# Patient Record
Sex: FEMALE | Race: WHITE | NOT HISPANIC OR LATINO | Employment: FULL TIME | URBAN - METROPOLITAN AREA
[De-identification: names, ages, dates, MRNs, and addresses within clinical notes are randomized per-mention and may not be internally consistent; named-entity substitution may affect disease eponyms.]

---

## 2017-05-13 LAB
EXTERNAL HIV CONFIRMATION: NORMAL
EXTERNAL HIV SCREEN: NORMAL
EXTERNAL HIV SCREEN: NORMAL

## 2018-10-09 ENCOUNTER — APPOINTMENT (OUTPATIENT)
Dept: URGENT CARE | Facility: MEDICAL CENTER | Age: 58
End: 2018-10-09
Payer: OTHER MISCELLANEOUS

## 2018-10-09 PROCEDURE — 99283 EMERGENCY DEPT VISIT LOW MDM: CPT

## 2018-10-09 PROCEDURE — G0382 LEV 3 HOSP TYPE B ED VISIT: HCPCS

## 2018-10-11 ENCOUNTER — APPOINTMENT (OUTPATIENT)
Dept: URGENT CARE | Facility: MEDICAL CENTER | Age: 58
End: 2018-10-11
Payer: OTHER MISCELLANEOUS

## 2018-10-11 PROCEDURE — 99213 OFFICE O/P EST LOW 20 MIN: CPT

## 2018-10-15 ENCOUNTER — APPOINTMENT (OUTPATIENT)
Dept: URGENT CARE | Facility: MEDICAL CENTER | Age: 58
End: 2018-10-15
Payer: OTHER MISCELLANEOUS

## 2018-10-15 PROCEDURE — 99213 OFFICE O/P EST LOW 20 MIN: CPT

## 2018-10-22 ENCOUNTER — APPOINTMENT (OUTPATIENT)
Dept: URGENT CARE | Facility: MEDICAL CENTER | Age: 58
End: 2018-10-22
Payer: OTHER MISCELLANEOUS

## 2018-10-22 PROCEDURE — 99213 OFFICE O/P EST LOW 20 MIN: CPT

## 2018-10-29 ENCOUNTER — APPOINTMENT (OUTPATIENT)
Dept: URGENT CARE | Facility: MEDICAL CENTER | Age: 58
End: 2018-10-29
Payer: OTHER MISCELLANEOUS

## 2018-10-29 PROCEDURE — 99213 OFFICE O/P EST LOW 20 MIN: CPT

## 2019-01-08 RX ORDER — DICLOFENAC SODIUM 75 MG/1
75 TABLET, DELAYED RELEASE ORAL 3 TIMES DAILY
COMMUNITY
End: 2020-09-28 | Stop reason: ALTCHOICE

## 2019-01-08 RX ORDER — METAXALONE 800 MG/1
800 TABLET ORAL 3 TIMES DAILY
COMMUNITY
Start: 2018-11-26 | End: 2022-03-29 | Stop reason: ALTCHOICE

## 2019-01-08 RX ORDER — PREDNISONE 20 MG/1
20 TABLET ORAL AS NEEDED
COMMUNITY
End: 2020-12-02

## 2019-01-08 RX ORDER — CETIRIZINE HYDROCHLORIDE 10 MG/1
10 TABLET, CHEWABLE ORAL DAILY
COMMUNITY
End: 2021-01-05 | Stop reason: ALTCHOICE

## 2019-01-08 RX ORDER — ONDANSETRON 4 MG/1
4 TABLET, ORALLY DISINTEGRATING ORAL 2 TIMES DAILY PRN
Status: ON HOLD | COMMUNITY
Start: 2016-11-30 | End: 2020-12-25 | Stop reason: CLARIF

## 2019-01-08 RX ORDER — NORTRIPTYLINE HYDROCHLORIDE 10 MG/1
10 CAPSULE ORAL
COMMUNITY
Start: 2018-08-29 | End: 2020-01-29 | Stop reason: ALTCHOICE

## 2019-01-08 RX ORDER — GABAPENTIN 300 MG/1
CAPSULE ORAL
COMMUNITY
Start: 2018-12-27

## 2019-01-08 RX ORDER — ZOLMITRIPTAN 2.5 MG/1
TABLET, FILM COATED ORAL
COMMUNITY
Start: 2018-08-29 | End: 2020-02-05 | Stop reason: SDUPTHER

## 2019-01-08 RX ORDER — DULOXETIN HYDROCHLORIDE 60 MG/1
60 CAPSULE, DELAYED RELEASE ORAL DAILY
COMMUNITY
Start: 2018-12-27 | End: 2020-03-24 | Stop reason: SDUPTHER

## 2019-01-08 RX ORDER — ARMODAFINIL 250 MG/1
250 TABLET ORAL
COMMUNITY
Start: 2018-11-13 | End: 2019-08-30 | Stop reason: SDUPTHER

## 2019-01-08 RX ORDER — AMPICILLIN TRIHYDRATE 250 MG
CAPSULE ORAL
COMMUNITY
End: 2019-11-20 | Stop reason: HOSPADM

## 2019-01-08 RX ORDER — MORPHINE SULFATE 15 MG/1
15 TABLET, FILM COATED, EXTENDED RELEASE ORAL 2 TIMES DAILY
Refills: 0 | COMMUNITY
Start: 2018-11-05 | End: 2019-06-25 | Stop reason: ALTCHOICE

## 2019-01-08 RX ORDER — FERROUS SULFATE 325(65) MG
325 TABLET ORAL
COMMUNITY
Start: 2014-09-04

## 2019-01-08 RX ORDER — HYOSCYAMINE SULFATE 0.125 MG
0.12 TABLET ORAL
COMMUNITY
Start: 2016-04-16 | End: 2019-09-25 | Stop reason: ALTCHOICE

## 2019-01-11 ENCOUNTER — OFFICE VISIT (OUTPATIENT)
Dept: UROLOGY | Facility: MEDICAL CENTER | Age: 59
End: 2019-01-11

## 2019-01-11 VITALS
WEIGHT: 134 LBS | BODY MASS INDEX: 22.88 KG/M2 | HEART RATE: 87 BPM | DIASTOLIC BLOOD PRESSURE: 70 MMHG | HEIGHT: 64 IN | SYSTOLIC BLOOD PRESSURE: 120 MMHG

## 2019-01-11 DIAGNOSIS — Z87.448 HISTORY OF HYDRONEPHROSIS: ICD-10-CM

## 2019-01-11 DIAGNOSIS — N39.41 URGE INCONTINENCE: ICD-10-CM

## 2019-01-11 DIAGNOSIS — R35.0 FREQUENCY OF URINATION: Primary | ICD-10-CM

## 2019-01-11 LAB
POST-VOID RESIDUAL VOLUME, ML POC: 6 ML
SL AMB  POCT GLUCOSE, UA: ABNORMAL
SL AMB LEUKOCYTE ESTERASE,UA: ABNORMAL
SL AMB POCT BILIRUBIN,UA: ABNORMAL
SL AMB POCT BLOOD,UA: ABNORMAL
SL AMB POCT CLARITY,UA: CLEAR
SL AMB POCT COLOR,UA: YELLOW
SL AMB POCT KETONES,UA: ABNORMAL
SL AMB POCT NITRITE,UA: ABNORMAL
SL AMB POCT PH,UA: 5
SL AMB POCT SPECIFIC GRAVITY,UA: 1.03
SL AMB POCT URINE PROTEIN: ABNORMAL
SL AMB POCT UROBILINOGEN: 0.2

## 2019-01-11 PROCEDURE — 81003 URINALYSIS AUTO W/O SCOPE: CPT | Performed by: UROLOGY

## 2019-01-11 PROCEDURE — 51798 US URINE CAPACITY MEASURE: CPT | Performed by: UROLOGY

## 2019-01-11 PROCEDURE — 99204 OFFICE O/P NEW MOD 45 MIN: CPT | Performed by: UROLOGY

## 2019-01-11 RX ORDER — VITAMIN E 268 MG
400 CAPSULE ORAL DAILY
COMMUNITY
End: 2021-03-16
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
## 2019-05-29 ENCOUNTER — HOSPITAL ENCOUNTER (EMERGENCY)
Facility: HOSPITAL | Age: 59
Discharge: HOME | End: 2019-05-29
Attending: EMERGENCY MEDICINE | Admitting: EMERGENCY MEDICINE
Payer: COMMERCIAL

## 2019-05-29 ENCOUNTER — OFFICE VISIT (OUTPATIENT)
Dept: URGENT CARE | Facility: CLINIC | Age: 59
End: 2019-05-29
Payer: COMMERCIAL

## 2019-05-29 VITALS
OXYGEN SATURATION: 98 % | SYSTOLIC BLOOD PRESSURE: 118 MMHG | TEMPERATURE: 98.1 F | WEIGHT: 126.98 LBS | BODY MASS INDEX: 21.8 KG/M2 | RESPIRATION RATE: 16 BRPM | HEART RATE: 78 BPM | DIASTOLIC BLOOD PRESSURE: 64 MMHG

## 2019-05-29 VITALS
DIASTOLIC BLOOD PRESSURE: 59 MMHG | TEMPERATURE: 96.9 F | SYSTOLIC BLOOD PRESSURE: 100 MMHG | RESPIRATION RATE: 18 BRPM | OXYGEN SATURATION: 99 % | HEART RATE: 81 BPM

## 2019-05-29 DIAGNOSIS — R53.1 WEAKNESS: Primary | ICD-10-CM

## 2019-05-29 DIAGNOSIS — R41.82 FLUCTUATING MENTAL STATUS: ICD-10-CM

## 2019-05-29 DIAGNOSIS — N39.0 UTI (URINARY TRACT INFECTION): Primary | ICD-10-CM

## 2019-05-29 DIAGNOSIS — R63.8 DEHYDRATION SYMPTOMS: ICD-10-CM

## 2019-05-29 LAB
ALBUMIN SERPL BCP-MCNC: 3.2 G/DL (ref 3.5–5)
ALP SERPL-CCNC: 89 U/L (ref 46–116)
ALT SERPL W P-5'-P-CCNC: 19 U/L (ref 12–78)
ANION GAP SERPL CALCULATED.3IONS-SCNC: 11 MMOL/L (ref 4–13)
AST SERPL W P-5'-P-CCNC: 18 U/L (ref 5–45)
BACTERIA UR QL AUTO: ABNORMAL /HPF
BASOPHILS # BLD AUTO: 0.05 THOUSANDS/ΜL (ref 0–0.1)
BASOPHILS NFR BLD AUTO: 0 % (ref 0–1)
BILIRUB SERPL-MCNC: 0.78 MG/DL (ref 0.2–1)
BILIRUB UR QL STRIP: NEGATIVE
BUN SERPL-MCNC: 29 MG/DL (ref 5–25)
CALCIUM SERPL-MCNC: 9.5 MG/DL (ref 8.3–10.1)
CHLORIDE SERPL-SCNC: 100 MMOL/L (ref 100–108)
CLARITY UR: CLEAR
CO2 SERPL-SCNC: 23 MMOL/L (ref 21–32)
COLOR UR: YELLOW
CREAT SERPL-MCNC: 1.21 MG/DL (ref 0.6–1.3)
EOSINOPHIL # BLD AUTO: 0.2 THOUSAND/ΜL (ref 0–0.61)
EOSINOPHIL NFR BLD AUTO: 1 % (ref 0–6)
ERYTHROCYTE [DISTWIDTH] IN BLOOD BY AUTOMATED COUNT: 12.7 % (ref 11.6–15.1)
GFR SERPL CREATININE-BSD FRML MDRD: 49 ML/MIN/1.73SQ M
GLUCOSE SERPL-MCNC: 96 MG/DL (ref 65–140)
GLUCOSE UR STRIP-MCNC: NEGATIVE MG/DL
HCT VFR BLD AUTO: 34.9 % (ref 34.8–46.1)
HGB BLD-MCNC: 11.4 G/DL (ref 11.5–15.4)
HGB UR QL STRIP.AUTO: ABNORMAL
IMM GRANULOCYTES # BLD AUTO: 0.06 THOUSAND/UL (ref 0–0.2)
IMM GRANULOCYTES NFR BLD AUTO: 0 % (ref 0–2)
KETONES UR STRIP-MCNC: NEGATIVE MG/DL
LEUKOCYTE ESTERASE UR QL STRIP: ABNORMAL
LIPASE SERPL-CCNC: 53 U/L (ref 73–393)
LYMPHOCYTES # BLD AUTO: 0.67 THOUSANDS/ΜL (ref 0.6–4.47)
LYMPHOCYTES NFR BLD AUTO: 5 % (ref 14–44)
MCH RBC QN AUTO: 31.4 PG (ref 26.8–34.3)
MCHC RBC AUTO-ENTMCNC: 32.7 G/DL (ref 31.4–37.4)
MCV RBC AUTO: 96 FL (ref 82–98)
MONOCYTES # BLD AUTO: 0.65 THOUSAND/ΜL (ref 0.17–1.22)
MONOCYTES NFR BLD AUTO: 5 % (ref 4–12)
NEUTROPHILS # BLD AUTO: 12.64 THOUSANDS/ΜL (ref 1.85–7.62)
NEUTS SEG NFR BLD AUTO: 89 % (ref 43–75)
NITRITE UR QL STRIP: POSITIVE
NON-SQ EPI CELLS URNS QL MICRO: ABNORMAL /HPF
NRBC BLD AUTO-RTO: 0 /100 WBCS
PH UR STRIP.AUTO: 5 [PH] (ref 4.5–8)
PLATELET # BLD AUTO: 241 THOUSANDS/UL (ref 149–390)
PMV BLD AUTO: 9.7 FL (ref 8.9–12.7)
POTASSIUM SERPL-SCNC: 4.1 MMOL/L (ref 3.5–5.3)
PROT SERPL-MCNC: 7 G/DL (ref 6.4–8.2)
PROT UR STRIP-MCNC: NEGATIVE MG/DL
RBC # BLD AUTO: 3.63 MILLION/UL (ref 3.81–5.12)
RBC #/AREA URNS AUTO: ABNORMAL /HPF
SODIUM SERPL-SCNC: 134 MMOL/L (ref 136–145)
SP GR UR STRIP.AUTO: 1.01 (ref 1–1.03)
TSH SERPL DL<=0.05 MIU/L-ACNC: 0.79 UIU/ML (ref 0.36–3.74)
UROBILINOGEN UR QL STRIP.AUTO: 0.2 E.U./DL
WBC # BLD AUTO: 14.27 THOUSAND/UL (ref 4.31–10.16)
WBC #/AREA URNS AUTO: ABNORMAL /HPF

## 2019-05-29 PROCEDURE — 81001 URINALYSIS AUTO W/SCOPE: CPT

## 2019-05-29 PROCEDURE — 99284 EMERGENCY DEPT VISIT MOD MDM: CPT

## 2019-05-29 PROCEDURE — 84443 ASSAY THYROID STIM HORMONE: CPT | Performed by: EMERGENCY MEDICINE

## 2019-05-29 PROCEDURE — 87186 SC STD MICRODIL/AGAR DIL: CPT | Performed by: EMERGENCY MEDICINE

## 2019-05-29 PROCEDURE — 96360 HYDRATION IV INFUSION INIT: CPT

## 2019-05-29 PROCEDURE — 36415 COLL VENOUS BLD VENIPUNCTURE: CPT | Performed by: EMERGENCY MEDICINE

## 2019-05-29 PROCEDURE — S9088 SERVICES PROVIDED IN URGENT: HCPCS | Performed by: PHYSICIAN ASSISTANT

## 2019-05-29 PROCEDURE — 85025 COMPLETE CBC W/AUTO DIFF WBC: CPT | Performed by: EMERGENCY MEDICINE

## 2019-05-29 PROCEDURE — 83690 ASSAY OF LIPASE: CPT | Performed by: EMERGENCY MEDICINE

## 2019-05-29 PROCEDURE — 99284 EMERGENCY DEPT VISIT MOD MDM: CPT | Performed by: EMERGENCY MEDICINE

## 2019-05-29 PROCEDURE — 99203 OFFICE O/P NEW LOW 30 MIN: CPT | Performed by: PHYSICIAN ASSISTANT

## 2019-05-29 PROCEDURE — 81003 URINALYSIS AUTO W/O SCOPE: CPT

## 2019-05-29 PROCEDURE — 87086 URINE CULTURE/COLONY COUNT: CPT | Performed by: EMERGENCY MEDICINE

## 2019-05-29 PROCEDURE — 87077 CULTURE AEROBIC IDENTIFY: CPT | Performed by: EMERGENCY MEDICINE

## 2019-05-29 PROCEDURE — 80053 COMPREHEN METABOLIC PANEL: CPT | Performed by: EMERGENCY MEDICINE

## 2019-05-29 RX ORDER — CEPHALEXIN 250 MG/1
500 CAPSULE ORAL ONCE
Status: COMPLETED | OUTPATIENT
Start: 2019-05-29 | End: 2019-05-29

## 2019-05-29 RX ORDER — CEPHALEXIN 500 MG/1
500 CAPSULE ORAL EVERY 12 HOURS SCHEDULED
Qty: 14 CAPSULE | Refills: 0 | Status: SHIPPED | OUTPATIENT
Start: 2019-05-29 | End: 2019-06-05

## 2019-05-29 RX ADMIN — SODIUM CHLORIDE 1000 ML: 0.9 INJECTION, SOLUTION INTRAVENOUS at 21:04

## 2019-05-29 RX ADMIN — CEPHALEXIN 500 MG: 250 CAPSULE ORAL at 23:24

## 2019-06-01 LAB — BACTERIA UR CULT: ABNORMAL

## 2019-06-25 ENCOUNTER — OFFICE VISIT (OUTPATIENT)
Dept: FAMILY MEDICINE CLINIC | Facility: CLINIC | Age: 59
End: 2019-06-25
Payer: COMMERCIAL

## 2019-06-25 VITALS
TEMPERATURE: 97.7 F | DIASTOLIC BLOOD PRESSURE: 76 MMHG | BODY MASS INDEX: 21.58 KG/M2 | WEIGHT: 126.4 LBS | HEIGHT: 64 IN | HEART RATE: 83 BPM | SYSTOLIC BLOOD PRESSURE: 118 MMHG | OXYGEN SATURATION: 96 %

## 2019-06-25 DIAGNOSIS — N39.0 URINARY TRACT INFECTION WITHOUT HEMATURIA, SITE UNSPECIFIED: Primary | ICD-10-CM

## 2019-06-25 DIAGNOSIS — R79.89 LOW SERUM SODIUM: ICD-10-CM

## 2019-06-25 DIAGNOSIS — D72.829 LEUKOCYTOSIS, UNSPECIFIED TYPE: ICD-10-CM

## 2019-06-25 DIAGNOSIS — R79.9 ELEVATED BUN: ICD-10-CM

## 2019-06-25 DIAGNOSIS — D64.9 ANEMIA, UNSPECIFIED TYPE: ICD-10-CM

## 2019-06-25 DIAGNOSIS — E78.00 PURE HYPERCHOLESTEROLEMIA: ICD-10-CM

## 2019-06-25 DIAGNOSIS — Z87.448 HISTORY OF HYDRONEPHROSIS: ICD-10-CM

## 2019-06-25 PROBLEM — K58.8 OTHER IRRITABLE BOWEL SYNDROME: Status: ACTIVE | Noted: 2019-06-25

## 2019-06-25 PROBLEM — M79.7 FIBROMYALGIA: Status: ACTIVE | Noted: 2019-06-25

## 2019-06-25 PROBLEM — G43.909 MIGRAINE: Status: ACTIVE | Noted: 2019-06-25

## 2019-06-25 PROBLEM — G47.11 IDIOPATHIC HYPERSOMNIA: Status: ACTIVE | Noted: 2019-06-25

## 2019-06-25 PROBLEM — T78.40XA ALLERGY: Status: ACTIVE | Noted: 2019-06-25

## 2019-06-25 PROBLEM — M81.0 OSTEOPOROSIS: Status: ACTIVE | Noted: 2019-06-25

## 2019-06-25 PROBLEM — E61.1 LOW IRON: Status: ACTIVE | Noted: 2019-06-25

## 2019-06-25 PROBLEM — K57.90 DIVERTICULOSIS: Status: ACTIVE | Noted: 2019-06-25

## 2019-06-25 PROCEDURE — 99204 OFFICE O/P NEW MOD 45 MIN: CPT | Performed by: FAMILY MEDICINE

## 2019-06-25 PROCEDURE — 3008F BODY MASS INDEX DOCD: CPT | Performed by: FAMILY MEDICINE

## 2019-06-25 RX ORDER — PANTOPRAZOLE SODIUM 20 MG/1
20 TABLET, DELAYED RELEASE ORAL DAILY PRN
COMMUNITY
End: 2019-10-03

## 2019-06-27 ENCOUNTER — APPOINTMENT (OUTPATIENT)
Dept: LAB | Facility: CLINIC | Age: 59
End: 2019-06-27
Payer: COMMERCIAL

## 2019-06-27 DIAGNOSIS — R79.9 ELEVATED BUN: ICD-10-CM

## 2019-06-27 DIAGNOSIS — R79.89 LOW SERUM SODIUM: ICD-10-CM

## 2019-06-27 DIAGNOSIS — D72.829 LEUKOCYTOSIS, UNSPECIFIED TYPE: ICD-10-CM

## 2019-06-27 DIAGNOSIS — D64.9 ANEMIA, UNSPECIFIED TYPE: ICD-10-CM

## 2019-06-27 DIAGNOSIS — N39.0 URINARY TRACT INFECTION WITHOUT HEMATURIA, SITE UNSPECIFIED: ICD-10-CM

## 2019-06-27 DIAGNOSIS — E78.00 PURE HYPERCHOLESTEROLEMIA: ICD-10-CM

## 2019-06-27 LAB
ALBUMIN SERPL BCP-MCNC: 3.8 G/DL (ref 3.5–5)
ALP SERPL-CCNC: 81 U/L (ref 46–116)
ALT SERPL W P-5'-P-CCNC: 24 U/L (ref 12–78)
ANION GAP SERPL CALCULATED.3IONS-SCNC: 6 MMOL/L (ref 4–13)
AST SERPL W P-5'-P-CCNC: 18 U/L (ref 5–45)
BACTERIA UR QL AUTO: ABNORMAL /HPF
BASOPHILS # BLD AUTO: 0.08 THOUSANDS/ΜL (ref 0–0.1)
BASOPHILS NFR BLD AUTO: 1 % (ref 0–1)
BILIRUB SERPL-MCNC: 0.47 MG/DL (ref 0.2–1)
BILIRUB UR QL STRIP: NEGATIVE
BUN SERPL-MCNC: 13 MG/DL (ref 5–25)
CALCIUM SERPL-MCNC: 9.5 MG/DL (ref 8.3–10.1)
CHLORIDE SERPL-SCNC: 107 MMOL/L (ref 100–108)
CHOLEST SERPL-MCNC: 265 MG/DL (ref 50–200)
CLARITY UR: ABNORMAL
CO2 SERPL-SCNC: 28 MMOL/L (ref 21–32)
COLOR UR: YELLOW
CREAT SERPL-MCNC: 0.66 MG/DL (ref 0.6–1.3)
EOSINOPHIL # BLD AUTO: 0.6 THOUSAND/ΜL (ref 0–0.61)
EOSINOPHIL NFR BLD AUTO: 9 % (ref 0–6)
ERYTHROCYTE [DISTWIDTH] IN BLOOD BY AUTOMATED COUNT: 13.5 % (ref 11.6–15.1)
FERRITIN SERPL-MCNC: 195 NG/ML (ref 8–388)
GFR SERPL CREATININE-BSD FRML MDRD: 96 ML/MIN/1.73SQ M
GLUCOSE P FAST SERPL-MCNC: 90 MG/DL (ref 65–99)
GLUCOSE UR STRIP-MCNC: NEGATIVE MG/DL
HCT VFR BLD AUTO: 39.7 % (ref 34.8–46.1)
HDLC SERPL-MCNC: 80 MG/DL (ref 40–60)
HGB BLD-MCNC: 12.4 G/DL (ref 11.5–15.4)
HGB UR QL STRIP.AUTO: NEGATIVE
HYALINE CASTS #/AREA URNS LPF: ABNORMAL /LPF
IMM GRANULOCYTES # BLD AUTO: 0.02 THOUSAND/UL (ref 0–0.2)
IMM GRANULOCYTES NFR BLD AUTO: 0 % (ref 0–2)
IRON SATN MFR SERPL: 33 %
IRON SERPL-MCNC: 86 UG/DL (ref 50–170)
KETONES UR STRIP-MCNC: NEGATIVE MG/DL
LDLC SERPL CALC-MCNC: 162 MG/DL (ref 0–100)
LEUKOCYTE ESTERASE UR QL STRIP: ABNORMAL
LYMPHOCYTES # BLD AUTO: 1.66 THOUSANDS/ΜL (ref 0.6–4.47)
LYMPHOCYTES NFR BLD AUTO: 25 % (ref 14–44)
MCH RBC QN AUTO: 30.5 PG (ref 26.8–34.3)
MCHC RBC AUTO-ENTMCNC: 31.2 G/DL (ref 31.4–37.4)
MCV RBC AUTO: 98 FL (ref 82–98)
MONOCYTES # BLD AUTO: 0.34 THOUSAND/ΜL (ref 0.17–1.22)
MONOCYTES NFR BLD AUTO: 5 % (ref 4–12)
NEUTROPHILS # BLD AUTO: 4.08 THOUSANDS/ΜL (ref 1.85–7.62)
NEUTS SEG NFR BLD AUTO: 60 % (ref 43–75)
NITRITE UR QL STRIP: POSITIVE
NON-SQ EPI CELLS URNS QL MICRO: ABNORMAL /HPF
NRBC BLD AUTO-RTO: 0 /100 WBCS
PH UR STRIP.AUTO: 7.5 [PH]
PLATELET # BLD AUTO: 244 THOUSANDS/UL (ref 149–390)
PMV BLD AUTO: 11.6 FL (ref 8.9–12.7)
POTASSIUM SERPL-SCNC: 4.4 MMOL/L (ref 3.5–5.3)
PROT SERPL-MCNC: 7 G/DL (ref 6.4–8.2)
PROT UR STRIP-MCNC: NEGATIVE MG/DL
RBC # BLD AUTO: 4.06 MILLION/UL (ref 3.81–5.12)
RBC #/AREA URNS AUTO: ABNORMAL /HPF
SODIUM SERPL-SCNC: 141 MMOL/L (ref 136–145)
SP GR UR STRIP.AUTO: 1.02 (ref 1–1.03)
TIBC SERPL-MCNC: 264 UG/DL (ref 250–450)
TRIGL SERPL-MCNC: 115 MG/DL
UROBILINOGEN UR QL STRIP.AUTO: 1 E.U./DL
WBC # BLD AUTO: 6.78 THOUSAND/UL (ref 4.31–10.16)
WBC #/AREA URNS AUTO: ABNORMAL /HPF

## 2019-06-27 PROCEDURE — 83540 ASSAY OF IRON: CPT

## 2019-06-27 PROCEDURE — 87086 URINE CULTURE/COLONY COUNT: CPT

## 2019-06-27 PROCEDURE — 80061 LIPID PANEL: CPT

## 2019-06-27 PROCEDURE — 81001 URINALYSIS AUTO W/SCOPE: CPT | Performed by: FAMILY MEDICINE

## 2019-06-27 PROCEDURE — 80053 COMPREHEN METABOLIC PANEL: CPT

## 2019-06-27 PROCEDURE — 87186 SC STD MICRODIL/AGAR DIL: CPT

## 2019-06-27 PROCEDURE — 83550 IRON BINDING TEST: CPT

## 2019-06-27 PROCEDURE — 36415 COLL VENOUS BLD VENIPUNCTURE: CPT

## 2019-06-27 PROCEDURE — 87077 CULTURE AEROBIC IDENTIFY: CPT

## 2019-06-27 PROCEDURE — 85025 COMPLETE CBC W/AUTO DIFF WBC: CPT

## 2019-06-27 PROCEDURE — 82728 ASSAY OF FERRITIN: CPT

## 2019-06-29 LAB — BACTERIA UR CULT: ABNORMAL

## 2019-07-01 ENCOUNTER — TELEPHONE (OUTPATIENT)
Dept: FAMILY MEDICINE CLINIC | Facility: CLINIC | Age: 59
End: 2019-07-01

## 2019-07-01 ENCOUNTER — TRANSCRIBE ORDERS (OUTPATIENT)
Dept: FAMILY MEDICINE CLINIC | Facility: CLINIC | Age: 59
End: 2019-07-01

## 2019-07-01 DIAGNOSIS — N39.0 URINARY TRACT INFECTION WITHOUT HEMATURIA, SITE UNSPECIFIED: ICD-10-CM

## 2019-07-01 DIAGNOSIS — D64.9 ANEMIA, UNSPECIFIED TYPE: Primary | ICD-10-CM

## 2019-07-01 NOTE — TELEPHONE ENCOUNTER
----- Message from Dinorah Tellez MD sent at 6/30/2019  2:56 PM EDT -----  uc is positive , if pt has no urinary symptoms , no treatment now , increase fluid intake , recheck ua and uc in 2 weeks anemia improved ,increase eosinophil    Check Cbc in one month ,also pt has high cholesterol , recommend medication

## 2019-07-03 ENCOUNTER — TELEPHONE (OUTPATIENT)
Dept: FAMILY MEDICINE CLINIC | Facility: CLINIC | Age: 59
End: 2019-07-03

## 2019-07-03 NOTE — TELEPHONE ENCOUNTER
----- Message from Wilfredo Perry MD sent at 7/3/2019 11:00 AM EDT -----  Urinalysis and urine culture is positive however if the patient has no urinary symptoms ,no treatment indicated at this time, but if she  Has symptoms, let me know so we can  Place her on antibiotic  White blood count is normal except eos is slightly above normal   Will recheck CBC  In 1 month   Iron study is normal  Blood work for kidney, sugar and liver are normal  Her cholesterol is high , recommend medication and to follow with low-fat diet

## 2019-07-15 ENCOUNTER — TELEPHONE (OUTPATIENT)
Dept: NEUROLOGY | Facility: CLINIC | Age: 59
End: 2019-07-15

## 2019-07-18 ENCOUNTER — APPOINTMENT (OUTPATIENT)
Dept: LAB | Facility: CLINIC | Age: 59
End: 2019-07-18
Payer: COMMERCIAL

## 2019-07-18 LAB
BACTERIA UR QL AUTO: ABNORMAL /HPF
BILIRUB UR QL STRIP: NEGATIVE
CLARITY UR: CLEAR
COLOR UR: YELLOW
GLUCOSE UR STRIP-MCNC: NEGATIVE MG/DL
HGB UR QL STRIP.AUTO: NEGATIVE
HYALINE CASTS #/AREA URNS LPF: ABNORMAL /LPF
KETONES UR STRIP-MCNC: NEGATIVE MG/DL
LEUKOCYTE ESTERASE UR QL STRIP: ABNORMAL
NITRITE UR QL STRIP: POSITIVE
NON-SQ EPI CELLS URNS QL MICRO: ABNORMAL /HPF
PH UR STRIP.AUTO: 6.5 [PH]
PROT UR STRIP-MCNC: NEGATIVE MG/DL
RBC #/AREA URNS AUTO: ABNORMAL /HPF
SP GR UR STRIP.AUTO: 1.02 (ref 1–1.03)
UROBILINOGEN UR QL STRIP.AUTO: 0.2 E.U./DL
WBC #/AREA URNS AUTO: ABNORMAL /HPF

## 2019-07-18 PROCEDURE — 81001 URINALYSIS AUTO W/SCOPE: CPT

## 2019-07-18 PROCEDURE — 87077 CULTURE AEROBIC IDENTIFY: CPT

## 2019-07-18 PROCEDURE — 87086 URINE CULTURE/COLONY COUNT: CPT

## 2019-07-18 PROCEDURE — 87186 SC STD MICRODIL/AGAR DIL: CPT

## 2019-07-21 LAB — BACTERIA UR CULT: ABNORMAL

## 2019-07-22 ENCOUNTER — TELEPHONE (OUTPATIENT)
Dept: FAMILY MEDICINE CLINIC | Facility: CLINIC | Age: 59
End: 2019-07-22

## 2019-07-22 DIAGNOSIS — N39.0 URINARY TRACT INFECTION WITHOUT HEMATURIA, SITE UNSPECIFIED: Primary | ICD-10-CM

## 2019-07-23 ENCOUNTER — TELEPHONE (OUTPATIENT)
Dept: FAMILY MEDICINE CLINIC | Facility: CLINIC | Age: 59
End: 2019-07-23

## 2019-07-23 DIAGNOSIS — N39.0 URINARY TRACT INFECTION WITHOUT HEMATURIA, SITE UNSPECIFIED: Primary | ICD-10-CM

## 2019-07-23 RX ORDER — CIPROFLOXACIN 250 MG/1
250 TABLET, FILM COATED ORAL EVERY 12 HOURS SCHEDULED
Qty: 6 TABLET | Refills: 0 | Status: SHIPPED | OUTPATIENT
Start: 2019-07-23 | End: 2019-07-26

## 2019-07-25 NOTE — TELEPHONE ENCOUNTER
Pt cipro went to [facility] pharmacy which is her mail away  She wanted it sent to [facility] in [place]   I called it into [facility] as directed on med list

## 2019-07-29 ENCOUNTER — OFFICE VISIT (OUTPATIENT)
Dept: NEUROLOGY | Facility: CLINIC | Age: 59
End: 2019-07-29
Payer: COMMERCIAL

## 2019-07-29 VITALS
BODY MASS INDEX: 20.93 KG/M2 | WEIGHT: 122.6 LBS | HEIGHT: 64 IN | DIASTOLIC BLOOD PRESSURE: 72 MMHG | HEART RATE: 78 BPM | RESPIRATION RATE: 16 BRPM | SYSTOLIC BLOOD PRESSURE: 108 MMHG

## 2019-07-29 DIAGNOSIS — G43.009 MIGRAINE WITHOUT AURA AND WITHOUT STATUS MIGRAINOSUS, NOT INTRACTABLE: Primary | ICD-10-CM

## 2019-07-29 PROCEDURE — 99203 OFFICE O/P NEW LOW 30 MIN: CPT | Performed by: PSYCHIATRY & NEUROLOGY

## 2019-07-29 RX ORDER — ASCORBIC ACID 1000 MG
40 TABLET ORAL DAILY
COMMUNITY

## 2019-07-29 NOTE — LETTER
July 29, 2019     Rajesh Moncada MD  [address]    Patient: Bhavna Lockett   YOB: 1960   Date of Visit: 7/29/2019       Dear Dr Jenifer Garza: Thank you for referring Bhavna Lockett to me for evaluation  Below are my notes for this consultation  If you have questions, please do not hesitate to call me  I look forward to following your patient along with you  Sincerely,        Pat Fabian MD        CC: No Recipients  Pat Fabian MD  7/29/2019  3:49 PM  Sign at close encounter  Patient ID: Bhavna Lockett is a 59 y o  female  Assessment/Plan:    Migraine without aura and without status migrainosus, not intractable  History as described quite consistent with a diagnosis of migraine without aura or common migraine  Headaches date back to her teen years  Has been evaluated and followed by Neurology elsewhere, and now looking to transfer into [place] [address] neurology  Fortunately, the frequency of her headaches would not suggest the need for a daily preventative medication in that she is only experiencing 2 and occasionally 3 per month  Those headaches when they do occur are fortunately quite nicely responsive to the judicious use of her zolmitriptan  --no role at this juncture for a daily preventative medication  --will continue to use zolmitriptan 2 5 milligram tablets taking 1 for headache, may repeat in 2 hours if needed  Limit 2/24 hours  She will follow up in 1 year or p r n     Subjective:    HPI  Patient, age 59 years and right-handed, presents in transfer given her history of migraine headaches  Has be followed by [address] [facility] neurology and is now looking to continue her care through [place] [address] neurology  As a background, she has a history of headaches dating back to her teen years  The headaches are for the most part stereotyped   Generally the headache begins in the left frontoparietal area and then becomes bilateral   The quality of the pain is described as a pressure which at times is pulsatile  With her headaches she experiences light and sound sensitivity and nausea  Occasional vomiting  No aura  Duration of headaches untreated up to half a day  However, generally quickly respond to the use of her zolmitriptan  Frequency of headaches 2 to occasionally 3 per month and patient does not feel that the frequency is significant enough to warrant daily preventative medication  Describes a family history of cerebral aneurysm  Did have an MRA head performed in 2017 which revealed no evident aneurysm  Recent blood work includes an unremarkable CMP along with CBC with hemoglobin 12 4, hematocrit 39 7, white count 6 78 and platelet count 879  Past Medical History:   Diagnosis Date    Irritable bowel     Osteoarthritis     Osteoporosis     Other chronic pain     degenerative disc disease    Sleep disorder      Past Surgical History:   Procedure Laterality Date    ABDOMINAL ADHESION SURGERY      BLADDER SURGERY      CHOLECYSTECTOMY      HYSTERECTOMY      KNEE SURGERY Right     replacement    LAMINECTOMY      MOLE REMOVAL      OTHER SURGICAL HISTORY      enlarged lymph node removed left arm   no cancer    SPINAL FUSION      WISDOM TOOTH EXTRACTION       Social History     Socioeconomic History    Marital status: /Civil Union     Spouse name: None    Number of children: None    Years of education: None    Highest education level: None   Occupational History    None   Social Needs    Financial resource strain: None    Food insecurity:     Worry: None     Inability: None    Transportation needs:     Medical: None     Non-medical: None   Tobacco Use    Smoking status: Current Every Day Smoker     Packs/day: 0 25     Types: Cigarettes    Smokeless tobacco: Never Used   Substance and Sexual Activity    Alcohol use: Yes     Comment: rarely    Drug use: Yes     Types: Marijuana     Comment: medicinal marijuana    Sexual activity: None   Lifestyle    Physical activity:     Days per week: None     Minutes per session: None    Stress: None   Relationships    Social connections:     Talks on phone: None     Gets together: None     Attends Jewish service: None     Active member of club or organization: None     Attends meetings of clubs or organizations: None     Relationship status: None    Intimate partner violence:     Fear of current or ex partner: None     Emotionally abused: None     Physically abused: None     Forced sexual activity: None   Other Topics Concern    None   Social History Narrative    None     Family History   Problem Relation Age of Onset    Hypertension Father     Cancer Father     Diabetes Mother     Hypertension Mother     Stroke Brother     Nephrolithiasis Brother     Diabetes Sister      Allergies   Allergen Reactions    Other Shortness Of Breath     And mold sneezing and breathing problems    Celecoxib Other (See Comments)     Increased Heart Rate, Palpitations    Sumatriptan      Other reaction(s): SUMATRIPTAN (IMITREX) (Throat closure)  Pt analilia zolmitriptan   Tramadol Other (See Comments)     GI Upset sever vomiting      Medical Tape Dermatitis, Other (See Comments) and Rash     Adhesive from medication patches       Current Outpatient Medications:     Armodafinil 250 MG tablet, Take 250 mg by mouth, Disp: , Rfl:     Ascorbic Acid (VITAMIN C PO), Take by mouth, Disp: , Rfl:     B Complex Vitamins (B COMPLEX 1 PO), Take 1 tablet by mouth, Disp: , Rfl:     BACILLUS COAGULANS-INULIN PO, Take 1 capsule by mouth, Disp: , Rfl:     calcium carbonate 1250 MG capsule, Take 600 mg by mouth  , Disp: , Rfl:     cetirizine (ZyrTEC) 10 MG chewable tablet, Chew 10 mg, Disp: , Rfl:     Cholecalciferol 1000 units tablet, Take 1,000 Units by mouth, Disp: , Rfl:     Cinnamon 500 MG capsule, , Disp: , Rfl:     Citicoline Sodium 500 MG TABS, Take by mouth, Disp: , Rfl:     Coenzyme Q10 200 MG/GM POWD, Take 200 mg by mouth, Disp: , Rfl:     denosumab (PROLIA) 60 mg/mL, Inject 60 mg under the skin, Disp: , Rfl:     diclofenac (VOLTAREN) 75 mg EC tablet, Take 75 mg by mouth, Disp: , Rfl:     DICYCLOMINE HCL PO, Take by mouth, Disp: , Rfl:     DULoxetine (CYMBALTA) 60 mg delayed release capsule, Take 60 mg by mouth, Disp: , Rfl:     ferrous sulfate 325 (65 Fe) mg tablet, Take 325 mg by mouth, Disp: , Rfl:     gabapentin (NEURONTIN) 300 mg capsule, 300 mg at 1200 in addition to 600 mg at bedtime  , Disp: , Rfl:     Ginger, Zingiber officinalis, (GINGER ROOT) 550 MG CAPS, Take by mouth, Disp: , Rfl:     Green Tea, Camillia sinensis, (GREEN TEA EXTRACT PO), Take by mouth, Disp: , Rfl:     hyoscyamine (ANASPAZ,LEVSIN) 0 125 MG tablet, Take 0 125 mg by mouth, Disp: , Rfl:     metaxalone (SKELAXIN) 800 mg tablet, Take 800 mg by mouth Three times daily as needed, Disp: , Rfl:     Misc Natural Products (GINSENG-COMPLEX PO), daily  , Disp: , Rfl:     Multiple Vitamins-Minerals (CENTRUM SILVER PO), Take 1 tablet by mouth, Disp: , Rfl:     nortriptyline (PAMELOR) 10 mg capsule, Take 10 mg by mouth, Disp: , Rfl:     ondansetron (ZOFRAN-ODT) 4 mg disintegrating tablet, TAKE 1 TABLET (4 MG TOTAL) BY MOUTH EVERY 12 (TWELVE) HOURS AS NEEDED FOR NAUSEA OR VOMITING , Disp: , Rfl:     pantoprazole (PROTONIX) 20 mg tablet, Take 20 mg by mouth 2 (two) times a day, Disp: , Rfl:     predniSONE 20 mg tablet, Take 20 mg by mouth as needed , Disp: , Rfl:     Probiotic Product (PROBIOTIC-10) CAPS, Take by mouth, Disp: , Rfl:     TURMERIC PO, Take 500 mg by mouth, Disp: , Rfl:     vitamin E, tocopherol, 400 units capsule, Take 400 Units by mouth daily, Disp: , Rfl:     ZOLMitriptan (ZOMIG) 2 5 MG tablet, Take one tablet by mouth at the onset of migraine  May repeat in 2 hours   2/24 hrs, 4/week, 8/month , Disp: , Rfl:     aspirin 500 MG tablet, 1500mg in the morning 1000mg at lunch and 1500mg before bedtime, Disp: , Rfl:     Ginkgo Biloba 40 MG TABS, Take by mouth, Disp: , Rfl:     Objective:    Blood pressure 108/72, pulse 78, resp  rate 16, height 5' 4" (1 626 m), weight 55 6 kg (122 lb 9 6 oz)  Physical Exam  Head normocephalic  Eyes nonicteric  No audible anterior neck bruits  Lungs clear to auscultation  Rhythm regular  GI (abdomen) soft nontender  Bowel sounds present  No significant lower extremity edema  Neurological Exam  Alert  Pleasantly and appropriately conversational   Fully oriented  No dysarthria  Unremarkable spontaneous gait  Able to tandem walk  Romberg maneuver performed unremarkably  Cranial Nerves:   I: Olfactory sense intact bilaterally  II: Visual fields full to confrontation  Pupils equal, round, reactive to light with normal accomodation  Fundus: with bilaterally marginated discs  III,IV,VI: Extraocular muscles EOMI, no nystagmus  V: [place] and pterygoid strength  Sensation in the V1 through V3 distributions intact to pinprick and light touch bilaterally  VII: Face is symmetric with no weakness noted  VIII: Audition intact to finger rub bilaterally  IX/X: Uvula midline  Soft palate elevation symmetric  XI: Trapezius and SCM strength 5/5 bilaterally  XII: Tongue midline with no atrophy or fasciculations with appropriate movement  Accurate with finger-to-nose and heel-to-shin maneuvers bilaterally  Essentially full symmetrical strength throughout the 4 extremities with no upper extremity drift  Sensory testing grossly intact to pin, position and vibration throughout  No extinction with double simultaneous stimulation  Muscle stretch reflexes bilaterally 2 throughout the upper extremities, at the knees and at the ankles  Toe response downgoing bilaterally  ROS:    Review of Systems   Constitutional: Positive for appetite change and fatigue  Negative for fever  Recent weight loss, taste and smell changes,    HENT: Positive for hearing loss   Negative for tinnitus and voice change  Eyes: Positive for pain  Negative for photophobia  Double vision, loss of vision,    Respiratory: Negative  Negative for shortness of breath  Cardiovascular: Negative  Negative for palpitations  Gastrointestinal: Positive for diarrhea and nausea  Negative for vomiting  Bowel habit changes   Endocrine: Negative  Negative for cold intolerance and heat intolerance  Genitourinary: Positive for frequency and urgency  Negative for dysuria  Loss of bladder control   Musculoskeletal: Positive for back pain, gait problem (clumsiness) and neck pain  Negative for myalgias  Joint pain,muscle pain,pain with walking, difficulty walking,tingling and falls, cramping   Skin: Negative  Negative for rash  Allergic/Immunologic: Negative  Neurological: Positive for dizziness, tremors, speech difficulty (slurred speech, ), light-headedness, numbness (hands and feet, facial numbness) and headaches  Negative for seizures, syncope, facial asymmetry and weakness  twitching   Hematological: Negative  Does not bruise/bleed easily  Psychiatric/Behavioral: Positive for agitation, behavioral problems and confusion  Negative for hallucinations and sleep disturbance  Mood swings,loss os sexual drive, increased sleepiness, memory problems,        I personally reviewed the ROS that was entered by the medical assistant  *Please note this document was created using voice recognition software and may contain sound-alike word errors  *

## 2019-07-29 NOTE — PROGRESS NOTES
Patient ID: Francisca Bourne is a 59 y o  female  Assessment/Plan:    Migraine without aura and without status migrainosus, not intractable  History as described quite consistent with a diagnosis of migraine without aura or common migraine  Headaches date back to her teen years  Has been evaluated and followed by Neurology elsewhere, and now looking to transfer into [place] [id number] neurology  Fortunately, the frequency of her headaches would not suggest the need for a daily preventative medication in that she is only experiencing 2 and occasionally 3 per month  Those headaches when they do occur are fortunately quite nicely responsive to the judicious use of her zolmitriptan  --no role at this juncture for a daily preventative medication  --will continue to use zolmitriptan 2 5 milligram tablets taking 1 for headache, may repeat in 2 hours if needed  Limit 2/24 hours  She will follow up in 1 year or p r n     Subjective:    HPI  Patient, age 59 years and right-handed, presents in transfer given her history of migraine headaches  Has be followed by [facility] and is now looking to continue her care through [place] [address] neurology  As a background, she has a history of headaches dating back to her teen years  The headaches are for the most part stereotyped  Generally the headache begins in the left frontoparietal area and then becomes bilateral   The quality of the pain is described as a pressure which at times is pulsatile  With her headaches she experiences light and sound sensitivity and nausea  Occasional vomiting  No aura  Duration of headaches untreated up to half a day  However, generally quickly respond to the use of her zolmitriptan  Frequency of headaches 2 to occasionally 3 per month and patient does not feel that the frequency is significant enough to warrant daily preventative medication  Describes a family history of cerebral aneurysm    Did have an MRA head performed in 2017 which revealed no evident aneurysm  Recent blood work includes an unremarkable CMP along with CBC with hemoglobin 12 4, hematocrit 39 7, white count 6 78 and platelet count 574  Past Medical History:   Diagnosis Date    Irritable bowel     Osteoarthritis     Osteoporosis     Other chronic pain     degenerative disc disease    Sleep disorder      Past Surgical History:   Procedure Laterality Date    ABDOMINAL ADHESION SURGERY      BLADDER SURGERY      CHOLECYSTECTOMY      HYSTERECTOMY      KNEE SURGERY Right     replacement    LAMINECTOMY      MOLE REMOVAL      OTHER SURGICAL HISTORY      enlarged lymph node removed left arm   no cancer    SPINAL FUSION      WISDOM TOOTH EXTRACTION       Social History     Socioeconomic History    Marital status: /Civil Union     Spouse name: None    Number of children: None    Years of education: None    Highest education level: None   Occupational History    None   Social Needs    Financial resource strain: None    Food insecurity:     Worry: None     Inability: None    Transportation needs:     Medical: None     Non-medical: None   Tobacco Use    Smoking status: Current Every Day Smoker     Packs/day: 0 25     Types: Cigarettes    Smokeless tobacco: Never Used   Substance and Sexual Activity    Alcohol use: Yes     Comment: rarely    Drug use: Yes     Types: Marijuana     Comment: medicinal marijuana    Sexual activity: None   Lifestyle    Physical activity:     Days per week: None     Minutes per session: None    Stress: None   Relationships    Social connections:     Talks on phone: None     Gets together: None     Attends Holiness service: None     Active member of club or organization: None     Attends meetings of clubs or organizations: None     Relationship status: None    Intimate partner violence:     Fear of current or ex partner: None     Emotionally abused: None     Physically abused: None     Forced sexual activity: None   Other Topics Concern    None   Social History Narrative    None     Family History   Problem Relation Age of Onset    Hypertension Father     Cancer Father     Diabetes Mother     Hypertension Mother     Stroke Brother     Nephrolithiasis Brother     Diabetes Sister      Allergies   Allergen Reactions    Other Shortness Of Breath     And mold sneezing and breathing problems    Celecoxib Other (See Comments)     Increased Heart Rate, Palpitations    Sumatriptan      Other reaction(s): SUMATRIPTAN (IMITREX) (Throat closure)  Pt analilia zolmitriptan   Tramadol Other (See Comments)     GI Upset sever vomiting   Medical Tape Dermatitis, Other (See Comments) and Rash     Adhesive from medication patches       Current Outpatient Medications:     Armodafinil 250 MG tablet, Take 250 mg by mouth, Disp: , Rfl:     Ascorbic Acid (VITAMIN C PO), Take by mouth, Disp: , Rfl:     B Complex Vitamins (B COMPLEX 1 PO), Take 1 tablet by mouth, Disp: , Rfl:     BACILLUS COAGULANS-INULIN PO, Take 1 capsule by mouth, Disp: , Rfl:     calcium carbonate 1250 MG capsule, Take 600 mg by mouth  , Disp: , Rfl:     cetirizine (ZyrTEC) 10 MG chewable tablet, Chew 10 mg, Disp: , Rfl:     Cholecalciferol 1000 units tablet, Take 1,000 Units by mouth, Disp: , Rfl:     Cinnamon 500 MG capsule, , Disp: , Rfl:     Citicoline Sodium 500 MG TABS, Take by mouth, Disp: , Rfl:     Coenzyme Q10 200 MG/GM POWD, Take 200 mg by mouth, Disp: , Rfl:     denosumab (PROLIA) 60 mg/mL, Inject 60 mg under the skin, Disp: , Rfl:     diclofenac (VOLTAREN) 75 mg EC tablet, Take 75 mg by mouth, Disp: , Rfl:     DICYCLOMINE HCL PO, Take by mouth, Disp: , Rfl:     DULoxetine (CYMBALTA) 60 mg delayed release capsule, Take 60 mg by mouth, Disp: , Rfl:     ferrous sulfate 325 (65 Fe) mg tablet, Take 325 mg by mouth, Disp: , Rfl:     gabapentin (NEURONTIN) 300 mg capsule, 300 mg at 1200 in addition to 600 mg at bedtime  , Disp: , Rfl:     Lori, Zingiber officinalis, (GINGER ROOT) 550 MG CAPS, Take by mouth, Disp: , Rfl:     Green Tea, Camillia sinensis, (GREEN TEA EXTRACT PO), Take by mouth, Disp: , Rfl:     hyoscyamine (ANASPAZ,LEVSIN) 0 125 MG tablet, Take 0 125 mg by mouth, Disp: , Rfl:     metaxalone (SKELAXIN) 800 mg tablet, Take 800 mg by mouth Three times daily as needed, Disp: , Rfl:     Misc Natural Products (GINSENG-COMPLEX PO), daily  , Disp: , Rfl:     Multiple Vitamins-Minerals (CENTRUM SILVER PO), Take 1 tablet by mouth, Disp: , Rfl:     nortriptyline (PAMELOR) 10 mg capsule, Take 10 mg by mouth, Disp: , Rfl:     ondansetron (ZOFRAN-ODT) 4 mg disintegrating tablet, TAKE 1 TABLET (4 MG TOTAL) BY MOUTH EVERY 12 (TWELVE) HOURS AS NEEDED FOR NAUSEA OR VOMITING , Disp: , Rfl:     pantoprazole (PROTONIX) 20 mg tablet, Take 20 mg by mouth 2 (two) times a day, Disp: , Rfl:     predniSONE 20 mg tablet, Take 20 mg by mouth as needed , Disp: , Rfl:     Probiotic Product (PROBIOTIC-10) CAPS, Take by mouth, Disp: , Rfl:     TURMERIC PO, Take 500 mg by mouth, Disp: , Rfl:     vitamin E, tocopherol, 400 units capsule, Take 400 Units by mouth daily, Disp: , Rfl:     ZOLMitriptan (ZOMIG) 2 5 MG tablet, Take one tablet by mouth at the onset of migraine  May repeat in 2 hours  2/24 hrs, 4/week, 8/month , Disp: , Rfl:     aspirin 500 MG tablet, 1500mg in the morning 1000mg at lunch and 1500mg before bedtime, Disp: , Rfl:     Ginkgo Biloba 40 MG TABS, Take by mouth, Disp: , Rfl:     Objective:    Blood pressure 108/72, pulse 78, resp  rate 16, height 5' 4" (1 626 m), weight 55 6 kg (122 lb 9 6 oz)  Physical Exam  Head normocephalic  Eyes nonicteric  No audible anterior neck bruits  Lungs clear to auscultation  Rhythm regular  GI (abdomen) soft nontender  Bowel sounds present  No significant lower extremity edema  Neurological Exam  Alert  Pleasantly and appropriately conversational   Fully oriented  No dysarthria    Unremarkable spontaneous gait   Able to tandem walk  Romberg maneuver performed unremarkably  Cranial Nerves:   I: Olfactory sense intact bilaterally  II: Visual fields full to confrontation  Pupils equal, round, reactive to light with normal accomodation  Fundus: with bilaterally marginated discs  III,IV,VI: Extraocular muscles EOMI, no nystagmus  V: [place] and pterygoid strength  Sensation in the V1 through V3 distributions intact to pinprick and light touch bilaterally  VII: Face is symmetric with no weakness noted  VIII: Audition intact to finger rub bilaterally  IX/X: Uvula midline  Soft palate elevation symmetric  XI: Trapezius and SCM strength 5/5 bilaterally  XII: Tongue midline with no atrophy or fasciculations with appropriate movement  Accurate with finger-to-nose and heel-to-shin maneuvers bilaterally  Essentially full symmetrical strength throughout the 4 extremities with no upper extremity drift  Sensory testing grossly intact to pin, position and vibration throughout  No extinction with double simultaneous stimulation  Muscle stretch reflexes bilaterally 2 throughout the upper extremities, at the knees and at the ankles  Toe response downgoing bilaterally  ROS:    Review of Systems   Constitutional: Positive for appetite change and fatigue  Negative for fever  Recent weight loss, taste and smell changes,    HENT: Positive for hearing loss  Negative for tinnitus and voice change  Eyes: Positive for pain  Negative for photophobia  Double vision, loss of vision,    Respiratory: Negative  Negative for shortness of breath  Cardiovascular: Negative  Negative for palpitations  Gastrointestinal: Positive for diarrhea and nausea  Negative for vomiting  Bowel habit changes   Endocrine: Negative  Negative for cold intolerance and heat intolerance  Genitourinary: Positive for frequency and urgency  Negative for dysuria          Loss of bladder control   Musculoskeletal: Positive for back pain, gait problem (clumsiness) and neck pain  Negative for myalgias  Joint pain,muscle pain,pain with walking, difficulty walking,tingling and falls, cramping   Skin: Negative  Negative for rash  Allergic/Immunologic: Negative  Neurological: Positive for dizziness, tremors, speech difficulty (slurred speech, ), light-headedness, numbness (hands and feet, facial numbness) and headaches  Negative for seizures, syncope, facial asymmetry and weakness  twitching   Hematological: Negative  Does not bruise/bleed easily  Psychiatric/Behavioral: Positive for agitation, behavioral problems and confusion  Negative for hallucinations and sleep disturbance  Mood swings,loss os sexual drive, increased sleepiness, memory problems,        I personally reviewed the ROS that was entered by the medical assistant  *Please note this document was created using voice recognition software and may contain sound-alike word errors  *

## 2019-07-29 NOTE — ASSESSMENT & PLAN NOTE
History as described quite consistent with a diagnosis of migraine without aura or common migraine  Headaches date back to her teen years  Has been evaluated and followed by Neurology elsewhere, and now looking to transfer into [address]  Fortunately, the frequency of her headaches would not suggest the need for a daily preventative medication in that she is only experiencing 2 and occasionally 3 per month  Those headaches when they do occur are fortunately quite nicely responsive to the judicious use of her zolmitriptan  --no role at this juncture for a daily preventative medication  --will continue to use zolmitriptan 2 5 milligram tablets taking 1 for headache, may repeat in 2 hours if needed  Limit 2/24 hours

## 2019-07-31 ENCOUNTER — TRANSCRIBE ORDERS (OUTPATIENT)
Dept: ADMINISTRATIVE | Facility: HOSPITAL | Age: 59
End: 2019-07-31

## 2019-07-31 ENCOUNTER — APPOINTMENT (OUTPATIENT)
Dept: LAB | Facility: CLINIC | Age: 59
End: 2019-07-31
Payer: COMMERCIAL

## 2019-07-31 ENCOUNTER — TRANSCRIBE ORDERS (OUTPATIENT)
Dept: LAB | Facility: CLINIC | Age: 59
End: 2019-07-31

## 2019-07-31 DIAGNOSIS — D64.9 ANEMIA, UNSPECIFIED TYPE: ICD-10-CM

## 2019-07-31 LAB
BASOPHILS # BLD AUTO: 0.07 THOUSANDS/ΜL (ref 0–0.1)
BASOPHILS NFR BLD AUTO: 1 % (ref 0–1)
EOSINOPHIL # BLD AUTO: 0.25 THOUSAND/ΜL (ref 0–0.61)
EOSINOPHIL NFR BLD AUTO: 3 % (ref 0–6)
ERYTHROCYTE [DISTWIDTH] IN BLOOD BY AUTOMATED COUNT: 13.5 % (ref 11.6–15.1)
HCT VFR BLD AUTO: 36.6 % (ref 34.8–46.1)
HGB BLD-MCNC: 11.5 G/DL (ref 11.5–15.4)
IMM GRANULOCYTES # BLD AUTO: 0.02 THOUSAND/UL (ref 0–0.2)
IMM GRANULOCYTES NFR BLD AUTO: 0 % (ref 0–2)
LYMPHOCYTES # BLD AUTO: 1.86 THOUSANDS/ΜL (ref 0.6–4.47)
LYMPHOCYTES NFR BLD AUTO: 23 % (ref 14–44)
MCH RBC QN AUTO: 30.2 PG (ref 26.8–34.3)
MCHC RBC AUTO-ENTMCNC: 31.4 G/DL (ref 31.4–37.4)
MCV RBC AUTO: 96 FL (ref 82–98)
MONOCYTES # BLD AUTO: 0.34 THOUSAND/ΜL (ref 0.17–1.22)
MONOCYTES NFR BLD AUTO: 4 % (ref 4–12)
NEUTROPHILS # BLD AUTO: 5.51 THOUSANDS/ΜL (ref 1.85–7.62)
NEUTS SEG NFR BLD AUTO: 69 % (ref 43–75)
NRBC BLD AUTO-RTO: 0 /100 WBCS
PLATELET # BLD AUTO: 289 THOUSANDS/UL (ref 149–390)
PMV BLD AUTO: 10.7 FL (ref 8.9–12.7)
RBC # BLD AUTO: 3.81 MILLION/UL (ref 3.81–5.12)
WBC # BLD AUTO: 8.05 THOUSAND/UL (ref 4.31–10.16)

## 2019-07-31 PROCEDURE — 36415 COLL VENOUS BLD VENIPUNCTURE: CPT

## 2019-07-31 PROCEDURE — 85025 COMPLETE CBC W/AUTO DIFF WBC: CPT

## 2019-08-22 ENCOUNTER — CONSULT (OUTPATIENT)
Dept: PAIN MEDICINE | Facility: MEDICAL CENTER | Age: 59
End: 2019-08-22
Payer: COMMERCIAL

## 2019-08-22 VITALS
HEART RATE: 87 BPM | SYSTOLIC BLOOD PRESSURE: 120 MMHG | WEIGHT: 123.4 LBS | HEIGHT: 64 IN | BODY MASS INDEX: 21.07 KG/M2 | DIASTOLIC BLOOD PRESSURE: 73 MMHG | RESPIRATION RATE: 16 BRPM

## 2019-08-22 DIAGNOSIS — M54.50 CHRONIC BILATERAL LOW BACK PAIN WITHOUT SCIATICA: ICD-10-CM

## 2019-08-22 DIAGNOSIS — M47.816 LUMBAR SPONDYLOSIS: ICD-10-CM

## 2019-08-22 DIAGNOSIS — M47.812 SPONDYLOSIS OF CERVICAL REGION WITHOUT MYELOPATHY OR RADICULOPATHY: Primary | ICD-10-CM

## 2019-08-22 DIAGNOSIS — M54.2 NECK PAIN: ICD-10-CM

## 2019-08-22 DIAGNOSIS — G89.29 CHRONIC BILATERAL LOW BACK PAIN WITHOUT SCIATICA: ICD-10-CM

## 2019-08-22 PROCEDURE — 99204 OFFICE O/P NEW MOD 45 MIN: CPT | Performed by: PHYSICAL MEDICINE & REHABILITATION

## 2019-08-22 NOTE — PROGRESS NOTES
Assessment  1  Spondylosis of cervical region without myelopathy or radiculopathy    2  Neck pain    3  Lumbar spondylosis    4  Chronic bilateral low back pain without sciatica        Plan  1  We will schedule the patient for left C3-5 medial branch nerve blocks with intention of moving forward towards radiofrequency ablation if there is an appropriate diagnostic response  The initial blocks will be performed with 2% lidocaine and if an appropriate response is obtained upon review of the patient's pain diary, a confirmatory block will be scheduled with 0 5% bupivacaine  In the office today, we reviewed the nature of facet joint pathology in depth using a spine model  We discussed the approach we would use for the injections and provided literature for home review  The patient understands the risks associated with the procedure including bleeding, infection, tissue injury, and allergic reaction and provided verbal informed consent in the office today  We will address the right side if she continues to have similar symptoms as well    2  She likely will also be a candidate for lumbar medial branch blocks and radiofrequency ablation based on the axial spine pain she has there and the fact that she has responded to facet joint injections with Dr Oskar Madison in the past     3   Patient currently using medical marijuana products  Not a candidate for opioid therapy  My impressions and treatment recommendations were discussed in detail with the patient who verbalized understanding and had no further questions  Discharge instructions were provided  I personally saw and examined the patient and I agree with the above discussed plan of care  No orders of the defined types were placed in this encounter  No orders of the defined types were placed in this encounter  History of Present Illness    Rudolph Covington is a 59 y o  female who is seen in consultation for chronic neck and back pain complaints    This is been going on for over 7 years without any specific inciting event or trauma  She states the pain is moderate to severe intensity and rated as a 6/10 currently  This is constant without any typical pattern characterized as shooting, numbness, pins and needles, pressure-like, throbbing, dull, aching  She does describe upper and lower extremity weakness as well and utilizes a cane occasionally for long distance ambulation  Aggravating factors include lying down, standing, bending, sitting, walking, coughing, sneezing  Alleviating factors occasionally include relaxation sitting, walking  She has had CT scan of the cervical spine as well as lumbar MRI  She does have degenerative changes and prior history of ACDF at C5 through 7  Medical history is also significant for fibromyalgia  She is using medical marijuana currently  I have personally reviewed and/or updated the patient's past medical history, past surgical history, family history, social history, current medications, allergies, and vital signs today  Review of Systems   Constitutional: Positive for unexpected weight change  Negative for fever  HENT: Negative for trouble swallowing  Eyes: Positive for visual disturbance  Respiratory: Negative for shortness of breath and wheezing  Cardiovascular: Positive for leg swelling  Negative for chest pain and palpitations  Gastrointestinal: Positive for abdominal pain and nausea  Negative for constipation, diarrhea and vomiting  Endocrine: Negative for cold intolerance, heat intolerance and polydipsia  Genitourinary: Positive for frequency  Negative for difficulty urinating  Musculoskeletal: Positive for joint swelling and myalgias  Negative for arthralgias and gait problem  Skin: Negative for rash  Neurological: Positive for dizziness, numbness and headaches  Negative for seizures, syncope and weakness  Hematological: Does not bruise/bleed easily     Psychiatric/Behavioral: Positive for decreased concentration and dysphoric mood  All other systems reviewed and are negative  Patient Active Problem List   Diagnosis    Urge incontinence    Frequency of urination    History of hydronephrosis    Migraine    Fibromyalgia    Pure hypercholesterolemia    Allergy    Low iron    Idiopathic hypersomnia    Osteoporosis    Diverticulosis    Other irritable bowel syndrome    Migraine without aura and without status migrainosus, not intractable       Past Medical History:   Diagnosis Date    Irritable bowel     Osteoarthritis     Osteoporosis     Other chronic pain     degenerative disc disease    Sleep disorder        Past Surgical History:   Procedure Laterality Date    ABDOMINAL ADHESION SURGERY      BLADDER SURGERY      CHOLECYSTECTOMY      HYSTERECTOMY      KNEE SURGERY Right     replacement    LAMINECTOMY      MOLE REMOVAL      OTHER SURGICAL HISTORY      enlarged lymph node removed left arm   no cancer    SPINAL FUSION      WISDOM TOOTH EXTRACTION         Family History   Problem Relation Age of Onset    Hypertension Father     Cancer Father     Diabetes Mother     Hypertension Mother    Lydia Silence Stroke Brother     Nephrolithiasis Brother     Diabetes Sister        Social History     Occupational History    Not on file   Tobacco Use    Smoking status: Current Every Day Smoker     Packs/day: 0 25     Types: Cigarettes    Smokeless tobacco: Never Used   Substance and Sexual Activity    Alcohol use: Yes     Comment: rarely    Drug use: Yes     Types: Marijuana     Comment: medicinal marijuana    Sexual activity: Not on file       Current Outpatient Medications on File Prior to Visit   Medication Sig    Armodafinil 250 MG tablet Take 250 mg by mouth    Ascorbic Acid (VITAMIN C PO) Take by mouth    B Complex Vitamins (B COMPLEX 1 PO) Take 1 tablet by mouth    BACILLUS COAGULANS-INULIN PO Take 1 capsule by mouth    calcium carbonate 1250 MG capsule Take 600 mg by mouth      cetirizine (ZyrTEC) 10 MG chewable tablet Chew 10 mg    Cholecalciferol 1000 units tablet Take 1,000 Units by mouth    Cinnamon 500 MG capsule     Citicoline Sodium 500 MG TABS Take by mouth    Coenzyme Q10 200 MG/GM POWD Take 200 mg by mouth    denosumab (PROLIA) 60 mg/mL Inject 60 mg under the skin    diclofenac (VOLTAREN) 75 mg EC tablet Take 75 mg by mouth    DULoxetine (CYMBALTA) 60 mg delayed release capsule Take 60 mg by mouth    ferrous sulfate 325 (65 Fe) mg tablet Take 325 mg by mouth    gabapentin (NEURONTIN) 300 mg capsule 300 mg at 1200 in addition to 600 mg at bedtime   Ginger, Zingiber officinalis, (GINGER ROOT) 550 MG CAPS Take by mouth    Ginkgo Biloba 40 MG TABS Take by mouth    Green Tea, Camillia sinensis, (GREEN TEA EXTRACT PO) Take by mouth    hyoscyamine (ANASPAZ,LEVSIN) 0 125 MG tablet Take 0 125 mg by mouth    metaxalone (SKELAXIN) 800 mg tablet Take 800 mg by mouth Three times daily as needed    Misc Natural Products (GINSENG-COMPLEX PO) daily   Multiple Vitamins-Minerals (CENTRUM SILVER PO) Take 1 tablet by mouth    nortriptyline (PAMELOR) 10 mg capsule Take 10 mg by mouth    ondansetron (ZOFRAN-ODT) 4 mg disintegrating tablet TAKE 1 TABLET (4 MG TOTAL) BY MOUTH EVERY 12 (TWELVE) HOURS AS NEEDED FOR NAUSEA OR VOMITING   pantoprazole (PROTONIX) 20 mg tablet Take 20 mg by mouth 2 (two) times a day    predniSONE 20 mg tablet Take 20 mg by mouth as needed     Probiotic Product (PROBIOTIC-10) CAPS Take by mouth    TURMERIC PO Take 500 mg by mouth    vitamin E, tocopherol, 400 units capsule Take 400 Units by mouth daily    ZOLMitriptan (ZOMIG) 2 5 MG tablet Take one tablet by mouth at the onset of migraine  May repeat in 2 hours  2/24 hrs, 4/week, 8/month      [DISCONTINUED] aspirin 500 MG tablet 1500mg in the morning 1000mg at lunch and 1500mg before bedtime    [DISCONTINUED] DICYCLOMINE HCL PO Take by mouth     No current facility-administered medications on file prior to visit  Allergies   Allergen Reactions    Other Shortness Of Breath     And mold sneezing and breathing problems    Celecoxib Other (See Comments)     Increased Heart Rate, Palpitations    Sumatriptan      Other reaction(s): SUMATRIPTAN (IMITREX) (Throat closure)  Pt analilia zolmitriptan   Tramadol Other (See Comments)     GI Upset sever vomiting   Medical Tape Dermatitis, Other (See Comments) and Rash     Adhesive from medication patches       Physical Exam    /73   Pulse 87   Resp 16   Ht 5' 4" (1 626 m)   Wt 56 kg (123 lb 6 4 oz)   BMI 21 18 kg/m²     General: Well-developed, well-nourished individual in no acute distress  Mental: Appropriate mood and affect  Grossly oriented with coherent speech and thought processing   Neuro:  Cranial nerves: Cranial nerve function is grossly intact bilaterally   Strength: Bilateral upper extremity strength is normal and symmetric   No atrophy or tone abnormalities noted   Reflexes: Bilateral upper extremity muscle stretch reflexes are physiologic and symmetric   No Regalado sign   Sensation: No loss of sensation is noted   Gait:  Gait/gross motor: Gait is slightly antalgic and pigeon toed  Station is normal      Musculoskeletal:  Palpation: Inspection and palpation of the spine and extremities are unremarkable   Spine: Normal pain-free range of motion except for cervical rotation which is limited as well as cervical flexion and extension, lumbar spine extension is significantly limited in reproduces her axial back pain  No gross axial skeletal deformities   Skin: Skin inspection grossly negative for erythema, breakdown, or concerning lesions in affected area   Lymph: No lymphadenopathy is appreciated in the involved extremity   Vessels: No lower extremity edema   Lungs: Breathing is comfortable and regular  No dyspnea noted during examination       Eyes: Visual field grossly intact to confrontation  No redness appreciated  ENT: No craniofacial deformities or asymmetry  No neck masses appreciated            Imaging

## 2019-08-29 ENCOUNTER — OFFICE VISIT (OUTPATIENT)
Dept: FAMILY MEDICINE CLINIC | Facility: CLINIC | Age: 59
End: 2019-08-29
Payer: COMMERCIAL

## 2019-08-29 ENCOUNTER — OFFICE VISIT (OUTPATIENT)
Dept: UROLOGY | Facility: MEDICAL CENTER | Age: 59
End: 2019-08-29
Payer: COMMERCIAL

## 2019-08-29 ENCOUNTER — CLINICAL SUPPORT (OUTPATIENT)
Dept: URGENT CARE | Facility: CLINIC | Age: 59
End: 2019-08-29
Payer: COMMERCIAL

## 2019-08-29 ENCOUNTER — APPOINTMENT (OUTPATIENT)
Dept: RADIOLOGY | Facility: CLINIC | Age: 59
End: 2019-08-29
Payer: COMMERCIAL

## 2019-08-29 ENCOUNTER — APPOINTMENT (OUTPATIENT)
Dept: LAB | Facility: CLINIC | Age: 59
End: 2019-08-29
Payer: COMMERCIAL

## 2019-08-29 VITALS
WEIGHT: 122 LBS | HEIGHT: 64 IN | BODY MASS INDEX: 20.83 KG/M2 | DIASTOLIC BLOOD PRESSURE: 74 MMHG | HEART RATE: 80 BPM | SYSTOLIC BLOOD PRESSURE: 120 MMHG

## 2019-08-29 VITALS
RESPIRATION RATE: 16 BRPM | OXYGEN SATURATION: 99 % | WEIGHT: 123 LBS | DIASTOLIC BLOOD PRESSURE: 76 MMHG | HEART RATE: 76 BPM | TEMPERATURE: 98.2 F | HEIGHT: 64 IN | SYSTOLIC BLOOD PRESSURE: 118 MMHG | BODY MASS INDEX: 21 KG/M2

## 2019-08-29 DIAGNOSIS — K58.9 IRRITABLE BOWEL SYNDROME, UNSPECIFIED TYPE: ICD-10-CM

## 2019-08-29 DIAGNOSIS — D64.9 ANEMIA, UNSPECIFIED TYPE: ICD-10-CM

## 2019-08-29 DIAGNOSIS — R60.0 EDEMA OF BOTH LEGS: ICD-10-CM

## 2019-08-29 DIAGNOSIS — N30.00 ACUTE CYSTITIS WITHOUT HEMATURIA: ICD-10-CM

## 2019-08-29 DIAGNOSIS — G43.009 MIGRAINE WITHOUT AURA AND WITHOUT STATUS MIGRAINOSUS, NOT INTRACTABLE: ICD-10-CM

## 2019-08-29 DIAGNOSIS — D72.829 LEUKOCYTOSIS, UNSPECIFIED TYPE: ICD-10-CM

## 2019-08-29 DIAGNOSIS — M81.0 OSTEOPOROSIS WITHOUT CURRENT PATHOLOGICAL FRACTURE, UNSPECIFIED OSTEOPOROSIS TYPE: ICD-10-CM

## 2019-08-29 DIAGNOSIS — R21 RASH: ICD-10-CM

## 2019-08-29 DIAGNOSIS — Z12.11 SCREENING FOR COLON CANCER: ICD-10-CM

## 2019-08-29 DIAGNOSIS — N39.0 URINARY TRACT INFECTION WITHOUT HEMATURIA, SITE UNSPECIFIED: Primary | ICD-10-CM

## 2019-08-29 DIAGNOSIS — Z12.31 SCREENING MAMMOGRAM, ENCOUNTER FOR: ICD-10-CM

## 2019-08-29 DIAGNOSIS — N20.0 CALCULUS OF KIDNEY: ICD-10-CM

## 2019-08-29 DIAGNOSIS — M79.7 FIBROMYALGIA: ICD-10-CM

## 2019-08-29 DIAGNOSIS — R94.31 PROLONGED QT INTERVAL: ICD-10-CM

## 2019-08-29 DIAGNOSIS — N39.41 URGE INCONTINENCE: Primary | ICD-10-CM

## 2019-08-29 DIAGNOSIS — R79.9 ELEVATED BUN: ICD-10-CM

## 2019-08-29 DIAGNOSIS — R79.89 LOW SERUM SODIUM: ICD-10-CM

## 2019-08-29 DIAGNOSIS — R89.8 EOSINOPHILS INCREASED: ICD-10-CM

## 2019-08-29 DIAGNOSIS — E78.00 PURE HYPERCHOLESTEROLEMIA: ICD-10-CM

## 2019-08-29 LAB
NT-PROBNP SERPL-MCNC: 61 PG/ML
POST-VOID RESIDUAL VOLUME, ML POC: 37 ML
SL AMB  POCT GLUCOSE, UA: NORMAL
SL AMB LEUKOCYTE ESTERASE,UA: NORMAL
SL AMB POCT BILIRUBIN,UA: NORMAL
SL AMB POCT BLOOD,UA: NORMAL
SL AMB POCT CLARITY,UA: CLEAR
SL AMB POCT COLOR,UA: YELLOW
SL AMB POCT KETONES,UA: NORMAL
SL AMB POCT NITRITE,UA: NORMAL
SL AMB POCT PH,UA: 6.5
SL AMB POCT SPECIFIC GRAVITY,UA: 1.01
SL AMB POCT URINE PROTEIN: NORMAL
SL AMB POCT UROBILINOGEN: 0.2

## 2019-08-29 PROCEDURE — 99244 OFF/OP CNSLTJ NEW/EST MOD 40: CPT | Performed by: UROLOGY

## 2019-08-29 PROCEDURE — 81003 URINALYSIS AUTO W/O SCOPE: CPT | Performed by: UROLOGY

## 2019-08-29 PROCEDURE — 71046 X-RAY EXAM CHEST 2 VIEWS: CPT

## 2019-08-29 PROCEDURE — 51798 US URINE CAPACITY MEASURE: CPT | Performed by: UROLOGY

## 2019-08-29 PROCEDURE — 93005 ELECTROCARDIOGRAM TRACING: CPT

## 2019-08-29 PROCEDURE — 36415 COLL VENOUS BLD VENIPUNCTURE: CPT

## 2019-08-29 PROCEDURE — 99214 OFFICE O/P EST MOD 30 MIN: CPT | Performed by: FAMILY MEDICINE

## 2019-08-29 PROCEDURE — 83880 ASSAY OF NATRIURETIC PEPTIDE: CPT

## 2019-08-29 RX ORDER — TOLTERODINE 4 MG/1
4 CAPSULE, EXTENDED RELEASE ORAL DAILY
Qty: 90 CAPSULE | Refills: 3 | Status: SHIPPED | OUTPATIENT
Start: 2019-08-29 | End: 2019-09-13 | Stop reason: DRUGHIGH

## 2019-08-29 NOTE — PROGRESS NOTES
Assessment/Plan:          Diagnoses and all orders for this visit:    Urinary tract infection without hematuria, site unspecified  Comments:   follow waking with Dr Subramanian    Anemia, unspecified type  Comments:   resolved    Eosinophils increased  Comments:  Resolved    Elevated BUN  Comments:  Resolved    Pure hypercholesterolemia    Low serum sodium  Comments:  Corrected    Leukocytosis, unspecified type  Comments:  resolved    Fibromyalgia  Comments: Following with Rheumatology    Osteoporosis without current pathological fracture, unspecified osteoporosis type  Comments:  Rheumatology ov on 6--27-19  Fall prevention, weight-bearing exercise, calcium vitamin-D supplement discussed    Irritable bowel syndrome, unspecified type  Comments:  Controlled  She is following with GI  Dr Sophie Stringer    Migraine without aura and without status migrainosus, not intractable  Comments:  Dr Marcio Hernandez on 7-29-19 noted    Edema of both legs  Comments:  Elevate legs  Patient to call if she developed persistent edema or new symptoms  Orders:  -     NT-BNP PRO; Future  -     XR chest pa & lateral; Future  -     ECG 12 lead; Future    Prolonged QT interval  Comments:  Patient was evaluated by Cardiology   she said  Orders:  -     ECG 12 lead; Future    Rash  Comments:  None seen today advised patient to call when she developed rash to be checked    Screening mammogram, encounter for  -     Mammo screening bilateral w 3d & cad; Future    Screening for colon cancer  Comments:    Patient follows with Dr Sophie Stringer  Orders:  -     Cancel: Ambulatory referral to Gastroenterology; Future            Subjective:     Patient ID: Leigh Rodriguez is a 59 y o  female       Patient is here for follow-up on her  Chronic medical problem  Urinary tract infection  Patient saw Dr Jonny Soler today and he sending her urine for culture   new complaint  Edema of the lower extremities for almost 1 year  Often on  Usually by the end of the day  Mild    Denied shortness of breath, orthopnea, chest pain or cough  Irritable bowel syndrome  Symptoms well controlled, following with Dr Jessica Kim  Migraine  Controlled following with Urology  Fibromyalgia  Controlled   following with Rheumatology  Rash  Patient stated in the last month she had rash at the distal both leg twice for couple days then resolved  Last time happen 2 weeks ago  Patient denied change in so, detergent, closing, food or medication  Hyperlipidemia  Admit to regular fat intake  Denied chest pain     test results  Lab done on 6-27 , July 18, 22nd, 31st 2019    EKG done in March 2019 at [facility] noted  Patient stated she did see Cardiology at [facility]  All discussed with patient      Review of Systems   Constitutional: Negative for activity change, appetite change, chills, fatigue, fever and unexpected weight change  HENT: Negative for congestion, ear discharge, ear pain, hearing loss, nosebleeds, rhinorrhea, sinus pressure, sore throat, tinnitus, trouble swallowing and voice change  Eyes: Negative for photophobia, pain and visual disturbance  Respiratory: Negative for cough, chest tightness, shortness of breath and wheezing  Cardiovascular: Positive for leg swelling  Negative for chest pain and palpitations  Gastrointestinal: Negative for abdominal pain, anal bleeding, blood in stool, constipation, diarrhea, nausea and vomiting  Endocrine: Negative for cold intolerance, heat intolerance, polydipsia and polyuria  Genitourinary: Negative for dysuria, frequency, hematuria and urgency  Musculoskeletal: Negative for arthralgias, back pain, gait problem, joint swelling, myalgias and neck pain  Skin: Negative for rash  Neurological: Negative for dizziness, tremors, seizures, syncope, weakness, light-headedness and headaches  Hematological: Negative for adenopathy  Does not bruise/bleed easily     Psychiatric/Behavioral: Negative for agitation, behavioral problems, confusion, dysphoric mood, hallucinations and sleep disturbance  The patient is not nervous/anxious  Objective:     Physical Exam   Constitutional: She is oriented to person, place, and time  She appears well-developed and well-nourished  No distress  HENT:   Head: Normocephalic and atraumatic  Eyes: Pupils are equal, round, and reactive to light  Conjunctivae and EOM are normal  No scleral icterus  Neck: Neck supple  No JVD present  No thyromegaly present  Cardiovascular: Normal rate, regular rhythm and normal heart sounds  No murmur heard  Pulses:       Carotid pulses are 3+ on the right side, and 3+ on the left side  Extremities  No edema  No calf tenderness bilaterally   Pulmonary/Chest: Effort normal and breath sounds normal    Abdominal: Soft  Bowel sounds are normal  She exhibits no distension and no mass  There is no tenderness  There is no rebound and no guarding  No hernia  Lymphadenopathy:     She has no cervical adenopathy  Neurological: She is alert and oriented to person, place, and time  No cranial nerve deficit  She exhibits normal muscle tone  Coordination normal    Skin: No rash noted  No erythema  No pallor  Psychiatric: She has a normal mood and affect   Her behavior is normal  Judgment and thought content normal

## 2019-08-29 NOTE — PROGRESS NOTES
SUBJECTIVE:    Years of urinary incontinence, mostly urgency type but becoming almost passive  Bad infection in May, with three positive cultures over the next six weeks  During that time her incontinence became much worse and is still real bad  CT March 2019 showed 3 mm right upper pole kidney stone, nonobstructing  She has never had trouble with stones  ASSESSMENT / PLAN:    Evaluate with cystoscopy  She is not sure which overactive bladder medicines she took before  She is on hyoscyamine for her bowel issues, I do not think would be safe to take both hyoscyamine and a new bladder med  She will talk with her GI doctor and see if it is okay to back off on the hyoscyamine so that she can take tolterodine 4 mg per day  The following portions of the patient's history were reviewed and updated as appropriate: allergies, current medications, past family history, past medical history, past social history, past surgical history and problem list     Review of Systems   All other systems reviewed and are negative  Objective:     Physical Exam   Constitutional: She appears well-developed and well-nourished     Abdominal:   Abdomen soft nontender    Minimal PVR         No results found for: PSA]  BUN   Date Value Ref Range Status   06/27/2019 13 5 - 25 mg/dL Final     Creatinine   Date Value Ref Range Status   06/27/2019 0 66 0 60 - 1 30 mg/dL Final     Comment:     Standardized to IDMS reference method     No components found for: CBC      Patient Active Problem List   Diagnosis    Urge incontinence    Frequency of urination    History of hydronephrosis    Migraine    Fibromyalgia    Pure hypercholesterolemia    Allergy    Low iron    Idiopathic hypersomnia    Osteoporosis    Diverticulosis    Other irritable bowel syndrome    Migraine without aura and without status migrainosus, not intractable    Acute cystitis without hematuria    Calculus of kidney    Elevated BUN        Diagnoses and all orders for this visit:    Urge incontinence  -     POCT Measure PVR  -     POCT urine dip auto non-scope  -     tolterodine (DETROL LA) 4 mg 24 hr capsule; Take 1 capsule (4 mg total) by mouth daily    Acute cystitis without hematuria  -     Urine culture    Calculus of kidney    Other orders  -     NON FORMULARY           Patient ID: Latisha Mancini is a 59 y o  female  Current Outpatient Medications:     Armodafinil 250 MG tablet, Take 250 mg by mouth, Disp: , Rfl:     Ascorbic Acid (VITAMIN C PO), Take by mouth, Disp: , Rfl:     B Complex Vitamins (B COMPLEX 1 PO), Take 1 tablet by mouth, Disp: , Rfl:     BACILLUS COAGULANS-INULIN PO, Take 1 capsule by mouth, Disp: , Rfl:     calcium carbonate 1250 MG capsule, Take 600 mg by mouth  , Disp: , Rfl:     cetirizine (ZyrTEC) 10 MG chewable tablet, Chew 10 mg, Disp: , Rfl:     Cholecalciferol 1000 units tablet, Take 1,000 Units by mouth, Disp: , Rfl:     Cinnamon 500 MG capsule, , Disp: , Rfl:     Citicoline Sodium 500 MG TABS, Take by mouth, Disp: , Rfl:     Coenzyme Q10 200 MG/GM POWD, Take 200 mg by mouth, Disp: , Rfl:     denosumab (PROLIA) 60 mg/mL, Inject 60 mg under the skin, Disp: , Rfl:     diclofenac (VOLTAREN) 75 mg EC tablet, Take 75 mg by mouth, Disp: , Rfl:     DULoxetine (CYMBALTA) 60 mg delayed release capsule, Take 60 mg by mouth, Disp: , Rfl:     ferrous sulfate 325 (65 Fe) mg tablet, Take 325 mg by mouth, Disp: , Rfl:     gabapentin (NEURONTIN) 300 mg capsule, 300 mg at 1200 in addition to 600 mg at bedtime  , Disp: , Rfl:     Lori, Zingiber officinalis, (LORI ROOT) 550 MG CAPS, Take by mouth, Disp: , Rfl:     Ginkgo Biloba 40 MG TABS, Take by mouth, Disp: , Rfl:     Green Tea, Camillia sinensis, (GREEN TEA EXTRACT PO), Take by mouth, Disp: , Rfl:     hyoscyamine (ANASPAZ,LEVSIN) 0 125 MG tablet, Take 0 125 mg by mouth, Disp: , Rfl:     Misc Natural Products (GINSENG-COMPLEX PO), daily  , Disp: , Rfl:     Multiple Vitamins-Minerals (CENTRUM SILVER PO), Take 1 tablet by mouth, Disp: , Rfl:     NON FORMULARY, , Disp: , Rfl:     nortriptyline (PAMELOR) 10 mg capsule, Take 10 mg by mouth, Disp: , Rfl:     ondansetron (ZOFRAN-ODT) 4 mg disintegrating tablet, TAKE 1 TABLET (4 MG TOTAL) BY MOUTH EVERY 12 (TWELVE) HOURS AS NEEDED FOR NAUSEA OR VOMITING , Disp: , Rfl:     pantoprazole (PROTONIX) 20 mg tablet, Take 20 mg by mouth 2 (two) times a day, Disp: , Rfl:     predniSONE 20 mg tablet, Take 20 mg by mouth as needed , Disp: , Rfl:     Probiotic Product (PROBIOTIC-10) CAPS, Take by mouth, Disp: , Rfl:     TURMERIC PO, Take 500 mg by mouth, Disp: , Rfl:     vitamin E, tocopherol, 400 units capsule, Take 400 Units by mouth daily, Disp: , Rfl:     ZOLMitriptan (ZOMIG) 2 5 MG tablet, Take one tablet by mouth at the onset of migraine  May repeat in 2 hours  2/24 hrs, 4/week, 8/month , Disp: , Rfl:     metaxalone (SKELAXIN) 800 mg tablet, Take 800 mg by mouth Three times daily as needed, Disp: , Rfl:     tolterodine (DETROL LA) 4 mg 24 hr capsule, Take 1 capsule (4 mg total) by mouth daily, Disp: 90 capsule, Rfl: 3    Past Medical History:   Diagnosis Date    Irritable bowel     Osteoarthritis     Osteoporosis     Other chronic pain     degenerative disc disease    Sleep disorder        Past Surgical History:   Procedure Laterality Date    ABDOMINAL ADHESION SURGERY      BLADDER SURGERY      CHOLECYSTECTOMY      HYSTERECTOMY      KNEE SURGERY Right     replacement    LAMINECTOMY      MOLE REMOVAL      OTHER SURGICAL HISTORY      enlarged lymph node removed left arm   no cancer    SPINAL FUSION      WISDOM TOOTH EXTRACTION         Social History

## 2019-08-29 NOTE — PATIENT INSTRUCTIONS
The new medicine for overactive bladder is tolterodine    Ask GI if you can stop the hyoscyamine to try this new medicine

## 2019-08-30 ENCOUNTER — OFFICE VISIT (OUTPATIENT)
Dept: SLEEP CENTER | Facility: CLINIC | Age: 59
End: 2019-08-30
Payer: COMMERCIAL

## 2019-08-30 VITALS
WEIGHT: 122 LBS | DIASTOLIC BLOOD PRESSURE: 80 MMHG | BODY MASS INDEX: 20.83 KG/M2 | HEIGHT: 64 IN | SYSTOLIC BLOOD PRESSURE: 116 MMHG

## 2019-08-30 DIAGNOSIS — M79.7 FIBROMYALGIA: ICD-10-CM

## 2019-08-30 DIAGNOSIS — Z72.0 TOBACCO ABUSE: ICD-10-CM

## 2019-08-30 DIAGNOSIS — G47.11 IDIOPATHIC HYPERSOMNIA: Primary | ICD-10-CM

## 2019-08-30 DIAGNOSIS — F51.12 INSUFFICIENT SLEEP SYNDROME: ICD-10-CM

## 2019-08-30 DIAGNOSIS — J30.9 ALLERGIC RHINITIS, UNSPECIFIED SEASONALITY, UNSPECIFIED TRIGGER: ICD-10-CM

## 2019-08-30 DIAGNOSIS — G43.009 MIGRAINE WITHOUT AURA AND WITHOUT STATUS MIGRAINOSUS, NOT INTRACTABLE: ICD-10-CM

## 2019-08-30 PROCEDURE — 99204 OFFICE O/P NEW MOD 45 MIN: CPT | Performed by: INTERNAL MEDICINE

## 2019-08-30 RX ORDER — ARMODAFINIL 250 MG/1
250 TABLET ORAL DAILY
Qty: 30 TABLET | Refills: 1 | Status: SHIPPED | OUTPATIENT
Start: 2019-08-30 | End: 2019-10-25 | Stop reason: SDUPTHER

## 2019-08-30 NOTE — PROGRESS NOTES
Review of Systems      Genitourinary need to urinate more than twice a night and post menopausal (no peroids)   Cardiology ankle/leg swelling   Gastrointestinal none   Neurology frequent headaches, awaken with headache, muscle weakness, numbness/tingling of an extremity, forgetfulness, poor concentration or confusion, , difficulty with memory and balance problems   Constitutional fatigue   Integumentary rash or dry skin   Psychiatry none   Musculoskeletal joint pain, muscle aches, back pain and leg cramps   Pulmonary none   ENT none   Endocrine frequent urination   Hematological none

## 2019-08-30 NOTE — PROGRESS NOTES
Consultation - [address] [facility], [place],[address]  59 y o  female  Jimenez Gerardo  PXT:[id number]    Physician Requesting Consult: Dinorah Tellez MD     Reason for Consult : I saw this patient for initial sleep evaluation today  She is here of her on accord to establish care  She was diagnosed with idiopathic hypersomnolence in 2018 for which she uses Nuvigil with good effect  Results of her prior studies were not available at the time of this encounter  PFSH, Problem List, Medications & Allergies were reviewed in EMR  She  has a past medical history of Irritable bowel, Osteoarthritis, Osteoporosis, Other chronic pain, and Sleep disorder  She has a current medication list which includes the following prescription(s): armodafinil, ascorbic acid, b complex vitamins, bacillus coagulans-inulin, calcium carbonate, cetirizine, cholecalciferol, cinnamon, citicoline sodium, coenzyme q10, denosumab, diclofenac, duloxetine, ferrous sulfate, gabapentin, pierre root, ginkgo biloba, green tea (tone sinensis), misc natural products, multiple vitamins-minerals, NON FORMULARY, ondansetron, pantoprazole, prednisone, probiotic-10, tolterodine, turmeric, vitamin e (tocopherol), zolmitriptan, hyoscyamine, metaxalone, and nortriptyline  HPI:  She sleeps alone but reports no snoring or breathing difficulties during sleep  She sleeps in a recliner because of back pain  Presently she is getting insufficient sleep but states she was drowsy even when she was able to get enough sleep  Other Complaints: none  Restless Leg Syndrome: reports no suggestive symptoms    Parasomnia: no features reported    Sleep Routine:   Typical Bedtime:  10:00 a m  Gets OOB:  4:30 a m  TIB:6 5 hrs  Sleep latency:< 15 minutes Sleep Interruptions:infrequent/nite   Awakens: only with aid of multiple alarms   Upon awakening: feels refreshed once I'm awake  She estimates getting 6 5 hrs sleep   She has Excessive Daytime Sleepiness and in the past week since running out of her medication she is dozing off unintentionally in various settings  [place] Sleepiness Scale rated at Total score: 18 /24  Her 's license was withdrawn and she does not drive  Habits: reports that she has been smoking cigarettes  She has been smoking about 0 25 packs per day  She uses smokeless tobacco  , reports that she drinks alcohol  ,  reports that she has current or past drug history  Drug: Marijuana   (medical use for back pain),Caffeine use:limited , Exercise routine: none but he is very active in her job that involves walking  Family History: Negative for sleep disturbance  ROS: reviewed & as attached  Significant for weight has been stable  She has nasal symptoms due to allergies  She reported no respiratory or cardiac symptoms  She has musculoskeletal aches and pains  She has migraine headaches around once a month  She denied feelings of anxiety or depression  She is on Cymbalta for fibromyalgia  EXAM:    Vitals /80   Ht 5' 4" (1 626 m)   Wt 55 3 kg (122 lb)   BMI 20 94 kg/m²     General  Well appearing female; appears  stated age; no apparent distress  Psychiatric  Well groomed Speech:clear and coherent; Cooperative  Mental State appears normal   Head   Craniofacial anatomy: normalSinuses: non- tender  TMJ: Normal     Ears Externally appear normal      Eyes   EOM's intact;  conjunctiva/corneas clear         Nasal Airway  is patent Septum:intact; Mucosa: normal; Turbinates: normal;  No Rhinorrhea    Oral   Airway   Lips: normal; Dentition: dentures - upper; Mucosa:moist Tongue: Modified MallampatiClass III and Mobile;Hard Palate: normal   Oropharynx: AP narrowing   Soft Palate:  redundant Tonsils:cryptic  PND: +   Neck   Neck Circumference: 13 5"; is lean; Supple; no abnormal masses;  Thyroid:normal  Trachea:central      Lymph    No Cervical or Submandibular Lymhadenopathy   Heart:   S1,S2 normal;RRR; no gallop; nomurmurs     Lungs Respiratory Effort:normal  Air entry good bilaterally  No wheezes  No rales   Abdomen   Soft & non-tender     Extremities   + pedal edema  No clubbing or cyanosis  Skin   Skin is warm and dry; Color& Hydration good; no facial rashes or lesions    Neurologic  Alert and orientated; CNII-XII intact; Motor normal; Sensation normal    Muscskeltl    Muscle bulk, tone and power WNL Gait:normal           IMPRESSION: Primary, Secondary Sleep Diagnoses (to Medical or Psych conditions) & Comorbidities   1  Idiopathic hypersomnia     2  Insufficient sleep syndrome     3  Fibromyalgia     4  Migraine without aura and without status migrainosus, not intractable          PLAN:  1  We will need to obtain results of her prior studies  2  I discussed treatment options with risks and benefits  3  I informed her that a diagnosis of idiopathic hypersomnia cannot be made in the setting of insufficient sleep     4  She was instructed to increase the amount of sleep that she gets to at least 8 hours a night  5  I provided a prescription for a limited supply of Nuvigil contingent upon review of results of her prior studies and that she gets sufficient sleep  6  Smoking cessation was advised but she has no intention to stop  7  I will see her for follow-up in 6-8 weeks to review results and treatment options going forward        Sincerely,     Authenticated electronically by Jeff Goldman MD   on 61/66/80   Board Certified Specialist

## 2019-08-30 NOTE — PATIENT INSTRUCTIONS

## 2019-08-31 LAB
ATRIAL RATE: 73 BPM
P AXIS: 60 DEGREES
PR INTERVAL: 140 MS
QRS AXIS: 20 DEGREES
QRSD INTERVAL: 98 MS
QT INTERVAL: 420 MS
QTC INTERVAL: 462 MS
T WAVE AXIS: 65 DEGREES
VENTRICULAR RATE: 73 BPM

## 2019-08-31 PROCEDURE — 93010 ELECTROCARDIOGRAM REPORT: CPT | Performed by: INTERNAL MEDICINE

## 2019-09-01 PROBLEM — Z12.11 SCREENING FOR COLON CANCER: Status: ACTIVE | Noted: 2019-09-01

## 2019-09-01 PROBLEM — R21 RASH: Status: ACTIVE | Noted: 2019-09-01

## 2019-09-01 PROBLEM — R89.8 EOSINOPHILS INCREASED: Status: ACTIVE | Noted: 2019-09-01

## 2019-09-01 PROBLEM — D72.829 LEUKOCYTOSIS: Status: RESOLVED | Noted: 2019-09-01 | Resolved: 2019-09-01

## 2019-09-01 PROBLEM — R79.9 ELEVATED BUN: Status: RESOLVED | Noted: 2019-08-29 | Resolved: 2019-09-01

## 2019-09-01 PROBLEM — K58.9 IRRITABLE BOWEL SYNDROME: Status: ACTIVE | Noted: 2019-09-01

## 2019-09-01 PROBLEM — R89.8 EOSINOPHILS INCREASED: Status: RESOLVED | Noted: 2019-09-01 | Resolved: 2019-09-01

## 2019-09-01 PROBLEM — Z12.31 SCREENING MAMMOGRAM, ENCOUNTER FOR: Status: ACTIVE | Noted: 2019-09-01

## 2019-09-01 PROBLEM — D64.9 ANEMIA: Status: RESOLVED | Noted: 2019-09-01 | Resolved: 2019-09-01

## 2019-09-01 PROBLEM — D72.829 LEUKOCYTOSIS: Status: ACTIVE | Noted: 2019-09-01

## 2019-09-01 PROBLEM — D64.9 ANEMIA: Status: ACTIVE | Noted: 2019-09-01

## 2019-09-01 PROBLEM — R60.0 EDEMA OF BOTH LEGS: Status: ACTIVE | Noted: 2019-09-01

## 2019-09-01 PROBLEM — R94.31 PROLONGED QT INTERVAL: Status: ACTIVE | Noted: 2019-09-01

## 2019-09-01 PROBLEM — N39.0 URINARY TRACT INFECTION WITHOUT HEMATURIA: Status: ACTIVE | Noted: 2019-09-01

## 2019-09-12 DIAGNOSIS — R35.0 FREQUENCY OF URINATION: ICD-10-CM

## 2019-09-12 DIAGNOSIS — R35.0 FREQUENCY OF URINATION: Primary | ICD-10-CM

## 2019-09-12 RX ORDER — OXYBUTYNIN CHLORIDE 10 MG/1
10 TABLET, EXTENDED RELEASE ORAL
Qty: 30 TABLET | Refills: 6 | Status: SHIPPED | OUTPATIENT
Start: 2019-09-12 | End: 2019-09-13 | Stop reason: SDUPTHER

## 2019-09-12 NOTE — TELEPHONE ENCOUNTER
Patient of Dr Joanne Castellon seen in the [facility] office  Patient advised that the medication Tolterodine that was prescribed at he last office visit  She advised that he insurance will not cover the medication due to having to do other steps and medications before this medication is taken  Please advise

## 2019-09-13 RX ORDER — OXYBUTYNIN CHLORIDE 10 MG/1
10 TABLET, EXTENDED RELEASE ORAL
Qty: 90 TABLET | Refills: 3 | Status: SHIPPED | OUTPATIENT
Start: 2019-09-13 | End: 2020-06-24

## 2019-09-13 NOTE — TELEPHONE ENCOUNTER
Patient called back stating script needs to be for a 90 day supply to [facility]    Corrected script was requeued and forwarded to RADHA Muniz for approval

## 2019-09-25 ENCOUNTER — HOSPITAL ENCOUNTER (OUTPATIENT)
Dept: RADIOLOGY | Facility: MEDICAL CENTER | Age: 59
Discharge: HOME | End: 2019-09-25
Attending: PHYSICAL MEDICINE & REHABILITATION
Payer: COMMERCIAL

## 2019-09-25 VITALS
HEART RATE: 61 BPM | SYSTOLIC BLOOD PRESSURE: 124 MMHG | DIASTOLIC BLOOD PRESSURE: 79 MMHG | TEMPERATURE: 98.6 F | RESPIRATION RATE: 18 BRPM | OXYGEN SATURATION: 100 %

## 2019-09-25 DIAGNOSIS — M54.2 NECK PAIN: ICD-10-CM

## 2019-09-25 DIAGNOSIS — M47.812 SPONDYLOSIS OF CERVICAL REGION WITHOUT MYELOPATHY OR RADICULOPATHY: ICD-10-CM

## 2019-09-25 PROCEDURE — 64491 INJ PARAVERT F JNT C/T 2 LEV: CPT | Performed by: PHYSICAL MEDICINE & REHABILITATION

## 2019-09-25 PROCEDURE — 64490 INJ PARAVERT F JNT C/T 1 LEV: CPT | Performed by: PHYSICAL MEDICINE & REHABILITATION

## 2019-09-25 RX ADMIN — Medication 1.5 ML: at 15:41

## 2019-09-25 NOTE — DISCHARGE INSTRUCTIONS

## 2019-09-25 NOTE — H&P
History of Present Illness: The patient is a 62 y o  female who presents with complaints of left-sided neck    Patient Active Problem List   Diagnosis    Urge incontinence    Frequency of urination    History of hydronephrosis    Migraine    Fibromyalgia    Pure hypercholesterolemia    Allergy    Low iron    Idiopathic hypersomnia    Osteoporosis    Diverticulosis    Other irritable bowel syndrome    Migraine without aura and without status migrainosus, not intractable    Acute cystitis without hematuria    Calculus of kidney    Urinary tract infection without hematuria    Irritable bowel syndrome    Edema of both legs    Prolonged QT interval    Rash    Screening mammogram, encounter for    Screening for colon cancer       Past Medical History:   Diagnosis Date    Irritable bowel     Osteoarthritis     Osteoporosis     Other chronic pain     degenerative disc disease    Sleep disorder        Past Surgical History:   Procedure Laterality Date    ABDOMINAL ADHESION SURGERY      BLADDER SURGERY      CHOLECYSTECTOMY      HYSTERECTOMY      KNEE SURGERY Right     replacement    LAMINECTOMY      MOLE REMOVAL      OTHER SURGICAL HISTORY      enlarged lymph node removed left arm   no cancer    SPINAL FUSION      WISDOM TOOTH EXTRACTION           Current Outpatient Medications:     Armodafinil 250 MG tablet, Take 1 tablet (250 mg total) by mouth daily, Disp: 30 tablet, Rfl: 1    Ascorbic Acid (VITAMIN C PO), Take by mouth, Disp: , Rfl:     B Complex Vitamins (B COMPLEX 1 PO), Take 1 tablet by mouth, Disp: , Rfl:     BACILLUS COAGULANS-INULIN PO, Take 1 capsule by mouth, Disp: , Rfl:     calcium carbonate 1250 MG capsule, Take 600 mg by mouth  , Disp: , Rfl:     cetirizine (ZyrTEC) 10 MG chewable tablet, Chew 10 mg, Disp: , Rfl:     Cholecalciferol 1000 units tablet, Take 1,000 Units by mouth, Disp: , Rfl:     Cinnamon 500 MG capsule, , Disp: , Rfl:     Citicoline Sodium 500 MG TABS, Take by mouth, Disp: , Rfl:     Coenzyme Q10 200 MG/GM POWD, Take 200 mg by mouth, Disp: , Rfl:     denosumab (PROLIA) 60 mg/mL, Inject 60 mg under the skin, Disp: , Rfl:     diclofenac (VOLTAREN) 75 mg EC tablet, Take 75 mg by mouth, Disp: , Rfl:     DULoxetine (CYMBALTA) 60 mg delayed release capsule, Take 60 mg by mouth, Disp: , Rfl:     ferrous sulfate 325 (65 Fe) mg tablet, Take 325 mg by mouth, Disp: , Rfl:     gabapentin (NEURONTIN) 300 mg capsule, 300 mg at 1200 in addition to 600 mg at bedtime  , Disp: , Rfl:     Ginger, Zingiber officinalis, (GINGER ROOT) 550 MG CAPS, Take by mouth, Disp: , Rfl:     Ginkgo Biloba 40 MG TABS, Take by mouth, Disp: , Rfl:     Green Tea, Camillia sinensis, (GREEN TEA EXTRACT PO), Take by mouth, Disp: , Rfl:     Misc Natural Products (GINSENG-COMPLEX PO), daily  , Disp: , Rfl:     Multiple Vitamins-Minerals (CENTRUM SILVER PO), Take 1 tablet by mouth, Disp: , Rfl:     NON FORMULARY, , Disp: , Rfl:     ondansetron (ZOFRAN-ODT) 4 mg disintegrating tablet, TAKE 1 TABLET (4 MG TOTAL) BY MOUTH EVERY 12 (TWELVE) HOURS AS NEEDED FOR NAUSEA OR VOMITING , Disp: , Rfl:     oxybutynin (DITROPAN-XL) 10 MG 24 hr tablet, Take 1 tablet (10 mg total) by mouth daily at bedtime, Disp: 90 tablet, Rfl: 3    pantoprazole (PROTONIX) 20 mg tablet, Take 20 mg by mouth 2 (two) times a day , Disp: , Rfl:     Probiotic Product (PROBIOTIC-10) CAPS, Take by mouth, Disp: , Rfl:     TURMERIC PO, Take 500 mg by mouth, Disp: , Rfl:     vitamin E, tocopherol, 400 units capsule, Take 400 Units by mouth daily, Disp: , Rfl:     ZOLMitriptan (ZOMIG) 2 5 MG tablet, Take one tablet by mouth at the onset of migraine  May repeat in 2 hours   2/24 hrs, 4/week, 8/month , Disp: , Rfl:     metaxalone (SKELAXIN) 800 mg tablet, Take 800 mg by mouth Three times daily as needed, Disp: , Rfl:     nortriptyline (PAMELOR) 10 mg capsule, Take 10 mg by mouth, Disp: , Rfl:     predniSONE 20 mg tablet, Take 20 mg by mouth as needed , Disp: , Rfl:     Current Facility-Administered Medications:     lidocaine (PF) (XYLOCAINE-MPF) 2 % injection 5 mL, 5 mL, Perineural, Once, J[facility] [employer], DO    Allergies   Allergen Reactions    Other Shortness Of Breath, Blisters and Rash     And mold sneezing and breathing problems    Celecoxib Other (See Comments)     Increased Heart Rate, Palpitations    Sumatriptan      Other reaction(s): SUMATRIPTAN (IMITREX) (Throat closure)  Pt analilia zolmitriptan   Tramadol Other (See Comments)     GI Upset sever vomiting   Medical Tape Dermatitis, Other (See Comments) and Rash     Adhesive from medication patches       Physical Exam:   Vitals:    09/25/19 1526   BP: 121/75   Pulse: 68   Resp: 20   Temp: 98 6 °F (37 °C)   SpO2: 97%     General: Awake, Alert, Oriented x 3, Mood and affect appropriate  Respiratory: Respirations even and unlabored  Cardiovascular: Peripheral pulses intact; no edema  Musculoskeletal Exam:  Left-sided neck    ASA Score:  2  Patient/Chart Verification  Patient ID Verified: Verbal  ID Band Applied: No  Consents Confirmed: Procedural  H&P( within 30 days) Verified: To be obtained in the Pre-Procedure area  Interval H&P(within 24 hr) Complete (required for Outpatients and Surgery Admit only): To be obtained in the Pre-Procedure area  Allergies Reviewed: Yes  Anticoag/NSAID held?: NA  Currently on antibiotics?: No    Assessment:   1  Spondylosis of cervical region without myelopathy or radiculopathy    2   Neck pain        Plan: (L) C3-5 MBB#1

## 2019-10-03 ENCOUNTER — OFFICE VISIT (OUTPATIENT)
Dept: FAMILY MEDICINE CLINIC | Facility: CLINIC | Age: 59
End: 2019-10-03
Payer: COMMERCIAL

## 2019-10-03 VITALS
TEMPERATURE: 96.5 F | SYSTOLIC BLOOD PRESSURE: 100 MMHG | HEIGHT: 64 IN | OXYGEN SATURATION: 98 % | HEART RATE: 62 BPM | DIASTOLIC BLOOD PRESSURE: 60 MMHG | WEIGHT: 123.4 LBS | BODY MASS INDEX: 21.07 KG/M2

## 2019-10-03 DIAGNOSIS — R19.00 ABDOMINAL WALL BULGE: Primary | ICD-10-CM

## 2019-10-03 DIAGNOSIS — R60.0 EDEMA OF BOTH LEGS: ICD-10-CM

## 2019-10-03 DIAGNOSIS — Z12.11 COLON CANCER SCREENING: ICD-10-CM

## 2019-10-03 DIAGNOSIS — Z87.448 HISTORY OF HYDRONEPHROSIS: ICD-10-CM

## 2019-10-03 DIAGNOSIS — E78.00 PURE HYPERCHOLESTEROLEMIA: ICD-10-CM

## 2019-10-03 DIAGNOSIS — Z23 NEED FOR INFLUENZA VACCINATION: ICD-10-CM

## 2019-10-03 DIAGNOSIS — Z11.59 ENCOUNTER FOR HEPATITIS C SCREENING TEST FOR LOW RISK PATIENT: ICD-10-CM

## 2019-10-03 DIAGNOSIS — R94.31 PROLONGED QT INTERVAL: ICD-10-CM

## 2019-10-03 PROCEDURE — 90471 IMMUNIZATION ADMIN: CPT

## 2019-10-03 PROCEDURE — 90682 RIV4 VACC RECOMBINANT DNA IM: CPT

## 2019-10-03 PROCEDURE — 99214 OFFICE O/P EST MOD 30 MIN: CPT | Performed by: FAMILY MEDICINE

## 2019-10-03 NOTE — PROGRESS NOTES
Assessment/Plan:          Diagnoses and all orders for this visit:    Abdominal wall bulge  Comments:  Abdominal wall hernia  Recommend referral to surgery  Patient declined  To call if she developed pain    Edema of both legs  Comments:  Resolved    Prolonged QT interval  Comments:  Resolved    History of hydronephrosis  Comments: Following with Urology    Pure hypercholesterolemia  Comments: Follow with low-fat diet   Orders:  -     Comprehensive metabolic panel; Future  -     Lipid Panel with Direct LDL reflex; Future    Colon cancer screening  -     Ambulatory referral to Gastroenterology; Future    Need for influenza vaccination  -     influenza vaccine, 9427-5064, quadrivalent, recombinant, PF, 0 5 mL, for patients 18 yr+ ([facility])    Encounter for hepatitis C screening test for low risk patient  -     Hepatitis C antibody; Future            Subjective:     Patient ID: Dell Benton is a 62 y o  female      Patient is here for follow-up   edema of the lower extremities  Resolved  Denied shortness of breath, cough or orthopnea  Prolonged QT interval   Patient denied palpitation or syncope  Hyperlipidemia admit to regular fat intake  Denied chest pain  New complaint  Bulging  Patient stated she noticed a small bulging at the right lower abdominal   For the last 2 years  Patient did have abdominal and pelvic CT scan  Bulging is stable  And also not painful  She was told she has a cyst there  Neck pain  Following with pain management  Had MRI done      CXR  EKG  Lab done on August 29, 2019  Discussed result with patient  Also CT scan was done on March 14 2019 reviewed results with patient      Review of Systems   Constitutional: Negative for appetite change and fatigue  HENT: Negative for ear pain, tinnitus, trouble swallowing and voice change  Eyes: Negative for photophobia, pain and visual disturbance  Respiratory: Negative for cough, chest tightness and wheezing      Cardiovascular: Negative for chest pain, palpitations and leg swelling  Gastrointestinal: Negative for abdominal distention, abdominal pain, anal bleeding, constipation, diarrhea, nausea and rectal pain  Endocrine: Negative for cold intolerance, heat intolerance, polydipsia and polyuria  Genitourinary: Negative for decreased urine volume, difficulty urinating, dysuria, flank pain, frequency, hematuria and urgency  Musculoskeletal: Positive for neck pain  Negative for arthralgias, back pain, gait problem and myalgias  Skin: Negative for pallor and rash  Allergic/Immunologic: Negative for immunocompromised state  Neurological: Negative for dizziness, seizures, syncope and speech difficulty  Hematological: Negative for adenopathy  Does not bruise/bleed easily  Psychiatric/Behavioral: Negative for agitation, confusion and hallucinations  The patient is not nervous/anxious  Objective:     Physical Exam   Constitutional: She is oriented to person, place, and time  She appears well-developed and well-nourished  No distress  HENT:   Head: Normocephalic and atraumatic  Eyes: Pupils are equal, round, and reactive to light  Conjunctivae and EOM are normal  No scleral icterus  Neck: No JVD present  No thyromegaly present  Cardiovascular: Normal rate, regular rhythm and normal heart sounds  No murmur heard  Extremities  No edema   Pulmonary/Chest: Effort normal and breath sounds normal    Abdominal: Soft  She exhibits no mass  There is no tenderness  There is no rebound and no guarding  There is a small size bulging at the right lower abdomen bulging about the size of a golf ball at the right lower abdomen soft to touch  Not tender   Lymphadenopathy:     She has no cervical adenopathy  Neurological: She is alert and oriented to person, place, and time  No cranial nerve deficit  She exhibits normal muscle tone  Coordination normal    Skin: No rash noted  Psychiatric: She has a normal mood and affect   Her behavior is normal  Judgment normal

## 2019-10-04 PROBLEM — Z11.59 ENCOUNTER FOR HEPATITIS C SCREENING TEST FOR LOW RISK PATIENT: Status: ACTIVE | Noted: 2019-10-04

## 2019-10-08 ENCOUNTER — OFFICE VISIT (OUTPATIENT)
Dept: DERMATOLOGY | Facility: CLINIC | Age: 59
End: 2019-10-08
Payer: COMMERCIAL

## 2019-10-08 VITALS — TEMPERATURE: 98.7 F | BODY MASS INDEX: 21.51 KG/M2 | HEIGHT: 64 IN | WEIGHT: 126 LBS

## 2019-10-08 DIAGNOSIS — L30.9 HAND DERMATITIS: Primary | ICD-10-CM

## 2019-10-08 DIAGNOSIS — L82.1 SEBORRHEIC KERATOSIS: ICD-10-CM

## 2019-10-08 DIAGNOSIS — D22.30 MELANOCYTIC NEVI OF FACE: ICD-10-CM

## 2019-10-08 PROCEDURE — 99204 OFFICE O/P NEW MOD 45 MIN: CPT | Performed by: DERMATOLOGY

## 2019-10-08 NOTE — PATIENT INSTRUCTIONS
Based on a thorough discussion of this condition and the management approach to it (including a comprehensive discussion of the known risks, side effects and potential benefits of treatment), the patient (family) agrees to implement the following specific plan:   Monitor for changes     Seborrheic Keratosis  A seborrheic keratosis is a harmless warty spot that appears during adult life as a common sign of skin aging  Seborrheic keratoses can arise on any area of skin, covered or uncovered, with the usual exception of the palms and soles  They do not arise from mucous membranes  Seborrheic keratoses can have highly variable appearance  Seborrheic keratoses are extremely common  It has been estimated that over 90% of adults over the age of 61 years have one or more of them  They occur in males and females of all races, typically beginning to erupt in the 35s or 45s  They are uncommon under the age of 21 years  The precise cause of seborrhoeic keratoses is not known  Seborrhoeic keratoses are considered degenerative in nature  As time goes by, seborrheic keratoses tend to become more numerous  Some people inherit a tendency to develop a very large number of them; some people may have hundreds of them  The name "seborrheic keratosis" is misleading, because these lesions are not limited to a seborrhoeic distribution (scalp, mid-face, chest, upper back), nor are they formed from sebaceous glands, nor are they associated with sebum -- which is greasy    Seborrheic keratosis may also be called "SK," "Seb K," "basal cell papilloma," "senile wart," or "barnacle "      Researchers have noted:   Eruptive seborrhoeic keratoses can follow sunburn or dermatitis   Skin friction may be the reason they appear in body folds   Viral cause (e g , human papillomavirus) seems unlikely   Stable and clonal mutations or activation of FRFR3, PIK3CA, COLE, AKT1 and EGFR genes are found in seborrhoeic keratoses   Seborrhoeic keratosis can arise from solar lentigo   FRFR3 mutations also arise in solar lentigines  These mutations are associated with increased age and location on the head and neck, suggesting a role of ultraviolet radiation in these lesions   Seborrheic keratoses do not harbour tumour suppressor gene mutations   Epidermal growth factor receptor inhibitors, which are used to treat some cancers, often result in an increase in verrucal (warty) keratoses  There is no easy way to remove multiple lesions on a single occasion  Unless a specific lesion is "inflamed" and is causing pain or stinging/burning or is bleeding, most insurance companies do not authorize treatment  Melanocytic Nevi  Melanocytic nevi ("moles") are tan or brown, raised or flat areas of the skin which have an increased number of melanocytes  Melanocytes are the cells in our body which make pigment and account for skin color  Some moles are present at birth (I e , "congenital nevi"), while others come up later in life (i e , "acquired nevi")  The sun can stimulate the body to make more moles  Sunburns are not the only thing that triggers more moles  Chronic sun exposure can do it too  Clinically distinguishing a healthy mole from melanoma may be difficult, even for experienced dermatologists  The "ABCDE's" of moles have been suggested as a means of helping to alert a person to a suspicious mole and the possible increased risk of melanoma  The suggestions for raising alert are as follows:    Asymmetry: Healthy moles tend to be symmetric, while melanomas are often asymmetric  Asymmetry means if you draw a line through the mole, the two halves do not match in color, size, shape, or surface texture  Asymmetry can be a result of rapid enlargement of a mole, the development of a raised area on a previously flat lesion, scaling, ulceration, bleeding or scabbing within the mole    Any mole that starts to demonstrate "asymmetry" should be examined promptly by a board certified dermatologist      Trell Forth: Healthy moles tend to have discrete, even borders  The border of a melanoma often blends into the normal skin and does not sharply delineate the mole from normal skin  Any mole that starts to demonstrate "uneven borders" should be examined promptly by a board certified dermatologist      Color: Healthy moles tend to be one color throughout  Melanomas tend to be made up of different colors ranging from dark black, blue, white, or red  Any mole that demonstrates a color change should be examined promptly by a board certified dermatologist      Diameter: Healthy moles tend to be smaller than 0 6 cm in size; an exception are "congenital nevi" that can be larger  Melanomas tend to grow and can often be greater than 0 6 cm (1/4 of an inch, or the size of a pencil eraser)  This is only a guideline, and many normal moles may be larger than 0 6 cm without being unhealthy  Any mole that starts to change in size (small to bigger or bigger to smaller) should be examined promptly by a board certified dermatologist      Evolving: Healthy moles tend to "stay the same "  Melanomas may often show signs of change or evolution such as a change in size, shape, color, or elevation  Any mole that starts to itch, bleed, crust, burn, hurt, or ulcerate or demonstrate a change or evolution should be examined promptly by a board certified dermatologist       Dysplastic Nevi  Dysplastic moles are moles that fit the ABCDE rules of melanoma but are not identified as melanomas when examined under the microscope  They may indicate an increased risk of melanoma in that person  If there is a family history of melanoma, most experts agree that the person may be at an increased risk for developing a melanoma  Experts still do not agree on what dysplastic moles mean in patients without a personal or family history of melanoma    Dysplastic moles are usually larger than common moles and have different colors within it with irregular borders  The appearance can be very similar to a melanoma  Biopsies of dysplastic moles may show abnormalities which are different from a regular mole  Melanoma  Malignant melanoma is a type of skin cancer that can be deadly if it spreads throughout the body  The incidence of melanoma in the [place] is growing faster than any other cancer  Melanoma usually grows near the surface of the skin for a period of time, and then begins to grow deeper into the skin  Once it grows deeper into the skin, the risk of spread to other organs greatly increases  Therefore, early detection and removal of a malignant melanoma may result in a better chance at a complete cure; removal after the tumor has spread may not be as effective, leading to worse clinical outcomes such as death  The true rate of nevus transformation into a melanoma is unknown  It has been estimated that the lifetime risk for any acquired melanocytic nevus on any 21year-old individual transforming into melanoma by age [de-identified] is 0 03% (1 in 3,164) for men and 0 009% (1 in 10,800) for women  The appearance of a "new mole" remains one of the most reliable methods for identifying a malignant melanoma  Occasionally, melanomas appear as rapidly growing, blue-black, dome-shaped bumps within a previous mole or previous area of normal skin  Other times, melanomas are suspected when a mole suddenly appears or changes  Itching, burning, or pain in a pigmented lesion should increase suspicion, but most patients with early melanoma have no skin discomfort whatsoever  Melanoma can occur anywhere on the skin, including areas that are difficult for self-examination  Many melanomas are first noticed by other family members  Suspicious-looking moles may be removed for microscopic examination  You may be able to prevent death from melanoma by doing two simple things:    1   Try to avoid unnecessary sun exposure and protect your skin when it is exposed to the sun  People who live near the equator, people who have intermittent exposures to large amounts of sun, and people who have had sunburns in childhood or adolescence have an increased risk for melanoma  Sun sense and vigilant sun protection may be keys to helping to prevent melanoma  We recommend wearing UPF-rated sun protective clothing and sunglasses whenever possible and applying a moisturizer-sunscreen combination product (SPF 50+) such as Neutrogena Daily Defense to sun exposed areas of skin at least three times a day  2  Have your moles regularly examined by a board certified dermatologist AND by yourself or a family member/friend at home  We recommend that you have your moles examined at least once a year by a board certified dermatologist   Use your birthday as an annual reminder to have your "Birthday Suit" (I e , your skin) examined; it is a nice birthday gift to yourself to know that your skin is healthy appearing! Additionally, at-home self examinations may be helpful for detecting a possible melanoma  Use the ABCDEs we discussed and check your moles once a month at home

## 2019-10-08 NOTE — PROGRESS NOTES
[address] [facility] Note     Patient Name: Doretha Carmona  Encounter Date: 10/08/2019    Today's Chief Concerns:  Cristina Uriel Concern #1:  Skin check   Concern #2:  Spot on leg   Concern #3:  Spot on chin    Past Medical History:  Have you ever had or currently have any of the following medical conditions or treatments? · HIV/AIDS: No  · Hepatitis B: No  · Hepatitis C: No   · Diabetes: No  · Tuberculosis: No  · Biologic Therapy/Chemotherapy: No  · Organ or Bone Marrow Transplantation: No  · Radiation Treatment: No  · Cancer (If Yes, which types)- No   · Arthritis: yes   · Osteoporosis: Yes      Have you ever had any of the following skin conditions? · Melanoma? (If Yes, please provide more detail)- No  · Basal Cell Carcinoma: No  · Squamous Cell Carcinoma: No  · Sebaceous Cell Carcinoma: No  · Merkel Cell Carcinoma: No  · Angiosarcoma: No  · Blistering Sunburns: No  · Eczema: No  · Psoriasis: No   · Mole removal: Pre cancerous     Social History:    What is your current Smoking Status? current everyday smoker    What is/was your primary occupation? What are your hobbies/past-times? Family history:  Do any of your "first degree relatives" (parent, brother, sister, or child) have any of the following conditions? · Melanoma? (If Yes, which relatives?) No  · Eczema: No  · Asthma: No  · Hay Fever/Seasonal Allergies: No  · Psoriasis: No  · Arthritis: No  · Thyroid Problems: No  · Lupus/Connective Tissue Disease: No  · Diabetes: YES  · Stroke: YES  · Blood Clots: No  · IBD/Crohn's/Ulcerative Colitis: No  · Vitiligo: No  · Scarring/Keloids: No  · Severe Acne: No  · Pancreatic Cancer: No  · Other known Skin Condition? If Yes, what condition and which relatives?   YES, father on scalp   · Nephrolithiasis: Yes    Current Medications:    Current Outpatient Medications:     Armodafinil 250 MG tablet, Take 1 tablet (250 mg total) by mouth daily, Disp: 30 tablet, Rfl: 1    Ascorbic Acid (VITAMIN C PO), Take by mouth, Disp: , Rfl:     B Complex Vitamins (B COMPLEX 1 PO), Take 1 tablet by mouth, Disp: , Rfl:     BACILLUS COAGULANS-INULIN PO, Take 1 capsule by mouth, Disp: , Rfl:     calcium carbonate 1250 MG capsule, Take 600 mg by mouth  , Disp: , Rfl:     cetirizine (ZyrTEC) 10 MG chewable tablet, Chew 10 mg, Disp: , Rfl:     Cholecalciferol 1000 units tablet, Take 1,000 Units by mouth, Disp: , Rfl:     Cinnamon 500 MG capsule, , Disp: , Rfl:     Citicoline Sodium 500 MG TABS, Take by mouth, Disp: , Rfl:     Coenzyme Q10 200 MG/GM POWD, Take 200 mg by mouth, Disp: , Rfl:     denosumab (PROLIA) 60 mg/mL, Inject 60 mg under the skin, Disp: , Rfl:     diclofenac (VOLTAREN) 75 mg EC tablet, Take 75 mg by mouth, Disp: , Rfl:     DULoxetine (CYMBALTA) 60 mg delayed release capsule, Take 60 mg by mouth, Disp: , Rfl:     ferrous sulfate 325 (65 Fe) mg tablet, Take 325 mg by mouth, Disp: , Rfl:     gabapentin (NEURONTIN) 300 mg capsule, 300 mg at 1200 in addition to 600 mg at bedtime  , Disp: , Rfl:     Ginger, Zingiber officinalis, (GINGER ROOT) 550 MG CAPS, Take by mouth, Disp: , Rfl:     Ginkgo Biloba 40 MG TABS, Take by mouth, Disp: , Rfl:     Green Tea, Camillia sinensis, (GREEN TEA EXTRACT PO), Take by mouth, Disp: , Rfl:     Misc Natural Products (GINSENG-COMPLEX PO), daily  , Disp: , Rfl:     Multiple Vitamins-Minerals (CENTRUM SILVER PO), Take 1 tablet by mouth, Disp: , Rfl:     NON FORMULARY, , Disp: , Rfl:     ondansetron (ZOFRAN-ODT) 4 mg disintegrating tablet, TAKE 1 TABLET (4 MG TOTAL) BY MOUTH EVERY 12 (TWELVE) HOURS AS NEEDED FOR NAUSEA OR VOMITING , Disp: , Rfl:     oxybutynin (DITROPAN-XL) 10 MG 24 hr tablet, Take 1 tablet (10 mg total) by mouth daily at bedtime, Disp: 90 tablet, Rfl: 3    predniSONE 20 mg tablet, Take 20 mg by mouth as needed , Disp: , Rfl:     Probiotic Product (PROBIOTIC-10) CAPS, Take by mouth, Disp: , Rfl:     TURMERIC PO, Take 500 mg by mouth, Disp: , Rfl:     vitamin E, tocopherol, 400 units capsule, Take 400 Units by mouth daily, Disp: , Rfl:     ZOLMitriptan (ZOMIG) 2 5 MG tablet, Take one tablet by mouth at the onset of migraine  May repeat in 2 hours  2/24 hrs, 4/week, 8/month , Disp: , Rfl:     metaxalone (SKELAXIN) 800 mg tablet, Take 800 mg by mouth Three times daily as needed, Disp: , Rfl:     nortriptyline (PAMELOR) 10 mg capsule, Take 10 mg by mouth, Disp: , Rfl:     Specific Alerts:    Have you been seen by a [facility] Dermatologist in the last 3 years? No    Are you pregnant or planning to become pregnant? No    Are you currently or planning to be nursing or breast feeding? No    Allergies   Allergen Reactions    Celecoxib Other (See Comments)     Increased Heart Rate, Palpitations    Sumatriptan      Other reaction(s): SUMATRIPTAN (IMITREX) (Throat closure)  Pt analilia zolmitriptan   Tramadol Other (See Comments)     GI Upset sever vomiting   Medical Tape Dermatitis, Other (See Comments) and Rash     Adhesive from medication patches       May we call your Preferred Phone number to discuss your specific medical information? YES    May we leave a detailed message that includes your specific medical information? YES    Have you traveled outside of the [facility] in the past 3 months? No    Do you currently have a pacemaker or defibrillator? No    Do you have any artificial heart valves, joints, plates, screws, rods, stents, pins, etc? YES, left knee and neck   - If Yes, were any placed within the last 2 years? Do you require any medications prior to a surgical procedure? No   - If Yes, for which procedure? - If Yes, what medications to you require? Are you taking any medications that cause you to bleed more easily ("blood thinners") No    Have you ever experienced a rapid heartbeat with epinephrine? No    Have you ever been treated with "gold" (gold sodium thiomalate) therapy?  No    [facility] can help with wrinkles, "laugh lines," facial volume loss, "double chin," "love handles," age spots, and more  Are you interested in learning today about some of the skin enhancement procedures that we offer? (If Yes, please provide more detail) No    Review of Systems:  Have you recently had or currently have any of the following? · Fever or chills: No  · Night Sweats: No  · Headaches: No  · Weight Gain: No  · Weight Loss: No  · Blurry Vision: No  · Nausea: No  · Vomiting: No  · Diarrhea: No  · Blood in Stool: No  · Abdominal Pain: No  · Itchy Skin: No  · Painful Joints: No  · Swollen Joints: No  · Muscle Pain: No  · Irregular Mole: No  · Sun Burn: No  · Dry Skin: No  · Skin Color Changes: No  · Scar or Keloid: No  · Cold Sores/Fever Blisters: No  · Bacterial Infections/MRSA: No  · Anxiety: No  · Depression: No  · Suicidal or Homicidal Thoughts: No      PHYSICAL EXAM:      Was a chaperone (Derm Clinical Assistant) present for the entirety of the Physical Exam? YES    Did the Dermatology Team specifically ask and  the patient on the importance of a Full Skin Exam to be sure that nothing is missed clinically?  YES    Did the patient request or accept a Full Skin Exam?  YES    Did the patient specifically refuse to have the areas "under-the-bra" examined by the Dermatologist? No    Did the patient specifically refuse to have the areas "under-the-underwear" examined by the Dermatologist? No      CONSTITUTIONAL:   Vitals:    10/08/19 1412   Temp: 98 7 °F (37 1 °C)   Weight: 57 2 kg (126 lb)   Height: 5' 4" (1 626 m)           PSYCH: Normal mood and affect  EYES: Normal conjunctiva  ENT: Normal lips and oral mucosa  CARDIOVASCULAR: No edema  RESPIRATORY: Normal respirations  HEME/LYMPH/IMMUNO:  No regional lymphadenopathy except as noted below in [address] (SKIN)  Hair, Scalp, Ears, Face Normal except as noted below in Assessment   Neck, Cervical Chain Nodes Normal except as noted below in Assessment   Right Arm/Hand/Fingers Normal except as noted below in Assessment   Left Arm/Hand/Fingers Normal except as noted below in Assessment   Chest/Breasts/Axillae Viewed areas Normal except as noted below in Assessment   Abdomen, Umbilicus Normal except as noted below in Assessment   Back/Spine Normal except as noted below in Assessment   Groin/Genitalia/Buttocks Viewed areas Normal except as noted below in Assessment   Right Leg, Foot, Toes Normal except as noted below in Assessment   Left Leg, Foot, Toes Normal except as noted below in Assessment        ASSESSMENT AND PLAN BY DIAGNOSIS:    History of Present Condition:     Duration:  How long has this been an issue for you? o  6 months    Location Affected:  Where on the body is this affecting you?    o  right leg    Quality:  Is there any bleeding, pain, itch, burning/irritation, or redness associated with the skin lesion? o  sore and flaky    Severity:  Describe any bleeding, pain, itch, burning/irritation, or redness on a scale of 1 to 10 (with 10 being the worst)  o  2   Timing:  Does this condition seem to be there pretty constantly or do you notice it more at specific times throughout the day? o  consistent    Context:  Have you ever noticed that this condition seems to be associated with specific activities you do?    o  denies   Modifying Factors:    o Anything that seems to make the condition worse?    -  denies   o What have you tried to do to make the condition better?    -  denies   Associated Signs and Symptoms:  Does this skin lesion seem to be associated with any of the following:  o  DERM ASSOCIATED SIGNS AND SYMPTOMS: Redness     1  MELANOCYTIC NEVI ("Moles")    Physical Exam:   Anatomic Location Affected:   Mid chin    Morphological Description:  0 4 cm Dome shaped papule central hair    Pertinent Positives:   Pertinent Negatives:     Additional History of Present Condition:  Patient states it occasionally becomes sore      Assessment and Plan:  Based on a thorough discussion of this condition and the management approach to it (including a comprehensive discussion of the known risks, side effects and potential benefits of treatment), the patient (family) agrees to implement the following specific plan:   Monitor for changes      Melanocytic Nevi  Melanocytic nevi ("moles") are tan or brown, raised or flat areas of the skin which have an increased number of melanocytes  Melanocytes are the cells in our body which make pigment and account for skin color  Some moles are present at birth (I e , "congenital nevi"), while others come up later in life (i e , "acquired nevi")  The sun can stimulate the body to make more moles  Sunburns are not the only thing that triggers more moles  Chronic sun exposure can do it too  Clinically distinguishing a healthy mole from melanoma may be difficult, even for experienced dermatologists  The "ABCDE's" of moles have been suggested as a means of helping to alert a person to a suspicious mole and the possible increased risk of melanoma  The suggestions for raising alert are as follows:    Asymmetry: Healthy moles tend to be symmetric, while melanomas are often asymmetric  Asymmetry means if you draw a line through the mole, the two halves do not match in color, size, shape, or surface texture  Asymmetry can be a result of rapid enlargement of a mole, the development of a raised area on a previously flat lesion, scaling, ulceration, bleeding or scabbing within the mole  Any mole that starts to demonstrate "asymmetry" should be examined promptly by a board certified dermatologist      Border: Healthy moles tend to have discrete, even borders  The border of a melanoma often blends into the normal skin and does not sharply delineate the mole from normal skin    Any mole that starts to demonstrate "uneven borders" should be examined promptly by a board certified dermatologist      Color: Healthy moles tend to be one color throughout  Melanomas tend to be made up of different colors ranging from dark black, blue, white, or red  Any mole that demonstrates a color change should be examined promptly by a board certified dermatologist      Diameter: Healthy moles tend to be smaller than 0 6 cm in size; an exception are "congenital nevi" that can be larger  Melanomas tend to grow and can often be greater than 0 6 cm (1/4 of an inch, or the size of a pencil eraser)  This is only a guideline, and many normal moles may be larger than 0 6 cm without being unhealthy  Any mole that starts to change in size (small to bigger or bigger to smaller) should be examined promptly by a board certified dermatologist      Evolving: Healthy moles tend to "stay the same "  Melanomas may often show signs of change or evolution such as a change in size, shape, color, or elevation  Any mole that starts to itch, bleed, crust, burn, hurt, or ulcerate or demonstrate a change or evolution should be examined promptly by a board certified dermatologist       Dysplastic Nevi  Dysplastic moles are moles that fit the ABCDE rules of melanoma but are not identified as melanomas when examined under the microscope  They may indicate an increased risk of melanoma in that person  If there is a family history of melanoma, most experts agree that the person may be at an increased risk for developing a melanoma  Experts still do not agree on what dysplastic moles mean in patients without a personal or family history of melanoma  Dysplastic moles are usually larger than common moles and have different colors within it with irregular borders  The appearance can be very similar to a melanoma  Biopsies of dysplastic moles may show abnormalities which are different from a regular mole  Melanoma  Malignant melanoma is a type of skin cancer that can be deadly if it spreads throughout the body   The incidence of melanoma in the [place] is growing faster than any other cancer  Melanoma usually grows near the surface of the skin for a period of time, and then begins to grow deeper into the skin  Once it grows deeper into the skin, the risk of spread to other organs greatly increases  Therefore, early detection and removal of a malignant melanoma may result in a better chance at a complete cure; removal after the tumor has spread may not be as effective, leading to worse clinical outcomes such as death  The true rate of nevus transformation into a melanoma is unknown  It has been estimated that the lifetime risk for any acquired melanocytic nevus on any 21year-old individual transforming into melanoma by age [de-identified] is 0 03% (1 in 3,164) for men and 0 009% (1 in 10,800) for women  The appearance of a "new mole" remains one of the most reliable methods for identifying a malignant melanoma  Occasionally, melanomas appear as rapidly growing, blue-black, dome-shaped bumps within a previous mole or previous area of normal skin  Other times, melanomas are suspected when a mole suddenly appears or changes  Itching, burning, or pain in a pigmented lesion should increase suspicion, but most patients with early melanoma have no skin discomfort whatsoever  Melanoma can occur anywhere on the skin, including areas that are difficult for self-examination  Many melanomas are first noticed by other family members  Suspicious-looking moles may be removed for microscopic examination  You may be able to prevent death from melanoma by doing two simple things:    1  Try to avoid unnecessary sun exposure and protect your skin when it is exposed to the sun  People who live near the equator, people who have intermittent exposures to large amounts of sun, and people who have had sunburns in childhood or adolescence have an increased risk for melanoma  Sun sense and vigilant sun protection may be keys to helping to prevent melanoma    We recommend wearing UPF-rated sun protective clothing and sunglasses whenever possible and applying a moisturizer-sunscreen combination product (SPF 50+) such as Neutrogena Daily Defense to sun exposed areas of skin at least three times a day  2  Have your moles regularly examined by a board certified dermatologist AND by yourself or a family member/friend at home  We recommend that you have your moles examined at least once a year by a board certified dermatologist   Use your birthday as an annual reminder to have your "Birthday Suit" (I e , your skin) examined; it is a nice birthday gift to yourself to know that your skin is healthy appearing! 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 which cause her hands to dry and crack  Patient does put on o'keef hand cream and Vaseline  Assessment and Plan:  Based on a thorough discussion of this condition and the management approach to it (including a comprehensive discussion of the known risks, side effects and potential benefits of treatment), the patient (family) agrees to implement the following specific plan:   Vaseline and cotton gloves at home after work    Wear gloves when you can     3  SEBORRHEIC KERATOSIS; NON-INFLAMED    Physical Exam:   Anatomic Location Affected:  Right ankle    Morphological Description:  Flat and raised, waxy, smooth to warty textured, yellow to brownish-grey to dark brown to blackish, discrete, "stuck-on" appearing papules     Pertinent Positives:   Pertinent Negatives: Additional History of Present Condition:  Patient reports new bumps on the skin  Denies itch, burn, pain, bleeding or ulceration  Present constantly; nothing seems to make it worse or better  No prior treatment  Assessment and Plan:  Based on a thorough discussion of this condition and the management approach to it (including a comprehensive discussion of the known risks, side effects and potential benefits of treatment), the patient (family) agrees to implement the following specific plan:   Monitor for changes     Seborrheic Keratosis  A seborrheic keratosis is a harmless warty spot that appears during adult life as a common sign of skin aging  Seborrheic keratoses can arise on any area of skin, covered or uncovered, with the usual exception of the palms and soles  They do not arise from mucous membranes  Seborrheic keratoses can have highly variable appearance  Seborrheic keratoses are extremely common  It has been estimated that over 90% of adults over the age of 61 years have one or more of them  They occur in males and females of all races, typically beginning to erupt in the 35s or 45s  They are uncommon under the age of 21 years  The precise cause of seborrhoeic keratoses is not known  Seborrhoeic keratoses are considered degenerative in nature  As time goes by, seborrheic keratoses tend to become more numerous  Some people inherit a tendency to develop a very large number of them; some people may have hundreds of them  The name "seborrheic keratosis" is misleading, because these lesions are not limited to a seborrhoeic distribution (scalp, mid-face, chest, upper back), nor are they formed from sebaceous glands, nor are they associated with sebum -- which is greasy    Seborrheic keratosis may also be called "SK," "Seb K," "basal cell papilloma," "senile wart," or "barnacle "      Researchers have noted:   Eruptive seborrhoeic keratoses can follow sunburn or dermatitis   Skin friction may be the reason they appear in body folds   Viral cause (e g , human papillomavirus) seems unlikely   Stable and clonal mutations or activation of FRFR3, PIK3CA, COLE, AKT1 and EGFR genes are found in seborrhoeic keratoses   Seborrhoeic keratosis can arise from solar lentigo   FRFR3 mutations also arise in solar lentigines  These mutations are associated with increased age and location on the head and neck, suggesting a role of ultraviolet radiation in these lesions   Seborrheic keratoses do not harbour tumour suppressor gene mutations   Epidermal growth factor receptor inhibitors, which are used to treat some cancers, often result in an increase in verrucal (warty) keratoses  There is no easy way to remove multiple lesions on a single occasion  Unless a specific lesion is "inflamed" and is causing pain or stinging/burning or is bleeding, most insurance companies do not authorize treatment    Scribe Attestation    I,:   Getix am acting as a scribe while in the presence of the attending physician :        I,:   Linden Osgood, MD personally performed the services described in this documentation    as scribed in my presence :

## 2019-10-16 ENCOUNTER — TELEPHONE (OUTPATIENT)
Dept: RADIOLOGY | Facility: MEDICAL CENTER | Age: 59
End: 2019-10-16

## 2019-10-17 NOTE — TELEPHONE ENCOUNTER
Pt returned call and would like a callback,  Pt somehow lost the voicemail        Pt can be reached at [phone]

## 2019-10-17 NOTE — TELEPHONE ENCOUNTER
Called patient back to schedule MBB #2  Patient states that she is unsure of proceeding with left C3-5 MBB #2 because this did not give her any relief to the numbness in her hands  I questioned if it helped with her neck pain as that is what it is targeted to help with and she said that it did but if it's only going to maybe last a year, then it's not worth it to continue since she has fibromyalgia pain also  She opted for OV to further discuss her response and options again  Next available with you on 10/25   0700 consult slot was used for f/u patient; I scheduled patient at 0730       FYI

## 2019-10-21 NOTE — TELEPHONE ENCOUNTER
The 0700 was scheduled for f/u patient already; this left the other half of the consult slot which I then used for this patient

## 2019-10-25 ENCOUNTER — OFFICE VISIT (OUTPATIENT)
Dept: SLEEP CENTER | Facility: CLINIC | Age: 59
End: 2019-10-25
Payer: COMMERCIAL

## 2019-10-25 ENCOUNTER — OFFICE VISIT (OUTPATIENT)
Dept: PAIN MEDICINE | Facility: MEDICAL CENTER | Age: 59
End: 2019-10-25
Payer: COMMERCIAL

## 2019-10-25 VITALS
HEART RATE: 69 BPM | RESPIRATION RATE: 16 BRPM | DIASTOLIC BLOOD PRESSURE: 72 MMHG | WEIGHT: 123 LBS | BODY MASS INDEX: 21 KG/M2 | HEIGHT: 64 IN | SYSTOLIC BLOOD PRESSURE: 128 MMHG

## 2019-10-25 VITALS
DIASTOLIC BLOOD PRESSURE: 68 MMHG | SYSTOLIC BLOOD PRESSURE: 112 MMHG | HEART RATE: 70 BPM | BODY MASS INDEX: 21 KG/M2 | WEIGHT: 123 LBS | HEIGHT: 64 IN

## 2019-10-25 DIAGNOSIS — M54.12 CERVICAL RADICULOPATHY: Primary | ICD-10-CM

## 2019-10-25 DIAGNOSIS — G43.009 MIGRAINE WITHOUT AURA AND WITHOUT STATUS MIGRAINOSUS, NOT INTRACTABLE: ICD-10-CM

## 2019-10-25 DIAGNOSIS — F51.12 INSUFFICIENT SLEEP SYNDROME: ICD-10-CM

## 2019-10-25 DIAGNOSIS — J30.9 ALLERGIC RHINITIS, UNSPECIFIED SEASONALITY, UNSPECIFIED TRIGGER: ICD-10-CM

## 2019-10-25 DIAGNOSIS — G47.11 IDIOPATHIC HYPERSOMNIA: Primary | ICD-10-CM

## 2019-10-25 DIAGNOSIS — Z72.0 TOBACCO ABUSE: ICD-10-CM

## 2019-10-25 DIAGNOSIS — Z98.1 HISTORY OF FUSION OF CERVICAL SPINE: ICD-10-CM

## 2019-10-25 DIAGNOSIS — G89.4 CHRONIC PAIN DISORDER: ICD-10-CM

## 2019-10-25 DIAGNOSIS — M79.7 FIBROMYALGIA: ICD-10-CM

## 2019-10-25 PROCEDURE — 99214 OFFICE O/P EST MOD 30 MIN: CPT | Performed by: PHYSICAL MEDICINE & REHABILITATION

## 2019-10-25 PROCEDURE — 99214 OFFICE O/P EST MOD 30 MIN: CPT | Performed by: INTERNAL MEDICINE

## 2019-10-25 RX ORDER — ARMODAFINIL 250 MG/1
250 TABLET ORAL DAILY
Qty: 30 TABLET | Refills: 2 | Status: SHIPPED | OUTPATIENT
Start: 2019-10-25 | End: 2020-01-24 | Stop reason: SDUPTHER

## 2019-10-25 NOTE — PROGRESS NOTES
Review of Systems      Genitourinary post menopausal (no peroids)   Cardiology none   Gastrointestinal none   Neurology muscle weakness, numbness/tingling of an extremity, forgetfulness, poor concentration or confusion, , difficulty with memory and balance problems   Constitutional weight change   Integumentary none   Psychiatry none   Musculoskeletal joint pain, muscle aches, back pain and legs twitching/jerking   Pulmonary none   ENT none   Endocrine none   Hematological none

## 2019-10-25 NOTE — PATIENT INSTRUCTIONS

## 2019-10-25 NOTE — PROGRESS NOTES
Follow-Up Note - Sleep Center   [facility], [place]  62 y o  female  GPP:4/24/3433  CQX:[id number]    CC: I saw this patient for follow-up in clinic today for hypersomnolence, Coexisting Sleep and Medical Problems  She had results of prior study sent over  The only report received was from and unattended home sleep study in October of 2017 that demonstrated an our GI of 4 3 per hour  Minimum oxygen saturation was 88%  PFSH, Problem List, Medications & Allergies were reviewed in EMR  Interval changes: none reported  She  has a past medical history of Irritable bowel, Osteoarthritis, Osteoporosis, Other chronic pain, and Sleep disorder  She has a current medication list which includes the following prescription(s): armodafinil, ascorbic acid, b complex vitamins, bacillus coagulans-inulin, calcium carbonate, cetirizine, cholecalciferol, cinnamon, citicoline sodium, coenzyme q10, denosumab, diclofenac, duloxetine, ferrous sulfate, gabapentin, pierre root, ginkgo biloba, metaxalone, misc natural products, multiple vitamins-minerals, mupirocin, NON FORMULARY, nortriptyline, ondansetron, oxybutynin, prednisone, probiotic-10, turmeric, vitamin e (tocopherol), and zolmitriptan  ROS: reviewed (see attached)  Significant for headaches are somewhat improved  She uses medical marijuana and other prescription medications for chronic pain  HPI:  She has increase the amount of sleep that she gets but feel more tired the more sleep I get  She dozes off unintentionally in various settings, mainly when sedentary or even when eating  She reported none of the ancillary symptoms of narcolepsy  Sleep Routine: She reports getting 7-1/2 to 8 hours sleep on week nights and more on weekends at 10-11 hours (because my  does not wake me up); she has no difficulty initiating or maintaining sleep   She awakens with the aid of multiple alarms feeling never feels rested  She has excessive drowsiness   She rated herself at Total score: 20 /24 on the [place] sleepiness scale  Habits: reports that she has been smoking cigarettes  She has been smoking about 0 25 packs per day  She uses smokeless tobacco ,  reports that she drinks alcohol ,  reports that she has current or past drug history  Drug: Marijuana  , Caffeine use: limited  , Exercise routine: regular    EXAM: /68   Pulse 70   Ht 5' 4" (1 626 m)   Wt 55 8 kg (123 lb)   BMI 21 11 kg/m²     Patient is well groomed; well appearing  Skin/Extrem: warm & dry; col & hydration normal; no edema  Psych: cooperativeand in no distress  Mental state appears normal   CNS: Alert, orientated, clear & coherent speech  H&N: EOMI; NC/AT:no facial pressure marks, no rashes  Neck Circumference: 13 5 cm  ENMT Mucus membranes appear normal Nasal airway:patent  Oral airway:  crowded  Resp:effort is normal CVS: RRR ABD:  Soft, nontender MSK:Gait normal     IMPRESSION: Primary Sleep/Secondary(to Medical or Psych conditions) & comorbidities   1  Idiopathic hypersomnia  Armodafinil 250 MG tablet   2  Insufficient sleep syndrome     3  Fibromyalgia     4  Allergic rhinitis, unspecified seasonality, unspecified trigger     5  Tobacco abuse     6  Migraine without aura and without status migrainosus, not intractable     7  Chronic pain disorder         PLAN:  1  I reviewed results of the Sleep studies with the patient  2  I advise continuing on general measures of Sleep Hygiene and allowing sufficient opportunity for sleep  3  She will obtain results of additional sleep studies done at [facility]   I will need to see results of her MSLT   (she did not want to repeat any studies at this time )  4  She does not drive but would like to have her license reinstated  In order to do so, she will need to undertake a maintenance of wakefulness test   5  I provided a prescription to renew Nuvigil  6  She has no intention of smoking cessation at this time      7  Follow-up to be scheduled in 3 months to review results and monitor progress           Sincerely,    Authenticated electronically by Isabel Cabezas MD on 28/65/93   Board Certified Specialist

## 2019-10-25 NOTE — PROGRESS NOTES
Assessment  1  Cervical radiculopathy    2  History of fusion of cervical spine        Plan  1  Cervical spine MRI  2  Bilateral upper extremity EMG  3  Follow-up once imaging and EMG study is available, consider cervical epidural steroid injection or referral to 1 of our spine surgeons     My impressions and treatment recommendations were discussed in detail with the patient who verbalized understanding and had no further questions  Discharge instructions were provided  I personally saw and examined the patient and I agree with the above discussed plan of care  Orders Placed This Encounter   Procedures    MRI cervical spine w wo contrast     Standing Status:   Future     Standing Expiration Date:   10/25/2023     Scheduling Instructions: There is no preparation for this test  Please leave your jewelry and valuables at home, wedding rings are the exception  Please bring your insurance cards, a form of photo ID and a list of your medications with you  Arrive 15 minutes prior to your appointment time in order to register  Please bring any prior CT or MRI studies of this area that were not performed at a [facility]  To schedule this appointment, please contact Central Scheduling at [phone]  Order Specific Question:   What is the patient's sedation requirement? Answer:   No Sedation    BUN     Standing Status:   Future     Standing Expiration Date:   10/25/2020    Creatinine, serum     Standing Status:   Future     Standing Expiration Date:   10/25/2020    EMG 2 Limb Upper Extremity     Standing Status:   Future     Standing Expiration Date:   10/25/2020     Scheduling Instructions:      Cervical radiculopathy vs carpal tunnel     Order Specific Question:   Possible Diagnosis: Answer:   cervical neuropathy     Order Specific Question:   Possible Diagnosis:      Answer:   median neuropathy (carpal tunnel syndrome)     No orders of the defined types were placed in this encounter  History of Present Illness    Natalie Ruvalcaba is a 62 y o  female follow-up after completing cervical medial branch nerve blocks  This did provide relief of her axial cervical spine pain however her main complaint is pain radiating into her hands  She is also describing progressive weakness  This is concerning for progressive neurological decline and potential cervical nerve root compression  She currently rates the pain as a 5/10  This is constant and worse in the evening characterized as burning, dull, aching, pressure-like, numbness, pins and needle sensation  She is currently using medical marijuana and describes 75-80% relief of symptoms with this  Despite that she is seeking further care  I have personally reviewed and/or updated the patient's past medical history, past surgical history, family history, social history, current medications, allergies, and vital signs today  Review of Systems   Respiratory: Negative for shortness of breath  Cardiovascular: Negative for chest pain  Gastrointestinal: Negative for constipation, diarrhea, nausea and vomiting  Musculoskeletal: Positive for gait problem and joint swelling  Negative for arthralgias and myalgias  Skin: Negative for rash  Neurological: Positive for weakness and numbness (in hands)  Negative for dizziness and seizures  Psychiatric/Behavioral: Positive for decreased concentration  All other systems reviewed and are negative        Patient Active Problem List   Diagnosis    Urge incontinence    Frequency of urination    History of hydronephrosis    Migraine    Fibromyalgia    Pure hypercholesterolemia    Allergy    Low iron    Idiopathic hypersomnia    Osteoporosis    Diverticulosis    Other irritable bowel syndrome    Migraine without aura and without status migrainosus, not intractable    Acute cystitis without hematuria    Calculus of kidney    Urinary tract infection without hematuria    Irritable bowel syndrome    Edema of both legs    Prolonged QT interval    Rash    Screening mammogram, encounter for    Screening for colon cancer    Spondylosis of cervical region without myelopathy or radiculopathy    Neck pain    Encounter for hepatitis C screening test for low risk patient       Past Medical History:   Diagnosis Date    Irritable bowel     Osteoarthritis     Osteoporosis     Other chronic pain     degenerative disc disease    Sleep disorder        Past Surgical History:   Procedure Laterality Date    ABDOMINAL ADHESION SURGERY      BLADDER SURGERY      CHOLECYSTECTOMY      HYSTERECTOMY      KNEE SURGERY Right     replacement    LAMINECTOMY      MOLE REMOVAL      OTHER SURGICAL HISTORY      enlarged lymph node removed left arm   no cancer    SPINAL FUSION      WISDOM TOOTH EXTRACTION         Family History   Problem Relation Age of Onset    Hypertension Father     Cancer Father     Diabetes Mother     Hypertension Mother     Stroke Brother     Nephrolithiasis Brother     Diabetes Sister        Social History     Occupational History    Not on file   Tobacco Use    Smoking status: Current Every Day Smoker     Packs/day: 0 25     Types: Cigarettes    Smokeless tobacco: Current User   Substance and Sexual Activity    Alcohol use: Yes     Comment: rarely    Drug use: Yes     Types: Marijuana     Comment: medicinal marijuana    Sexual activity: Not on file       Current Outpatient Medications on File Prior to Visit   Medication Sig    Armodafinil 250 MG tablet Take 1 tablet (250 mg total) by mouth daily    Ascorbic Acid (VITAMIN C PO) Take by mouth    B Complex Vitamins (B COMPLEX 1 PO) Take 1 tablet by mouth    BACILLUS COAGULANS-INULIN PO Take 1 capsule by mouth    calcium carbonate 1250 MG capsule Take 600 mg by mouth      cetirizine (ZyrTEC) 10 MG chewable tablet Chew 10 mg    Cholecalciferol 1000 units tablet Take 1,000 Units by mouth    Cinnamon 500 MG capsule     Citicoline Sodium 500 MG TABS Take by mouth    Coenzyme Q10 200 MG/GM POWD Take 200 mg by mouth    denosumab (PROLIA) 60 mg/mL Inject 60 mg under the skin    diclofenac (VOLTAREN) 75 mg EC tablet Take 75 mg by mouth    DULoxetine (CYMBALTA) 60 mg delayed release capsule Take 60 mg by mouth    ferrous sulfate 325 (65 Fe) mg tablet Take 325 mg by mouth    gabapentin (NEURONTIN) 300 mg capsule 300 mg at 1200 in addition to 600 mg at bedtime   Ginger, Zingiber officinalis, (GINGER ROOT) 550 MG CAPS Take by mouth    Ginkgo Biloba 40 MG TABS Take by mouth    metaxalone (SKELAXIN) 800 mg tablet Take 800 mg by mouth Three times daily as needed    Misc Natural Products (GINSENG-COMPLEX PO) daily   Multiple Vitamins-Minerals (CENTRUM SILVER PO) Take 1 tablet by mouth    mupirocin (BACTROBAN) 2 % ointment Apply topically 3 (three) times a day Apply topically on hands to open cuts to prevent infection    NON FORMULARY     nortriptyline (PAMELOR) 10 mg capsule Take 10 mg by mouth    ondansetron (ZOFRAN-ODT) 4 mg disintegrating tablet TAKE 1 TABLET (4 MG TOTAL) BY MOUTH EVERY 12 (TWELVE) HOURS AS NEEDED FOR NAUSEA OR VOMITING   oxybutynin (DITROPAN-XL) 10 MG 24 hr tablet Take 1 tablet (10 mg total) by mouth daily at bedtime    predniSONE 20 mg tablet Take 20 mg by mouth as needed     Probiotic Product (PROBIOTIC-10) CAPS Take by mouth    TURMERIC PO Take 500 mg by mouth    vitamin E, tocopherol, 400 units capsule Take 400 Units by mouth daily    ZOLMitriptan (ZOMIG) 2 5 MG tablet Take one tablet by mouth at the onset of migraine  May repeat in 2 hours  2/24 hrs, 4/week, 8/month   [DISCONTINUED] Green Tea, Camillia sinensis, (GREEN TEA EXTRACT PO) Take by mouth     No current facility-administered medications on file prior to visit          Allergies   Allergen Reactions    Celecoxib Other (See Comments)     Increased Heart Rate, Palpitations    Sumatriptan      Other reaction(s): SUMATRIPTAN (IMITREX) (Throat closure)  Pt analilia zolmitriptan   Tramadol Other (See Comments)     GI Upset sever vomiting   Medical Tape Dermatitis, Other (See Comments) and Rash     Adhesive from medication patches       Physical Exam    /72   Pulse 69   Resp 16   Ht 5' 4" (1 626 m)   Wt 55 8 kg (123 lb)   BMI 21 11 kg/m²     Constitutional: normal, well developed, well nourished, alert, in no distress and non-toxic and no overt pain behavior    Eyes: anicteric  HEENT: grossly intact  Neck: supple, symmetric, trachea midline and no masses   Pulmonary:even and unlabored  Cardiovascular:No edema or pitting edema present  Skin:Normal without rashes or lesions and well hydrated  Psychiatric:Mood and affect appropriate  Neurologic:Cranial Nerves II-XII grossly intact  Musculoskeletal:normal, except for pain radiating into the hands and perceived weakness with     Imaging

## 2019-11-06 ENCOUNTER — PROCEDURE VISIT (OUTPATIENT)
Dept: UROLOGY | Facility: MEDICAL CENTER | Age: 59
End: 2019-11-06
Payer: COMMERCIAL

## 2019-11-06 VITALS — BODY MASS INDEX: 21.17 KG/M2 | WEIGHT: 124 LBS | HEIGHT: 64 IN

## 2019-11-06 DIAGNOSIS — N39.41 URGE INCONTINENCE: Primary | ICD-10-CM

## 2019-11-06 DIAGNOSIS — R35.0 FREQUENCY OF URINATION: ICD-10-CM

## 2019-11-06 LAB
SL AMB  POCT GLUCOSE, UA: NORMAL
SL AMB LEUKOCYTE ESTERASE,UA: NORMAL
SL AMB POCT BILIRUBIN,UA: NORMAL
SL AMB POCT BLOOD,UA: NORMAL
SL AMB POCT CLARITY,UA: CLEAR
SL AMB POCT COLOR,UA: YELLOW
SL AMB POCT KETONES,UA: NORMAL
SL AMB POCT NITRITE,UA: NORMAL
SL AMB POCT PH,UA: 6
SL AMB POCT SPECIFIC GRAVITY,UA: 1.02
SL AMB POCT URINE PROTEIN: NORMAL
SL AMB POCT UROBILINOGEN: 0.2

## 2019-11-06 PROCEDURE — 81003 URINALYSIS AUTO W/O SCOPE: CPT | Performed by: UROLOGY

## 2019-11-06 PROCEDURE — 52000 CYSTOURETHROSCOPY: CPT | Performed by: UROLOGY

## 2019-11-06 PROCEDURE — 99213 OFFICE O/P EST LOW 20 MIN: CPT | Performed by: UROLOGY

## 2019-11-06 NOTE — PROGRESS NOTES
HISTORY:    Follow-up for urgency with urge incontinence mostly daytime  Much improved on oxybutynin 10 mg per day  No side effects  Much less leakage, only wears small pad now and again  ASSESSMENT / PLAN:    Cysto unremarkable    Continue medication  If she needed more help, would increase oxybutynin to 15 mg per day  The following portions of the patient's history were reviewed and updated as appropriate: allergies, current medications, past family history, past medical history, past social history, past surgical history and problem list     Review of Systems   All other systems reviewed and are negative  Objective:     Physical Exam   Constitutional: She appears well-developed and well-nourished  Cystoscopy  Date/Time: 11/6/2019 4:00 PM  Performed by: Manjula Carnes MD  Authorized by: Manjula Carnes MD     Procedure details: cystoscopy    Additional Procedure Details:      Patient presents for cystoscopy  I have discussed the reasons for doing the test, and the potential risks and complications  Patient expressed understanding, and signed informed consent document  The patient was carefully  positioned supine on the examining table  Sterile preparation was performed on the urethra  Xylocaine jelly was instilled and left  Indwelling for the procedure  The 13 Romanian flexible cystoscope was passed with the following findings:      Urethra:  No stenosis    Bladder: Moderate trabeculation, no lesion tumor stones     Residual urine:  Minimal    Patient tolerated the procedure well and was escorted from the examining table        No results found for: PSA]  BUN   Date Value Ref Range Status   06/27/2019 13 5 - 25 mg/dL Final     Creatinine   Date Value Ref Range Status   06/27/2019 0 66 0 60 - 1 30 mg/dL Final     Comment:     Standardized to IDMS reference method     No components found for: CBC      Patient Active Problem List   Diagnosis    Urge incontinence    Frequency of urination    History of hydronephrosis    Migraine    Fibromyalgia    Pure hypercholesterolemia    Allergy    Low iron    Idiopathic hypersomnia    Osteoporosis    Diverticulosis    Other irritable bowel syndrome    Migraine without aura and without status migrainosus, not intractable    Acute cystitis without hematuria    Calculus of kidney    Urinary tract infection without hematuria    Irritable bowel syndrome    Edema of both legs    Prolonged QT interval    Rash    Screening mammogram, encounter for    Screening for colon cancer    Spondylosis of cervical region without myelopathy or radiculopathy    Neck pain    Encounter for hepatitis C screening test for low risk patient        Diagnoses and all orders for this visit:    Urge incontinence  -     POCT urine dip auto non-scope    Frequency of urination  -     POCT urine dip auto non-scope           Patient ID: Jeanna White is a 62 y o  female        Current Outpatient Medications:     Armodafinil 250 MG tablet, Take 1 tablet (250 mg total) by mouth daily, Disp: 30 tablet, Rfl: 2    Ascorbic Acid (VITAMIN C PO), Take by mouth, Disp: , Rfl:     B Complex Vitamins (B COMPLEX 1 PO), Take 1 tablet by mouth, Disp: , Rfl:     BACILLUS COAGULANS-INULIN PO, Take 1 capsule by mouth, Disp: , Rfl:     calcium carbonate 1250 MG capsule, Take 600 mg by mouth  , Disp: , Rfl:     cetirizine (ZyrTEC) 10 MG chewable tablet, Chew 10 mg, Disp: , Rfl:     Cholecalciferol 1000 units tablet, Take 1,000 Units by mouth, Disp: , Rfl:     Citicoline Sodium 500 MG TABS, Take by mouth, Disp: , Rfl:     Coenzyme Q10 200 MG/GM POWD, Take 200 mg by mouth, Disp: , Rfl:     denosumab (PROLIA) 60 mg/mL, Inject 60 mg under the skin, Disp: , Rfl:     diclofenac (VOLTAREN) 75 mg EC tablet, Take 75 mg by mouth, Disp: , Rfl:     DULoxetine (CYMBALTA) 60 mg delayed release capsule, Take 60 mg by mouth, Disp: , Rfl:     ferrous sulfate 325 (65 Fe) mg tablet, Take 325 mg by mouth, Disp: , Rfl:     gabapentin (NEURONTIN) 300 mg capsule, 300 mg at 1200 in addition to 600 mg at bedtime  , Disp: , Rfl:     Ginger, Zingiber officinalis, (GINGER ROOT) 550 MG CAPS, Take by mouth, Disp: , Rfl:     Ginkgo Biloba 40 MG TABS, Take by mouth, Disp: , Rfl:     metaxalone (SKELAXIN) 800 mg tablet, Take 800 mg by mouth Three times daily as needed, Disp: , Rfl:     Misc Natural Products (GINSENG-COMPLEX PO), daily  , Disp: , Rfl:     Multiple Vitamins-Minerals (CENTRUM SILVER PO), Take 1 tablet by mouth, Disp: , Rfl:     mupirocin (BACTROBAN) 2 % ointment, Apply topically 3 (three) times a day Apply topically on hands to open cuts to prevent infection, Disp: 30 g, Rfl: 0    NON FORMULARY, , Disp: , Rfl:     nortriptyline (PAMELOR) 10 mg capsule, Take 10 mg by mouth, Disp: , Rfl:     ondansetron (ZOFRAN-ODT) 4 mg disintegrating tablet, TAKE 1 TABLET (4 MG TOTAL) BY MOUTH EVERY 12 (TWELVE) HOURS AS NEEDED FOR NAUSEA OR VOMITING , Disp: , Rfl:     oxybutynin (DITROPAN-XL) 10 MG 24 hr tablet, Take 1 tablet (10 mg total) by mouth daily at bedtime, Disp: 90 tablet, Rfl: 3    predniSONE 20 mg tablet, Take 20 mg by mouth as needed , Disp: , Rfl:     TURMERIC PO, Take 500 mg by mouth, Disp: , Rfl:     vitamin E, tocopherol, 400 units capsule, Take 400 Units by mouth daily, Disp: , Rfl:     ZOLMitriptan (ZOMIG) 2 5 MG tablet, Take one tablet by mouth at the onset of migraine  May repeat in 2 hours   2/24 hrs, 4/week, 8/month , Disp: , Rfl:     Cinnamon 500 MG capsule, , Disp: , Rfl:     Past Medical History:   Diagnosis Date    Irritable bowel     Osteoarthritis     Osteoporosis     Other chronic pain     degenerative disc disease    Sleep disorder        Past Surgical History:   Procedure Laterality Date    ABDOMINAL ADHESION SURGERY      BLADDER SURGERY      CHOLECYSTECTOMY      HYSTERECTOMY      KNEE SURGERY Right     replacement    LAMINECTOMY      MOLE REMOVAL      OTHER SURGICAL HISTORY      enlarged lymph node removed left arm   no cancer    SPINAL FUSION      WISDOM TOOTH EXTRACTION         Social History

## 2019-11-06 NOTE — LETTER
November 6, 2019     Mary Nieto MD  [address]    Patient: Kari Mallory   YOB: 1960   Date of Visit: 11/6/2019       Dear Dr Phoebe Swanson: Thank you for referring Kari Mallory to me for evaluation  Below are my notes for this consultation  If you have questions, please do not hesitate to call me  I look forward to following your patient along with you  Sincerely,        Aaron Leyva MD        CC: No Recipients  Aaron Leyva MD  11/6/2019  4:01 PM  Sign at close encounter     HISTORY:    Follow-up for urgency with urge incontinence mostly daytime  Much improved on oxybutynin 10 mg per day  No side effects  Much less leakage, only wears small pad now and again  ASSESSMENT / PLAN:    Cysto unremarkable    Continue medication  If she needed more help, would increase oxybutynin to 15 mg per day  The following portions of the patient's history were reviewed and updated as appropriate: allergies, current medications, past family history, past medical history, past social history, past surgical history and problem list     Review of Systems   All other systems reviewed and are negative  Objective:     Physical Exam   Constitutional: She appears well-developed and well-nourished  Cystoscopy  Date/Time: 11/6/2019 4:00 PM  Performed by: Aaron Leyva MD  Authorized by: Aaron Leyva MD     Procedure details: cystoscopy    Additional Procedure Details:      Patient presents for cystoscopy  I have discussed the reasons for doing the test, and the potential risks and complications  Patient expressed understanding, and signed informed consent document  The patient was carefully  positioned supine on the examining table  Sterile preparation was performed on the urethra  Xylocaine jelly was instilled and left  Indwelling for the procedure    The 15 [place] flexible cystoscope was passed with the following findings:      Urethra:  No stenosis    Bladder: Moderate trabeculation, no lesion tumor stones     Residual urine:  Minimal    Patient tolerated the procedure well and was escorted from the examining table  No results found for: PSA]  BUN   Date Value Ref Range Status   06/27/2019 13 5 - 25 mg/dL Final     Creatinine   Date Value Ref Range Status   06/27/2019 0 66 0 60 - 1 30 mg/dL Final     Comment:     Standardized to IDMS reference method     No components found for: CBC      Patient Active Problem List   Diagnosis    Urge incontinence    Frequency of urination    History of hydronephrosis    Migraine    Fibromyalgia    Pure hypercholesterolemia    Allergy    Low iron    Idiopathic hypersomnia    Osteoporosis    Diverticulosis    Other irritable bowel syndrome    Migraine without aura and without status migrainosus, not intractable    Acute cystitis without hematuria    Calculus of kidney    Urinary tract infection without hematuria    Irritable bowel syndrome    Edema of both legs    Prolonged QT interval    Rash    Screening mammogram, encounter for    Screening for colon cancer    Spondylosis of cervical region without myelopathy or radiculopathy    Neck pain    Encounter for hepatitis C screening test for low risk patient        Diagnoses and all orders for this visit:    Urge incontinence  -     POCT urine dip auto non-scope    Frequency of urination  -     POCT urine dip auto non-scope           Patient ID: Natalie Ruvalcaba is a 62 y o  female        Current Outpatient Medications:     Armodafinil 250 MG tablet, Take 1 tablet (250 mg total) by mouth daily, Disp: 30 tablet, Rfl: 2    Ascorbic Acid (VITAMIN C PO), Take by mouth, Disp: , Rfl:     B Complex Vitamins (B COMPLEX 1 PO), Take 1 tablet by mouth, Disp: , Rfl:     BACILLUS COAGULANS-INULIN PO, Take 1 capsule by mouth, Disp: , Rfl:     calcium carbonate 1250 MG capsule, Take 600 mg by mouth  , Disp: , Rfl:     cetirizine (ZyrTEC) 10 MG chewable tablet, Chew 10 mg, Disp: , Rfl:     Cholecalciferol 1000 units tablet, Take 1,000 Units by mouth, Disp: , Rfl:     Citicoline Sodium 500 MG TABS, Take by mouth, Disp: , Rfl:     Coenzyme Q10 200 MG/GM POWD, Take 200 mg by mouth, Disp: , Rfl:     denosumab (PROLIA) 60 mg/mL, Inject 60 mg under the skin, Disp: , Rfl:     diclofenac (VOLTAREN) 75 mg EC tablet, Take 75 mg by mouth, Disp: , Rfl:     DULoxetine (CYMBALTA) 60 mg delayed release capsule, Take 60 mg by mouth, Disp: , Rfl:     ferrous sulfate 325 (65 Fe) mg tablet, Take 325 mg by mouth, Disp: , Rfl:     gabapentin (NEURONTIN) 300 mg capsule, 300 mg at 1200 in addition to 600 mg at bedtime  , Disp: , Rfl:     Ginger, Zingiber officinalis, (GINGER ROOT) 550 MG CAPS, Take by mouth, Disp: , Rfl:     Ginkgo Biloba 40 MG TABS, Take by mouth, Disp: , Rfl:     metaxalone (SKELAXIN) 800 mg tablet, Take 800 mg by mouth Three times daily as needed, Disp: , Rfl:     Misc Natural Products (GINSENG-COMPLEX PO), daily  , Disp: , Rfl:     Multiple Vitamins-Minerals (CENTRUM SILVER PO), Take 1 tablet by mouth, Disp: , Rfl:     mupirocin (BACTROBAN) 2 % ointment, Apply topically 3 (three) times a day Apply topically on hands to open cuts to prevent infection, Disp: 30 g, Rfl: 0    NON FORMULARY, , Disp: , Rfl:     nortriptyline (PAMELOR) 10 mg capsule, Take 10 mg by mouth, Disp: , Rfl:     ondansetron (ZOFRAN-ODT) 4 mg disintegrating tablet, TAKE 1 TABLET (4 MG TOTAL) BY MOUTH EVERY 12 (TWELVE) HOURS AS NEEDED FOR NAUSEA OR VOMITING , Disp: , Rfl:     oxybutynin (DITROPAN-XL) 10 MG 24 hr tablet, Take 1 tablet (10 mg total) by mouth daily at bedtime, Disp: 90 tablet, Rfl: 3    predniSONE 20 mg tablet, Take 20 mg by mouth as needed , Disp: , Rfl:     TURMERIC PO, Take 500 mg by mouth, Disp: , Rfl:     vitamin E, tocopherol, 400 units capsule, Take 400 Units by mouth daily, Disp: , Rfl:     ZOLMitriptan (ZOMIG) 2 5 MG tablet, Take one tablet by mouth at the onset of migraine  May repeat in 2 hours  2/24 hrs, 4/week, 8/month , Disp: , Rfl:     Cinnamon 500 MG capsule, , Disp: , Rfl:     Past Medical History:   Diagnosis Date    Irritable bowel     Osteoarthritis     Osteoporosis     Other chronic pain     degenerative disc disease    Sleep disorder        Past Surgical History:   Procedure Laterality Date    ABDOMINAL ADHESION SURGERY      BLADDER SURGERY      CHOLECYSTECTOMY      HYSTERECTOMY      KNEE SURGERY Right     replacement    LAMINECTOMY      MOLE REMOVAL      OTHER SURGICAL HISTORY      enlarged lymph node removed left arm   no cancer    SPINAL FUSION      WISDOM TOOTH EXTRACTION         Social History

## 2019-11-14 ENCOUNTER — HOSPITAL ENCOUNTER (OUTPATIENT)
Dept: MRI IMAGING | Facility: HOSPITAL | Age: 59
Discharge: HOME | End: 2019-11-14
Attending: PHYSICAL MEDICINE & REHABILITATION
Payer: COMMERCIAL

## 2019-11-14 DIAGNOSIS — M54.12 CERVICAL RADICULOPATHY: ICD-10-CM

## 2019-11-14 DIAGNOSIS — Z98.1 HISTORY OF FUSION OF CERVICAL SPINE: ICD-10-CM

## 2019-11-14 PROCEDURE — A9585 GADOBUTROL INJECTION: HCPCS | Performed by: PHYSICAL MEDICINE & REHABILITATION

## 2019-11-14 PROCEDURE — 72156 MRI NECK SPINE W/O & W/DYE: CPT

## 2019-11-14 RX ADMIN — GADOBUTROL 5.5 ML: 604.72 INJECTION INTRAVENOUS at 20:22

## 2019-11-21 ENCOUNTER — APPOINTMENT (OUTPATIENT)
Dept: LAB | Facility: CLINIC | Age: 59
End: 2019-11-21
Payer: COMMERCIAL

## 2019-11-21 ENCOUNTER — OFFICE VISIT (OUTPATIENT)
Dept: FAMILY MEDICINE CLINIC | Facility: CLINIC | Age: 59
End: 2019-11-21
Payer: COMMERCIAL

## 2019-11-21 VITALS
HEIGHT: 64 IN | HEART RATE: 70 BPM | DIASTOLIC BLOOD PRESSURE: 82 MMHG | SYSTOLIC BLOOD PRESSURE: 120 MMHG | OXYGEN SATURATION: 96 % | BODY MASS INDEX: 21.1 KG/M2 | WEIGHT: 123.6 LBS | TEMPERATURE: 96.2 F

## 2019-11-21 DIAGNOSIS — Z11.59 ENCOUNTER FOR HEPATITIS C SCREENING TEST FOR LOW RISK PATIENT: ICD-10-CM

## 2019-11-21 DIAGNOSIS — E61.1 LOW IRON: ICD-10-CM

## 2019-11-21 DIAGNOSIS — E78.00 PURE HYPERCHOLESTEROLEMIA: Primary | ICD-10-CM

## 2019-11-21 DIAGNOSIS — L85.3 XEROSIS OF SKIN: ICD-10-CM

## 2019-11-21 DIAGNOSIS — M54.12 CERVICAL RADICULOPATHY: ICD-10-CM

## 2019-11-21 DIAGNOSIS — M79.7 FIBROMYALGIA: ICD-10-CM

## 2019-11-21 DIAGNOSIS — T78.40XD ALLERGIC STATE, SUBSEQUENT ENCOUNTER: ICD-10-CM

## 2019-11-21 DIAGNOSIS — M81.0 OSTEOPOROSIS WITHOUT CURRENT PATHOLOGICAL FRACTURE, UNSPECIFIED OSTEOPOROSIS TYPE: ICD-10-CM

## 2019-11-21 DIAGNOSIS — Z98.1 HISTORY OF FUSION OF CERVICAL SPINE: ICD-10-CM

## 2019-11-21 DIAGNOSIS — Z23 ENCOUNTER FOR IMMUNIZATION: ICD-10-CM

## 2019-11-21 DIAGNOSIS — G43.009 MIGRAINE WITHOUT AURA AND WITHOUT STATUS MIGRAINOSUS, NOT INTRACTABLE: ICD-10-CM

## 2019-11-21 DIAGNOSIS — E78.00 PURE HYPERCHOLESTEROLEMIA: ICD-10-CM

## 2019-11-21 LAB
ALBUMIN SERPL BCP-MCNC: 4.2 G/DL (ref 3.5–5)
ALP SERPL-CCNC: 81 U/L (ref 46–116)
ALT SERPL W P-5'-P-CCNC: 26 U/L (ref 12–78)
ANION GAP SERPL CALCULATED.3IONS-SCNC: 7 MMOL/L (ref 4–13)
AST SERPL W P-5'-P-CCNC: 15 U/L (ref 5–45)
BILIRUB SERPL-MCNC: 0.39 MG/DL (ref 0.2–1)
BUN SERPL-MCNC: 20 MG/DL (ref 5–25)
CALCIUM SERPL-MCNC: 9.7 MG/DL (ref 8.3–10.1)
CHLORIDE SERPL-SCNC: 108 MMOL/L (ref 100–108)
CHOLEST SERPL-MCNC: 281 MG/DL (ref 50–200)
CO2 SERPL-SCNC: 25 MMOL/L (ref 21–32)
CREAT SERPL-MCNC: 0.94 MG/DL (ref 0.6–1.3)
GFR SERPL CREATININE-BSD FRML MDRD: 66 ML/MIN/1.73SQ M
GLUCOSE P FAST SERPL-MCNC: 97 MG/DL (ref 65–99)
HCV AB SER QL: NORMAL
HDLC SERPL-MCNC: 84 MG/DL
LDLC SERPL CALC-MCNC: 176 MG/DL (ref 0–100)
POTASSIUM SERPL-SCNC: 5.6 MMOL/L (ref 3.5–5.3)
PROT SERPL-MCNC: 7.6 G/DL (ref 6.4–8.2)
SODIUM SERPL-SCNC: 140 MMOL/L (ref 136–145)
TRIGL SERPL-MCNC: 106 MG/DL

## 2019-11-21 PROCEDURE — 80053 COMPREHEN METABOLIC PANEL: CPT

## 2019-11-21 PROCEDURE — 90670 PCV13 VACCINE IM: CPT

## 2019-11-21 PROCEDURE — 90471 IMMUNIZATION ADMIN: CPT

## 2019-11-21 PROCEDURE — 36415 COLL VENOUS BLD VENIPUNCTURE: CPT

## 2019-11-21 PROCEDURE — 80061 LIPID PANEL: CPT

## 2019-11-21 PROCEDURE — 99214 OFFICE O/P EST MOD 30 MIN: CPT | Performed by: FAMILY MEDICINE

## 2019-11-21 PROCEDURE — 86803 HEPATITIS C AB TEST: CPT

## 2019-11-21 NOTE — PROGRESS NOTES
Assessment/Plan:          Diagnoses and all orders for this visit:    Pure hypercholesterolemia  Comments:  Check CMP and lipid  Order exist  To follow with low-fat diet    Allergic state, subsequent encounter  Comments:  Discussed side effect of Zyrtec  including drowsiness , avoid driving  Low iron  Comments:  Patient had iron study June this year was normal and  She had CBC July this year  Also she had EGD and colonoscopy    Migraine without aura and without status migrainosus, not intractable  Comments:  Neuro office visit on November 15, 2019 noted    Fibromyalgia  Comments: Follow with Rheumatology    Xerosis of skin  Comments:  Fingers     Advised to apply Eucerin cream at night and to workup denied loss  Avoid excessive exposure to chemical and water    Encounter for hepatitis C screening test for low risk patient  Comments:  check hep c  Order exit    Osteoporosis without current pathological fracture, unspecified osteoporosis type  Comments: Following with Rheumatology    Encounter for immunization  -     PNEUMOCOCCAL CONJUGATE VACCINE 13-VALENT GREATER THAN 6 MONTHS            Subjective:     Patient ID: Keara Quiroga is a 62 y o  female      Patient is here for follow-up on her chronic medical problem  Hyperlipidemia  Admit to low fat intake  Low iron  Denied the home fatigue, denied melena or rectal bleeding  Patient stated she had colonoscopy and EGD  Migraine  Well controlled  She is following with Urology  Allergy  Well controlled with Zyrtec  Patient stated if she does not take Zyrtec she has sneezing, nasal drainage  Cracks of  finger tips  chronic , admit to high exposure to water  And chemical   saw Dermatology told, her to use Bactroban ointment, continue to have same symptoms        Patient had HIV screening last year  No test done since last office visit      Review of Systems   Constitutional: Negative for activity change, appetite change, chills, fatigue, fever and unexpected weight change  HENT: Negative for congestion, ear discharge, ear pain, hearing loss, nosebleeds, rhinorrhea, sinus pressure, sore throat, tinnitus, trouble swallowing and voice change  Eyes: Negative for photophobia, pain and visual disturbance  Respiratory: Negative for cough, chest tightness, shortness of breath and wheezing  Cardiovascular: Negative for chest pain, palpitations and leg swelling  Gastrointestinal: Negative for abdominal pain, anal bleeding, blood in stool, constipation, diarrhea, nausea and vomiting  Endocrine: Negative for cold intolerance, heat intolerance, polydipsia and polyuria  Genitourinary: Negative for dysuria, frequency, hematuria and urgency  Musculoskeletal: Negative for arthralgias, back pain, gait problem, joint swelling, myalgias and neck pain  Skin: Negative for rash  Neurological: Negative for dizziness, tremors, seizures, syncope, weakness, light-headedness and headaches  Hematological: Negative for adenopathy  Does not bruise/bleed easily  Psychiatric/Behavioral: Negative for agitation, behavioral problems, confusion, dysphoric mood, hallucinations and sleep disturbance  The patient is not nervous/anxious  Objective:     Physical Exam   Constitutional: She is oriented to person, place, and time  She appears well-developed and well-nourished  No distress  HENT:   Head: Normocephalic and atraumatic  Eyes: Pupils are equal, round, and reactive to light  Conjunctivae and EOM are normal  No scleral icterus  Neck: No JVD present  No thyromegaly present  Cardiovascular: Normal rate, regular rhythm and normal heart sounds  No murmur heard  Extremities  No edema   Pulmonary/Chest: Effort normal and breath sounds normal    Abdominal: Soft  She exhibits no mass  There is no tenderness  There is no rebound and no guarding  No hernia  Lymphadenopathy:     She has no cervical adenopathy     Neurological: She is alert and oriented to person, place, and time  No cranial nerve deficit  She exhibits normal muscle tone  Coordination normal    Skin: No rash noted  Moderate dry skin at the tip of the fingers with cracks    No sign of infection   Psychiatric: She has a normal mood and affect   Her behavior is normal  Judgment normal

## 2019-11-22 ENCOUNTER — TELEPHONE (OUTPATIENT)
Dept: FAMILY MEDICINE CLINIC | Facility: CLINIC | Age: 59
End: 2019-11-22

## 2019-11-22 ENCOUNTER — TELEPHONE (OUTPATIENT)
Dept: PAIN MEDICINE | Facility: MEDICAL CENTER | Age: 59
End: 2019-11-22

## 2019-11-22 NOTE — TELEPHONE ENCOUNTER
----- Message from Da Estevez MD sent at 11/21/2019 10:59 PM EST -----  Potassium is slightly  To follow with low-potassium diet  Recheck potassium level in 2 weeks  Total cholesterol 281 is high ,triglyceride is normal    LDL is high 176   advised to start medication

## 2019-11-22 NOTE — TELEPHONE ENCOUNTER
----- Message from Quinten Saini DO sent at 11/22/2019  3:50 PM EST -----  Previous ACDF at the C5-6 and C6-7 levels with no residual canal stenosis or foraminal narrowing      Progression of degenerative disc disease at the C4-5 level compared to prior MRI dated 8/14/2014  Await EMG findings  May consider surgical opinion again

## 2019-11-22 NOTE — TELEPHONE ENCOUNTER
Patient is aware of test result  Patient stated that she will go for the blood work in two weeks, but she will not start on any medications

## 2019-11-23 PROBLEM — L85.3 XEROSIS OF SKIN: Status: ACTIVE | Noted: 2019-11-23

## 2019-11-23 PROBLEM — Z23 ENCOUNTER FOR IMMUNIZATION: Status: ACTIVE | Noted: 2019-11-23

## 2019-11-25 NOTE — TELEPHONE ENCOUNTER
RN attempted to reach pt regarding previous  [employer] with c/b number office ours and location provided

## 2019-11-30 PROBLEM — E78.00 HYPERCHOLESTEROLEMIA: Status: ACTIVE | Noted: 2019-04-04

## 2019-12-11 ENCOUNTER — HOSPITAL ENCOUNTER (OUTPATIENT)
Dept: NEUROLOGY | Facility: CLINIC | Age: 59
Discharge: HOME | End: 2019-12-11
Payer: COMMERCIAL

## 2019-12-11 DIAGNOSIS — Z98.1 HISTORY OF FUSION OF CERVICAL SPINE: ICD-10-CM

## 2019-12-11 DIAGNOSIS — M54.12 CERVICAL RADICULOPATHY: ICD-10-CM

## 2019-12-11 PROCEDURE — 95886 MUSC TEST DONE W/N TEST COMP: CPT | Performed by: PHYSICAL MEDICINE & REHABILITATION

## 2019-12-11 PROCEDURE — 95913 NRV CNDJ TEST 13/> STUDIES: CPT | Performed by: PHYSICAL MEDICINE & REHABILITATION

## 2019-12-12 ENCOUNTER — TELEPHONE (OUTPATIENT)
Dept: RADIOLOGY | Facility: MEDICAL CENTER | Age: 59
End: 2019-12-12

## 2019-12-12 NOTE — TELEPHONE ENCOUNTER
----- Message from Ade Covarrubias DO sent at 12/12/2019  7:29 AM EST -----  There is electrodiagnostic evidence of a left and right median demyelinating sensory  mononeuropathy across the wrists  This may be consistent with clinical diagnosis of mild  bilateral carpal tunnel syndrome  Treatment recs for this include PT, bracing especially at night, and can consider carpal tunnel injections and finally referral to one of our hand surgeons  Can also try YUN for radiating neck pain  If no relief from that or if patient prefers to avoid injection would recommend review with previous spine surgeon or new consultation with another spine surgeon

## 2019-12-12 NOTE — TELEPHONE ENCOUNTER
RN attempted to reach pt at mobile number, [employer] with c/b number office hours and location provided

## 2019-12-12 NOTE — TELEPHONE ENCOUNTER
RN s/w pt regarding previous  Pt would like to wait on doing anything for her wrists  Pt would like to be scheduled for YUN on 1/29 at 11:30 arrival time denied use of prescription strength blood thinners does take diclofenac and is aware last dose to be taken on 1/24 and then held until procedure is completed  Aware must have a , call to reschedule if become ill has a fever or starts on antibiotics, wear loose comfortable clothing, NPO one hour prior  Pt appreciative of call

## 2020-01-09 ENCOUNTER — HOSPITAL ENCOUNTER (OUTPATIENT)
Dept: MAMMOGRAPHY | Facility: MEDICAL CENTER | Age: 60
Discharge: HOME | End: 2020-01-09
Payer: COMMERCIAL

## 2020-01-09 VITALS — HEIGHT: 64 IN | WEIGHT: 120 LBS | BODY MASS INDEX: 20.49 KG/M2

## 2020-01-09 DIAGNOSIS — Z12.31 SCREENING MAMMOGRAM, ENCOUNTER FOR: ICD-10-CM

## 2020-01-09 PROCEDURE — 77063 BREAST TOMOSYNTHESIS BI: CPT

## 2020-01-09 PROCEDURE — 77067 SCR MAMMO BI INCL CAD: CPT

## 2020-01-24 ENCOUNTER — OFFICE VISIT (OUTPATIENT)
Dept: SLEEP CENTER | Facility: CLINIC | Age: 60
End: 2020-01-24
Payer: COMMERCIAL

## 2020-01-24 VITALS
HEIGHT: 64 IN | SYSTOLIC BLOOD PRESSURE: 112 MMHG | WEIGHT: 125 LBS | BODY MASS INDEX: 21.34 KG/M2 | HEART RATE: 70 BPM | DIASTOLIC BLOOD PRESSURE: 58 MMHG

## 2020-01-24 DIAGNOSIS — G43.009 MIGRAINE WITHOUT AURA AND WITHOUT STATUS MIGRAINOSUS, NOT INTRACTABLE: ICD-10-CM

## 2020-01-24 DIAGNOSIS — G89.4 CHRONIC PAIN DISORDER: ICD-10-CM

## 2020-01-24 DIAGNOSIS — M79.7 FIBROMYALGIA: ICD-10-CM

## 2020-01-24 DIAGNOSIS — F51.12 INSUFFICIENT SLEEP SYNDROME: ICD-10-CM

## 2020-01-24 DIAGNOSIS — G47.11 IDIOPATHIC HYPERSOMNIA: Primary | ICD-10-CM

## 2020-01-24 DIAGNOSIS — Z72.0 TOBACCO ABUSE: ICD-10-CM

## 2020-01-24 DIAGNOSIS — J30.9 ALLERGIC RHINITIS, UNSPECIFIED SEASONALITY, UNSPECIFIED TRIGGER: ICD-10-CM

## 2020-01-24 PROCEDURE — 99214 OFFICE O/P EST MOD 30 MIN: CPT | Performed by: INTERNAL MEDICINE

## 2020-01-24 RX ORDER — ARMODAFINIL 250 MG/1
250 TABLET ORAL DAILY
Qty: 30 TABLET | Refills: 2 | Status: SHIPPED | OUTPATIENT
Start: 2020-01-24 | End: 2020-02-14

## 2020-01-24 NOTE — PATIENT INSTRUCTIONS

## 2020-01-24 NOTE — PROGRESS NOTES
Review of Systems      Genitourinary none   Cardiology ankle/leg swelling   Gastrointestinal none   Neurology need to move extremities, muscle weakness, numbness/tingling of an extremity, forgetfulness, poor concentration or confusion, , difficulty with memory and balance problems   Constitutional none   Integumentary none   Psychiatry none   Musculoskeletal joint pain, muscle aches, back pain and leg cramps   Pulmonary none   ENT none   Endocrine none   Hematological none

## 2020-01-24 NOTE — PROGRESS NOTES
Follow-Up Note - Sleep Center   [facility]  62 y o  female  LAZARUS:[birth year]  OCS:[id number]    CC: I saw this patient for follow-up in clinic today for hypersomnolence, Coexisting Sleep and Medical Problems  She had a diagnostic sleep study at [facility] on 10/03/2018 total amount of sleep and sleep efficiency were not quantified however she was seen in all sleep stages  The study demonstrated no evidence for sleep disordered breathing:  AHI of 1 per hour and minimum oxygen saturation of 86%  This was followed by and MSLT 1 4 minutes  No sleep onset REM was seen in any of the 5 nap trials  PFSH, Problem List, Medications & Allergies were reviewed in EMR  Interval changes: none reported  She  has a past medical history of Irritable bowel, Osteoarthritis, Osteoporosis, Other chronic pain, and Sleep disorder  She has a current medication list which includes the following prescription(s): armodafinil, ascorbic acid, bacillus coagulans-inulin, calcium carbonate, cetirizine, cholecalciferol, citicoline sodium, coenzyme q10, denosumab, diclofenac, duloxetine, ferrous sulfate, gabapentin, pierre root, ginkgo biloba, misc natural products, multiple vitamins-minerals, mupirocin, NON FORMULARY, ondansetron, oxybutynin, prednisone, vitamin e (tocopherol), zolmitriptan, metaxalone, and nortriptyline  ROS: A 10 point review of systems undertaken (as attached)  Significant for nasal congestion  Migraines and fibromyalgia are controlled on current medications  HPI:  She is on Nuvigil 250 mg and is doing well  She is not requiring daytime naps  Sleep Routine: She reports getting 7-1/2 hours sleep on week nights and more on weekends ; she has no difficulty initiating or maintaining sleep   She awakens with the aid of an alarm feeling never feels rested  She has episodic  excessive drowsiness if she gets insufficient sleep   She rated herself at Total score: 12 /24 on the [place] sleepiness scale   With use of new visual, she is not dozing off unintentionally  Habits: reports that she has been smoking cigarettes  She has been smoking about 0 25 packs per day  She uses smokeless tobacco ,  reports that she drinks alcohol ,  reports that she has current or past drug history  Drug: Marijuana  , Caffeine use: limited  , Exercise routine: none   EXAM: /58   Pulse 70   Ht 5' 4" (1 626 m)   Wt 56 7 kg (125 lb)   BMI 21 46 kg/m²     Patient is well groomed; well appearing  Skin/Extrem: warm & dry; col & hydration normal; no edema  Psych: cooperativeand in no distress  Mental state appears normal   CNS: Alert, orientated, clear & coherent speech  H&N: EOMI; NC/AT:no facial pressure marks, no rashes  Neck Circumference: 13 5 cm  ENMT Mucus membranes appear normal Nasal airway:patent  Oral airway:  crowded  Resp:effort is normal CVS: RRR ABD:truncal obesity MSK:Gait normal     IMPRESSION: Primary Sleep/Secondary(to Medical or Psych conditions) & comorbidities   1  Idiopathic hypersomnia  Armodafinil 250 MG tablet    Maintenance wakefulness test   2  Insufficient sleep syndrome     3  Fibromyalgia     4  Tobacco abuse     5  Allergic rhinitis, unspecified seasonality, unspecified trigger     6  Migraine without aura and without status migrainosus, not intractable     7  Chronic pain disorder         PLAN:  1  I reviewed results of the Sleep studies with the patient  I discussed treatment options with risks and benefits     2  Continue Nuvigil 250 mg daily  2  I advised on sleep hygiene specifically allowing sufficient opportunity for sleep  3  Nasal symptoms may improve with regular nasal saline rinse followed by topical nasal steroid if necessary  4  I also advised smoking cessation  5  I advise minimizing use of sedating medications such as nortriptyline  6  She will need a maintenance of wakefulness tests to demonstrate adequate vigilance in ordered to reinstate her license    She is to be scheduled for this  7  I will see her for follow-up after her study to review results and further options        Sincerely,    Authenticated electronically by Christi Cazares MD on 37/68/45   Board Certified Specialist

## 2020-01-29 ENCOUNTER — HOSPITAL ENCOUNTER (OUTPATIENT)
Dept: RADIOLOGY | Facility: MEDICAL CENTER | Age: 60
Discharge: HOME | End: 2020-01-29
Attending: PHYSICAL MEDICINE & REHABILITATION | Admitting: PHYSICAL MEDICINE & REHABILITATION
Payer: COMMERCIAL

## 2020-01-29 VITALS
SYSTOLIC BLOOD PRESSURE: 116 MMHG | TEMPERATURE: 98.4 F | OXYGEN SATURATION: 96 % | RESPIRATION RATE: 18 BRPM | HEART RATE: 68 BPM | DIASTOLIC BLOOD PRESSURE: 75 MMHG

## 2020-01-29 DIAGNOSIS — M54.16 LUMBAR RADICULOPATHY: ICD-10-CM

## 2020-01-29 PROCEDURE — 62321 NJX INTERLAMINAR CRV/THRC: CPT | Performed by: PHYSICAL MEDICINE & REHABILITATION

## 2020-01-29 RX ORDER — METHYLPREDNISOLONE ACETATE 80 MG/ML
80 INJECTION, SUSPENSION INTRA-ARTICULAR; INTRALESIONAL; INTRAMUSCULAR; PARENTERAL; SOFT TISSUE ONCE
Status: COMPLETED | OUTPATIENT
Start: 2020-01-29 | End: 2020-01-29

## 2020-01-29 RX ORDER — LIDOCAINE HYDROCHLORIDE 10 MG/ML
5 INJECTION, SOLUTION EPIDURAL; INFILTRATION; INTRACAUDAL; PERINEURAL ONCE
Status: COMPLETED | OUTPATIENT
Start: 2020-01-29 | End: 2020-01-29

## 2020-01-29 RX ADMIN — METHYLPREDNISOLONE ACETATE 80 MG: 80 INJECTION, SUSPENSION INTRA-ARTICULAR; INTRALESIONAL; INTRAMUSCULAR; PARENTERAL; SOFT TISSUE at 11:58

## 2020-01-29 RX ADMIN — IOHEXOL 1 ML: 300 INJECTION, SOLUTION INTRAVENOUS at 11:58

## 2020-01-29 RX ADMIN — LIDOCAINE HYDROCHLORIDE 2 ML: 10 INJECTION, SOLUTION EPIDURAL; INFILTRATION; INTRACAUDAL; PERINEURAL at 11:56

## 2020-01-29 NOTE — H&P
History of Present Illness: The patient is a 62 y o  female who presents with complaints of neck and arm pain    Patient Active Problem List   Diagnosis    Urge incontinence    Frequency of urination    History of hydronephrosis    Migraine    Fibromyalgia    Pure hypercholesterolemia    Allergy    Low iron    Idiopathic hypersomnia    Osteoporosis    Diverticulosis    Other irritable bowel syndrome    Migraine without aura and without status migrainosus, not intractable    Acute cystitis without hematuria    Calculus of kidney    Urinary tract infection without hematuria    Irritable bowel syndrome    Edema of both legs    Prolonged QT interval    Rash    Screening mammogram, encounter for    Screening for colon cancer    Spondylosis of cervical region without myelopathy or radiculopathy    Neck pain    Encounter for hepatitis C screening test for low risk patient    Encounter for immunization    Xerosis of skin    Spondylosis of lumbar spine    Senile osteoporosis    Irritable bowel syndrome without diarrhea    Hypercholesterolemia    Cervical radiculopathy       Past Medical History:   Diagnosis Date    Irritable bowel     Osteoarthritis     Osteoporosis     Other chronic pain     degenerative disc disease    Sleep disorder        Past Surgical History:   Procedure Laterality Date    ABDOMINAL ADHESION SURGERY      BLADDER SURGERY      CHOLECYSTECTOMY      HYSTERECTOMY  1977    KNEE SURGERY Right     replacement    LAMINECTOMY      MOLE REMOVAL      OOPHORECTOMY Bilateral 1977    OTHER SURGICAL HISTORY      enlarged lymph node removed left arm   no cancer    SPINAL FUSION      WISDOM TOOTH EXTRACTION           Current Outpatient Medications:     Armodafinil 250 MG tablet, Take 1 tablet (250 mg total) by mouth daily, Disp: 30 tablet, Rfl: 2    Ascorbic Acid (VITAMIN C PO), Take by mouth daily , Disp: , Rfl:     BACILLUS COAGULANS-INULIN PO, Take 1 capsule by mouth daily , Disp: , Rfl:     calcium carbonate 1250 MG capsule, Take 600 mg by mouth daily , Disp: , Rfl:     cetirizine (ZyrTEC) 10 MG chewable tablet, Chew 10 mg daily , Disp: , Rfl:     Cholecalciferol 1000 units tablet, Take 1,000 Units by mouth daily , Disp: , Rfl:     Citicoline Sodium 500 MG TABS, Take by mouth daily , Disp: , Rfl:     Coenzyme Q10 200 MG/GM POWD, Take 200 mg by mouth daily , Disp: , Rfl:     denosumab (PROLIA) 60 mg/mL, Inject 60 mg under the skin, Disp: , Rfl:     diclofenac (VOLTAREN) 75 mg EC tablet, Take 75 mg by mouth 3 (three) times a day , Disp: , Rfl:     DULoxetine (CYMBALTA) 60 mg delayed release capsule, Take 60 mg by mouth daily , Disp: , Rfl:     ferrous sulfate 325 (65 Fe) mg tablet, Take 325 mg by mouth daily with breakfast , Disp: , Rfl:     gabapentin (NEURONTIN) 300 mg capsule, 300 mg at 1200 in addition to 600 mg at bedtime  , Disp: , Rfl:     Ginger, Zingiber officinalis, (GINGER ROOT) 550 MG CAPS, Take by mouth 2 (two) times a day , Disp: , Rfl:     Ginkgo Biloba 40 MG TABS, Take by mouth, Disp: , Rfl:     metaxalone (SKELAXIN) 800 mg tablet, Take 800 mg by mouth Three times daily as needed, Disp: , Rfl:     Misc Natural Products (GINSENG-COMPLEX PO), daily  , Disp: , Rfl:     Multiple Vitamins-Minerals (CENTRUM SILVER PO), Take 1 tablet by mouth daily , Disp: , Rfl:     mupirocin (BACTROBAN) 2 % ointment, Apply topically 3 (three) times a day Apply topically on hands to open cuts to prevent infection (Patient taking differently: Apply topically as needed Apply topically on hands to open cuts to prevent infection), Disp: 30 g, Rfl: 0    NON FORMULARY, , Disp: , Rfl:     ondansetron (ZOFRAN-ODT) 4 mg disintegrating tablet, TAKE 1 TABLET (4 MG TOTAL) BY MOUTH EVERY 12 (TWELVE) HOURS AS NEEDED FOR NAUSEA OR VOMITING , Disp: , Rfl:     oxybutynin (DITROPAN-XL) 10 MG 24 hr tablet, Take 1 tablet (10 mg total) by mouth daily at bedtime, Disp: 90 tablet, Rfl: 3   predniSONE 20 mg tablet, Take 20 mg by mouth as needed , Disp: , Rfl:     vitamin E, tocopherol, 400 units capsule, Take 400 Units by mouth daily, Disp: , Rfl:     ZOLMitriptan (ZOMIG) 2 5 MG tablet, Take one tablet by mouth at the onset of migraine  May repeat in 2 hours  2/24 hrs, 4/week, 8/month , Disp: , Rfl:     Current Facility-Administered Medications:     iohexol (OMNIPAQUE) 300 mg/mL injection 50 mL, 50 mL, Epidural, Once, Andreia Padilla,     lidocaine (PF) (XYLOCAINE-MPF) 1 % injection 5 mL, 5 mL, Infiltration, Once, Andreia Padilla DO    methylPREDNISolone acetate (DEPO-MEDROL) injection 80 mg, 80 mg, Epidural, Once, Andreia Padilla, DO    Allergies   Allergen Reactions    Celecoxib Other (See Comments)     Increased Heart Rate, Palpitations    Sumatriptan      Other reaction(s): SUMATRIPTAN (IMITREX) (Throat closure)  Pt analilia zolmitriptan   Tramadol Other (See Comments)     GI Upset sever vomiting   Medical Tape Dermatitis, Rash and Other (See Comments)     Adhesive from medication patches and bandaides       Physical Exam:   Vitals:    01/29/20 1146   BP: 118/73   Pulse: 67   Resp: 18   Temp: 98 4 °F (36 9 °C)   SpO2: 99%     General: Awake, Alert, Oriented x 3, Mood and affect appropriate  Respiratory: Respirations even and unlabored  Cardiovascular: Peripheral pulses intact; no edema  Musculoskeletal Exam:  Neck and arm pain    ASA Score:  2  Patient/Chart Verification  Patient ID Verified: Verbal  Consents Confirmed: Procedural, To be obtained in the Pre-Procedure area  H&P( within 30 days) Verified: To be obtained in the Pre-Procedure area  Allergies Reviewed: Yes  Anticoag/NSAID held?: Yes(diclofenac last dose 1/24/20)  Currently on antibiotics?: No  Pregnancy denied?: NA    Assessment:   1   Cervical radiculopathy        Plan: YUN

## 2020-01-29 NOTE — DISCHARGE INSTRUCTIONS
Epidural Steroid Injection   WHAT YOU NEED TO KNOW:   An epidural steroid injection (NOEMY) is a procedure to inject steroid medicine into the epidural space  The epidural space is between your spinal cord and vertebrae  Steroids reduce inflammation and fluid buildup in your spine that may be causing pain  You may be given pain medicine along with the steroids  ACTIVITY  · Do not drive or operate machinery today  · No strenuous activity today - bending, lifting, etc   · You may resume normal activites starting tomorrow - start slowly and as tolerated  · You may shower today, but no tub baths or hot tubs  · You may have numbness for several hours from the local anesthetic  Please use caution and common sense, especially with weight-bearing activities  CARE OF THE INJECTION SITE  · If you have soreness or pain, apply ice to the area today (20 minutes on/20 minutes off)  · Starting tomorrow, you may use warm, moist heat or ice if needed  · You may have an increase or change in your discomfort for 36-48 hours after your treatment  · Apply ice and continue with any pain medication you have been prescribed  · Notify [facility] if you have any of the following: redness, drainage, swelling, headache, stiff neck or fever above 100°F     SPECIAL INSTRUCTIONS  · Our office will contact you in approximately 7 days for a progress report  MEDICATIONS  · Continue to take all routine medications  · Our office may have instructed you to hold some medications  Resume diclofenac tomorrow 1/30/2020  If you have a problem specifically related to your procedure, please call our office at [phone]  Problems not related to your procedure should be directed to your primary care physician

## 2020-02-05 ENCOUNTER — TELEPHONE (OUTPATIENT)
Dept: PAIN MEDICINE | Facility: CLINIC | Age: 60
End: 2020-02-05

## 2020-02-05 DIAGNOSIS — G43.009 MIGRAINE WITHOUT AURA AND WITHOUT STATUS MIGRAINOSUS, NOT INTRACTABLE: Primary | ICD-10-CM

## 2020-02-05 RX ORDER — ZOLMITRIPTAN 2.5 MG/1
TABLET, FILM COATED ORAL
Qty: 12 TABLET | Refills: 2 | Status: SHIPPED | OUTPATIENT
Start: 2020-02-05 | End: 2020-12-30

## 2020-02-05 NOTE — TELEPHONE ENCOUNTER
Pharmacy stated that patient can receive only a 30 day supply of the zolmitriptan which is 12 tablets

## 2020-02-05 NOTE — TELEPHONE ENCOUNTER
I called patient and left a voicemail message informing the patient that her script from Dr Ana Rosa Mcclendon was sent to the pharmacy for her Zolmitriptan

## 2020-02-05 NOTE — TELEPHONE ENCOUNTER
----- Message from Natalie Ruvalcaba sent at 2/5/2020  6:52 AM EST -----  Regarding: Prescription Question  Contact: [phone]  It is time for me to get a prescription for my Zolmitripan 2 5 mg  This will be a first time refill for your office  I normally get a 3 month supply  It should be sent to the [facility] pharmacy in [address]  If you have any questions, please contact me

## 2020-02-05 NOTE — TELEPHONE ENCOUNTER
Left detailed message advising pt that insurance will only cover a 30 day supply  Pt was advise if any questions arise to give us a call back

## 2020-02-05 NOTE — TELEPHONE ENCOUNTER
Angelina, please check with the [facility] pharmacy as to exactly how many tablets is approved for a 90 day supply  Thanks

## 2020-02-06 NOTE — TELEPHONE ENCOUNTER
S/W pt  Advised pt of the same  Pt verbalized understanding    Pt stated she has a appt coming up with her PCP and she sees a neurologist

## 2020-02-06 NOTE — TELEPHONE ENCOUNTER
Pain level is a 2-3/10  The only problem is pt states she is getting migraines  She has to take medication for it  Every other day she is getting migraines  Pt would like someone to call her   Pt can be called back at [phone]

## 2020-02-06 NOTE — TELEPHONE ENCOUNTER
Aware and agree with plan  Recommend she follow up with PCP or [facility] for assistance with managing migraines

## 2020-02-12 DIAGNOSIS — G47.11 IDIOPATHIC HYPERSOMNIA: ICD-10-CM

## 2020-02-14 RX ORDER — ARMODAFINIL 250 MG/1
TABLET ORAL
Qty: 30 TABLET | Refills: 1 | Status: SHIPPED | OUTPATIENT
Start: 2020-02-14 | End: 2020-05-21 | Stop reason: SDUPTHER

## 2020-02-20 ENCOUNTER — HOSPITAL ENCOUNTER (OUTPATIENT)
Dept: SLEEP CENTER | Facility: CLINIC | Age: 60
Discharge: HOME | End: 2020-02-20
Payer: COMMERCIAL

## 2020-02-20 DIAGNOSIS — G47.11 IDIOPATHIC HYPERSOMNIA: ICD-10-CM

## 2020-02-20 PROCEDURE — 95805 MULTIPLE SLEEP LATENCY TEST: CPT

## 2020-02-20 PROCEDURE — 80307 DRUG TEST PRSMV CHEM ANLYZR: CPT | Performed by: INTERNAL MEDICINE

## 2020-02-20 NOTE — PROGRESS NOTES
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   Caffeine use:YES- coffee  6 to 18 ounces    Alcohol:Dayshift workers after 5PM: Alcohol use:NO    Typical day for patient:YES    Time awoke prior to study: 5am    Medication used prior to sleep study:NO    Urine drug screen performed: YES      Study Documentation    Trial: 1: Lights out: 8:07:00   EPOCH: 127 Lights on:8:47:00  EPOCH:207    Sleep Onset: N/A Time: N/A Unequivocal Sleep: No      Trial 2: Lights out: 10:00:00    EPOCH: 353 Lights on:10:40:00  EPOCH:433 Sleep Onset: N/A Time: N/A Unequivocal Sleep: No     Trial 3: Lights out:12:02:00     EPOCH: 597 Lights on:12:29:30   EPOCH:652 Sleep Onset: Yes Time:12:28:30 Unequivocal Sleep: Yes      Trial 4: Lights out:2:02:00   EPOCH: 837 Lights on:2:28:30  EPOCH:889 Sleep Onset: Yes Time: 2:23:30 Unequivocal Sleep: Yes     EKG abnormalities: no     EEG abnormalities: no    Study Terminated:N/A    Patient classification: employed       Post-Sleep Study    Medication used during sleep study:NO

## 2020-02-24 ENCOUNTER — TELEPHONE (OUTPATIENT)
Dept: SLEEP CENTER | Facility: CLINIC | Age: 60
End: 2020-02-24

## 2020-02-24 LAB
AMPHETAMINES UR QL SCN: NEGATIVE NG/ML
BARBITURATES UR QL SCN: NEGATIVE NG/ML
BENZODIAZ UR QL: NEGATIVE NG/ML
BZE UR QL: NEGATIVE NG/ML
CANNABINOIDS UR QL SCN: NEGATIVE
METHADONE UR QL SCN: NEGATIVE NG/ML
OPIATES UR QL: NEGATIVE NG/ML
PCP UR QL: NEGATIVE NG/ML
PROPOXYPH UR QL SCN: NEGATIVE NG/ML

## 2020-02-24 NOTE — TELEPHONE ENCOUNTER
Left message for the patient to call back for sleep study results  Patient had DME set up appointment for 3/2/2020 I canceled      Follow up appointment with Dr Gina Hollis is scheduled for 4/28/2020

## 2020-02-25 ENCOUNTER — OFFICE VISIT (OUTPATIENT)
Dept: SLEEP CENTER | Facility: CLINIC | Age: 60
End: 2020-02-25
Payer: COMMERCIAL

## 2020-02-25 VITALS
HEIGHT: 64 IN | DIASTOLIC BLOOD PRESSURE: 60 MMHG | BODY MASS INDEX: 21.17 KG/M2 | WEIGHT: 124 LBS | HEART RATE: 84 BPM | SYSTOLIC BLOOD PRESSURE: 116 MMHG

## 2020-02-25 DIAGNOSIS — G47.11 IDIOPATHIC HYPERSOMNIA: Primary | ICD-10-CM

## 2020-02-25 DIAGNOSIS — Z72.0 TOBACCO ABUSE: ICD-10-CM

## 2020-02-25 DIAGNOSIS — G43.009 MIGRAINE WITHOUT AURA AND WITHOUT STATUS MIGRAINOSUS, NOT INTRACTABLE: ICD-10-CM

## 2020-02-25 DIAGNOSIS — M79.7 FIBROMYALGIA: ICD-10-CM

## 2020-02-25 DIAGNOSIS — J30.9 ALLERGIC RHINITIS, UNSPECIFIED SEASONALITY, UNSPECIFIED TRIGGER: ICD-10-CM

## 2020-02-25 DIAGNOSIS — G89.4 CHRONIC PAIN DISORDER: ICD-10-CM

## 2020-02-25 DIAGNOSIS — F51.12 INSUFFICIENT SLEEP SYNDROME: ICD-10-CM

## 2020-02-25 PROCEDURE — 3008F BODY MASS INDEX DOCD: CPT | Performed by: INTERNAL MEDICINE

## 2020-02-25 PROCEDURE — 99214 OFFICE O/P EST MOD 30 MIN: CPT | Performed by: INTERNAL MEDICINE

## 2020-02-25 NOTE — PATIENT INSTRUCTIONS

## 2020-02-25 NOTE — PROGRESS NOTES
Review of Systems      Genitourinary none   Cardiology ankle/leg swelling   Gastrointestinal none   Neurology awaken with headache, muscle weakness, numbness/tingling of an extremity, forgetfulness, poor concentration or confusion,  and difficulty with memory   Constitutional fatigue   Integumentary none   Psychiatry none   Musculoskeletal joint pain, muscle aches, back pain and leg cramps   Pulmonary none   ENT none   Endocrine none   Hematological none

## 2020-03-02 RX ORDER — METAXALONE 800 MG/1
TABLET ORAL
COMMUNITY
Start: 2020-02-15 | End: 2020-05-04 | Stop reason: SDUPTHER

## 2020-03-05 ENCOUNTER — OFFICE VISIT (OUTPATIENT)
Dept: FAMILY MEDICINE CLINIC | Facility: CLINIC | Age: 60
End: 2020-03-05
Payer: COMMERCIAL

## 2020-03-05 VITALS
WEIGHT: 127.6 LBS | HEART RATE: 64 BPM | RESPIRATION RATE: 16 BRPM | SYSTOLIC BLOOD PRESSURE: 120 MMHG | TEMPERATURE: 96.5 F | HEIGHT: 64 IN | BODY MASS INDEX: 21.78 KG/M2 | DIASTOLIC BLOOD PRESSURE: 70 MMHG

## 2020-03-05 DIAGNOSIS — E78.00 PURE HYPERCHOLESTEROLEMIA: ICD-10-CM

## 2020-03-05 DIAGNOSIS — Z23 ENCOUNTER FOR IMMUNIZATION: ICD-10-CM

## 2020-03-05 DIAGNOSIS — E61.1 LOW IRON: ICD-10-CM

## 2020-03-05 DIAGNOSIS — Z53.20 HIV SCREENING DECLINED: ICD-10-CM

## 2020-03-05 DIAGNOSIS — G47.11 IDIOPATHIC HYPERSOMNIA: ICD-10-CM

## 2020-03-05 DIAGNOSIS — M79.7 FIBROMYALGIA: ICD-10-CM

## 2020-03-05 DIAGNOSIS — E87.5 HYPERKALEMIA: ICD-10-CM

## 2020-03-05 DIAGNOSIS — Z00.00 WELL ADULT EXAM: Primary | ICD-10-CM

## 2020-03-05 DIAGNOSIS — M81.0 OSTEOPOROSIS WITHOUT CURRENT PATHOLOGICAL FRACTURE, UNSPECIFIED OSTEOPOROSIS TYPE: ICD-10-CM

## 2020-03-05 DIAGNOSIS — G43.009 MIGRAINE WITHOUT AURA AND WITHOUT STATUS MIGRAINOSUS, NOT INTRACTABLE: ICD-10-CM

## 2020-03-05 DIAGNOSIS — K58.9 IRRITABLE BOWEL SYNDROME, UNSPECIFIED TYPE: ICD-10-CM

## 2020-03-05 DIAGNOSIS — H26.9 CATARACT OF BOTH EYES, UNSPECIFIED CATARACT TYPE: ICD-10-CM

## 2020-03-05 PROCEDURE — 90471 IMMUNIZATION ADMIN: CPT | Performed by: FAMILY MEDICINE

## 2020-03-05 PROCEDURE — 99396 PREV VISIT EST AGE 40-64: CPT | Performed by: FAMILY MEDICINE

## 2020-03-05 PROCEDURE — 90750 HZV VACC RECOMBINANT IM: CPT | Performed by: FAMILY MEDICINE

## 2020-03-05 PROCEDURE — 99214 OFFICE O/P EST MOD 30 MIN: CPT | Performed by: FAMILY MEDICINE

## 2020-03-05 PROCEDURE — 3008F BODY MASS INDEX DOCD: CPT | Performed by: FAMILY MEDICINE

## 2020-03-05 NOTE — PROGRESS NOTES
Assessment/Plan:       No problem-specific Assessment & Plan notes found for this encounter  Diagnoses and all orders for this visit:    Well adult exam  Comments:  Diet and activity discussed    Cataract of both eyes, unspecified cataract type  Comments:  Advised to follow with her ophthalmologist    Pure hypercholesterolemia  Comments: To follow with low-fat diet  Orders:  -     Comprehensive metabolic panel; Future  -     Lipid Panel with Direct LDL reflex; Future    Fibromyalgia  Comments:  Controlled  Following with Rheumatology  Rheumatology office visit on January 2, 2020 noted    Idiopathic hypersomnia  Comments:  Take medicine office visit on February noted    Osteoporosis without current pathological fracture, unspecified osteoporosis type  Comments: On Prolia  Fall prevention and weight-bearing exercise discussed    Irritable bowel syndrome, unspecified type  -     Comprehensive metabolic panel; Future  -     Vitamin D 25 hydroxy; Future    Hyperkalemia  Comments: To follow with low-potassium diet  Orders:  -     Comprehensive metabolic panel; Future    Low iron  -     CBC and differential; Future    Migraine without aura and without status migrainosus, not intractable  Comments:  Controlled  Following with the Neurology    Encounter for immunization  Comments:  Side effect of Shingrix discussed  Orders:  -     Zoster Vaccine Recombinant IM    HIV screening declined        Patient Instructions   Patient to follow up with test results      Orders Placed This Encounter   Procedures    Zoster Vaccine Recombinant IM    Comprehensive metabolic panel     This is a patient instruction: Patient fasting for 8 hours or longer recommended  Standing Status:   Future     Standing Expiration Date:   3/5/2021    Lipid Panel with Direct LDL reflex     This is a patient instruction: This test requires patient fasting for 10-12 hours or longer  Drinking of black coffee or black tea is acceptable  Standing Status:   Future     Standing Expiration Date:   3/5/2021    Vitamin D 25 hydroxy     Standing Status:   Future     Standing Expiration Date:   3/5/2021    CBC and differential     This is a patient instruction: This test is non-fasting  Please drink two glasses of water morning of bloodwork  Standing Status:   Future     Standing Expiration Date:   3/5/2021         Subjective:     Patient ID: Kari  is a 62 y o  female      Patient is here for follow-up on her chronic medical problem  Fibromyalgia  Well controlled denied any significant muscle or aches pain  Neck pain following with pain management  She had an epidural injection recently  Migraine was doing well and she had an epidural injection she had 1 episode  No further headache  Following with the Neurology  Idiopathic hypersomnia she is doing very well  And the had been evaluated recently at the Sleep Medicine  She said they are  reactivating her driving license  Osteoporosis, she is taking Prolia injection  She is following with Rheumatology  Denied bone fracture     Hyperlipidemia admit to regular fat intake  Denied chest pain  Low iron  Denied rectal bleeding or melena denied fatigue, shortness of breath or dizziness  Next  Test results  Labs 11-26-20   Discussed      Review of Systems   Constitutional: Negative for appetite change and fatigue  HENT: Negative for ear pain, tinnitus, trouble swallowing and voice change  Eyes: Negative for photophobia, pain and visual disturbance  Respiratory: Negative for cough, chest tightness and wheezing  Cardiovascular: Negative for chest pain, palpitations and leg swelling  Gastrointestinal: Negative for abdominal distention, abdominal pain, anal bleeding, constipation, diarrhea, nausea and rectal pain  Endocrine: Negative for cold intolerance, heat intolerance, polydipsia and polyuria     Genitourinary: Negative for decreased urine volume, difficulty urinating, dysuria, flank pain, frequency, hematuria and urgency  Musculoskeletal: Negative for arthralgias, back pain, gait problem, myalgias and neck pain  Skin: Negative for pallor and rash  Allergic/Immunologic: Negative for immunocompromised state  Neurological: Negative for dizziness, seizures, syncope and speech difficulty  Hematological: Negative for adenopathy  Does not bruise/bleed easily  Psychiatric/Behavioral: Negative for agitation, confusion and hallucinations  The patient is not nervous/anxious  Objective:     Physical Exam   Constitutional: She is oriented to person, place, and time  She appears well-developed and well-nourished  No distress  HENT:   Head: Normocephalic and atraumatic  Right Ear: External ear normal    Left Ear: External ear normal    Nose: Nose normal    Mouth/Throat: Oropharynx is clear and moist  No oropharyngeal exudate  Eyes: Pupils are equal, round, and reactive to light  Conjunctivae and EOM are normal  Right eye exhibits no discharge  Left eye exhibits no discharge  No scleral icterus  Neck: Neck supple  No JVD present  No thyromegaly present  Cardiovascular: Normal rate, regular rhythm, normal heart sounds and intact distal pulses  No murmur heard  Pulmonary/Chest: Effort normal and breath sounds normal    Abdominal: Soft  Bowel sounds are normal  She exhibits no distension and no mass  There is no tenderness  There is no rebound and no guarding  No hernia  Lymphadenopathy:     She has no cervical adenopathy  Neurological: She is alert and oriented to person, place, and time  No cranial nerve deficit  She exhibits normal muscle tone  Coordination normal    Skin: No rash noted  Psychiatric: She has a normal mood and affect   Her behavior is normal  Judgment normal

## 2020-03-05 NOTE — PROGRESS NOTES
Assessment/Plan:       No problem-specific Assessment & Plan notes found for this encounter  Diagnoses and all orders for this visit:    Well adult exam  Comments:  Diet and activity discussed    Cataract of both eyes, unspecified cataract type  Comments:  Advised to follow with her ophthalmologist    Pure hypercholesterolemia  Comments: To follow with low-fat diet  Orders:  -     Comprehensive metabolic panel; Future  -     Lipid Panel with Direct LDL reflex; Future    Fibromyalgia  Comments:  Controlled  Following with Rheumatology  Rheumatology office visit on January 2, 2020 noted    Idiopathic hypersomnia  Comments:  Take medicine office visit on February noted    Osteoporosis without current pathological fracture, unspecified osteoporosis type  Comments: On Prolia  Fall prevention and weight-bearing exercise discussed    Irritable bowel syndrome, unspecified type  -     Comprehensive metabolic panel; Future  -     Vitamin D 25 hydroxy; Future    Hyperkalemia  Comments: To follow with low-potassium diet  Orders:  -     Comprehensive metabolic panel; Future    Low iron  -     CBC and differential; Future    Migraine without aura and without status migrainosus, not intractable  Comments:  Controlled  Following with the Neurology    Encounter for immunization  Comments:  Side effect of Shingrix discussed  Orders:  -     Zoster Vaccine Recombinant IM    HIV screening declined        Patient Instructions   Patient to follow up with test results      Orders Placed This Encounter   Procedures    Zoster Vaccine Recombinant IM    Comprehensive metabolic panel     This is a patient instruction: Patient fasting for 8 hours or longer recommended  Standing Status:   Future     Standing Expiration Date:   3/5/2021    Lipid Panel with Direct LDL reflex     This is a patient instruction: This test requires patient fasting for 10-12 hours or longer  Drinking of black coffee or black tea is acceptable  Standing Status:   Future     Standing Expiration Date:   3/5/2021    Vitamin D 25 hydroxy     Standing Status:   Future     Standing Expiration Date:   3/5/2021    CBC and differential     This is a patient instruction: This test is non-fasting  Please drink two glasses of water morning of bloodwork  Standing Status:   Future     Standing Expiration Date:   3/5/2021         Subjective:     Patient ID: Kari  is a 62 y o  female      Patient is here for annual physical     Patient denied anxiety or depression  Eye care  Last eye exam was October 2018  She wear glasses  Dental care, she sees her dentist on the regular basis  Gyn care, patient had hysterectomy and bilateral oophorectomy when she was 32years old and since then she never had follow-up gyn exam   Colonoscopy  Had colonoscopy less than 10 years ago and it was normal  Mammogram she had this year  Diet  Regular  Activity  She is very active at her job  Immunization, reviewed  Had history of chickenpox  He had HIV screening in 2017 and it was negative      Recommend GYN eaxm , pt declined      Review of Systems   Constitutional: Negative for activity change, appetite change, chills, diaphoresis, fatigue, fever and unexpected weight change  HENT: Negative for congestion, dental problem, drooling, ear discharge, ear pain, hearing loss, mouth sores, nosebleeds, rhinorrhea, sinus pressure, sinus pain, sore throat, tinnitus, trouble swallowing and voice change  Eyes: Negative for photophobia, pain, discharge and visual disturbance  Respiratory: Negative for cough, chest tightness, shortness of breath and wheezing  Cardiovascular: Negative for chest pain, palpitations and leg swelling  Gastrointestinal: Negative for abdominal pain, anal bleeding, blood in stool, constipation, diarrhea, nausea and vomiting  Endocrine: Negative for cold intolerance, heat intolerance, polydipsia and polyuria     Genitourinary: Negative for decreased urine volume, dysuria, frequency, hematuria, pelvic pain, urgency, vaginal bleeding, vaginal discharge and vaginal pain  Musculoskeletal: Negative for arthralgias, back pain, gait problem, joint swelling, myalgias and neck pain  Skin: Negative for rash  Neurological: Negative for dizziness, tremors, seizures, syncope, facial asymmetry, speech difficulty, weakness, light-headedness, numbness and headaches  Hematological: Negative for adenopathy  Does not bruise/bleed easily  Psychiatric/Behavioral: Negative for agitation, behavioral problems, confusion, decreased concentration, dysphoric mood, hallucinations and sleep disturbance  The patient is not nervous/anxious and is not hyperactive  Objective:     Physical Exam   Constitutional: She is oriented to person, place, and time  She appears well-developed and well-nourished  No distress  HENT:   Head: Normocephalic and atraumatic  Right Ear: External ear normal    Left Ear: External ear normal    Nose: Nose normal    Mouth/Throat: Oropharynx is clear and moist  No oropharyngeal exudate  Tympanic membrane are normal bilaterally  Whisper test is normal bilaterally  5 ft away   Eyes: Pupils are equal, round, and reactive to light  Conjunctivae and EOM are normal  Right eye exhibits no discharge  Left eye exhibits no discharge  No scleral icterus  Positive cataracts bilaterally   Neck: Normal range of motion  Neck supple  No JVD present  No thyromegaly present  Cardiovascular: Normal rate, regular rhythm, normal heart sounds and intact distal pulses  No murmur heard  Pulses:       Carotid pulses are 3+ on the right side, and 3+ on the left side  Dorsalis pedis pulses are 3+ on the right side, and 3+ on the left side  Posterior tibial pulses are 3+ on the right side, and 3+ on the left side  Pulmonary/Chest: Effort normal and breath sounds normal  No stridor  No respiratory distress  She has no wheezes  She has no rales  Abdominal: Soft  Bowel sounds are normal  She exhibits no distension and no mass  There is no tenderness  There is no rebound and no guarding  No hernia  Genitourinary:   Genitourinary Comments: Breast exam/gyn exam  , pt declined   Musculoskeletal: Normal range of motion  She exhibits no edema, tenderness or deformity  Lymphadenopathy:     She has no cervical adenopathy  Neurological: She is alert and oriented to person, place, and time  She displays normal reflexes  No cranial nerve deficit or sensory deficit  She exhibits normal muscle tone  Coordination normal    Gait is normal    Negative Babinski bilaterally  Negative Romberg   Skin: No rash noted  She is not diaphoretic  No pallor  Psychiatric: She has a normal mood and affect   Her behavior is normal  Judgment and thought content normal

## 2020-03-19 ENCOUNTER — APPOINTMENT (OUTPATIENT)
Dept: LAB | Facility: CLINIC | Age: 60
End: 2020-03-19
Payer: COMMERCIAL

## 2020-03-19 DIAGNOSIS — E78.00 PURE HYPERCHOLESTEROLEMIA: ICD-10-CM

## 2020-03-19 DIAGNOSIS — E61.1 LOW IRON: ICD-10-CM

## 2020-03-19 DIAGNOSIS — K58.9 IRRITABLE BOWEL SYNDROME, UNSPECIFIED TYPE: ICD-10-CM

## 2020-03-19 DIAGNOSIS — E87.5 HYPERKALEMIA: ICD-10-CM

## 2020-03-19 LAB
25(OH)D3 SERPL-MCNC: 40.5 NG/ML (ref 30–100)
ALBUMIN SERPL BCP-MCNC: 3.6 G/DL (ref 3.5–5)
ALP SERPL-CCNC: 78 U/L (ref 46–116)
ALT SERPL W P-5'-P-CCNC: 23 U/L (ref 12–78)
ANION GAP SERPL CALCULATED.3IONS-SCNC: 3 MMOL/L (ref 4–13)
AST SERPL W P-5'-P-CCNC: 18 U/L (ref 5–45)
BASOPHILS # BLD AUTO: 0.07 THOUSANDS/ΜL (ref 0–0.1)
BASOPHILS NFR BLD AUTO: 1 % (ref 0–1)
BILIRUB SERPL-MCNC: 0.49 MG/DL (ref 0.2–1)
BUN SERPL-MCNC: 19 MG/DL (ref 5–25)
CALCIUM SERPL-MCNC: 10.1 MG/DL (ref 8.3–10.1)
CHLORIDE SERPL-SCNC: 107 MMOL/L (ref 100–108)
CHOLEST SERPL-MCNC: 227 MG/DL (ref 50–200)
CO2 SERPL-SCNC: 29 MMOL/L (ref 21–32)
CREAT SERPL-MCNC: 0.81 MG/DL (ref 0.6–1.3)
EOSINOPHIL # BLD AUTO: 0.37 THOUSAND/ΜL (ref 0–0.61)
EOSINOPHIL NFR BLD AUTO: 6 % (ref 0–6)
ERYTHROCYTE [DISTWIDTH] IN BLOOD BY AUTOMATED COUNT: 12.7 % (ref 11.6–15.1)
GFR SERPL CREATININE-BSD FRML MDRD: 79 ML/MIN/1.73SQ M
GLUCOSE P FAST SERPL-MCNC: 79 MG/DL (ref 65–99)
HCT VFR BLD AUTO: 34.6 % (ref 34.8–46.1)
HDLC SERPL-MCNC: 95 MG/DL
HGB BLD-MCNC: 11.2 G/DL (ref 11.5–15.4)
IMM GRANULOCYTES # BLD AUTO: 0.02 THOUSAND/UL (ref 0–0.2)
IMM GRANULOCYTES NFR BLD AUTO: 0 % (ref 0–2)
LDLC SERPL CALC-MCNC: 121 MG/DL (ref 0–100)
LYMPHOCYTES # BLD AUTO: 1.85 THOUSANDS/ΜL (ref 0.6–4.47)
LYMPHOCYTES NFR BLD AUTO: 28 % (ref 14–44)
MCH RBC QN AUTO: 31 PG (ref 26.8–34.3)
MCHC RBC AUTO-ENTMCNC: 32.4 G/DL (ref 31.4–37.4)
MCV RBC AUTO: 96 FL (ref 82–98)
MONOCYTES # BLD AUTO: 0.27 THOUSAND/ΜL (ref 0.17–1.22)
MONOCYTES NFR BLD AUTO: 4 % (ref 4–12)
NEUTROPHILS # BLD AUTO: 4.14 THOUSANDS/ΜL (ref 1.85–7.62)
NEUTS SEG NFR BLD AUTO: 61 % (ref 43–75)
NRBC BLD AUTO-RTO: 0 /100 WBCS
PLATELET # BLD AUTO: 321 THOUSANDS/UL (ref 149–390)
PMV BLD AUTO: 10.1 FL (ref 8.9–12.7)
POTASSIUM SERPL-SCNC: 4.1 MMOL/L (ref 3.5–5.3)
PROT SERPL-MCNC: 6.9 G/DL (ref 6.4–8.2)
RBC # BLD AUTO: 3.61 MILLION/UL (ref 3.81–5.12)
SODIUM SERPL-SCNC: 139 MMOL/L (ref 136–145)
TRIGL SERPL-MCNC: 55 MG/DL
WBC # BLD AUTO: 6.72 THOUSAND/UL (ref 4.31–10.16)

## 2020-03-19 PROCEDURE — 82306 VITAMIN D 25 HYDROXY: CPT

## 2020-03-19 PROCEDURE — 80053 COMPREHEN METABOLIC PANEL: CPT

## 2020-03-19 PROCEDURE — 85025 COMPLETE CBC W/AUTO DIFF WBC: CPT

## 2020-03-19 PROCEDURE — 36415 COLL VENOUS BLD VENIPUNCTURE: CPT

## 2020-03-19 PROCEDURE — 80061 LIPID PANEL: CPT

## 2020-03-23 ENCOUNTER — TELEPHONE (OUTPATIENT)
Dept: FAMILY MEDICINE CLINIC | Facility: CLINIC | Age: 60
End: 2020-03-23

## 2020-03-23 DIAGNOSIS — E61.1 LOW IRON: Primary | ICD-10-CM

## 2020-03-23 NOTE — TELEPHONE ENCOUNTER
----- Message from Ermelinda Doshi MD sent at 3/22/2020 12:03 PM EDT -----  Vit d is normal ,chemistry unremarkable     TC is 227 ,  to follow with low fat diet , recommend medication  CBC , mild anemia , check iron , sat , TIBC , and ferritin

## 2020-03-23 NOTE — TELEPHONE ENCOUNTER
Haily Marc called in regarding results  She will get the labs done and she will try medication for her cholesterol  Can you send in a 30 supply so she can try it  Thanks

## 2020-03-24 ENCOUNTER — TELEPHONE (OUTPATIENT)
Dept: FAMILY MEDICINE CLINIC | Facility: CLINIC | Age: 60
End: 2020-03-24

## 2020-03-24 ENCOUNTER — TELEMEDICINE (OUTPATIENT)
Dept: PAIN MEDICINE | Facility: MEDICAL CENTER | Age: 60
End: 2020-03-24
Payer: COMMERCIAL

## 2020-03-24 DIAGNOSIS — D64.9 ANEMIA, UNSPECIFIED TYPE: Primary | ICD-10-CM

## 2020-03-24 DIAGNOSIS — M54.16 RIGHT LUMBAR RADICULITIS: Primary | ICD-10-CM

## 2020-03-24 PROCEDURE — G2012 BRIEF CHECK IN BY MD/QHP: HCPCS | Performed by: PHYSICAL MEDICINE & REHABILITATION

## 2020-03-24 RX ORDER — DULOXETIN HYDROCHLORIDE 30 MG/1
90 CAPSULE, DELAYED RELEASE ORAL DAILY
Qty: 90 CAPSULE | Refills: 1 | Status: SHIPPED | OUTPATIENT
Start: 2020-03-24

## 2020-03-24 NOTE — PROGRESS NOTES
Virtual Regular Visit    Problem List Items Addressed This Visit     None      Visit Diagnoses     Right lumbar radiculitis    -  Primary    Relevant Medications    DULoxetine (CYMBALTA) 30 mg delayed release capsule               Reason for visit is back and radiating right leg pain with occasional buckling of the leg secondary to weakness    Encounter provider Evonne Grossman DO    Provider located at [address] [place] [address]      Recent Visits  No visits were found meeting these conditions  Showing recent visits within past 7 days and meeting all other requirements     Today's Visits  Date Type Provider Dept   03/24/20 Telephone Irish Ormond, MD [facility]   Showing today's visits and meeting all other requirements     Future Appointments  Date Type Provider Dept   03/24/20 Telephone Irish Ormond, MD [facility]   Showing future appointments within next 150 days and meeting all other requirements        After connecting through [facility], the patient was identified by name and date of birth  Giuliano Suazo was informed that this is a telemedicine visit and that the visit is being conducted through doxy  me video visit later converted to phone as the patient was unable to unmute her cell phone which may not be secure and therefore, might not be HIPAA-compliant  My office door was closed  No one else was in the room  She acknowledged consent and understanding of privacy and security of the video platform  The patient has agreed to participate and understands they can discontinue the visit at any time  Subjective  Giuliano Suazo is a 62 y o  female presented for video visit follow-up for new complaint of back and radiating right leg pain  The patient is known to our office from prior treatment for her neck pain complaints  She is currently requesting assistance of back and radiating right leg pain    She has experience this in the past but has been having worsening symptoms at this point  I was able to review her previous MRI of the lumbar spine which was performed in January of 2018 at [address] [place] [id number]  This does reveal bilateral neural foraminal narrowing at L5-S1 with potential L5 nerve root irritation  It is likely that her current symptoms are related to this especially on the right side and she potentially has worsening compression on this side  She is agreeable to obtaining an updated MRI  We discussed multiple adjustments to her current medication regimen but she is very sensitive to medication adjustments  She is using medical marijuana therefore a cannot issue opioid medications  She is hesitant to increase her gabapentin  She was agreeable to increasing her duloxetine  She has been unable to tolerate Lyrica in the past and trialed Nortriptyline without any benefit  She is describing back and radiating right leg pain as well as occasional weakness in the leg with buckling episodes  She denies any recent falls  She describes burning and shooting pain in the right lower extremity as well  Past Medical History:   Diagnosis Date    Irritable bowel     Osteoarthritis     Osteoporosis     Other chronic pain     degenerative disc disease    Sleep disorder        Past Surgical History:   Procedure Laterality Date    ABDOMINAL ADHESION SURGERY      BLADDER SURGERY      CHOLECYSTECTOMY      HYSTERECTOMY  1977    KNEE SURGERY Right     replacement    LAMINECTOMY      MOLE REMOVAL      OOPHORECTOMY Bilateral 1977    OTHER SURGICAL HISTORY      enlarged lymph node removed left arm   no cancer    SPINAL FUSION      WISDOM TOOTH EXTRACTION         Current Outpatient Medications   Medication Sig Dispense Refill    Armodafinil 250 MG tablet TAKE ONE TABLET BY MOUTH EVERY DAY 30 tablet 1    Ascorbic Acid (VITAMIN C PO) Take by mouth daily       BACILLUS COAGULANS-INULIN PO Take 1 capsule by mouth daily       calcium carbonate 1250 MG capsule Take 600 mg by mouth daily       cetirizine (ZyrTEC) 10 MG chewable tablet Chew 10 mg daily       Cholecalciferol 1000 units tablet Take 1,000 Units by mouth daily       Citicoline Sodium 500 MG TABS Take by mouth daily       Coenzyme Q10 200 MG/GM POWD Take 200 mg by mouth daily       denosumab (PROLIA) 60 mg/mL Inject 60 mg under the skin      diclofenac (VOLTAREN) 75 mg EC tablet Take 75 mg by mouth 3 (three) times a day       DULoxetine (CYMBALTA) 30 mg delayed release capsule Take 3 capsules (90 mg total) by mouth daily 90 capsule 1    ferrous sulfate 325 (65 Fe) mg tablet Take 325 mg by mouth daily with breakfast       gabapentin (NEURONTIN) 300 mg capsule 300 mg at 1200 in addition to 600 mg at bedtime   Ginger, Zingiber officinalis, (GINGER ROOT) 550 MG CAPS Take by mouth 2 (two) times a day       Ginkgo Biloba 40 MG TABS Take by mouth      metaxalone (SKELAXIN) 800 mg tablet Take 800 mg by mouth Three times daily as needed      metaxalone (SKELAXIN) 800 mg tablet       Misc Natural Products (GINSENG-COMPLEX PO) daily   Multiple Vitamins-Minerals (CENTRUM SILVER PO) Take 1 tablet by mouth daily       mupirocin (BACTROBAN) 2 % ointment Apply topically 3 (three) times a day Apply topically on hands to open cuts to prevent infection (Patient taking differently: Apply topically as needed Apply topically on hands to open cuts to prevent infection) 30 g 0    NON FORMULARY       ondansetron (ZOFRAN-ODT) 4 mg disintegrating tablet TAKE 1 TABLET (4 MG TOTAL) BY MOUTH EVERY 12 (TWELVE) HOURS AS NEEDED FOR NAUSEA OR VOMITING        oxybutynin (DITROPAN-XL) 10 MG 24 hr tablet Take 1 tablet (10 mg total) by mouth daily at bedtime 90 tablet 3    predniSONE 20 mg tablet Take 20 mg by mouth as needed       vitamin E, tocopherol, 400 units capsule Take 400 Units by mouth daily      ZOLMitriptan (ZOMIG) 2 5 MG tablet By mouth take 1 tablet at onset of migraine  May repeat in 2 hours if needed  Limit 2/24 hours, 4/week, 8/month  12 tablet 2     No current facility-administered medications for this visit  Allergies   Allergen Reactions    Celecoxib Other (See Comments)     Increased Heart Rate, Palpitations    Sumatriptan      Other reaction(s): SUMATRIPTAN (IMITREX) (Throat closure)  Pt analilia zolmitriptan   Tramadol Other (See Comments)     GI Upset sever vomiting   Medical Tape Dermatitis, Rash and Other (See Comments)     Adhesive from medication patches and bandaides       Twelve point review of systems is conducted and negative aside from what is mentioned in the subjective portion of the note  Physical exam:   General: Well-developed, well-nourished individual in no acute distress  Wearing a hat and sunglasses  Mental: Appropriate mood and affect  Grossly oriented with coherent speech and thought processing   Neuro:  Cranial nerves: Cranial nerve function is grossly intact bilaterally   Strength: Bilateral lower extremity strength is graded as at least functional as she is able to ambulate without assistive device noted on video imaging  Gait:  Gait/gross motor: Gait is normal      Lungs: Breathing is comfortable and regular  No dyspnea noted during examination   ENT: No craniofacial deformities or asymmetry  Assessment:  1  Right lumbar radiculitis    Plan:  1  We will increase to duloxetine to 90 mg daily  2  Obtain updated MRI of the lumbar spine  3  Consider right L5-S1 transforaminal epidural steroid injection depending on results from the MRI  4  Follow-up with her once the imaging is available for review       I spent 8 minutes with the patient during this visit      Typical billing code for this visit would be [phone]

## 2020-03-25 ENCOUNTER — TELEPHONE (OUTPATIENT)
Dept: FAMILY MEDICINE CLINIC | Facility: CLINIC | Age: 60
End: 2020-03-25

## 2020-03-25 DIAGNOSIS — E78.00 PURE HYPERCHOLESTEROLEMIA: Primary | ICD-10-CM

## 2020-03-25 RX ORDER — ROSUVASTATIN CALCIUM 5 MG/1
5 TABLET, COATED ORAL DAILY
Qty: 30 TABLET | Refills: 1 | Status: SHIPPED | OUTPATIENT
Start: 2020-03-25 | End: 2020-06-02 | Stop reason: SDUPTHER

## 2020-03-25 NOTE — TELEPHONE ENCOUNTER
I did put prescription for Crestor 5 mg to take it 1 tablet daily advised patient there might be is side effect on the liver and muscle patient to call if any symptoms also a recheck lipid and liver function test in 6 week

## 2020-03-25 NOTE — TELEPHONE ENCOUNTER
Patient called back stating that she tried doing a virtual visit with pain management yesterday and it did not work well  Patient states that she will start the cholesterol medication and does not need the phone call with you  However, if she has any side effects she will go off of it  Patient stated to only call a 30 day supply into [facility]

## 2020-04-06 ENCOUNTER — TELEPHONE (OUTPATIENT)
Dept: MRI IMAGING | Facility: HOSPITAL | Age: 60
End: 2020-04-06

## 2020-05-07 ENCOUNTER — TELEPHONE (OUTPATIENT)
Dept: ADMINISTRATIVE | Facility: OTHER | Age: 60
End: 2020-05-07

## 2020-05-07 ENCOUNTER — TELEPHONE (OUTPATIENT)
Dept: FAMILY MEDICINE CLINIC | Facility: CLINIC | Age: 60
End: 2020-05-07

## 2020-05-07 ENCOUNTER — OFFICE VISIT (OUTPATIENT)
Dept: FAMILY MEDICINE CLINIC | Facility: CLINIC | Age: 60
End: 2020-05-07
Payer: COMMERCIAL

## 2020-05-07 ENCOUNTER — APPOINTMENT (OUTPATIENT)
Dept: LAB | Facility: CLINIC | Age: 60
End: 2020-05-07
Payer: COMMERCIAL

## 2020-05-07 VITALS
TEMPERATURE: 97.2 F | WEIGHT: 120 LBS | HEIGHT: 64 IN | RESPIRATION RATE: 18 BRPM | SYSTOLIC BLOOD PRESSURE: 120 MMHG | OXYGEN SATURATION: 98 % | DIASTOLIC BLOOD PRESSURE: 70 MMHG | HEART RATE: 72 BPM | BODY MASS INDEX: 20.49 KG/M2

## 2020-05-07 DIAGNOSIS — R63.4 WEIGHT LOSS: ICD-10-CM

## 2020-05-07 DIAGNOSIS — E78.00 PURE HYPERCHOLESTEROLEMIA: ICD-10-CM

## 2020-05-07 DIAGNOSIS — M65.331 TRIGGER MIDDLE FINGER OF RIGHT HAND: Primary | ICD-10-CM

## 2020-05-07 DIAGNOSIS — D64.9 ANEMIA, UNSPECIFIED TYPE: ICD-10-CM

## 2020-05-07 DIAGNOSIS — E61.1 LOW IRON: ICD-10-CM

## 2020-05-07 DIAGNOSIS — R10.31 RIGHT LOWER QUADRANT ABDOMINAL PAIN: Primary | ICD-10-CM

## 2020-05-07 DIAGNOSIS — M65.331 TRIGGER MIDDLE FINGER OF RIGHT HAND: ICD-10-CM

## 2020-05-07 DIAGNOSIS — R19.7 DIARRHEA, UNSPECIFIED TYPE: ICD-10-CM

## 2020-05-07 DIAGNOSIS — K40.91 UNILATERAL RECURRENT INGUINAL HERNIA WITHOUT OBSTRUCTION OR GANGRENE: ICD-10-CM

## 2020-05-07 DIAGNOSIS — K62.5 RECTAL BLEED: ICD-10-CM

## 2020-05-07 LAB
ALT SERPL W P-5'-P-CCNC: 21 U/L (ref 12–78)
AST SERPL W P-5'-P-CCNC: 15 U/L (ref 5–45)
CHOLEST SERPL-MCNC: 182 MG/DL (ref 50–200)
FERRITIN SERPL-MCNC: 184 NG/ML (ref 8–388)
HDLC SERPL-MCNC: 93 MG/DL
IRON SATN MFR SERPL: 54 %
IRON SERPL-MCNC: 128 UG/DL (ref 50–170)
LDLC SERPL CALC-MCNC: 78 MG/DL (ref 0–100)
TIBC SERPL-MCNC: 238 UG/DL (ref 250–450)
TRIGL SERPL-MCNC: 53 MG/DL

## 2020-05-07 PROCEDURE — 36415 COLL VENOUS BLD VENIPUNCTURE: CPT

## 2020-05-07 PROCEDURE — 80061 LIPID PANEL: CPT

## 2020-05-07 PROCEDURE — 83550 IRON BINDING TEST: CPT

## 2020-05-07 PROCEDURE — 99214 OFFICE O/P EST MOD 30 MIN: CPT | Performed by: FAMILY MEDICINE

## 2020-05-07 PROCEDURE — 82728 ASSAY OF FERRITIN: CPT

## 2020-05-07 PROCEDURE — 83540 ASSAY OF IRON: CPT

## 2020-05-07 PROCEDURE — 84460 ALANINE AMINO (ALT) (SGPT): CPT

## 2020-05-07 PROCEDURE — 84450 TRANSFERASE (AST) (SGOT): CPT

## 2020-05-07 PROCEDURE — 3008F BODY MASS INDEX DOCD: CPT | Performed by: FAMILY MEDICINE

## 2020-05-14 ENCOUNTER — TELEPHONE (OUTPATIENT)
Dept: PAIN MEDICINE | Facility: CLINIC | Age: 60
End: 2020-05-14

## 2020-05-14 ENCOUNTER — HOSPITAL ENCOUNTER (OUTPATIENT)
Dept: MRI IMAGING | Facility: HOSPITAL | Age: 60
Discharge: HOME | End: 2020-05-14
Attending: PHYSICAL MEDICINE & REHABILITATION
Payer: COMMERCIAL

## 2020-05-14 ENCOUNTER — APPOINTMENT (OUTPATIENT)
Dept: LAB | Facility: HOSPITAL | Age: 60
End: 2020-05-14
Attending: FAMILY MEDICINE
Payer: COMMERCIAL

## 2020-05-14 DIAGNOSIS — M54.16 RIGHT LUMBAR RADICULITIS: ICD-10-CM

## 2020-05-14 DIAGNOSIS — R10.31 RIGHT LOWER QUADRANT ABDOMINAL PAIN: ICD-10-CM

## 2020-05-14 DIAGNOSIS — R19.7 DIARRHEA, UNSPECIFIED TYPE: ICD-10-CM

## 2020-05-14 LAB
ALBUMIN SERPL BCP-MCNC: 3.6 G/DL (ref 3.5–5)
ALP SERPL-CCNC: 82 U/L (ref 46–116)
ALT SERPL W P-5'-P-CCNC: 28 U/L (ref 12–78)
AMYLASE SERPL-CCNC: 35 IU/L (ref 25–115)
ANION GAP SERPL CALCULATED.3IONS-SCNC: 7 MMOL/L (ref 4–13)
AST SERPL W P-5'-P-CCNC: 19 U/L (ref 5–45)
BASOPHILS # BLD AUTO: 0.09 THOUSANDS/ΜL (ref 0–0.1)
BASOPHILS NFR BLD AUTO: 1 % (ref 0–1)
BILIRUB SERPL-MCNC: 0.4 MG/DL (ref 0.2–1)
BUN SERPL-MCNC: 17 MG/DL (ref 5–25)
CALCIUM SERPL-MCNC: 9.3 MG/DL (ref 8.3–10.1)
CHLORIDE SERPL-SCNC: 104 MMOL/L (ref 100–108)
CO2 SERPL-SCNC: 27 MMOL/L (ref 21–32)
CREAT SERPL-MCNC: 0.94 MG/DL (ref 0.6–1.3)
EOSINOPHIL # BLD AUTO: 0.31 THOUSAND/ΜL (ref 0–0.61)
EOSINOPHIL NFR BLD AUTO: 4 % (ref 0–6)
ERYTHROCYTE [DISTWIDTH] IN BLOOD BY AUTOMATED COUNT: 12.8 % (ref 11.6–15.1)
GFR SERPL CREATININE-BSD FRML MDRD: 66 ML/MIN/1.73SQ M
GLUCOSE P FAST SERPL-MCNC: 108 MG/DL (ref 65–99)
HCT VFR BLD AUTO: 39 % (ref 34.8–46.1)
HGB BLD-MCNC: 12.6 G/DL (ref 11.5–15.4)
IMM GRANULOCYTES # BLD AUTO: 0.02 THOUSAND/UL (ref 0–0.2)
IMM GRANULOCYTES NFR BLD AUTO: 0 % (ref 0–2)
LIPASE SERPL-CCNC: 91 U/L (ref 73–393)
LYMPHOCYTES # BLD AUTO: 1.79 THOUSANDS/ΜL (ref 0.6–4.47)
LYMPHOCYTES NFR BLD AUTO: 22 % (ref 14–44)
MCH RBC QN AUTO: 31.1 PG (ref 26.8–34.3)
MCHC RBC AUTO-ENTMCNC: 32.3 G/DL (ref 31.4–37.4)
MCV RBC AUTO: 96 FL (ref 82–98)
MONOCYTES # BLD AUTO: 0.31 THOUSAND/ΜL (ref 0.17–1.22)
MONOCYTES NFR BLD AUTO: 4 % (ref 4–12)
NEUTROPHILS # BLD AUTO: 5.53 THOUSANDS/ΜL (ref 1.85–7.62)
NEUTS SEG NFR BLD AUTO: 69 % (ref 43–75)
NRBC BLD AUTO-RTO: 0 /100 WBCS
PLATELET # BLD AUTO: 379 THOUSANDS/UL (ref 149–390)
PMV BLD AUTO: 9.3 FL (ref 8.9–12.7)
POTASSIUM SERPL-SCNC: 4.8 MMOL/L (ref 3.5–5.3)
PROT SERPL-MCNC: 7.2 G/DL (ref 6.4–8.2)
RBC # BLD AUTO: 4.05 MILLION/UL (ref 3.81–5.12)
SODIUM SERPL-SCNC: 138 MMOL/L (ref 136–145)
WBC # BLD AUTO: 8.05 THOUSAND/UL (ref 4.31–10.16)

## 2020-05-14 PROCEDURE — 82150 ASSAY OF AMYLASE: CPT

## 2020-05-14 PROCEDURE — 83690 ASSAY OF LIPASE: CPT

## 2020-05-14 PROCEDURE — 72148 MRI LUMBAR SPINE W/O DYE: CPT

## 2020-05-14 PROCEDURE — 85025 COMPLETE CBC W/AUTO DIFF WBC: CPT

## 2020-05-14 PROCEDURE — 80053 COMPREHEN METABOLIC PANEL: CPT

## 2020-05-14 PROCEDURE — 36415 COLL VENOUS BLD VENIPUNCTURE: CPT

## 2020-05-15 ENCOUNTER — APPOINTMENT (OUTPATIENT)
Dept: LAB | Facility: CLINIC | Age: 60
End: 2020-05-15
Payer: COMMERCIAL

## 2020-05-15 DIAGNOSIS — N39.0 URINARY TRACT INFECTION WITHOUT HEMATURIA, SITE UNSPECIFIED: ICD-10-CM

## 2020-05-15 DIAGNOSIS — R19.7 DIARRHEA, UNSPECIFIED TYPE: ICD-10-CM

## 2020-05-15 LAB
BACTERIA UR QL AUTO: ABNORMAL /HPF
BILIRUB UR QL STRIP: NEGATIVE
CLARITY UR: CLEAR
COLOR UR: YELLOW
GLUCOSE UR STRIP-MCNC: NEGATIVE MG/DL
HGB UR QL STRIP.AUTO: ABNORMAL
HYALINE CASTS #/AREA URNS LPF: ABNORMAL /LPF
KETONES UR STRIP-MCNC: NEGATIVE MG/DL
LEUKOCYTE ESTERASE UR QL STRIP: NEGATIVE
NITRITE UR QL STRIP: NEGATIVE
NON-SQ EPI CELLS URNS QL MICRO: ABNORMAL /HPF
PH UR STRIP.AUTO: 6 [PH]
PROT UR STRIP-MCNC: NEGATIVE MG/DL
RBC #/AREA URNS AUTO: ABNORMAL /HPF
SP GR UR STRIP.AUTO: 1.01 (ref 1–1.03)
UROBILINOGEN UR QL STRIP.AUTO: 0.2 E.U./DL
WBC #/AREA URNS AUTO: ABNORMAL /HPF

## 2020-05-15 PROCEDURE — 87177 OVA AND PARASITES SMEARS: CPT

## 2020-05-15 PROCEDURE — 87209 SMEAR COMPLEX STAIN: CPT

## 2020-05-15 PROCEDURE — 87086 URINE CULTURE/COLONY COUNT: CPT

## 2020-05-15 PROCEDURE — 87505 NFCT AGENT DETECTION GI: CPT

## 2020-05-15 PROCEDURE — 81001 URINALYSIS AUTO W/SCOPE: CPT | Performed by: FAMILY MEDICINE

## 2020-05-16 LAB
BACTERIA UR CULT: NORMAL
C DIFF TOX GENS STL QL NAA+PROBE: NEGATIVE
CAMPYLOBACTER DNA SPEC NAA+PROBE: NORMAL
O+P STL CONC: NORMAL
SALMONELLA DNA SPEC QL NAA+PROBE: NORMAL
SHIGA TOXIN STX GENE SPEC NAA+PROBE: NORMAL
SHIGELLA DNA SPEC QL NAA+PROBE: NORMAL

## 2020-05-21 ENCOUNTER — TELEPHONE (OUTPATIENT)
Dept: FAMILY MEDICINE CLINIC | Facility: CLINIC | Age: 60
End: 2020-05-21

## 2020-05-21 ENCOUNTER — HOSPITAL ENCOUNTER (OUTPATIENT)
Dept: CT IMAGING | Facility: HOSPITAL | Age: 60
Discharge: HOME | End: 2020-05-21
Attending: FAMILY MEDICINE
Payer: COMMERCIAL

## 2020-05-21 DIAGNOSIS — K62.5 RECTAL BLEED: ICD-10-CM

## 2020-05-21 DIAGNOSIS — R19.7 DIARRHEA, UNSPECIFIED TYPE: ICD-10-CM

## 2020-05-21 DIAGNOSIS — R10.31 RIGHT LOWER QUADRANT ABDOMINAL PAIN: ICD-10-CM

## 2020-05-21 DIAGNOSIS — G47.11 IDIOPATHIC HYPERSOMNIA: ICD-10-CM

## 2020-05-21 DIAGNOSIS — R63.4 WEIGHT LOSS: ICD-10-CM

## 2020-05-21 PROCEDURE — 74177 CT ABD & PELVIS W/CONTRAST: CPT

## 2020-05-21 RX ORDER — ARMODAFINIL 250 MG/1
250 TABLET ORAL DAILY
Qty: 30 TABLET | Refills: 1 | Status: SHIPPED | OUTPATIENT
Start: 2020-05-21 | End: 2020-08-25 | Stop reason: SDUPTHER

## 2020-05-21 RX ADMIN — IOHEXOL 100 ML: 350 INJECTION, SOLUTION INTRAVENOUS at 16:14

## 2020-05-22 ENCOUNTER — TELEPHONE (OUTPATIENT)
Dept: FAMILY MEDICINE CLINIC | Facility: CLINIC | Age: 60
End: 2020-05-22

## 2020-05-26 ENCOUNTER — OFFICE VISIT (OUTPATIENT)
Dept: GASTROENTEROLOGY | Facility: MEDICAL CENTER | Age: 60
End: 2020-05-26
Payer: COMMERCIAL

## 2020-05-26 VITALS
TEMPERATURE: 99.4 F | WEIGHT: 119 LBS | DIASTOLIC BLOOD PRESSURE: 78 MMHG | BODY MASS INDEX: 20.43 KG/M2 | SYSTOLIC BLOOD PRESSURE: 132 MMHG | HEART RATE: 89 BPM

## 2020-05-26 DIAGNOSIS — R19.7 DIARRHEA, UNSPECIFIED TYPE: Primary | ICD-10-CM

## 2020-05-26 DIAGNOSIS — K57.90 DIVERTICULOSIS: ICD-10-CM

## 2020-05-26 DIAGNOSIS — R13.10 DYSPHAGIA, UNSPECIFIED TYPE: ICD-10-CM

## 2020-05-26 DIAGNOSIS — R63.4 WEIGHT LOSS: ICD-10-CM

## 2020-05-26 DIAGNOSIS — K21.9 GASTROESOPHAGEAL REFLUX DISEASE, ESOPHAGITIS PRESENCE NOT SPECIFIED: ICD-10-CM

## 2020-05-26 DIAGNOSIS — K62.5 RECTAL BLEED: ICD-10-CM

## 2020-05-26 DIAGNOSIS — R19.4 CHANGE IN BOWEL HABITS: ICD-10-CM

## 2020-05-26 DIAGNOSIS — K58.8 OTHER IRRITABLE BOWEL SYNDROME: ICD-10-CM

## 2020-05-26 DIAGNOSIS — K58.9 IRRITABLE BOWEL SYNDROME, UNSPECIFIED TYPE: ICD-10-CM

## 2020-05-26 DIAGNOSIS — R11.0 NAUSEA: ICD-10-CM

## 2020-05-26 PROCEDURE — 99245 OFF/OP CONSLTJ NEW/EST HI 55: CPT | Performed by: INTERNAL MEDICINE

## 2020-05-26 RX ORDER — DICYCLOMINE HCL 20 MG
20 TABLET ORAL EVERY 6 HOURS PRN
Qty: 120 TABLET | Refills: 2 | Status: SHIPPED | OUTPATIENT
Start: 2020-05-26 | End: 2020-12-02

## 2020-05-27 ENCOUNTER — TELEMEDICINE (OUTPATIENT)
Dept: UROLOGY | Facility: MEDICAL CENTER | Age: 60
End: 2020-05-27
Payer: COMMERCIAL

## 2020-05-27 ENCOUNTER — TELEPHONE (OUTPATIENT)
Dept: UROLOGY | Facility: MEDICAL CENTER | Age: 60
End: 2020-05-27

## 2020-05-27 DIAGNOSIS — N20.1 CALCULUS OF URETER: Primary | ICD-10-CM

## 2020-05-27 DIAGNOSIS — R31.29 MICROSCOPIC HEMATURIA: ICD-10-CM

## 2020-05-27 PROCEDURE — 99442 PR PHYS/QHP TELEPHONE EVALUATION 11-20 MIN: CPT | Performed by: UROLOGY

## 2020-05-28 ENCOUNTER — OFFICE VISIT (OUTPATIENT)
Dept: OBGYN CLINIC | Facility: MEDICAL CENTER | Age: 60
End: 2020-05-28
Payer: COMMERCIAL

## 2020-05-28 VITALS
BODY MASS INDEX: 20.32 KG/M2 | HEART RATE: 84 BPM | HEIGHT: 64 IN | SYSTOLIC BLOOD PRESSURE: 131 MMHG | WEIGHT: 119 LBS | DIASTOLIC BLOOD PRESSURE: 76 MMHG | TEMPERATURE: 98 F

## 2020-05-28 DIAGNOSIS — M65.331 TRIGGER MIDDLE FINGER OF RIGHT HAND: ICD-10-CM

## 2020-05-28 PROCEDURE — 20550 NJX 1 TENDON SHEATH/LIGAMENT: CPT | Performed by: PHYSICIAN ASSISTANT

## 2020-05-28 PROCEDURE — 99244 OFF/OP CNSLTJ NEW/EST MOD 40: CPT | Performed by: PHYSICIAN ASSISTANT

## 2020-05-28 RX ORDER — LIDOCAINE HYDROCHLORIDE 10 MG/ML
0.5 INJECTION, SOLUTION INFILTRATION; PERINEURAL
Status: COMPLETED | OUTPATIENT
Start: 2020-05-28 | End: 2020-05-28

## 2020-05-28 RX ORDER — BETAMETHASONE SODIUM PHOSPHATE AND BETAMETHASONE ACETATE 3; 3 MG/ML; MG/ML
3 INJECTION, SUSPENSION INTRA-ARTICULAR; INTRALESIONAL; INTRAMUSCULAR; SOFT TISSUE
Status: COMPLETED | OUTPATIENT
Start: 2020-05-28 | End: 2020-05-28

## 2020-05-28 RX ADMIN — BETAMETHASONE SODIUM PHOSPHATE AND BETAMETHASONE ACETATE 3 MG: 3; 3 INJECTION, SUSPENSION INTRA-ARTICULAR; INTRALESIONAL; INTRAMUSCULAR; SOFT TISSUE at 16:52

## 2020-05-28 RX ADMIN — LIDOCAINE HYDROCHLORIDE 0.5 ML: 10 INJECTION, SOLUTION INFILTRATION; PERINEURAL at 16:52

## 2020-06-01 ENCOUNTER — TELEPHONE (OUTPATIENT)
Dept: UROLOGY | Facility: MEDICAL CENTER | Age: 60
End: 2020-06-01

## 2020-06-02 DIAGNOSIS — E78.00 PURE HYPERCHOLESTEROLEMIA: ICD-10-CM

## 2020-06-03 RX ORDER — ROSUVASTATIN CALCIUM 5 MG/1
5 TABLET, COATED ORAL DAILY
Qty: 30 TABLET | Refills: 1 | Status: SHIPPED | OUTPATIENT
Start: 2020-06-03 | End: 2020-08-25 | Stop reason: SDUPTHER

## 2020-06-04 ENCOUNTER — OFFICE VISIT (OUTPATIENT)
Dept: SURGERY | Facility: CLINIC | Age: 60
End: 2020-06-04
Payer: COMMERCIAL

## 2020-06-04 ENCOUNTER — APPOINTMENT (OUTPATIENT)
Dept: LAB | Facility: HOSPITAL | Age: 60
End: 2020-06-04
Attending: INTERNAL MEDICINE
Payer: COMMERCIAL

## 2020-06-04 ENCOUNTER — TRANSCRIBE ORDERS (OUTPATIENT)
Dept: ADMINISTRATIVE | Facility: HOSPITAL | Age: 60
End: 2020-06-04

## 2020-06-04 VITALS
DIASTOLIC BLOOD PRESSURE: 62 MMHG | HEIGHT: 64 IN | WEIGHT: 117.6 LBS | BODY MASS INDEX: 20.08 KG/M2 | TEMPERATURE: 97.5 F | HEART RATE: 78 BPM | SYSTOLIC BLOOD PRESSURE: 100 MMHG

## 2020-06-04 DIAGNOSIS — R19.7 DIARRHEA, UNSPECIFIED TYPE: ICD-10-CM

## 2020-06-04 DIAGNOSIS — K40.90 NON-RECURRENT UNILATERAL INGUINAL HERNIA WITHOUT OBSTRUCTION OR GANGRENE: Primary | ICD-10-CM

## 2020-06-04 PROCEDURE — 87338 HPYLORI STOOL AG IA: CPT

## 2020-06-04 PROCEDURE — 99243 OFF/OP CNSLTJ NEW/EST LOW 30: CPT | Performed by: SURGERY

## 2020-06-04 PROCEDURE — 36415 COLL VENOUS BLD VENIPUNCTURE: CPT

## 2020-06-04 PROCEDURE — 82656 EL-1 FECAL QUAL/SEMIQ: CPT

## 2020-06-04 PROCEDURE — 86255 FLUORESCENT ANTIBODY SCREEN: CPT

## 2020-06-04 PROCEDURE — 82784 ASSAY IGA/IGD/IGG/IGM EACH: CPT

## 2020-06-04 PROCEDURE — 83516 IMMUNOASSAY NONANTIBODY: CPT

## 2020-06-04 PROCEDURE — 83993 ASSAY FOR CALPROTECTIN FECAL: CPT

## 2020-06-04 PROCEDURE — 82705 FATS/LIPIDS FECES QUAL: CPT

## 2020-06-05 ENCOUNTER — TELEPHONE (OUTPATIENT)
Dept: NEUROLOGY | Facility: CLINIC | Age: 60
End: 2020-06-05

## 2020-06-05 LAB
ENDOMYSIUM IGA SER QL: NEGATIVE
GLIADIN PEPTIDE IGA SER-ACNC: 2 UNITS (ref 0–19)
GLIADIN PEPTIDE IGG SER-ACNC: 1 UNITS (ref 0–19)
H PYLORI AG STL QL IA: NEGATIVE
IGA SERPL-MCNC: 243 MG/DL (ref 87–352)
TTG IGA SER-ACNC: <2 U/ML (ref 0–3)
TTG IGG SER-ACNC: 4 U/ML (ref 0–5)

## 2020-06-09 LAB
FAT STL QL: NORMAL
NEUTRAL FAT STL QL: NORMAL

## 2020-06-10 ENCOUNTER — OFFICE VISIT (OUTPATIENT)
Dept: FAMILY MEDICINE CLINIC | Facility: CLINIC | Age: 60
End: 2020-06-10
Payer: COMMERCIAL

## 2020-06-10 ENCOUNTER — TELEPHONE (OUTPATIENT)
Dept: PAIN MEDICINE | Facility: MEDICAL CENTER | Age: 60
End: 2020-06-10

## 2020-06-10 VITALS
HEART RATE: 70 BPM | TEMPERATURE: 97.2 F | SYSTOLIC BLOOD PRESSURE: 100 MMHG | OXYGEN SATURATION: 98 % | WEIGHT: 119.8 LBS | HEIGHT: 64 IN | DIASTOLIC BLOOD PRESSURE: 64 MMHG | BODY MASS INDEX: 20.45 KG/M2

## 2020-06-10 DIAGNOSIS — N20.1 URETERAL STONE: ICD-10-CM

## 2020-06-10 DIAGNOSIS — R73.9 ELEVATED BLOOD SUGAR: ICD-10-CM

## 2020-06-10 DIAGNOSIS — R19.7 DIARRHEA, UNSPECIFIED TYPE: ICD-10-CM

## 2020-06-10 DIAGNOSIS — M65.331 TRIGGER MIDDLE FINGER OF RIGHT HAND: ICD-10-CM

## 2020-06-10 DIAGNOSIS — D64.9 ANEMIA, UNSPECIFIED TYPE: ICD-10-CM

## 2020-06-10 DIAGNOSIS — K40.90 RIGHT INGUINAL HERNIA: ICD-10-CM

## 2020-06-10 DIAGNOSIS — R10.31 RIGHT LOWER QUADRANT ABDOMINAL PAIN: Primary | ICD-10-CM

## 2020-06-10 DIAGNOSIS — R63.4 WEIGHT LOSS: ICD-10-CM

## 2020-06-10 DIAGNOSIS — N13.30 BILATERAL HYDRONEPHROSIS: ICD-10-CM

## 2020-06-10 DIAGNOSIS — R31.29 MICROSCOPIC HEMATURIA: ICD-10-CM

## 2020-06-10 LAB — CALPROTECTIN STL-MCNT: 75 UG/G (ref 0–120)

## 2020-06-10 PROCEDURE — 3008F BODY MASS INDEX DOCD: CPT | Performed by: FAMILY MEDICINE

## 2020-06-10 PROCEDURE — 99214 OFFICE O/P EST MOD 30 MIN: CPT | Performed by: FAMILY MEDICINE

## 2020-06-10 RX ORDER — NORTRIPTYLINE HYDROCHLORIDE 10 MG/1
10 CAPSULE ORAL
COMMUNITY
Start: 2019-06-11 | End: 2020-07-10 | Stop reason: ALTCHOICE

## 2020-06-12 ENCOUNTER — OFFICE VISIT (OUTPATIENT)
Dept: UROLOGY | Facility: MEDICAL CENTER | Age: 60
End: 2020-06-12
Payer: COMMERCIAL

## 2020-06-12 VITALS
BODY MASS INDEX: 20.66 KG/M2 | SYSTOLIC BLOOD PRESSURE: 112 MMHG | WEIGHT: 121 LBS | DIASTOLIC BLOOD PRESSURE: 72 MMHG | HEIGHT: 64 IN | TEMPERATURE: 98.7 F

## 2020-06-12 DIAGNOSIS — N39.41 URGE INCONTINENCE: Primary | ICD-10-CM

## 2020-06-12 DIAGNOSIS — N20.1 CALCULUS OF URETER: ICD-10-CM

## 2020-06-12 LAB
SL AMB  POCT GLUCOSE, UA: NORMAL
SL AMB LEUKOCYTE ESTERASE,UA: NORMAL
SL AMB POCT BILIRUBIN,UA: NORMAL
SL AMB POCT BLOOD,UA: NORMAL
SL AMB POCT CLARITY,UA: CLEAR
SL AMB POCT COLOR,UA: YELLOW
SL AMB POCT KETONES,UA: NORMAL
SL AMB POCT NITRITE,UA: NORMAL
SL AMB POCT PH,UA: 6
SL AMB POCT SPECIFIC GRAVITY,UA: 1.03
SL AMB POCT URINE PROTEIN: NORMAL
SL AMB POCT UROBILINOGEN: 0.2

## 2020-06-12 PROCEDURE — 3008F BODY MASS INDEX DOCD: CPT | Performed by: UROLOGY

## 2020-06-12 PROCEDURE — 81003 URINALYSIS AUTO W/O SCOPE: CPT | Performed by: UROLOGY

## 2020-06-12 PROCEDURE — 99214 OFFICE O/P EST MOD 30 MIN: CPT | Performed by: UROLOGY

## 2020-06-12 RX ORDER — OXYBUTYNIN CHLORIDE 15 MG/1
15 TABLET, EXTENDED RELEASE ORAL
Qty: 90 TABLET | Refills: 3 | Status: ON HOLD | OUTPATIENT
Start: 2020-06-12 | End: 2020-12-25 | Stop reason: CLARIF

## 2020-06-13 LAB — ELASTASE PANC STL-MCNT: 240 UG ELAST./G

## 2020-06-18 ENCOUNTER — TELEPHONE (OUTPATIENT)
Dept: SURGERY | Facility: CLINIC | Age: 60
End: 2020-06-18

## 2020-06-19 DIAGNOSIS — R19.7 DIARRHEA, UNSPECIFIED TYPE: ICD-10-CM

## 2020-06-19 DIAGNOSIS — R63.4 WEIGHT LOSS: ICD-10-CM

## 2020-06-19 PROCEDURE — U0003 INFECTIOUS AGENT DETECTION BY NUCLEIC ACID (DNA OR RNA); SEVERE ACUTE RESPIRATORY SYNDROME CORONAVIRUS 2 (SARS-COV-2) (CORONAVIRUS DISEASE [COVID-19]), AMPLIFIED PROBE TECHNIQUE, MAKING USE OF HIGH THROUGHPUT TECHNOLOGIES AS DESCRIBED BY CMS-2020-01-R: HCPCS

## 2020-06-20 LAB — SARS-COV-2 RNA SPEC QL NAA+PROBE: NOT DETECTED

## 2020-06-24 ENCOUNTER — ANESTHESIA EVENT (OUTPATIENT)
Dept: PERIOP | Facility: HOSPITAL | Age: 60
End: 2020-06-24
Payer: COMMERCIAL

## 2020-06-24 LAB — ELASTASE PANC STL-MCNT: NORMAL UG/G

## 2020-06-25 ENCOUNTER — ANESTHESIA (OUTPATIENT)
Dept: PERIOP | Facility: HOSPITAL | Age: 60
End: 2020-06-25
Payer: COMMERCIAL

## 2020-06-25 ENCOUNTER — HOSPITAL ENCOUNTER (OUTPATIENT)
Facility: HOSPITAL | Age: 60
Setting detail: OUTPATIENT SURGERY
Discharge: HOME | End: 2020-06-25
Attending: SURGERY | Admitting: SURGERY
Payer: COMMERCIAL

## 2020-06-25 VITALS
HEART RATE: 69 BPM | SYSTOLIC BLOOD PRESSURE: 107 MMHG | RESPIRATION RATE: 16 BRPM | DIASTOLIC BLOOD PRESSURE: 60 MMHG | BODY MASS INDEX: 20.32 KG/M2 | TEMPERATURE: 97.7 F | HEIGHT: 64 IN | OXYGEN SATURATION: 98 % | WEIGHT: 119 LBS

## 2020-06-25 DIAGNOSIS — K40.90 INGUINAL HERNIA OF RIGHT SIDE WITHOUT OBSTRUCTION OR GANGRENE: Primary | ICD-10-CM

## 2020-06-25 PROCEDURE — C1781 MESH (IMPLANTABLE): HCPCS | Performed by: SURGERY

## 2020-06-25 PROCEDURE — 49585 PR REPAIR UMBILICAL HERN,5+Y/O,REDUC: CPT | Performed by: PHYSICIAN ASSISTANT

## 2020-06-25 PROCEDURE — 49505 PRP I/HERN INIT REDUC >5 YR: CPT | Performed by: SURGERY

## 2020-06-25 DEVICE — BARD MESH
Type: IMPLANTABLE DEVICE | Site: INGUINAL | Status: FUNCTIONAL
Brand: BARD MESH

## 2020-06-25 RX ORDER — GLYCOPYRROLATE 0.2 MG/ML
INJECTION INTRAMUSCULAR; INTRAVENOUS AS NEEDED
Status: DISCONTINUED | OUTPATIENT
Start: 2020-06-25 | End: 2020-06-25 | Stop reason: SURG

## 2020-06-25 RX ORDER — DEXTROSE AND SODIUM CHLORIDE 5; .45 G/100ML; G/100ML
80 INJECTION, SOLUTION INTRAVENOUS CONTINUOUS
Status: DISCONTINUED | OUTPATIENT
Start: 2020-06-25 | End: 2020-06-25 | Stop reason: HOSPADM

## 2020-06-25 RX ORDER — CEFAZOLIN SODIUM 1 G/50ML
1000 SOLUTION INTRAVENOUS ONCE
Status: COMPLETED | OUTPATIENT
Start: 2020-06-25 | End: 2020-06-25

## 2020-06-25 RX ORDER — HYDROMORPHONE HCL/PF 1 MG/ML
1 SYRINGE (ML) INJECTION
Status: DISCONTINUED | OUTPATIENT
Start: 2020-06-25 | End: 2020-06-25 | Stop reason: HOSPADM

## 2020-06-25 RX ORDER — HYDROCODONE BITARTRATE AND ACETAMINOPHEN 5; 325 MG/1; MG/1
1 TABLET ORAL EVERY 4 HOURS PRN
Qty: 10 TABLET | Refills: 0 | Status: SHIPPED | OUTPATIENT
Start: 2020-06-25 | End: 2020-07-10 | Stop reason: ALTCHOICE

## 2020-06-25 RX ORDER — NEOSTIGMINE METHYLSULFATE 1 MG/ML
INJECTION INTRAVENOUS AS NEEDED
Status: DISCONTINUED | OUTPATIENT
Start: 2020-06-25 | End: 2020-06-25 | Stop reason: SURG

## 2020-06-25 RX ORDER — ONDANSETRON 4 MG/1
4 TABLET, FILM COATED ORAL EVERY 8 HOURS PRN
Qty: 20 TABLET | Refills: 0 | Status: SHIPPED | OUTPATIENT
Start: 2020-06-25 | End: 2020-12-02

## 2020-06-25 RX ORDER — ONDANSETRON 2 MG/ML
4 INJECTION INTRAMUSCULAR; INTRAVENOUS ONCE AS NEEDED
Status: DISCONTINUED | OUTPATIENT
Start: 2020-06-25 | End: 2020-06-25 | Stop reason: HOSPADM

## 2020-06-25 RX ORDER — FENTANYL CITRATE 50 UG/ML
INJECTION, SOLUTION INTRAMUSCULAR; INTRAVENOUS AS NEEDED
Status: DISCONTINUED | OUTPATIENT
Start: 2020-06-25 | End: 2020-06-25 | Stop reason: SURG

## 2020-06-25 RX ORDER — EPHEDRINE SULFATE 50 MG/ML
INJECTION INTRAVENOUS AS NEEDED
Status: DISCONTINUED | OUTPATIENT
Start: 2020-06-25 | End: 2020-06-25 | Stop reason: SURG

## 2020-06-25 RX ORDER — ONDANSETRON 4 MG/1
4 TABLET, ORALLY DISINTEGRATING ORAL EVERY 8 HOURS PRN
Status: DISCONTINUED | OUTPATIENT
Start: 2020-06-25 | End: 2020-06-25 | Stop reason: HOSPADM

## 2020-06-25 RX ORDER — PROPOFOL 10 MG/ML
INJECTION, EMULSION INTRAVENOUS AS NEEDED
Status: DISCONTINUED | OUTPATIENT
Start: 2020-06-25 | End: 2020-06-25 | Stop reason: SURG

## 2020-06-25 RX ORDER — SODIUM CHLORIDE 9 MG/ML
125 INJECTION, SOLUTION INTRAVENOUS CONTINUOUS
Status: DISCONTINUED | OUTPATIENT
Start: 2020-06-25 | End: 2020-06-25 | Stop reason: HOSPADM

## 2020-06-25 RX ORDER — ROCURONIUM BROMIDE 10 MG/ML
INJECTION, SOLUTION INTRAVENOUS AS NEEDED
Status: DISCONTINUED | OUTPATIENT
Start: 2020-06-25 | End: 2020-06-25 | Stop reason: SURG

## 2020-06-25 RX ORDER — DOCUSATE SODIUM 100 MG/1
100 CAPSULE, LIQUID FILLED ORAL 2 TIMES DAILY
Qty: 60 CAPSULE | Refills: 0 | Status: SHIPPED | OUTPATIENT
Start: 2020-06-25 | End: 2020-07-10 | Stop reason: ALTCHOICE

## 2020-06-25 RX ORDER — HYDROCODONE BITARTRATE AND ACETAMINOPHEN 5; 325 MG/1; MG/1
1 TABLET ORAL EVERY 4 HOURS PRN
Status: DISCONTINUED | OUTPATIENT
Start: 2020-06-25 | End: 2020-06-25 | Stop reason: HOSPADM

## 2020-06-25 RX ORDER — MIDAZOLAM HYDROCHLORIDE 2 MG/2ML
INJECTION, SOLUTION INTRAMUSCULAR; INTRAVENOUS AS NEEDED
Status: DISCONTINUED | OUTPATIENT
Start: 2020-06-25 | End: 2020-06-25 | Stop reason: SURG

## 2020-06-25 RX ORDER — ONDANSETRON 2 MG/ML
4 INJECTION INTRAMUSCULAR; INTRAVENOUS EVERY 8 HOURS PRN
Status: DISCONTINUED | OUTPATIENT
Start: 2020-06-25 | End: 2020-06-25 | Stop reason: HOSPADM

## 2020-06-25 RX ORDER — FENTANYL CITRATE/PF 50 MCG/ML
50 SYRINGE (ML) INJECTION
Status: DISCONTINUED | OUTPATIENT
Start: 2020-06-25 | End: 2020-06-25 | Stop reason: HOSPADM

## 2020-06-25 RX ORDER — ACETAMINOPHEN 325 MG/1
650 TABLET ORAL EVERY 6 HOURS PRN
Status: DISCONTINUED | OUTPATIENT
Start: 2020-06-25 | End: 2020-06-25 | Stop reason: HOSPADM

## 2020-06-25 RX ORDER — ONDANSETRON 2 MG/ML
INJECTION INTRAMUSCULAR; INTRAVENOUS AS NEEDED
Status: DISCONTINUED | OUTPATIENT
Start: 2020-06-25 | End: 2020-06-25 | Stop reason: SURG

## 2020-06-25 RX ORDER — SCOLOPAMINE TRANSDERMAL SYSTEM 1 MG/1
1 PATCH, EXTENDED RELEASE TRANSDERMAL
Status: DISCONTINUED | OUTPATIENT
Start: 2020-06-25 | End: 2020-06-25 | Stop reason: HOSPADM

## 2020-06-25 RX ADMIN — NEOSTIGMINE METHYLSULFATE 3 MG: 1 INJECTION, SOLUTION INTRAVENOUS at 09:19

## 2020-06-25 RX ADMIN — LIDOCAINE HYDROCHLORIDE 100 MG: 20 INJECTION, SOLUTION INTRAVENOUS at 08:46

## 2020-06-25 RX ADMIN — FENTANYL CITRATE 50 MCG: 50 INJECTION, SOLUTION INTRAMUSCULAR; INTRAVENOUS at 09:30

## 2020-06-25 RX ADMIN — ONDANSETRON 4 MG: 4 TABLET, ORALLY DISINTEGRATING ORAL at 11:58

## 2020-06-25 RX ADMIN — PROPOFOL 150 MG: 10 INJECTION, EMULSION INTRAVENOUS at 08:46

## 2020-06-25 RX ADMIN — GLYCOPYRROLATE 0.2 MG: 0.2 INJECTION, SOLUTION INTRAMUSCULAR; INTRAVENOUS at 08:59

## 2020-06-25 RX ADMIN — SODIUM CHLORIDE 125 ML/HR: 0.9 INJECTION, SOLUTION INTRAVENOUS at 07:49

## 2020-06-25 RX ADMIN — GLYCOPYRROLATE 0.4 MG: 0.2 INJECTION, SOLUTION INTRAMUSCULAR; INTRAVENOUS at 09:19

## 2020-06-25 RX ADMIN — FENTANYL CITRATE 50 MCG: 50 INJECTION, SOLUTION INTRAMUSCULAR; INTRAVENOUS at 10:05

## 2020-06-25 RX ADMIN — HYDROCODONE BITARTRATE AND ACETAMINOPHEN 1 TABLET: 5; 325 TABLET ORAL at 11:57

## 2020-06-25 RX ADMIN — FENTANYL CITRATE 100 MCG: 50 INJECTION, SOLUTION INTRAMUSCULAR; INTRAVENOUS at 08:46

## 2020-06-25 RX ADMIN — MIDAZOLAM 2 MG: 1 INJECTION INTRAMUSCULAR; INTRAVENOUS at 08:37

## 2020-06-25 RX ADMIN — FENTANYL CITRATE 50 MCG: 50 INJECTION, SOLUTION INTRAMUSCULAR; INTRAVENOUS at 09:26

## 2020-06-25 RX ADMIN — ONDANSETRON 4 MG: 2 INJECTION INTRAMUSCULAR; INTRAVENOUS at 09:15

## 2020-06-25 RX ADMIN — FENTANYL CITRATE 50 MCG: 50 INJECTION, SOLUTION INTRAMUSCULAR; INTRAVENOUS at 10:16

## 2020-06-25 RX ADMIN — SODIUM CHLORIDE 125 ML/HR: 0.9 INJECTION, SOLUTION INTRAVENOUS at 10:16

## 2020-06-25 RX ADMIN — SCOPALAMINE 1 PATCH: 1 PATCH, EXTENDED RELEASE TRANSDERMAL at 08:20

## 2020-06-25 RX ADMIN — CEFAZOLIN SODIUM 1000 MG: 1 SOLUTION INTRAVENOUS at 08:15

## 2020-06-25 RX ADMIN — EPHEDRINE SULFATE 15 MG: 50 INJECTION, SOLUTION INTRAVENOUS at 08:52

## 2020-06-25 RX ADMIN — PHENYLEPHRINE HYDROCHLORIDE 200 MCG: 10 INJECTION INTRAVENOUS at 08:57

## 2020-06-25 RX ADMIN — FENTANYL CITRATE 50 MCG: 50 INJECTION, SOLUTION INTRAMUSCULAR; INTRAVENOUS at 09:50

## 2020-06-25 RX ADMIN — ROCURONIUM BROMIDE 30 MG: 10 INJECTION, SOLUTION INTRAVENOUS at 08:46

## 2020-06-26 ENCOUNTER — TELEPHONE (OUTPATIENT)
Dept: SURGERY | Facility: CLINIC | Age: 60
End: 2020-06-26

## 2020-06-26 NOTE — TELEPHONE ENCOUNTER
Received return call from patient  She reports no F/V/N/C  She states that she hasn't yet had a bowel movement, but she also hasn't really eaten  She's aware that if this becomes an issue, to utilize stool softener/prune juice, etc  OR to call the office, as needed  She's aware that outer bandage can be removed later today or tomorrow, aware steri-strips will remain intact  Patient was discharged with binder/truss  She's aware to wear this, unless showering until her post-op appointment, 7/9/2020  Patient will call the office with any questions/concerns  No pathology was sent

## 2020-06-26 NOTE — TELEPHONE ENCOUNTER
S/P = Hernia repair = 06/25/2020    Unable to reach patient left message to call the office back  No path sent

## 2020-07-01 ENCOUNTER — APPOINTMENT (OUTPATIENT)
Dept: RADIOLOGY | Facility: CLINIC | Age: 60
End: 2020-07-01
Payer: COMMERCIAL

## 2020-07-01 ENCOUNTER — APPOINTMENT (OUTPATIENT)
Dept: LAB | Facility: CLINIC | Age: 60
End: 2020-07-01
Payer: COMMERCIAL

## 2020-07-01 ENCOUNTER — OFFICE VISIT (OUTPATIENT)
Dept: FAMILY MEDICINE CLINIC | Facility: CLINIC | Age: 60
End: 2020-07-01
Payer: COMMERCIAL

## 2020-07-01 VITALS
DIASTOLIC BLOOD PRESSURE: 72 MMHG | TEMPERATURE: 98.2 F | WEIGHT: 115.4 LBS | HEART RATE: 73 BPM | OXYGEN SATURATION: 97 % | HEIGHT: 64 IN | BODY MASS INDEX: 19.7 KG/M2 | SYSTOLIC BLOOD PRESSURE: 121 MMHG

## 2020-07-01 DIAGNOSIS — R19.7 DIARRHEA, UNSPECIFIED TYPE: ICD-10-CM

## 2020-07-01 DIAGNOSIS — R63.4 WEIGHT LOSS: ICD-10-CM

## 2020-07-01 DIAGNOSIS — N20.0 CALCULUS OF KIDNEY: ICD-10-CM

## 2020-07-01 DIAGNOSIS — Z98.890 S/P RIGHT INGUINAL HERNIA REPAIR: Primary | ICD-10-CM

## 2020-07-01 DIAGNOSIS — N20.0 CALCULUS OF KIDNEY: Primary | ICD-10-CM

## 2020-07-01 DIAGNOSIS — E78.00 PURE HYPERCHOLESTEROLEMIA: ICD-10-CM

## 2020-07-01 DIAGNOSIS — Z87.19 S/P RIGHT INGUINAL HERNIA REPAIR: Primary | ICD-10-CM

## 2020-07-01 DIAGNOSIS — R73.9 ELEVATED BLOOD SUGAR: ICD-10-CM

## 2020-07-01 LAB
EST. AVERAGE GLUCOSE BLD GHB EST-MCNC: 111 MG/DL
HBA1C MFR BLD: 5.5 %

## 2020-07-01 PROCEDURE — 4004F PT TOBACCO SCREEN RCVD TLK: CPT | Performed by: FAMILY MEDICINE

## 2020-07-01 PROCEDURE — 36415 COLL VENOUS BLD VENIPUNCTURE: CPT

## 2020-07-01 PROCEDURE — 99214 OFFICE O/P EST MOD 30 MIN: CPT | Performed by: FAMILY MEDICINE

## 2020-07-01 PROCEDURE — 83036 HEMOGLOBIN GLYCOSYLATED A1C: CPT

## 2020-07-01 PROCEDURE — 74018 RADEX ABDOMEN 1 VIEW: CPT

## 2020-07-01 NOTE — PROGRESS NOTES
So solid red check list room shows solid shows patient the condition diet for the Assessment/Plan:       No problem-specific Assessment & Plan notes found for this encounter  Diagnoses and all orders for this visit:    S/P right inguinal hernia repair  Comments: To follow with surgeon, if her pain continue    Diarrhea, unspecified type  Comments:  chronic , stable , to follow with GI    Weight loss  Comments:  Patient to check her weight every 3 days if she keep losing weight she need to call me back    Elevated blood sugar  Comments:  await Hgba1c results    Pure hypercholesterolemia  -     Lipid Panel with Direct LDL reflex; Future    Other orders  -     Cancel: Ambulatory referral to Gynecology; Future        Patient Instructions   Patient to follow up with test results      Orders Placed This Encounter   Procedures    Lipid Panel with Direct LDL reflex     This is a patient instruction: This test requires patient fasting for 10-12 hours or longer  Drinking of black coffee or black tea is acceptable  Standing Status:   Future     Standing Expiration Date:   7/1/2021         Subjective:     Patient ID: Breana Burt is a 62 y o  female      HPI  Post right inguinal hernia repair  Patient stated she has pain at right inguinal hernia radiate to upper inner   thigh since surgery  , she called her surgeon , was told was expected  But if continue , they will refer her to pain management   Taking Percocet only p r n  She needed to use Percocet only 3 tablets since her surgery, 1 week ago  pain is vary    Could be mild or moderate , burning sensation  Diarrhea  Same  Admit to 3 - 4 BM a day   , small amount  , no fever    Scheduled for colonoscopy in August and also she is going to have EGD  Weight loss, patient stated her weight is stable  Elevated blood sugar, denied polyuria or polydipsia    Discussed GYN exam pt declined , pt had GLO -BSO at 32year old , pt had dysmenorrhea secondary to severe adhesion    Labs  Hemoglobin A1c is pending  On June 19 for COVID was negative      tReview of Systems   Constitutional: Negative for appetite change and fatigue  HENT: Negative for ear pain, tinnitus, trouble swallowing and voice change  Eyes: Negative for photophobia, pain and visual disturbance  Respiratory: Negative for cough, chest tightness and wheezing  Cardiovascular: Negative for chest pain, palpitations and leg swelling  Gastrointestinal: Negative for abdominal distention, anal bleeding, blood in stool, constipation, diarrhea, nausea, rectal pain and vomiting  Endocrine: Negative for cold intolerance, heat intolerance, polydipsia and polyuria  Genitourinary: Negative for decreased urine volume, difficulty urinating, dysuria, flank pain, frequency, hematuria and urgency  Musculoskeletal: Negative for arthralgias, back pain, gait problem, myalgias and neck pain  Skin: Negative for pallor and rash  Allergic/Immunologic: Negative for immunocompromised state  Neurological: Negative for dizziness, seizures, syncope and speech difficulty  Hematological: Negative for adenopathy  Does not bruise/bleed easily  Psychiatric/Behavioral: Negative for agitation, confusion and hallucinations  The patient is not nervous/anxious  Objective:     Physical Exam   Constitutional: She is oriented to person, place, and time  She appears well-developed and well-nourished  No distress  HENT:   Head: Normocephalic and atraumatic  Eyes: Pupils are equal, round, and reactive to light  Conjunctivae and EOM are normal  Right eye exhibits no discharge  Left eye exhibits no discharge  No scleral icterus  Neck: No JVD present  No thyromegaly present  Cardiovascular: Normal rate, regular rhythm and normal heart sounds  No murmur heard  Extremities  No edema   Pulmonary/Chest: Effort normal and breath sounds normal    Abdominal: Soft   Bowel sounds are normal  She exhibits no distension and no mass  There is no tenderness  There is no rebound and no guarding  No hernia  Right inguinal area  No mass  No tenderness  Patient has for range of motion of the right hip   Lymphadenopathy:     She has no cervical adenopathy  Neurological: She is alert and oriented to person, place, and time  No cranial nerve deficit or sensory deficit  She exhibits normal muscle tone  Coordination normal    Skin: No rash noted  Psychiatric: She has a normal mood and affect   Her behavior is normal  Judgment and thought content normal

## 2020-07-02 PROBLEM — Z98.890 S/P RIGHT INGUINAL HERNIA REPAIR: Status: ACTIVE | Noted: 2020-07-02

## 2020-07-02 PROBLEM — Z87.19 S/P RIGHT INGUINAL HERNIA REPAIR: Status: ACTIVE | Noted: 2020-07-02

## 2020-07-02 PROBLEM — R63.4 WEIGHT LOSS: Status: ACTIVE | Noted: 2020-07-02

## 2020-07-02 PROBLEM — R19.7 DIARRHEA: Status: ACTIVE | Noted: 2020-07-02

## 2020-07-02 PROBLEM — R73.9 ELEVATED BLOOD SUGAR: Status: ACTIVE | Noted: 2020-07-02

## 2020-07-02 PROBLEM — Z12.4 PAP SMEAR FOR CERVICAL CANCER SCREENING: Status: ACTIVE | Noted: 2020-07-02

## 2020-07-10 ENCOUNTER — OFFICE VISIT (OUTPATIENT)
Dept: UROLOGY | Facility: MEDICAL CENTER | Age: 60
End: 2020-07-10
Payer: COMMERCIAL

## 2020-07-10 ENCOUNTER — OFFICE VISIT (OUTPATIENT)
Dept: SURGERY | Facility: CLINIC | Age: 60
End: 2020-07-10

## 2020-07-10 VITALS
HEIGHT: 64 IN | DIASTOLIC BLOOD PRESSURE: 72 MMHG | TEMPERATURE: 98.6 F | SYSTOLIC BLOOD PRESSURE: 118 MMHG | BODY MASS INDEX: 19.63 KG/M2 | WEIGHT: 115 LBS

## 2020-07-10 VITALS
TEMPERATURE: 97.2 F | HEART RATE: 64 BPM | OXYGEN SATURATION: 99 % | WEIGHT: 114 LBS | DIASTOLIC BLOOD PRESSURE: 70 MMHG | SYSTOLIC BLOOD PRESSURE: 118 MMHG | BODY MASS INDEX: 19.57 KG/M2

## 2020-07-10 DIAGNOSIS — K40.90 NON-RECURRENT UNILATERAL INGUINAL HERNIA WITHOUT OBSTRUCTION OR GANGRENE: Primary | ICD-10-CM

## 2020-07-10 DIAGNOSIS — N20.1 CALCULUS OF URETER: Primary | ICD-10-CM

## 2020-07-10 PROCEDURE — 4004F PT TOBACCO SCREEN RCVD TLK: CPT | Performed by: UROLOGY

## 2020-07-10 PROCEDURE — 99024 POSTOP FOLLOW-UP VISIT: CPT | Performed by: PHYSICIAN ASSISTANT

## 2020-07-10 PROCEDURE — 3008F BODY MASS INDEX DOCD: CPT | Performed by: UROLOGY

## 2020-07-10 PROCEDURE — 99214 OFFICE O/P EST MOD 30 MIN: CPT | Performed by: UROLOGY

## 2020-07-10 NOTE — PROGRESS NOTES
Assessment/Plan: Larissa Sultana is a 62 y  o female who comes in today for postoperative check after open right inguinal hernia repair with mesh    Patient is here for follow-up visit after surgery  Patient is doing well except she notices the bulge lateral to her incision  Patient states she had a severe coughing fit and ever since then she has noticed a bulge and tenderness  Patient is concerned that she has a new hernia    On physical exam there is a bulge and laxity in the muscle but no discrete hernia noted  Possible seroma or scar tissue  Will follow up in 6-8 weeks or sooner if gets worse  Consider CT scan if no improvement or worsening of symptoms    Pathology: Reviewed with patient, all questions answered  Postoperative restrictions reviewed, including specific lifting and exercise restrictions  All questions answered  Patient is eager to return to work discussed lifting restrictions and to limit to 4 hours or less for 1 week     ___________________________________________________  HPI:  Larissa Sultana is a 62 y  o female who comes in today for postoperative check after recent open right inguinal hernia repair    Currently doing well with some problems : bulge above incision and tenderness , no fever or chills,no nausea and no vomiting  Reports painful bulge lateral and superior to incision  ROS:  General ROS: negative for - chills, fatigue, fever or night sweats, weight loss  Respiratory ROS: no cough, shortness of breath, or wheezing  Cardiovascular ROS: no chest pain or dyspnea on exertion  Genito-Urinary ROS: no dysuria, trouble voiding, or hematuria  Musculoskeletal ROS: negative for - gait disturbance, joint pain or muscle pain  Neurological ROS: no TIA or stroke symptoms    GI ROS: see HPI  Skin ROS: no new rashes or lesions   Lymphatic ROS: no new adenopathy noted by pt     GYN ROS: see HPI, no new GYN history or bleeding noted  Psy ROS: no new mental or behavioral disturbances     Patient Active Problem List   Diagnosis    Urge incontinence    Frequency of urination    History of hydronephrosis    Migraine    Fibromyalgia    Pure hypercholesterolemia    Allergy    Low iron    Idiopathic hypersomnia    Osteoporosis    Diverticulosis    Other irritable bowel syndrome    Migraine without aura and without status migrainosus, not intractable    Acute cystitis without hematuria    Calculus of kidney    Urinary tract infection without hematuria    Irritable bowel syndrome    Edema of both legs    Prolonged QT interval    Rash    Screening mammogram, encounter for    Screening for colon cancer    Spondylosis of cervical region without myelopathy or radiculopathy    Neck pain    Encounter for hepatitis C screening test for low risk patient    Encounter for immunization    Xerosis of skin    Spondylosis of lumbar spine    Senile osteoporosis    Irritable bowel syndrome without diarrhea    Hypercholesterolemia    Cervical radiculopathy    Narcolepsy without cataplexy    Microscopic hematuria    Calculus of ureter    Inguinal hernia of right side without obstruction or gangrene    S/P right inguinal hernia repair    Diarrhea    Weight loss    Elevated blood sugar    Pap smear for cervical cancer screening         Allergies:   Sumatriptan; Celecoxib;  Tramadol; and Medical tape      Current Outpatient Medications:     Armodafinil 250 MG tablet, Take 1 tablet (250 mg total) by mouth daily, Disp: 30 tablet, Rfl: 1    Ascorbic Acid (VITAMIN C PO), Take 500 mg by mouth daily , Disp: , Rfl:     BACILLUS COAGULANS-INULIN PO, Take 1 capsule by mouth daily , Disp: , Rfl:     calcium carbonate 1250 MG capsule, Take 600 mg by mouth daily , Disp: , Rfl:     cetirizine (ZyrTEC) 10 MG chewable tablet, Chew 10 mg daily , Disp: , Rfl:     Cholecalciferol 1000 units tablet, Take 1,000 Units by mouth daily , Disp: , Rfl:     Citicoline Sodium 500 MG TABS, Take 500 mg by mouth daily , Disp: , Rfl:     Coenzyme Q10 200 MG/GM POWD, Take 200 mg by mouth daily , Disp: , Rfl:     denosumab (PROLIA) 60 mg/mL, Inject 60 mg under the skin Every 6 months, Disp: , Rfl:     diclofenac (VOLTAREN) 75 mg EC tablet, Take 75 mg by mouth 3 (three) times a day , Disp: , Rfl:     dicyclomine (BENTYL) 20 mg tablet, Take 1 tablet (20 mg total) by mouth every 6 (six) hours as needed (urgency, abdominal pain), Disp: 120 tablet, Rfl: 2    ferrous sulfate 325 (65 Fe) mg tablet, Take 325 mg by mouth daily with breakfast , Disp: , Rfl:     gabapentin (NEURONTIN) 300 mg capsule, 300 mg at 1200 in addition to 600 mg at bedtime  , Disp: , Rfl:     Ginger, Zingiber officinalis, (GINGER ROOT) 550 MG CAPS, Take 550 mg by mouth 2 (two) times a day , Disp: , Rfl:     Ginkgo Biloba 40 MG TABS, Take 40 mg by mouth daily , Disp: , Rfl:     Misc Natural Products (GINSENG-COMPLEX PO), Take 2 tablets by mouth daily , Disp: , Rfl:     Multiple Vitamins-Minerals (CENTRUM SILVER PO), Take 1 tablet by mouth daily , Disp: , Rfl:     mupirocin (BACTROBAN) 2 % ointment, Apply topically 3 (three) times a day Apply topically on hands to open cuts to prevent infection (Patient taking differently: Apply 1 application topically as needed Apply topically on hands to open cuts to prevent infection), Disp: 30 g, Rfl: 0    NON FORMULARY, Place 30 mg under the tongue as needed --- tincture prn, Disp: , Rfl:     NON FORMULARY, Take 2 tablets by mouth daily Take Health and Sooth take two capsule daily , Disp: , Rfl:     oxybutynin (DITROPAN XL) 15 MG 24 hr tablet, Take 1 tablet (15 mg total) by mouth daily at bedtime, Disp: 90 tablet, Rfl: 3    predniSONE 20 mg tablet, Take 20 mg by mouth as needed , Disp: , Rfl:     rosuvastatin (CRESTOR) 5 mg tablet, Take 1 tablet (5 mg total) by mouth daily, Disp: 30 tablet, Rfl: 1    vitamin E, tocopherol, 400 units capsule, Take 400 Units by mouth daily, Disp: , Rfl:     ZOLMitriptan (ZOMIG) 2 5 MG tablet, By mouth take 1 tablet at onset of migraine  May repeat in 2 hours if needed  Limit 2/24 hours, 4/week, 8/month  (Patient taking differently: Take 2 5 mg by mouth By mouth take 1 tablet at onset of migraine  May repeat in 2 hours if needed    Limit 2/24 hours, 4/week, 8/month ), Disp: 12 tablet, Rfl: 2    docusate sodium (COLACE) 100 mg capsule, Take 1 capsule (100 mg total) by mouth 2 (two) times a day, Disp: 60 capsule, Rfl: 0    DULoxetine (CYMBALTA) 30 mg delayed release capsule, Take 3 capsules (90 mg total) by mouth daily (Patient taking differently: Take 90 mg by mouth every evening ), Disp: 90 capsule, Rfl: 1    HYDROcodone-acetaminophen (NORCO) 5-325 mg per tablet, Take 1 tablet by mouth every 4 (four) hours as needed for pain for up to 10 dosesMax Daily Amount: 6 tablets, Disp: 10 tablet, Rfl: 0    metaxalone (SKELAXIN) 800 mg tablet, Take 800 mg by mouth 3 (three) times a day , Disp: , Rfl:     nortriptyline (PAMELOR) 10 mg capsule, Take 10 mg by mouth daily at bedtime as needed , Disp: , Rfl:     ondansetron (ZOFRAN) 4 mg tablet, Take 1 tablet (4 mg total) by mouth every 8 (eight) hours as needed for nausea or vomiting for up to 7 days, Disp: 20 tablet, Rfl: 0    ondansetron (ZOFRAN-ODT) 4 mg disintegrating tablet, Take 4 mg by mouth 2 (two) times a day as needed , Disp: , Rfl:     Past Medical History:   Diagnosis Date    Allergy to dust     Anemia     Calculus of ureter     Chronic pain disorder     neck and back- sees pain management    Colitis     Cracked skin     on fingers    Diverticulosis     Dysphagia     H/O: GI bleed     Hiatal hernia     History of DVT in adulthood     flaquito    Hydronephrosis     Hyperlipidemia     Inguinal hernia     right side    Irritable bowel     Migraines     Narcolepsy     OAB (overactive bladder)     Osteoarthritis     Osteoporosis     Other chronic pain     degenerative disc disease    PONV (postoperative nausea and vomiting) migraines    Tobacco abuse     Wears dentures     full upper    Wears glasses        Past Surgical History:   Procedure Laterality Date    ABDOMINAL ADHESION SURGERY      ARM SKIN LESION BIOPSY / EXCISION      lymoh node excision    BLADDER SURGERY      BREAST SURGERY      CHOLECYSTECTOMY      HYSTERECTOMY  1977    JOINT REPLACEMENT Right     KNEE SURGERY Right     replacement    LAMINECTOMY      LAPAROSCOPY      MOLE REMOVAL      OOPHORECTOMY Bilateral 1977    OTHER SURGICAL HISTORY      enlarged lymph node removed left arm   no cancer    NM REPAIR ING HERNIA,5+Y/O,REDUCIBL Right 6/25/2020    Procedure: REPAIR HERNIA INGUINAL  WITH MESH;  Surgeon: Sapna Caceres MD;  Location: [facility];  Service: General    SPINAL FUSION      WISDOM TOOTH EXTRACTION         Family History   Problem Relation Age of Onset    Hypertension Father     Cancer Father     Diabetes Mother     Hypertension Mother     Stroke Brother     Nephrolithiasis Brother     Diabetes Sister     No Known Problems Daughter     No Known Problems Maternal Grandmother     No Known Problems Maternal Grandfather     No Known Problems Paternal Grandmother     No Known Problems Paternal Grandfather     No Known Problems Sister     No Known Problems Maternal Aunt     No Known Problems Maternal Aunt     No Known Problems Maternal Aunt     No Known Problems Paternal Aunt     No Known Problems Paternal Aunt     No Known Problems Paternal Aunt         reports that she has been smoking cigarettes  She has a 23 00 pack-year smoking history  She has never used smokeless tobacco  She reports that she drinks alcohol  She reports that she has current or past drug history  Drug: Marijuana      Vitals:    07/10/20 1040   BP: 118/70   Pulse: 64   Temp: (!) 97 2 °F (36 2 °C)   SpO2: 99%        PHYSICAL EXAM  General: normal, cooperative, no distress  Abdominal: soft, nondistended, bulge with tenderness lateral incision  Incision: clean, dry, and intact and healing well    Physical Exam   Abdominal:               Some portions of this record may have been generated with voice recognition software  There may be translation, syntax,  or grammatical errors  Occasional wrong word or "sound-a-like" substitutions may have occurred due to the inherent limitations of the voice recognition software  Read the chart carefully and recognize, using context, where substitutions may have occurred  If you have any questions, please contact the dictating provider for clarification or correction, as needed  This encounter has been coded by a non-certified coder         Sapna Caceres MD    Date: 7/10/2020 Time: 11:14 AM

## 2020-07-10 NOTE — PROGRESS NOTES
HISTORY:    Follow-up on CT scan of May 27, it showed a 2 x 3 mm stone proximal right ureter just below UPJ  She had some mild right flank pain before that, but does not have that same pain since    In follow-up we did KUB on 1 July, the film is hazy, but also shows a similar calcification in that area  She had some mild generalized abdominal and back pain the day of that  KUB but nothing referable to the right kidney  ASSESSMENT / PLAN:    If in fact the stone is still lodged in the ureter, she will need surgery for this  The KUB is not enough evidence to schedule this, so I will do another CT  The following portions of the patient's history were reviewed and updated as appropriate: allergies, current medications, past family history, past medical history, past social history, past surgical history and problem list     Review of Systems   All other systems reviewed and are negative  Objective:     Physical Exam   Constitutional: She is oriented to person, place, and time  She appears well-developed and well-nourished  No distress  HENT:   Head: Normocephalic and atraumatic  Eyes: No scleral icterus  Pulmonary/Chest: Effort normal    Neurological: She is alert and oriented to person, place, and time  Skin: She is not diaphoretic  No pallor  Psychiatric: She has a normal mood and affect   Her behavior is normal  Judgment and thought content normal        CT and KUB films reviewed and shown to patient  No results found for: PSA]  BUN   Date Value Ref Range Status   05/14/2020 17 5 - 25 mg/dL Final     Creatinine   Date Value Ref Range Status   05/14/2020 0 94 0 60 - 1 30 mg/dL Final     Comment:     Standardized to IDMS reference method     No components found for: CBC      Patient Active Problem List   Diagnosis    Urge incontinence    Frequency of urination    History of hydronephrosis    Migraine    Fibromyalgia    Pure hypercholesterolemia    Allergy    Low iron    Idiopathic hypersomnia    Osteoporosis    Diverticulosis    Other irritable bowel syndrome    Migraine without aura and without status migrainosus, not intractable    Acute cystitis without hematuria    Calculus of kidney    Urinary tract infection without hematuria    Irritable bowel syndrome    Edema of both legs    Prolonged QT interval    Rash    Screening mammogram, encounter for    Screening for colon cancer    Spondylosis of cervical region without myelopathy or radiculopathy    Neck pain    Encounter for hepatitis C screening test for low risk patient    Encounter for immunization    Xerosis of skin    Spondylosis of lumbar spine    Senile osteoporosis    Irritable bowel syndrome without diarrhea    Hypercholesterolemia    Cervical radiculopathy    Narcolepsy without cataplexy    Microscopic hematuria    Calculus of ureter    Inguinal hernia of right side without obstruction or gangrene    S/P right inguinal hernia repair    Diarrhea    Weight loss    Elevated blood sugar    Pap smear for cervical cancer screening        Diagnoses and all orders for this visit:    Calculus of ureter  -     CT abdomen pelvis wo contrast; Future           Patient ID: Negar Saavedra is a 62 y o  female        Current Outpatient Medications:     Armodafinil 250 MG tablet, Take 1 tablet (250 mg total) by mouth daily, Disp: 30 tablet, Rfl: 1    Ascorbic Acid (VITAMIN C PO), Take 500 mg by mouth daily , Disp: , Rfl:     BACILLUS COAGULANS-INULIN PO, Take 1 capsule by mouth daily , Disp: , Rfl:     calcium carbonate 1250 MG capsule, Take 600 mg by mouth daily , Disp: , Rfl:     cetirizine (ZyrTEC) 10 MG chewable tablet, Chew 10 mg daily , Disp: , Rfl:     Cholecalciferol 1000 units tablet, Take 1,000 Units by mouth daily , Disp: , Rfl:     Citicoline Sodium 500 MG TABS, Take 500 mg by mouth daily , Disp: , Rfl:     Coenzyme Q10 200 MG/GM POWD, Take 200 mg by mouth daily , Disp: , Rfl:    denosumab (PROLIA) 60 mg/mL, Inject 60 mg under the skin Every 6 months, Disp: , Rfl:     diclofenac (VOLTAREN) 75 mg EC tablet, Take 75 mg by mouth 3 (three) times a day , Disp: , Rfl:     dicyclomine (BENTYL) 20 mg tablet, Take 1 tablet (20 mg total) by mouth every 6 (six) hours as needed (urgency, abdominal pain), Disp: 120 tablet, Rfl: 2    ferrous sulfate 325 (65 Fe) mg tablet, Take 325 mg by mouth daily with breakfast , Disp: , Rfl:     gabapentin (NEURONTIN) 300 mg capsule, 300 mg at 1200 in addition to 600 mg at bedtime  , Disp: , Rfl:     Ginger, Zingiber officinalis, (GINGER ROOT) 550 MG CAPS, Take 550 mg by mouth 2 (two) times a day , Disp: , Rfl:     Ginkgo Biloba 40 MG TABS, Take 40 mg by mouth daily , Disp: , Rfl:     Misc Natural Products (GINSENG-COMPLEX PO), Take 2 tablets by mouth daily , Disp: , Rfl:     Multiple Vitamins-Minerals (CENTRUM SILVER PO), Take 1 tablet by mouth daily , Disp: , Rfl:     mupirocin (BACTROBAN) 2 % ointment, Apply topically 3 (three) times a day Apply topically on hands to open cuts to prevent infection (Patient taking differently: Apply 1 application topically as needed Apply topically on hands to open cuts to prevent infection), Disp: 30 g, Rfl: 0    NON FORMULARY, Place 30 mg under the tongue as needed --- tincture prn, Disp: , Rfl:     NON FORMULARY, Take 2 tablets by mouth daily Take Health and Sooth take two capsule daily , Disp: , Rfl:     ondansetron (ZOFRAN-ODT) 4 mg disintegrating tablet, Take 4 mg by mouth 2 (two) times a day as needed , Disp: , Rfl:     oxybutynin (DITROPAN XL) 15 MG 24 hr tablet, Take 1 tablet (15 mg total) by mouth daily at bedtime, Disp: 90 tablet, Rfl: 3    predniSONE 20 mg tablet, Take 20 mg by mouth as needed , Disp: , Rfl:     rosuvastatin (CRESTOR) 5 mg tablet, Take 1 tablet (5 mg total) by mouth daily, Disp: 30 tablet, Rfl: 1    vitamin E, tocopherol, 400 units capsule, Take 400 Units by mouth daily, Disp: , Rfl:    ZOLMitriptan (ZOMIG) 2 5 MG tablet, By mouth take 1 tablet at onset of migraine  May repeat in 2 hours if needed  Limit 2/24 hours, 4/week, 8/month  (Patient taking differently: Take 2 5 mg by mouth By mouth take 1 tablet at onset of migraine  May repeat in 2 hours if needed    Limit 2/24 hours, 4/week, 8/month ), Disp: 12 tablet, Rfl: 2    DULoxetine (CYMBALTA) 30 mg delayed release capsule, Take 3 capsules (90 mg total) by mouth daily (Patient taking differently: Take 90 mg by mouth every evening ), Disp: 90 capsule, Rfl: 1    metaxalone (SKELAXIN) 800 mg tablet, Take 800 mg by mouth 3 (three) times a day , Disp: , Rfl:     ondansetron (ZOFRAN) 4 mg tablet, Take 1 tablet (4 mg total) by mouth every 8 (eight) hours as needed for nausea or vomiting for up to 7 days, Disp: 20 tablet, Rfl: 0    Past Medical History:   Diagnosis Date    Allergy to dust     Anemia     Calculus of ureter     Chronic pain disorder     neck and back- sees pain management    Colitis     Cracked skin     on fingers    Diverticulosis     Dysphagia     H/O: GI bleed     Hiatal hernia     History of DVT in adulthood     flaquito    Hydronephrosis     Hyperlipidemia     Inguinal hernia     right side    Irritable bowel     Migraines     Narcolepsy     OAB (overactive bladder)     Osteoarthritis     Osteoporosis     Other chronic pain     degenerative disc disease    PONV (postoperative nausea and vomiting)     migraines    Tobacco abuse     Wears dentures     full upper    Wears glasses        Past Surgical History:   Procedure Laterality Date    ABDOMINAL ADHESION SURGERY      ARM SKIN LESION BIOPSY / EXCISION      lymoh node excision    BLADDER SURGERY      BREAST SURGERY      CHOLECYSTECTOMY      HYSTERECTOMY  1977    JOINT REPLACEMENT Right     KNEE SURGERY Right     replacement    LAMINECTOMY      LAPAROSCOPY      MOLE REMOVAL      OOPHORECTOMY Bilateral 1977    OTHER SURGICAL HISTORY      enlarged lymph node removed left arm   no cancer    CO REPAIR ING HERNIA,5+Y/O,REDUCIBL Right 6/25/2020    Procedure: REPAIR HERNIA INGUINAL  WITH MESH;  Surgeon: Jonathan Mckeon MD;  Location: [facility];  Service: General    SPINAL FUSION      WISDOM TOOTH EXTRACTION         Social History

## 2020-07-13 ENCOUNTER — HOSPITAL ENCOUNTER (OUTPATIENT)
Dept: RADIOLOGY | Facility: MEDICAL CENTER | Age: 60
Discharge: HOME | End: 2020-07-13
Attending: PHYSICAL MEDICINE & REHABILITATION | Admitting: PHYSICAL MEDICINE & REHABILITATION
Payer: COMMERCIAL

## 2020-07-13 VITALS
HEART RATE: 80 BPM | SYSTOLIC BLOOD PRESSURE: 100 MMHG | OXYGEN SATURATION: 96 % | TEMPERATURE: 98.4 F | RESPIRATION RATE: 20 BRPM | DIASTOLIC BLOOD PRESSURE: 61 MMHG

## 2020-07-13 DIAGNOSIS — M54.16 LUMBAR RADICULOPATHY: ICD-10-CM

## 2020-07-13 PROCEDURE — 64483 NJX AA&/STRD TFRM EPI L/S 1: CPT | Performed by: PHYSICAL MEDICINE & REHABILITATION

## 2020-07-13 RX ORDER — PAPAVERINE HCL 150 MG
20 CAPSULE, EXTENDED RELEASE ORAL ONCE
Status: COMPLETED | OUTPATIENT
Start: 2020-07-13 | End: 2020-07-13

## 2020-07-13 RX ORDER — LIDOCAINE HYDROCHLORIDE 10 MG/ML
5 INJECTION, SOLUTION EPIDURAL; INFILTRATION; INTRACAUDAL; PERINEURAL ONCE
Status: COMPLETED | OUTPATIENT
Start: 2020-07-13 | End: 2020-07-13

## 2020-07-13 RX ADMIN — DEXAMETHASONE SODIUM PHOSPHATE 20 MG: 10 INJECTION, SOLUTION INTRAMUSCULAR; INTRAVENOUS at 13:18

## 2020-07-13 RX ADMIN — IOHEXOL 2 ML: 300 INJECTION, SOLUTION INTRAVENOUS at 13:18

## 2020-07-13 RX ADMIN — LIDOCAINE HYDROCHLORIDE 2.5 ML: 10 INJECTION, SOLUTION EPIDURAL; INFILTRATION; INTRACAUDAL; PERINEURAL at 13:16

## 2020-07-13 NOTE — H&P
History of Present Illness:  The patient is a 62 y o  female who presents with complaints of bilateral back and leg pain    Patient Active Problem List   Diagnosis    Urge incontinence    Frequency of urination    History of hydronephrosis    Migraine    Fibromyalgia    Pure hypercholesterolemia    Allergy    Low iron    Idiopathic hypersomnia    Osteoporosis    Diverticulosis    Other irritable bowel syndrome    Migraine without aura and without status migrainosus, not intractable    Acute cystitis without hematuria    Calculus of kidney    Urinary tract infection without hematuria    Irritable bowel syndrome    Edema of both legs    Prolonged QT interval    Rash    Screening mammogram, encounter for    Screening for colon cancer    Spondylosis of cervical region without myelopathy or radiculopathy    Neck pain    Encounter for hepatitis C screening test for low risk patient    Encounter for immunization    Xerosis of skin    Spondylosis of lumbar spine    Senile osteoporosis    Irritable bowel syndrome without diarrhea    Hypercholesterolemia    Cervical radiculopathy    Narcolepsy without cataplexy    Microscopic hematuria    Calculus of ureter    Inguinal hernia of right side without obstruction or gangrene    S/P right inguinal hernia repair    Diarrhea    Weight loss    Elevated blood sugar    Pap smear for cervical cancer screening       Past Medical History:   Diagnosis Date    Allergy to dust     Anemia     Calculus of ureter     Chronic pain disorder     neck and back- sees pain management    Colitis     Cracked skin     on fingers    Diverticulosis     Dysphagia     H/O: GI bleed     Hiatal hernia     History of DVT in adulthood     flaquito    Hydronephrosis     Hyperlipidemia     Inguinal hernia     right side    Irritable bowel     Migraines     Narcolepsy     OAB (overactive bladder)     Osteoarthritis     Osteoporosis     Other chronic pain degenerative disc disease    PONV (postoperative nausea and vomiting)     migraines    Tobacco abuse     Wears dentures     full upper    Wears glasses        Past Surgical History:   Procedure Laterality Date    ABDOMINAL ADHESION SURGERY      ARM SKIN LESION BIOPSY / EXCISION      lymoh node excision    BLADDER SURGERY      BREAST SURGERY      CHOLECYSTECTOMY      HYSTERECTOMY  1977    JOINT REPLACEMENT Right     KNEE SURGERY Right     replacement    LAMINECTOMY      LAPAROSCOPY      MOLE REMOVAL      OOPHORECTOMY Bilateral 1977    OTHER SURGICAL HISTORY      enlarged lymph node removed left arm   no cancer    NV REPAIR ING HERNIA,5+Y/O,REDUCIBL Right 6/25/2020    Procedure: REPAIR HERNIA INGUINAL  WITH MESH;  Surgeon: Deborah Ba MD;  Location: [facility];  Service: General    SPINAL FUSION      WISDOM TOOTH EXTRACTION           Current Outpatient Medications:     Armodafinil 250 MG tablet, Take 1 tablet (250 mg total) by mouth daily, Disp: 30 tablet, Rfl: 1    Ascorbic Acid (VITAMIN C PO), Take 500 mg by mouth daily , Disp: , Rfl:     BACILLUS COAGULANS-INULIN PO, Take 1 capsule by mouth daily , Disp: , Rfl:     calcium carbonate 1250 MG capsule, Take 600 mg by mouth daily , Disp: , Rfl:     cetirizine (ZyrTEC) 10 MG chewable tablet, Chew 10 mg daily , Disp: , Rfl:     Cholecalciferol 1000 units tablet, Take 1,000 Units by mouth daily , Disp: , Rfl:     Citicoline Sodium 500 MG TABS, Take 500 mg by mouth daily , Disp: , Rfl:     Coenzyme Q10 200 MG/GM POWD, Take 200 mg by mouth daily , Disp: , Rfl:     denosumab (PROLIA) 60 mg/mL, Inject 60 mg under the skin Every 6 months, Disp: , Rfl:     diclofenac (VOLTAREN) 75 mg EC tablet, Take 75 mg by mouth 3 (three) times a day , Disp: , Rfl:     dicyclomine (BENTYL) 20 mg tablet, Take 1 tablet (20 mg total) by mouth every 6 (six) hours as needed (urgency, abdominal pain), Disp: 120 tablet, Rfl: 2    DULoxetine (CYMBALTA) 30 mg delayed release capsule, Take 3 capsules (90 mg total) by mouth daily (Patient taking differently: Take 90 mg by mouth every evening ), Disp: 90 capsule, Rfl: 1    ferrous sulfate 325 (65 Fe) mg tablet, Take 325 mg by mouth daily with breakfast , Disp: , Rfl:     gabapentin (NEURONTIN) 300 mg capsule, 300 mg at 1200 in addition to 600 mg at bedtime  , Disp: , Rfl:     Ginger, Zingiber officinalis, (GINGER ROOT) 550 MG CAPS, Take 550 mg by mouth 2 (two) times a day , Disp: , Rfl:     Ginkgo Biloba 40 MG TABS, Take 40 mg by mouth daily , Disp: , Rfl:     metaxalone (SKELAXIN) 800 mg tablet, Take 800 mg by mouth 3 (three) times a day , Disp: , Rfl:     Misc Natural Products (GINSENG-COMPLEX PO), Take 2 tablets by mouth daily , Disp: , Rfl:     Multiple Vitamins-Minerals (CENTRUM SILVER PO), Take 1 tablet by mouth daily , Disp: , Rfl:     mupirocin (BACTROBAN) 2 % ointment, Apply topically 3 (three) times a day Apply topically on hands to open cuts to prevent infection (Patient taking differently: Apply 1 application topically as needed Apply topically on hands to open cuts to prevent infection), Disp: 30 g, Rfl: 0    NON FORMULARY, Place 30 mg under the tongue as needed --- tincture prn, Disp: , Rfl:     NON FORMULARY, Take 2 tablets by mouth daily Take Health and Sooth take two capsule daily , Disp: , Rfl:     ondansetron (ZOFRAN) 4 mg tablet, Take 1 tablet (4 mg total) by mouth every 8 (eight) hours as needed for nausea or vomiting for up to 7 days, Disp: 20 tablet, Rfl: 0    ondansetron (ZOFRAN-ODT) 4 mg disintegrating tablet, Take 4 mg by mouth 2 (two) times a day as needed , Disp: , Rfl:     oxybutynin (DITROPAN XL) 15 MG 24 hr tablet, Take 1 tablet (15 mg total) by mouth daily at bedtime, Disp: 90 tablet, Rfl: 3    predniSONE 20 mg tablet, Take 20 mg by mouth as needed , Disp: , Rfl:     rosuvastatin (CRESTOR) 5 mg tablet, Take 1 tablet (5 mg total) by mouth daily, Disp: 30 tablet, Rfl: 1   vitamin E, tocopherol, 400 units capsule, Take 400 Units by mouth daily, Disp: , Rfl:     ZOLMitriptan (ZOMIG) 2 5 MG tablet, By mouth take 1 tablet at onset of migraine  May repeat in 2 hours if needed  Limit 2/24 hours, 4/week, 8/month  (Patient taking differently: Take 2 5 mg by mouth By mouth take 1 tablet at onset of migraine  May repeat in 2 hours if needed  Limit 2/24 hours, 4/week, 8/month ), Disp: 12 tablet, Rfl: 2    Current Facility-Administered Medications:     dexamethasone (PF) (DECADRON) injection 20 mg, 20 mg, Epidural, Once, [place] Levers, DO    iohexol (OMNIPAQUE) 300 mg/mL injection 50 mL, 50 mL, Epidural, Once, [place] Levers, DO    lidocaine (PF) (XYLOCAINE-MPF) 1 % injection 5 mL, 5 mL, Infiltration, Once, [place] Levers, DO    Allergies   Allergen Reactions    Sumatriptan Anaphylaxis     Other reaction(s): SUMATRIPTAN (IMITREX) (Throat closure)  Pt analilia zolmitriptan   Celecoxib Other (See Comments)     Increased Heart Rate, Palpitations    Tramadol Other (See Comments)     GI Upset sever vomiting   Medical Tape Dermatitis, Rash and Other (See Comments)     Adhesive from medication patches and bandaides- ok with paper tape       Physical Exam:   Vitals:    07/13/20 1306   BP: 100/62   Pulse: 75   Resp: 20   Temp: 98 4 °F (36 9 °C)   SpO2: 95%     General: Awake, Alert, Oriented x 3, Mood and affect appropriate  Respiratory: Respirations even and unlabored  Cardiovascular: Peripheral pulses intact; no edema  Musculoskeletal Exam:  Bilateral back and leg pain    ASA Score:  2  Patient/Chart Verification  Patient ID Verified: Verbal  ID Band Applied: No  Consents Confirmed: Procedural, To be obtained in the Pre-Procedure area  H&P( within 30 days) Verified: To be obtained in the Pre-Procedure area  Interval H&P(within 24 hr) Complete (required for Outpatients and Surgery Admit only): To be obtained in the Pre-Procedure area  Allergies Reviewed:  Yes  Anticoag/NSAID held?: No  Currently on antibiotics?: No    Assessment:   1   Lumbar radiculopathy        Plan: RT L5-S1 TFESI

## 2020-07-13 NOTE — DISCHARGE INSTRUCTIONS
Epidural Steroid Injection   WHAT YOU NEED TO KNOW:   An epidural steroid injection (NOEMY) is a procedure to inject steroid medicine into the epidural space  The epidural space is between your spinal cord and vertebrae  Steroids reduce inflammation and fluid buildup in your spine that may be causing pain  You may be given pain medicine along with the steroids  ACTIVITY  · Do not drive or operate machinery today  · No strenuous activity today - bending, lifting, etc   · You may resume normal activites starting tomorrow - start slowly and as tolerated  · You may shower today, but no tub baths or hot tubs  · You may have numbness for several hours from the local anesthetic  Please use caution and common sense, especially with weight-bearing activities  CARE OF THE INJECTION SITE  · If you have soreness or pain, apply ice to the area today (20 minutes on/20 minutes off)  · Starting tomorrow, you may use warm, moist heat or ice if needed  · You may have an increase or change in your discomfort for 36-48 hours after your treatment  · Apply ice and continue with any pain medication you have been prescribed  · Notify the [facility] if you have any of the following: redness, drainage, swelling, headache, stiff neck or fever above 100°F     SPECIAL INSTRUCTIONS  · Our office will contact you in approximately 7 days for a progress report  MEDICATIONS  · Continue to take all routine medications  · Our office may have instructed you to hold some medications  If you have a problem specifically related to your procedure, please call our office at [phone]  Problems not related to your procedure should be directed to your primary care physician

## 2020-07-20 ENCOUNTER — TELEPHONE (OUTPATIENT)
Dept: PAIN MEDICINE | Facility: CLINIC | Age: 60
End: 2020-07-20

## 2020-07-20 ENCOUNTER — HOSPITAL ENCOUNTER (OUTPATIENT)
Dept: CT IMAGING | Facility: HOSPITAL | Age: 60
Discharge: HOME | End: 2020-07-20
Attending: UROLOGY
Payer: COMMERCIAL

## 2020-07-20 DIAGNOSIS — N20.1 CALCULUS OF URETER: ICD-10-CM

## 2020-07-20 PROCEDURE — 74176 CT ABD & PELVIS W/O CONTRAST: CPT

## 2020-07-22 NOTE — TELEPHONE ENCOUNTER
Pt reports 25% improvement post inj   Pain level 8/10 (she said she twisted her back and has more pain today)

## 2020-07-24 ENCOUNTER — TELEPHONE (OUTPATIENT)
Dept: UROLOGY | Facility: CLINIC | Age: 60
End: 2020-07-24

## 2020-07-24 NOTE — TELEPHONE ENCOUNTER
Called and left a detailed message  Explained in message that [address] in ureter is gone, no obstruction  Two small nonobstructing stones in right kidney, in peripheral calices, that I do not recommend she have any procedures for  Patient is to call the office  Office number provided  ----- Message from Jacob Bradley MD sent at 7/23/2020  4:30 PM EDT -----  Tell pt:  [address] in ureter is gone, no obstruction  Two small nonobstructing stones in right kidney, in peripheral calices, that I do not recommend she have any procedures for

## 2020-07-27 DIAGNOSIS — K40.90 NON-RECURRENT UNILATERAL INGUINAL HERNIA WITHOUT OBSTRUCTION OR GANGRENE: ICD-10-CM

## 2020-07-27 PROCEDURE — U0003 INFECTIOUS AGENT DETECTION BY NUCLEIC ACID (DNA OR RNA); SEVERE ACUTE RESPIRATORY SYNDROME CORONAVIRUS 2 (SARS-COV-2) (CORONAVIRUS DISEASE [COVID-19]), AMPLIFIED PROBE TECHNIQUE, MAKING USE OF HIGH THROUGHPUT TECHNOLOGIES AS DESCRIBED BY CMS-2020-01-R: HCPCS

## 2020-07-28 LAB — SARS-COV-2 RNA SPEC QL NAA+PROBE: NOT DETECTED

## 2020-08-05 ENCOUNTER — ANESTHESIA EVENT (OUTPATIENT)
Dept: GASTROENTEROLOGY | Facility: HOSPITAL | Age: 60
End: 2020-08-05

## 2020-08-06 ENCOUNTER — HOSPITAL ENCOUNTER (OUTPATIENT)
Dept: GASTROENTEROLOGY | Facility: HOSPITAL | Age: 60
Setting detail: OUTPATIENT SURGERY
Discharge: HOME | End: 2020-08-06
Attending: INTERNAL MEDICINE
Payer: COMMERCIAL

## 2020-08-06 ENCOUNTER — ANESTHESIA (OUTPATIENT)
Dept: GASTROENTEROLOGY | Facility: HOSPITAL | Age: 60
End: 2020-08-06

## 2020-08-06 ENCOUNTER — TELEPHONE (OUTPATIENT)
Dept: NEUROLOGY | Facility: CLINIC | Age: 60
End: 2020-08-06

## 2020-08-06 VITALS
HEIGHT: 64 IN | RESPIRATION RATE: 18 BRPM | DIASTOLIC BLOOD PRESSURE: 74 MMHG | SYSTOLIC BLOOD PRESSURE: 126 MMHG | OXYGEN SATURATION: 99 % | TEMPERATURE: 97.6 F | WEIGHT: 115 LBS | BODY MASS INDEX: 19.63 KG/M2 | HEART RATE: 60 BPM

## 2020-08-06 DIAGNOSIS — K29.80 DUODENITIS: Primary | ICD-10-CM

## 2020-08-06 DIAGNOSIS — K62.5 RECTAL BLEED: ICD-10-CM

## 2020-08-06 DIAGNOSIS — R19.7 DIARRHEA, UNSPECIFIED TYPE: ICD-10-CM

## 2020-08-06 DIAGNOSIS — R63.4 WEIGHT LOSS: ICD-10-CM

## 2020-08-06 PROBLEM — F17.200 SMOKING: Chronic | Status: ACTIVE | Noted: 2020-08-06

## 2020-08-06 PROBLEM — IMO0001 SMOKING: Chronic | Status: ACTIVE | Noted: 2020-08-06

## 2020-08-06 PROBLEM — G47.419 NARCOLEPSY: Chronic | Status: ACTIVE | Noted: 2020-08-06

## 2020-08-06 PROCEDURE — 45380 COLONOSCOPY AND BIOPSY: CPT | Performed by: INTERNAL MEDICINE

## 2020-08-06 PROCEDURE — 43239 EGD BIOPSY SINGLE/MULTIPLE: CPT | Performed by: INTERNAL MEDICINE

## 2020-08-06 PROCEDURE — 88305 TISSUE EXAM BY PATHOLOGIST: CPT | Performed by: PATHOLOGY

## 2020-08-06 RX ORDER — ONDANSETRON 2 MG/ML
INJECTION INTRAMUSCULAR; INTRAVENOUS AS NEEDED
Status: DISCONTINUED | OUTPATIENT
Start: 2020-08-06 | End: 2020-08-06

## 2020-08-06 RX ORDER — PROPOFOL 10 MG/ML
INJECTION, EMULSION INTRAVENOUS CONTINUOUS PRN
Status: DISCONTINUED | OUTPATIENT
Start: 2020-08-06 | End: 2020-08-06

## 2020-08-06 RX ORDER — OMEPRAZOLE 40 MG/1
40 CAPSULE, DELAYED RELEASE ORAL
Qty: 30 CAPSULE | Refills: 3 | Status: SHIPPED | OUTPATIENT
Start: 2020-08-06 | End: 2020-08-31

## 2020-08-06 RX ORDER — PROPOFOL 10 MG/ML
INJECTION, EMULSION INTRAVENOUS AS NEEDED
Status: DISCONTINUED | OUTPATIENT
Start: 2020-08-06 | End: 2020-08-06

## 2020-08-06 RX ORDER — SODIUM CHLORIDE 9 MG/ML
125 INJECTION, SOLUTION INTRAVENOUS CONTINUOUS
Status: DISCONTINUED | OUTPATIENT
Start: 2020-08-06 | End: 2020-08-10 | Stop reason: HOSPADM

## 2020-08-06 RX ADMIN — PROPOFOL 50 MG: 10 INJECTION, EMULSION INTRAVENOUS at 08:47

## 2020-08-06 RX ADMIN — PROPOFOL 150 MG: 10 INJECTION, EMULSION INTRAVENOUS at 08:41

## 2020-08-06 RX ADMIN — PROPOFOL 50 MG: 10 INJECTION, EMULSION INTRAVENOUS at 08:44

## 2020-08-06 RX ADMIN — PROPOFOL 30 MG: 10 INJECTION, EMULSION INTRAVENOUS at 08:58

## 2020-08-06 RX ADMIN — SODIUM CHLORIDE 125 ML/HR: 0.9 INJECTION, SOLUTION INTRAVENOUS at 08:32

## 2020-08-06 RX ADMIN — PROPOFOL 30 MG: 10 INJECTION, EMULSION INTRAVENOUS at 09:04

## 2020-08-06 RX ADMIN — PROPOFOL 30 MG: 10 INJECTION, EMULSION INTRAVENOUS at 09:06

## 2020-08-06 RX ADMIN — ONDANSETRON 4 MG: 2 INJECTION INTRAMUSCULAR; INTRAVENOUS at 08:49

## 2020-08-06 RX ADMIN — PROPOFOL 50 MG: 10 INJECTION, EMULSION INTRAVENOUS at 08:49

## 2020-08-06 RX ADMIN — PROPOFOL 30 MG: 10 INJECTION, EMULSION INTRAVENOUS at 09:01

## 2020-08-06 RX ADMIN — PROPOFOL 150 MCG/KG/MIN: 10 INJECTION, EMULSION INTRAVENOUS at 08:49

## 2020-08-06 NOTE — H&P
History and Physical - [facility]  Constantin Saini 62 y o  female MRN: [id number]                  HPI: Constantin Saini is a 62y o  year old female who presents for weight loss, diarrhea, dysphagia, nausea  REVIEW OF SYSTEMS: Per the HPI, and otherwise unremarkable  Historical Information   Past Medical History:   Diagnosis Date    Allergy to dust     Anemia     Calculus of ureter     Chronic pain disorder     neck and back- sees pain management    Colitis     Cracked skin     on fingers    Diverticulosis     Dysphagia     H/O: GI bleed     Hiatal hernia     History of DVT in adulthood     flaquito    Hydronephrosis     Hyperlipidemia     Inguinal hernia     right side    Irritable bowel     Migraines     Narcolepsy     OAB (overactive bladder)     Osteoarthritis     Osteoporosis     Other chronic pain     degenerative disc disease    PONV (postoperative nausea and vomiting)     migraines    Tobacco abuse     Wears dentures     full upper    Wears glasses      Past Surgical History:   Procedure Laterality Date    ABDOMINAL ADHESION SURGERY      ARM SKIN LESION BIOPSY / EXCISION      lymoh node excision    BLADDER SURGERY      BREAST SURGERY      CHOLECYSTECTOMY      HYSTERECTOMY  1977    JOINT REPLACEMENT Right     KNEE SURGERY Right     replacement    LAMINECTOMY      LAPAROSCOPY      MOLE REMOVAL      OOPHORECTOMY Bilateral 1977    OTHER SURGICAL HISTORY      enlarged lymph node removed left arm   no cancer    MA REPAIR ING HERNIA,5+Y/O,REDUCIBL Right 6/25/2020    Procedure: REPAIR HERNIA INGUINAL  WITH MESH;  Surgeon: Naty Lee MD;  Location: [facility];  Service: General    SPINAL FUSION      WISDOM TOOTH EXTRACTION       Social History   Social History     Substance and Sexual Activity   Alcohol Use Yes    Frequency: 2-4 times a month    Drinks per session: 1 or 2    Binge frequency: Never    Comment: beer     Social History     Substance and Sexual Activity   Drug Use Yes    Types: Marijuana    Comment: medicinal marijuana     Social History     Tobacco Use   Smoking Status Current Every Day Smoker    Packs/day: 0 50    Years: 46 00    Pack years: 23 00    Types: Cigarettes   Smokeless Tobacco Never Used     Family History   Problem Relation Age of Onset    Hypertension Father     Cancer Father     Diabetes Mother     Hypertension Mother     Stroke Brother     Nephrolithiasis Brother     Diabetes Sister     No Known Problems Daughter     No Known Problems Maternal Grandmother     No Known Problems Maternal Grandfather     No Known Problems Paternal Grandmother     No Known Problems Paternal Grandfather     No Known Problems Sister     No Known Problems Maternal Aunt     No Known Problems Maternal Aunt     No Known Problems Maternal Aunt     No Known Problems Paternal Aunt     No Known Problems Paternal Aunt     No Known Problems Paternal Aunt        Meds/Allergies     (Not in a hospital admission)      Allergies   Allergen Reactions    Sumatriptan Anaphylaxis     Other reaction(s): SUMATRIPTAN (IMITREX) (Throat closure)  Pt analilia zolmitriptan   Celecoxib Other (See Comments)     Increased Heart Rate, Palpitations    Tramadol Other (See Comments)     GI Upset sever vomiting   Medical Tape Dermatitis, Rash and Other (See Comments)     Adhesive from medication patches and bandaides- ok with paper tape       Objective     not currently breastfeeding  PHYSICAL EXAMINATION:    General Appearance:   Alert, cooperative, no distress   HEENT:  Normocephalic, atraumatic, anicteric  Neck supple, symmetrical, trachea midline  Lungs:   Equal chest rise and unlabored breathing, normal effort, no coughing  Cardiovascular:   No visualized JVD  Abdomen:   No abdominal distension  Skin:   No jaundice, rashes, or lesions  Musculoskeletal:   Normal range of motion visualized  Psych:  Normal affect and normal insight     Neuro: Alert and appropriate  ASSESSMENT/PLAN:  This is a 62y o  year old female here for EGD and colonoscopy, and she is stable and optimized for her procedure

## 2020-08-06 NOTE — DISCHARGE INSTRUCTIONS
Upper Endoscopy   WHAT YOU NEED TO KNOW:   An upper endoscopy is also called an upper gastrointestinal (GI) endoscopy, or an esophagogastroduodenoscopy (EGD)  You may feel bloated, gassy, or have some abdominal discomfort after your procedure  Your throat may be sore for 24 to 36 hours  You may burp or pass gas from air that is still inside your body  DISCHARGE INSTRUCTIONS:   Call 911 if:   · You have sudden chest pain or trouble breathing  Seek care immediately if:   · You feel dizzy or faint  · You have trouble swallowing  · You have severe throat pain  · Your bowel movements are very dark or black  · Your abdomen is hard and firm and you have severe pain  · You vomit blood  Contact your healthcare provider if:   · You feel full or bloated and cannot burp or pass gas  · You have not had a bowel movement for 3 days after your procedure  · You have neck pain  · You have a fever or chills  · You have nausea or are vomiting  · You have a rash or hives  · You have questions or concerns about your endoscopy  Relieve a sore throat:  Suck on throat lozenges or crushed ice  Gargle with a small amount of warm salt water  Mix 1 teaspoon of salt and 1 cup of warm water to make salt water  Relieve gas and discomfort from bloating:  Lie on your right side with a heating pad on your abdomen  Take short walks to help pass gas  Eat small meals until bloating is relieved  Rest after your procedure:  Do not drive or make important decisions until the day after your procedure  Return to your normal activity as directed  You can usually return to work the day after your procedure  Follow up with your healthcare provider as directed:  Write down your questions so you remember to ask them during your visits  © 2017 [employer] Information is for End User's use only and may not be sold, redistributed or otherwise used for commercial purposes   All illustrations and images included in CareNotes® are the copyrighted property of [employer]  or Bryon Holbrook  The above information is an  only  It is not intended as medical advice for individual conditions or treatments  Talk to your doctor, nurse or pharmacist before following any medical regimen to see if it is safe and effective for you  Colonoscopy   WHAT YOU NEED TO KNOW:   A colonoscopy is a procedure to examine the inside of your colon (intestine) with a scope  Polyps or tissue growths may have been removed during your colonoscopy  It is normal to feel bloated and to have some abdominal discomfort  You should be passing gas  If you have hemorrhoids or you had polyps removed, you may have a small amount of bleeding  DISCHARGE INSTRUCTIONS:   Seek care immediately if:   · You have a large amount of bright red blood in your bowel movements  · Your abdomen is hard and firm and you have severe pain  · You have sudden trouble breathing  Contact your healthcare provider if:   · You develop a rash or hives  · You have a fever within 24 hours of your procedure       · You have not had a bowel movement for 3 days after your procedure  · You have questions or concerns about your condition or care  Activity:   · Do not lift, strain, or run  for 3 days after your procedure  · Rest after your procedure  You have been given medicine to relax you  Do not  drive or make important decisions until the day after your procedure  Return to your normal activity as directed  · Relieve gas and discomfort from bloating  by lying on your right side with a heating pad on your abdomen  You may need to take short walks to help the gas move out  Eat small meals until bloating is relieved  If you had polyps removed: For 7 days after your procedure:  · Do not  take aspirin  · Do not  go on long car rides    Follow up with your healthcare provider as directed:  Write down your questions so you remember to ask them during your visits  © 2017 [address] [employer] is for End User's use only and may not be sold, redistributed or otherwise used for commercial purposes  All illustrations and images included in CareNotes® are the copyrighted property of [employer]  or Bryon Holbrook  The above information is an  only  It is not intended as medical advice for individual conditions or treatments  Talk to your doctor, nurse or pharmacist before following any medical regimen to see if it is safe and effective for you

## 2020-08-06 NOTE — ANESTHESIA PREPROCEDURE EVALUATION
Procedure:  COLONOSCOPY  EGD    Relevant Problems   CARDIO   (+) Hypercholesterolemia   (+) Pure hypercholesterolemia      /RENAL   (+) Calculus of kidney      MUSCULOSKELETAL   (+) Spondylosis of cervical region without myelopathy or radiculopathy   (+) Spondylosis of lumbar spine      NEURO/PSYCH   (+) Fibromyalgia   (+) History of hydronephrosis      PULMONARY   (+) Smoking        Physical Exam    Airway    Mallampati score: II  TM Distance: >3 FB  Neck ROM: full     Dental   No notable dental hx     Cardiovascular  Rhythm: regular, Rate: normal,     Pulmonary  Pulmonary exam normal Breath sounds clear to auscultation,     Other Findings        Anesthesia Plan  ASA Score- 2     Anesthesia Type- IV sedation with anesthesia and general with ASA Monitors  Additional Monitors:   Airway Plan:           Plan Factors-    Chart reviewed  Patient summary reviewed  Induction-     Postoperative Plan-     Informed Consent- Anesthetic plan and risks discussed with patient  I personally reviewed this patient with the CRNA  Discussed and agreed on the Anesthesia Plan with the CRNA  Naman Veronica

## 2020-08-10 ENCOUNTER — OFFICE VISIT (OUTPATIENT)
Dept: PAIN MEDICINE | Facility: MEDICAL CENTER | Age: 60
End: 2020-08-10
Payer: COMMERCIAL

## 2020-08-10 ENCOUNTER — TELEPHONE (OUTPATIENT)
Dept: NEUROLOGY | Facility: CLINIC | Age: 60
End: 2020-08-10

## 2020-08-10 VITALS
BODY MASS INDEX: 19.63 KG/M2 | TEMPERATURE: 97.7 F | HEART RATE: 58 BPM | DIASTOLIC BLOOD PRESSURE: 69 MMHG | HEIGHT: 64 IN | WEIGHT: 115 LBS | SYSTOLIC BLOOD PRESSURE: 129 MMHG

## 2020-08-10 DIAGNOSIS — M54.16 LUMBAR RADICULOPATHY: Primary | ICD-10-CM

## 2020-08-10 PROCEDURE — 4004F PT TOBACCO SCREEN RCVD TLK: CPT | Performed by: NURSE PRACTITIONER

## 2020-08-10 PROCEDURE — 3008F BODY MASS INDEX DOCD: CPT | Performed by: NURSE PRACTITIONER

## 2020-08-10 PROCEDURE — 99214 OFFICE O/P EST MOD 30 MIN: CPT | Performed by: NURSE PRACTITIONER

## 2020-08-10 NOTE — TELEPHONE ENCOUNTER
Message left for patient to move her appointment on 8/13/20 from 750a to 230p as provider has a meeting this morning

## 2020-08-10 NOTE — PROGRESS NOTES
Assessment  1  Lumbar radiculopathy        Plan  At this time we will schedule the patient for repeat bilateral L5-S1 transforaminal epidural steroid injections since she did get about 25% relief that lasted 1-2 weeks  Hopefully with a 2nd injection she will get longer lasting relief  Follow up after procedure        Complete risks and benefits including bleeding, infection, tissue reaction, nerve injury and allergic reaction were discussed  The approach was demonstrated using models and literature was provided  Verbal and written consent was obtained  My impressions and treatment recommendations were discussed in detail with the patient who verbalized understanding and had no further questions  Discharge instructions were provided  I personally saw and examined the patient and I agree with the above discussed plan of care  Orders Placed This Encounter   Procedures    FL spine and pain procedure     Standing Status:   Future     Standing Expiration Date:   8/10/2024     Order Specific Question:   Reason for Exam:     Answer:   repeat B/L L5- S1 TFESI     Order Specific Question:   Anticoagulant hold needed? Answer:   uses NSAIDS     No orders of the defined types were placed in this encounter  History of Present Illness    Olimpia Washington is a 62 y o  female presents for follow-up related to her chronic low back and bilateral leg pain  Today she rates pain 6/10 this is constant and described as dull, aching, throbbing, numbness, and pins and needles  Patient is status post a bilateral L5-S1 transforaminal epidural steroid injection with Dr Kyle Ramírez on July 13, 2020 she reports 25% relief that lasted 1-2 weeks  Patient does use medical marijuana  Her duloxetine and gabapentin are refill that Rheumatology  I have personally reviewed and/or updated the patient's past medical history, past surgical history, family history, social history, current medications, allergies, and vital signs today  Review of Systems   Respiratory: Negative for shortness of breath  Cardiovascular: Negative for chest pain  Gastrointestinal: Positive for diarrhea and nausea  Negative for constipation and vomiting  Musculoskeletal: Positive for gait problem, neck pain and neck stiffness  Negative for arthralgias, joint swelling and myalgias  Skin: Negative for rash  Neurological: Negative for dizziness, seizures and weakness  All other systems reviewed and are negative        Patient Active Problem List   Diagnosis    Urge incontinence    Frequency of urination    History of hydronephrosis    Migraine    Fibromyalgia    Pure hypercholesterolemia    Allergy    Low iron    Idiopathic hypersomnia    Osteoporosis    Diverticulosis    Other irritable bowel syndrome    Migraine without aura and without status migrainosus, not intractable    Acute cystitis without hematuria    Calculus of kidney    Urinary tract infection without hematuria    Irritable bowel syndrome    Edema of both legs    Prolonged QT interval    Rash    Screening mammogram, encounter for    Screening for colon cancer    Spondylosis of cervical region without myelopathy or radiculopathy    Neck pain    Encounter for hepatitis C screening test for low risk patient    Encounter for immunization    Xerosis of skin    Spondylosis of lumbar spine    Senile osteoporosis    Irritable bowel syndrome without diarrhea    Hypercholesterolemia    Cervical radiculopathy    Narcolepsy without cataplexy    Microscopic hematuria    Calculus of ureter    Inguinal hernia of right side without obstruction or gangrene    S/P right inguinal hernia repair    Diarrhea    Weight loss    Elevated blood sugar    Pap smear for cervical cancer screening    Lumbar radiculopathy    Smoking    Narcolepsy       Past Medical History:   Diagnosis Date    Allergy to dust     Anemia     Calculus of ureter     Chronic pain disorder     neck and back- sees pain management    Colitis     Cracked skin     on fingers    Diverticulosis     Dysphagia     H/O: GI bleed     Hiatal hernia     History of DVT in adulthood     flaquito    Hydronephrosis     Hyperlipidemia     Inguinal hernia     right side    Irritable bowel     Migraines     Narcolepsy     Narcolepsy 8/6/2020    OAB (overactive bladder)     Osteoarthritis     Osteoporosis     Other chronic pain     degenerative disc disease    PONV (postoperative nausea and vomiting)     migraines    Smoking 8/6/2020    Tobacco abuse     Wears dentures     full upper    Wears glasses        Past Surgical History:   Procedure Laterality Date    ABDOMINAL ADHESION SURGERY      ARM SKIN LESION BIOPSY / EXCISION      lymoh node excision    BLADDER SURGERY      BREAST SURGERY      CHOLECYSTECTOMY      HYSTERECTOMY  1977    JOINT REPLACEMENT Right     KNEE SURGERY Right     replacement    LAMINECTOMY      LAPAROSCOPY      MOLE REMOVAL      OOPHORECTOMY Bilateral 1977    OTHER SURGICAL HISTORY      enlarged lymph node removed left arm   no cancer    MI REPAIR ING HERNIA,5+Y/O,REDUCIBL Right 6/25/2020    Procedure: REPAIR HERNIA INGUINAL  WITH MESH;  Surgeon: Oskar Del Rosario MD;  Location: [facility];  Service: General    SPINAL FUSION      WISDOM TOOTH EXTRACTION         Family History   Problem Relation Age of Onset    Hypertension Father     Cancer Father     Diabetes Mother     Hypertension Mother     Stroke Brother     Nephrolithiasis Brother     Diabetes Sister     No Known Problems Daughter     No Known Problems Maternal Grandmother     No Known Problems Maternal Grandfather     No Known Problems Paternal Grandmother     No Known Problems Paternal Grandfather     No Known Problems Sister     No Known Problems Maternal Aunt     No Known Problems Maternal Aunt     No Known Problems Maternal Aunt     No Known Problems Paternal Aunt     No Known Problems Paternal Aunt     No Known Problems Paternal Aunt        Social History     Occupational History    Not on file   Tobacco Use    Smoking status: Current Every Day Smoker     Packs/day: 0 50     Years: 46 00     Pack years: 23 00     Types: Cigarettes    Smokeless tobacco: Never Used   Substance and Sexual Activity    Alcohol use: Yes     Frequency: 2-4 times a month     Drinks per session: 1 or 2     Binge frequency: Never     Comment: beer    Drug use: Yes     Types: Marijuana     Comment: medicinal marijuana    Sexual activity: Not Currently       Current Outpatient Medications on File Prior to Visit   Medication Sig    Armodafinil 250 MG tablet Take 1 tablet (250 mg total) by mouth daily    Ascorbic Acid (VITAMIN C PO) Take 500 mg by mouth daily     BACILLUS COAGULANS-INULIN PO Take 1 capsule by mouth daily     calcium carbonate 1250 MG capsule Take 600 mg by mouth daily     cetirizine (ZyrTEC) 10 MG chewable tablet Chew 10 mg daily     Cholecalciferol 1000 units tablet Take 1,000 Units by mouth daily     Citicoline Sodium 500 MG TABS Take 500 mg by mouth daily     Coenzyme Q10 200 MG/GM POWD Take 200 mg by mouth daily     denosumab (PROLIA) 60 mg/mL Inject 60 mg under the skin Every 6 months    diclofenac (VOLTAREN) 75 mg EC tablet Take 75 mg by mouth 3 (three) times a day     ferrous sulfate 325 (65 Fe) mg tablet Take 325 mg by mouth daily with breakfast     gabapentin (NEURONTIN) 300 mg capsule 300 mg at 1200 in addition to 600 mg at bedtime      Lori, Zingiber officinalis, (LORI ROOT) 550 MG CAPS Take 550 mg by mouth 2 (two) times a day     Ginkgo Biloba 40 MG TABS Take 40 mg by mouth daily     Misc Natural Products (GINSENG-COMPLEX PO) Take 2 tablets by mouth daily     Multiple Vitamins-Minerals (CENTRUM SILVER PO) Take 1 tablet by mouth daily     mupirocin (BACTROBAN) 2 % ointment Apply topically 3 (three) times a day Apply topically on hands to open cuts to prevent infection (Patient taking differently: Apply 1 application topically as needed Apply topically on hands to open cuts to prevent infection)    NON FORMULARY Place 30 mg under the tongue as needed --- tincture prn    NON FORMULARY Take 2 tablets by mouth daily Take Health and Sooth take two capsule daily     omeprazole (PriLOSEC) 40 MG capsule Take 1 capsule (40 mg total) by mouth daily before breakfast    ondansetron (ZOFRAN-ODT) 4 mg disintegrating tablet Take 4 mg by mouth 2 (two) times a day as needed     oxybutynin (DITROPAN XL) 15 MG 24 hr tablet Take 1 tablet (15 mg total) by mouth daily at bedtime    predniSONE 20 mg tablet Take 20 mg by mouth as needed     rosuvastatin (CRESTOR) 5 mg tablet Take 1 tablet (5 mg total) by mouth daily    vitamin E, tocopherol, 400 units capsule Take 400 Units by mouth daily    ZOLMitriptan (ZOMIG) 2 5 MG tablet By mouth take 1 tablet at onset of migraine  May repeat in 2 hours if needed  Limit 2/24 hours, 4/week, 8/month  (Patient taking differently: Take 2 5 mg by mouth By mouth take 1 tablet at onset of migraine  May repeat in 2 hours if needed    Limit 2/24 hours, 4/week, 8/month )    dicyclomine (BENTYL) 20 mg tablet Take 1 tablet (20 mg total) by mouth every 6 (six) hours as needed (urgency, abdominal pain) (Patient not taking: Reported on 8/10/2020)    DULoxetine (CYMBALTA) 30 mg delayed release capsule Take 3 capsules (90 mg total) by mouth daily (Patient taking differently: Take 90 mg by mouth every evening )    metaxalone (SKELAXIN) 800 mg tablet Take 800 mg by mouth 3 (three) times a day     ondansetron (ZOFRAN) 4 mg tablet Take 1 tablet (4 mg total) by mouth every 8 (eight) hours as needed for nausea or vomiting for up to 7 days     Current Facility-Administered Medications on File Prior to Visit   Medication    [DISCONTINUED] sodium chloride 0 9 % infusion       Allergies   Allergen Reactions    Sumatriptan Anaphylaxis     Other reaction(s): SUMATRIPTAN (IMITREX) (Throat closure)  Pt analilia zolmitriptan   Celecoxib Other (See Comments)     Increased Heart Rate, Palpitations    Tramadol Other (See Comments)     GI Upset sever vomiting   Medical Tape Dermatitis, Rash and Other (See Comments)     Adhesive from medication patches and bandaides- ok with paper tape       Physical Exam    /69   Pulse 58   Temp 97 7 °F (36 5 °C)   Ht 5' 4" (1 626 m)   Wt 52 2 kg (115 lb)   BMI 19 74 kg/m²     Constitutional: normal, well developed, well nourished, alert, in no distress and non-toxic and no overt pain behavior    Eyes: anicteric  HEENT: grossly intact  Neck: supple, symmetric, trachea midline and no masses   Pulmonary:even and unlabored  Cardiovascular:No edema or pitting edema present  Skin:Normal without rashes or lesions and well hydrated  Psychiatric:Mood and affect appropriate  Neurologic:Cranial Nerves II-XII grossly intact  Musculoskeletal:normal    Imaging

## 2020-08-11 ENCOUNTER — TELEPHONE (OUTPATIENT)
Dept: NEUROLOGY | Facility: CLINIC | Age: 60
End: 2020-08-11

## 2020-08-13 ENCOUNTER — OFFICE VISIT (OUTPATIENT)
Dept: NEUROLOGY | Facility: CLINIC | Age: 60
End: 2020-08-13
Payer: COMMERCIAL

## 2020-08-13 VITALS
WEIGHT: 116 LBS | TEMPERATURE: 98.4 F | DIASTOLIC BLOOD PRESSURE: 60 MMHG | HEIGHT: 64 IN | BODY MASS INDEX: 19.81 KG/M2 | HEART RATE: 78 BPM | SYSTOLIC BLOOD PRESSURE: 109 MMHG | RESPIRATION RATE: 18 BRPM

## 2020-08-13 DIAGNOSIS — G43.009 MIGRAINE WITHOUT AURA AND WITHOUT STATUS MIGRAINOSUS, NOT INTRACTABLE: Primary | ICD-10-CM

## 2020-08-13 PROCEDURE — 99212 OFFICE O/P EST SF 10 MIN: CPT | Performed by: PSYCHIATRY & NEUROLOGY

## 2020-08-13 PROCEDURE — 3008F BODY MASS INDEX DOCD: CPT | Performed by: PSYCHIATRY & NEUROLOGY

## 2020-08-13 PROCEDURE — 4004F PT TOBACCO SCREEN RCVD TLK: CPT | Performed by: PSYCHIATRY & NEUROLOGY

## 2020-08-13 NOTE — LETTER
August 13, 2020     Shannon Lemitar, [address] [place] [address]    Patient: Breana Burt   YOB: 1960   Date of Visit: 8/13/2020       Dear Dr Beth Benjamin: Thank you for referring Breana Burt to me for evaluation  Below are my notes for this consultation  If you have questions, please do not hesitate to call me  I look forward to following your patient along with you  Sincerely,        Nanci Garner MD        CC: No Recipients  Nanci Garner MD  8/13/2020  2:54 PM  Sign when Signing Visit  Patient ID: Breana Burt is a 62 y o  female  Assessment/Plan:    Migraine without aura and without status migrainosus, not intractable  Continues to do very well with only 1-2 migraines on a monthly basis  Fortunately those respond very nicely to her zolmitriptan  Her current headache frequency does not speak to the appropriateness of considering at this point in time a daily preventative medication  Patient remains pleased with a symptomatic approach  --continues on a triptan 2 5 mg tablets taking 1 for headache, may repeat in 2 hours if needed, limit 2/24 hours  --she will contact the the office should she have any escalating headache issues prior to her next scheduled follow-up  She will follow up in 1 year or as needed  Subjective:    HPI  Patient, 62years of age, returns to reassess the status of her migraine without aura  In the year since last seen she has done very well  Once again she experiences on average of 1 and occasionally 2 migraines on a monthly basis  Fortunately they usually respond very briskly to an initial dose of zolmitriptan  She will occasionally have an additional migraine in the aftermath of an anesthetic event, etc   At this point in time she is very pleased with the symptomatic approach      Blood work reviewed:  -CBC with hemoglobin 12 6, hematocrit 39 0, white count 8 05 and platelet count 114   -CMP with creatinine 0 94, AST 19 and ALT 28  Past Medical History:   Diagnosis Date    Allergy to dust     Anemia     Calculus of ureter     Chronic pain disorder     neck and back- sees pain management    Colitis     Cracked skin     on fingers    Diverticulosis     Dysphagia     H/O: GI bleed     Hiatal hernia     History of DVT in adulthood     flaquito    Hydronephrosis     Hyperlipidemia     Inguinal hernia     right side    Irritable bowel     Migraines     Narcolepsy     Narcolepsy 8/6/2020    OAB (overactive bladder)     Osteoarthritis     Osteoporosis     Other chronic pain     degenerative disc disease    PONV (postoperative nausea and vomiting)     migraines    Smoking 8/6/2020    Tobacco abuse     Wears dentures     full upper    Wears glasses      Past Surgical History:   Procedure Laterality Date    ABDOMINAL ADHESION SURGERY      ARM SKIN LESION BIOPSY / EXCISION      lymoh node excision    BLADDER SURGERY      BREAST SURGERY      CHOLECYSTECTOMY      HYSTERECTOMY  1977    JOINT REPLACEMENT Right     KNEE SURGERY Right     replacement    LAMINECTOMY      LAPAROSCOPY      MOLE REMOVAL      OOPHORECTOMY Bilateral 1977    OTHER SURGICAL HISTORY      enlarged lymph node removed left arm   no cancer    IL REPAIR ING HERNIA,5+Y/O,REDUCIBL Right 6/25/2020    Procedure: REPAIR HERNIA INGUINAL  WITH MESH;  Surgeon: Rashard Marquez MD;  Location: [facility];  Service: General    SPINAL FUSION      WISDOM TOOTH EXTRACTION       Social History     Socioeconomic History    Marital status: /Civil Union     Spouse name: None    Number of children: None    Years of education: None    Highest education level: None   Occupational History    None   Social Needs    Financial resource strain: None    Food insecurity     Worry: None     Inability: None    Transportation needs     Medical: None     Non-medical: None   Tobacco Use    Smoking status: Current Every Day Smoker     Packs/day: 0 50 Years: 46 00     Pack years: 23 00     Types: Cigarettes    Smokeless tobacco: Never Used   Substance and Sexual Activity    Alcohol use: Yes     Frequency: 2-4 times a month     Drinks per session: 1 or 2     Binge frequency: Never     Comment: beer    Drug use: Yes     Types: Marijuana     Comment: medicinal marijuana    Sexual activity: Not Currently   Lifestyle    Physical activity     Days per week: None     Minutes per session: None    Stress: None   Relationships    Social connections     Talks on phone: None     Gets together: None     Attends Jew service: None     Active member of club or organization: None     Attends meetings of clubs or organizations: None     Relationship status: None    Intimate partner violence     Fear of current or ex partner: None     Emotionally abused: None     Physically abused: None     Forced sexual activity: None   Other Topics Concern    None   Social History Narrative    None     Family History   Problem Relation Age of Onset    Hypertension Father     Cancer Father     Diabetes Mother     Hypertension Mother     Stroke Brother     Nephrolithiasis Brother     Diabetes Sister     No Known Problems Daughter     No Known Problems Maternal Grandmother     No Known Problems Maternal Grandfather     No Known Problems Paternal Grandmother     No Known Problems Paternal Grandfather     No Known Problems Sister     No Known Problems Maternal Aunt     No Known Problems Maternal Aunt     No Known Problems Maternal Aunt     No Known Problems Paternal Aunt     No Known Problems Paternal Aunt     No Known Problems Paternal Aunt      Allergies   Allergen Reactions    Sumatriptan Anaphylaxis     Other reaction(s): SUMATRIPTAN (IMITREX) (Throat closure)  Pt analilia zolmitriptan   Celecoxib Other (See Comments)     Increased Heart Rate, Palpitations    Tramadol Other (See Comments)     GI Upset sever vomiting      Medical Tape Dermatitis, Rash and Other (See Comments)     Adhesive from medication patches and bandaides- ok with paper tape       Current Outpatient Medications:     Armodafinil 250 MG tablet, Take 1 tablet (250 mg total) by mouth daily, Disp: 30 tablet, Rfl: 1    Ascorbic Acid (VITAMIN C PO), Take 500 mg by mouth daily , Disp: , Rfl:     BACILLUS COAGULANS-INULIN PO, Take 1 capsule by mouth daily , Disp: , Rfl:     calcium carbonate 1250 MG capsule, Take 600 mg by mouth daily , Disp: , Rfl:     cetirizine (ZyrTEC) 10 MG chewable tablet, Chew 10 mg daily , Disp: , Rfl:     Cholecalciferol 1000 units tablet, Take 1,000 Units by mouth daily , Disp: , Rfl:     Citicoline Sodium 500 MG TABS, Take 500 mg by mouth daily , Disp: , Rfl:     Coenzyme Q10 200 MG/GM POWD, Take 200 mg by mouth daily , Disp: , Rfl:     denosumab (PROLIA) 60 mg/mL, Inject 60 mg under the skin Every 6 months, Disp: , Rfl:     diclofenac (VOLTAREN) 75 mg EC tablet, Take 75 mg by mouth 3 (three) times a day , Disp: , Rfl:     dicyclomine (BENTYL) 20 mg tablet, Take 1 tablet (20 mg total) by mouth every 6 (six) hours as needed (urgency, abdominal pain), Disp: 120 tablet, Rfl: 2    ferrous sulfate 325 (65 Fe) mg tablet, Take 325 mg by mouth daily with breakfast , Disp: , Rfl:     gabapentin (NEURONTIN) 300 mg capsule, 300 mg at 1200 in addition to 600 mg at bedtime  , Disp: , Rfl:     Ginger, Zingiber officinalis, (GINGER ROOT) 550 MG CAPS, Take 550 mg by mouth 2 (two) times a day , Disp: , Rfl:     Ginkgo Biloba 40 MG TABS, Take 40 mg by mouth daily , Disp: , Rfl:     Misc Natural Products (GINSENG-COMPLEX PO), Take 2 tablets by mouth daily , Disp: , Rfl:     Multiple Vitamins-Minerals (CENTRUM SILVER PO), Take 1 tablet by mouth daily , Disp: , Rfl:     mupirocin (BACTROBAN) 2 % ointment, Apply topically 3 (three) times a day Apply topically on hands to open cuts to prevent infection (Patient taking differently: Apply 1 application topically as needed Apply topically on hands to open cuts to prevent infection), Disp: 30 g, Rfl: 0    NON FORMULARY, Place 30 mg under the tongue as needed --- tincture prn, Disp: , Rfl:     NON FORMULARY, Take 2 tablets by mouth daily Take Health and Sooth take two capsule daily , Disp: , Rfl:     omeprazole (PriLOSEC) 40 MG capsule, Take 1 capsule (40 mg total) by mouth daily before breakfast, Disp: 30 capsule, Rfl: 3    ondansetron (ZOFRAN-ODT) 4 mg disintegrating tablet, Take 4 mg by mouth 2 (two) times a day as needed , Disp: , Rfl:     oxybutynin (DITROPAN XL) 15 MG 24 hr tablet, Take 1 tablet (15 mg total) by mouth daily at bedtime, Disp: 90 tablet, Rfl: 3    predniSONE 20 mg tablet, Take 20 mg by mouth as needed , Disp: , Rfl:     rosuvastatin (CRESTOR) 5 mg tablet, Take 1 tablet (5 mg total) by mouth daily, Disp: 30 tablet, Rfl: 1    vitamin E, tocopherol, 400 units capsule, Take 400 Units by mouth daily, Disp: , Rfl:     ZOLMitriptan (ZOMIG) 2 5 MG tablet, By mouth take 1 tablet at onset of migraine  May repeat in 2 hours if needed  Limit 2/24 hours, 4/week, 8/month  (Patient taking differently: Take 2 5 mg by mouth By mouth take 1 tablet at onset of migraine  May repeat in 2 hours if needed  Limit 2/24 hours, 4/week, 8/month ), Disp: 12 tablet, Rfl: 2    DULoxetine (CYMBALTA) 30 mg delayed release capsule, Take 3 capsules (90 mg total) by mouth daily (Patient taking differently: Take 90 mg by mouth every evening ), Disp: 90 capsule, Rfl: 1    metaxalone (SKELAXIN) 800 mg tablet, Take 800 mg by mouth 3 (three) times a day , Disp: , Rfl:     ondansetron (ZOFRAN) 4 mg tablet, Take 1 tablet (4 mg total) by mouth every 8 (eight) hours as needed for nausea or vomiting for up to 7 days (Patient not taking: Reported on 8/13/2020), Disp: 20 tablet, Rfl: 0    Objective:    Blood pressure 109/60, pulse 78, temperature 98 4 °F (36 9 °C), resp   rate 18, height 5' 4" (1 626 m), weight 52 6 kg (116 lb), not currently breastfeeding  Physical Exam  Head normocephalic  Eyes nonicteric  No audible anterior neck bruits  Lungs clear to auscultation  Rhythm regular  GI (abdomen) soft nontender with bowel sounds present  No lower extremity edema  Neurological Exam  Alert  Fully oriented and appropriately conversational   No dysarthria  Unremarkable spontaneous gait  Romberg maneuver performed unremarkably  Cranial nerves 2-12 tested and grossly intact  Accurate finger-to-nose bilaterally  Good symmetrical strength throughout the 4 extremities with no upper extremity drift  Sensory testing grossly intact to pin throughout  Muscle stretch reflexes bilaterally 2 throughout the upper extremities, at the knees and at the ankles  ROS:    Review of Systems   Constitutional: Negative  Negative for appetite change and fever  HENT: Negative  Negative for hearing loss, tinnitus, trouble swallowing and voice change  Eyes: Negative  Negative for photophobia and pain  Respiratory: Negative  Negative for shortness of breath  Cardiovascular: Negative  Negative for palpitations  Gastrointestinal: Positive for diarrhea  Negative for nausea and vomiting  IBS   Endocrine: Negative  Negative for cold intolerance  Genitourinary: Positive for frequency and urgency  Negative for dysuria  Musculoskeletal: Positive for back pain (lumbar pain ), neck pain and neck stiffness  Negative for myalgias  Balance issues   Skin: Negative  Negative for rash  Allergic/Immunologic: Negative  Neurological: Positive for dizziness, light-headedness, numbness (hands and feet) and headaches  Negative for tremors, seizures, syncope, facial asymmetry, speech difficulty and weakness  Hematological: Negative  Does not bruise/bleed easily  Psychiatric/Behavioral: Negative  Negative for confusion, hallucinations and sleep disturbance          Memory issues       I personally reviewed the ROS as entered by the medical assistant  *Please note this document was created using voice recognition software and may contain sound-alike word errors  *

## 2020-08-13 NOTE — ASSESSMENT & PLAN NOTE
Continues to do very well with only 1-2 migraines on a monthly basis  Fortunately those respond very nicely to her zolmitriptan  Her current headache frequency does not speak to the appropriateness of considering at this point in time a daily preventative medication  Patient remains pleased with a symptomatic approach  --continues on zolmitriptan 2 5 mg tablets taking 1 for headache, may repeat in 2 hours if needed, limit 2/24 hours  --she will contact the the office should she have any escalating headache issues prior to her next scheduled follow-up

## 2020-08-13 NOTE — PROGRESS NOTES
Patient ID: Tiburcio Hager is a 56 y o  female  Assessment/Plan:    Migraine without aura and without status migrainosus, not intractable  Continues to do very well with only 1-2 migraines on a monthly basis  Fortunately those respond very nicely to her zolmitriptan  Her current headache frequency does not speak to the appropriateness of considering at this point in time a daily preventative medication  Patient remains pleased with a symptomatic approach  --continues on zolmitriptan 2 5 mg tablets taking 1 for headache, may repeat in 2 hours if needed, limit 2/24 hours  --she will contact the the office should she have any escalating headache issues prior to her next scheduled follow-up  She will follow up in 1 year or as needed  Subjective:    HPI  Patient, Monet Leon 88years of age, returns to reassess the status of her migraine without aura  In the year since last seen she has done very well  Once again she experiences on average of 1 and occasionally 2 migraines on a monthly basis  Fortunately they usually respond very briskly to an initial dose of zolmitriptan  She will occasionally have an additional migraine in the aftermath of an anesthetic event, etc   At this point in time she is very pleased with the symptomatic approach  Blood work reviewed:  -CBC with hemoglobin 12 6, hematocrit 39 0, white count 8 05 and platelet count 309   -CMP with creatinine 0 94, AST 19 and ALT 28       Past Medical History:   Diagnosis Date    Allergy to dust     Anemia     Calculus of ureter     Chronic pain disorder     neck and back- sees pain management    Colitis     Cracked skin     on fingers    Diverticulosis     Dysphagia     GERD (gastroesophageal reflux disease)     H/O: GI bleed     Hiatal hernia     History of DVT in adulthood     flaquito    Hydronephrosis     Hyperlipidemia     Inguinal hernia     right side    Irritable bowel     Migraines     Narcolepsy     Narcolepsy 8/6/2020    OAB (overactive bladder)     Osteoarthritis     Osteoporosis     Other chronic pain     degenerative disc disease    PONV (postoperative nausea and vomiting)     migraines    Smoking 8/6/2020    Tobacco abuse     Wears dentures     full upper    Wears glasses     Wears glasses      Past Surgical History:   Procedure Laterality Date    ABDOMINAL ADHESION SURGERY      ARM SKIN LESION BIOPSY / EXCISION      lymph node excision    BLADDER SURGERY      CHOLECYSTECTOMY      COLONOSCOPY  08/06/2020    HYSTERECTOMY  1977    JOINT REPLACEMENT Right     KNEE SURGERY Right     replacement    LAMINECTOMY      L5-6-7    LAPAROSCOPY      LYMPH NODE DISSECTION Left     removed -no cancer-  limb restriction    MOLE REMOVAL      OOPHORECTOMY Bilateral 1977    OTHER SURGICAL HISTORY      enlarged lymph node removed left arm   no cancer    NM CYSTOURETHROSCOPY,URETER CATHETER Right 12/2/2020    Procedure: CYSTOSCOPY AND INSERTION STENT URETERAL;  Surgeon: Daisy Ruvalcaba MD;  Location: [facility];  Service: Urology    NM REPAIR ING HERNIA,5+Y/O,REDUCIBL Right 6/25/2020    Procedure: REPAIR HERNIA INGUINAL  WITH MESH;  Surgeon: Soledad Garcia MD;  Location: [facility];  Service: General    SPINAL FUSION      UPPER GASTROINTESTINAL ENDOSCOPY  08/06/2020    WISDOM TOOTH EXTRACTION       Social History     Socioeconomic History    Marital status: /Civil Union     Spouse name: None    Number of children: None    Years of education: None    Highest education level: None   Occupational History    None   Social Needs    Financial resource strain: None    Food insecurity     Worry: None     Inability: None    Transportation needs     Medical: None     Non-medical: None   Tobacco Use    Smoking status: Current Every Day Smoker     Packs/day: 0 25     Years: 46 00     Pack years: 11 50     Types: Cigarettes    Smokeless tobacco: Never Used    Tobacco comment: as of 12- pt has reduced to 3 cigarettes per day   Substance and Sexual Activity    Alcohol use: Yes     Frequency: Monthly or less     Drinks per session: 1 or 2     Binge frequency: Never    Drug use: Yes     Types: Marijuana     Comment: medicinal marijuana    Sexual activity: Not Currently   Lifestyle    Physical activity     Days per week: None     Minutes per session: None    Stress: None   Relationships    Social connections     Talks on phone: None     Gets together: None     Attends [employer] service: None     Active member of club or organization: None     Attends meetings of clubs or organizations: None     Relationship status: None    Intimate partner violence     Fear of current or ex partner: None     Emotionally abused: None     Physically abused: None     Forced sexual activity: None   Other Topics Concern    None   Social History Narrative    None     Family History   Problem Relation Age of Onset    Hypertension Father     Cancer Father     Diabetes Mother     Hypertension Mother     Stroke Brother     Nephrolithiasis Brother     Diabetes Sister     No Known Problems Daughter     No Known Problems Maternal Grandmother     No Known Problems Maternal Grandfather     No Known Problems Paternal Grandmother     No Known Problems Paternal Grandfather     No Known Problems Sister     No Known Problems Maternal Aunt     No Known Problems Maternal Aunt     No Known Problems Maternal Aunt     No Known Problems Paternal Aunt     No Known Problems Paternal Aunt     No Known Problems Paternal Aunt      Allergies   Allergen Reactions    Celecoxib Other (See Comments)     Increased Heart Rate, Palpitations    Sumatriptan Anaphylaxis     Other reaction(s): SUMATRIPTAN (IMITREX) (Throat closure)  Pt analilia zolmitriptan      Tramadol Other (See Comments)     GI Upset- severe vomiting and systemic body aches    Medical Tape Dermatitis, Rash and Other (See Comments)     Adhesive from medication patches and bandaides- ok with paper tape       Current Outpatient Medications:     Ascorbic Acid (VITAMIN C PO), Take 500 mg by mouth daily , Disp: , Rfl:     BACILLUS COAGULANS-INULIN PO, Take 1 capsule by mouth daily , Disp: , Rfl:     calcium carbonate 1250 MG capsule, Take 600 mg by mouth daily , Disp: , Rfl:     cetirizine (ZyrTEC) 10 MG chewable tablet, Chew 10 mg daily , Disp: , Rfl:     Cholecalciferol 1000 units tablet, Take 1,000 Units by mouth daily , Disp: , Rfl:     Citicoline Sodium 500 MG TABS, Take 500 mg by mouth daily , Disp: , Rfl:     Coenzyme Q10 200 MG/GM POWD, Take 200 mg by mouth daily , Disp: , Rfl:     denosumab (PROLIA) 60 mg/mL, Inject 60 mg under the skin Every 6 months, Disp: , Rfl:     ferrous sulfate 325 (65 Fe) mg tablet, Take 325 mg by mouth daily with breakfast , Disp: , Rfl:     gabapentin (NEURONTIN) 300 mg capsule, 300 mg at 1200 in addition to 600 mg at bedtime  , Disp: , Rfl:     Ginger, Zingiber officinalis, (GINGER ROOT) 550 MG CAPS, Take 550 mg by mouth 2 (two) times a day , Disp: , Rfl:     Ginkgo Biloba 40 MG TABS, Take 40 mg by mouth daily , Disp: , Rfl:     Multiple Vitamins-Minerals (CENTRUM SILVER PO), Take 1 tablet by mouth daily , Disp: , Rfl:     mupirocin (BACTROBAN) 2 % ointment, Apply topically 3 (three) times a day Apply topically on hands to open cuts to prevent infection, Disp: 30 g, Rfl: 0    NON FORMULARY, Place 30 mg under the tongue as needed --- tincture prn, Disp: , Rfl:     NON FORMULARY, Take 2 tablets by mouth daily Take Health and Sooth take two capsule daily , Disp: , Rfl:     vitamin E, tocopherol, 400 units capsule, Take 400 Units by mouth daily, Disp: , Rfl:     ZOLMitriptan (ZOMIG) 2 5 MG tablet, By mouth take 1 tablet at onset of migraine  May repeat in 2 hours if needed  Limit 2/24 hours, 4/week, 8/month  (Patient taking differently: Take 2 5 mg by mouth By mouth take 1 tablet at onset of migraine  May repeat in 2 hours if needed    Limit 2/24 hours, 4/week, 8/month ), Disp: 12 tablet, Rfl: 2    Armodafinil 250 MG tablet, Take 1 tablet (250 mg total) by mouth daily, Disp: 30 tablet, Rfl: 5    dicyclomine (BENTYL) 20 mg tablet, Take 1 tablet (20 mg total) by mouth 4 (four) times a day (before meals and at bedtime), Disp: 20 tablet, Rfl: 0    docusate sodium (COLACE) 100 mg capsule, Take 1 capsule (100 mg total) by mouth 2 (two) times a day as needed for constipation, Disp: 10 capsule, Rfl: 0    DULoxetine (CYMBALTA) 30 mg delayed release capsule, Take 3 capsules (90 mg total) by mouth daily (Patient taking differently: Take 90 mg by mouth every evening ), Disp: 90 capsule, Rfl: 1    mesalamine (LIALDA) 1 2 g EC tablet, Take 2 tablets (2 4 g total) by mouth daily with breakfast, Disp: 60 tablet, Rfl: 3    metaxalone (SKELAXIN) 800 mg tablet, Take 800 mg by mouth 3 (three) times a day , Disp: , Rfl:     nitrofurantoin (MACROBID) 100 mg capsule, Take 1 capsule (100 mg total) by mouth 2 (two) times a day with meals for 3 days, Disp: 6 capsule, Rfl: 0    omeprazole (PriLOSEC) 40 MG capsule, Take 40 mg by mouth daily before breakfast, Disp: , Rfl:     oxybutynin (DITROPAN) 5 mg tablet, Take 1 tablet (5 mg total) by mouth 3 (three) times a day, Disp: 15 tablet, Rfl: 0    prochlorperazine (COMPAZINE) 10 mg tablet, Take 1 tablet (10 mg total) by mouth every 6 (six) hours as needed for nausea or vomiting, Disp: 12 tablet, Rfl: 1    rosuvastatin (CRESTOR) 5 mg tablet, Take 1 tablet (5 mg total) by mouth daily, Disp: 90 tablet, Rfl: 0    solifenacin (VESICARE) 10 MG tablet, Take 1 tablet (10 mg total) by mouth daily, Disp: 30 tablet, Rfl: 3    tamsulosin (FLOMAX) 0 4 mg, Take 1 capsule (0 4 mg total) by mouth daily with dinner, Disp: 15 capsule, Rfl: 0    Objective:    Blood pressure 109/60, pulse 78, temperature 98 4 °F (36 9 °C), resp  rate 18, height 5' 4" (1 626 m), weight 52 6 kg (116 lb), not currently breastfeeding      Physical Exam  Head normocephalic  Eyes nonicteric  No audible anterior neck bruits  Lungs clear to auscultation  Rhythm regular  GI (abdomen) soft nontender with bowel sounds present  No lower extremity edema  Neurological Exam  Alert  Fully oriented and appropriately conversational   No dysarthria  Unremarkable spontaneous gait  Romberg maneuver performed unremarkably  Cranial nerves 2-12 tested and grossly intact  Accurate finger-to-nose bilaterally  Good symmetrical strength throughout the 4 extremities with no upper extremity drift  Sensory testing grossly intact to pin throughout  Muscle stretch reflexes bilaterally 2 throughout the upper extremities, at the knees and at the ankles  ROS:    Review of Systems   Constitutional: Negative  Negative for appetite change and fever  HENT: Negative  Negative for hearing loss, tinnitus, trouble swallowing and voice change  Eyes: Negative  Negative for photophobia and pain  Respiratory: Negative  Negative for shortness of breath  Cardiovascular: Negative  Negative for palpitations  Gastrointestinal: Positive for diarrhea  Negative for nausea and vomiting  IBS   Endocrine: Negative  Negative for cold intolerance  Genitourinary: Positive for frequency and urgency  Negative for dysuria  Musculoskeletal: Positive for back pain (lumbar pain ), neck pain and neck stiffness  Negative for myalgias  Balance issues   Skin: Negative  Negative for rash  Allergic/Immunologic: Negative  Neurological: Positive for dizziness, light-headedness, numbness (hands and feet) and headaches  Negative for tremors, seizures, syncope, facial asymmetry, speech difficulty and weakness  Hematological: Negative  Does not bruise/bleed easily  Psychiatric/Behavioral: Negative  Negative for confusion, hallucinations and sleep disturbance  Memory issues       I personally reviewed the ROS as entered by the medical assistant        *Please note this document was created using voice recognition software and may contain sound-alike word errors  *

## 2020-08-19 ENCOUNTER — TELEPHONE (OUTPATIENT)
Dept: RADIOLOGY | Facility: MEDICAL CENTER | Age: 60
End: 2020-08-19

## 2020-08-19 NOTE — TELEPHONE ENCOUNTER
Old voicemail reviewed on scheduling line ( out of office on 8/11)  Patient left message stating that she was seen on 8/10 and is calling with her insurance information:     ID# [id number]  And a # above this ID# on the card: Luc Lara    Please f/u with patient on this information

## 2020-08-20 NOTE — TELEPHONE ENCOUNTER
Contacted patient, she states her insurance is [employer], when I look up the ID in [facility] it doesn't show as one of theirs, tried to look up her full name and  and it states Invalid / Sravani Colin / Insured ID  Please Correct and Resubmit  Patient stated she needs to find the paper she has with her information on since she has yet to receive her ID card  Patient to call back

## 2020-08-20 NOTE — TELEPHONE ENCOUNTER
Tried to verify patients elibility via navinet and availity through multiple insurances as patient did not provide the name of the insurance she had   Ran through : [address] [place] [address]  , [facility] , [facility], [facility], [facility], [facility], [facility] all returned "subscriber not found"  Will need to contact patient for type of insurance

## 2020-08-20 NOTE — TELEPHONE ENCOUNTER
Patient called back with [facility] is name of her insurance and ID is [id number]     Thank you

## 2020-08-25 ENCOUNTER — OFFICE VISIT (OUTPATIENT)
Dept: SLEEP CENTER | Facility: CLINIC | Age: 60
End: 2020-08-25
Payer: COMMERCIAL

## 2020-08-25 VITALS
OXYGEN SATURATION: 98 % | BODY MASS INDEX: 20.55 KG/M2 | WEIGHT: 120.4 LBS | HEART RATE: 73 BPM | SYSTOLIC BLOOD PRESSURE: 108 MMHG | HEIGHT: 64 IN | DIASTOLIC BLOOD PRESSURE: 60 MMHG

## 2020-08-25 DIAGNOSIS — J30.9 ALLERGIC RHINITIS, UNSPECIFIED SEASONALITY, UNSPECIFIED TRIGGER: ICD-10-CM

## 2020-08-25 DIAGNOSIS — E78.00 PURE HYPERCHOLESTEROLEMIA: ICD-10-CM

## 2020-08-25 DIAGNOSIS — F51.12 INSUFFICIENT SLEEP SYNDROME: ICD-10-CM

## 2020-08-25 DIAGNOSIS — G89.4 CHRONIC PAIN DISORDER: ICD-10-CM

## 2020-08-25 DIAGNOSIS — G47.11 IDIOPATHIC HYPERSOMNIA: Primary | ICD-10-CM

## 2020-08-25 DIAGNOSIS — Z79.899 MEDICAL MARIJUANA USE: ICD-10-CM

## 2020-08-25 DIAGNOSIS — G43.009 MIGRAINE WITHOUT AURA AND WITHOUT STATUS MIGRAINOSUS, NOT INTRACTABLE: ICD-10-CM

## 2020-08-25 DIAGNOSIS — Z72.0 TOBACCO ABUSE: ICD-10-CM

## 2020-08-25 DIAGNOSIS — M79.7 FIBROMYALGIA: ICD-10-CM

## 2020-08-25 PROCEDURE — 4004F PT TOBACCO SCREEN RCVD TLK: CPT | Performed by: INTERNAL MEDICINE

## 2020-08-25 PROCEDURE — 3008F BODY MASS INDEX DOCD: CPT | Performed by: INTERNAL MEDICINE

## 2020-08-25 PROCEDURE — 99214 OFFICE O/P EST MOD 30 MIN: CPT | Performed by: INTERNAL MEDICINE

## 2020-08-25 RX ORDER — ARMODAFINIL 250 MG/1
250 TABLET ORAL DAILY
Qty: 30 TABLET | Refills: 5 | Status: SHIPPED | OUTPATIENT
Start: 2020-08-25 | End: 2021-03-29 | Stop reason: SDUPTHER

## 2020-08-25 NOTE — PROGRESS NOTES
Review of Systems      Genitourinary post menopausal (no peroids)   Cardiology none   Gastrointestinal none   Neurology awaken with headache, muscle weakness, numbness/tingling of an extremity, forgetfulness, poor concentration or confusion, , difficulty with memory and balance problems   Constitutional weight change   Integumentary none   Psychiatry none   Musculoskeletal joint pain, muscle aches, back pain, sciatica and leg cramps   Pulmonary none   ENT none   Endocrine none   Hematological none

## 2020-08-25 NOTE — PATIENT INSTRUCTIONS

## 2020-08-25 NOTE — PROGRESS NOTES
Follow-Up Note - Sleep Center   [facility]  62 y o  female  [id number]  [facility]:[id number]    CC: I saw this patient for follow-up in clinic today for hypersomnolence, Coexisting Sleep and Medical Problems  She had a diagnostic sleep study at [facility] on 10/03/2018 total amount of sleep and sleep efficiency were not quantified however she was seen in all sleep stages  The study demonstrated no evidence for sleep disordered breathing:  AHI of 1 per hour and minimum oxygen saturation of 86%  This was followed by and MSLT demonstrated an average sleep latency of 1 4 minutes  No sleep onset REM was seen in any of the 5 nap trials  PFSH, Problem List, Medications & Allergies were reviewed in EMR  Interval changes: none reported  She  has a past medical history of Allergy to dust, Anemia, Calculus of ureter, Chronic pain disorder, Colitis, Cracked skin, Diverticulosis, Dysphagia, H/O: GI bleed, Hiatal hernia, History of DVT in adulthood, Hydronephrosis, Hyperlipidemia, Inguinal hernia, Irritable bowel, Migraines, Narcolepsy, Narcolepsy (2020), OAB (overactive bladder), Osteoarthritis, Osteoporosis, Other chronic pain, PONV (postoperative nausea and vomiting), Smoking (2020), Tobacco abuse, Wears dentures, and Wears glasses  She has a current medication list which includes the following prescription(s): armodafinil, ascorbic acid, bacillus coagulans-inulin, calcium carbonate, cetirizine, cholecalciferol, citicoline sodium, coenzyme q10, denosumab, diclofenac, duloxetine, ferrous sulfate, gabapentin, pierre root, ginkgo biloba, metaxalone, misc natural products, multiple vitamins-minerals, mupirocin, NON FORMULARY, NON FORMULARY, ondansetron, oxybutynin, prednisone, rosuvastatin, vitamin e (tocopherol), zolmitriptan, dicyclomine, omeprazole, and ondansetron  ROS: constitutional, psychiatric, ENT, respiratory,CVS, GI, UGS, CNS, MSK, integumentary, endocrine, hematological reviewed  Significant for - see attached  HPI:  She is doing well with Nuvigil 250 mg daily  She uses medical marijuana for fibromyalgia, chronic pain and irritable bowel syndrome that are controlled with use  She reports none of the ancillary symptoms of narcolepsy  Sleep Routine: She reports getting 7 5 sleep on week nights and more on weekends; she has no difficulty initiating or maintaining sleep   She awakens with aid of an alarm feeling never feels rested  She significantly improved drowsiness since on Nuvigil and is not dozing off unintentionally with use  She rated herself at Total score: 12 /24 on the Farnsworth sleepiness scale  Habits: reports that she has been smoking cigarettes  She has a 23 00 pack-year smoking history  She has never used smokeless tobacco ,  reports current alcohol use ,  reports current drug use  Drug: Marijuana  , Caffeine use: limited  , Exercise routine: none but is physically active in her job  EXAM: /60   Pulse 73   Ht 5' 4" (1 626 m)   Wt 54 6 kg (120 lb 6 4 oz)   SpO2 98%   BMI 20 67 kg/m²     Patient is well groomed; well appearing  Skin/Extrem: warm & dry; col & hydration normal; no edema  Psych: cooperativeand in no distress  Mental state appears normal   CNS: Alert, orientated, clear & coherent speech  H&N: EOMI; NC/AT:no facial pressure marks, no rashes  Neck Circumference: 11 5 cm  ENMT Mucus membranes appear normal Nasal airway:patent  Oral airway:  crowded  Resp:effort is normal CVS: RRR ABD:truncal obesity MSK:Gait normal     IMPRESSION: Primary Sleep/Secondary(to Medical or Psych conditions) & comorbidities   1  Idiopathic hypersomnia  Armodafinil 250 MG tablet   2  Insufficient sleep syndrome     3  Fibromyalgia     4  Migraine without aura and without status migrainosus, not intractable     5  Tobacco abuse     6  Allergic rhinitis, unspecified seasonality, unspecified trigger     7  Chronic pain disorder     8   Medical marijuana use PLAN:  1  I reviewed results of the Sleep studies with the patient  2  Continue Nuvigil 250 mg daily  3  I advised on sleep hygiene specifically allowing sufficient opportunity for sleep  4  I advised smoking cessation  5  She is still trying to obtain documentation required to reinstate her 's license    6  I advised follow-up in 6 months to monitor progress and to adjust therapy           Sincerely,    Authenticated electronically by Meena Kong MD on 94/61/23   Board Certified Specialist

## 2020-08-26 RX ORDER — ROSUVASTATIN CALCIUM 5 MG/1
5 TABLET, COATED ORAL DAILY
Qty: 30 TABLET | Refills: 0 | Status: SHIPPED | OUTPATIENT
Start: 2020-08-26 | End: 2020-10-06

## 2020-08-31 ENCOUNTER — OFFICE VISIT (OUTPATIENT)
Dept: GASTROENTEROLOGY | Facility: MEDICAL CENTER | Age: 60
End: 2020-08-31
Payer: COMMERCIAL

## 2020-08-31 VITALS
TEMPERATURE: 98 F | BODY MASS INDEX: 20.63 KG/M2 | WEIGHT: 120.2 LBS | DIASTOLIC BLOOD PRESSURE: 66 MMHG | HEART RATE: 69 BPM | SYSTOLIC BLOOD PRESSURE: 104 MMHG

## 2020-08-31 DIAGNOSIS — K58.8 OTHER IRRITABLE BOWEL SYNDROME: ICD-10-CM

## 2020-08-31 DIAGNOSIS — M25.50 ARTHRALGIA, UNSPECIFIED JOINT: ICD-10-CM

## 2020-08-31 DIAGNOSIS — R12 HEARTBURN: ICD-10-CM

## 2020-08-31 DIAGNOSIS — R15.9 INCONTINENCE OF FECES, UNSPECIFIED FECAL INCONTINENCE TYPE: ICD-10-CM

## 2020-08-31 DIAGNOSIS — R19.7 DIARRHEA, UNSPECIFIED TYPE: ICD-10-CM

## 2020-08-31 DIAGNOSIS — K58.0 IRRITABLE BOWEL SYNDROME WITH DIARRHEA: ICD-10-CM

## 2020-08-31 DIAGNOSIS — K52.9 COLITIS: Primary | ICD-10-CM

## 2020-08-31 DIAGNOSIS — R15.2 FECAL URGENCY: ICD-10-CM

## 2020-08-31 DIAGNOSIS — K57.90 DIVERTICULOSIS: ICD-10-CM

## 2020-08-31 DIAGNOSIS — R13.10 DYSPHAGIA, UNSPECIFIED TYPE: ICD-10-CM

## 2020-08-31 DIAGNOSIS — K13.79 MOUTH SORES: ICD-10-CM

## 2020-08-31 PROCEDURE — 99215 OFFICE O/P EST HI 40 MIN: CPT | Performed by: INTERNAL MEDICINE

## 2020-08-31 RX ORDER — MESALAMINE 1.2 G/1
2400 TABLET, DELAYED RELEASE ORAL
Qty: 60 TABLET | Refills: 3 | Status: SHIPPED | OUTPATIENT
Start: 2020-08-31 | End: 2021-01-05 | Stop reason: ALTCHOICE

## 2020-08-31 NOTE — PROGRESS NOTES
[facility]ot [facility] - Outpatient Follow-up Note  Bing Phan 62 y o  female MRN: [id number]  Encounter: [id number]          ASSESSMENT AND PLAN:    Bing Phan is a 62 y o  female with partial SBO 2/2 adhesions in the setting of prior hysterectomy (s/p RONIT), microscopic colitis who presents with complaint of diarrhea, urgency, heartburn  She recently underwent an EGD and colonoscopy and biopsies notable for colitis without architectural changes  The ddx is broad and includes NSAID-induced colitis vs segmental colitis associated with diverticulosis vs early IBD vs a form of microscopic colitis (although there was some identified macroscopic/endoscopic colitis)  She continues to have significant diarrhea and symptoms at this time  She also notes some intermittent dysphagia (to pills and solids)  DDx includes motility disorder vs GERD related dysmotility  Available labs and imaging reviewed  1  Colitis    2  Dysphagia, unspecified type    3  Other irritable bowel syndrome    4  Irritable bowel syndrome with diarrhea    5  Diverticulosis    6  Diarrhea, unspecified type    7  Fecal urgency    8  Incontinence of feces, unspecified fecal incontinence type    9  Mouth sores    10  Arthralgia, unspecified joint    11   Heartburn        Orders Placed This Encounter   Procedures    MRI enterography w wo    FL barium swallow       We will obtain an MRE to evaluate for small bowel inflammation  Stop Voltaren  Will start low dose Lialda to see if this helps  Continue Bentyl, Imodium PRN  Consider repeat colonoscopy for additional biopsies in the next several months depending on the above  Esophogram given dysphagia  Consider pH and manometry study for dysphagia and GERD, pending esophogram results  Gastric emptying study in the future, depending on the above  Consider PPI in the future if she is agreeable  ______________________________________________________________________    SUBJECTIVEDewane Del Charu Kurtz is a 62 y o  female with microscopic colitis who presents with complaint of diarrhea, urgency, heartburn      Biopsies showed inflammation throughout the colon but without architectural changes, and no colitis in the rectum  This could be consistent with early Crohn's colitis vs NSAID induced colitis  She is on Voltaren  We discussed the results in detail  She has diarrhea, with 2-3 loose watery stools  No BRBPR  She is on iron and so stools are dark  + urgency and + incontinence  No nighttime BMs (rare)  She avoids spicy foods, but otherwise she has mid / epigastric pain  No lower abdominal pain  Some nausea with diarrhea, but no vomiting  + heartburn  Occasional dysphagia at times  She has been steadily losing weight, but she is still within her normal range  No fevers, chills  + mouth sores (unclear if dentures)  No rashes  + joint pains  No FH of Crohn's or colitis  She had microscopic colitis in the past      EGD (8/6/2020): IMPRESSION:  · The esophagus appeared normal  Performed random biopsy  · Irregular Z-line 40 cm from the incisors  · Mild, generalized erythematous mucosa in the body of the stomach, incisura and antrum; performed cold biopsy  R/o H pylori  · Mild, patchy edematous, erythematous and ulcerated mucosa with erosion in the duodenal bulb; performed cold biopsy  · The 2nd part of the duodenum appeared normal  Performed random biopsy  Colonoscopy (8/6/2020): IMPRESSION:  · Small, prolapsing and internal hemorrhoids  · Multiple small and large, moderate extensive pancolonic diverticula causing mild luminal narrowing  Most diverticula in the sigmoid colon  · Inflamed stricture in the ascending colon; performed cold biopsy  · Mild, patchy scarred mucosa with erosion in the ascending colon; performed cold biopsy  · The terminal ileum appeared normal   · Performed biopsies in the terminal ileum  · Performed multiple pancolonic biopsies  R/o colitis    · The examination was otherwise normal on direct and retroflexion views       Path:  A  Duodenum, second portion duodenal bx, r/o celiac:  - Small bowel mucosa with no significant histopathologic abnormality   - Negative for malabsorption pattern  - Negative for malignancy       B  Duodenum, duodenal bulb and ulcer bx:  - Small bowel mucosa with erosive and reactive changes  - Negative for malabsorption pattern  - Negative for malignancy       C  Stomach, gastric bx, r/o hpylori:  - Reactive gastropathy   - Negative for H  pylori by routine H&E   - Negative for malignancy        D  Esophagus, lower esophageal bx:  - Squamocolumnar mucosa with mild chronic inflammation   - Negative for intestinal metaplasia, dysplasia, and malignancy       E  Terminal Ileum, bx:  - Small bowel mucosa with no significant histopathologic abnormality   - Negative for malabsorption pattern  - Negative for malignancy       F  Colon, right colon bx:  - Erosive mild acute colitis without significant architectural changes  See comment   - Negative for dysplasia and malignancy       G  Large Intestine, ascending colon stricture bx:  - Mild acute colitis without significant architectural changes  See comment   - Negative for dysplasia and malignancy       H  Large Intestine, transverse colon bx:  - Moderate acute colitis without significant architectural changes  See comment   - Negative for dysplasia and malignancy        I  Large Intestine, left colon bx:  - Mild acute colitis without significant architectural changes  See comment   - Negative for dysplasia and malignancy        J  Rectum, bx:  - Rectal mucosa with no significant histopathologic abnormality   - No evidence of microscopic colitis or acute inflammation   - Negative for dysplasia and malignancy  REVIEW OF SYSTEMS IS OTHERWISE NEGATIVE    10 point ROS reviewed and negative, except as above      Historical Information   Past Medical History:   Diagnosis Date    Allergy to dust     Anemia     Calculus of ureter     Chronic pain disorder     neck and back- sees pain management    Colitis     Cracked skin     on fingers    Diverticulosis     Dysphagia     H/O: GI bleed     Hiatal hernia     History of DVT in adulthood     flaquito    Hydronephrosis     Hyperlipidemia     Inguinal hernia     right side    Irritable bowel     Migraines     Narcolepsy     Narcolepsy 8/6/2020    OAB (overactive bladder)     Osteoarthritis     Osteoporosis     Other chronic pain     degenerative disc disease    PONV (postoperative nausea and vomiting)     migraines    Smoking 8/6/2020    Tobacco abuse     Wears dentures     full upper    Wears glasses      Past Surgical History:   Procedure Laterality Date    ABDOMINAL ADHESION SURGERY      ARM SKIN LESION BIOPSY / EXCISION      lymoh node excision    BLADDER SURGERY      BREAST SURGERY      CHOLECYSTECTOMY      HYSTERECTOMY  1977    JOINT REPLACEMENT Right     KNEE SURGERY Right     replacement    LAMINECTOMY      LAPAROSCOPY      MOLE REMOVAL      OOPHORECTOMY Bilateral 1977    OTHER SURGICAL HISTORY      enlarged lymph node removed left arm   no cancer    NC REPAIR ING HERNIA,5+Y/O,REDUCIBL Right 6/25/2020    Procedure: REPAIR HERNIA INGUINAL  WITH MESH;  Surgeon: Gena Rowland MD;  Location: [facility];  Service: General    SPINAL FUSION      WISDOM TOOTH EXTRACTION       Social History   Social History     Substance and Sexual Activity   Alcohol Use Yes    Frequency: 2-4 times a month    Drinks per session: 1 or 2    Binge frequency: Never    Comment: beer     Social History     Substance and Sexual Activity   Drug Use Yes    Types: Marijuana    Comment: medicinal marijuana     Social History     Tobacco Use   Smoking Status Current Every Day Smoker    Packs/day: 0 50    Years: 46 00    Pack years: 23 00    Types: Cigarettes   Smokeless Tobacco Never Used     Family History   Problem Relation Age of Onset    Hypertension Father  Cancer Father     Diabetes Mother     Hypertension Mother     Stroke Brother     Nephrolithiasis Brother     Diabetes Sister     No Known Problems Daughter     No Known Problems Maternal Grandmother     No Known Problems Maternal Grandfather     No Known Problems Paternal Grandmother     No Known Problems Paternal Grandfather     No Known Problems Sister     No Known Problems Maternal Aunt     No Known Problems Maternal Aunt     No Known Problems Maternal Aunt     No Known Problems Paternal Aunt     No Known Problems Paternal Aunt     No Known Problems Paternal Aunt        Meds/Allergies       Current Outpatient Medications:     Armodafinil 250 MG tablet    Ascorbic Acid (VITAMIN C PO)    BACILLUS COAGULANS-INULIN PO    calcium carbonate 1250 MG capsule    cetirizine (ZyrTEC) 10 MG chewable tablet    Cholecalciferol 1000 units tablet    Citicoline Sodium 500 MG TABS    Coenzyme Q10 200 MG/GM POWD    denosumab (PROLIA) 60 mg/mL    diclofenac (VOLTAREN) 75 mg EC tablet    ferrous sulfate 325 (65 Fe) mg tablet    gabapentin (NEURONTIN) 300 mg capsule    Lori, Zingiber officinalis, (LORI ROOT) 550 MG CAPS    Ginkgo Biloba 40 MG TABS    Misc Natural Products (GINSENG-COMPLEX PO)    Multiple Vitamins-Minerals (CENTRUM SILVER PO)    mupirocin (BACTROBAN) 2 % ointment    NON FORMULARY    NON FORMULARY    ondansetron (ZOFRAN-ODT) 4 mg disintegrating tablet    oxybutynin (DITROPAN XL) 15 MG 24 hr tablet    predniSONE 20 mg tablet    rosuvastatin (CRESTOR) 5 mg tablet    vitamin E, tocopherol, 400 units capsule    ZOLMitriptan (ZOMIG) 2 5 MG tablet    dicyclomine (BENTYL) 20 mg tablet    DULoxetine (CYMBALTA) 30 mg delayed release capsule    mesalamine (LIALDA) 1 2 g EC tablet    metaxalone (SKELAXIN) 800 mg tablet    ondansetron (ZOFRAN) 4 mg tablet    Allergies   Allergen Reactions    Sumatriptan Anaphylaxis     Other reaction(s): SUMATRIPTAN (IMITREX) (Throat closure)  Pt analilia zolmitriptan   Celecoxib Other (See Comments)     Increased Heart Rate, Palpitations    Tramadol Other (See Comments)     GI Upset sever vomiting   Medical Tape Dermatitis, Rash and Other (See Comments)     Adhesive from medication patches and bandaides- ok with paper tape           Objective     Blood pressure 104/66, pulse 69, temperature 98 °F (36 7 °C), weight 54 5 kg (120 lb 3 2 oz), not currently breastfeeding  Body mass index is 20 63 kg/m²  PHYSICAL EXAMINATION:    General Appearance:   Alert, cooperative, no distress   HEENT:  Normocephalic, atraumatic, anicteric  Neck supple, symmetrical, trachea midline  Lungs:   Equal chest rise and unlabored breathing, normal effort, no coughing  Cardiovascular:   No visualized JVD  Abdomen:   No abdominal distension  No TTP   Skin:   No jaundice, rashes, or lesions  Musculoskeletal:   Normal range of motion visualized  Psych:  Normal affect and normal insight  Neuro:  Alert and appropriate  Lab Results:   No visits with results within 1 Day(s) from this visit     Latest known visit with results is:   Hospital Outpatient Visit on 08/06/2020   Component Date Value    Case Report 08/06/2020                      Value:Surgical Pathology Report                         Case: [id number]                                   Authorizing Provider:  Roberto Kumari MD    Collected:           08/06/2020 0844              Ordering Location:     [facility]        [facility]            [facility]                                                          Pathologist:           Sayda Mcclellan MD                                                   Specimens:   A) - Duodenum, second portion duodenal bx, r/o celiac                                               B) - Duodenum, duodenal bulb and ulcer bx                                                           C) - Stomach, gastric bx, r/o hpylori                                                               D) - Esophagus, lower esophageal bx                                                                 E) - Terminal Ileum, terminal ileum bx                                                                                        F) - Colon, rt colon bx                                                                             G) - Large Intestine, Right/Ascending Colon, ascending colon stricture bx                           H) - Large Intestine, Transverse Colon, transverse colon bx                                         I) - Large Intestine, Left/Descending Colon, left colon bx                                          J) - Rectum, rectal bx                                                                     Final Diagnosis 08/06/2020                      Value: This result contains rich text formatting which cannot be displayed here   Additional Information 08/06/2020                      Value: This result contains rich text formatting which cannot be displayed here  Jagdish Matute Gross Description 08/06/2020                      Value: This result contains rich text formatting which cannot be displayed here         Lab Results   Component Value Date    WBC 8 05 05/14/2020    HGB 12 6 05/14/2020    HCT 39 0 05/14/2020    MCV 96 05/14/2020     05/14/2020       Lab Results   Component Value Date    SODIUM 138 05/14/2020    K 4 8 05/14/2020     05/14/2020    CO2 27 05/14/2020    AGAP 7 05/14/2020    BUN 17 05/14/2020    CREATININE 0 94 05/14/2020    GLUC 96 05/29/2019    GLUF 108 (H) 05/14/2020    CALCIUM 9 3 05/14/2020    AST 19 05/14/2020    ALT 28 05/14/2020    ALKPHOS 82 05/14/2020    TP 7 2 05/14/2020    TBILI 0 40 05/14/2020    EGFR 66 05/14/2020       No results found for: CRP    Lab Results   Component Value Date    IPS6KQGYQCRJ 0 794 05/29/2019       Lab Results   Component Value Date    IRON 128 05/07/2020 TIBC 238 (L) 05/07/2020    FERRITIN 184 05/07/2020       Radiology Results:   No results found

## 2020-09-15 ENCOUNTER — HOSPITAL ENCOUNTER (OUTPATIENT)
Dept: MRI IMAGING | Facility: HOSPITAL | Age: 60
Discharge: HOME | End: 2020-09-15
Attending: INTERNAL MEDICINE
Payer: COMMERCIAL

## 2020-09-15 DIAGNOSIS — K52.9 COLITIS: ICD-10-CM

## 2020-09-15 PROCEDURE — 74183 MRI ABD W/O CNTR FLWD CNTR: CPT

## 2020-09-15 PROCEDURE — 72197 MRI PELVIS W/O & W/DYE: CPT

## 2020-09-15 PROCEDURE — G1004 CDSM NDSC: HCPCS

## 2020-09-15 PROCEDURE — A9585 GADOBUTROL INJECTION: HCPCS | Performed by: INTERNAL MEDICINE

## 2020-09-15 RX ADMIN — GADOBUTROL 5 ML: 604.72 INJECTION INTRAVENOUS at 08:51

## 2020-09-15 RX ADMIN — GLUCAGON HYDROCHLORIDE 1 MG: KIT at 09:00

## 2020-09-17 ENCOUNTER — HOSPITAL ENCOUNTER (OUTPATIENT)
Dept: RADIOLOGY | Facility: HOSPITAL | Age: 60
Discharge: HOME | End: 2020-09-17
Attending: INTERNAL MEDICINE
Payer: COMMERCIAL

## 2020-09-17 DIAGNOSIS — R13.10 DYSPHAGIA, UNSPECIFIED TYPE: ICD-10-CM

## 2020-09-17 PROCEDURE — 74220 X-RAY XM ESOPHAGUS 1CNTRST: CPT

## 2020-09-22 ENCOUNTER — HOSPITAL ENCOUNTER (OUTPATIENT)
Dept: RADIOLOGY | Facility: MEDICAL CENTER | Age: 60
Discharge: HOME | End: 2020-09-22
Attending: PHYSICAL MEDICINE & REHABILITATION | Admitting: PHYSICAL MEDICINE & REHABILITATION
Payer: COMMERCIAL

## 2020-09-22 VITALS
TEMPERATURE: 97.9 F | HEART RATE: 76 BPM | SYSTOLIC BLOOD PRESSURE: 130 MMHG | RESPIRATION RATE: 20 BRPM | OXYGEN SATURATION: 97 % | DIASTOLIC BLOOD PRESSURE: 85 MMHG

## 2020-09-22 DIAGNOSIS — M54.16 LUMBAR RADICULOPATHY: ICD-10-CM

## 2020-09-22 PROCEDURE — 64483 NJX AA&/STRD TFRM EPI L/S 1: CPT | Performed by: PHYSICAL MEDICINE & REHABILITATION

## 2020-09-22 RX ORDER — LIDOCAINE HYDROCHLORIDE 10 MG/ML
5 INJECTION, SOLUTION EPIDURAL; INFILTRATION; INTRACAUDAL; PERINEURAL ONCE
Status: COMPLETED | OUTPATIENT
Start: 2020-09-22 | End: 2020-09-22

## 2020-09-22 RX ORDER — PAPAVERINE HCL 150 MG
20 CAPSULE, EXTENDED RELEASE ORAL ONCE
Status: COMPLETED | OUTPATIENT
Start: 2020-09-22 | End: 2020-09-22

## 2020-09-22 RX ADMIN — DEXAMETHASONE SODIUM PHOSPHATE 15 MG: 10 INJECTION, SOLUTION INTRAMUSCULAR; INTRAVENOUS at 15:06

## 2020-09-22 RX ADMIN — LIDOCAINE HYDROCHLORIDE 2 ML: 10 INJECTION, SOLUTION EPIDURAL; INFILTRATION; INTRACAUDAL; PERINEURAL at 15:04

## 2020-09-22 RX ADMIN — IOHEXOL 3 ML: 300 INJECTION, SOLUTION INTRAVENOUS at 15:05

## 2020-09-22 NOTE — DISCHARGE INSTRUCTIONS
Epidural Steroid Injection   WHAT YOU NEED TO KNOW:   An epidural steroid injection (NOEMY) is a procedure to inject steroid medicine into the epidural space  The epidural space is between your spinal cord and vertebrae  Steroids reduce inflammation and fluid buildup in your spine that may be causing pain  You may be given pain medicine along with the steroids  ACTIVITY  · Do not drive or operate machinery today  · No strenuous activity today - bending, lifting, etc   · You may resume normal activites starting tomorrow - start slowly and as tolerated  · You may shower today, but no tub baths or hot tubs  · You may have numbness for several hours from the local anesthetic  Please use caution and common sense, especially with weight-bearing activities  CARE OF THE INJECTION SITE  · If you have soreness or pain, apply ice to the area today (20 minutes on/20 minutes off)  · Starting tomorrow, you may use warm, moist heat or ice if needed  · You may have an increase or change in your discomfort for 36-48 hours after your treatment  · Apply ice and continue with any pain medication you have been prescribed  · Notify [facility] if you have any of the following: redness, drainage, swelling, headache, stiff neck or fever above 100°F     SPECIAL INSTRUCTIONS  · Our office will contact you in approximately 7 days for a progress report  MEDICATIONS  · Continue to take all routine medications  · Our office may have instructed you to hold some medications  If you have a problem specifically related to your procedure, please call our office at [phone]  Problems not related to your procedure should be directed to your primary care physician

## 2020-09-22 NOTE — H&P
History of Present Illness:  The patient is a 62 y o  female who presents with complaints of  Bilateral back and leg pain    Patient Active Problem List   Diagnosis    Urge incontinence    Frequency of urination    History of hydronephrosis    Migraine    Fibromyalgia    Pure hypercholesterolemia    Allergy    Low iron    Idiopathic hypersomnia    Osteoporosis    Diverticulosis    Other irritable bowel syndrome    Migraine without aura and without status migrainosus, not intractable    Acute cystitis without hematuria    Calculus of kidney    Urinary tract infection without hematuria    Irritable bowel syndrome    Edema of both legs    Prolonged QT interval    Rash    Screening mammogram, encounter for    Screening for colon cancer    Spondylosis of cervical region without myelopathy or radiculopathy    Neck pain    Encounter for hepatitis C screening test for low risk patient    Encounter for immunization    Xerosis of skin    Spondylosis of lumbar spine    Senile osteoporosis    Irritable bowel syndrome without diarrhea    Hypercholesterolemia    Cervical radiculopathy    Narcolepsy without cataplexy    Microscopic hematuria    Calculus of ureter    Inguinal hernia of right side without obstruction or gangrene    S/P right inguinal hernia repair    Diarrhea    Weight loss    Elevated blood sugar    Pap smear for cervical cancer screening    Lumbar radiculopathy    Smoking    Narcolepsy       Past Medical History:   Diagnosis Date    Allergy to dust     Anemia     Calculus of ureter     Chronic pain disorder     neck and back- sees pain management    Colitis     Cracked skin     on fingers    Diverticulosis     Dysphagia     H/O: GI bleed     Hiatal hernia     History of DVT in adulthood     flaquito    Hydronephrosis     Hyperlipidemia     Inguinal hernia     right side    Irritable bowel     Migraines     Narcolepsy     Narcolepsy 8/6/2020    OAB (overactive bladder)     Osteoarthritis     Osteoporosis     Other chronic pain     degenerative disc disease    PONV (postoperative nausea and vomiting)     migraines    Smoking 8/6/2020    Tobacco abuse     Wears dentures     full upper    Wears glasses        Past Surgical History:   Procedure Laterality Date    ABDOMINAL ADHESION SURGERY      ARM SKIN LESION BIOPSY / EXCISION      lymoh node excision    BLADDER SURGERY      BREAST SURGERY      CHOLECYSTECTOMY      HYSTERECTOMY  1977    JOINT REPLACEMENT Right     KNEE SURGERY Right     replacement    LAMINECTOMY      LAPAROSCOPY      MOLE REMOVAL      OOPHORECTOMY Bilateral 1977    OTHER SURGICAL HISTORY      enlarged lymph node removed left arm   no cancer    [place] REPAIR ING HERNIA,5+Y/O,REDUCIBL Right 6/25/2020    Procedure: REPAIR HERNIA INGUINAL  WITH MESH;  Surgeon: Rebecca Garnica MD;  Location: [facility];  Service: General    SPINAL FUSION      WISDOM TOOTH EXTRACTION           Current Outpatient Medications:     Armodafinil 250 MG tablet, Take 1 tablet (250 mg total) by mouth daily, Disp: 30 tablet, Rfl: 5    Ascorbic Acid (VITAMIN C PO), Take 500 mg by mouth daily , Disp: , Rfl:     BACILLUS COAGULANS-INULIN PO, Take 1 capsule by mouth daily , Disp: , Rfl:     calcium carbonate 1250 MG capsule, Take 600 mg by mouth daily , Disp: , Rfl:     cetirizine (ZyrTEC) 10 MG chewable tablet, Chew 10 mg daily , Disp: , Rfl:     Cholecalciferol 1000 units tablet, Take 1,000 Units by mouth daily , Disp: , Rfl:     Citicoline Sodium 500 MG TABS, Take 500 mg by mouth daily , Disp: , Rfl:     Coenzyme Q10 200 MG/GM POWD, Take 200 mg by mouth daily , Disp: , Rfl:     denosumab (PROLIA) 60 mg/mL, Inject 60 mg under the skin Every 6 months, Disp: , Rfl:     diclofenac (VOLTAREN) 75 mg EC tablet, Take 75 mg by mouth 3 (three) times a day , Disp: , Rfl:     dicyclomine (BENTYL) 20 mg tablet, Take 1 tablet (20 mg total) by mouth every 6 (six) hours as needed (urgency, abdominal pain) (Patient not taking: Reported on 8/31/2020), Disp: 120 tablet, Rfl: 2    DULoxetine (CYMBALTA) 30 mg delayed release capsule, Take 3 capsules (90 mg total) by mouth daily (Patient taking differently: Take 90 mg by mouth every evening ), Disp: 90 capsule, Rfl: 1    ferrous sulfate 325 (65 Fe) mg tablet, Take 325 mg by mouth daily with breakfast , Disp: , Rfl:     gabapentin (NEURONTIN) 300 mg capsule, 300 mg at 1200 in addition to 600 mg at bedtime  , Disp: , Rfl:     Ginger, Zingiber officinalis, (GINGER ROOT) 550 MG CAPS, Take 550 mg by mouth 2 (two) times a day , Disp: , Rfl:     Ginkgo Biloba 40 MG TABS, Take 40 mg by mouth daily , Disp: , Rfl:     mesalamine (LIALDA) 1 2 g EC tablet, Take 2 tablets (2 4 g total) by mouth daily with breakfast, Disp: 60 tablet, Rfl: 3    metaxalone (SKELAXIN) 800 mg tablet, Take 800 mg by mouth 3 (three) times a day , Disp: , Rfl:     Misc Natural Products (GINSENG-COMPLEX PO), Take 2 tablets by mouth daily , Disp: , Rfl:     Multiple Vitamins-Minerals (CENTRUM SILVER PO), Take 1 tablet by mouth daily , Disp: , Rfl:     mupirocin (BACTROBAN) 2 % ointment, Apply topically 3 (three) times a day Apply topically on hands to open cuts to prevent infection (Patient taking differently: Apply 1 application topically as needed Apply topically on hands to open cuts to prevent infection), Disp: 30 g, Rfl: 0    NON FORMULARY, Place 30 mg under the tongue as needed --- tincture prn, Disp: , Rfl:     NON FORMULARY, Take 2 tablets by mouth daily Take Health and Sooth take two capsule daily , Disp: , Rfl:     ondansetron (ZOFRAN) 4 mg tablet, Take 1 tablet (4 mg total) by mouth every 8 (eight) hours as needed for nausea or vomiting for up to 7 days, Disp: 20 tablet, Rfl: 0    ondansetron (ZOFRAN-ODT) 4 mg disintegrating tablet, Take 4 mg by mouth 2 (two) times a day as needed , Disp: , Rfl:     oxybutynin (DITROPAN XL) 15 MG 24 hr tablet, Take 1 tablet (15 mg total) by mouth daily at bedtime, Disp: 90 tablet, Rfl: 3    predniSONE 20 mg tablet, Take 20 mg by mouth as needed , Disp: , Rfl:     rosuvastatin (CRESTOR) 5 mg tablet, Take 1 tablet (5 mg total) by mouth daily, Disp: 30 tablet, Rfl: 0    vitamin E, tocopherol, 400 units capsule, Take 400 Units by mouth daily, Disp: , Rfl:     ZOLMitriptan (ZOMIG) 2 5 MG tablet, By mouth take 1 tablet at onset of migraine  May repeat in 2 hours if needed  Limit 2/24 hours, 4/week, 8/month  (Patient taking differently: Take 2 5 mg by mouth By mouth take 1 tablet at onset of migraine  May repeat in 2 hours if needed  Limit 2/24 hours, 4/week, 8/month ), Disp: 12 tablet, Rfl: 2    Current Facility-Administered Medications:     dexamethasone (PF) (DECADRON) injection 20 mg, 20 mg, Epidural, Once, Ashley Epley, DO    iohexol (OMNIPAQUE) 300 mg/mL injection 50 mL, 50 mL, Epidural, Once, Ashley Epley, DO    lidocaine (PF) (XYLOCAINE-MPF) 1 % injection 5 mL, 5 mL, Infiltration, Once, Ashley Epley, DO    Allergies   Allergen Reactions    Sumatriptan Anaphylaxis     Other reaction(s): SUMATRIPTAN (IMITREX) (Throat closure)  Pt analilia zolmitriptan   Celecoxib Other (See Comments)     Increased Heart Rate, Palpitations    Tramadol Other (See Comments)     GI Upset sever vomiting   Medical Tape Dermatitis, Rash and Other (See Comments)     Adhesive from medication patches and bandaides- ok with paper tape       Physical Exam:   Vitals:    09/22/20 1451   BP: 120/77   Pulse: 84   Resp: 20   Temp: 97 9 °F (36 6 °C)   SpO2: 95%     General: Awake, Alert, Oriented x 3, Mood and affect appropriate  Respiratory: Respirations even and unlabored  Cardiovascular: Peripheral pulses intact; no edema  Musculoskeletal Exam:  Bilateral back and leg pain    ASA Score:  2      Patient/Chart Verification  Patient ID Verified: Verbal  ID Band Applied: No  Consents Confirmed: Procedural  H&P( within 30 days) Verified: To be obtained in the Pre-Procedure area  Interval H&P(within 24 hr) Complete (required for Outpatients and Surgery Admit only): To be obtained in the Pre-Procedure area  Allergies Reviewed: Yes  Anticoag/NSAID held?: NA  Currently on antibiotics?: No  Pregnancy denied?: NA    Assessment:   1   Lumbar radiculopathy        Plan: repeat B/L L5- S1 TFESI

## 2020-09-24 ENCOUNTER — APPOINTMENT (OUTPATIENT)
Dept: LAB | Facility: MEDICAL CENTER | Age: 60
End: 2020-09-24
Payer: COMMERCIAL

## 2020-09-24 DIAGNOSIS — E78.00 PURE HYPERCHOLESTEROLEMIA: ICD-10-CM

## 2020-09-24 LAB
CHOLEST SERPL-MCNC: 223 MG/DL (ref 50–200)
HDLC SERPL-MCNC: 87 MG/DL
LDLC SERPL CALC-MCNC: 101 MG/DL (ref 0–100)
TRIGL SERPL-MCNC: 175 MG/DL

## 2020-09-24 PROCEDURE — 36415 COLL VENOUS BLD VENIPUNCTURE: CPT

## 2020-09-24 PROCEDURE — 80061 LIPID PANEL: CPT

## 2020-09-29 ENCOUNTER — TELEPHONE (OUTPATIENT)
Dept: PAIN MEDICINE | Facility: CLINIC | Age: 60
End: 2020-09-29

## 2020-10-01 ENCOUNTER — TELEPHONE (OUTPATIENT)
Dept: UROLOGY | Facility: MEDICAL CENTER | Age: 60
End: 2020-10-01

## 2020-10-01 ENCOUNTER — OFFICE VISIT (OUTPATIENT)
Dept: FAMILY MEDICINE CLINIC | Facility: CLINIC | Age: 60
End: 2020-10-01
Payer: COMMERCIAL

## 2020-10-01 VITALS
TEMPERATURE: 97.8 F | HEART RATE: 78 BPM | WEIGHT: 116.6 LBS | DIASTOLIC BLOOD PRESSURE: 67 MMHG | BODY MASS INDEX: 19.91 KG/M2 | OXYGEN SATURATION: 97 % | HEIGHT: 64 IN | SYSTOLIC BLOOD PRESSURE: 126 MMHG

## 2020-10-01 DIAGNOSIS — R63.4 WEIGHT LOSS: ICD-10-CM

## 2020-10-01 DIAGNOSIS — S00.83XA CONTUSION OF OTHER PART OF HEAD, INITIAL ENCOUNTER: ICD-10-CM

## 2020-10-01 DIAGNOSIS — K52.9 COLITIS: ICD-10-CM

## 2020-10-01 DIAGNOSIS — R73.9 ELEVATED BLOOD SUGAR: ICD-10-CM

## 2020-10-01 DIAGNOSIS — Z23 IMMUNIZATION DUE: ICD-10-CM

## 2020-10-01 DIAGNOSIS — E78.2 MIXED HYPERLIPIDEMIA: Primary | ICD-10-CM

## 2020-10-01 DIAGNOSIS — Z12.4 SCREENING FOR MALIGNANT NEOPLASM OF THE CERVIX: ICD-10-CM

## 2020-10-01 PROCEDURE — 4004F PT TOBACCO SCREEN RCVD TLK: CPT | Performed by: FAMILY MEDICINE

## 2020-10-01 PROCEDURE — 99214 OFFICE O/P EST MOD 30 MIN: CPT | Performed by: FAMILY MEDICINE

## 2020-10-01 PROCEDURE — 3725F SCREEN DEPRESSION PERFORMED: CPT | Performed by: FAMILY MEDICINE

## 2020-10-06 DIAGNOSIS — E78.00 PURE HYPERCHOLESTEROLEMIA: ICD-10-CM

## 2020-10-06 RX ORDER — ROSUVASTATIN CALCIUM 5 MG/1
TABLET, COATED ORAL
Qty: 30 TABLET | Refills: 0 | Status: SHIPPED | OUTPATIENT
Start: 2020-10-06 | End: 2020-11-24

## 2020-11-09 ENCOUNTER — OFFICE VISIT (OUTPATIENT)
Dept: GASTROENTEROLOGY | Facility: MEDICAL CENTER | Age: 60
End: 2020-11-09
Payer: COMMERCIAL

## 2020-11-09 VITALS
HEIGHT: 64 IN | WEIGHT: 119 LBS | BODY MASS INDEX: 20.32 KG/M2 | HEART RATE: 78 BPM | TEMPERATURE: 97.5 F | DIASTOLIC BLOOD PRESSURE: 82 MMHG | SYSTOLIC BLOOD PRESSURE: 140 MMHG

## 2020-11-09 DIAGNOSIS — K52.9 COLITIS: Primary | ICD-10-CM

## 2020-11-09 PROCEDURE — 99214 OFFICE O/P EST MOD 30 MIN: CPT | Performed by: INTERNAL MEDICINE

## 2020-11-19 ENCOUNTER — OFFICE VISIT (OUTPATIENT)
Dept: UROLOGY | Facility: MEDICAL CENTER | Age: 60
End: 2020-11-19
Payer: COMMERCIAL

## 2020-11-19 VITALS
HEIGHT: 64 IN | TEMPERATURE: 97.5 F | BODY MASS INDEX: 19.81 KG/M2 | WEIGHT: 116 LBS | DIASTOLIC BLOOD PRESSURE: 80 MMHG | SYSTOLIC BLOOD PRESSURE: 132 MMHG

## 2020-11-19 DIAGNOSIS — N20.0 RENAL CALCULUS: Primary | ICD-10-CM

## 2020-11-19 DIAGNOSIS — R39.15 URGENCY OF URINATION: ICD-10-CM

## 2020-11-19 LAB
SL AMB  POCT GLUCOSE, UA: NORMAL
SL AMB LEUKOCYTE ESTERASE,UA: NORMAL
SL AMB POCT BILIRUBIN,UA: NORMAL
SL AMB POCT BLOOD,UA: NORMAL
SL AMB POCT CLARITY,UA: CLEAR
SL AMB POCT COLOR,UA: YELLOW
SL AMB POCT KETONES,UA: NORMAL
SL AMB POCT NITRITE,UA: NORMAL
SL AMB POCT PH,UA: 6.5
SL AMB POCT SPECIFIC GRAVITY,UA: 1.01
SL AMB POCT URINE PROTEIN: NORMAL
SL AMB POCT UROBILINOGEN: 0.2

## 2020-11-19 PROCEDURE — 4004F PT TOBACCO SCREEN RCVD TLK: CPT | Performed by: UROLOGY

## 2020-11-19 PROCEDURE — 81003 URINALYSIS AUTO W/O SCOPE: CPT | Performed by: UROLOGY

## 2020-11-19 PROCEDURE — 99213 OFFICE O/P EST LOW 20 MIN: CPT | Performed by: UROLOGY

## 2020-11-19 PROCEDURE — 3008F BODY MASS INDEX DOCD: CPT | Performed by: UROLOGY

## 2020-11-23 DIAGNOSIS — E78.00 PURE HYPERCHOLESTEROLEMIA: ICD-10-CM

## 2020-11-24 RX ORDER — ROSUVASTATIN CALCIUM 5 MG/1
TABLET, COATED ORAL
Qty: 30 TABLET | Refills: 0 | Status: SHIPPED | OUTPATIENT
Start: 2020-11-24 | End: 2020-12-29 | Stop reason: SDUPTHER

## 2020-11-30 ENCOUNTER — TELEPHONE (OUTPATIENT)
Dept: ADMINISTRATIVE | Facility: OTHER | Age: 60
End: 2020-11-30

## 2020-12-02 ENCOUNTER — APPOINTMENT (EMERGENCY)
Dept: CT IMAGING | Facility: HOSPITAL | Age: 60
DRG: 661 | End: 2020-12-02
Payer: COMMERCIAL

## 2020-12-02 ENCOUNTER — ANESTHESIA EVENT (INPATIENT)
Dept: PERIOP | Facility: HOSPITAL | Age: 60
DRG: 661 | End: 2020-12-02
Payer: COMMERCIAL

## 2020-12-02 ENCOUNTER — ANESTHESIA (INPATIENT)
Dept: PERIOP | Facility: HOSPITAL | Age: 60
DRG: 661 | End: 2020-12-02
Payer: COMMERCIAL

## 2020-12-02 ENCOUNTER — HOSPITAL ENCOUNTER (INPATIENT)
Facility: HOSPITAL | Age: 60
LOS: 2 days | Discharge: HOME | DRG: 661 | End: 2020-12-04
Attending: EMERGENCY MEDICINE | Admitting: HOSPITALIST
Payer: COMMERCIAL

## 2020-12-02 ENCOUNTER — APPOINTMENT (EMERGENCY)
Dept: RADIOLOGY | Facility: HOSPITAL | Age: 60
DRG: 661 | End: 2020-12-02
Payer: COMMERCIAL

## 2020-12-02 VITALS — HEART RATE: 87 BPM

## 2020-12-02 DIAGNOSIS — N20.1 RIGHT URETERAL CALCULUS: Primary | ICD-10-CM

## 2020-12-02 DIAGNOSIS — N13.2 URETERAL STONE WITH HYDRONEPHROSIS: ICD-10-CM

## 2020-12-02 DIAGNOSIS — R82.71 BACTERIURIA: ICD-10-CM

## 2020-12-02 PROBLEM — Z72.0 TOBACCO ABUSE: Status: ACTIVE | Noted: 2020-08-06

## 2020-12-02 LAB
ALBUMIN SERPL BCP-MCNC: 3.3 G/DL (ref 3.5–5)
ALP SERPL-CCNC: 74 U/L (ref 46–116)
ALT SERPL W P-5'-P-CCNC: 21 U/L (ref 12–78)
ANION GAP SERPL CALCULATED.3IONS-SCNC: 12 MMOL/L (ref 4–13)
AST SERPL W P-5'-P-CCNC: 18 U/L (ref 5–45)
ATRIAL RATE: 86 BPM
BACTERIA UR QL AUTO: ABNORMAL /HPF
BASOPHILS # BLD AUTO: 0.03 THOUSANDS/ΜL (ref 0–0.1)
BASOPHILS NFR BLD AUTO: 0 % (ref 0–1)
BILIRUB SERPL-MCNC: 0.66 MG/DL (ref 0.2–1)
BILIRUB UR QL STRIP: NEGATIVE
BUN SERPL-MCNC: 17 MG/DL (ref 5–25)
CALCIUM ALBUM COR SERPL-MCNC: 9.2 MG/DL (ref 8.3–10.1)
CALCIUM SERPL-MCNC: 8.6 MG/DL (ref 8.3–10.1)
CHLORIDE SERPL-SCNC: 103 MMOL/L (ref 100–108)
CLARITY UR: ABNORMAL
CLARITY, POC: ABNORMAL
CO2 SERPL-SCNC: 24 MMOL/L (ref 21–32)
COLOR UR: YELLOW
COLOR, POC: YELLOW
CREAT SERPL-MCNC: 0.82 MG/DL (ref 0.6–1.3)
EOSINOPHIL # BLD AUTO: 0.04 THOUSAND/ΜL (ref 0–0.61)
EOSINOPHIL NFR BLD AUTO: 1 % (ref 0–6)
ERYTHROCYTE [DISTWIDTH] IN BLOOD BY AUTOMATED COUNT: 12.2 % (ref 11.6–15.1)
GFR SERPL CREATININE-BSD FRML MDRD: 77 ML/MIN/1.73SQ M
GLUCOSE SERPL-MCNC: 120 MG/DL (ref 65–140)
GLUCOSE UR STRIP-MCNC: NEGATIVE MG/DL
HCT VFR BLD AUTO: 34.5 % (ref 34.8–46.1)
HGB BLD-MCNC: 11.5 G/DL (ref 11.5–15.4)
HGB UR QL STRIP.AUTO: ABNORMAL
IMM GRANULOCYTES # BLD AUTO: 0.02 THOUSAND/UL (ref 0–0.2)
IMM GRANULOCYTES NFR BLD AUTO: 0 % (ref 0–2)
KETONES UR STRIP-MCNC: ABNORMAL MG/DL
LEUKOCYTE ESTERASE UR QL STRIP: ABNORMAL
LIPASE SERPL-CCNC: 59 U/L (ref 73–393)
LYMPHOCYTES # BLD AUTO: 0.37 THOUSANDS/ΜL (ref 0.6–4.47)
LYMPHOCYTES NFR BLD AUTO: 5 % (ref 14–44)
MCH RBC QN AUTO: 31.9 PG (ref 26.8–34.3)
MCHC RBC AUTO-ENTMCNC: 33.3 G/DL (ref 31.4–37.4)
MCV RBC AUTO: 96 FL (ref 82–98)
MONOCYTES # BLD AUTO: 0.49 THOUSAND/ΜL (ref 0.17–1.22)
MONOCYTES NFR BLD AUTO: 6 % (ref 4–12)
NEUTROPHILS # BLD AUTO: 6.8 THOUSANDS/ΜL (ref 1.85–7.62)
NEUTS SEG NFR BLD AUTO: 88 % (ref 43–75)
NITRITE UR QL STRIP: POSITIVE
NON-SQ EPI CELLS URNS QL MICRO: ABNORMAL /HPF
NRBC BLD AUTO-RTO: 0 /100 WBCS
P AXIS: 68 DEGREES
PH UR STRIP.AUTO: 7 [PH] (ref 4.5–8)
PLATELET # BLD AUTO: 190 THOUSANDS/UL (ref 149–390)
PMV BLD AUTO: 10.1 FL (ref 8.9–12.7)
POTASSIUM SERPL-SCNC: 3.2 MMOL/L (ref 3.5–5.3)
PR INTERVAL: 136 MS
PROT SERPL-MCNC: 6.4 G/DL (ref 6.4–8.2)
PROT UR STRIP-MCNC: NEGATIVE MG/DL
QRS AXIS: 38 DEGREES
QRSD INTERVAL: 94 MS
QT INTERVAL: 346 MS
QTC INTERVAL: 414 MS
RBC # BLD AUTO: 3.6 MILLION/UL (ref 3.81–5.12)
RBC #/AREA URNS AUTO: ABNORMAL /HPF
SODIUM SERPL-SCNC: 139 MMOL/L (ref 136–145)
SP GR UR STRIP.AUTO: 1.02 (ref 1–1.03)
T WAVE AXIS: 57 DEGREES
TROPONIN I SERPL-MCNC: <0.02 NG/ML
UROBILINOGEN UR QL STRIP.AUTO: 0.2 E.U./DL
VENTRICULAR RATE: 86 BPM
WBC # BLD AUTO: 7.75 THOUSAND/UL (ref 4.31–10.16)
WBC #/AREA URNS AUTO: ABNORMAL /HPF

## 2020-12-02 PROCEDURE — 84484 ASSAY OF TROPONIN QUANT: CPT | Performed by: PHYSICIAN ASSISTANT

## 2020-12-02 PROCEDURE — 81001 URINALYSIS AUTO W/SCOPE: CPT

## 2020-12-02 PROCEDURE — 93005 ELECTROCARDIOGRAM TRACING: CPT

## 2020-12-02 PROCEDURE — 96374 THER/PROPH/DIAG INJ IV PUSH: CPT

## 2020-12-02 PROCEDURE — 87077 CULTURE AEROBIC IDENTIFY: CPT | Performed by: PHYSICIAN ASSISTANT

## 2020-12-02 PROCEDURE — 99285 EMERGENCY DEPT VISIT HI MDM: CPT

## 2020-12-02 PROCEDURE — 87186 SC STD MICRODIL/AGAR DIL: CPT | Performed by: PHYSICIAN ASSISTANT

## 2020-12-02 PROCEDURE — 80053 COMPREHEN METABOLIC PANEL: CPT | Performed by: PHYSICIAN ASSISTANT

## 2020-12-02 PROCEDURE — 93010 ELECTROCARDIOGRAM REPORT: CPT

## 2020-12-02 PROCEDURE — 52332 CYSTOSCOPY AND TREATMENT: CPT | Performed by: UROLOGY

## 2020-12-02 PROCEDURE — 0T768DZ DILATION OF RIGHT URETER WITH INTRALUMINAL DEVICE, VIA NATURAL OR ARTIFICIAL OPENING ENDOSCOPIC: ICD-10-PCS | Performed by: UROLOGY

## 2020-12-02 PROCEDURE — 74018 RADEX ABDOMEN 1 VIEW: CPT

## 2020-12-02 PROCEDURE — 36415 COLL VENOUS BLD VENIPUNCTURE: CPT | Performed by: PHYSICIAN ASSISTANT

## 2020-12-02 PROCEDURE — 99223 1ST HOSP IP/OBS HIGH 75: CPT | Performed by: HOSPITALIST

## 2020-12-02 PROCEDURE — C1769 GUIDE WIRE: HCPCS | Performed by: UROLOGY

## 2020-12-02 PROCEDURE — 74177 CT ABD & PELVIS W/CONTRAST: CPT

## 2020-12-02 PROCEDURE — 96375 TX/PRO/DX INJ NEW DRUG ADDON: CPT

## 2020-12-02 PROCEDURE — 87086 URINE CULTURE/COLONY COUNT: CPT | Performed by: PHYSICIAN ASSISTANT

## 2020-12-02 PROCEDURE — 96361 HYDRATE IV INFUSION ADD-ON: CPT

## 2020-12-02 PROCEDURE — C2617 STENT, NON-COR, TEM W/O DEL: HCPCS | Performed by: UROLOGY

## 2020-12-02 PROCEDURE — 83690 ASSAY OF LIPASE: CPT | Performed by: PHYSICIAN ASSISTANT

## 2020-12-02 PROCEDURE — 99285 EMERGENCY DEPT VISIT HI MDM: CPT | Performed by: EMERGENCY MEDICINE

## 2020-12-02 PROCEDURE — 99255 IP/OBS CONSLTJ NEW/EST HI 80: CPT | Performed by: UROLOGY

## 2020-12-02 PROCEDURE — 85025 COMPLETE CBC W/AUTO DIFF WBC: CPT | Performed by: PHYSICIAN ASSISTANT

## 2020-12-02 PROCEDURE — G1004 CDSM NDSC: HCPCS

## 2020-12-02 DEVICE — INLAY OPTIMA URETERAL STENT W/O GUIDEWIRE
Type: IMPLANTABLE DEVICE | Site: BLADDER | Status: FUNCTIONAL
Brand: BARD® INLAY OPTIMA® URETERAL STENT

## 2020-12-02 RX ORDER — PROPOFOL 10 MG/ML
INJECTION, EMULSION INTRAVENOUS AS NEEDED
Status: DISCONTINUED | OUTPATIENT
Start: 2020-12-02 | End: 2020-12-02

## 2020-12-02 RX ORDER — ONDANSETRON 2 MG/ML
4 INJECTION INTRAMUSCULAR; INTRAVENOUS ONCE
Status: COMPLETED | OUTPATIENT
Start: 2020-12-02 | End: 2020-12-02

## 2020-12-02 RX ORDER — ONDANSETRON 2 MG/ML
INJECTION INTRAMUSCULAR; INTRAVENOUS AS NEEDED
Status: DISCONTINUED | OUTPATIENT
Start: 2020-12-02 | End: 2020-12-02

## 2020-12-02 RX ORDER — FENTANYL CITRATE 50 UG/ML
1 INJECTION, SOLUTION INTRAMUSCULAR; INTRAVENOUS ONCE
Status: COMPLETED | OUTPATIENT
Start: 2020-12-02 | End: 2020-12-02

## 2020-12-02 RX ORDER — MAGNESIUM HYDROXIDE 1200 MG/15ML
LIQUID ORAL AS NEEDED
Status: DISCONTINUED | OUTPATIENT
Start: 2020-12-02 | End: 2020-12-02 | Stop reason: HOSPADM

## 2020-12-02 RX ORDER — OMEPRAZOLE 40 MG/1
40 CAPSULE, DELAYED RELEASE ORAL
COMMUNITY
Start: 2020-11-16 | End: 2021-04-01 | Stop reason: SDUPTHER

## 2020-12-02 RX ORDER — MORPHINE SULFATE 4 MG/ML
4 INJECTION, SOLUTION INTRAMUSCULAR; INTRAVENOUS ONCE
Status: COMPLETED | OUTPATIENT
Start: 2020-12-02 | End: 2020-12-02

## 2020-12-02 RX ORDER — ONDANSETRON 2 MG/ML
1 INJECTION INTRAMUSCULAR; INTRAVENOUS ONCE
Status: COMPLETED | OUTPATIENT
Start: 2020-12-02 | End: 2020-12-02

## 2020-12-02 RX ORDER — SODIUM CHLORIDE 9 MG/ML
125 INJECTION, SOLUTION INTRAVENOUS CONTINUOUS
Status: CANCELLED | OUTPATIENT
Start: 2020-12-02

## 2020-12-02 RX ORDER — CEFAZOLIN SODIUM 1 G/50ML
1000 SOLUTION INTRAVENOUS ONCE
Status: COMPLETED | OUTPATIENT
Start: 2020-12-02 | End: 2020-12-02

## 2020-12-02 RX ORDER — ONDANSETRON 2 MG/ML
4 INJECTION INTRAMUSCULAR; INTRAVENOUS ONCE AS NEEDED
Status: DISCONTINUED | OUTPATIENT
Start: 2020-12-02 | End: 2020-12-02 | Stop reason: HOSPADM

## 2020-12-02 RX ORDER — FENTANYL CITRATE/PF 50 MCG/ML
25 SYRINGE (ML) INJECTION
Status: DISCONTINUED | OUTPATIENT
Start: 2020-12-02 | End: 2020-12-02 | Stop reason: HOSPADM

## 2020-12-02 RX ORDER — FENTANYL CITRATE 50 UG/ML
INJECTION, SOLUTION INTRAMUSCULAR; INTRAVENOUS AS NEEDED
Status: DISCONTINUED | OUTPATIENT
Start: 2020-12-02 | End: 2020-12-02

## 2020-12-02 RX ORDER — SODIUM CHLORIDE 9 MG/ML
100 INJECTION, SOLUTION INTRAVENOUS CONTINUOUS
Status: DISCONTINUED | OUTPATIENT
Start: 2020-12-02 | End: 2020-12-04 | Stop reason: HOSPADM

## 2020-12-02 RX ORDER — NICOTINE 21 MG/24HR
1 PATCH, TRANSDERMAL 24 HOURS TRANSDERMAL DAILY
Status: DISCONTINUED | OUTPATIENT
Start: 2020-12-03 | End: 2020-12-04 | Stop reason: HOSPADM

## 2020-12-02 RX ORDER — DEXAMETHASONE SODIUM PHOSPHATE 4 MG/ML
INJECTION, SOLUTION INTRA-ARTICULAR; INTRALESIONAL; INTRAMUSCULAR; INTRAVENOUS; SOFT TISSUE AS NEEDED
Status: DISCONTINUED | OUTPATIENT
Start: 2020-12-02 | End: 2020-12-02

## 2020-12-02 RX ORDER — LIDOCAINE HYDROCHLORIDE 20 MG/ML
INJECTION, SOLUTION EPIDURAL; INFILTRATION; INTRACAUDAL; PERINEURAL AS NEEDED
Status: DISCONTINUED | OUTPATIENT
Start: 2020-12-02 | End: 2020-12-02

## 2020-12-02 RX ORDER — MIDAZOLAM HYDROCHLORIDE 2 MG/2ML
INJECTION, SOLUTION INTRAMUSCULAR; INTRAVENOUS AS NEEDED
Status: DISCONTINUED | OUTPATIENT
Start: 2020-12-02 | End: 2020-12-02

## 2020-12-02 RX ORDER — EPHEDRINE SULFATE 50 MG/ML
INJECTION INTRAVENOUS AS NEEDED
Status: DISCONTINUED | OUTPATIENT
Start: 2020-12-02 | End: 2020-12-02

## 2020-12-02 RX ADMIN — ONDANSETRON 4 MG: 2 INJECTION INTRAMUSCULAR; INTRAVENOUS at 18:39

## 2020-12-02 RX ADMIN — SODIUM CHLORIDE: 0.9 INJECTION, SOLUTION INTRAVENOUS at 18:12

## 2020-12-02 RX ADMIN — LIDOCAINE HYDROCHLORIDE 50 MG: 20 INJECTION, SOLUTION EPIDURAL; INFILTRATION; INTRACAUDAL; PERINEURAL at 18:24

## 2020-12-02 RX ADMIN — EPHEDRINE SULFATE 5 MG: 50 INJECTION, SOLUTION INTRAVENOUS at 18:34

## 2020-12-02 RX ADMIN — MORPHINE SULFATE 4 MG: 4 INJECTION INTRAVENOUS at 14:42

## 2020-12-02 RX ADMIN — MORPHINE SULFATE 2 MG: 2 INJECTION, SOLUTION INTRAMUSCULAR; INTRAVENOUS at 17:35

## 2020-12-02 RX ADMIN — ONDANSETRON 4 MG: 2 INJECTION INTRAMUSCULAR; INTRAVENOUS at 08:44

## 2020-12-02 RX ADMIN — MORPHINE SULFATE 2 MG: 2 INJECTION, SOLUTION INTRAMUSCULAR; INTRAVENOUS at 13:46

## 2020-12-02 RX ADMIN — DEXAMETHASONE SODIUM PHOSPHATE 4 MG: 4 INJECTION INTRA-ARTICULAR; INTRALESIONAL; INTRAMUSCULAR; INTRAVENOUS; SOFT TISSUE at 18:39

## 2020-12-02 RX ADMIN — CEFAZOLIN SODIUM 1000 MG: 1 SOLUTION INTRAVENOUS at 18:04

## 2020-12-02 RX ADMIN — FENTANYL CITRATE 50 MCG: 50 INJECTION, SOLUTION INTRAMUSCULAR; INTRAVENOUS at 18:20

## 2020-12-02 RX ADMIN — EPHEDRINE SULFATE 5 MG: 50 INJECTION, SOLUTION INTRAVENOUS at 18:31

## 2020-12-02 RX ADMIN — SODIUM CHLORIDE 125 ML/HR: 0.9 INJECTION, SOLUTION INTRAVENOUS at 17:40

## 2020-12-02 RX ADMIN — IOHEXOL 100 ML: 350 INJECTION, SOLUTION INTRAVENOUS at 11:09

## 2020-12-02 RX ADMIN — EPHEDRINE SULFATE 5 MG: 50 INJECTION, SOLUTION INTRAVENOUS at 18:30

## 2020-12-02 RX ADMIN — SODIUM CHLORIDE 1000 ML: 0.9 INJECTION, SOLUTION INTRAVENOUS at 08:00

## 2020-12-02 RX ADMIN — FENTANYL CITRATE 25 MCG: 50 INJECTION, SOLUTION INTRAMUSCULAR; INTRAVENOUS at 18:24

## 2020-12-02 RX ADMIN — SODIUM CHLORIDE 125 ML/HR: 0.9 INJECTION, SOLUTION INTRAVENOUS at 19:46

## 2020-12-02 RX ADMIN — EPHEDRINE SULFATE 5 MG: 50 INJECTION, SOLUTION INTRAVENOUS at 18:33

## 2020-12-02 RX ADMIN — MIDAZOLAM 2 MG: 1 INJECTION INTRAMUSCULAR; INTRAVENOUS at 18:15

## 2020-12-02 RX ADMIN — EPHEDRINE SULFATE 5 MG: 50 INJECTION, SOLUTION INTRAVENOUS at 18:32

## 2020-12-02 RX ADMIN — PROPOFOL 120 MG: 10 INJECTION, EMULSION INTRAVENOUS at 18:24

## 2020-12-02 RX ADMIN — MORPHINE SULFATE 4 MG: 4 INJECTION INTRAVENOUS at 08:44

## 2020-12-02 RX ADMIN — CEFTRIAXONE SODIUM 1000 MG: 10 INJECTION, POWDER, FOR SOLUTION INTRAVENOUS at 13:19

## 2020-12-02 RX ADMIN — FENTANYL CITRATE 25 MCG: 50 INJECTION, SOLUTION INTRAMUSCULAR; INTRAVENOUS at 18:44

## 2020-12-02 RX ADMIN — EPHEDRINE SULFATE 5 MG: 50 INJECTION, SOLUTION INTRAVENOUS at 18:35

## 2020-12-03 LAB
ANION GAP SERPL CALCULATED.3IONS-SCNC: 9 MMOL/L (ref 4–13)
BASOPHILS # BLD AUTO: 0.03 THOUSANDS/ΜL (ref 0–0.1)
BASOPHILS NFR BLD AUTO: 0 % (ref 0–1)
BUN SERPL-MCNC: 14 MG/DL (ref 5–25)
CALCIUM SERPL-MCNC: 7.8 MG/DL (ref 8.3–10.1)
CHLORIDE SERPL-SCNC: 103 MMOL/L (ref 100–108)
CO2 SERPL-SCNC: 25 MMOL/L (ref 21–32)
CREAT SERPL-MCNC: 0.75 MG/DL (ref 0.6–1.3)
EOSINOPHIL # BLD AUTO: 0.04 THOUSAND/ΜL (ref 0–0.61)
EOSINOPHIL NFR BLD AUTO: 0 % (ref 0–6)
ERYTHROCYTE [DISTWIDTH] IN BLOOD BY AUTOMATED COUNT: 12.8 % (ref 11.6–15.1)
GFR SERPL CREATININE-BSD FRML MDRD: 86 ML/MIN/1.73SQ M
GLUCOSE SERPL-MCNC: 111 MG/DL (ref 65–140)
HCT VFR BLD AUTO: 31.3 % (ref 34.8–46.1)
HGB BLD-MCNC: 10.3 G/DL (ref 11.5–15.4)
IMM GRANULOCYTES # BLD AUTO: 0.04 THOUSAND/UL (ref 0–0.2)
IMM GRANULOCYTES NFR BLD AUTO: 0 % (ref 0–2)
LYMPHOCYTES # BLD AUTO: 0.6 THOUSANDS/ΜL (ref 0.6–4.47)
LYMPHOCYTES NFR BLD AUTO: 6 % (ref 14–44)
MAGNESIUM SERPL-MCNC: 1.5 MG/DL (ref 1.6–2.6)
MCH RBC QN AUTO: 31.7 PG (ref 26.8–34.3)
MCHC RBC AUTO-ENTMCNC: 32.9 G/DL (ref 31.4–37.4)
MCV RBC AUTO: 96 FL (ref 82–98)
MONOCYTES # BLD AUTO: 0.56 THOUSAND/ΜL (ref 0.17–1.22)
MONOCYTES NFR BLD AUTO: 6 % (ref 4–12)
NEUTROPHILS # BLD AUTO: 8.5 THOUSANDS/ΜL (ref 1.85–7.62)
NEUTS SEG NFR BLD AUTO: 88 % (ref 43–75)
NRBC BLD AUTO-RTO: 0 /100 WBCS
PLATELET # BLD AUTO: 163 THOUSANDS/UL (ref 149–390)
PMV BLD AUTO: 10.7 FL (ref 8.9–12.7)
POTASSIUM SERPL-SCNC: 3.3 MMOL/L (ref 3.5–5.3)
RBC # BLD AUTO: 3.25 MILLION/UL (ref 3.81–5.12)
SODIUM SERPL-SCNC: 137 MMOL/L (ref 136–145)
WBC # BLD AUTO: 9.77 THOUSAND/UL (ref 4.31–10.16)

## 2020-12-03 PROCEDURE — 90471 IMMUNIZATION ADMIN: CPT | Performed by: HOSPITALIST

## 2020-12-03 PROCEDURE — 85025 COMPLETE CBC W/AUTO DIFF WBC: CPT | Performed by: UROLOGY

## 2020-12-03 PROCEDURE — 90682 RIV4 VACC RECOMBINANT DNA IM: CPT | Performed by: HOSPITALIST

## 2020-12-03 PROCEDURE — NC001 PR NO CHARGE: Performed by: PHYSICIAN ASSISTANT

## 2020-12-03 PROCEDURE — 83735 ASSAY OF MAGNESIUM: CPT | Performed by: UROLOGY

## 2020-12-03 PROCEDURE — 99232 SBSQ HOSP IP/OBS MODERATE 35: CPT | Performed by: HOSPITALIST

## 2020-12-03 PROCEDURE — 80048 BASIC METABOLIC PNL TOTAL CA: CPT | Performed by: UROLOGY

## 2020-12-03 PROCEDURE — 99232 SBSQ HOSP IP/OBS MODERATE 35: CPT | Performed by: PHYSICIAN ASSISTANT

## 2020-12-03 RX ORDER — HYDROMORPHONE HCL/PF 1 MG/ML
0.5 SYRINGE (ML) INJECTION ONCE
Status: COMPLETED | OUTPATIENT
Start: 2020-12-03 | End: 2020-12-03

## 2020-12-03 RX ORDER — KETOROLAC TROMETHAMINE 30 MG/ML
15 INJECTION, SOLUTION INTRAMUSCULAR; INTRAVENOUS ONCE
Status: COMPLETED | OUTPATIENT
Start: 2020-12-03 | End: 2020-12-03

## 2020-12-03 RX ORDER — MAGNESIUM SULFATE 1 G/100ML
1 INJECTION INTRAVENOUS ONCE
Status: COMPLETED | OUTPATIENT
Start: 2020-12-03 | End: 2020-12-03

## 2020-12-03 RX ORDER — METOCLOPRAMIDE HYDROCHLORIDE 5 MG/ML
10 INJECTION INTRAMUSCULAR; INTRAVENOUS ONCE
Status: COMPLETED | OUTPATIENT
Start: 2020-12-03 | End: 2020-12-03

## 2020-12-03 RX ORDER — POTASSIUM CHLORIDE 20 MEQ/1
40 TABLET, EXTENDED RELEASE ORAL ONCE
Status: COMPLETED | OUTPATIENT
Start: 2020-12-03 | End: 2020-12-03

## 2020-12-03 RX ORDER — METOCLOPRAMIDE HYDROCHLORIDE 5 MG/ML
5 INJECTION INTRAMUSCULAR; INTRAVENOUS ONCE
Status: COMPLETED | OUTPATIENT
Start: 2020-12-03 | End: 2020-12-03

## 2020-12-03 RX ORDER — HYDROMORPHONE HCL/PF 1 MG/ML
0.5 SYRINGE (ML) INJECTION ONCE
Status: DISCONTINUED | OUTPATIENT
Start: 2020-12-03 | End: 2020-12-03

## 2020-12-03 RX ADMIN — ENOXAPARIN SODIUM 40 MG: 40 INJECTION SUBCUTANEOUS at 08:36

## 2020-12-03 RX ADMIN — MAGNESIUM SULFATE HEPTAHYDRATE 1 G: 1 INJECTION, SOLUTION INTRAVENOUS at 11:22

## 2020-12-03 RX ADMIN — HYDROMORPHONE HYDROCHLORIDE 0.5 MG: 1 INJECTION, SOLUTION INTRAMUSCULAR; INTRAVENOUS; SUBCUTANEOUS at 18:34

## 2020-12-03 RX ADMIN — POTASSIUM CHLORIDE 40 MEQ: 1500 TABLET, EXTENDED RELEASE ORAL at 11:21

## 2020-12-03 RX ADMIN — INFLUENZA A VIRUS A/HAWAII/70/2019 (H1N1) RECOMBINANT HEMAGGLUTININ ANTIGEN, INFLUENZA A VIRUS A/MINNESOTA/41/2019 (H3N2) RECOMBINANT HEMAGGLUTININ ANTIGEN, INFLUENZA B VIRUS B/WASHINGTON/02/2019 RECOMBINANT HEMAGGLUTININ ANTIGEN, AND INFLUENZA B VIRUS B/PHUKET/3073/2013 RECOMBINANT HEMAGGLUTININ ANTIGEN 0.5 ML: 45; 45; 45; 45 INJECTION INTRAMUSCULAR at 06:05

## 2020-12-03 RX ADMIN — KETOROLAC TROMETHAMINE 15 MG: 30 INJECTION, SOLUTION INTRAMUSCULAR at 09:21

## 2020-12-03 RX ADMIN — SODIUM CHLORIDE 125 ML/HR: 0.9 INJECTION, SOLUTION INTRAVENOUS at 16:49

## 2020-12-03 RX ADMIN — Medication 1 PATCH: at 08:36

## 2020-12-03 RX ADMIN — MORPHINE SULFATE 2 MG: 2 INJECTION, SOLUTION INTRAMUSCULAR; INTRAVENOUS at 23:39

## 2020-12-03 RX ADMIN — MORPHINE SULFATE 2 MG: 2 INJECTION, SOLUTION INTRAMUSCULAR; INTRAVENOUS at 06:03

## 2020-12-03 RX ADMIN — SODIUM CHLORIDE 125 ML/HR: 0.9 INJECTION, SOLUTION INTRAVENOUS at 04:31

## 2020-12-03 RX ADMIN — METOCLOPRAMIDE 5 MG: 5 INJECTION, SOLUTION INTRAMUSCULAR; INTRAVENOUS at 09:22

## 2020-12-03 RX ADMIN — DIPHENHYDRAMINE HYDROCHLORIDE 25 MG: 50 INJECTION INTRAMUSCULAR; INTRAVENOUS at 09:26

## 2020-12-03 RX ADMIN — METOCLOPRAMIDE 10 MG: 5 INJECTION, SOLUTION INTRAMUSCULAR; INTRAVENOUS at 18:34

## 2020-12-03 RX ADMIN — CEFTRIAXONE SODIUM 1000 MG: 10 INJECTION, POWDER, FOR SOLUTION INTRAVENOUS at 13:14

## 2020-12-04 VITALS
BODY MASS INDEX: 20.06 KG/M2 | WEIGHT: 116.84 LBS | RESPIRATION RATE: 18 BRPM | TEMPERATURE: 98.1 F | DIASTOLIC BLOOD PRESSURE: 80 MMHG | SYSTOLIC BLOOD PRESSURE: 137 MMHG | HEART RATE: 72 BPM | OXYGEN SATURATION: 97 %

## 2020-12-04 LAB
ANION GAP SERPL CALCULATED.3IONS-SCNC: 8 MMOL/L (ref 4–13)
BACTERIA UR CULT: ABNORMAL
BASOPHILS # BLD AUTO: 0.04 THOUSANDS/ΜL (ref 0–0.1)
BASOPHILS NFR BLD AUTO: 1 % (ref 0–1)
BUN SERPL-MCNC: 10 MG/DL (ref 5–25)
CALCIUM SERPL-MCNC: 7.9 MG/DL (ref 8.3–10.1)
CHLORIDE SERPL-SCNC: 103 MMOL/L (ref 100–108)
CO2 SERPL-SCNC: 22 MMOL/L (ref 21–32)
CREAT SERPL-MCNC: 0.73 MG/DL (ref 0.6–1.3)
EOSINOPHIL # BLD AUTO: 0.07 THOUSAND/ΜL (ref 0–0.61)
EOSINOPHIL NFR BLD AUTO: 1 % (ref 0–6)
ERYTHROCYTE [DISTWIDTH] IN BLOOD BY AUTOMATED COUNT: 12.6 % (ref 11.6–15.1)
GFR SERPL CREATININE-BSD FRML MDRD: 89 ML/MIN/1.73SQ M
GLUCOSE SERPL-MCNC: 107 MG/DL (ref 65–140)
HCT VFR BLD AUTO: 32.7 % (ref 34.8–46.1)
HGB BLD-MCNC: 10.7 G/DL (ref 11.5–15.4)
IMM GRANULOCYTES # BLD AUTO: 0.04 THOUSAND/UL (ref 0–0.2)
IMM GRANULOCYTES NFR BLD AUTO: 1 % (ref 0–2)
LYMPHOCYTES # BLD AUTO: 0.69 THOUSANDS/ΜL (ref 0.6–4.47)
LYMPHOCYTES NFR BLD AUTO: 9 % (ref 14–44)
MCH RBC QN AUTO: 31.3 PG (ref 26.8–34.3)
MCHC RBC AUTO-ENTMCNC: 32.7 G/DL (ref 31.4–37.4)
MCV RBC AUTO: 96 FL (ref 82–98)
MONOCYTES # BLD AUTO: 0.44 THOUSAND/ΜL (ref 0.17–1.22)
MONOCYTES NFR BLD AUTO: 6 % (ref 4–12)
NEUTROPHILS # BLD AUTO: 6.52 THOUSANDS/ΜL (ref 1.85–7.62)
NEUTS SEG NFR BLD AUTO: 82 % (ref 43–75)
NRBC BLD AUTO-RTO: 0 /100 WBCS
PLATELET # BLD AUTO: 165 THOUSANDS/UL (ref 149–390)
PMV BLD AUTO: 10.7 FL (ref 8.9–12.7)
POTASSIUM SERPL-SCNC: 3.5 MMOL/L (ref 3.5–5.3)
RBC # BLD AUTO: 3.42 MILLION/UL (ref 3.81–5.12)
SODIUM SERPL-SCNC: 133 MMOL/L (ref 136–145)
WBC # BLD AUTO: 7.8 THOUSAND/UL (ref 4.31–10.16)

## 2020-12-04 PROCEDURE — 85025 COMPLETE CBC W/AUTO DIFF WBC: CPT | Performed by: HOSPITALIST

## 2020-12-04 PROCEDURE — 80048 BASIC METABOLIC PNL TOTAL CA: CPT | Performed by: HOSPITALIST

## 2020-12-04 PROCEDURE — 99239 HOSP IP/OBS DSCHRG MGMT >30: CPT | Performed by: HOSPITALIST

## 2020-12-04 RX ORDER — CEPHALEXIN 500 MG/1
500 CAPSULE ORAL EVERY 6 HOURS SCHEDULED
Qty: 28 CAPSULE | Refills: 0 | Status: SHIPPED | OUTPATIENT
Start: 2020-12-04 | End: 2020-12-11

## 2020-12-04 RX ADMIN — ENOXAPARIN SODIUM 40 MG: 40 INJECTION SUBCUTANEOUS at 08:05

## 2020-12-04 RX ADMIN — CEFTRIAXONE SODIUM 1000 MG: 10 INJECTION, POWDER, FOR SOLUTION INTRAVENOUS at 12:53

## 2020-12-04 RX ADMIN — SODIUM CHLORIDE 100 ML/HR: 0.9 INJECTION, SOLUTION INTRAVENOUS at 01:29

## 2020-12-04 RX ADMIN — Medication 1 PATCH: at 08:07

## 2020-12-07 ENCOUNTER — TELEPHONE (OUTPATIENT)
Dept: UROLOGY | Facility: MEDICAL CENTER | Age: 60
End: 2020-12-07

## 2020-12-07 ENCOUNTER — TRANSITIONAL CARE MANAGEMENT (OUTPATIENT)
Dept: FAMILY MEDICINE CLINIC | Facility: CLINIC | Age: 60
End: 2020-12-07

## 2020-12-09 ENCOUNTER — OFFICE VISIT (OUTPATIENT)
Dept: FAMILY MEDICINE CLINIC | Facility: CLINIC | Age: 60
End: 2020-12-09
Payer: COMMERCIAL

## 2020-12-09 VITALS
OXYGEN SATURATION: 97 % | SYSTOLIC BLOOD PRESSURE: 140 MMHG | HEIGHT: 64 IN | WEIGHT: 117 LBS | HEART RATE: 94 BPM | BODY MASS INDEX: 19.97 KG/M2 | TEMPERATURE: 97.6 F | DIASTOLIC BLOOD PRESSURE: 80 MMHG

## 2020-12-09 DIAGNOSIS — R94.31 ABNORMAL EKG: ICD-10-CM

## 2020-12-09 DIAGNOSIS — E87.6 HYPOKALEMIA: ICD-10-CM

## 2020-12-09 DIAGNOSIS — R74.8 ABNORMAL SERUM LEVEL OF LIPASE: ICD-10-CM

## 2020-12-09 DIAGNOSIS — I10 HYPERTENSION, UNSPECIFIED TYPE: ICD-10-CM

## 2020-12-09 DIAGNOSIS — E83.51 HYPOCALCEMIA: ICD-10-CM

## 2020-12-09 DIAGNOSIS — R79.0 LOW MAGNESIUM LEVEL: ICD-10-CM

## 2020-12-09 DIAGNOSIS — R93.89 ABNORMAL FINDING ON CT SCAN: Primary | ICD-10-CM

## 2020-12-09 DIAGNOSIS — D64.9 ANEMIA, UNSPECIFIED TYPE: ICD-10-CM

## 2020-12-09 DIAGNOSIS — N13.2 URETERAL STONE WITH HYDRONEPHROSIS: ICD-10-CM

## 2020-12-09 DIAGNOSIS — G43.819 OTHER MIGRAINE WITHOUT STATUS MIGRAINOSUS, INTRACTABLE: ICD-10-CM

## 2020-12-09 DIAGNOSIS — Z12.4 SCREENING FOR MALIGNANT NEOPLASM OF THE CERVIX: ICD-10-CM

## 2020-12-09 DIAGNOSIS — N39.0 URINARY TRACT INFECTION WITHOUT HEMATURIA, SITE UNSPECIFIED: ICD-10-CM

## 2020-12-09 DIAGNOSIS — J98.11 ATELECTASIS OF BOTH LUNGS: ICD-10-CM

## 2020-12-09 PROCEDURE — 4004F PT TOBACCO SCREEN RCVD TLK: CPT | Performed by: FAMILY MEDICINE

## 2020-12-09 PROCEDURE — 1111F DSCHRG MED/CURRENT MED MERGE: CPT | Performed by: FAMILY MEDICINE

## 2020-12-09 PROCEDURE — 3008F BODY MASS INDEX DOCD: CPT | Performed by: FAMILY MEDICINE

## 2020-12-09 PROCEDURE — 3077F SYST BP >= 140 MM HG: CPT | Performed by: FAMILY MEDICINE

## 2020-12-09 PROCEDURE — 99215 OFFICE O/P EST HI 40 MIN: CPT | Performed by: FAMILY MEDICINE

## 2020-12-09 PROCEDURE — 3079F DIAST BP 80-89 MM HG: CPT | Performed by: FAMILY MEDICINE

## 2020-12-14 ENCOUNTER — PREP FOR PROCEDURE (OUTPATIENT)
Dept: UROLOGY | Facility: AMBULATORY SURGERY CENTER | Age: 60
End: 2020-12-14

## 2020-12-14 DIAGNOSIS — R11.0 NAUSEA AFTER ANESTHESIA, INITIAL ENCOUNTER: ICD-10-CM

## 2020-12-14 DIAGNOSIS — R39.15 URGENCY OF URINATION: ICD-10-CM

## 2020-12-14 DIAGNOSIS — N20.0 RENAL CALCULUS: Primary | ICD-10-CM

## 2020-12-14 DIAGNOSIS — T88.59XA NAUSEA AFTER ANESTHESIA, INITIAL ENCOUNTER: ICD-10-CM

## 2020-12-14 DIAGNOSIS — N20.1 URETERAL CALCULUS: Primary | ICD-10-CM

## 2020-12-14 RX ORDER — PROCHLORPERAZINE MALEATE 10 MG
10 TABLET ORAL EVERY 6 HOURS PRN
Qty: 12 TABLET | Refills: 1 | Status: SHIPPED | OUTPATIENT
Start: 2020-12-14 | End: 2021-01-07 | Stop reason: SDUPTHER

## 2020-12-14 RX ORDER — CEFAZOLIN SODIUM 1 G/50ML
1000 SOLUTION INTRAVENOUS ONCE
Status: CANCELLED | OUTPATIENT
Start: 2021-01-05 | End: 2020-12-14

## 2020-12-14 RX ORDER — SOLIFENACIN SUCCINATE 10 MG/1
10 TABLET, FILM COATED ORAL DAILY
Qty: 30 TABLET | Refills: 3 | Status: SHIPPED | OUTPATIENT
Start: 2020-12-14 | End: 2021-03-05 | Stop reason: SDUPTHER

## 2020-12-15 ENCOUNTER — PREP FOR PROCEDURE (OUTPATIENT)
Dept: UROLOGY | Facility: MEDICAL CENTER | Age: 60
End: 2020-12-15

## 2020-12-15 DIAGNOSIS — N20.0 RENAL CALCULUS: Primary | ICD-10-CM

## 2020-12-20 ENCOUNTER — NURSE TRIAGE (OUTPATIENT)
Dept: OTHER | Facility: OTHER | Age: 60
End: 2020-12-20

## 2020-12-21 ENCOUNTER — LAB (OUTPATIENT)
Dept: LAB | Facility: CLINIC | Age: 60
End: 2020-12-21
Payer: COMMERCIAL

## 2020-12-21 DIAGNOSIS — R79.0 LOW MAGNESIUM LEVEL: ICD-10-CM

## 2020-12-21 DIAGNOSIS — D64.9 ANEMIA, UNSPECIFIED TYPE: ICD-10-CM

## 2020-12-21 DIAGNOSIS — E83.51 HYPOCALCEMIA: ICD-10-CM

## 2020-12-21 DIAGNOSIS — E87.6 HYPOKALEMIA: ICD-10-CM

## 2020-12-21 DIAGNOSIS — N20.0 RENAL CALCULUS: ICD-10-CM

## 2020-12-21 DIAGNOSIS — R74.8 ABNORMAL SERUM LEVEL OF LIPASE: ICD-10-CM

## 2020-12-21 LAB
ANION GAP SERPL CALCULATED.3IONS-SCNC: 9 MMOL/L (ref 4–13)
BASOPHILS # BLD AUTO: 0.07 THOUSANDS/ΜL (ref 0–0.1)
BASOPHILS NFR BLD AUTO: 1 % (ref 0–1)
BUN SERPL-MCNC: 12 MG/DL (ref 5–25)
CALCIUM SERPL-MCNC: 9.5 MG/DL (ref 8.3–10.1)
CHLORIDE SERPL-SCNC: 106 MMOL/L (ref 100–108)
CO2 SERPL-SCNC: 26 MMOL/L (ref 21–32)
CREAT SERPL-MCNC: 0.67 MG/DL (ref 0.6–1.3)
EOSINOPHIL # BLD AUTO: 0.29 THOUSAND/ΜL (ref 0–0.61)
EOSINOPHIL NFR BLD AUTO: 4 % (ref 0–6)
ERYTHROCYTE [DISTWIDTH] IN BLOOD BY AUTOMATED COUNT: 13.2 % (ref 11.6–15.1)
GFR SERPL CREATININE-BSD FRML MDRD: 95 ML/MIN/1.73SQ M
GLUCOSE SERPL-MCNC: 90 MG/DL (ref 65–140)
HCT VFR BLD AUTO: 38.5 % (ref 34.8–46.1)
HGB BLD-MCNC: 11.9 G/DL (ref 11.5–15.4)
IMM GRANULOCYTES # BLD AUTO: 0.03 THOUSAND/UL (ref 0–0.2)
IMM GRANULOCYTES NFR BLD AUTO: 0 % (ref 0–2)
LIPASE SERPL-CCNC: 40 U/L (ref 73–393)
LYMPHOCYTES # BLD AUTO: 1.39 THOUSANDS/ΜL (ref 0.6–4.47)
LYMPHOCYTES NFR BLD AUTO: 17 % (ref 14–44)
MAGNESIUM SERPL-MCNC: 2.1 MG/DL (ref 1.6–2.6)
MCH RBC QN AUTO: 31.3 PG (ref 26.8–34.3)
MCHC RBC AUTO-ENTMCNC: 30.9 G/DL (ref 31.4–37.4)
MCV RBC AUTO: 101 FL (ref 82–98)
MONOCYTES # BLD AUTO: 0.38 THOUSAND/ΜL (ref 0.17–1.22)
MONOCYTES NFR BLD AUTO: 5 % (ref 4–12)
NEUTROPHILS # BLD AUTO: 6.1 THOUSANDS/ΜL (ref 1.85–7.62)
NEUTS SEG NFR BLD AUTO: 73 % (ref 43–75)
NRBC BLD AUTO-RTO: 0 /100 WBCS
PLATELET # BLD AUTO: 357 THOUSANDS/UL (ref 149–390)
PMV BLD AUTO: 10.6 FL (ref 8.9–12.7)
POTASSIUM SERPL-SCNC: 4 MMOL/L (ref 3.5–5.3)
RBC # BLD AUTO: 3.8 MILLION/UL (ref 3.81–5.12)
SODIUM SERPL-SCNC: 141 MMOL/L (ref 136–145)
WBC # BLD AUTO: 8.26 THOUSAND/UL (ref 4.31–10.16)

## 2020-12-21 PROCEDURE — 87077 CULTURE AEROBIC IDENTIFY: CPT

## 2020-12-21 PROCEDURE — 87186 SC STD MICRODIL/AGAR DIL: CPT

## 2020-12-21 PROCEDURE — 80048 BASIC METABOLIC PNL TOTAL CA: CPT

## 2020-12-21 PROCEDURE — 87086 URINE CULTURE/COLONY COUNT: CPT

## 2020-12-21 PROCEDURE — 83690 ASSAY OF LIPASE: CPT

## 2020-12-21 PROCEDURE — 83735 ASSAY OF MAGNESIUM: CPT

## 2020-12-21 PROCEDURE — 36415 COLL VENOUS BLD VENIPUNCTURE: CPT

## 2020-12-21 PROCEDURE — 85025 COMPLETE CBC W/AUTO DIFF WBC: CPT

## 2020-12-22 ENCOUNTER — TELEPHONE (OUTPATIENT)
Dept: UROLOGY | Facility: MEDICAL CENTER | Age: 60
End: 2020-12-22

## 2020-12-22 DIAGNOSIS — N30.00 ACUTE CYSTITIS WITHOUT HEMATURIA: Primary | ICD-10-CM

## 2020-12-22 RX ORDER — NITROFURANTOIN 25; 75 MG/1; MG/1
100 CAPSULE ORAL 2 TIMES DAILY
Qty: 34 CAPSULE | Refills: 0 | Status: ON HOLD | OUTPATIENT
Start: 2020-12-22 | End: 2020-12-28 | Stop reason: SDUPTHER

## 2020-12-23 LAB — BACTERIA UR CULT: ABNORMAL

## 2020-12-25 ENCOUNTER — HOSPITAL ENCOUNTER (INPATIENT)
Facility: HOSPITAL | Age: 60
LOS: 3 days | Discharge: HOME | DRG: 690 | End: 2020-12-28
Attending: EMERGENCY MEDICINE | Admitting: INTERNAL MEDICINE
Payer: COMMERCIAL

## 2020-12-25 ENCOUNTER — APPOINTMENT (EMERGENCY)
Dept: CT IMAGING | Facility: HOSPITAL | Age: 60
DRG: 690 | End: 2020-12-25
Payer: COMMERCIAL

## 2020-12-25 DIAGNOSIS — N30.00 ACUTE CYSTITIS WITHOUT HEMATURIA: ICD-10-CM

## 2020-12-25 DIAGNOSIS — R19.7 DIARRHEA, UNSPECIFIED TYPE: ICD-10-CM

## 2020-12-25 DIAGNOSIS — N39.0 COMPLICATED UTI (URINARY TRACT INFECTION): ICD-10-CM

## 2020-12-25 DIAGNOSIS — R10.9 FLANK PAIN: ICD-10-CM

## 2020-12-25 DIAGNOSIS — R35.0 FREQUENCY OF URINATION: ICD-10-CM

## 2020-12-25 DIAGNOSIS — R63.4 WEIGHT LOSS: ICD-10-CM

## 2020-12-25 DIAGNOSIS — Z78.9 FAILURE OF OUTPATIENT TREATMENT: Primary | ICD-10-CM

## 2020-12-25 PROBLEM — K52.9 CHRONIC DIARRHEA: Status: ACTIVE | Noted: 2019-06-25

## 2020-12-25 LAB
ANION GAP SERPL CALCULATED.3IONS-SCNC: 7 MMOL/L (ref 4–13)
BACTERIA UR QL AUTO: ABNORMAL /HPF
BASOPHILS # BLD AUTO: 0.04 THOUSANDS/ΜL (ref 0–0.1)
BASOPHILS NFR BLD AUTO: 1 % (ref 0–1)
BILIRUB UR QL STRIP: NEGATIVE
BUN SERPL-MCNC: 15 MG/DL (ref 5–25)
CALCIUM SERPL-MCNC: 10 MG/DL (ref 8.3–10.1)
CHLORIDE SERPL-SCNC: 102 MMOL/L (ref 100–108)
CLARITY UR: ABNORMAL
CO2 SERPL-SCNC: 27 MMOL/L (ref 21–32)
COLOR UR: YELLOW
CREAT SERPL-MCNC: 0.73 MG/DL (ref 0.6–1.3)
EOSINOPHIL # BLD AUTO: 0.24 THOUSAND/ΜL (ref 0–0.61)
EOSINOPHIL NFR BLD AUTO: 5 % (ref 0–6)
ERYTHROCYTE [DISTWIDTH] IN BLOOD BY AUTOMATED COUNT: 12.4 % (ref 11.6–15.1)
GFR SERPL CREATININE-BSD FRML MDRD: 89 ML/MIN/1.73SQ M
GLUCOSE SERPL-MCNC: 103 MG/DL (ref 65–140)
GLUCOSE UR STRIP-MCNC: NEGATIVE MG/DL
HCT VFR BLD AUTO: 39.7 % (ref 34.8–46.1)
HGB BLD-MCNC: 12.9 G/DL (ref 11.5–15.4)
HGB UR QL STRIP.AUTO: ABNORMAL
IMM GRANULOCYTES # BLD AUTO: 0.03 THOUSAND/UL (ref 0–0.2)
IMM GRANULOCYTES NFR BLD AUTO: 1 % (ref 0–2)
KETONES UR STRIP-MCNC: NEGATIVE MG/DL
LEUKOCYTE ESTERASE UR QL STRIP: ABNORMAL
LYMPHOCYTES # BLD AUTO: 1.36 THOUSANDS/ΜL (ref 0.6–4.47)
LYMPHOCYTES NFR BLD AUTO: 25 % (ref 14–44)
MCH RBC QN AUTO: 31.4 PG (ref 26.8–34.3)
MCHC RBC AUTO-ENTMCNC: 32.5 G/DL (ref 31.4–37.4)
MCV RBC AUTO: 97 FL (ref 82–98)
MONOCYTES # BLD AUTO: 0.24 THOUSAND/ΜL (ref 0.17–1.22)
MONOCYTES NFR BLD AUTO: 5 % (ref 4–12)
NEUTROPHILS # BLD AUTO: 3.47 THOUSANDS/ΜL (ref 1.85–7.62)
NEUTS SEG NFR BLD AUTO: 63 % (ref 43–75)
NITRITE UR QL STRIP: NEGATIVE
NON-SQ EPI CELLS URNS QL MICRO: ABNORMAL /HPF
NRBC BLD AUTO-RTO: 0 /100 WBCS
OTHER STN SPEC: ABNORMAL
PH UR STRIP.AUTO: 6.5 [PH] (ref 4.5–8)
PLATELET # BLD AUTO: 332 THOUSANDS/UL (ref 149–390)
PMV BLD AUTO: 9.8 FL (ref 8.9–12.7)
POTASSIUM SERPL-SCNC: 4.1 MMOL/L (ref 3.5–5.3)
PROT UR STRIP-MCNC: NEGATIVE MG/DL
RBC # BLD AUTO: 4.11 MILLION/UL (ref 3.81–5.12)
RBC #/AREA URNS AUTO: ABNORMAL /HPF
SODIUM SERPL-SCNC: 136 MMOL/L (ref 136–145)
SP GR UR STRIP.AUTO: 1.02 (ref 1–1.03)
UROBILINOGEN UR QL STRIP.AUTO: 0.2 E.U./DL
WBC # BLD AUTO: 5.38 THOUSAND/UL (ref 4.31–10.16)
WBC #/AREA URNS AUTO: ABNORMAL /HPF

## 2020-12-25 PROCEDURE — 87086 URINE CULTURE/COLONY COUNT: CPT | Performed by: EMERGENCY MEDICINE

## 2020-12-25 PROCEDURE — 96375 TX/PRO/DX INJ NEW DRUG ADDON: CPT

## 2020-12-25 PROCEDURE — 74176 CT ABD & PELVIS W/O CONTRAST: CPT

## 2020-12-25 PROCEDURE — 99223 1ST HOSP IP/OBS HIGH 75: CPT | Performed by: INTERNAL MEDICINE

## 2020-12-25 PROCEDURE — 99254 IP/OBS CNSLTJ NEW/EST MOD 60: CPT | Performed by: PHYSICIAN ASSISTANT

## 2020-12-25 PROCEDURE — G1004 CDSM NDSC: HCPCS

## 2020-12-25 PROCEDURE — 80048 BASIC METABOLIC PNL TOTAL CA: CPT | Performed by: EMERGENCY MEDICINE

## 2020-12-25 PROCEDURE — 36415 COLL VENOUS BLD VENIPUNCTURE: CPT | Performed by: EMERGENCY MEDICINE

## 2020-12-25 PROCEDURE — 85025 COMPLETE CBC W/AUTO DIFF WBC: CPT | Performed by: EMERGENCY MEDICINE

## 2020-12-25 PROCEDURE — 87086 URINE CULTURE/COLONY COUNT: CPT

## 2020-12-25 PROCEDURE — 96374 THER/PROPH/DIAG INJ IV PUSH: CPT

## 2020-12-25 PROCEDURE — 99285 EMERGENCY DEPT VISIT HI MDM: CPT

## 2020-12-25 PROCEDURE — 81001 URINALYSIS AUTO W/SCOPE: CPT

## 2020-12-25 PROCEDURE — 99285 EMERGENCY DEPT VISIT HI MDM: CPT | Performed by: EMERGENCY MEDICINE

## 2020-12-25 PROCEDURE — 96361 HYDRATE IV INFUSION ADD-ON: CPT

## 2020-12-25 RX ORDER — PROCHLORPERAZINE MALEATE 10 MG
10 TABLET ORAL EVERY 6 HOURS PRN
Status: DISCONTINUED | OUTPATIENT
Start: 2020-12-25 | End: 2020-12-28 | Stop reason: HOSPADM

## 2020-12-25 RX ORDER — KETOROLAC TROMETHAMINE 30 MG/ML
15 INJECTION, SOLUTION INTRAMUSCULAR; INTRAVENOUS ONCE
Status: COMPLETED | OUTPATIENT
Start: 2020-12-25 | End: 2020-12-25

## 2020-12-25 RX ORDER — HYDROMORPHONE HCL/PF 1 MG/ML
0.2 SYRINGE (ML) INJECTION EVERY 4 HOURS PRN
Status: DISCONTINUED | OUTPATIENT
Start: 2020-12-25 | End: 2020-12-26

## 2020-12-25 RX ORDER — GABAPENTIN 300 MG/1
300 CAPSULE ORAL
Status: DISCONTINUED | OUTPATIENT
Start: 2020-12-25 | End: 2020-12-28 | Stop reason: HOSPADM

## 2020-12-25 RX ORDER — PRAVASTATIN SODIUM 40 MG
40 TABLET ORAL
Status: DISCONTINUED | OUTPATIENT
Start: 2020-12-25 | End: 2020-12-28 | Stop reason: HOSPADM

## 2020-12-25 RX ORDER — OXYBUTYNIN CHLORIDE 10 MG/1
10 TABLET, EXTENDED RELEASE ORAL DAILY
Status: DISCONTINUED | OUTPATIENT
Start: 2020-12-25 | End: 2020-12-25

## 2020-12-25 RX ORDER — MESALAMINE 400 MG/1
400 CAPSULE, DELAYED RELEASE ORAL 4 TIMES DAILY
Status: DISCONTINUED | OUTPATIENT
Start: 2020-12-25 | End: 2020-12-25

## 2020-12-25 RX ORDER — MESALAMINE 400 MG/1
400 CAPSULE, DELAYED RELEASE ORAL 3 TIMES DAILY
Status: DISCONTINUED | OUTPATIENT
Start: 2020-12-25 | End: 2020-12-28 | Stop reason: HOSPADM

## 2020-12-25 RX ORDER — MORPHINE SULFATE 4 MG/ML
4 INJECTION, SOLUTION INTRAMUSCULAR; INTRAVENOUS ONCE
Status: COMPLETED | OUTPATIENT
Start: 2020-12-25 | End: 2020-12-25

## 2020-12-25 RX ORDER — METAXALONE 800 MG/1
800 TABLET ORAL EVERY 6 HOURS PRN
Status: DISCONTINUED | OUTPATIENT
Start: 2020-12-25 | End: 2020-12-28 | Stop reason: HOSPADM

## 2020-12-25 RX ORDER — ONDANSETRON 2 MG/ML
4 INJECTION INTRAMUSCULAR; INTRAVENOUS ONCE
Status: COMPLETED | OUTPATIENT
Start: 2020-12-25 | End: 2020-12-25

## 2020-12-25 RX ORDER — KETOROLAC TROMETHAMINE 30 MG/ML
15 INJECTION, SOLUTION INTRAMUSCULAR; INTRAVENOUS EVERY 6 HOURS PRN
Status: DISCONTINUED | OUTPATIENT
Start: 2020-12-25 | End: 2020-12-26

## 2020-12-25 RX ORDER — MODAFINIL 100 MG/1
100 TABLET ORAL DAILY
Status: DISCONTINUED | OUTPATIENT
Start: 2020-12-26 | End: 2020-12-28 | Stop reason: HOSPADM

## 2020-12-25 RX ORDER — ACETAMINOPHEN 325 MG/1
650 TABLET ORAL EVERY 6 HOURS PRN
Status: DISCONTINUED | OUTPATIENT
Start: 2020-12-25 | End: 2020-12-28 | Stop reason: HOSPADM

## 2020-12-25 RX ORDER — GABAPENTIN 300 MG/1
600 CAPSULE ORAL
Status: DISCONTINUED | OUTPATIENT
Start: 2020-12-25 | End: 2020-12-28 | Stop reason: HOSPADM

## 2020-12-25 RX ORDER — TAMSULOSIN HYDROCHLORIDE 0.4 MG/1
0.4 CAPSULE ORAL
Status: DISCONTINUED | OUTPATIENT
Start: 2020-12-25 | End: 2020-12-28 | Stop reason: HOSPADM

## 2020-12-25 RX ORDER — DULOXETIN HYDROCHLORIDE 30 MG/1
90 CAPSULE, DELAYED RELEASE ORAL EVERY EVENING
Status: DISCONTINUED | OUTPATIENT
Start: 2020-12-25 | End: 2020-12-28 | Stop reason: HOSPADM

## 2020-12-25 RX ORDER — PANTOPRAZOLE SODIUM 40 MG/1
40 TABLET, DELAYED RELEASE ORAL
Status: DISCONTINUED | OUTPATIENT
Start: 2020-12-25 | End: 2020-12-28 | Stop reason: HOSPADM

## 2020-12-25 RX ORDER — KETOROLAC TROMETHAMINE 30 MG/ML
15 INJECTION, SOLUTION INTRAMUSCULAR; INTRAVENOUS EVERY 6 HOURS SCHEDULED
Status: DISCONTINUED | OUTPATIENT
Start: 2020-12-25 | End: 2020-12-25

## 2020-12-25 RX ORDER — FERROUS SULFATE 325(65) MG
325 TABLET ORAL
Status: DISCONTINUED | OUTPATIENT
Start: 2020-12-26 | End: 2020-12-28 | Stop reason: HOSPADM

## 2020-12-25 RX ORDER — SODIUM CHLORIDE 9 MG/ML
125 INJECTION, SOLUTION INTRAVENOUS CONTINUOUS
Status: DISCONTINUED | OUTPATIENT
Start: 2020-12-25 | End: 2020-12-27

## 2020-12-25 RX ORDER — OXYCODONE HYDROCHLORIDE 5 MG/1
5 TABLET ORAL EVERY 4 HOURS PRN
Status: DISCONTINUED | OUTPATIENT
Start: 2020-12-25 | End: 2020-12-26

## 2020-12-25 RX ORDER — OXYBUTYNIN CHLORIDE 5 MG/1
5 TABLET ORAL 3 TIMES DAILY
Status: DISCONTINUED | OUTPATIENT
Start: 2020-12-25 | End: 2020-12-28 | Stop reason: HOSPADM

## 2020-12-25 RX ADMIN — ONDANSETRON 4 MG: 2 INJECTION INTRAMUSCULAR; INTRAVENOUS at 08:09

## 2020-12-25 RX ADMIN — DULOXETINE HYDROCHLORIDE 90 MG: 30 CAPSULE, DELAYED RELEASE ORAL at 17:37

## 2020-12-25 RX ADMIN — HYDROMORPHONE HYDROCHLORIDE 0.2 MG: 1 INJECTION, SOLUTION INTRAMUSCULAR; INTRAVENOUS; SUBCUTANEOUS at 17:37

## 2020-12-25 RX ADMIN — KETOROLAC TROMETHAMINE 15 MG: 30 INJECTION, SOLUTION INTRAMUSCULAR at 14:45

## 2020-12-25 RX ADMIN — GABAPENTIN 300 MG: 300 CAPSULE ORAL at 13:23

## 2020-12-25 RX ADMIN — MESALAMINE 400 MG: 400 CAPSULE, DELAYED RELEASE ORAL at 22:53

## 2020-12-25 RX ADMIN — TAMSULOSIN HYDROCHLORIDE 0.4 MG: 0.4 CAPSULE ORAL at 17:36

## 2020-12-25 RX ADMIN — GABAPENTIN 600 MG: 300 CAPSULE ORAL at 22:53

## 2020-12-25 RX ADMIN — HYDROMORPHONE HYDROCHLORIDE 0.2 MG: 1 INJECTION, SOLUTION INTRAMUSCULAR; INTRAVENOUS; SUBCUTANEOUS at 23:00

## 2020-12-25 RX ADMIN — ENOXAPARIN SODIUM 40 MG: 40 INJECTION SUBCUTANEOUS at 13:23

## 2020-12-25 RX ADMIN — SODIUM CHLORIDE 125 ML/HR: 0.9 INJECTION, SOLUTION INTRAVENOUS at 13:23

## 2020-12-25 RX ADMIN — KETOROLAC TROMETHAMINE 15 MG: 30 INJECTION, SOLUTION INTRAMUSCULAR at 08:08

## 2020-12-25 RX ADMIN — MORPHINE SULFATE 4 MG: 4 INJECTION INTRAVENOUS at 09:16

## 2020-12-25 RX ADMIN — SODIUM CHLORIDE 1000 ML: 0.9 INJECTION, SOLUTION INTRAVENOUS at 08:05

## 2020-12-25 RX ADMIN — CEFTRIAXONE SODIUM 1000 MG: 10 INJECTION, POWDER, FOR SOLUTION INTRAVENOUS at 13:04

## 2020-12-25 RX ADMIN — MESALAMINE 400 MG: 400 CAPSULE, DELAYED RELEASE ORAL at 14:07

## 2020-12-25 RX ADMIN — PRAVASTATIN SODIUM 40 MG: 40 TABLET ORAL at 17:37

## 2020-12-25 RX ADMIN — SODIUM CHLORIDE 125 ML/HR: 0.9 INJECTION, SOLUTION INTRAVENOUS at 23:01

## 2020-12-25 RX ADMIN — PANTOPRAZOLE SODIUM 40 MG: 40 TABLET, DELAYED RELEASE ORAL at 13:23

## 2020-12-26 LAB
ANION GAP SERPL CALCULATED.3IONS-SCNC: 8 MMOL/L (ref 4–13)
BACTERIA UR CULT: NORMAL
BACTERIA UR CULT: NORMAL
BASOPHILS # BLD AUTO: 0.06 THOUSANDS/ΜL (ref 0–0.1)
BASOPHILS NFR BLD AUTO: 1 % (ref 0–1)
BUN SERPL-MCNC: 17 MG/DL (ref 5–25)
CALCIUM SERPL-MCNC: 8.9 MG/DL (ref 8.3–10.1)
CHLORIDE SERPL-SCNC: 106 MMOL/L (ref 100–108)
CO2 SERPL-SCNC: 23 MMOL/L (ref 21–32)
CREAT SERPL-MCNC: 0.8 MG/DL (ref 0.6–1.3)
CRP SERPL QL: 3.3 MG/L
EOSINOPHIL # BLD AUTO: 0.24 THOUSAND/ΜL (ref 0–0.61)
EOSINOPHIL NFR BLD AUTO: 4 % (ref 0–6)
ERYTHROCYTE [DISTWIDTH] IN BLOOD BY AUTOMATED COUNT: 12.5 % (ref 11.6–15.1)
GFR SERPL CREATININE-BSD FRML MDRD: 79 ML/MIN/1.73SQ M
GLUCOSE SERPL-MCNC: 96 MG/DL (ref 65–140)
HCT VFR BLD AUTO: 35.2 % (ref 34.8–46.1)
HGB BLD-MCNC: 11.2 G/DL (ref 11.5–15.4)
IMM GRANULOCYTES # BLD AUTO: 0.02 THOUSAND/UL (ref 0–0.2)
IMM GRANULOCYTES NFR BLD AUTO: 0 % (ref 0–2)
LYMPHOCYTES # BLD AUTO: 1.62 THOUSANDS/ΜL (ref 0.6–4.47)
LYMPHOCYTES NFR BLD AUTO: 29 % (ref 14–44)
MCH RBC QN AUTO: 31.3 PG (ref 26.8–34.3)
MCHC RBC AUTO-ENTMCNC: 31.8 G/DL (ref 31.4–37.4)
MCV RBC AUTO: 98 FL (ref 82–98)
MONOCYTES # BLD AUTO: 0.31 THOUSAND/ΜL (ref 0.17–1.22)
MONOCYTES NFR BLD AUTO: 6 % (ref 4–12)
NEUTROPHILS # BLD AUTO: 3.27 THOUSANDS/ΜL (ref 1.85–7.62)
NEUTS SEG NFR BLD AUTO: 60 % (ref 43–75)
NRBC BLD AUTO-RTO: 0 /100 WBCS
PLATELET # BLD AUTO: 271 THOUSANDS/UL (ref 149–390)
PMV BLD AUTO: 10.4 FL (ref 8.9–12.7)
POTASSIUM SERPL-SCNC: 4.2 MMOL/L (ref 3.5–5.3)
RBC # BLD AUTO: 3.58 MILLION/UL (ref 3.81–5.12)
SODIUM SERPL-SCNC: 137 MMOL/L (ref 136–145)
WBC # BLD AUTO: 5.52 THOUSAND/UL (ref 4.31–10.16)

## 2020-12-26 PROCEDURE — 86140 C-REACTIVE PROTEIN: CPT | Performed by: INTERNAL MEDICINE

## 2020-12-26 PROCEDURE — 99232 SBSQ HOSP IP/OBS MODERATE 35: CPT | Performed by: INTERNAL MEDICINE

## 2020-12-26 PROCEDURE — 99222 1ST HOSP IP/OBS MODERATE 55: CPT | Performed by: INTERNAL MEDICINE

## 2020-12-26 PROCEDURE — 85025 COMPLETE CBC W/AUTO DIFF WBC: CPT | Performed by: INTERNAL MEDICINE

## 2020-12-26 PROCEDURE — 80048 BASIC METABOLIC PNL TOTAL CA: CPT | Performed by: INTERNAL MEDICINE

## 2020-12-26 RX ORDER — HYDROMORPHONE HCL/PF 1 MG/ML
0.5 SYRINGE (ML) INJECTION EVERY 4 HOURS PRN
Status: DISCONTINUED | OUTPATIENT
Start: 2020-12-26 | End: 2020-12-28 | Stop reason: HOSPADM

## 2020-12-26 RX ORDER — OXYCODONE HYDROCHLORIDE 5 MG/1
5 TABLET ORAL EVERY 4 HOURS PRN
Status: DISCONTINUED | OUTPATIENT
Start: 2020-12-26 | End: 2020-12-28 | Stop reason: HOSPADM

## 2020-12-26 RX ORDER — DICYCLOMINE HCL 20 MG
20 TABLET ORAL
Status: DISCONTINUED | OUTPATIENT
Start: 2020-12-26 | End: 2020-12-28 | Stop reason: HOSPADM

## 2020-12-26 RX ORDER — OXYCODONE HYDROCHLORIDE 10 MG/1
10 TABLET ORAL EVERY 4 HOURS PRN
Status: DISCONTINUED | OUTPATIENT
Start: 2020-12-26 | End: 2020-12-28 | Stop reason: HOSPADM

## 2020-12-26 RX ORDER — FLUCONAZOLE 100 MG/1
200 TABLET ORAL DAILY
Status: DISCONTINUED | OUTPATIENT
Start: 2020-12-26 | End: 2020-12-27

## 2020-12-26 RX ADMIN — ENOXAPARIN SODIUM 40 MG: 40 INJECTION SUBCUTANEOUS at 08:13

## 2020-12-26 RX ADMIN — KETOROLAC TROMETHAMINE 15 MG: 30 INJECTION, SOLUTION INTRAMUSCULAR at 05:52

## 2020-12-26 RX ADMIN — DICYCLOMINE HYDROCHLORIDE 20 MG: 20 TABLET ORAL at 17:11

## 2020-12-26 RX ADMIN — MESALAMINE 400 MG: 400 CAPSULE, DELAYED RELEASE ORAL at 17:10

## 2020-12-26 RX ADMIN — OXYCODONE HYDROCHLORIDE 10 MG: 10 TABLET ORAL at 13:44

## 2020-12-26 RX ADMIN — DULOXETINE HYDROCHLORIDE 90 MG: 30 CAPSULE, DELAYED RELEASE ORAL at 17:10

## 2020-12-26 RX ADMIN — HYDROMORPHONE HYDROCHLORIDE 0.5 MG: 1 INJECTION, SOLUTION INTRAMUSCULAR; INTRAVENOUS; SUBCUTANEOUS at 20:32

## 2020-12-26 RX ADMIN — OXYBUTYNIN CHLORIDE 5 MG: 5 TABLET ORAL at 20:32

## 2020-12-26 RX ADMIN — OXYCODONE HYDROCHLORIDE 10 MG: 10 TABLET ORAL at 19:30

## 2020-12-26 RX ADMIN — PANTOPRAZOLE SODIUM 40 MG: 40 TABLET, DELAYED RELEASE ORAL at 05:52

## 2020-12-26 RX ADMIN — OXYCODONE HYDROCHLORIDE 5 MG: 5 TABLET ORAL at 08:13

## 2020-12-26 RX ADMIN — DICYCLOMINE HYDROCHLORIDE 20 MG: 20 TABLET ORAL at 12:46

## 2020-12-26 RX ADMIN — FERROUS SULFATE TAB 325 MG (65 MG ELEMENTAL FE) 325 MG: 325 (65 FE) TAB at 08:13

## 2020-12-26 RX ADMIN — SODIUM CHLORIDE 125 ML/HR: 0.9 INJECTION, SOLUTION INTRAVENOUS at 08:12

## 2020-12-26 RX ADMIN — GABAPENTIN 600 MG: 300 CAPSULE ORAL at 22:03

## 2020-12-26 RX ADMIN — CEFTRIAXONE SODIUM 1000 MG: 10 INJECTION, POWDER, FOR SOLUTION INTRAVENOUS at 12:47

## 2020-12-26 RX ADMIN — FLUCONAZOLE 200 MG: 100 TABLET ORAL at 12:47

## 2020-12-26 RX ADMIN — OXYBUTYNIN CHLORIDE 5 MG: 5 TABLET ORAL at 17:11

## 2020-12-26 RX ADMIN — SODIUM CHLORIDE 125 ML/HR: 0.9 INJECTION, SOLUTION INTRAVENOUS at 17:10

## 2020-12-26 RX ADMIN — PRAVASTATIN SODIUM 40 MG: 40 TABLET ORAL at 17:11

## 2020-12-26 RX ADMIN — GABAPENTIN 300 MG: 300 CAPSULE ORAL at 12:47

## 2020-12-26 RX ADMIN — DICYCLOMINE HYDROCHLORIDE 20 MG: 20 TABLET ORAL at 22:03

## 2020-12-26 RX ADMIN — OXYBUTYNIN CHLORIDE 5 MG: 5 TABLET ORAL at 08:13

## 2020-12-26 RX ADMIN — TAMSULOSIN HYDROCHLORIDE 0.4 MG: 0.4 CAPSULE ORAL at 17:11

## 2020-12-26 RX ADMIN — MODAFINIL 100 MG: 100 TABLET ORAL at 08:13

## 2020-12-26 RX ADMIN — MESALAMINE 400 MG: 400 CAPSULE, DELAYED RELEASE ORAL at 08:13

## 2020-12-26 RX ADMIN — HYDROMORPHONE HYDROCHLORIDE 0.2 MG: 1 INJECTION, SOLUTION INTRAMUSCULAR; INTRAVENOUS; SUBCUTANEOUS at 03:13

## 2020-12-27 LAB
ANION GAP SERPL CALCULATED.3IONS-SCNC: 5 MMOL/L (ref 4–13)
BASOPHILS # BLD AUTO: 0.06 THOUSANDS/ΜL (ref 0–0.1)
BASOPHILS NFR BLD AUTO: 1 % (ref 0–1)
BUN SERPL-MCNC: 11 MG/DL (ref 5–25)
CALCIUM SERPL-MCNC: 8.3 MG/DL (ref 8.3–10.1)
CHLORIDE SERPL-SCNC: 109 MMOL/L (ref 100–108)
CO2 SERPL-SCNC: 26 MMOL/L (ref 21–32)
CREAT SERPL-MCNC: 0.66 MG/DL (ref 0.6–1.3)
EOSINOPHIL # BLD AUTO: 0.2 THOUSAND/ΜL (ref 0–0.61)
EOSINOPHIL NFR BLD AUTO: 4 % (ref 0–6)
ERYTHROCYTE [DISTWIDTH] IN BLOOD BY AUTOMATED COUNT: 12.6 % (ref 11.6–15.1)
FERRITIN SERPL-MCNC: 213 NG/ML (ref 8–388)
FOLATE SERPL-MCNC: 15.8 NG/ML (ref 3.1–17.5)
GFR SERPL CREATININE-BSD FRML MDRD: 95 ML/MIN/1.73SQ M
GLUCOSE SERPL-MCNC: 82 MG/DL (ref 65–140)
HCT VFR BLD AUTO: 29.6 % (ref 34.8–46.1)
HGB BLD-MCNC: 9.3 G/DL (ref 11.5–15.4)
IMM GRANULOCYTES # BLD AUTO: 0.01 THOUSAND/UL (ref 0–0.2)
IMM GRANULOCYTES NFR BLD AUTO: 0 % (ref 0–2)
IRON SATN MFR SERPL: 43 %
IRON SERPL-MCNC: 80 UG/DL (ref 50–170)
LYMPHOCYTES # BLD AUTO: 1.55 THOUSANDS/ΜL (ref 0.6–4.47)
LYMPHOCYTES NFR BLD AUTO: 34 % (ref 14–44)
MCH RBC QN AUTO: 31 PG (ref 26.8–34.3)
MCHC RBC AUTO-ENTMCNC: 31.4 G/DL (ref 31.4–37.4)
MCV RBC AUTO: 99 FL (ref 82–98)
MONOCYTES # BLD AUTO: 0.25 THOUSAND/ΜL (ref 0.17–1.22)
MONOCYTES NFR BLD AUTO: 6 % (ref 4–12)
NEUTROPHILS # BLD AUTO: 2.44 THOUSANDS/ΜL (ref 1.85–7.62)
NEUTS SEG NFR BLD AUTO: 55 % (ref 43–75)
NRBC BLD AUTO-RTO: 0 /100 WBCS
PLATELET # BLD AUTO: 220 THOUSANDS/UL (ref 149–390)
PMV BLD AUTO: 10.1 FL (ref 8.9–12.7)
POTASSIUM SERPL-SCNC: 4.4 MMOL/L (ref 3.5–5.3)
RBC # BLD AUTO: 3 MILLION/UL (ref 3.81–5.12)
SODIUM SERPL-SCNC: 140 MMOL/L (ref 136–145)
TIBC SERPL-MCNC: 185 UG/DL (ref 250–450)
TSH SERPL DL<=0.05 MIU/L-ACNC: 3.06 UIU/ML (ref 0.36–3.74)
VIT B12 SERPL-MCNC: 590 PG/ML (ref 100–900)
WBC # BLD AUTO: 4.51 THOUSAND/UL (ref 4.31–10.16)

## 2020-12-27 PROCEDURE — 82728 ASSAY OF FERRITIN: CPT | Performed by: INTERNAL MEDICINE

## 2020-12-27 PROCEDURE — 80048 BASIC METABOLIC PNL TOTAL CA: CPT | Performed by: INTERNAL MEDICINE

## 2020-12-27 PROCEDURE — 85025 COMPLETE CBC W/AUTO DIFF WBC: CPT | Performed by: INTERNAL MEDICINE

## 2020-12-27 PROCEDURE — 84443 ASSAY THYROID STIM HORMONE: CPT | Performed by: INTERNAL MEDICINE

## 2020-12-27 PROCEDURE — 99232 SBSQ HOSP IP/OBS MODERATE 35: CPT | Performed by: INTERNAL MEDICINE

## 2020-12-27 PROCEDURE — 82746 ASSAY OF FOLIC ACID SERUM: CPT | Performed by: INTERNAL MEDICINE

## 2020-12-27 PROCEDURE — 82607 VITAMIN B-12: CPT | Performed by: INTERNAL MEDICINE

## 2020-12-27 PROCEDURE — 83550 IRON BINDING TEST: CPT | Performed by: INTERNAL MEDICINE

## 2020-12-27 PROCEDURE — 83540 ASSAY OF IRON: CPT | Performed by: INTERNAL MEDICINE

## 2020-12-27 RX ORDER — POLYETHYLENE GLYCOL 3350 17 G/17G
17 POWDER, FOR SOLUTION ORAL DAILY
Status: DISCONTINUED | OUTPATIENT
Start: 2020-12-27 | End: 2020-12-28 | Stop reason: HOSPADM

## 2020-12-27 RX ORDER — NITROFURANTOIN 25; 75 MG/1; MG/1
100 CAPSULE ORAL 2 TIMES DAILY WITH MEALS
Status: DISCONTINUED | OUTPATIENT
Start: 2020-12-28 | End: 2020-12-28 | Stop reason: HOSPADM

## 2020-12-27 RX ORDER — LACTOBACILLUS ACIDOPHILUS / LACTOBACILLUS BULGARICUS 100 MILLION CFU STRENGTH
1 GRANULES ORAL
Status: DISCONTINUED | OUTPATIENT
Start: 2020-12-28 | End: 2020-12-28 | Stop reason: HOSPADM

## 2020-12-27 RX ADMIN — TAMSULOSIN HYDROCHLORIDE 0.4 MG: 0.4 CAPSULE ORAL at 17:04

## 2020-12-27 RX ADMIN — OXYCODONE HYDROCHLORIDE 10 MG: 10 TABLET ORAL at 21:34

## 2020-12-27 RX ADMIN — MESALAMINE 400 MG: 400 CAPSULE, DELAYED RELEASE ORAL at 08:52

## 2020-12-27 RX ADMIN — DICYCLOMINE HYDROCHLORIDE 20 MG: 20 TABLET ORAL at 05:01

## 2020-12-27 RX ADMIN — FLUCONAZOLE 200 MG: 100 TABLET ORAL at 08:51

## 2020-12-27 RX ADMIN — OXYCODONE HYDROCHLORIDE 10 MG: 10 TABLET ORAL at 04:28

## 2020-12-27 RX ADMIN — GABAPENTIN 600 MG: 300 CAPSULE ORAL at 21:33

## 2020-12-27 RX ADMIN — OXYBUTYNIN CHLORIDE 5 MG: 5 TABLET ORAL at 08:51

## 2020-12-27 RX ADMIN — DICYCLOMINE HYDROCHLORIDE 20 MG: 20 TABLET ORAL at 21:33

## 2020-12-27 RX ADMIN — GABAPENTIN 300 MG: 300 CAPSULE ORAL at 12:12

## 2020-12-27 RX ADMIN — OXYCODONE HYDROCHLORIDE 10 MG: 10 TABLET ORAL at 17:05

## 2020-12-27 RX ADMIN — PRAVASTATIN SODIUM 40 MG: 40 TABLET ORAL at 17:04

## 2020-12-27 RX ADMIN — MODAFINIL 100 MG: 100 TABLET ORAL at 08:51

## 2020-12-27 RX ADMIN — HYDROMORPHONE HYDROCHLORIDE 0.5 MG: 1 INJECTION, SOLUTION INTRAMUSCULAR; INTRAVENOUS; SUBCUTANEOUS at 18:58

## 2020-12-27 RX ADMIN — POLYETHYLENE GLYCOL 3350 17 G: 17 POWDER, FOR SOLUTION ORAL at 20:26

## 2020-12-27 RX ADMIN — OXYCODONE HYDROCHLORIDE 10 MG: 10 TABLET ORAL at 08:51

## 2020-12-27 RX ADMIN — ENOXAPARIN SODIUM 40 MG: 40 INJECTION SUBCUTANEOUS at 08:51

## 2020-12-27 RX ADMIN — OXYBUTYNIN CHLORIDE 5 MG: 5 TABLET ORAL at 16:00

## 2020-12-27 RX ADMIN — CEFTRIAXONE SODIUM 1000 MG: 10 INJECTION, POWDER, FOR SOLUTION INTRAVENOUS at 12:11

## 2020-12-27 RX ADMIN — OXYBUTYNIN CHLORIDE 5 MG: 5 TABLET ORAL at 21:33

## 2020-12-27 RX ADMIN — DICYCLOMINE HYDROCHLORIDE 20 MG: 20 TABLET ORAL at 16:00

## 2020-12-27 RX ADMIN — PANTOPRAZOLE SODIUM 40 MG: 40 TABLET, DELAYED RELEASE ORAL at 05:01

## 2020-12-27 RX ADMIN — FERROUS SULFATE TAB 325 MG (65 MG ELEMENTAL FE) 325 MG: 325 (65 FE) TAB at 08:51

## 2020-12-27 RX ADMIN — DULOXETINE HYDROCHLORIDE 90 MG: 30 CAPSULE, DELAYED RELEASE ORAL at 17:04

## 2020-12-28 ENCOUNTER — TELEPHONE (OUTPATIENT)
Dept: GASTROENTEROLOGY | Facility: MEDICAL CENTER | Age: 60
End: 2020-12-28

## 2020-12-28 VITALS
TEMPERATURE: 98 F | DIASTOLIC BLOOD PRESSURE: 53 MMHG | RESPIRATION RATE: 18 BRPM | OXYGEN SATURATION: 95 % | SYSTOLIC BLOOD PRESSURE: 95 MMHG | HEART RATE: 57 BPM

## 2020-12-28 LAB
BASOPHILS # BLD AUTO: 0.06 THOUSANDS/ΜL (ref 0–0.1)
BASOPHILS NFR BLD AUTO: 1 % (ref 0–1)
EOSINOPHIL # BLD AUTO: 0.19 THOUSAND/ΜL (ref 0–0.61)
EOSINOPHIL NFR BLD AUTO: 4 % (ref 0–6)
ERYTHROCYTE [DISTWIDTH] IN BLOOD BY AUTOMATED COUNT: 12.6 % (ref 11.6–15.1)
HCT VFR BLD AUTO: 31.1 % (ref 34.8–46.1)
HGB BLD-MCNC: 9.8 G/DL (ref 11.5–15.4)
IMM GRANULOCYTES # BLD AUTO: 0.02 THOUSAND/UL (ref 0–0.2)
IMM GRANULOCYTES NFR BLD AUTO: 0 % (ref 0–2)
LYMPHOCYTES # BLD AUTO: 1.66 THOUSANDS/ΜL (ref 0.6–4.47)
LYMPHOCYTES NFR BLD AUTO: 31 % (ref 14–44)
MCH RBC QN AUTO: 31.1 PG (ref 26.8–34.3)
MCHC RBC AUTO-ENTMCNC: 31.5 G/DL (ref 31.4–37.4)
MCV RBC AUTO: 99 FL (ref 82–98)
MONOCYTES # BLD AUTO: 0.32 THOUSAND/ΜL (ref 0.17–1.22)
MONOCYTES NFR BLD AUTO: 6 % (ref 4–12)
NEUTROPHILS # BLD AUTO: 3.08 THOUSANDS/ΜL (ref 1.85–7.62)
NEUTS SEG NFR BLD AUTO: 58 % (ref 43–75)
NRBC BLD AUTO-RTO: 0 /100 WBCS
PLATELET # BLD AUTO: 228 THOUSANDS/UL (ref 149–390)
PMV BLD AUTO: 10.4 FL (ref 8.9–12.7)
RBC # BLD AUTO: 3.15 MILLION/UL (ref 3.81–5.12)
WBC # BLD AUTO: 5.33 THOUSAND/UL (ref 4.31–10.16)

## 2020-12-28 PROCEDURE — 99239 HOSP IP/OBS DSCHRG MGMT >30: CPT | Performed by: INTERNAL MEDICINE

## 2020-12-28 PROCEDURE — 99232 SBSQ HOSP IP/OBS MODERATE 35: CPT | Performed by: INTERNAL MEDICINE

## 2020-12-28 PROCEDURE — 85025 COMPLETE CBC W/AUTO DIFF WBC: CPT | Performed by: PHYSICIAN ASSISTANT

## 2020-12-28 RX ORDER — OXYBUTYNIN CHLORIDE 5 MG/1
5 TABLET ORAL 3 TIMES DAILY
Qty: 15 TABLET | Refills: 0 | Status: SHIPPED | OUTPATIENT
Start: 2020-12-28 | End: 2020-12-31

## 2020-12-28 RX ORDER — TAMSULOSIN HYDROCHLORIDE 0.4 MG/1
0.4 CAPSULE ORAL
Qty: 15 CAPSULE | Refills: 0 | Status: SHIPPED | OUTPATIENT
Start: 2020-12-28 | End: 2020-12-31

## 2020-12-28 RX ORDER — POLYETHYLENE GLYCOL 3350 17 G/17G
17 POWDER, FOR SOLUTION ORAL DAILY PRN
Qty: 119 G | Refills: 0 | Status: SHIPPED | OUTPATIENT
Start: 2020-12-28 | End: 2020-12-29 | Stop reason: SDUPTHER

## 2020-12-28 RX ORDER — NITROFURANTOIN 25; 75 MG/1; MG/1
100 CAPSULE ORAL 2 TIMES DAILY WITH MEALS
Qty: 6 CAPSULE | Refills: 0 | Status: SHIPPED | OUTPATIENT
Start: 2020-12-28 | End: 2020-12-31

## 2020-12-28 RX ORDER — NITROFURANTOIN 25; 75 MG/1; MG/1
100 CAPSULE ORAL 2 TIMES DAILY
Qty: 6 CAPSULE | Refills: 0 | Status: SHIPPED | OUTPATIENT
Start: 2020-12-28 | End: 2020-12-28 | Stop reason: HOSPADM

## 2020-12-28 RX ORDER — DICYCLOMINE HCL 20 MG
20 TABLET ORAL
Qty: 20 TABLET | Refills: 0 | Status: SHIPPED | OUTPATIENT
Start: 2020-12-28 | End: 2021-04-01 | Stop reason: SDUPTHER

## 2020-12-28 RX ORDER — ZOLMITRIPTAN 2.5 MG/1
2.5 TABLET, FILM COATED ORAL 2 TIMES DAILY PRN
Status: DISCONTINUED | OUTPATIENT
Start: 2020-12-28 | End: 2020-12-28 | Stop reason: HOSPADM

## 2020-12-28 RX ORDER — DOCUSATE SODIUM 100 MG/1
100 CAPSULE, LIQUID FILLED ORAL 2 TIMES DAILY PRN
Qty: 10 CAPSULE | Refills: 0 | Status: SHIPPED | OUTPATIENT
Start: 2020-12-28 | End: 2021-03-16

## 2020-12-28 RX ADMIN — LACTOBACILLUS ACIDOPHILUS / LACTOBACILLUS BULGARICUS 1 PACKET: 100 MILLION CFU STRENGTH GRANULES at 09:55

## 2020-12-28 RX ADMIN — POLYETHYLENE GLYCOL 3350 17 G: 17 POWDER, FOR SOLUTION ORAL at 09:50

## 2020-12-28 RX ADMIN — FERROUS SULFATE TAB 325 MG (65 MG ELEMENTAL FE) 325 MG: 325 (65 FE) TAB at 09:48

## 2020-12-28 RX ADMIN — OXYCODONE HYDROCHLORIDE 10 MG: 10 TABLET ORAL at 09:58

## 2020-12-28 RX ADMIN — DICYCLOMINE HYDROCHLORIDE 20 MG: 20 TABLET ORAL at 05:32

## 2020-12-28 RX ADMIN — OXYBUTYNIN CHLORIDE 5 MG: 5 TABLET ORAL at 09:48

## 2020-12-28 RX ADMIN — ZOLMITRIPTAN 2.5 MG: 2.5 TABLET ORAL at 09:48

## 2020-12-28 RX ADMIN — DICYCLOMINE HYDROCHLORIDE 20 MG: 20 TABLET ORAL at 12:21

## 2020-12-28 RX ADMIN — PANTOPRAZOLE SODIUM 40 MG: 40 TABLET, DELAYED RELEASE ORAL at 05:31

## 2020-12-28 RX ADMIN — MODAFINIL 100 MG: 100 TABLET ORAL at 09:48

## 2020-12-28 RX ADMIN — GABAPENTIN 300 MG: 300 CAPSULE ORAL at 12:21

## 2020-12-28 RX ADMIN — ENOXAPARIN SODIUM 40 MG: 40 INJECTION SUBCUTANEOUS at 09:55

## 2020-12-28 RX ADMIN — LACTOBACILLUS ACIDOPHILUS / LACTOBACILLUS BULGARICUS 1 PACKET: 100 MILLION CFU STRENGTH GRANULES at 12:21

## 2020-12-28 RX ADMIN — OXYCODONE HYDROCHLORIDE 10 MG: 10 TABLET ORAL at 05:32

## 2020-12-28 RX ADMIN — NITROFURANTOIN (MONOHYDRATE/MACROCRYSTALS) 100 MG: 75; 25 CAPSULE ORAL at 09:55

## 2020-12-29 ENCOUNTER — ANESTHESIA EVENT (OUTPATIENT)
Dept: PERIOP | Facility: HOSPITAL | Age: 60
End: 2020-12-29
Payer: COMMERCIAL

## 2020-12-29 ENCOUNTER — TRANSITIONAL CARE MANAGEMENT (OUTPATIENT)
Dept: FAMILY MEDICINE CLINIC | Facility: CLINIC | Age: 60
End: 2020-12-29

## 2020-12-29 DIAGNOSIS — G43.009 MIGRAINE WITHOUT AURA AND WITHOUT STATUS MIGRAINOSUS, NOT INTRACTABLE: ICD-10-CM

## 2020-12-29 DIAGNOSIS — E78.00 PURE HYPERCHOLESTEROLEMIA: ICD-10-CM

## 2020-12-29 RX ORDER — ROSUVASTATIN CALCIUM 5 MG/1
5 TABLET, COATED ORAL DAILY
Qty: 90 TABLET | Refills: 0 | Status: SHIPPED | OUTPATIENT
Start: 2020-12-29 | End: 2021-05-17 | Stop reason: SDUPTHER

## 2020-12-29 NOTE — PRE-PROCEDURE INSTRUCTIONS
Pre-Surgery Instructions:   Medication Instructions    Armodafinil 250 MG tablet Instructed patient per Anesthesia Guidelines   Ascorbic Acid (VITAMIN C PO) Instructed patient per Anesthesia Guidelines   BACILLUS COAGULANS-INULIN PO Instructed patient per Anesthesia Guidelines   calcium carbonate 1250 MG capsule Instructed patient per Anesthesia Guidelines   cetirizine (ZyrTEC) 10 MG chewable tablet Instructed patient per Anesthesia Guidelines   Cholecalciferol 1000 units tablet Instructed patient per Anesthesia Guidelines   Citicoline Sodium 500 MG TABS Instructed patient per Anesthesia Guidelines   Coenzyme Q10 200 MG/GM POWD Instructed patient per Anesthesia Guidelines   denosumab (PROLIA) 60 mg/mL Instructed patient per Anesthesia Guidelines   dicyclomine (BENTYL) 20 mg tablet Instructed patient per Anesthesia Guidelines   docusate sodium (COLACE) 100 mg capsule Instructed patient per Anesthesia Guidelines   DULoxetine (CYMBALTA) 30 mg delayed release capsule Instructed patient per Anesthesia Guidelines   ferrous sulfate 325 (65 Fe) mg tablet Instructed patient per Anesthesia Guidelines   gabapentin (NEURONTIN) 300 mg capsule Instructed patient per Anesthesia Guidelines   Lori, Zingiber officinalis, (LORI ROOT) 550 MG CAPS Instructed patient per Anesthesia Guidelines   Ginkgo Biloba 40 MG TABS Instructed patient per Anesthesia Guidelines   mesalamine (LIALDA) 1 2 g EC tablet Instructed patient per Anesthesia Guidelines   metaxalone (SKELAXIN) 800 mg tablet Instructed patient per Anesthesia Guidelines   Multiple Vitamins-Minerals (CENTRUM SILVER PO) Instructed patient per Anesthesia Guidelines   mupirocin (BACTROBAN) 2 % ointment Instructed patient per Anesthesia Guidelines   nitrofurantoin (MACROBID) 100 mg capsule Instructed patient per Anesthesia Guidelines   NON FORMULARY Instructed patient per Anesthesia Guidelines      NON FORMULARY Instructed patient per Anesthesia Guidelines   omeprazole (PriLOSEC) 40 MG capsule Instructed patient per Anesthesia Guidelines   prochlorperazine (COMPAZINE) 10 mg tablet Instructed patient per Anesthesia Guidelines   rosuvastatin (CRESTOR) 5 mg tablet Instructed patient per Anesthesia Guidelines   solifenacin (VESICARE) 10 MG tablet Instructed patient per Anesthesia Guidelines   vitamin E, tocopherol, 400 units capsule Instructed patient per Anesthesia Guidelines   ZOLMitriptan (ZOMIG) 2 5 MG tablet Instructed patient per Anesthesia Guidelines   [DISCONTINUED] Misc Natural Products (GINSENG-COMPLEX PO) Instructed patient per Anesthesia Guidelines  Pre-op and bathing instructions given  Pt advised to hold ASA/ibuprofen and all vitamins and supplements for 7 days prior to sx

## 2020-12-30 ENCOUNTER — TELEPHONE (OUTPATIENT)
Dept: UROLOGY | Facility: MEDICAL CENTER | Age: 60
End: 2020-12-30

## 2020-12-30 ENCOUNTER — TELEPHONE (OUTPATIENT)
Dept: FAMILY MEDICINE CLINIC | Facility: CLINIC | Age: 60
End: 2020-12-30

## 2020-12-30 ENCOUNTER — APPOINTMENT (OUTPATIENT)
Dept: RADIOLOGY | Facility: MEDICAL CENTER | Age: 60
End: 2020-12-30
Payer: COMMERCIAL

## 2020-12-30 ENCOUNTER — APPOINTMENT (OUTPATIENT)
Dept: LAB | Facility: MEDICAL CENTER | Age: 60
End: 2020-12-30
Attending: UROLOGY
Payer: COMMERCIAL

## 2020-12-30 ENCOUNTER — PREP FOR PROCEDURE (OUTPATIENT)
Dept: UROLOGY | Facility: MEDICAL CENTER | Age: 60
End: 2020-12-30

## 2020-12-30 DIAGNOSIS — Z11.59 SPECIAL SCREENING EXAMINATION FOR VIRAL DISEASE: ICD-10-CM

## 2020-12-30 DIAGNOSIS — N20.0 RENAL CALCULUS: ICD-10-CM

## 2020-12-30 DIAGNOSIS — J98.11 ATELECTASIS OF BOTH LUNGS: ICD-10-CM

## 2020-12-30 DIAGNOSIS — N20.0 RENAL CALCULUS: Primary | ICD-10-CM

## 2020-12-30 PROCEDURE — 87086 URINE CULTURE/COLONY COUNT: CPT

## 2020-12-30 PROCEDURE — 71046 X-RAY EXAM CHEST 2 VIEWS: CPT

## 2020-12-30 PROCEDURE — U0003 INFECTIOUS AGENT DETECTION BY NUCLEIC ACID (DNA OR RNA); SEVERE ACUTE RESPIRATORY SYNDROME CORONAVIRUS 2 (SARS-COV-2) (CORONAVIRUS DISEASE [COVID-19]), AMPLIFIED PROBE TECHNIQUE, MAKING USE OF HIGH THROUGHPUT TECHNOLOGIES AS DESCRIBED BY CMS-2020-01-R: HCPCS | Performed by: UROLOGY

## 2020-12-30 RX ORDER — ZOLMITRIPTAN 2.5 MG/1
TABLET, FILM COATED ORAL
Qty: 12 TABLET | Refills: 2 | Status: SHIPPED | OUTPATIENT
Start: 2020-12-30 | End: 2021-07-28 | Stop reason: SDUPTHER

## 2020-12-31 ENCOUNTER — OFFICE VISIT (OUTPATIENT)
Dept: GASTROENTEROLOGY | Facility: MEDICAL CENTER | Age: 60
End: 2020-12-31
Payer: COMMERCIAL

## 2020-12-31 ENCOUNTER — TELEPHONE (OUTPATIENT)
Dept: FAMILY MEDICINE CLINIC | Facility: CLINIC | Age: 60
End: 2020-12-31

## 2020-12-31 VITALS
TEMPERATURE: 97.7 F | SYSTOLIC BLOOD PRESSURE: 108 MMHG | WEIGHT: 113 LBS | DIASTOLIC BLOOD PRESSURE: 68 MMHG | BODY MASS INDEX: 19.29 KG/M2 | HEIGHT: 64 IN | HEART RATE: 65 BPM

## 2020-12-31 DIAGNOSIS — K57.90 DIVERTICULOSIS: ICD-10-CM

## 2020-12-31 DIAGNOSIS — K52.9 COLITIS: ICD-10-CM

## 2020-12-31 DIAGNOSIS — K52.9 CHRONIC DIARRHEA: Primary | ICD-10-CM

## 2020-12-31 DIAGNOSIS — R63.4 WEIGHT LOSS: ICD-10-CM

## 2020-12-31 DIAGNOSIS — R10.9 ABDOMINAL PAIN, UNSPECIFIED ABDOMINAL LOCATION: ICD-10-CM

## 2020-12-31 LAB — SARS-COV-2 RNA SPEC QL NAA+PROBE: NOT DETECTED

## 2020-12-31 PROCEDURE — 99213 OFFICE O/P EST LOW 20 MIN: CPT | Performed by: INTERNAL MEDICINE

## 2020-12-31 PROCEDURE — NC001 PR NO CHARGE: Performed by: UROLOGY

## 2020-12-31 PROCEDURE — 3008F BODY MASS INDEX DOCD: CPT | Performed by: FAMILY MEDICINE

## 2020-12-31 NOTE — H&P
HISTORY AND PHYSICAL  ? ? Patient Name: Maria Del Rosario Gallego  Patient MRN: [id number]  Attending Provider: Lizett Thompson MD  Service: Urology  Chief Complaint    Right renal calculus 6 mm  HPI   Maria Del Rosario Gallego is a 56 y o  female with stent placed early December for six month later right upper ureteral stone  It admitted for infection on [place] [facility]  Now doing well and ready for procedure  I plan cysto, right ureteroscopy, laser stone, stent replacement  Potential risks and complications discussed, and informed consent was given by the patient  Medications  Meds/Allergies   No current facility-administered medications for this encounter  Cannot display prior to admission medications because the patient has not been admitted in this contact  No current facility-administered medications for this encounter       Current Outpatient Medications:     Armodafinil 250 MG tablet, Take 1 tablet (250 mg total) by mouth daily, Disp: 30 tablet, Rfl: 5    Ascorbic Acid (VITAMIN C PO), Take 500 mg by mouth daily , Disp: , Rfl:     BACILLUS COAGULANS-INULIN PO, Take 1 capsule by mouth daily , Disp: , Rfl:     calcium carbonate 1250 MG capsule, Take 600 mg by mouth daily , Disp: , Rfl:     cetirizine (ZyrTEC) 10 MG chewable tablet, Chew 10 mg daily , Disp: , Rfl:     Cholecalciferol 1000 units tablet, Take 1,000 Units by mouth daily , Disp: , Rfl:     Citicoline Sodium 500 MG TABS, Take 500 mg by mouth daily , Disp: , Rfl:     Coenzyme Q10 200 MG/GM POWD, Take 200 mg by mouth daily , Disp: , Rfl:     denosumab (PROLIA) 60 mg/mL, Inject 60 mg under the skin Every 6 months, Disp: , Rfl:     dicyclomine (BENTYL) 20 mg tablet, Take 1 tablet (20 mg total) by mouth 4 (four) times a day (before meals and at bedtime), Disp: 20 tablet, Rfl: 0    docusate sodium (COLACE) 100 mg capsule, Take 1 capsule (100 mg total) by mouth 2 (two) times a day as needed for constipation, Disp: 10 capsule, Rfl: 0    DULoxetine (CYMBALTA) 30 mg delayed release capsule, Take 3 capsules (90 mg total) by mouth daily (Patient taking differently: Take 90 mg by mouth every evening ), Disp: 90 capsule, Rfl: 1    ferrous sulfate 325 (65 Fe) mg tablet, Take 325 mg by mouth daily with breakfast , Disp: , Rfl:     gabapentin (NEURONTIN) 300 mg capsule, 300 mg at 1200 in addition to 600 mg at bedtime  , Disp: , Rfl:     Ginger, Zingiber officinalis, (GINGER ROOT) 550 MG CAPS, Take 550 mg by mouth 2 (two) times a day , Disp: , Rfl:     Ginkgo Biloba 40 MG TABS, Take 40 mg by mouth daily , Disp: , Rfl:     mesalamine (LIALDA) 1 2 g EC tablet, Take 2 tablets (2 4 g total) by mouth daily with breakfast (Patient not taking: Reported on 12/31/2020), Disp: 60 tablet, Rfl: 3    metaxalone (SKELAXIN) 800 mg tablet, Take 800 mg by mouth 3 (three) times a day , Disp: , Rfl:     Multiple Vitamins-Minerals (CENTRUM SILVER PO), Take 1 tablet by mouth daily , Disp: , Rfl:     mupirocin (BACTROBAN) 2 % ointment, Apply topically 3 (three) times a day Apply topically on hands to open cuts to prevent infection, Disp: 30 g, Rfl: 0    nitrofurantoin (MACROBID) 100 mg capsule, Take 1 capsule (100 mg total) by mouth 2 (two) times a day with meals for 3 days, Disp: 6 capsule, Rfl: 0    NON FORMULARY, Place 30 mg under the tongue as needed --- tincture prn, Disp: , Rfl:     NON FORMULARY, Take 2 tablets by mouth daily Take Health and Sooth take two capsule daily , Disp: , Rfl:     omeprazole (PriLOSEC) 40 MG capsule, Take 40 mg by mouth daily before breakfast, Disp: , Rfl:     prochlorperazine (COMPAZINE) 10 mg tablet, Take 1 tablet (10 mg total) by mouth every 6 (six) hours as needed for nausea or vomiting, Disp: 12 tablet, Rfl: 1    rosuvastatin (CRESTOR) 5 mg tablet, Take 1 tablet (5 mg total) by mouth daily, Disp: 90 tablet, Rfl: 0    solifenacin (VESICARE) 10 MG tablet, Take 1 tablet (10 mg total) by mouth daily, Disp: 30 tablet, Rfl: 3    vitamin E, tocopherol, 400 units capsule, Take 400 Units by mouth daily, Disp: , Rfl:     ZOLMitriptan (ZOMIG) 2 5 MG tablet, TAKE 1 TABLET BY MOUTH AT ONSET OF MIGRAINE, MAY REPEAT IN 2 HOURS IF NEEDED  LIMIT 2/24 HOURS, 4/WEEK, 8/MONTH, Disp: 12 tablet, Rfl: 2  Review of Systems  10 point review of systems negative except as noted in HPI  Allergies  Allergies   Allergen Reactions    Celecoxib Other (See Comments)     Increased Heart Rate, Palpitations    Sumatriptan Anaphylaxis     Other reaction(s): SUMATRIPTAN (IMITREX) (Throat closure)  Pt analilia zolmitriptan      Tramadol Other (See Comments)     GI Upset- severe vomiting and systemic body aches    Medical Tape Dermatitis, Rash and Other (See Comments)     Adhesive from medication patches and bandaides- ok with paper tape     PMH  Past Medical History:   Diagnosis Date    Allergy to dust     Anemia     Calculus of ureter     Chronic pain disorder     neck and back- sees pain management    Colitis     Cracked skin     on fingers    Diverticulosis     Dysphagia     GERD (gastroesophageal reflux disease)     H/O: GI bleed     Hiatal hernia     History of DVT in adulthood     flaquito    Hydronephrosis     Hyperlipidemia     Inguinal hernia     right side    Irritable bowel     Migraines     Narcolepsy     Narcolepsy 8/6/2020    OAB (overactive bladder)     Osteoarthritis     Osteoporosis     Other chronic pain     degenerative disc disease    PONV (postoperative nausea and vomiting)     migraines    Smoking 8/6/2020    Tobacco abuse     Wears dentures     full upper    Wears glasses     Wears glasses      Past surgical history  Past Surgical History:   Procedure Laterality Date    ABDOMINAL ADHESION SURGERY      ARM SKIN LESION BIOPSY / EXCISION      lymph node excision    BLADDER SURGERY      CHOLECYSTECTOMY      COLONOSCOPY  08/06/2020    HYSTERECTOMY  1977    JOINT REPLACEMENT Right     KNEE SURGERY Right     replacement    LAMINECTOMY L5-6-7    LAPAROSCOPY      LYMPH NODE DISSECTION Left     removed -no cancer-  limb restriction    MOLE REMOVAL      OOPHORECTOMY Bilateral 1977    OTHER SURGICAL HISTORY      enlarged lymph node removed left arm   no cancer    MA CYSTOURETHROSCOPY,URETER CATHETER Right 12/2/2020    Procedure: CYSTOSCOPY AND INSERTION STENT URETERAL;  Surgeon: Adal Thompson MD;  Location: [facility];  Service: Urology    MA REPAIR [address] HERNIA,5+Y/O,REDUCIBL Right 6/25/2020    Procedure: REPAIR HERNIA INGUINAL  WITH MESH;  Surgeon: Bib Gupta MD;  Location: [facility];  Service: General    SPINAL FUSION      UPPER GASTROINTESTINAL ENDOSCOPY  08/06/2020    WISDOM TOOTH EXTRACTION       Social history  Social History     Tobacco Use    Smoking status: Current Every Day Smoker     Packs/day: 0 25     Years: 46 00     Pack years: 11 50     Types: Cigarettes    Smokeless tobacco: Never Used    Tobacco comment: as of 12- pt has reduced to 3 cigarettes per day   Substance Use Topics    Alcohol use: Yes     Frequency: Monthly or less     Drinks per session: 1 or 2     Binge frequency: Never    Drug use: Yes     Types: Marijuana     Comment: medicinal marijuana     ? Physical Exam     vs  General appearance: alert and oriented, in no acute distress  Head: Normocephalic, without obvious abnormality, atraumatic  Eyes: conjunctivae/corneas clear  PERRL, EOM's intact  Fundi benign    Throat: lips, mucosa, and tongue normal; teeth and gums normal  Neck: no adenopathy, no carotid bruit, no JVD, supple, symmetrical, trachea midline and thyroid not enlarged, symmetric, no tenderness/mass/nodules  Lungs: clear to auscultation bilaterally  Heart: regular rate and rhythm, S1, S2 normal, no murmur, click, rub or gallop  Abdomen: soft, non-tender; bowel sounds normal; no masses,  no organomegaly  Extremities: extremities normal, warm and well-perfused; no cyanosis, clubbing, or edema  Neurologic: Grossly normal  Cori Andrae, MD

## 2021-01-01 LAB — BACTERIA UR CULT: NORMAL

## 2021-01-04 RX ORDER — PROPRANOLOL/HYDROCHLOROTHIAZID 40 MG-25MG
700 TABLET ORAL 2 TIMES DAILY
COMMUNITY

## 2021-01-05 ENCOUNTER — APPOINTMENT (OUTPATIENT)
Dept: RADIOLOGY | Facility: HOSPITAL | Age: 61
End: 2021-01-05
Payer: COMMERCIAL

## 2021-01-05 ENCOUNTER — HOSPITAL ENCOUNTER (OUTPATIENT)
Facility: HOSPITAL | Age: 61
Setting detail: OUTPATIENT SURGERY
Discharge: HOME | End: 2021-01-05
Attending: UROLOGY | Admitting: UROLOGY
Payer: COMMERCIAL

## 2021-01-05 ENCOUNTER — ANESTHESIA (OUTPATIENT)
Dept: PERIOP | Facility: HOSPITAL | Age: 61
End: 2021-01-05
Payer: COMMERCIAL

## 2021-01-05 VITALS
RESPIRATION RATE: 18 BRPM | TEMPERATURE: 98.2 F | BODY MASS INDEX: 18.1 KG/M2 | HEART RATE: 68 BPM | DIASTOLIC BLOOD PRESSURE: 59 MMHG | OXYGEN SATURATION: 97 % | SYSTOLIC BLOOD PRESSURE: 121 MMHG | HEIGHT: 64 IN | WEIGHT: 106 LBS

## 2021-01-05 VITALS — HEART RATE: 77 BPM

## 2021-01-05 DIAGNOSIS — N20.0 CALCULUS OF KIDNEY: Primary | ICD-10-CM

## 2021-01-05 DIAGNOSIS — N20.1 URETERAL CALCULUS: ICD-10-CM

## 2021-01-05 PROBLEM — F17.200 SMOKING: Status: ACTIVE | Noted: 2020-08-06

## 2021-01-05 PROBLEM — IMO0001 SMOKING: Status: ACTIVE | Noted: 2020-08-06

## 2021-01-05 PROCEDURE — NC001 PR NO CHARGE: Performed by: UROLOGY

## 2021-01-05 PROCEDURE — 52332 CYSTOSCOPY AND TREATMENT: CPT | Performed by: UROLOGY

## 2021-01-05 PROCEDURE — C1894 INTRO/SHEATH, NON-LASER: HCPCS | Performed by: UROLOGY

## 2021-01-05 PROCEDURE — C2617 STENT, NON-COR, TEM W/O DEL: HCPCS | Performed by: UROLOGY

## 2021-01-05 PROCEDURE — C1769 GUIDE WIRE: HCPCS | Performed by: UROLOGY

## 2021-01-05 PROCEDURE — 74420 UROGRAPHY RTRGR +-KUB: CPT

## 2021-01-05 PROCEDURE — 74018 RADEX ABDOMEN 1 VIEW: CPT

## 2021-01-05 PROCEDURE — 87086 URINE CULTURE/COLONY COUNT: CPT | Performed by: UROLOGY

## 2021-01-05 DEVICE — INLAY OPTIMA URETERAL STENT W/O GUIDEWIRE
Type: IMPLANTABLE DEVICE | Site: URETER | Status: FUNCTIONAL
Brand: BARD® INLAY OPTIMA® URETERAL STENT

## 2021-01-05 RX ORDER — CEFAZOLIN SODIUM 2 G/50ML
2000 SOLUTION INTRAVENOUS ONCE
Status: COMPLETED | OUTPATIENT
Start: 2021-01-05 | End: 2021-01-05

## 2021-01-05 RX ORDER — ONDANSETRON 2 MG/ML
INJECTION INTRAMUSCULAR; INTRAVENOUS AS NEEDED
Status: DISCONTINUED | OUTPATIENT
Start: 2021-01-05 | End: 2021-01-05

## 2021-01-05 RX ORDER — MAGNESIUM HYDROXIDE 1200 MG/15ML
LIQUID ORAL AS NEEDED
Status: DISCONTINUED | OUTPATIENT
Start: 2021-01-05 | End: 2021-01-05 | Stop reason: HOSPADM

## 2021-01-05 RX ORDER — OXYCODONE HYDROCHLORIDE AND ACETAMINOPHEN 5; 325 MG/1; MG/1
2 TABLET ORAL EVERY 4 HOURS PRN
Status: DISCONTINUED | OUTPATIENT
Start: 2021-01-05 | End: 2021-01-05 | Stop reason: HOSPADM

## 2021-01-05 RX ORDER — FENTANYL CITRATE 50 UG/ML
INJECTION, SOLUTION INTRAMUSCULAR; INTRAVENOUS AS NEEDED
Status: DISCONTINUED | OUTPATIENT
Start: 2021-01-05 | End: 2021-01-05

## 2021-01-05 RX ORDER — PROPOFOL 10 MG/ML
INJECTION, EMULSION INTRAVENOUS CONTINUOUS PRN
Status: DISCONTINUED | OUTPATIENT
Start: 2021-01-05 | End: 2021-01-05

## 2021-01-05 RX ORDER — MIDAZOLAM HYDROCHLORIDE 2 MG/2ML
INJECTION, SOLUTION INTRAMUSCULAR; INTRAVENOUS AS NEEDED
Status: DISCONTINUED | OUTPATIENT
Start: 2021-01-05 | End: 2021-01-05

## 2021-01-05 RX ORDER — SCOLOPAMINE TRANSDERMAL SYSTEM 1 MG/1
PATCH, EXTENDED RELEASE TRANSDERMAL AS NEEDED
Status: DISCONTINUED | OUTPATIENT
Start: 2021-01-05 | End: 2021-01-05

## 2021-01-05 RX ORDER — HYDRALAZINE HYDROCHLORIDE 20 MG/ML
INJECTION INTRAMUSCULAR; INTRAVENOUS AS NEEDED
Status: DISCONTINUED | OUTPATIENT
Start: 2021-01-05 | End: 2021-01-05

## 2021-01-05 RX ORDER — LABETALOL 20 MG/4 ML (5 MG/ML) INTRAVENOUS SYRINGE
AS NEEDED
Status: DISCONTINUED | OUTPATIENT
Start: 2021-01-05 | End: 2021-01-05

## 2021-01-05 RX ORDER — HYDROMORPHONE HCL/PF 1 MG/ML
0.5 SYRINGE (ML) INJECTION
Status: DISCONTINUED | OUTPATIENT
Start: 2021-01-05 | End: 2021-01-05 | Stop reason: HOSPADM

## 2021-01-05 RX ORDER — DEXAMETHASONE SODIUM PHOSPHATE 4 MG/ML
INJECTION, SOLUTION INTRA-ARTICULAR; INTRALESIONAL; INTRAMUSCULAR; INTRAVENOUS; SOFT TISSUE AS NEEDED
Status: DISCONTINUED | OUTPATIENT
Start: 2021-01-05 | End: 2021-01-05

## 2021-01-05 RX ORDER — GLYCOPYRROLATE 0.2 MG/ML
INJECTION INTRAMUSCULAR; INTRAVENOUS AS NEEDED
Status: DISCONTINUED | OUTPATIENT
Start: 2021-01-05 | End: 2021-01-05

## 2021-01-05 RX ORDER — NEOSTIGMINE METHYLSULFATE 1 MG/ML
INJECTION INTRAVENOUS AS NEEDED
Status: DISCONTINUED | OUTPATIENT
Start: 2021-01-05 | End: 2021-01-05

## 2021-01-05 RX ORDER — OXYCODONE HYDROCHLORIDE AND ACETAMINOPHEN 5; 325 MG/1; MG/1
1 TABLET ORAL EVERY 4 HOURS PRN
Status: DISCONTINUED | OUTPATIENT
Start: 2021-01-05 | End: 2021-01-05 | Stop reason: HOSPADM

## 2021-01-05 RX ORDER — CEFUROXIME AXETIL 250 MG/1
250 TABLET ORAL EVERY 12 HOURS SCHEDULED
Qty: 10 TABLET | Refills: 0 | Status: SHIPPED | OUTPATIENT
Start: 2021-01-05 | End: 2021-01-10

## 2021-01-05 RX ORDER — HYDROCODONE BITARTRATE AND ACETAMINOPHEN 5; 325 MG/1; MG/1
TABLET ORAL
Qty: 10 TABLET | Refills: 0 | Status: SHIPPED | OUTPATIENT
Start: 2021-01-05 | End: 2021-03-11 | Stop reason: ALTCHOICE

## 2021-01-05 RX ORDER — CEFAZOLIN SODIUM 1 G/50ML
1000 SOLUTION INTRAVENOUS ONCE
Status: DISCONTINUED | OUTPATIENT
Start: 2021-01-05 | End: 2021-01-05 | Stop reason: HOSPADM

## 2021-01-05 RX ORDER — PROPOFOL 10 MG/ML
INJECTION, EMULSION INTRAVENOUS AS NEEDED
Status: DISCONTINUED | OUTPATIENT
Start: 2021-01-05 | End: 2021-01-05

## 2021-01-05 RX ORDER — ROCURONIUM BROMIDE 10 MG/ML
INJECTION, SOLUTION INTRAVENOUS AS NEEDED
Status: DISCONTINUED | OUTPATIENT
Start: 2021-01-05 | End: 2021-01-05

## 2021-01-05 RX ORDER — SODIUM CHLORIDE, SODIUM LACTATE, POTASSIUM CHLORIDE, CALCIUM CHLORIDE 600; 310; 30; 20 MG/100ML; MG/100ML; MG/100ML; MG/100ML
125 INJECTION, SOLUTION INTRAVENOUS CONTINUOUS
Status: DISCONTINUED | OUTPATIENT
Start: 2021-01-05 | End: 2021-01-05 | Stop reason: HOSPADM

## 2021-01-05 RX ORDER — ONDANSETRON 2 MG/ML
4 INJECTION INTRAMUSCULAR; INTRAVENOUS ONCE AS NEEDED
Status: DISCONTINUED | OUTPATIENT
Start: 2021-01-05 | End: 2021-01-05 | Stop reason: HOSPADM

## 2021-01-05 RX ORDER — FENTANYL CITRATE/PF 50 MCG/ML
25 SYRINGE (ML) INJECTION
Status: COMPLETED | OUTPATIENT
Start: 2021-01-05 | End: 2021-01-05

## 2021-01-05 RX ADMIN — FENTANYL CITRATE 50 MCG: 50 INJECTION, SOLUTION INTRAMUSCULAR; INTRAVENOUS at 13:57

## 2021-01-05 RX ADMIN — PROPOFOL 140 MG: 10 INJECTION, EMULSION INTRAVENOUS at 13:57

## 2021-01-05 RX ADMIN — SCOPALAMINE 1 PATCH: 1 PATCH, EXTENDED RELEASE TRANSDERMAL at 13:40

## 2021-01-05 RX ADMIN — FENTANYL CITRATE 25 MCG: 50 INJECTION INTRAMUSCULAR; INTRAVENOUS at 15:49

## 2021-01-05 RX ADMIN — LIDOCAINE HYDROCHLORIDE 50 MG: 20 INJECTION, SOLUTION INTRAVENOUS at 13:57

## 2021-01-05 RX ADMIN — ROCURONIUM BROMIDE 10 MG: 10 INJECTION, SOLUTION INTRAVENOUS at 14:10

## 2021-01-05 RX ADMIN — ONDANSETRON 4 MG: 2 INJECTION INTRAMUSCULAR; INTRAVENOUS at 13:56

## 2021-01-05 RX ADMIN — FENTANYL CITRATE 50 MCG: 50 INJECTION, SOLUTION INTRAMUSCULAR; INTRAVENOUS at 14:10

## 2021-01-05 RX ADMIN — GLYCOPYRROLATE 0.4 MG: 0.2 INJECTION, SOLUTION INTRAMUSCULAR; INTRAVENOUS at 15:20

## 2021-01-05 RX ADMIN — FENTANYL CITRATE 25 MCG: 50 INJECTION INTRAMUSCULAR; INTRAVENOUS at 15:44

## 2021-01-05 RX ADMIN — LABETALOL 20 MG/4 ML (5 MG/ML) INTRAVENOUS SYRINGE 10 MG: at 14:38

## 2021-01-05 RX ADMIN — FENTANYL CITRATE 50 MCG: 50 INJECTION, SOLUTION INTRAMUSCULAR; INTRAVENOUS at 14:35

## 2021-01-05 RX ADMIN — HYDRALAZINE HYDROCHLORIDE 5 MG: 20 INJECTION INTRAMUSCULAR; INTRAVENOUS at 14:48

## 2021-01-05 RX ADMIN — OXYCODONE HYDROCHLORIDE AND ACETAMINOPHEN 1 TABLET: 5; 325 TABLET ORAL at 17:52

## 2021-01-05 RX ADMIN — HYDROMORPHONE HYDROCHLORIDE 0.5 MG: 1 INJECTION, SOLUTION INTRAMUSCULAR; INTRAVENOUS; SUBCUTANEOUS at 16:15

## 2021-01-05 RX ADMIN — FENTANYL CITRATE 25 MCG: 50 INJECTION INTRAMUSCULAR; INTRAVENOUS at 15:54

## 2021-01-05 RX ADMIN — SODIUM CHLORIDE, SODIUM LACTATE, POTASSIUM CHLORIDE, AND CALCIUM CHLORIDE 125 ML/HR: .6; .31; .03; .02 INJECTION, SOLUTION INTRAVENOUS at 13:12

## 2021-01-05 RX ADMIN — FENTANYL CITRATE 25 MCG: 50 INJECTION INTRAMUSCULAR; INTRAVENOUS at 16:07

## 2021-01-05 RX ADMIN — HYDROMORPHONE HYDROCHLORIDE 0.5 MG: 1 INJECTION, SOLUTION INTRAMUSCULAR; INTRAVENOUS; SUBCUTANEOUS at 16:42

## 2021-01-05 RX ADMIN — LABETALOL 20 MG/4 ML (5 MG/ML) INTRAVENOUS SYRINGE 5 MG: at 14:29

## 2021-01-05 RX ADMIN — HYDROMORPHONE HYDROCHLORIDE 0.5 MG: 1 INJECTION, SOLUTION INTRAMUSCULAR; INTRAVENOUS; SUBCUTANEOUS at 16:24

## 2021-01-05 RX ADMIN — PROPOFOL 50 MG: 10 INJECTION, EMULSION INTRAVENOUS at 14:51

## 2021-01-05 RX ADMIN — FENTANYL CITRATE 50 MCG: 50 INJECTION, SOLUTION INTRAMUSCULAR; INTRAVENOUS at 14:52

## 2021-01-05 RX ADMIN — LABETALOL 20 MG/4 ML (5 MG/ML) INTRAVENOUS SYRINGE 2.5 MG: at 14:23

## 2021-01-05 RX ADMIN — NEOSTIGMINE METHYLSULFATE 3 MG: 1 INJECTION, SOLUTION INTRAVENOUS at 15:20

## 2021-01-05 RX ADMIN — MIDAZOLAM 2 MG: 1 INJECTION INTRAMUSCULAR; INTRAVENOUS at 13:45

## 2021-01-05 RX ADMIN — CEFAZOLIN SODIUM 2000 MG: 2 SOLUTION INTRAVENOUS at 13:40

## 2021-01-05 RX ADMIN — DEXAMETHASONE SODIUM PHOSPHATE 4 MG: 4 INJECTION INTRA-ARTICULAR; INTRALESIONAL; INTRAMUSCULAR; INTRAVENOUS; SOFT TISSUE at 13:57

## 2021-01-05 RX ADMIN — ROCURONIUM BROMIDE 20 MG: 10 INJECTION, SOLUTION INTRAVENOUS at 13:57

## 2021-01-05 RX ADMIN — PROPOFOL 130 MCG/KG/MIN: 10 INJECTION, EMULSION INTRAVENOUS at 13:59

## 2021-01-05 NOTE — ANESTHESIA POSTPROCEDURE EVALUATION
Post-Op Assessment Note    CV Status:  Stable  Pain Score: 2    Pain management: adequate     Mental Status:  Alert and awake   Hydration Status:  Euvolemic   PONV Controlled:  Controlled   Airway Patency:  Patent      Post Op Vitals Reviewed: Yes      Staff: Anesthesiologist         No complications documented      /63 (01/05/21 1537)    Temp 98 3 °F (36 8 °C) (01/05/21 1537)    Pulse 72 (01/05/21 1537)   Resp 16 (01/05/21 1537)    SpO2 100 % (01/05/21 1537)

## 2021-01-05 NOTE — DISCHARGE SUMMARY
Discharge Summary - Terrence Daugherty 56 y o  female MRN: [id number]    Admission Date: 1/5/2021     Admitting Diagnosis: Ureteral calculus [N20 1]    Procedures Performed:  Cysto, right ureteroscopy, retrograde pyelogram, stent exchange    Patient underwent planned outpt surgery and recovered without complication  Discharged in good condition  Medications were prescribed  Pt knows to have office follow-up at the appropriate time

## 2021-01-05 NOTE — ANESTHESIA PREPROCEDURE EVALUATION
Procedure:  CYSTO, LASER  URETEROSCOPY, RETRO PYELOGRAM, STENT (Right Bladder)    Relevant Problems   CARDIO   (+) Hypercholesterolemia   (+) Pure hypercholesterolemia      /RENAL   (+) Calculus of kidney   (+) Ureteral stone with hydronephrosis      HEMATOLOGY   (+) Anemia      MUSCULOSKELETAL   (+) Fibromyalgia   (+) Spondylosis of cervical region without myelopathy or radiculopathy   (+) Spondylosis of lumbar spine      NEURO/PSYCH   (+) Fibromyalgia   (+) History of hydronephrosis      PULMONARY   (+) Smoking        Physical Exam    Airway    Mallampati score: II  TM Distance: >3 FB  Neck ROM: full     Dental       Cardiovascular  Rhythm: regular, Rate: normal, Cardiovascular exam normal    Pulmonary  Pulmonary exam normal Breath sounds clear to auscultation,     Other Findings        Anesthesia Plan  ASA Score- 3     Anesthesia Type- general with ASA Monitors  Additional Monitors:   Airway Plan:           Plan Factors-    Chart reviewed  Induction- intravenous  Postoperative Plan-     Informed Consent- Anesthetic plan and risks discussed with patient

## 2021-01-05 NOTE — DISCHARGE INSTRUCTIONS
ALL YOUR  PREVIOUS MEDS ARE THE SAME  NEW MEDS:  Cefuroxime antibiotic  Hydrocodone for pain    EXPECT SOME BLOOD IN URINE, BURNING, FREQUENT URINATION  ACTIVITY:  RESUME FULL ACTIVITY 2-3 days

## 2021-01-05 NOTE — INTERVAL H&P NOTE
H&P reviewed  After examining the patient I find no changes in the patients condition since the H&P had been written      Vitals:    01/05/21 1248   BP: 160/76   Pulse: 68   Resp: 18   Temp: 97 6 °F (36 4 °C)   SpO2: 99%

## 2021-01-05 NOTE — OP NOTE
OPERATIVE REPORT  PATIENT NAME: Riley Rose    :  1960  MRN: [id number]  Pt Location: [facility] ROOM 03    SURGERY DATE: 2021    Surgeon(s) and Role:     * Gianna Licona MD - Primary    Preop Diagnosis:  Ureteral calculus [N20 1]    Post-Op Diagnosis Codes:     * Ureteral calculus [N20 1]    Procedure(s) (LRB):  CYSTO, LASER  URETEROSCOPY, RETRO PYELOGRAM, STENT REPLACMENT (Right)    Specimen(s):  ID Type Source Tests Collected by Time Destination   A : urine culture Urine Urine, Cystoscopic URINE CULTURE Gianna Licona MD 2021 1423        Estimated Blood Loss:   Minimal    Drains:  Ureteral Drain/Stent Right ureter 6 Fr  (Active)   Site Assessment Clean; Intact 21 1537   Number of days: 0       Anesthesia Type:   General    Operative Indications:  Ureteral calculus [N20 1]  Correction:  Renal calculus    Operative Findings:  Dilated, corkscrew ureter, unable to pass scope adequately into kidney to access the stone  Stent replaced  Complications:   None    Procedure and Technique:      PLAN FOR STENT:  Keep in place until next procedure       The patient was brought into the OR, properly identified and positioned on the table  General anesthesia was induced, and they were placed in lithotomy position and prepped and draped using chlorhexidine solution  The 22F scope was introduced in the urethra, which showed no stricture, and mild inflammation from the existing stent       The right ureteral stent was grasped and withdrawn  It was wired and I passed a second wire alongside it  At this point I could see that the upper ureter, just at and below the UPJ, had a corkscrew appearance, made a complete 360 degree turn as went to  the kidney  Over the next hour time, I attempted to get a flexible ureteroscope through a sheath, and over one of the wires, up into the kidney    It was very difficult to get the scope to make this 360 degree turn, and at one point I did have the tip of the scope in the renal pelvis, but I could not direct  the scope to the lower pole of the kidney where the stone was presumed to be  Multiple attempts were made with different guidewires, access sheath, rigid wires and open-ended catheters  However, after this time I decided not to pursue anymore, and I replaced the stent  The scope was removed from the ureter, and the cystoscope was used to place a 6F Contour VL stent in the ureter, with the upper coils in the renal pelvis, and the distal coil in the bladder  2 in of string was left on the distal end of the stent in the bladder, in case the stent were to migrate up into the ureter  Retrograde pyelogram on that side confirmed good position and no extravasation of contrast    The patient was awaken from anesthesia and taken to the PACU in good condition        I was present for the entire procedure    Patient Disposition:  PACU     SIGNATURE: Damian Rodriguez MD  DATE: January 5, 2021  TIME: 3:56 PM

## 2021-01-06 ENCOUNTER — TELEPHONE (OUTPATIENT)
Dept: UROLOGY | Facility: MEDICAL CENTER | Age: 61
End: 2021-01-06

## 2021-01-06 NOTE — TELEPHONE ENCOUNTER
Patient underwent cysto, ureteroscopy, stent exchange in the OR yesterday, it was a prolonged procedure due to tortuosity of the ureter  The OR called her today for a postop check in call, and asked me to call patient  She has significant dysuria, and also bladder pain that is quite severe while she voids, and she feels it in the suprapubic region  at other times  Having generalized abdominal pain throughout, nothing specific to right kidney  No spiking temperatures  Cultures pending, urine culture one week ago was negative  We discussed her symptoms, we will observe one more day, the culture from surgery will be back tomorrow    If she still having bad pain, she will check in with us again

## 2021-01-07 ENCOUNTER — OFFICE VISIT (OUTPATIENT)
Dept: FAMILY MEDICINE CLINIC | Facility: CLINIC | Age: 61
End: 2021-01-07
Payer: COMMERCIAL

## 2021-01-07 VITALS
TEMPERATURE: 97.4 F | BODY MASS INDEX: 19.33 KG/M2 | HEIGHT: 64 IN | DIASTOLIC BLOOD PRESSURE: 62 MMHG | WEIGHT: 113.2 LBS | OXYGEN SATURATION: 97 % | HEART RATE: 58 BPM | SYSTOLIC BLOOD PRESSURE: 110 MMHG

## 2021-01-07 DIAGNOSIS — E87.6 HYPOKALEMIA: ICD-10-CM

## 2021-01-07 DIAGNOSIS — D50.9 IRON DEFICIENCY ANEMIA, UNSPECIFIED IRON DEFICIENCY ANEMIA TYPE: ICD-10-CM

## 2021-01-07 DIAGNOSIS — N39.0 URINARY TRACT INFECTION WITHOUT HEMATURIA, SITE UNSPECIFIED: Primary | ICD-10-CM

## 2021-01-07 DIAGNOSIS — E83.51 HYPOCALCEMIA: ICD-10-CM

## 2021-01-07 DIAGNOSIS — T88.59XA NAUSEA AFTER ANESTHESIA, INITIAL ENCOUNTER: ICD-10-CM

## 2021-01-07 DIAGNOSIS — R79.0 LOW MAGNESIUM LEVEL: ICD-10-CM

## 2021-01-07 DIAGNOSIS — R63.4 WEIGHT LOSS: ICD-10-CM

## 2021-01-07 DIAGNOSIS — G47.419 PRIMARY NARCOLEPSY WITHOUT CATAPLEXY: ICD-10-CM

## 2021-01-07 DIAGNOSIS — K52.9 COLITIS: ICD-10-CM

## 2021-01-07 DIAGNOSIS — R74.8 ABNORMAL SERUM LEVEL OF LIPASE: ICD-10-CM

## 2021-01-07 DIAGNOSIS — N13.2 URETERAL STONE WITH HYDRONEPHROSIS: ICD-10-CM

## 2021-01-07 DIAGNOSIS — T14.8XXA ABRASION: ICD-10-CM

## 2021-01-07 DIAGNOSIS — R11.0 NAUSEA AFTER ANESTHESIA, INITIAL ENCOUNTER: ICD-10-CM

## 2021-01-07 DIAGNOSIS — Z12.31 SCREENING MAMMOGRAM, ENCOUNTER FOR: ICD-10-CM

## 2021-01-07 DIAGNOSIS — J98.11 ATELECTASIS OF BOTH LUNGS: ICD-10-CM

## 2021-01-07 LAB — BACTERIA UR CULT: NORMAL

## 2021-01-07 PROCEDURE — 1111F DSCHRG MED/CURRENT MED MERGE: CPT | Performed by: FAMILY MEDICINE

## 2021-01-07 PROCEDURE — 99495 TRANSJ CARE MGMT MOD F2F 14D: CPT | Performed by: FAMILY MEDICINE

## 2021-01-07 RX ORDER — PROCHLORPERAZINE MALEATE 10 MG
10 TABLET ORAL EVERY 6 HOURS PRN
Qty: 12 TABLET | Refills: 1 | Status: SHIPPED | OUTPATIENT
Start: 2021-01-07

## 2021-01-07 NOTE — PROGRESS NOTES
TCM Call (since 12/10/2020)     Date and time call was made  12/29/2020 11:02 AM    Hospital care reviewed  Records reviewed    Patient was hospitialized at  [facility] [address]        Date of Admission  12/25/20    Date of discharge  12/28/20    Diagnosis  Complicated UTI    Disposition  Home    Were the patients medications reviewed and updated  Yes    Current Symptoms  Neausea; Back pain - left side; Back pain - right side; Upper abdominal pain; Middle abdominal pain; Lower abdominal pain    Lower abdominal pain severity  Moderate    Lower abdominal pain onset  Ongoing; Unable to assess    Back pain, left side, severity  Moderate    Back pain, left side, onset  Ongoing; Unable to assess    Upper abdominal pain severity  Moderate    Upper abdominal pain onset  Ongoing; Unable to assess    Neausea severity  Moderate    Middle abdominal pain severity  Moderate    Middle abdominal pain onset  Ongoing; Unable to assess    Back pain, right side, onset  Ongoing; Unable to assess      TCM Call (since 12/10/2020)     Post hospital issues  None    Should patient be enrolled in anticoag monitoring? No    Scheduled for follow up?   Yes    Patients specialists  Urologist    Urologist name  Dr Aisha Harris - patient has surgery scheduled on 1/5/2020    Did you obtain your prescribed medications  Yes    Do you need help managing your prescriptions or medications  No    Is transportation to your appointment needed  No    I have advised the patient to call PCP with any new or worsening symptoms  [address] or [employer] other    Support System  Spouse    The type of support provided  None    Are you recieving any outpatient services  No    Are you recieving home care services  No    Are you using any community resources  No    Current waiver services  No    Have you fallen in the last 12 months  No    Interperter language line needed  No    Counseling  Patient        Assessment/Plan:       No problem-specific Assessment & Plan notes found for this encounter  Diagnoses and all orders for this visit:    Urinary tract infection without hematuria, site unspecified  Comments:  Clinically patient is doing well  Advised patient to follow with Urology  Abrasion  Comments:  Left labia minora  Apply Bactroban ointment 3 times a day  To call if any further problem  Primary narcolepsy without cataplexy  Comments:  Doing well  [facility]  Ureteral stone with hydronephrosis  -     Basic metabolic panel; Future    Iron deficiency anemia, unspecified iron deficiency anemia type  -     CBC and differential; Future    Weight loss  Comments:  resolved    Colitis  Comments:  Controlled  Hypokalemia  Comments:  Resolved    Hypocalcemia  Comments:  Resolved    Low magnesium level  -     Magnesium; Future    Atelectasis of both lungs  Comments:  Resolved    Abnormal serum level of lipase  Comments: To follow up with GI    Screening mammogram, encounter for  -     Mammo screening bilateral w 3d & cad; Future    Other orders  -     Turmeric (QC Tumeric Complex) 500 MG CAPS        Patient Instructions   Patient to follow up with test results      Orders Placed This Encounter   Procedures    Mammo screening bilateral w 3d & cad     Standing Status:   Future     Standing Expiration Date:   1/7/2025     Scheduling Instructions:      Do not wear any perfume, powder, lotion or deodorant on the breast or underarm area  If you have had any prior breast studies done at a different facility than [facility], please bring a copy of the study with you on the day of your test  Please allow at least 30 minutes for this appointment  Please bring your insurance cards, a form of photo ID and a list of your medications with you  To schedule this appointment, please contact Central Scheduling at [phone]       Order Specific Question:   Reason for Exam:     Answer:   Screening     Order Specific Question:   Approval for Diagnostic Call Back/Ultrasound If Necessary     Answer:   Yes     Order Specific Question:   Approval for Coordination for Additional Testing     Answer: Yes    CBC and differential     This is a patient instruction: This test is non-fasting  Please drink two glasses of water morning of bloodwork  Standing Status:   Future     Standing Expiration Date:   1/7/2022    Basic metabolic panel     This is a patient instruction: Patient fasting for 8 hours or longer recommended  Standing Status:   Future     Standing Expiration Date:   1/7/2022    Magnesium     Standing Status:   Future     Standing Expiration Date:   1/7/2022         Subjective:     Patient ID: Sienna Richardson is a 56 y o  female      HPI  Post admission on December 25, 2020  Patient was discharged on December 07/53  Had complicated urinary tract infection  Patient developed abdominal pain and slight fever and she was diagnosed with urinary tract infection she was treated with antibiotic and she did well  Had CT scan with stent in appropriate position resolved hydronephrosis still has tiny nonobstructing stone bilaterally and diffuse bladder wall thickening suspicious for cystitis  Patient was evaluated by Urology  Narcolepsy patient was started on modafinil instead of her are red after because it was not present in the hospital patient does follow with Dr Darren Hills  Patient has been doing well with this medication  Weight loss  Patient stated she did lose weight when she was in the hospital she was down to 103 but she did gain weight after she left the hospital  Anemia  History of iron deficiency anemia her hemoglobin did drop from 12-9 3 in the hospital  Patient had EGD and colonoscopy  Hydration chronic diarrhea  Patient stated is well controlled and she follow with GI  She did see GI last week  Patient readmitted for for same-day procedure on January 5, 2021   For cystoscopy right ureteroscopy, retrograde and stent exchange they were not able to remove the stone  Patient stated shortly after the procedure she feel sensitive area on the left side external genitalia with touch and when she touch it with paper she sees trace for blood on the paper  Denied hematuria , vaginal bleeding or rectal  Discharge summary for admission December 25 noted also discharge summary for admission January 5, 2020 noted  She had chest x-ray on December 30  Labs done through her admissions and also abdominal and CT scan done on December 25, 2020 noted    Lab done on December 27,28,30/ 20  Reviewed results with patient  Review of Systems   Constitutional: Negative for activity change, appetite change, chills, fatigue, fever and unexpected weight change  HENT: Negative for congestion, ear discharge, ear pain, hearing loss, nosebleeds, rhinorrhea, sinus pressure, sore throat, tinnitus, trouble swallowing and voice change  Eyes: Negative for photophobia, pain and visual disturbance  Respiratory: Negative for cough, chest tightness, shortness of breath and wheezing  Cardiovascular: Negative for chest pain, palpitations and leg swelling  Gastrointestinal: Negative for abdominal pain, anal bleeding, blood in stool, constipation, diarrhea, nausea and vomiting  Endocrine: Negative for cold intolerance, heat intolerance, polydipsia and polyuria  Genitourinary: Negative for dysuria, frequency, hematuria and urgency  Musculoskeletal: Negative for arthralgias, back pain, gait problem, joint swelling, myalgias and neck pain  Skin: Negative for rash  Neurological: Negative for dizziness, tremors, seizures, syncope, weakness, light-headedness and headaches  Hematological: Negative for adenopathy  Does not bruise/bleed easily  Psychiatric/Behavioral: Negative for agitation, behavioral problems, confusion, dysphoric mood, hallucinations and sleep disturbance  The patient is not nervous/anxious          Objective: Physical Exam  Constitutional:       General: She is not in acute distress  Appearance: She is well-developed  HENT:      Head: Normocephalic and atraumatic  Eyes:      General: No scleral icterus  Conjunctiva/sclera: Conjunctivae normal       Pupils: Pupils are equal, round, and reactive to light  Neck:      Thyroid: No thyromegaly  Vascular: No JVD  Cardiovascular:      Rate and Rhythm: Normal rate and regular rhythm  Heart sounds: Normal heart sounds  No murmur  Comments: Extremities  No edema  Pulmonary:      Effort: Pulmonary effort is normal       Breath sounds: Normal breath sounds  Abdominal:      Palpations: Abdomen is soft  There is no mass  Tenderness: There is no abdominal tenderness  There is no guarding or rebound  Hernia: No hernia is present  Genitourinary:     Comments: Inside the labia minor on the left side there is a slight opening about few mm with a slight irritation around  No drainage no bleeding  Urethra looks normal   And no vaginal bleeding seen  Lymphadenopathy:      Cervical: No cervical adenopathy  Skin:     Findings: No rash  Neurological:      General: No focal deficit present  Mental Status: She is alert and oriented to person, place, and time  Cranial Nerves: No cranial nerve deficit  Motor: No weakness or abnormal muscle tone        Coordination: Coordination normal       Gait: Gait normal    Psychiatric:         Behavior: Behavior normal          Judgment: Judgment normal

## 2021-01-07 NOTE — PROGRESS NOTES
Ureteroscopy two days ago, could not reach stone, see retrogrades  I called her today for follow-up, she is feeling better, but still having some nausea, I will refill chlorpromazine  Stone is visible on KUB, I am asking whether one of us can do shockwave for her

## 2021-01-08 ENCOUNTER — PREP FOR PROCEDURE (OUTPATIENT)
Dept: UROLOGY | Facility: MEDICAL CENTER | Age: 61
End: 2021-01-08

## 2021-01-08 DIAGNOSIS — N20.0 RENAL CALCULUS: Primary | ICD-10-CM

## 2021-01-10 PROBLEM — E83.51 HYPOCALCEMIA: Status: RESOLVED | Noted: 2021-01-10 | Resolved: 2021-01-10

## 2021-01-10 PROBLEM — E87.6 HYPOKALEMIA: Status: ACTIVE | Noted: 2021-01-10

## 2021-01-10 PROBLEM — E83.51 HYPOCALCEMIA: Status: ACTIVE | Noted: 2021-01-10

## 2021-01-10 PROBLEM — J98.11 ATELECTASIS OF BOTH LUNGS: Status: RESOLVED | Noted: 2021-01-10 | Resolved: 2021-01-10

## 2021-01-10 PROBLEM — R79.0 LOW MAGNESIUM LEVEL: Status: ACTIVE | Noted: 2021-01-10

## 2021-01-10 PROBLEM — E87.6 HYPOKALEMIA: Status: RESOLVED | Noted: 2021-01-10 | Resolved: 2021-01-10

## 2021-01-10 PROBLEM — J98.11 ATELECTASIS OF BOTH LUNGS: Status: ACTIVE | Noted: 2021-01-10

## 2021-01-11 ENCOUNTER — TELEPHONE (OUTPATIENT)
Dept: UROLOGY | Facility: MEDICAL CENTER | Age: 61
End: 2021-01-11

## 2021-01-11 NOTE — TELEPHONE ENCOUNTER
----- Message from Jose Fulton MD sent at 1/8/2021 11:03 AM EST -----  Schedule shockwave lithotripsy with one of the docs who does this    Patient knows we will call

## 2021-01-13 ENCOUNTER — HOSPITAL ENCOUNTER (INPATIENT)
Facility: HOSPITAL | Age: 61
LOS: 3 days | Discharge: HOME | DRG: 872 | End: 2021-01-16
Attending: EMERGENCY MEDICINE | Admitting: INTERNAL MEDICINE
Payer: COMMERCIAL

## 2021-01-13 ENCOUNTER — TELEPHONE (OUTPATIENT)
Dept: UROLOGY | Facility: CLINIC | Age: 61
End: 2021-01-13

## 2021-01-13 ENCOUNTER — APPOINTMENT (EMERGENCY)
Dept: CT IMAGING | Facility: HOSPITAL | Age: 61
DRG: 872 | End: 2021-01-13
Payer: COMMERCIAL

## 2021-01-13 DIAGNOSIS — R50.9 FEVER: ICD-10-CM

## 2021-01-13 DIAGNOSIS — N12 PYELONEPHRITIS: Primary | ICD-10-CM

## 2021-01-13 PROBLEM — A41.9 SEPSIS (HCC): Status: ACTIVE | Noted: 2021-01-13

## 2021-01-13 LAB
ALBUMIN SERPL BCP-MCNC: 3.4 G/DL (ref 3.5–5)
ALP SERPL-CCNC: 79 U/L (ref 46–116)
ALT SERPL W P-5'-P-CCNC: 18 U/L (ref 12–78)
ANION GAP SERPL CALCULATED.3IONS-SCNC: 9 MMOL/L (ref 4–13)
APTT PPP: 29 SECONDS (ref 23–37)
AST SERPL W P-5'-P-CCNC: 13 U/L (ref 5–45)
BACTERIA UR QL AUTO: ABNORMAL /HPF
BASOPHILS # BLD AUTO: 0.04 THOUSANDS/ΜL (ref 0–0.1)
BASOPHILS NFR BLD AUTO: 0 % (ref 0–1)
BILIRUB SERPL-MCNC: 0.62 MG/DL (ref 0.2–1)
BILIRUB UR QL STRIP: NEGATIVE
BUN SERPL-MCNC: 14 MG/DL (ref 5–25)
CALCIUM ALBUM COR SERPL-MCNC: 9.8 MG/DL (ref 8.3–10.1)
CALCIUM SERPL-MCNC: 9.3 MG/DL (ref 8.3–10.1)
CHLORIDE SERPL-SCNC: 101 MMOL/L (ref 100–108)
CLARITY UR: ABNORMAL
CO2 SERPL-SCNC: 26 MMOL/L (ref 21–32)
COLOR UR: YELLOW
CREAT SERPL-MCNC: 0.66 MG/DL (ref 0.6–1.3)
EOSINOPHIL # BLD AUTO: 0.13 THOUSAND/ΜL (ref 0–0.61)
EOSINOPHIL NFR BLD AUTO: 1 % (ref 0–6)
ERYTHROCYTE [DISTWIDTH] IN BLOOD BY AUTOMATED COUNT: 13.5 % (ref 11.6–15.1)
FLUAV RNA RESP QL NAA+PROBE: NEGATIVE
FLUBV RNA RESP QL NAA+PROBE: NEGATIVE
GFR SERPL CREATININE-BSD FRML MDRD: 95 ML/MIN/1.73SQ M
GLUCOSE SERPL-MCNC: 112 MG/DL (ref 65–140)
GLUCOSE UR STRIP-MCNC: NEGATIVE MG/DL
HCT VFR BLD AUTO: 34.4 % (ref 34.8–46.1)
HGB BLD-MCNC: 11.4 G/DL (ref 11.5–15.4)
HGB UR QL STRIP.AUTO: ABNORMAL
IMM GRANULOCYTES # BLD AUTO: 0.09 THOUSAND/UL (ref 0–0.2)
IMM GRANULOCYTES NFR BLD AUTO: 1 % (ref 0–2)
INR PPP: 1 (ref 0.84–1.19)
KETONES UR STRIP-MCNC: NEGATIVE MG/DL
LACTATE SERPL-SCNC: 0.8 MMOL/L (ref 0.5–2)
LEUKOCYTE ESTERASE UR QL STRIP: ABNORMAL
LYMPHOCYTES # BLD AUTO: 0.66 THOUSANDS/ΜL (ref 0.6–4.47)
LYMPHOCYTES NFR BLD AUTO: 5 % (ref 14–44)
MCH RBC QN AUTO: 31.8 PG (ref 26.8–34.3)
MCHC RBC AUTO-ENTMCNC: 33.1 G/DL (ref 31.4–37.4)
MCV RBC AUTO: 96 FL (ref 82–98)
MONOCYTES # BLD AUTO: 0.75 THOUSAND/ΜL (ref 0.17–1.22)
MONOCYTES NFR BLD AUTO: 6 % (ref 4–12)
NEUTROPHILS # BLD AUTO: 11.84 THOUSANDS/ΜL (ref 1.85–7.62)
NEUTS SEG NFR BLD AUTO: 87 % (ref 43–75)
NITRITE UR QL STRIP: POSITIVE
NON-SQ EPI CELLS URNS QL MICRO: ABNORMAL /HPF
NRBC BLD AUTO-RTO: 0 /100 WBCS
PH UR STRIP.AUTO: 7 [PH]
PLATELET # BLD AUTO: 244 THOUSANDS/UL (ref 149–390)
PMV BLD AUTO: 9.9 FL (ref 8.9–12.7)
POTASSIUM SERPL-SCNC: 3.5 MMOL/L (ref 3.5–5.3)
PROCALCITONIN SERPL-MCNC: 0.28 NG/ML
PROT SERPL-MCNC: 6.6 G/DL (ref 6.4–8.2)
PROT UR STRIP-MCNC: ABNORMAL MG/DL
PROTHROMBIN TIME: 13 SECONDS (ref 11.6–14.5)
RBC # BLD AUTO: 3.59 MILLION/UL (ref 3.81–5.12)
RBC #/AREA URNS AUTO: ABNORMAL /HPF
RSV RNA RESP QL NAA+PROBE: NEGATIVE
SARS-COV-2 RNA RESP QL NAA+PROBE: NEGATIVE
SODIUM SERPL-SCNC: 136 MMOL/L (ref 136–145)
SP GR UR STRIP.AUTO: 1.02 (ref 1–1.03)
UROBILINOGEN UR QL STRIP.AUTO: 0.2 E.U./DL
WBC # BLD AUTO: 13.51 THOUSAND/UL (ref 4.31–10.16)
WBC #/AREA URNS AUTO: ABNORMAL /HPF

## 2021-01-13 PROCEDURE — G1004 CDSM NDSC: HCPCS

## 2021-01-13 PROCEDURE — 81001 URINALYSIS AUTO W/SCOPE: CPT | Performed by: EMERGENCY MEDICINE

## 2021-01-13 PROCEDURE — 96361 HYDRATE IV INFUSION ADD-ON: CPT

## 2021-01-13 PROCEDURE — 99285 EMERGENCY DEPT VISIT HI MDM: CPT

## 2021-01-13 PROCEDURE — 87040 BLOOD CULTURE FOR BACTERIA: CPT | Performed by: EMERGENCY MEDICINE

## 2021-01-13 PROCEDURE — 99285 EMERGENCY DEPT VISIT HI MDM: CPT | Performed by: EMERGENCY MEDICINE

## 2021-01-13 PROCEDURE — 36415 COLL VENOUS BLD VENIPUNCTURE: CPT | Performed by: EMERGENCY MEDICINE

## 2021-01-13 PROCEDURE — 85610 PROTHROMBIN TIME: CPT | Performed by: EMERGENCY MEDICINE

## 2021-01-13 PROCEDURE — 96375 TX/PRO/DX INJ NEW DRUG ADDON: CPT

## 2021-01-13 PROCEDURE — 74177 CT ABD & PELVIS W/CONTRAST: CPT

## 2021-01-13 PROCEDURE — 96374 THER/PROPH/DIAG INJ IV PUSH: CPT

## 2021-01-13 PROCEDURE — 85025 COMPLETE CBC W/AUTO DIFF WBC: CPT | Performed by: EMERGENCY MEDICINE

## 2021-01-13 PROCEDURE — 80053 COMPREHEN METABOLIC PANEL: CPT | Performed by: EMERGENCY MEDICINE

## 2021-01-13 PROCEDURE — 96365 THER/PROPH/DIAG IV INF INIT: CPT

## 2021-01-13 PROCEDURE — 83605 ASSAY OF LACTIC ACID: CPT | Performed by: EMERGENCY MEDICINE

## 2021-01-13 PROCEDURE — 87186 SC STD MICRODIL/AGAR DIL: CPT | Performed by: EMERGENCY MEDICINE

## 2021-01-13 PROCEDURE — 87077 CULTURE AEROBIC IDENTIFY: CPT | Performed by: EMERGENCY MEDICINE

## 2021-01-13 PROCEDURE — 85730 THROMBOPLASTIN TIME PARTIAL: CPT | Performed by: EMERGENCY MEDICINE

## 2021-01-13 PROCEDURE — 87086 URINE CULTURE/COLONY COUNT: CPT | Performed by: EMERGENCY MEDICINE

## 2021-01-13 PROCEDURE — 99223 1ST HOSP IP/OBS HIGH 75: CPT | Performed by: INTERNAL MEDICINE

## 2021-01-13 PROCEDURE — 0241U HB NFCT DS VIR RESP RNA 4 TRGT: CPT | Performed by: EMERGENCY MEDICINE

## 2021-01-13 PROCEDURE — 84145 PROCALCITONIN (PCT): CPT | Performed by: EMERGENCY MEDICINE

## 2021-01-13 RX ORDER — PANTOPRAZOLE SODIUM 40 MG/1
40 TABLET, DELAYED RELEASE ORAL
Status: DISCONTINUED | OUTPATIENT
Start: 2021-01-14 | End: 2021-01-16 | Stop reason: HOSPADM

## 2021-01-13 RX ORDER — CETIRIZINE HYDROCHLORIDE 10 MG/1
10 TABLET, CHEWABLE ORAL DAILY
COMMUNITY
End: 2021-03-16

## 2021-01-13 RX ORDER — GABAPENTIN 300 MG/1
600 CAPSULE ORAL
Status: DISCONTINUED | OUTPATIENT
Start: 2021-01-13 | End: 2021-01-16 | Stop reason: HOSPADM

## 2021-01-13 RX ORDER — OXYBUTYNIN CHLORIDE 15 MG/1
15 TABLET, EXTENDED RELEASE ORAL
COMMUNITY
End: 2021-03-05 | Stop reason: DRUGHIGH

## 2021-01-13 RX ORDER — MESALAMINE 1.2 G/1
2.4 TABLET, DELAYED RELEASE ORAL
COMMUNITY
End: 2022-03-24 | Stop reason: ALTCHOICE

## 2021-01-13 RX ORDER — ACETAMINOPHEN 325 MG/1
650 TABLET ORAL EVERY 6 HOURS PRN
Status: DISCONTINUED | OUTPATIENT
Start: 2021-01-13 | End: 2021-01-16 | Stop reason: HOSPADM

## 2021-01-13 RX ORDER — ACETAMINOPHEN 325 MG/1
975 TABLET ORAL ONCE
Status: COMPLETED | OUTPATIENT
Start: 2021-01-13 | End: 2021-01-13

## 2021-01-13 RX ORDER — MORPHINE SULFATE 4 MG/ML
4 INJECTION, SOLUTION INTRAMUSCULAR; INTRAVENOUS ONCE
Status: COMPLETED | OUTPATIENT
Start: 2021-01-13 | End: 2021-01-13

## 2021-01-13 RX ORDER — HEPARIN SODIUM 5000 [USP'U]/ML
5000 INJECTION, SOLUTION INTRAVENOUS; SUBCUTANEOUS EVERY 8 HOURS SCHEDULED
Status: DISCONTINUED | OUTPATIENT
Start: 2021-01-13 | End: 2021-01-16 | Stop reason: HOSPADM

## 2021-01-13 RX ORDER — MODAFINIL 100 MG/1
100 TABLET ORAL DAILY
Status: DISCONTINUED | OUTPATIENT
Start: 2021-01-14 | End: 2021-01-14

## 2021-01-13 RX ORDER — ATORVASTATIN CALCIUM 40 MG/1
40 TABLET, FILM COATED ORAL
Status: DISCONTINUED | OUTPATIENT
Start: 2021-01-14 | End: 2021-01-16 | Stop reason: HOSPADM

## 2021-01-13 RX ORDER — NICOTINE 21 MG/24HR
1 PATCH, TRANSDERMAL 24 HOURS TRANSDERMAL DAILY
Status: DISCONTINUED | OUTPATIENT
Start: 2021-01-14 | End: 2021-01-16 | Stop reason: HOSPADM

## 2021-01-13 RX ORDER — KETOROLAC TROMETHAMINE 30 MG/ML
15 INJECTION, SOLUTION INTRAMUSCULAR; INTRAVENOUS ONCE
Status: COMPLETED | OUTPATIENT
Start: 2021-01-13 | End: 2021-01-13

## 2021-01-13 RX ORDER — ONDANSETRON 4 MG/1
4 TABLET, ORALLY DISINTEGRATING ORAL EVERY 6 HOURS PRN
COMMUNITY
End: 2021-03-05 | Stop reason: DRUGHIGH

## 2021-01-13 RX ADMIN — GABAPENTIN 600 MG: 300 CAPSULE ORAL at 21:37

## 2021-01-13 RX ADMIN — ACETAMINOPHEN 975 MG: 325 TABLET ORAL at 16:39

## 2021-01-13 RX ADMIN — IOHEXOL 100 ML: 350 INJECTION, SOLUTION INTRAVENOUS at 17:11

## 2021-01-13 RX ADMIN — MORPHINE SULFATE 4 MG: 4 INJECTION INTRAVENOUS at 15:39

## 2021-01-13 RX ADMIN — HEPARIN SODIUM 5000 UNITS: 5000 INJECTION INTRAVENOUS; SUBCUTANEOUS at 21:37

## 2021-01-13 RX ADMIN — KETOROLAC TROMETHAMINE 15 MG: 30 INJECTION, SOLUTION INTRAMUSCULAR at 15:39

## 2021-01-13 RX ADMIN — SODIUM CHLORIDE 1000 ML: 0.9 INJECTION, SOLUTION INTRAVENOUS at 15:38

## 2021-01-13 RX ADMIN — OXYBUTYNIN 15 MG: 10 TABLET, FILM COATED, EXTENDED RELEASE ORAL at 21:37

## 2021-01-13 RX ADMIN — DULOXETINE HYDROCHLORIDE 90 MG: 60 CAPSULE, DELAYED RELEASE ORAL at 21:39

## 2021-01-13 RX ADMIN — ACETAMINOPHEN 650 MG: 325 TABLET ORAL at 21:37

## 2021-01-13 RX ADMIN — CEFTRIAXONE SODIUM 1000 MG: 10 INJECTION, POWDER, FOR SOLUTION INTRAVENOUS at 15:43

## 2021-01-13 NOTE — H&P
H&P- Riley Rose 1960, 56 y o  female MRN: [id number]    Unit/Bed#: E5 -01 Encounter: [id number]    Primary Care Provider: Anusha London MD   Date and time admitted to hospital: 1/13/2021  2:57 PM        * Sepsis [facility])  Assessment & Plan  This is a 77-year-old female with history of right ureteral stone status post ureteroscopy with failed retrieval blood with stent placement on 01/05/2021 presenting with fever and diffuse body aches  · Sepsis POA criteria met with fever, leukocytosis  · Secondary to pyelonephritis  · Continue with empiric antibiotics  · Follow-up cultures    Pyelonephritis  Assessment & Plan  · CT scan reveals numerous areas of right renal cortical hypoenhancement suspicious for pyelonephritis, right nephroureteral stent remains in place, no hydronephrosis  · Continue with empiric antibiotics follow-up cultures  · Urology input will be appreciated    Narcolepsy  Assessment & Plan  · Start modafinil in place of home medication    Pure hypercholesterolemia  Assessment & Plan  · Continue statin    Fibromyalgia  Assessment & Plan  · Continue Cymbalta        VTE Prophylaxis: Heparin  / sequential compression device   Code Status: FULL  POLST: There is no POLST form on file for this patient (pre-hospital)    Anticipated Length of Stay:  Patient will be admitted on an Inpatient basis with an anticipated length of stay of  greater than 2 midnights  Justification for Hospital Stay:  Sepsis    Total Time for Visit, including Counseling / Coordination of Care: 30 minutes  Greater than 50% of this total time spent on direct patient counseling and coordination of care  Chief Complaint:   Generalized weakness, fatigue, fever    History of Present Illness: Riley Rose is a 56 y o  female who presents with generalized weakness and fatigue  Patient has history of right renal stone status post stent placement and UTI, replacement on 01/05/2021    Patient now presenting with fever, fatigue, body aches that started this morning  She denies any chest pain, palpitations, dyspnea  Denies any nausea, vomiting, diarrhea  Patient  called urology office and was told to come to the ED  Upon arrival to the ED vitals noted for temperature 101 4, blood work ordered for leukocytosis, patent shunt will be admitted for further management and evaluation    Review of Systems:    Review of Systems   Constitutional: Positive for chills, fatigue and fever  HENT: Negative  Eyes: Negative  Respiratory: Negative  Cardiovascular: Negative  Gastrointestinal: Negative  Endocrine: Negative  Genitourinary: Negative  Musculoskeletal: Positive for myalgias  Skin: Negative  Allergic/Immunologic: Negative  Neurological: Negative  Hematological: Negative  Psychiatric/Behavioral: Negative          Past Medical and Surgical History:     Past Medical History:   Diagnosis Date    Allergy to dust     Anemia     Calculus of ureter     Chronic pain disorder     neck and back- sees pain management    Colitis     Cracked skin     on fingers    Diverticulosis     Dysphagia     GERD (gastroesophageal reflux disease)     H/O: GI bleed     Hiatal hernia     History of DVT in adulthood     flaquito    Hydronephrosis     Hyperlipidemia     Inguinal hernia     right side    Irritable bowel     Migraines     Narcolepsy     Narcolepsy 8/6/2020    OAB (overactive bladder)     Osteoarthritis     Osteoporosis     Other chronic pain     degenerative disc disease    PONV (postoperative nausea and vomiting)     migraines    Smoking 8/6/2020    Tobacco abuse     Wears dentures     full upper    Wears glasses     Wears glasses        Past Surgical History:   Procedure Laterality Date    ABDOMINAL ADHESION SURGERY      ARM SKIN LESION BIOPSY / EXCISION      lymph node excision    BLADDER SURGERY      CHOLECYSTECTOMY      COLONOSCOPY  08/06/2020    FL RETROGRADE PYELOGRAM 1/5/2021    HYSTERECTOMY  1977    JOINT REPLACEMENT Right     KNEE SURGERY Right     replacement    LAMINECTOMY      L5-6-7    LAPAROSCOPY      LYMPH NODE DISSECTION Left     removed -no cancer-  limb restriction    MOLE REMOVAL      OOPHORECTOMY Bilateral 1977    OTHER SURGICAL HISTORY      enlarged lymph node removed left arm   no cancer    IA CYSTO/URETERO W/LITHOTRIPSY &INDWELL STENT INSRT Right 1/5/2021    Procedure: CYSTO, LASER  URETEROSCOPY, RETRO PYELOGRAM, STENT REPLACMENT;  Surgeon: Sofia Moore MD;  [facility];  Service: Urology    IA CYSTOURETHROSCOPY,URETER CATHETER Right 12/2/2020    Procedure: CYSTOSCOPY AND INSERTION STENT URETERAL;  Surgeon: Zohra Ramos MD;  [facility];  Service: Urology    IA REPAIR [address] HERNIA,5+Y/O,REDUCIBL Right 6/25/2020    Procedure: REPAIR HERNIA INGUINAL  WITH MESH;  Surgeon: Ysabel Garcia MD;  [facility];  Service: General    SPINAL FUSION      UPPER GASTROINTESTINAL ENDOSCOPY  08/06/2020    WISDOM TOOTH EXTRACTION         Meds/Allergies:    Prior to Admission medications    Medication Sig Start Date End Date Taking?  Authorizing Provider   Armodafinil 250 MG tablet Take 1 tablet (250 mg total) by mouth daily 8/25/20  Yes Yash Schaefer MD   Ascorbic Acid (VITAMIN C PO) Take 500 mg by mouth daily    Yes Historical Provider, MD   BACILLUS COAGULANS-INULIN PO Take 1 capsule by mouth daily    Yes Historical Provider, MD   calcium carbonate 1250 MG capsule Take 600 mg by mouth daily    Yes Historical Provider, MD   cetirizine (ZyrTEC) 10 MG chewable tablet Chew 10 mg daily   Yes Historical Provider, MD   Cholecalciferol 1000 units tablet Take 1,000 Units by mouth daily  12/9/16  Yes Historical Provider, MD   Citicoline Sodium 500 MG TABS Take 500 mg by mouth daily    Yes Historical Provider, MD   Coenzyme Q10 200 MG/GM POWD Take 200 mg by mouth daily    Yes Historical Provider, MD   denosumab (PROLIA) 60 mg/mL Inject 60 mg under the skin Every 6 months   Yes Historical Provider, MD   DULoxetine (CYMBALTA) 30 mg delayed release capsule Take 3 capsules (90 mg total) by mouth daily  Patient taking differently: Take 90 mg by mouth every evening  3/24/20 1/13/21 Yes Jac Ferguson,    ferrous sulfate 325 (65 Fe) mg tablet Take 325 mg by mouth daily with breakfast  9/4/14  Yes Historical Provider, MD   gabapentin (NEURONTIN) 300 mg capsule 300 mg at 1200 in addition to 600 mg at bedtime  12/27/18  Yes Historical Provider, MD   Gingdonell Zingiber officinalis, (GINGER ROOT) 550 MG CAPS Take 550 mg by mouth 2 (two) times a day    Yes Historical Provider, MD   Ginkgo Biloba 40 MG TABS Take 40 mg by mouth daily    Yes Historical Provider, MD   mesalamine (LIALDA) 1 2 g EC tablet Take 2 4 g by mouth daily with breakfast   Yes Historical Provider, MD   Misc Natural Products (GINSENG-COMPLEX PO) Take by mouth   Yes Historical Provider, MD   Multiple Vitamins-Minerals (CENTRUM SILVER PO) Take 1 tablet by mouth daily    Yes Historical Provider, MD   mupirocin (BACTROBAN) 2 % ointment Apply topically 3 (three) times a day Apply topically on hands to open cuts to prevent infection 10/8/19  Yes Nolan Shah MD   omeprazole (PriLOSEC) 40 MG capsule Take 40 mg by mouth daily before breakfast 11/16/20  Yes Historical Provider, MD   ondansetron (ZOFRAN-ODT) 4 mg disintegrating tablet Take 4 mg by mouth every 6 (six) hours as needed for nausea or vomiting   Yes Historical Provider, MD   oxybutynin (DITROPAN XL) 15 MG 24 hr tablet Take 15 mg by mouth daily at bedtime   Yes Historical Provider, MD   rosuvastatin (CRESTOR) 5 mg tablet Take 1 tablet (5 mg total) by mouth daily 12/29/20  Yes Magalie Borden MD   vitamin E, tocopherol, 400 units capsule Take 400 Units by mouth daily   Yes Historical Provider, MD   ZOLMitriptan (ZOMIG) 2 5 MG tablet TAKE 1 TABLET BY MOUTH AT ONSET OF MIGRAINE, MAY REPEAT IN 2 HOURS IF NEEDED   LIMIT 2/24 HOURS, 4/WEEK, 8/MONTH 12/30/20 Yes Berkley Fry MD   dicyclomine (BENTYL) 20 mg tablet Take 1 tablet (20 mg total) by mouth 4 (four) times a day (before meals and at bedtime) 12/28/20   Miriam Chavez MD   docusate sodium (COLACE) 100 mg capsule Take 1 capsule (100 mg total) by mouth 2 (two) times a day as needed for constipation 12/28/20   Miriam Chavez MD   HYDROcodone-acetaminophen [facility]) 5-325 mg per tablet 1-2 tab every 6 hr if needed for pain 1/5/21   Kam Manuel MD   metaxalone [facility]) 800 mg tablet Take 800 mg by mouth 3 (three) times a day  11/26/18 12/29/20  Historical Provider, MD   NON FORMULARY Place 30 mg under the tongue as needed --- tincture prn 1/4/19   Historical Provider, MD   NON FORMULARY Take 2 tablets by mouth daily Take Health and Sooth take two capsule daily     Historical Provider, MD   prochlorperazine (COMPAZINE) 10 mg tablet Take 1 tablet (10 mg total) by mouth every 6 (six) hours as needed for nausea or vomiting 1/7/21   Kam Manuel MD   solifenacin (VESICARE) 10 MG tablet Take 1 tablet (10 mg total) by mouth daily 12/14/20   Kam Manuel MD   Turmeric (QC Tumeric Complex) 500 MG CAPS     Historical Provider, MD     I have reviewed home medications with patient personally  Allergies: Allergies   Allergen Reactions    Celecoxib Other (See Comments)     Increased Heart Rate, Palpitations    Sumatriptan Anaphylaxis     Other reaction(s): SUMATRIPTAN (IMITREX) (Throat closure)  Pt analilia zolmitriptan      Tramadol Other (See Comments)     GI Upset- severe vomiting and systemic body aches    Medical Tape Dermatitis, Rash and Other (See Comments)     Adhesive from medication patches and bandaides- ok with paper tape       Social History:     Social History     Substance and Sexual Activity   Alcohol Use Not Currently    Frequency: Monthly or less    Drinks per session: 1 or 2    Binge frequency: Never     Social History     Tobacco Use   Smoking Status Current Every Day Smoker    Packs/day: 0 50  Years: 46 00    Pack years: 23 00    Types: Cigarettes   Smokeless Tobacco Never Used   Tobacco Comment    as of 12- pt has reduced to 3 cigarettes per day     Social History     Substance and Sexual Activity   Drug Use Yes    Types: Marijuana    Comment: medicinal marijuana       Family History:    Family History   Problem Relation Age of Onset    Hypertension Father     Cancer Father     Diabetes Mother     Hypertension Mother     Stroke Brother     Nephrolithiasis Brother     Diabetes Sister     No Known Problems Daughter     No Known Problems Maternal Grandmother     No Known Problems Maternal Grandfather     No Known Problems Paternal Grandmother     No Known Problems Paternal Grandfather     No Known Problems Sister     No Known Problems Maternal Aunt     No Known Problems Maternal Aunt     No Known Problems Maternal Aunt     No Known Problems Paternal Aunt     No Known Problems Paternal Aunt     No Known Problems Paternal Aunt        Physical Exam:     Vitals:   Blood Pressure: 100/66 (01/13/21 1922)  Pulse: 80 (01/13/21 1922)  Temperature: 97 7 °F (36 5 °C) (01/13/21 1922)  Temp Source: Temporal (01/13/21 1922)  Respirations: 18 (01/13/21 1922)  Weight - Scale: 50 7 kg (111 lb 12 4 oz) (01/13/21 1500)  SpO2: 98 % (01/13/21 1922)    Constitutional: Patient is oriented to person, place and time, no acute distress  HEENT:  Normocephalic, atraumatic  Cardiovascular: Normal S1S2, RRR, No murmurs/rubs/gallops appreciated  Pulmonary:  Bilateral air entry, No rhonchi/rales/wheezing appreciated  Abdominal: Soft, Bowel sounds present, Non-tender, Non-distended  Extremities:  No cyanosis, clubbing or edema  Neurological: Cranial nerves II-XII grossly intact, sensation intact, otherwise no focal neurological symptoms  Additional Data:     Lab Results: I have personally reviewed pertinent reports        Results from last 7 days   Lab Units 01/13/21  1529   WBC Thousand/uL 13 51* HEMOGLOBIN g/dL 11 4*   HEMATOCRIT % 34 4*   PLATELETS Thousands/uL 244   NEUTROS PCT % 87*   LYMPHS PCT % 5*   MONOS PCT % 6   EOS PCT % 1     Results from last 7 days   Lab Units 01/13/21  1529   POTASSIUM mmol/L 3 5   CHLORIDE mmol/L 101   CO2 mmol/L 26   BUN mg/dL 14   CREATININE mg/dL 0 66   CALCIUM mg/dL 9 3   ALK PHOS U/L 79   ALT U/L 18   AST U/L 13     Results from last 7 days   Lab Units 01/13/21  1529   INR  1 00       Imaging: I have personally reviewed pertinent reports  Ct Abdomen Pelvis Wo Contrast    Result Date: 12/25/2020  Narrative: CT ABDOMEN AND PELVIS WITHOUT IV CONTRAST INDICATION:   left flank pain  COMPARISON:  12/2/2020 CT; 12/2/2020 retrograde pyelogram TECHNIQUE:  CT examination of the abdomen and pelvis was performed without intravenous contrast   Axial, sagittal, and coronal 2D reformatted images were created from the source data and submitted for interpretation  Radiation dose length product (DLP) for this visit:  210 mGy-cm   This examination, like all CT scans performed in the [place], was performed utilizing techniques to minimize radiation dose exposure, including the use of iterative reconstruction and automated exposure control  Enteric contrast was administered  FINDINGS: ABDOMEN LOWER CHEST:  No clinically significant abnormality identified in the visualized lower chest  LIVER/BILIARY TREE:  Unremarkable  GALLBLADDER:  Gallbladder is surgically absent  SPLEEN:  Unremarkable  PANCREAS:  Unremarkable  ADRENAL GLANDS:  Unremarkable  KIDNEYS/URETERS:  Tiny nonobstructive bilateral renal calculi  Double-J right nephroureteral stent is unchanged compared with retrograde pyelogram   Previous right hydronephrosis has resolved  Previous right ureteropelvic junction calculus has migrated  to the lower pole of the right kidney  STOMACH AND BOWEL:  Unremarkable  APPENDIX:  No findings to suggest appendicitis  ABDOMINOPELVIC CAVITY:  No ascites    No pneumoperitoneum  No lymphadenopathy  VESSELS:  Unremarkable for patient's age  PELVIS REPRODUCTIVE ORGANS:  Unremarkable for patient's age  URINARY BLADDER:  Development of mild diffuse bladder wall thickening suspicious for possible cystitis, not evident previously  ABDOMINAL WALL/INGUINAL REGIONS:  Unremarkable  OSSEOUS STRUCTURES:  No acute fracture or destructive osseous lesion  Moderate to severe degenerative spondylosis L3-4, L4-5, L5-S1, unchanged  Impression: Tiny nonobstructive bilateral intrarenal calculi Previous right UPJ junction calculus has migrated to the lower pole of the kidney status post double-J nephroureteral stent placement  Resolution of previous right hydronephrosis Development of mild diffuse bladder wall thickening suspicious for cystitis Workstation performed: [id number]     Xr Chest Pa & Lateral    Result Date: 12/30/2020  Narrative: CHEST INDICATION:   J98 11: Atelectasis  COMPARISON:  August 29, 2019  EXAM PERFORMED/VIEWS:  XR CHEST PA & LATERAL  The frontal view was performed utilizing dual energy radiographic technique  FINDINGS: Cardiomediastinal silhouette appears unremarkable  The lungs are clear  No pneumothorax or pleural effusion  Osseous structures appear within normal limits for patient age  Status post prior lower cervical fusion  Impression: No acute cardiopulmonary disease  Workstation performed: [id number]     Xr Abdomen 1 View Kub    Result Date: 1/8/2021  Narrative: ABDOMEN INDICATION:   Rt stent and Rt lower pole renal calculus  COMPARISON:  None VIEWS:  AP supine FINDINGS: Right ureteral stent is identified  4 mm calculus overlies the right lower pole No radiopaque ureteral calculi identified  Nonobstructive bowel gas pattern  No acute osseous abnormality is seen  Impression: Right renal calculus   Workstation performed: [id number]     Fl Retrograde Pyelogram    Result Date: 1/6/2021  Narrative: RIGHT RETROGRADE PYELOGRAM INDICATION:   N20 1: Calculus of ureter  COMPARISON: CT 12/25/2020 IMAGES:  15 FLUOROSCOPY TIME:   2 mins 42 secs CONTRAST:  49 mL of iohexol (OMNIPAQUE) FINDINGS: Fluoroscopic guidance provided for retrograde pyelogram  Right ureteral stent removed  Retrograde ureteropyelography performed  Opacified portions of the ureter demonstrate a ptotic right kidney with somewhat tortuous proximal right ureter making cystoscopic negotiation challenging  Stone in the lower pole collecting system could not be visualized and no intervention was undertaken  Right ureteral stent was replaced  The upper tract is unremarkable  Osseous and soft tissue detail limited by technique  Impression: Fluoroscopic guidance provided for retrograde pyelogram and ureteral stent placement  Please see procedure report for further details  Workstation performed: [id number]     Ct Abdomen Pelvis With Contrast    Result Date: 1/13/2021  Narrative: CT ABDOMEN AND PELVIS WITH IV CONTRAST INDICATION:   right renal stone s/o stent and failed retrieval, fever and diffuse body aches today  COMPARISON:  CT abdomen pelvis 12/25/2020  AP abdomen 1/5/2021  Retrograde pyelogram 1/5/2021  TECHNIQUE:  CT examination of the abdomen and pelvis was performed  Axial, sagittal, and coronal 2D reformatted images were created from the source data and submitted for interpretation  Radiation dose length product (DLP) for this visit:  373 mGy-cm   This examination, like all CT scans performed in the [facility], was performed utilizing techniques to minimize radiation dose exposure, including the use of iterative reconstruction and automated exposure control  IV Contrast:  100 mL of iohexol (OMNIPAQUE) Enteric Contrast:  Enteric contrast was not administered  FINDINGS: ABDOMEN LOWER CHEST:  No clinically significant abnormality identified in the visualized lower chest  LIVER/BILIARY TREE:  Unremarkable  GALLBLADDER:  Gallbladder is surgically absent   SPLEEN:  A few calcified granulomas  PANCREAS:  Unremarkable  ADRENAL GLANDS:  Unremarkable  KIDNEYS/URETERS:  A right nephroureteral stent remains in place  No hydronephrosis or ureteral calculi  Nonobstructive 4 mm lower pole right renal calculus  Numerous areas of right renal cortical hypoenhancement suspicious for pyelonephritis  No perinephric collection  STOMACH AND BOWEL:  Unremarkable  APPENDIX:  No findings to suggest appendicitis  ABDOMINOPELVIC CAVITY:  No ascites  No pneumoperitoneum  No lymphadenopathy  VESSELS:  Unremarkable for patient's age  PELVIS REPRODUCTIVE ORGANS:  Surgical changes of prior hysterectomy  URINARY BLADDER:  Unremarkable  ABDOMINAL WALL/INGUINAL REGIONS:  Unremarkable  OSSEOUS STRUCTURES:  No acute fracture or destructive osseous lesion  Impression: Numerous areas of right renal cortical hypoenhancement suspicious for pyelonephritis  Right nephroureteral stent remains in place  No hydronephrosis  No ureteral calculi  The study was marked in [facility] for immediate notification  Workstation performed: [id number]       [employer] / [employer] Records Reviewed: Yes     ** Please Note: This note has been constructed using a voice recognition system   **

## 2021-01-13 NOTE — ED PROVIDER NOTES
History  Chief Complaint   Patient presents with    Generalized Body Aches     patient recently dx with kidney stone  woke up around 0400 with generalized body aches and fever of 101 9  c/o R sided flank pain  This is a 55-year-old female with a history of right ureteral stone status post ureteroscopy with failed retrieval but with stent placement on 1/5/21 who presents with fever and diffuse body aches  The patient states that she woke up this morning with pain all over  She is unable to locate a particular spot that hurts the most   She does admit to dysuria  She has not taken anything for her fever or body aches  She does have a history of right ureteral stone  Her  called the urology office and she was told to come to the emergency department   ultimately called EMS  Denies any sick contacts or COVID exposures  Denies chills, nausea/vomiting, lightheadedness/dizziness, numbness/weakness, headache, change in vision, URI symptoms, neck pain, chest pain, palpitations, shortness of breath, cough, back pain, flank pain, abdominal pain, diarrhea, hematochezia, melena, dysuria, hematuria, abnormal vaginal discharge/bleeding  Prior to Admission Medications   Prescriptions Last Dose Informant Patient Reported? Taking?    Armodafinil 250 MG tablet  Self No Yes   Sig: Take 1 tablet (250 mg total) by mouth daily   Ascorbic Acid (VITAMIN C PO)  Self Yes Yes   Sig: Take 500 mg by mouth daily    BACILLUS COAGULANS-INULIN PO  Self Yes Yes   Sig: Take 1 capsule by mouth daily    Cholecalciferol 1000 units tablet  Self Yes Yes   Sig: Take 1,000 Units by mouth daily    Citicoline Sodium 500 MG TABS  Self Yes Yes   Sig: Take 500 mg by mouth daily    Coenzyme Q10 200 MG/GM POWD  Self Yes Yes   Sig: Take 200 mg by mouth daily    DULoxetine (CYMBALTA) 30 mg delayed release capsule  Self No Yes   Sig: Take 3 capsules (90 mg total) by mouth daily   Patient taking differently: Take 90 mg by mouth every evening    Ginger, Zingiber officinalis, (GINGER ROOT) 550 MG CAPS  Self Yes Yes   Sig: Take 550 mg by mouth 2 (two) times a day    Ginkgo Biloba 40 MG TABS  Self Yes Yes   Sig: Take 40 mg by mouth daily    HYDROcodone-acetaminophen (NORCO) 5-325 mg per tablet  Self No No   Si-2 tab every 6 hr if needed for pain   Misc Natural Products (GINSENG-COMPLEX PO)   Yes Yes   Sig: Take by mouth   Multiple Vitamins-Minerals (CENTRUM SILVER PO)  Self Yes Yes   Sig: Take 1 tablet by mouth daily    NON FORMULARY  Self Yes No   Sig: Place 30 mg under the tongue as needed --- tincture prn   NON FORMULARY  Self Yes No   Sig: Take 2 tablets by mouth daily Take Health and Sooth take two capsule daily    Turmeric (QC Tumeric Complex) 500 MG CAPS  Self Yes No   ZOLMitriptan (ZOMIG) 2 5 MG tablet  Self No Yes   Sig: TAKE 1 TABLET BY MOUTH AT ONSET OF MIGRAINE, MAY REPEAT IN 2 HOURS IF NEEDED  LIMIT 2/24 HOURS, 4/WEEK, 8/MONTH   calcium carbonate 1250 MG capsule  Self Yes Yes   Sig: Take 600 mg by mouth daily    cetirizine (ZyrTEC) 10 MG chewable tablet   Yes Yes   Sig: Chew 10 mg daily   denosumab (PROLIA) 60 mg/mL  Self Yes Yes   Sig: Inject 60 mg under the skin Every 6 months   dicyclomine (BENTYL) 20 mg tablet  Self No No   Sig: Take 1 tablet (20 mg total) by mouth 4 (four) times a day (before meals and at bedtime)   docusate sodium (COLACE) 100 mg capsule  Self No No   Sig: Take 1 capsule (100 mg total) by mouth 2 (two) times a day as needed for constipation   ferrous sulfate 325 (65 Fe) mg tablet  Self Yes Yes   Sig: Take 325 mg by mouth daily with breakfast    gabapentin (NEURONTIN) 300 mg capsule  Self Yes Yes   Si mg at 1200 in addition to 600 mg at bedtime     mesalamine (LIALDA) 1 2 g EC tablet   Yes Yes   Sig: Take 2 4 g by mouth daily with breakfast   metaxalone (SKELAXIN) 800 mg tablet  Self Yes No   Sig: Take 800 mg by mouth 3 (three) times a day    mupirocin (BACTROBAN) 2 % ointment  Self No Yes   Sig: Apply topically 3 (three) times a day Apply topically on hands to open cuts to prevent infection   omeprazole (PriLOSEC) 40 MG capsule  Self Yes Yes   Sig: Take 40 mg by mouth daily before breakfast   ondansetron (ZOFRAN-ODT) 4 mg disintegrating tablet   Yes Yes   Sig: Take 4 mg by mouth every 6 (six) hours as needed for nausea or vomiting   oxybutynin (DITROPAN XL) 15 MG 24 hr tablet   Yes Yes   Sig: Take 15 mg by mouth daily at bedtime   prochlorperazine (COMPAZINE) 10 mg tablet  Self No No   Sig: Take 1 tablet (10 mg total) by mouth every 6 (six) hours as needed for nausea or vomiting   rosuvastatin (CRESTOR) 5 mg tablet  Self No Yes   Sig: Take 1 tablet (5 mg total) by mouth daily   solifenacin (VESICARE) 10 MG tablet  Self No No   Sig: Take 1 tablet (10 mg total) by mouth daily   vitamin E, tocopherol, 400 units capsule  Self Yes Yes   Sig: Take 400 Units by mouth daily      Facility-Administered Medications: None       Past Medical History:   Diagnosis Date    Allergy to dust     Anemia     Calculus of ureter     Chronic pain disorder     neck and back- sees pain management    Colitis     Cracked skin     on fingers    Diverticulosis     Dysphagia     GERD (gastroesophageal reflux disease)     H/O: GI bleed     Hiatal hernia     History of DVT in adulthood     flaquito    Hydronephrosis     Hyperlipidemia     Inguinal hernia     right side    Irritable bowel     Migraines     Narcolepsy     Narcolepsy 8/6/2020    OAB (overactive bladder)     Osteoarthritis     Osteoporosis     Other chronic pain     degenerative disc disease    PONV (postoperative nausea and vomiting)     migraines    Smoking 8/6/2020    Tobacco abuse     Wears dentures     full upper    Wears glasses     Wears glasses        Past Surgical History:   Procedure Laterality Date    ABDOMINAL ADHESION SURGERY      ARM SKIN LESION BIOPSY / EXCISION      lymph node excision    BLADDER SURGERY      CHOLECYSTECTOMY      COLONOSCOPY  08/06/2020    FL RETROGRADE PYELOGRAM  1/5/2021    HYSTERECTOMY  1977    JOINT REPLACEMENT Right     KNEE SURGERY Right     replacement    LAMINECTOMY      L5-6-7    LAPAROSCOPY      LYMPH NODE DISSECTION Left     removed -no cancer-  limb restriction    MOLE REMOVAL      OOPHORECTOMY Bilateral 1977    OTHER SURGICAL HISTORY      enlarged lymph node removed left arm   no cancer    IN CYSTO/URETERO W/LITHOTRIPSY &INDWELL STENT INSRT Right 1/5/2021    Procedure: CYSTO, LASER  URETEROSCOPY, RETRO PYELOGRAM, STENT REPLACMENT;  Surgeon: Kam Manuel MD;  [facility];  Service: Urology    IN CYSTOURETHROSCOPY,URETER CATHETER Right 12/2/2020    Procedure: CYSTOSCOPY AND INSERTION STENT URETERAL;  Surgeon: Chino Mars MD;  [facility];  Service: Urology    IN REPAIR ING HERNIA,5+Y/O,REDUCIBL Right 6/25/2020    Procedure: REPAIR HERNIA INGUINAL  WITH MESH;  Surgeon: Sofía Oro MD;  Location[facility];  Service: General    SPINAL FUSION      UPPER GASTROINTESTINAL ENDOSCOPY  08/06/2020    WISDOM TOOTH EXTRACTION         Family History   Problem Relation Age of Onset    Hypertension Father     Cancer Father     Diabetes Mother     Hypertension Mother     Stroke Brother     Nephrolithiasis Brother     Diabetes Sister     No Known Problems Daughter     No Known Problems Maternal Grandmother     No Known Problems Maternal Grandfather     No Known Problems Paternal Grandmother     No Known Problems Paternal Grandfather     No Known Problems Sister     No Known Problems Maternal Aunt     No Known Problems Maternal Aunt     No Known Problems Maternal Aunt     No Known Problems Paternal Aunt     No Known Problems Paternal Aunt     No Known Problems Paternal Aunt      I have reviewed and agree with the history as documented      E-Cigarette/Vaping    E-Cigarette Use Never User      E-Cigarette/Vaping Substances    Nicotine No     THC No     CBD No     Flavoring No     Other No     Unknown No      Social History     Tobacco Use    Smoking status: Current Every Day Smoker     Packs/day: 0 25     Years: 46 00     Pack years: 11 50     Types: Cigarettes    Smokeless tobacco: Never Used    Tobacco comment: as of 12- pt has reduced to 3 cigarettes per day   Substance Use Topics    Alcohol use: Yes     Frequency: Monthly or less     Drinks per session: 1 or 2     Binge frequency: Never    Drug use: Yes     Types: Marijuana     Comment: medicinal marijuana       Review of Systems   Constitutional: Positive for fever  Negative for chills  Body aches   HENT: Negative for rhinorrhea, sore throat and trouble swallowing  Eyes: Negative for photophobia and visual disturbance  Respiratory: Negative for cough, chest tightness and shortness of breath  Cardiovascular: Negative for chest pain, palpitations and leg swelling  Gastrointestinal: Negative for abdominal pain, blood in stool, diarrhea, nausea and vomiting  Endocrine: Negative for polyuria  Genitourinary: Positive for dysuria  Negative for flank pain, hematuria, vaginal bleeding and vaginal discharge  Musculoskeletal: Negative for back pain and neck pain  Skin: Negative for color change and rash  Allergic/Immunologic: Negative for immunocompromised state  Neurological: Negative for dizziness, weakness, light-headedness, numbness and headaches  All other systems reviewed and are negative  Physical Exam  Physical Exam  Constitutional:       General: She is not in acute distress  Appearance: Normal appearance  She is underweight  HENT:      Mouth/Throat:      Pharynx: Uvula midline  Eyes:      Conjunctiva/sclera: Conjunctivae normal       Pupils: Pupils are equal, round, and reactive to light  Neck:      Thyroid: No thyroid mass or thyromegaly  Trachea: Trachea normal    Cardiovascular:      Rate and Rhythm: Regular rhythm  Tachycardia present        Pulses: Normal pulses  Heart sounds: Normal heart sounds  No murmur  Pulmonary:      Effort: Pulmonary effort is normal       Breath sounds: Normal breath sounds  Abdominal:      General: Bowel sounds are normal       Palpations: Abdomen is soft  Tenderness: There is no abdominal tenderness  There is no guarding or rebound  Skin:     General: Skin is warm and dry  Neurological:      Mental Status: She is alert  Psychiatric:         Speech: Speech normal          Behavior: Behavior normal  Behavior is cooperative  Thought Content:  Thought content normal          Vital Signs  ED Triage Vitals [01/13/21 1500]   Temperature Pulse Respirations Blood Pressure SpO2   100 3 °F (37 9 °C) 94 20 95/59 97 %      Temp Source Heart Rate Source Patient Position - Orthostatic VS BP Location FiO2 (%)   Oral Monitor Lying Left arm --      Pain Score       6           Vitals:    01/13/21 1500 01/13/21 1629   BP: 95/59 99/56   Pulse: 94 79   Patient Position - Orthostatic VS: Lying Lying         Visual Acuity      ED Medications  Medications    EMS REPLENISHMENT MED ( Does not apply Given to EMS 1/13/21 1505)   sodium chloride 0 9 % bolus 1,000 mL (0 mL Intravenous Stopped 1/13/21 1629)   ketorolac (TORADOL) injection 15 mg (15 mg Intravenous Given 1/13/21 1539)   morphine (PF) 4 mg/mL injection 4 mg (4 mg Intravenous Given 1/13/21 1539)   ceftriaxone (ROCEPHIN) 1 g/50 mL in dextrose IVPB (0 mg Intravenous Stopped 1/13/21 1629)   acetaminophen (TYLENOL) tablet 975 mg (975 mg Oral Given 1/13/21 1639)   iohexol (OMNIPAQUE) 350 MG/ML injection (SINGLE-DOSE) 100 mL (100 mL Intravenous Given 1/13/21 1711)       Diagnostic Studies  Results Reviewed     Procedure Component Value Units Date/Time    UA w Reflex to Microscopic w Reflex to Culture [[id number]]  (Abnormal) Collected: 01/13/21 1639    Lab Status: Final result Specimen: Urine, Clean Catch Updated: 01/13/21 1648     Color, UA Yellow     Clarity, UA Slightly Cloudy Specific [place], UA 1 020     pH, UA 7 0     Leukocytes, UA Moderate     Nitrite, UA Positive     Protein, UA Trace mg/dl      Glucose, UA Negative mg/dl      Ketones, UA Negative mg/dl      Urobilinogen, UA 0 2 E U /dl      Bilirubin, UA Negative     Blood, UA Small    Urine Microscopic [[id number]] Collected: 01/13/21 1639    Lab Status: In process Specimen: Urine, Clean Catch Updated: 01/13/21 1648    COVID19, Influenza A/B, RSV PCR, [facility] [[id number]]  (Normal) Collected: 01/13/21 1538    Lab Status: Final result Specimen: Nares from Nasopharyngeal Swab Updated: 01/13/21 1631     SARS-CoV-2 Negative     INFLUENZA A PCR Negative     INFLUENZA B PCR Negative     RSV PCR Negative    Narrative: This test has been authorized by FDA under an EUA (Emergency Use Assay) for use by authorized laboratories  Clinical caution and judgement should be used with the interpretation of these results with consideration of the clinical impression and other laboratory testing  Testing reported as "Positive" or "Negative" has been proven to be accurate according to standard laboratory validation requirements  All testing is performed with control materials showing appropriate reactivity at standard intervals      Comprehensive metabolic panel [[id number]]  (Abnormal) Collected: 01/13/21 1529    Lab Status: Final result Specimen: Blood from Arm, Right Updated: 01/13/21 1613     Sodium 136 mmol/L      Potassium 3 5 mmol/L      Chloride 101 mmol/L      CO2 26 mmol/L      ANION GAP 9 mmol/L      BUN 14 mg/dL      Creatinine 0 66 mg/dL      Glucose 112 mg/dL      Calcium 9 3 mg/dL      Corrected Calcium 9 8 mg/dL      AST 13 U/L      ALT 18 U/L      Alkaline Phosphatase 79 U/L      Total Protein 6 6 g/dL      Albumin 3 4 g/dL      Total Bilirubin 0 62 mg/dL      eGFR 95 ml/min/1 73sq m     Narrative:      [place] guidelines for Chronic Kidney Disease (CKD):     Stage 1 with normal or high GFR (GFR > 90 mL/min/1 73 square meters)    Stage 2 Mild CKD (GFR = 60-89 mL/min/1 73 square meters)    Stage 3A Moderate CKD (GFR = 45-59 mL/min/1 73 square meters)    Stage 3B Moderate CKD (GFR = 30-44 mL/min/1 73 square meters)    Stage 4 Severe CKD (GFR = 15-29 mL/min/1 73 square meters)    Stage 5 End Stage CKD (GFR <15 mL/min/1 73 square meters)  Note: GFR calculation is accurate only with a steady state creatinine    Lactic Acid [[id number]]  (Normal) Collected: 01/13/21 1529    Lab Status: Final result Specimen: Blood from Arm, Right Updated: 01/13/21 1605     LACTIC ACID 0 8 mmol/L     Narrative:      Result may be elevated if tourniquet was used during collection  Heath Screen [[id number]]  (Normal) Collected: 01/13/21 1529    Lab Status: Final result Specimen: Blood from Arm, Right Updated: 01/13/21 1551     Protime 13 0 seconds      INR 1 00    APTT [[id number]]  (Normal) Collected: 01/13/21 1529    Lab Status: Final result Specimen: Blood from Arm, Right Updated: 01/13/21 1551     PTT 29 seconds     Blood culture #2 [[id number]] Collected: 01/13/21 1537    Lab Status:  In process Specimen: Blood from Hand, Right Updated: 01/13/21 1546    CBC and differential [[id number]]  (Abnormal) Collected: 01/13/21 1529    Lab Status: Final result Specimen: Blood from Arm, Right Updated: 01/13/21 1539     WBC 13 51 Thousand/uL      RBC 3 59 Million/uL      Hemoglobin 11 4 g/dL      Hematocrit 34 4 %      MCV 96 fL      MCH 31 8 pg      MCHC 33 1 g/dL      RDW 13 5 %      MPV 9 9 fL      Platelets 353 Thousands/uL      nRBC 0 /100 WBCs      Neutrophils Relative 87 %      Immat GRANS % 1 %      Lymphocytes Relative 5 %      Monocytes Relative 6 %      Eosinophils Relative 1 %      Basophils Relative 0 %      Neutrophils Absolute 11 84 Thousands/µL      Immature Grans Absolute 0 09 Thousand/uL      Lymphocytes Absolute 0 66 Thousands/µL      Monocytes Absolute 0 75 Thousand/µL      Eosinophils Absolute 0 13 Thousand/µL Basophils Absolute 0 04 Thousands/µL     Procalcitonin with AM Reflex [[id number]] Collected: 01/13/21 1529    Lab Status: In process Specimen: Blood from Arm, Right Updated: 01/13/21 1535    Blood culture #1 [[id number]] Collected: 01/13/21 1529    Lab Status: In process Specimen: Blood from Arm, Right Updated: 01/13/21 1535                 CT abdomen pelvis with contrast   Final Result by Edda Bonner MD (01/13 1721)      Numerous areas of right renal cortical hypoenhancement suspicious for pyelonephritis  Right nephroureteral stent remains in place  No hydronephrosis  No ureteral calculi  The study was marked in [facility] for immediate notification  Workstation performed: [id number]                    Procedures  Procedures         ED Course                             SBIRT 20yo+      Most Recent Value   SBIRT (22 yo +)   In order to provide better care to our patients, we are screening all of our patients for alcohol and drug use  Would it be okay to ask you these screening questions? No Filed at: 01/13/2021 1504                    MDM  Number of Diagnoses or Management Options  Diagnosis management comments: Sepsis likely from a urinary source given the patient's recent instrumentation and right-sided stone  Urine cultures reviewed  She does have history of E coli which is sensitive to Rocephin  Plan to check labs, urinalysis  CT abdomen/pelvis with contrast   IV Rocephin and fluids  Will speak with Urology        Disposition  Final diagnoses:   Pyelonephritis   Fever     Time reflects when diagnosis was documented in both MDM as applicable and the Disposition within this note     Time User Action Codes Description Comment    1/13/2021  5:38 PM Katya Linen Add [N12] Pyelonephritis     1/13/2021  5:38 PM Melanee Linen Add [R50 9] Fever       ED Disposition     ED Disposition Condition Date/Time Comment    Admit Stable Wed Jan 13, 2021  5:38 PM         Follow-up Information None         Patient's Medications   Discharge Prescriptions    No medications on file     No discharge procedures on file      PDMP Review       Value Time User    PDMP Reviewed  Yes 12/28/2020  2:09 Rupinder Cervantes MD          ED Provider  Electronically Signed by           Krystyna Deal MD  01/13/21 7458

## 2021-01-13 NOTE — ASSESSMENT & PLAN NOTE
This is a 38-year-old female with history of right ureteral stone status post ureteroscopy with failed retrieval blood with stent placement on 01/05/2021 presenting with fever and diffuse body aches      · Sepsis POA criteria met with fever, leukocytosis  · Secondary to pyelonephritis  · Continue with empiric antibiotics  · Follow-up cultures

## 2021-01-13 NOTE — TELEPHONE ENCOUNTER
Patient  transferred to me from call center     Patient  calling states patient has a impacted stone, states we tried to address it with no success     States today patient has a fever over 101 and is delirious, he states she is just mumbling and he doesn't know what she is saying     Advised to patient  with the symptoms the patient is experiencing we would advise for her to be examined at the ER asap      He states he thought she needed this but wanted to give us a call     He states he will be taking her to the ER at this time     Advised will route to provider to advise as well     He is thankful for call

## 2021-01-13 NOTE — ASSESSMENT & PLAN NOTE
· CT scan reveals numerous areas of right renal cortical hypoenhancement suspicious for pyelonephritis, right nephroureteral stent remains in place, no hydronephrosis  · Continue with empiric antibiotics follow-up cultures  · Urology input will be appreciated

## 2021-01-13 NOTE — TELEPHONE ENCOUNTER
Called and spoke to patient  advised if patient has not left for ER yet we recommend her go to [facility] per provider Crow Garcia if possible  he states patient has already been picked up by ambulance and they are on the way to [facility]       Advised I am happy to hear she is on her way so we can get her proper care, and carmelita is fine as well to have her taken care of     He is thankful for call

## 2021-01-13 NOTE — LETTER
[address]  Dept: [phone]    January 16, 2021     Patient: Ijeoma Drake   YOB: 1960   Date of Visit: 1/13/2021       To Whom it May Concern: Ana Luisa Bauer is under my professional care  She was seen in the hospital from 1/13/2021   to 01/16/21  She may return to work on 01/25/2021 without limitations  If you have any questions or concerns, please don't hesitate to call           Sincerely,          Tari Boyd MD

## 2021-01-14 PROBLEM — A41.9 SEPSIS (HCC): Chronic | Status: ACTIVE | Noted: 2021-01-13

## 2021-01-14 LAB
ALBUMIN SERPL BCP-MCNC: 2.8 G/DL (ref 3.5–5)
ALP SERPL-CCNC: 67 U/L (ref 46–116)
ALT SERPL W P-5'-P-CCNC: 18 U/L (ref 12–78)
ANION GAP SERPL CALCULATED.3IONS-SCNC: 6 MMOL/L (ref 4–13)
AST SERPL W P-5'-P-CCNC: 14 U/L (ref 5–45)
BASOPHILS # BLD AUTO: 0.05 THOUSANDS/ΜL (ref 0–0.1)
BASOPHILS NFR BLD AUTO: 0 % (ref 0–1)
BILIRUB SERPL-MCNC: 0.66 MG/DL (ref 0.2–1)
BUN SERPL-MCNC: 17 MG/DL (ref 5–25)
CALCIUM ALBUM COR SERPL-MCNC: 9.9 MG/DL (ref 8.3–10.1)
CALCIUM SERPL-MCNC: 8.9 MG/DL (ref 8.3–10.1)
CHLORIDE SERPL-SCNC: 103 MMOL/L (ref 100–108)
CO2 SERPL-SCNC: 26 MMOL/L (ref 21–32)
CREAT SERPL-MCNC: 0.75 MG/DL (ref 0.6–1.3)
EOSINOPHIL # BLD AUTO: 0.07 THOUSAND/ΜL (ref 0–0.61)
EOSINOPHIL NFR BLD AUTO: 1 % (ref 0–6)
ERYTHROCYTE [DISTWIDTH] IN BLOOD BY AUTOMATED COUNT: 13.8 % (ref 11.6–15.1)
GFR SERPL CREATININE-BSD FRML MDRD: 86 ML/MIN/1.73SQ M
GLUCOSE SERPL-MCNC: 97 MG/DL (ref 65–140)
HCT VFR BLD AUTO: 34.1 % (ref 34.8–46.1)
HGB BLD-MCNC: 11 G/DL (ref 11.5–15.4)
IMM GRANULOCYTES # BLD AUTO: 0.04 THOUSAND/UL (ref 0–0.2)
IMM GRANULOCYTES NFR BLD AUTO: 0 % (ref 0–2)
LYMPHOCYTES # BLD AUTO: 0.9 THOUSANDS/ΜL (ref 0.6–4.47)
LYMPHOCYTES NFR BLD AUTO: 8 % (ref 14–44)
MCH RBC QN AUTO: 31.1 PG (ref 26.8–34.3)
MCHC RBC AUTO-ENTMCNC: 32.3 G/DL (ref 31.4–37.4)
MCV RBC AUTO: 96 FL (ref 82–98)
MONOCYTES # BLD AUTO: 0.52 THOUSAND/ΜL (ref 0.17–1.22)
MONOCYTES NFR BLD AUTO: 5 % (ref 4–12)
NEUTROPHILS # BLD AUTO: 9.98 THOUSANDS/ΜL (ref 1.85–7.62)
NEUTS SEG NFR BLD AUTO: 86 % (ref 43–75)
NRBC BLD AUTO-RTO: 0 /100 WBCS
PLATELET # BLD AUTO: 212 THOUSANDS/UL (ref 149–390)
PMV BLD AUTO: 9.6 FL (ref 8.9–12.7)
POTASSIUM SERPL-SCNC: 4.1 MMOL/L (ref 3.5–5.3)
PROCALCITONIN SERPL-MCNC: 0.44 NG/ML
PROT SERPL-MCNC: 6.5 G/DL (ref 6.4–8.2)
RBC # BLD AUTO: 3.54 MILLION/UL (ref 3.81–5.12)
SODIUM SERPL-SCNC: 135 MMOL/L (ref 136–145)
WBC # BLD AUTO: 11.56 THOUSAND/UL (ref 4.31–10.16)

## 2021-01-14 PROCEDURE — 84145 PROCALCITONIN (PCT): CPT | Performed by: EMERGENCY MEDICINE

## 2021-01-14 PROCEDURE — 80053 COMPREHEN METABOLIC PANEL: CPT | Performed by: INTERNAL MEDICINE

## 2021-01-14 PROCEDURE — 85025 COMPLETE CBC W/AUTO DIFF WBC: CPT | Performed by: INTERNAL MEDICINE

## 2021-01-14 PROCEDURE — 99255 IP/OBS CONSLTJ NEW/EST HI 80: CPT | Performed by: NURSE PRACTITIONER

## 2021-01-14 PROCEDURE — 99232 SBSQ HOSP IP/OBS MODERATE 35: CPT | Performed by: STUDENT IN AN ORGANIZED HEALTH CARE EDUCATION/TRAINING PROGRAM

## 2021-01-14 RX ORDER — KETOROLAC TROMETHAMINE 30 MG/ML
15 INJECTION, SOLUTION INTRAMUSCULAR; INTRAVENOUS EVERY 6 HOURS PRN
Status: DISPENSED | OUTPATIENT
Start: 2021-01-14 | End: 2021-01-16

## 2021-01-14 RX ORDER — MODAFINIL 100 MG/1
100 TABLET ORAL
Status: DISCONTINUED | OUTPATIENT
Start: 2021-01-15 | End: 2021-01-16 | Stop reason: HOSPADM

## 2021-01-14 RX ORDER — ZOLMITRIPTAN 2.5 MG/1
2.5 TABLET, FILM COATED ORAL EVERY 2 HOUR PRN
Status: DISCONTINUED | OUTPATIENT
Start: 2021-01-14 | End: 2021-01-16 | Stop reason: HOSPADM

## 2021-01-14 RX ORDER — OXYCODONE HYDROCHLORIDE 5 MG/1
5 TABLET ORAL EVERY 6 HOURS PRN
Status: DISCONTINUED | OUTPATIENT
Start: 2021-01-14 | End: 2021-01-16 | Stop reason: HOSPADM

## 2021-01-14 RX ORDER — KETOROLAC TROMETHAMINE 30 MG/ML
15 INJECTION, SOLUTION INTRAMUSCULAR; INTRAVENOUS EVERY 6 HOURS PRN
Status: DISCONTINUED | OUTPATIENT
Start: 2021-01-14 | End: 2021-01-14

## 2021-01-14 RX ADMIN — ZOLMITRIPTAN 2.5 MG: 2.5 TABLET ORAL at 16:28

## 2021-01-14 RX ADMIN — Medication 1 PATCH: at 10:21

## 2021-01-14 RX ADMIN — ACETAMINOPHEN 650 MG: 325 TABLET ORAL at 18:57

## 2021-01-14 RX ADMIN — CEFTRIAXONE SODIUM 1000 MG: 10 INJECTION, POWDER, FOR SOLUTION INTRAVENOUS at 19:09

## 2021-01-14 RX ADMIN — GABAPENTIN 600 MG: 300 CAPSULE ORAL at 21:38

## 2021-01-14 RX ADMIN — OXYBUTYNIN 15 MG: 10 TABLET, FILM COATED, EXTENDED RELEASE ORAL at 21:37

## 2021-01-14 RX ADMIN — ZOLMITRIPTAN 2.5 MG: 2.5 TABLET ORAL at 18:59

## 2021-01-14 RX ADMIN — PANTOPRAZOLE SODIUM 40 MG: 40 TABLET, DELAYED RELEASE ORAL at 05:11

## 2021-01-14 RX ADMIN — DULOXETINE HYDROCHLORIDE 90 MG: 60 CAPSULE, DELAYED RELEASE ORAL at 18:57

## 2021-01-14 RX ADMIN — HEPARIN SODIUM 5000 UNITS: 5000 INJECTION INTRAVENOUS; SUBCUTANEOUS at 21:37

## 2021-01-14 RX ADMIN — ATORVASTATIN CALCIUM 40 MG: 40 TABLET, FILM COATED ORAL at 18:57

## 2021-01-14 RX ADMIN — HEPARIN SODIUM 5000 UNITS: 5000 INJECTION INTRAVENOUS; SUBCUTANEOUS at 05:11

## 2021-01-14 RX ADMIN — KETOROLAC TROMETHAMINE 15 MG: 30 INJECTION, SOLUTION INTRAMUSCULAR at 18:58

## 2021-01-14 RX ADMIN — HEPARIN SODIUM 5000 UNITS: 5000 INJECTION INTRAVENOUS; SUBCUTANEOUS at 15:15

## 2021-01-14 RX ADMIN — OXYCODONE HYDROCHLORIDE 5 MG: 5 TABLET ORAL at 12:25

## 2021-01-14 NOTE — CONSULTS
UROLOGY CONSULTATION NOTE     Patient Identifiers: Mya Smith (MRN [id number])  Service Requesting Consultation:  Internal Medicine  Service Providing Consultation:  Urology, Daniel Josue    Date of Service: 1/14/2021  Inpatient consult to Urology  Consult performed by: RADHA Josue  Consult ordered by: Mike Moran MD      Reason for Consultation:  Nephrolithiasis    ASSESSMENT/ PLAN:     Nephrolithiasis  · Status post ureteroscopy with failed stone retrieval and stent insertion 01/05/2021  · CT abdomen pelvis performed 01/13/2021 reveals Proper right nephroureteral stent in place with no hydronephrosis noted  · Will perform definitive stone treatment upon resolution of current infected state-tentative ESWL scheduled 03/2021    Pyelonephritis/sepsis  · IV fluids and pain management per Medicine  · Culture driven antibiotic therapy-currently managed on ceftriaxone 1 g every 24 hours  · Urine culture and blood cultures pending  · Trend vital signs, fever  · Trend labs-WBC count 11 56 11/14/2021    History of Present Illness: Mya Smith is a 56 y o  old with a history of nephrolithiasis  Patient is status post right ureteroscopy with failed stone retrieval and stent insertion 01/05/2021  She presented to the emergency department on 01/13/2021 for the complaint of diffuse body aches, confusion and fever  Her  initially called the Urology office and was directed to take the patient to the emergency department for further evaluation and intervention      Past Medical, Past Surgical History:     Past Medical History:   Diagnosis Date    Allergy to dust     Anemia     Calculus of ureter     Chronic pain disorder     neck and back- sees pain management    Colitis     Cracked skin     on fingers    Diverticulosis     Dysphagia     GERD (gastroesophageal reflux disease)     H/O: GI bleed     Hiatal hernia     History of DVT in adulthood     flaquito    Hydronephrosis     Hyperlipidemia  Inguinal hernia     right side    Irritable bowel     Migraines     Narcolepsy     Narcolepsy 8/6/2020    OAB (overactive bladder)     Osteoarthritis     Osteoporosis     Other chronic pain     degenerative disc disease    PONV (postoperative nausea and vomiting)     migraines    Smoking 8/6/2020    Tobacco abuse     Wears dentures     full upper    Wears glasses     Wears glasses    :    Past Surgical History:   Procedure Laterality Date    ABDOMINAL ADHESION SURGERY      ARM SKIN LESION BIOPSY / EXCISION      lymph node excision    BLADDER SURGERY      CHOLECYSTECTOMY      COLONOSCOPY  08/06/2020    FL RETROGRADE PYELOGRAM  1/5/2021    HYSTERECTOMY  1977    JOINT REPLACEMENT Right     KNEE SURGERY Right     replacement    LAMINECTOMY      L5-6-7    LAPAROSCOPY      LYMPH NODE DISSECTION Left     removed -no cancer-  limb restriction    MOLE REMOVAL      OOPHORECTOMY Bilateral 1977    OTHER SURGICAL HISTORY      enlarged lymph node removed left arm   no cancer    OK CYSTO/URETERO W/LITHOTRIPSY &INDWELL STENT INSRT Right 1/5/2021    Procedure: CYSTO, LASER  URETEROSCOPY, RETRO PYELOGRAM, STENT REPLACMENT;  Surgeon: Cecilia Casey MD;  [facility];  Service: Urology    OK CYSTOURETHROSCOPY,URETER CATHETER Right 12/2/2020    Procedure: CYSTOSCOPY AND INSERTION STENT URETERAL;  Surgeon: Savannah Calabrese MD;  [facility];  Service: Urology    OK REPAIR ING HERNIA,5+Y/O,REDUCIBL Right 6/25/2020    Procedure: REPAIR HERNIA INGUINAL  WITH MESH;  Surgeon: Tray Berumen MD;  Location: [facility];  Service: General    SPINAL FUSION      UPPER GASTROINTESTINAL ENDOSCOPY  08/06/2020    WISDOM TOOTH EXTRACTION     :    Medications, Allergies:     Current Facility-Administered Medications   Medication Dose Route Frequency    acetaminophen (TYLENOL) tablet 650 mg  650 mg Oral Q6H PRN    atorvastatin (LIPITOR) tablet 40 mg  40 mg Oral Daily With Dinner    cefTRIAXone (ROCEPHIN) 1,000 mg in dextrose 5 % 50 mL IVPB  1,000 mg Intravenous Q24H    DULoxetine (CYMBALTA) delayed release capsule 90 mg  90 mg Oral QPM    gabapentin (NEURONTIN) capsule 600 mg  600 mg Oral HS    heparin (porcine) subcutaneous injection 5,000 Units  5,000 Units Subcutaneous Q8H [address]    modafinil (PROVIGIL) tablet 100 mg  100 mg Oral Daily    nicotine (NICODERM CQ) 14 mg/24hr TD 24 hr patch 1 patch  1 patch Transdermal Daily    oxybutynin (DITROPAN-XL) 24 hr tablet 15 mg  15 mg Oral HS    pantoprazole (PROTONIX) EC tablet 40 mg  40 mg Oral Early Morning       Allergies: Allergies   Allergen Reactions    Celecoxib Other (See Comments)     Increased Heart Rate, Palpitations    Sumatriptan Anaphylaxis     Other reaction(s): SUMATRIPTAN (IMITREX) (Throat closure)  Pt analilia zolmitriptan   Tramadol Other (See Comments)     GI Upset- severe vomiting and systemic body aches    Medical Tape Dermatitis, Rash and Other (See Comments)     Adhesive from medication patches and bandaides- ok with paper tape   :    Social and Family History:   Social History:   Social History     Tobacco Use    Smoking status: Current Every Day Smoker     Packs/day: 0 50     Years: 46 00     Pack years: 23 00     Types: Cigarettes    Smokeless tobacco: Never Used    Tobacco comment: as of 10- pt has reduced to 3 cigarettes per day   Substance Use Topics    Alcohol use: Not Currently     Frequency: Monthly or less     Drinks per session: 1 or 2     Binge frequency: Never    Drug use: Yes     Types: Marijuana     Comment: medicinal marijuana         Social History     Tobacco Use   Smoking Status Current Every Day Smoker    Packs/day: 0 50    Years: 46 00    Pack years: 23 00    Types: Cigarettes   Smokeless Tobacco Never Used   Tobacco Comment    as of 12- pt has reduced to 3 cigarettes per day       Family History:  Family History   Problem Relation Age of Onset    Hypertension Father     Cancer Father  Diabetes Mother     Hypertension Mother     Stroke Brother     Nephrolithiasis Brother     Diabetes Sister     No Known Problems Daughter     No Known Problems Maternal Grandmother     No Known Problems Maternal Grandfather     No Known Problems Paternal Grandmother     No Known Problems Paternal Grandfather     No Known Problems Sister     No Known Problems Maternal Aunt     No Known Problems Maternal Aunt     No Known Problems Maternal Aunt     No Known Problems Paternal Aunt     No Known Problems Paternal Aunt     No Known Problems Paternal Aunt    :     Review of Systems:     General: Fever, chills, or night sweats: positive  Cardiac: Negative for chest pain  Pulmonary: Negative for shortness of breath  Gastrointestinal: Abdominal pain negative  Nausea, vomiting, or diarrhea negative,  Genitourinary: See HPI above  Patient does not have hematuria  All other systems queried were negative  Physical Exam:   General: Patient is pleasant and in NAD  Awake and alert  /60 (BP Location: Left arm)   Pulse 88   Temp 97 7 °F (36 5 °C) (Temporal)   Resp 18   Wt 50 7 kg (111 lb 12 4 oz)   SpO2 97%   BMI 19 19 kg/m² Temp (24hrs), Av 4 °F (37 4 °C), Min:97 7 °F (36 5 °C), Max:101 4 °F (38 6 °C)  current; Temperature: 97 7 °F (36 5 °C)  I/O last 24 hours: In: 1376 7 [I V :300; IV Piggyback:1076 7]  Out: -   Skin: warm, dry, intact  Cardiac: S1S2, HRR, Peripheral edema: negative  Pulmonary: Non-labored breathing  Abdomen: Soft, non-tender, non-distended  No surgical scars  No masses, tenderness, hernias noted  Musculoskeletal: AROM with no joint deformity or tenderness  Neurology: alert, oriented x3, affect appropriate, no focal neurological deficits and moves all extremities well  Genitourinary: Positive CVA tenderness, negative suprapubic tenderness    COX:  None    Labs:     Lab Results   Component Value Date    HGB 11 0 (L) 2021    HCT 34 1 (L) 2021    WBC 11 56 (H) 01/14/2021     01/14/2021   ]    Lab Results   Component Value Date    K 4 1 01/14/2021     01/14/2021    CO2 26 01/14/2021    BUN 17 01/14/2021    CREATININE 0 75 01/14/2021    CALCIUM 8 9 01/14/2021   ]    Imaging:   I personally reviewed the images and report of the following studies, and reviewed them with the patient:      CT ABDOMEN AND PELVIS WITH IV CONTRAST     INDICATION:   right renal stone s/o stent and failed retrieval, fever and diffuse body aches today      COMPARISON:  CT abdomen pelvis 12/25/2020  AP abdomen 1/5/2021  Retrograde pyelogram 1/5/2021      TECHNIQUE:  CT examination of the abdomen and pelvis was performed  Axial, sagittal, and coronal 2D reformatted images were created from the source data and submitted for interpretation      Radiation dose length product (DLP) for this visit:  373 mGy-cm   This examination, like all CT scans performed in the [facility], was performed utilizing techniques to minimize radiation dose exposure, including the use of iterative   reconstruction and automated exposure control      IV Contrast:  100 mL of iohexol (OMNIPAQUE)  Enteric Contrast:  Enteric contrast was not administered      FINDINGS:     ABDOMEN     LOWER CHEST:  No clinically significant abnormality identified in the visualized lower chest      LIVER/BILIARY TREE:  Unremarkable      GALLBLADDER:  Gallbladder is surgically absent      SPLEEN:  A few calcified granulomas      PANCREAS:  Unremarkable      ADRENAL GLANDS:  Unremarkable      KIDNEYS/URETERS:  A right nephroureteral stent remains in place  No hydronephrosis or ureteral calculi  Nonobstructive 4 mm lower pole right renal calculus      Numerous areas of right renal cortical hypoenhancement suspicious for pyelonephritis      No perinephric collection      STOMACH AND BOWEL:  Unremarkable      APPENDIX:  No findings to suggest appendicitis      ABDOMINOPELVIC CAVITY:  No ascites  No pneumoperitoneum    No lymphadenopathy      VESSELS:  Unremarkable for patient's age      PELVIS     REPRODUCTIVE ORGANS:  Surgical changes of prior hysterectomy      URINARY BLADDER:  Unremarkable      ABDOMINAL WALL/INGUINAL REGIONS:  Unremarkable      OSSEOUS STRUCTURES:  No acute fracture or destructive osseous lesion      IMPRESSION:     Numerous areas of right renal cortical hypoenhancement suspicious for pyelonephritis      Right nephroureteral stent remains in place  No hydronephrosis  No ureteral calculi      The study was marked in EPIC for immediate notification        Thank you for allowing me to participate in this patients care  Please do not hesitate to call with any additional questions    RADHA Manzanares

## 2021-01-14 NOTE — PROGRESS NOTES
Progress Note - Juan Handing 1960, 56 y o  female MRN: [id number]    Unit/Bed#: E5 -01 Encounter: [id number]    Primary Care Provider: Adama Lees MD   Date and time admitted to hospital: 1/13/2021  2:57 PM        * Sepsis [facility])  Assessment & Plan  This is a 68-year-old female with history of right ureteral stone status post ureteroscopy with failed retrieval blood with stent placement on 01/05/2021 presenting with fever and diffuse body aches  · Sepsis POA criteria met with fever, leukocytosis  · Secondary to pyelonephritis  · Continue with empiric antibiotics  · Urology following, recommending outpatient treatment  · Follow urine cultures and treat appropriately, will likely need 2 weeks of abx    Pyelonephritis  Assessment & Plan  · CT scan reveals numerous areas of right renal cortical hypoenhancement suspicious for pyelonephritis, right nephroureteral stent remains in place, no hydronephrosis  · Continue with empiric antibiotics follow-up cultures  · Urology input appreciated    Narcolepsy  Assessment & Plan  · Continue modafinil    Pure hypercholesterolemia  Assessment & Plan  · Continue statin    Fibromyalgia  Assessment & Plan  · Continue Cymbalta        VTE Pharmacologic Prophylaxis:   Pharmacologic: Heparin  Mechanical VTE Prophylaxis in Place: Yes    Patient Centered Rounds: I have performed bedside rounds with nursing staff today  Discussions with Specialists or Other Care Team Provider: urology    Education and Discussions with Family / Patient: patient,     Time Spent for Care: 30 minutes  More than 50% of total time spent on counseling and coordination of care as described above      Current Length of Stay: 1 day(s)    Current Patient Status: Inpatient   Certification Statement: The patient will continue to require additional inpatient hospital stay due to urine cx sensitivities, continue to spike fevers    Discharge Plan: 2-3 days    Code Status: Level 1 - Full Code      Subjective:   Patient seen today, continues to spike fevers but trending down  Reports abdominal pain and fullness with mild improvement  No events overnight  Objective:     Vitals:   Temp (24hrs), Av 9 °F (37 2 °C), Min:97 7 °F (36 5 °C), Max:101 3 °F (38 5 °C)    Temp:  [97 7 °F (36 5 °C)-101 3 °F (38 5 °C)] 101 3 °F (38 5 °C)  HR:  [80-99] 99  Resp:  [15-18] 18  BP: ()/(58-73) 114/73  SpO2:  [93 %-98 %] 94 %  Body mass index is 19 19 kg/m²  Input and Output Summary (last 24 hours):     No intake or output data in the 24 hours ending 21 1700    Physical Exam:     Physical Exam  Vitals signs and nursing note reviewed  Constitutional:       Appearance: Normal appearance  She is normal weight  HENT:      Head: Normocephalic and atraumatic  Eyes:      Conjunctiva/sclera: Conjunctivae normal       Pupils: Pupils are equal, round, and reactive to light  Cardiovascular:      Rate and Rhythm: Normal rate and regular rhythm  Heart sounds: Normal heart sounds  Pulmonary:      Breath sounds: Normal breath sounds  No wheezing or rhonchi  Abdominal:      General: Bowel sounds are normal  There is no distension  Palpations: Abdomen is soft  Tenderness: There is no abdominal tenderness  There is right CVA tenderness  There is no left CVA tenderness  Musculoskeletal: Normal range of motion  General: No swelling  Skin:     General: Skin is warm and dry  Neurological:      General: No focal deficit present  Mental Status: She is alert and oriented to person, place, and time             Additional Data:     Labs:    Results from last 7 days   Lab Units 21  0655   WBC Thousand/uL 11 56*   HEMOGLOBIN g/dL 11 0*   HEMATOCRIT % 34 1*   PLATELETS Thousands/uL 212   NEUTROS PCT % 86*   LYMPHS PCT % 8*   MONOS PCT % 5   EOS PCT % 1     Results from last 7 days   Lab Units 21  0655   SODIUM mmol/L 135*   POTASSIUM mmol/L 4 1   CHLORIDE mmol/L 103   CO2 mmol/L 26   BUN mg/dL 17   CREATININE mg/dL 0 75   ANION GAP mmol/L 6   CALCIUM mg/dL 8 9   ALBUMIN g/dL 2 8*   TOTAL BILIRUBIN mg/dL 0 66   ALK PHOS U/L 67   ALT U/L 18   AST U/L 14   GLUCOSE RANDOM mg/dL 97     Results from last 7 days   Lab Units 01/13/21  1529   INR  1 00             Results from last 7 days   Lab Units 01/14/21  0655 01/13/21  1529   LACTIC ACID mmol/L  --  0 8   PROCALCITONIN ng/ml 0 44* 0 28*           * I Have Reviewed All Lab Data Listed Above  * Additional Pertinent Lab Tests Reviewed: Ardianne 64 Admission Reviewed    Imaging:    Ct Abdomen Pelvis With Contrast    Result Date: 1/13/2021  Impression: Numerous areas of right renal cortical hypoenhancement suspicious for pyelonephritis  Right nephroureteral stent remains in place  No hydronephrosis  No ureteral calculi  The study was marked in [facility] for immediate notification  Workstation performed: [id number]     Recent Cultures (last 7 days):     Results from last 7 days   Lab Units 01/13/21  1537 01/13/21  1529   BLOOD CULTURE  Received in Microbiology Lab  Culture in Progress  Received in Microbiology Lab  Culture in Progress         Last 24 Hours Medication List:   Current Facility-Administered Medications   Medication Dose Route Frequency Provider Last Rate    acetaminophen  650 mg Oral Q6H PRN Edilberto Gonzalez MD      atorvastatin  40 mg Oral Daily With Becky Greene MD      cefTRIAXone  1,000 mg Intravenous Q24H Edilberto Gonzalez MD      DULoxetine  90 mg Oral QPM Edilberto Gonzalez MD      gabapentin  600 mg Oral HS Edilberto Gonzalez MD      heparin (porcine)  5,000 Units Subcutaneous [facility] Edilberto Gonzalez MD      ketorolac  15 mg Intravenous Q6H PRN MD Maria D Saez ON 1/15/2021] modafinil  100 mg Oral Early Morning Bianka Melendez MD      nicotine  1 patch Transdermal Daily Edilberto Gonzalez MD      oxybutynin  15 mg Oral HS Edilberto Gonzalez MD      oxyCODONE  5 mg Oral Q6H PRN MD Maria D Saez pantoprazole  40 mg Oral Early Morning Edilberto Gonzalez MD      ZOLMitriptan  2 5 mg Oral Q2H PRN Bianka Melendez MD          Today, Patient Was Seen By: Bianka Melendez MD    ** Please Note: Dictation voice to text software may have been used in the creation of this document   **

## 2021-01-14 NOTE — ASSESSMENT & PLAN NOTE
This is a 68-year-old female with history of right ureteral stone status post ureteroscopy with failed retrieval blood with stent placement on 01/05/2021 presenting with fever and diffuse body aches  · Sepsis POA criteria met with fever, leukocytosis  · Secondary to pyelonephritis  · Continue with empiric antibiotics  · Urology following, recommending outpatient treatment    · Follow urine cultures and treat appropriately, will likely need 2 weeks of abx

## 2021-01-14 NOTE — UTILIZATION REVIEW
Initial Clinical Review    Admission: Date/Time/Statement:   Admission Orders (From admission, onward)     Ordered        01/13/21 1739  Inpatient Admission  Once                   Orders Placed This Encounter   Procedures    Inpatient Admission     Standing Status:   Standing     Number of Occurrences:   1     Order Specific Question:   Level of Care     Answer:   Med Surg [16]     Order Specific Question:   Estimated length of stay     Answer:   More than 2 Midnights     Order Specific Question:   Certification     Answer:   I certify that inpatient services are medically necessary for this patient for a duration of greater than two midnights  See H&P and MD Progress Notes for additional information about the patient's course of treatment  ED Arrival Information     Expected Arrival Acuity Means of Arrival Escorted By Service Admission Type    - 1/13/2021 14:57 Urgent Ambulance [address] [place] [address] Urgent    Arrival Complaint    flank pain        Chief Complaint   Patient presents with    Generalized Body Aches     patient recently dx with kidney stone  woke up around 0400 with generalized body aches and fever of 101 9  c/o R sided flank pain  Assessment/Plan:    56  Y O female presents to ED  Via  EMS  From home with fatigue, body aches and fever which started the am of admission  Recent  History is a  Right renal stone, S/P stent  Placement and UTI, replacement  On 1/5/21  Urology  Office instructed patient to go to ED  Found with temp  Of  101 4 and elevated  WBC, meets sepsis criteria  CT scan  Suspicious for pyelo  Additional PMH  Is  Fibromyalgia and narcolepsy  Admit  Ip with  Sepsis and pyelonephritis and plan is  Monitor labs,  Follow  Cultures,  NANETTE  And urology consult  Urology consult   ( 1/14)    Appears to be clinically improving on  NANETTE  No urological intervention at this  Time  Continue to monitor  Temps/labs  Wait  Urine/blood cultures          ED Triage Vitals [01/13/21 1500]   Temperature Pulse Respirations Blood Pressure SpO2   100 3 °F (37 9 °C) 94 20 95/59 97 %      Temp Source Heart Rate Source Patient Position - Orthostatic VS BP Location FiO2 (%)   Oral Monitor Lying Left arm --      Pain Score       6          Wt Readings from Last 1 Encounters:   01/13/21 50 7 kg (111 lb 12 4 oz)     Additional Vital Signs:   01/14/21 0900  97 8 °F (36 6 °C)  83  18  110/62  93 %  None (Room air)  Lying   01/13/21 2300  97 7 °F (36 5 °C)  88  18  100/60  97 %  None (Room air)  Lying   01/13/21 1922  97 7 °F (36 5 °C)  80  18  100/66  98 %  None (Room air)  Lying   01/13/21 1809  99 9 °F (37 7 °C)  81  15  98/58  98 %  None (Room air)  Lying   01/13/21 1629  101 4 °F (38 6 °C)Abnormal   79  18  99/56  99 %  None (Room air)  Lying   01/13/21 1550  --  --  --  --  --  None (Room air)  --   01/13/21 1500  100 3 °F (37 9 °C)  94  20  95/59  97 %  None (Room air)  Lying         Pertinent Labs/Diagnostic Test Results:   Ct  Abd/pelvis   ( 1/13)    Numerous areas of right renal cortical hypoenhancement suspicious for pyelonephritis  Right nephroureteral stent remains in place  No hydronephrosis  No ureteral calculi     Results from last 7 days   Lab Units 01/13/21  1538   SARS-COV-2  Negative     Results from last 7 days   Lab Units 01/14/21  0655 01/13/21  1529   WBC Thousand/uL 11 56* 13 51*   HEMOGLOBIN g/dL 11 0* 11 4*   HEMATOCRIT % 34 1* 34 4*   PLATELETS Thousands/uL 212 244   NEUTROS ABS Thousands/µL 9 98* 11 84*         Results from last 7 days   Lab Units 01/14/21  0655 01/13/21  1529   SODIUM mmol/L 135* 136   POTASSIUM mmol/L 4 1 3 5   CHLORIDE mmol/L 103 101   CO2 mmol/L 26 26   ANION GAP mmol/L 6 9   BUN mg/dL 17 14   CREATININE mg/dL 0 75 0 66   EGFR ml/min/1 73sq m 86 95   CALCIUM mg/dL 8 9 9 3     Results from last 7 days   Lab Units 01/14/21  0655 01/13/21  1529   AST U/L 14 13   ALT U/L 18 18   ALK PHOS U/L 67 79   TOTAL PROTEIN g/dL 6 5 6 6   ALBUMIN g/dL 2 8* 3 4* TOTAL BILIRUBIN mg/dL 0 66 0 62         Results from last 7 days   Lab Units 01/14/21  0655 01/13/21  1529   GLUCOSE RANDOM mg/dL 97 112               Results from last 7 days   Lab Units 01/13/21  1529   PROTIME seconds 13 0   INR  1 00   PTT seconds 29         Results from last 7 days   Lab Units 01/13/21  1529   PROCALCITONIN ng/ml 0 28*     Results from last 7 days   Lab Units 01/13/21  1529   LACTIC ACID mmol/L 0 8           Results from last 7 days   Lab Units 01/13/21  1639   CLARITY UA  Slightly Cloudy   COLOR UA  Yellow   SPEC GRAV UA  1 020   PH UA  7 0   GLUCOSE UA mg/dl Negative   KETONES UA mg/dl Negative   BLOOD UA  Small*   PROTEIN UA mg/dl Trace*   NITRITE UA  Positive*   BILIRUBIN UA  Negative   UROBILINOGEN UA E U /dl 0 2   LEUKOCYTES UA  Moderate*   WBC UA /hpf Innumerable*   RBC UA /hpf 2-4   BACTERIA UA /hpf Innumerable*   EPITHELIAL CELLS WET PREP /hpf Occasional     Results from last 7 days   Lab Units 01/13/21  1538   INFLUENZA A PCR  Negative   INFLUENZA B PCR  Negative   RSV PCR  Negative                             Results from last 7 days   Lab Units 01/13/21  1537 01/13/21  1529   BLOOD CULTURE  Received in Microbiology Lab  Culture in Progress  Received in Microbiology Lab  Culture in Progress                 ED Treatment:   Medication Administration from 01/13/2021 1457 to 01/13/2021 1913       Date/Time Order Dose Route Action Comments     01/13/2021 1505  EMS REPLENISHMENT MED   Does not apply Given to EMS      01/13/2021 1629 sodium chloride 0 9 % bolus 1,000 mL 0 mL Intravenous Stopped      01/13/2021 1538 sodium chloride 0 9 % bolus 1,000 mL 1,000 mL Intravenous New Bag      01/13/2021 1539 ketorolac (TORADOL) injection 15 mg 15 mg Intravenous Given      01/13/2021 1539 morphine (PF) 4 mg/mL injection 4 mg 4 mg Intravenous Given      01/13/2021 1629 ceftriaxone (ROCEPHIN) 1 g/50 mL in dextrose IVPB 0 mg Intravenous Stopped      01/13/2021 1543 ceftriaxone (ROCEPHIN) 1 g/50 mL in dextrose IVPB 1,000 mg Intravenous New Bag      01/13/2021 1639 acetaminophen (TYLENOL) tablet 975 mg 975 mg Oral Given      01/13/2021 1711 iohexol (OMNIPAQUE) 350 MG/ML injection (SINGLE-DOSE) 100 mL 100 mL Intravenous Given         Present on Admission:   Narcolepsy   Pure hypercholesterolemia   Fibromyalgia      Admitting Diagnosis: Pyelonephritis [N12]  Fever [R50 9]  Generalized body aches [R52]  Age/Sex: 56 y o  female  Admission Orders:  Scheduled Medications:  atorvastatin, 40 mg, Oral, Daily With Dinner  cefTRIAXone, 1,000 mg, Intravenous, Q24H  DULoxetine, 90 mg, Oral, QPM  gabapentin, 600 mg, Oral, HS  heparin (porcine), 5,000 Units, Subcutaneous, Q8H [facility]  [START ON 1/15/2021] modafinil, 100 mg, Oral, Early Morning  nicotine, 1 patch, Transdermal, Daily  oxybutynin, 15 mg, Oral, HS  pantoprazole, 40 mg, Oral, Early Morning      Continuous IV Infusions:     PRN Meds:  acetaminophen, 650 mg, Oral, Q6H PRN  ketorolac, 15 mg, Intravenous, Q6H PRN  oxyCODONE, 5 mg, Oral, Q6H PRN        IP CONSULT TO UROLOGY    Network Utilization Review Department  ATTENTION: Please call with any questions or concerns to [phone] and carefully listen to the prompts so that you are directed to the right person  All voicemails are confidential   Logan Cunha all requests for admission clinical reviews, approved or denied determinations and any other requests to dedicated fax number below belonging to the campus where the patient is receiving treatment   List of dedicated fax numbers for the Facilities:  FACILITY NAME UR FAX NUMBER   ADMISSION DENIALS (Administrative/Medical Necessity) [phone]   [address] (Maternity/NICU/Pediatrics) [address] [facility] (Darcy [address] "[address]) [address] [place] [address] Tiffanie Barron [address] [place] [address] [phone]

## 2021-01-14 NOTE — UTILIZATION REVIEW
Notification of Inpatient Admission/Inpatient Authorization Request   This is a Notification of Inpatient Admission for [address]  Be advised that this patient was admitted to our facility under Inpatient Status  Contact Berna Rey at [phone] for additional admission information  Rick Cavanaugh UR DEPT  DEDICATED -[phone]  Patient Name:   Zbigniew Childers   YOB: 1960       [address]   [address]:   [facility]  Tax ID: [id number]  NPI: [id number] Attending Provider/NPI:  Phone:  Address: Smitha Kirk   [phone]  Same as the facility   Place of Service Code: 24 Place of Service Name:  [address] [place] [address]   Start Date: 1/13/21 1739 Discharge Date & Time: No discharge date for patient encounter  Type of Admission: Inpatient Status Discharge Disposition   (if discharged): Home/Self Care   Patient Diagnoses: Pyelonephritis [N12]  Fever [R50 9]  Generalized body aches [R52]     Orders: Admission Orders (From admission, onward)     Ordered        01/13/21 1739  Inpatient Admission  Once                    Assigned Utilization Review Contact: Magali Interiano  Utilization   Network Utilization Review Department  Phone: [phone]; Fax [phone]  Email: [email or web address]   ATTENTION PAYERS: Please call the assigned Utilization  directly with any questions or concerns ALL voicemails in the department are confidential  Send all requests for admission clinical reviews, approved or denied determinations and any other requests to dedicated fax number belonging to the campus where the patient is receiving treatment

## 2021-01-14 NOTE — PLAN OF CARE
Problem: Potential for Falls  Goal: Patient will remain free of falls  Description: INTERVENTIONS:  - Assess patient frequently for physical needs  -  Identify cognitive and physical deficits and behaviors that affect risk of falls    -  [place] fall precautions as indicated by assessment   - Educate patient/family on patient safety including physical limitations  - Instruct patient to call for assistance with activity based on assessment  - Modify environment to reduce risk of injury  - Consider OT/PT consult to assist with strengthening/mobility  Outcome: Progressing     Problem: PAIN - ADULT  Goal: Verbalizes/displays adequate comfort level or baseline comfort level  Description: Interventions:  - Encourage patient to monitor pain and request assistance  - Assess pain using appropriate pain scale  - Administer analgesics based on type and severity of pain and evaluate response  - Implement non-pharmacological measures as appropriate and evaluate response  - Consider cultural and social influences on pain and pain management  - Notify physician/advanced practitioner if interventions unsuccessful or patient reports new pain  Outcome: Progressing     Problem: INFECTION - ADULT  Goal: Absence or prevention of progression during hospitalization  Description: INTERVENTIONS:  - Assess and monitor for signs and symptoms of infection  - Monitor lab/diagnostic results  - Monitor all insertion sites, i e  indwelling lines, tubes, and drains  - Monitor endotracheal if appropriate and nasal secretions for changes in amount and color  - [place] appropriate cooling/warming therapies per order  - Administer medications as ordered  - Instruct and encourage patient and family to use good hand hygiene technique  - Identify and instruct in appropriate isolation precautions for identified infection/condition  Outcome: Progressing  Goal: Absence of fever/infection during neutropenic period  Description: INTERVENTIONS:  - Monitor WBC    Outcome: Progressing     Problem: SAFETY ADULT  Goal: Patient will remain free of falls  Description: INTERVENTIONS:  - Assess patient frequently for physical needs  -  Identify cognitive and physical deficits and behaviors that affect risk of falls    -  [place] fall precautions as indicated by assessment   - Educate patient/family on patient safety including physical limitations  - Instruct patient to call for assistance with activity based on assessment  - Modify environment to reduce risk of injury  - Consider OT/PT consult to assist with strengthening/mobility  Outcome: Progressing  Goal: Maintain or return to baseline ADL function  Description: INTERVENTIONS:  -  Assess patient's ability to carry out ADLs; assess patient's baseline for ADL function and identify physical deficits which impact ability to perform ADLs (bathing, care of mouth/teeth, toileting, grooming, dressing, etc )  - Assess/evaluate cause of self-care deficits   - Assess range of motion  - Assess patient's mobility; develop plan if impaired  - Assess patient's need for assistive devices and provide as appropriate  - Encourage maximum independence but intervene and supervise when necessary  - Involve family in performance of ADLs  - Assess for home care needs following discharge   - Consider OT consult to assist with ADL evaluation and planning for discharge  - Provide patient education as appropriate  Outcome: Progressing  Goal: Maintain or return mobility status to optimal level  Description: INTERVENTIONS:  - Assess patient's baseline mobility status (ambulation, transfers, stairs, etc )    - Identify cognitive and physical deficits and behaviors that affect mobility  - Identify mobility aids required to assist with transfers and/or ambulation (gait belt, sit-to-stand, lift, walker, cane, etc )  - [place] fall precautions as indicated by assessment  - Record patient progress and toleration of activity level on Mobility SBAR; progress patient to next Phase/Stage  - Instruct patient to call for assistance with activity based on assessment  - Consider rehabilitation consult to assist with strengthening/weightbearing, etc   Outcome: Progressing     Problem: DISCHARGE PLANNING  Goal: Discharge to home or other facility with appropriate resources  Description: INTERVENTIONS:  - Identify barriers to discharge w/patient and caregiver  - Arrange for needed discharge resources and transportation as appropriate  - Identify discharge learning needs (meds, wound care, etc )  - Arrange for interpretive services to assist at discharge as needed  - Refer to Case Management Department for coordinating discharge planning if the patient needs post-hospital services based on physician/advanced practitioner order or complex needs related to functional status, cognitive ability, or social support system  Outcome: Progressing     Problem: Knowledge Deficit  Goal: Patient/family/caregiver demonstrates understanding of disease process, treatment plan, medications, and discharge instructions  Description: Complete learning assessment and assess knowledge base    Interventions:  - Provide teaching at level of understanding  - Provide teaching via preferred learning methods  Outcome: Progressing     Problem: METABOLIC, FLUID AND ELECTROLYTES - ADULT  Goal: Electrolytes maintained within normal limits  Description: INTERVENTIONS:  - Monitor labs and assess patient for signs and symptoms of electrolyte imbalances  - Administer electrolyte replacement as ordered  - Monitor response to electrolyte replacements, including repeat lab results as appropriate  - Instruct patient on fluid and nutrition as appropriate  Outcome: Progressing  Goal: Fluid balance maintained  Description: INTERVENTIONS:  - Monitor labs   - Monitor I/O and WT  - Instruct patient on fluid and nutrition as appropriate  - Assess for signs & symptoms of volume excess or deficit  Outcome: Progressing  Goal: Glucose maintained within target range  Description: INTERVENTIONS:  - Monitor Blood Glucose as ordered  - Assess for signs and symptoms of hyperglycemia and hypoglycemia  - Administer ordered medications to maintain glucose within target range  - Assess nutritional intake and initiate nutrition service referral as needed  Outcome: Progressing

## 2021-01-14 NOTE — ASSESSMENT & PLAN NOTE
· CT scan reveals numerous areas of right renal cortical hypoenhancement suspicious for pyelonephritis, right nephroureteral stent remains in place, no hydronephrosis  · Continue with empiric antibiotics follow-up cultures  · Urology input appreciated

## 2021-01-15 LAB
ANION GAP SERPL CALCULATED.3IONS-SCNC: 7 MMOL/L (ref 4–13)
BACTERIA UR CULT: ABNORMAL
BASOPHILS # BLD AUTO: 0.05 THOUSANDS/ΜL (ref 0–0.1)
BASOPHILS NFR BLD AUTO: 1 % (ref 0–1)
BUN SERPL-MCNC: 16 MG/DL (ref 5–25)
CALCIUM SERPL-MCNC: 9.2 MG/DL (ref 8.3–10.1)
CHLORIDE SERPL-SCNC: 100 MMOL/L (ref 100–108)
CO2 SERPL-SCNC: 26 MMOL/L (ref 21–32)
CREAT SERPL-MCNC: 0.84 MG/DL (ref 0.6–1.3)
EOSINOPHIL # BLD AUTO: 0.2 THOUSAND/ΜL (ref 0–0.61)
EOSINOPHIL NFR BLD AUTO: 3 % (ref 0–6)
ERYTHROCYTE [DISTWIDTH] IN BLOOD BY AUTOMATED COUNT: 13.6 % (ref 11.6–15.1)
GFR SERPL CREATININE-BSD FRML MDRD: 75 ML/MIN/1.73SQ M
GLUCOSE SERPL-MCNC: 98 MG/DL (ref 65–140)
HCT VFR BLD AUTO: 34.6 % (ref 34.8–46.1)
HGB BLD-MCNC: 11 G/DL (ref 11.5–15.4)
IMM GRANULOCYTES # BLD AUTO: 0.01 THOUSAND/UL (ref 0–0.2)
IMM GRANULOCYTES NFR BLD AUTO: 0 % (ref 0–2)
LYMPHOCYTES # BLD AUTO: 0.6 THOUSANDS/ΜL (ref 0.6–4.47)
LYMPHOCYTES NFR BLD AUTO: 9 % (ref 14–44)
MCH RBC QN AUTO: 30.9 PG (ref 26.8–34.3)
MCHC RBC AUTO-ENTMCNC: 31.8 G/DL (ref 31.4–37.4)
MCV RBC AUTO: 97 FL (ref 82–98)
MONOCYTES # BLD AUTO: 0.41 THOUSAND/ΜL (ref 0.17–1.22)
MONOCYTES NFR BLD AUTO: 6 % (ref 4–12)
NEUTROPHILS # BLD AUTO: 5.78 THOUSANDS/ΜL (ref 1.85–7.62)
NEUTS SEG NFR BLD AUTO: 81 % (ref 43–75)
NRBC BLD AUTO-RTO: 0 /100 WBCS
PLATELET # BLD AUTO: 202 THOUSANDS/UL (ref 149–390)
PMV BLD AUTO: 11 FL (ref 8.9–12.7)
POTASSIUM SERPL-SCNC: 3.7 MMOL/L (ref 3.5–5.3)
RBC # BLD AUTO: 3.56 MILLION/UL (ref 3.81–5.12)
SODIUM SERPL-SCNC: 133 MMOL/L (ref 136–145)
WBC # BLD AUTO: 7.05 THOUSAND/UL (ref 4.31–10.16)

## 2021-01-15 PROCEDURE — 80048 BASIC METABOLIC PNL TOTAL CA: CPT | Performed by: STUDENT IN AN ORGANIZED HEALTH CARE EDUCATION/TRAINING PROGRAM

## 2021-01-15 PROCEDURE — 99232 SBSQ HOSP IP/OBS MODERATE 35: CPT | Performed by: STUDENT IN AN ORGANIZED HEALTH CARE EDUCATION/TRAINING PROGRAM

## 2021-01-15 PROCEDURE — 85025 COMPLETE CBC W/AUTO DIFF WBC: CPT | Performed by: STUDENT IN AN ORGANIZED HEALTH CARE EDUCATION/TRAINING PROGRAM

## 2021-01-15 PROCEDURE — 99232 SBSQ HOSP IP/OBS MODERATE 35: CPT | Performed by: NURSE PRACTITIONER

## 2021-01-15 RX ORDER — BUTALBITAL, ACETAMINOPHEN AND CAFFEINE 50; 325; 40 MG/1; MG/1; MG/1
1 TABLET ORAL EVERY 6 HOURS PRN
Status: DISCONTINUED | OUTPATIENT
Start: 2021-01-15 | End: 2021-01-16 | Stop reason: HOSPADM

## 2021-01-15 RX ADMIN — MODAFINIL 100 MG: 100 TABLET ORAL at 05:54

## 2021-01-15 RX ADMIN — OXYCODONE HYDROCHLORIDE 5 MG: 5 TABLET ORAL at 06:19

## 2021-01-15 RX ADMIN — OXYBUTYNIN 15 MG: 10 TABLET, FILM COATED, EXTENDED RELEASE ORAL at 21:03

## 2021-01-15 RX ADMIN — PANTOPRAZOLE SODIUM 40 MG: 40 TABLET, DELAYED RELEASE ORAL at 05:54

## 2021-01-15 RX ADMIN — OXYCODONE HYDROCHLORIDE 5 MG: 5 TABLET ORAL at 20:26

## 2021-01-15 RX ADMIN — DULOXETINE HYDROCHLORIDE 90 MG: 60 CAPSULE, DELAYED RELEASE ORAL at 17:26

## 2021-01-15 RX ADMIN — CEFEPIME HYDROCHLORIDE 2000 MG: 2 INJECTION, POWDER, FOR SOLUTION INTRAVENOUS at 16:39

## 2021-01-15 RX ADMIN — CEFEPIME HYDROCHLORIDE 2000 MG: 2 INJECTION, POWDER, FOR SOLUTION INTRAVENOUS at 00:58

## 2021-01-15 RX ADMIN — HEPARIN SODIUM 5000 UNITS: 5000 INJECTION INTRAVENOUS; SUBCUTANEOUS at 15:24

## 2021-01-15 RX ADMIN — CEFEPIME HYDROCHLORIDE 2000 MG: 2 INJECTION, POWDER, FOR SOLUTION INTRAVENOUS at 09:05

## 2021-01-15 RX ADMIN — HEPARIN SODIUM 5000 UNITS: 5000 INJECTION INTRAVENOUS; SUBCUTANEOUS at 21:04

## 2021-01-15 RX ADMIN — ATORVASTATIN CALCIUM 40 MG: 40 TABLET, FILM COATED ORAL at 16:39

## 2021-01-15 RX ADMIN — BUTALBITAL, ACETAMINOPHEN AND CAFFEINE 1 TABLET: 50; 325; 40 TABLET ORAL at 12:26

## 2021-01-15 RX ADMIN — GABAPENTIN 600 MG: 300 CAPSULE ORAL at 21:03

## 2021-01-15 RX ADMIN — HEPARIN SODIUM 5000 UNITS: 5000 INJECTION INTRAVENOUS; SUBCUTANEOUS at 05:54

## 2021-01-15 RX ADMIN — KETOROLAC TROMETHAMINE 15 MG: 30 INJECTION, SOLUTION INTRAMUSCULAR at 15:25

## 2021-01-15 NOTE — PLAN OF CARE
Problem: Potential for Falls  Goal: Patient will remain free of falls  Description: INTERVENTIONS:  - Assess patient frequently for physical needs  -  Identify cognitive and physical deficits and behaviors that affect risk of falls    -  [place] fall precautions as indicated by assessment   - Educate patient/family on patient safety including physical limitations  - Instruct patient to call for assistance with activity based on assessment  - Modify environment to reduce risk of injury  - Consider OT/PT consult to assist with strengthening/mobility  Outcome: Progressing     Problem: PAIN - ADULT  Goal: Verbalizes/displays adequate comfort level or baseline comfort level  Description: Interventions:  - Encourage patient to monitor pain and request assistance  - Assess pain using appropriate pain scale  - Administer analgesics based on type and severity of pain and evaluate response  - Implement non-pharmacological measures as appropriate and evaluate response  - Consider cultural and social influences on pain and pain management  - Notify physician/advanced practitioner if interventions unsuccessful or patient reports new pain  Outcome: Progressing     Problem: INFECTION - ADULT  Goal: Absence or prevention of progression during hospitalization  Description: INTERVENTIONS:  - Assess and monitor for signs and symptoms of infection  - Monitor lab/diagnostic results  - Monitor all insertion sites, i e  indwelling lines, tubes, and drains  - Monitor endotracheal if appropriate and nasal secretions for changes in amount and color  - [place] appropriate cooling/warming therapies per order  - Administer medications as ordered  - Instruct and encourage patient and family to use good hand hygiene technique  - Identify and instruct in appropriate isolation precautions for identified infection/condition  Outcome: Progressing  Goal: Absence of fever/infection during neutropenic period  Description: INTERVENTIONS:  - Monitor WBC    Outcome: Progressing     Problem: SAFETY ADULT  Goal: Patient will remain free of falls  Description: INTERVENTIONS:  - Assess patient frequently for physical needs  -  Identify cognitive and physical deficits and behaviors that affect risk of falls    -  [place] fall precautions as indicated by assessment   - Educate patient/family on patient safety including physical limitations  - Instruct patient to call for assistance with activity based on assessment  - Modify environment to reduce risk of injury  - Consider OT/PT consult to assist with strengthening/mobility  Outcome: Progressing  Goal: Maintain or return to baseline ADL function  Description: INTERVENTIONS:  -  Assess patient's ability to carry out ADLs; assess patient's baseline for ADL function and identify physical deficits which impact ability to perform ADLs (bathing, care of mouth/teeth, toileting, grooming, dressing, etc )  - Assess/evaluate cause of self-care deficits   - Assess range of motion  - Assess patient's mobility; develop plan if impaired  - Assess patient's need for assistive devices and provide as appropriate  - Encourage maximum independence but intervene and supervise when necessary  - Involve family in performance of ADLs  - Assess for home care needs following discharge   - Consider OT consult to assist with ADL evaluation and planning for discharge  - Provide patient education as appropriate  Outcome: Progressing  Goal: Maintain or return mobility status to optimal level  Description: INTERVENTIONS:  - Assess patient's baseline mobility status (ambulation, transfers, stairs, etc )    - Identify cognitive and physical deficits and behaviors that affect mobility  - Identify mobility aids required to assist with transfers and/or ambulation (gait belt, sit-to-stand, lift, walker, cane, etc )  - [place] fall precautions as indicated by assessment  - Record patient progress and toleration of activity level on Mobility SBAR; progress patient to next Phase/Stage  - Instruct patient to call for assistance with activity based on assessment  - Consider rehabilitation consult to assist with strengthening/weightbearing, etc   Outcome: Progressing     Problem: DISCHARGE PLANNING  Goal: Discharge to home or other facility with appropriate resources  Description: INTERVENTIONS:  - Identify barriers to discharge w/patient and caregiver  - Arrange for needed discharge resources and transportation as appropriate  - Identify discharge learning needs (meds, wound care, etc )  - Arrange for interpretive services to assist at discharge as needed  - Refer to Case Management Department for coordinating discharge planning if the patient needs post-hospital services based on physician/advanced practitioner order or complex needs related to functional status, cognitive ability, or social support system  Outcome: Progressing     Problem: Knowledge Deficit  Goal: Patient/family/caregiver demonstrates understanding of disease process, treatment plan, medications, and discharge instructions  Description: Complete learning assessment and assess knowledge base    Interventions:  - Provide teaching at level of understanding  - Provide teaching via preferred learning methods  Outcome: Progressing     Problem: METABOLIC, FLUID AND ELECTROLYTES - ADULT  Goal: Electrolytes maintained within normal limits  Description: INTERVENTIONS:  - Monitor labs and assess patient for signs and symptoms of electrolyte imbalances  - Administer electrolyte replacement as ordered  - Monitor response to electrolyte replacements, including repeat lab results as appropriate  - Instruct patient on fluid and nutrition as appropriate  Outcome: Progressing  Goal: Fluid balance maintained  Description: INTERVENTIONS:  - Monitor labs   - Monitor I/O and WT  - Instruct patient on fluid and nutrition as appropriate  - Assess for signs & symptoms of volume excess or deficit  Outcome: Progressing  Goal: Glucose maintained within target range  Description: INTERVENTIONS:  - Monitor Blood Glucose as ordered  - Assess for signs and symptoms of hyperglycemia and hypoglycemia  - Administer ordered medications to maintain glucose within target range  - Assess nutritional intake and initiate nutrition service referral as needed  Outcome: Progressing

## 2021-01-15 NOTE — ASSESSMENT & PLAN NOTE
? IV fluids and pain management per Medicine  ? Urine culture positive for >100,000 cfu/ml Pseudomonas aeruginosa  ? Antibiotics adjusted to cefepime 2 g every 8 hours by Medicine   ? blood cultures pending  ? Trend vital signs, fever  ?  Trend labs-WBC count 7 05 01/15/2021

## 2021-01-15 NOTE — PLAN OF CARE
Problem: Potential for Falls  Goal: Patient will remain free of falls  Description: INTERVENTIONS:  - Assess patient frequently for physical needs  -  Identify cognitive and physical deficits and behaviors that affect risk of falls    -  [place] fall precautions as indicated by assessment   - Educate patient/family on patient safety including physical limitations  - Instruct patient to call for assistance with activity based on assessment  - Modify environment to reduce risk of injury  - Consider OT/PT consult to assist with strengthening/mobility  Outcome: Progressing     Problem: PAIN - ADULT  Goal: Verbalizes/displays adequate comfort level or baseline comfort level  Description: Interventions:  - Encourage patient to monitor pain and request assistance  - Assess pain using appropriate pain scale  - Administer analgesics based on type and severity of pain and evaluate response  - Implement non-pharmacological measures as appropriate and evaluate response  - Consider cultural and social influences on pain and pain management  - Notify physician/advanced practitioner if interventions unsuccessful or patient reports new pain  Outcome: Progressing     Problem: INFECTION - ADULT  Goal: Absence or prevention of progression during hospitalization  Description: INTERVENTIONS:  - Assess and monitor for signs and symptoms of infection  - Monitor lab/diagnostic results  - Monitor all insertion sites, i e  indwelling lines, tubes, and drains  - Monitor endotracheal if appropriate and nasal secretions for changes in amount and color  - [place] appropriate cooling/warming therapies per order  - Administer medications as ordered  - Instruct and encourage patient and family to use good hand hygiene technique  - Identify and instruct in appropriate isolation precautions for identified infection/condition  Outcome: Progressing  Goal: Absence of fever/infection during neutropenic period  Description: INTERVENTIONS:  - Monitor WBC    Outcome: Progressing     Problem: SAFETY ADULT  Goal: Patient will remain free of falls  Description: INTERVENTIONS:  - Assess patient frequently for physical needs  -  Identify cognitive and physical deficits and behaviors that affect risk of falls    -  [place] fall precautions as indicated by assessment   - Educate patient/family on patient safety including physical limitations  - Instruct patient to call for assistance with activity based on assessment  - Modify environment to reduce risk of injury  - Consider OT/PT consult to assist with strengthening/mobility  Outcome: Progressing  Goal: Maintain or return to baseline ADL function  Description: INTERVENTIONS:  -  Assess patient's ability to carry out ADLs; assess patient's baseline for ADL function and identify physical deficits which impact ability to perform ADLs (bathing, care of mouth/teeth, toileting, grooming, dressing, etc )  - Assess/evaluate cause of self-care deficits   - Assess range of motion  - Assess patient's mobility; develop plan if impaired  - Assess patient's need for assistive devices and provide as appropriate  - Encourage maximum independence but intervene and supervise when necessary  - Involve family in performance of ADLs  - Assess for home care needs following discharge   - Consider OT consult to assist with ADL evaluation and planning for discharge  - Provide patient education as appropriate  Outcome: Progressing  Goal: Maintain or return mobility status to optimal level  Description: INTERVENTIONS:  - Assess patient's baseline mobility status (ambulation, transfers, stairs, etc )    - Identify cognitive and physical deficits and behaviors that affect mobility  - Identify mobility aids required to assist with transfers and/or ambulation (gait belt, sit-to-stand, lift, walker, cane, etc )  - [place] fall precautions as indicated by assessment  - Record patient progress and toleration of activity level on Mobility SBAR; progress patient to next Phase/Stage  - Instruct patient to call for assistance with activity based on assessment  - Consider rehabilitation consult to assist with strengthening/weightbearing, etc   Outcome: Progressing     Problem: DISCHARGE PLANNING  Goal: Discharge to home or other facility with appropriate resources  Description: INTERVENTIONS:  - Identify barriers to discharge w/patient and caregiver  - Arrange for needed discharge resources and transportation as appropriate  - Identify discharge learning needs (meds, wound care, etc )  - Arrange for interpretive services to assist at discharge as needed  - Refer to Case Management Department for coordinating discharge planning if the patient needs post-hospital services based on physician/advanced practitioner order or complex needs related to functional status, cognitive ability, or social support system  Outcome: Progressing     Problem: Knowledge Deficit  Goal: Patient/family/caregiver demonstrates understanding of disease process, treatment plan, medications, and discharge instructions  Description: Complete learning assessment and assess knowledge base    Interventions:  - Provide teaching at level of understanding  - Provide teaching via preferred learning methods  Outcome: Progressing     Problem: METABOLIC, FLUID AND ELECTROLYTES - ADULT  Goal: Electrolytes maintained within normal limits  Description: INTERVENTIONS:  - Monitor labs and assess patient for signs and symptoms of electrolyte imbalances  - Administer electrolyte replacement as ordered  - Monitor response to electrolyte replacements, including repeat lab results as appropriate  - Instruct patient on fluid and nutrition as appropriate  Outcome: Progressing  Goal: Fluid balance maintained  Description: INTERVENTIONS:  - Monitor labs   - Monitor I/O and WT  - Instruct patient on fluid and nutrition as appropriate  - Assess for signs & symptoms of volume excess or deficit  Outcome: Progressing  Goal: Glucose maintained within target range  Description: INTERVENTIONS:  - Monitor Blood Glucose as ordered  - Assess for signs and symptoms of hyperglycemia and hypoglycemia  - Administer ordered medications to maintain glucose within target range  - Assess nutritional intake and initiate nutrition service referral as needed  Outcome: Progressing

## 2021-01-15 NOTE — ASSESSMENT & PLAN NOTE
This is a 58-year-old female with history of right ureteral stone status post ureteroscopy with failed retrieval blood with stent placement on 01/05/2021 presenting with fever and diffuse body aches      · Sepsis POA criteria met with fever, leukocytosis  · Secondary to pyelonephritis  · IV cefepime  · Urine cx growing Pseudomonas  · Urology consulted, recommending outpatient treatment with lithotripsy in 03/2021  · Plan to d/c tmr with ciprofloxacin for 7 days once fevers resolve for at least 24 hours

## 2021-01-15 NOTE — PROGRESS NOTES
Progress Note - Mallory Doherty 1960, 56 y o  female MRN: [id number]    Unit/Bed#: E5 -01 Encounter: [id number]    Primary Care Provider: Irish Ormond, MD   Date and time admitted to hospital: 1/13/2021  2:57 PM        * Sepsis [facility])  Assessment & Plan  This is a 56-year-old female with history of right ureteral stone status post ureteroscopy with failed retrieval blood with stent placement on 01/05/2021 presenting with fever and diffuse body aches  · Sepsis POA criteria met with fever, leukocytosis  · Secondary to pyelonephritis  · IV cefepime  · Urine cx growing Pseudomonas  · Urology consulted, recommending outpatient treatment with lithotripsy in 03/2021  · Plan to d/c tmr with ciprofloxacin for 7 days once fevers resolve for at least 24 hours    Pyelonephritis  Assessment & Plan  · CT scan reveals numerous areas of right renal cortical hypoenhancement suspicious for pyelonephritis, right nephroureteral stent remains in place, no hydronephrosis  · IV cefepime for pseudomonas  · Plan to treat for 7 days    Narcolepsy  Assessment & Plan  · Continue modafinil    Pure hypercholesterolemia  Assessment & Plan  · Continue statin    Fibromyalgia  Assessment & Plan  · Continue Cymbalta        VTE Pharmacologic Prophylaxis:   Pharmacologic: Heparin  Mechanical VTE Prophylaxis in Place: Yes    Patient Centered Rounds: I have performed bedside rounds with nursing staff today  Discussions with Specialists or Other Care Team Provider: None    Education and Discussions with Family / Patient: Patient    Time Spent for Care: 30 minutes  More than 50% of total time spent on counseling and coordination of care as described above      Current Length of Stay: 2 day(s)    Current Patient Status: Inpatient   Certification Statement: The patient will continue to require additional inpatient hospital stay due to Pyelo    Discharge Plan: Tomorrow    Code Status: Level 1 - Full Code      Subjective:   Patient seen today, had fever over night  No further fevers this morning  She reports doing well with improvement in her symptoms  Discussed discharge planning about being fever free for at least greater than 24 hours prior to being discharged likely tomorrow  Objective:     Vitals:   Temp (24hrs), Av 6 °F (36 4 °C), Min:97 °F (36 1 °C), Max:98 1 °F (36 7 °C)    Temp:  [97 °F (36 1 °C)-98 1 °F (36 7 °C)] 97 8 °F (36 6 °C)  HR:  [64-74] 67  Resp:  [18-20] 20  BP: ()/(54-60) 92/60  SpO2:  [93 %-97 %] 97 %  Body mass index is 19 19 kg/m²  Input and Output Summary (last 24 hours):     No intake or output data in the 24 hours ending 01/15/21 1701    Physical Exam:     Physical Exam  Vitals signs and nursing note reviewed  Constitutional:       Appearance: Normal appearance  She is normal weight  HENT:      Head: Normocephalic and atraumatic  Eyes:      Conjunctiva/sclera: Conjunctivae normal       Pupils: Pupils are equal, round, and reactive to light  Cardiovascular:      Rate and Rhythm: Normal rate and regular rhythm  Heart sounds: Normal heart sounds  Pulmonary:      Breath sounds: Normal breath sounds  No wheezing or rhonchi  Abdominal:      General: Bowel sounds are normal  There is no distension  Palpations: Abdomen is soft  Tenderness: There is no abdominal tenderness  Musculoskeletal: Normal range of motion  General: No swelling  Skin:     General: Skin is warm and dry  Neurological:      General: No focal deficit present  Mental Status: She is alert and oriented to person, place, and time             Additional Data:     Labs:    Results from last 7 days   Lab Units 01/15/21  0547   WBC Thousand/uL 7 05   HEMOGLOBIN g/dL 11 0*   HEMATOCRIT % 34 6*   PLATELETS Thousands/uL 202   NEUTROS PCT % 81*   LYMPHS PCT % 9*   MONOS PCT % 6   EOS PCT % 3     Results from last 7 days   Lab Units 01/15/21  0547 21  0655   SODIUM mmol/L 133* 135*   POTASSIUM mmol/L 3 7 4 1 CHLORIDE mmol/L 100 103   CO2 mmol/L 26 26   BUN mg/dL 16 17   CREATININE mg/dL 0 84 0 75   ANION GAP mmol/L 7 6   CALCIUM mg/dL 9 2 8 9   ALBUMIN g/dL  --  2 8*   TOTAL BILIRUBIN mg/dL  --  0 66   ALK PHOS U/L  --  67   ALT U/L  --  18   AST U/L  --  14   GLUCOSE RANDOM mg/dL 98 97     Results from last 7 days   Lab Units 01/13/21  1529   INR  1 00             Results from last 7 days   Lab Units 01/14/21  0655 01/13/21  1529   LACTIC ACID mmol/L  --  0 8   PROCALCITONIN ng/ml 0 44* 0 28*           * I Have Reviewed All Lab Data Listed Above  * Additional Pertinent Lab Tests Reviewed:  Adrianne 64 Admission Reviewed    Imaging:    None  Recent Cultures (last 7 days):     Results from last 7 days   Lab Units 01/13/21  1639 01/13/21  1537 01/13/21  1529   BLOOD CULTURE   --  No Growth at 24 hrs   --    GRAM STAIN RESULT   --   --  Gram negative rods*   URINE CULTURE  >100,000 cfu/ml Pseudomonas aeruginosa*  --   --        Last 24 Hours Medication List:   Current Facility-Administered Medications   Medication Dose Route Frequency Provider Last Rate    acetaminophen  650 mg Oral Q6H PRN Estella Salazar MD      atorvastatin  40 mg Oral Daily With Monae Rowland MD      butalbital-acetaminophen-caffeine  1 tablet Oral Q6H PRN Devendra Platt MD      cefepime  2,000 mg Intravenous Q8H Bita Manzo PA-C 2,000 mg (01/15/21 1639)    DULoxetine  90 mg Oral QPM Estella Salazar MD      gabapentin  600 mg Oral HS Estella Salazar MD      heparin (porcine)  5,000 Units Subcutaneous [facility] Estella Salazar MD      ketorolac  15 mg Intravenous Q6H PRN Devendra Platt MD      modafinil  100 mg Oral Early Morning Devendra Platt MD      nicotine  1 patch Transdermal Daily Estella Salazar MD      oxybutynin  15 mg Oral HS Estella Salazar MD      oxyCODONE  5 mg Oral Q6H PRN Devendra Platt MD      pantoprazole  40 mg Oral Early Morning Estella Salazar MD      ZOLMitriptan  2 5 mg Oral Q2H PRN Devendra Platt MD Today, Patient Was Seen By: Estuardo Hardwick MD    ** Please Note: Dictation voice to text software may have been used in the creation of this document   **

## 2021-01-15 NOTE — ASSESSMENT & PLAN NOTE
· CT scan reveals numerous areas of right renal cortical hypoenhancement suspicious for pyelonephritis, right nephroureteral stent remains in place, no hydronephrosis  · IV cefepime for pseudomonas  · Plan to treat for 7 days

## 2021-01-15 NOTE — CASE MANAGEMENT
LOS: 2 Days  Bundle: No  Unplanned Readmission Score: 15  30 Day Readmission: Yes     CM called patient  Explained the role of CM  Obtained the following information from patient  Home: Ranch-style home with 2 steps to enter  Lives With:  and 43 y/o daughter  ADL's: Independent  DME: Has RW and crutches from prior knee replacement surgery  Ambulation: Independent  Transportation:  or daughter drives   Pharmacy: [facility]  [facility] H[facility]: [facility] following knee replacement surgery in 2014  Rehab Hx: No  Mental Health Hx: Denies  Substance Abuse Hx: Denies  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  POA/LW/AD: Has LW  Requested information be brought in for chart  Designates  as emergency contact  Transport at D/C:  or daughter will drive her home  CM reviewed d/c planning process including the following: identifying help at home, patient preferences for d/c planning needs, [facility] program, availability of treatment team to discuss questions or concerns patient and/or family may have regarding understanding medications and recognizing signs and symptoms once discharged  Patient reports that she has good support at home with her daughter and   She does not want Meds to [facility] and any d/c medications should be sent to [facility]  She states that she works full time and intends on returning to work once her medical issues stabilize  No needs identified  CM Department will continue to follow

## 2021-01-15 NOTE — QUICK NOTE
Notified of one blood culture growing gram negative rods  Review of urine culture shows growth of pseudomonas aeruginosa  Will switch patient from ceftriaxone to cefepime which offers better pseudomonas coverage

## 2021-01-15 NOTE — PROGRESS NOTES
Progress Note - Urology  [facility] 1960, 56 y o  female MRN: [id number]    Unit/Bed#: E5 -01 Encounter: [id number]    Pyelonephritis  Assessment & Plan  ? IV fluids and pain management per Medicine  ? Urine culture positive for >100,000 cfu/ml Pseudomonas aeruginosa  ? Antibiotics adjusted to cefepime 2 g every 8 hours by Medicine   ? blood cultures pending  ? Trend vital signs, fever  ? Trend labs-WBC count 7 05 01/15/2021      Nephrolithiasis  ? Status post ureteroscopy with failed stone retrieval and stent insertion 01/05/2021  ? CREAT 0 84 today  ? CT scan shows right nephro ureteral stent in proper position without hydronephrosis  ? Definitive treatment for kidney stone (ESWL) tentatively scheduled for 03/25/2021    Subjective:  Patient is status post right ureteroscopy with failed stone retrieval and stent insertion 01/05/2021  She presented to the emergency department on 01/13/2021 for the complaint of diffuse body aches, confusion and fever  CT on admission demonstrated pyelonephritis  Patient appears to be clinically improving with IV antibiotics  No plans for urologic intervention or manipulation of stone or stent at this time period and follow admission urine cultures and treat with broad-spectrum antibiotics, tailoring when cultures have returned  24 HR EVENTS:   no significant events  Patient has no complaints      Review of Systems   Constitutional: Negative for activity change, appetite change, chills, fatigue, fever and unexpected weight change  HENT: Negative for facial swelling  Eyes: Negative for discharge  Respiratory: Negative  Negative for cough and shortness of breath  Cardiovascular: Negative for chest pain and leg swelling  Gastrointestinal: Negative  Negative for abdominal distention, abdominal pain, constipation, diarrhea, nausea and vomiting  Endocrine: Negative      Genitourinary: Negative for decreased urine volume, difficulty urinating, dysuria, enuresis, flank pain, genital sores, hematuria and urgency  Musculoskeletal: Negative for back pain and myalgias  Skin: Negative for pallor and rash  Allergic/Immunologic: Negative  Negative for immunocompromised state  Neurological: Negative for facial asymmetry and speech difficulty  Psychiatric/Behavioral: Negative for agitation and confusion  Objective:  Nursing Rounds:   Vitals: Blood pressure 101/54, pulse 74, temperature 98 1 °F (36 7 °C), temperature source Temporal, resp  rate 20, weight 50 7 kg (111 lb 12 4 oz), SpO2 97 %, not currently breastfeeding  ,Body mass index is 19 19 kg/m²  INS & OUTS:  No intake/output data recorded  Physical Exam  Vitals signs and nursing note reviewed  Constitutional:       General: She is not in acute distress  Appearance: Normal appearance  She is not ill-appearing, toxic-appearing or diaphoretic  HENT:      Head: Normocephalic  Cardiovascular:      Rate and Rhythm: Normal rate  Pulmonary:      Effort: Pulmonary effort is normal  No respiratory distress  Abdominal:      General: Abdomen is flat  Bowel sounds are normal  There is no distension  Palpations: Abdomen is soft  Tenderness: There is no abdominal tenderness  Musculoskeletal:         General: No swelling  Right lower leg: No edema  Left lower leg: No edema  Skin:     General: Skin is warm and dry  Neurological:      General: No focal deficit present  Mental Status: She is alert and oriented to person, place, and time  Psychiatric:         Mood and Affect: Mood normal          Behavior: Behavior normal          Imaging:  CT ABDOMEN AND PELVIS WITH IV CONTRAST     INDICATION:   right renal stone s/o stent and failed retrieval, fever and diffuse body aches today      COMPARISON:  CT abdomen pelvis 12/25/2020  AP abdomen 1/5/2021  Retrograde pyelogram 1/5/2021      TECHNIQUE:  CT examination of the abdomen and pelvis was performed   Axial, sagittal, and coronal 2D reformatted images were created from the source data and submitted for interpretation      Radiation dose length product (DLP) for this visit:  373 mGy-cm   This examination, like all CT scans performed in the [facility], was performed utilizing techniques to minimize radiation dose exposure, including the use of iterative   reconstruction and automated exposure control      IV Contrast:  100 mL of iohexol (OMNIPAQUE)  Enteric Contrast:  Enteric contrast was not administered      FINDINGS:     ABDOMEN     LOWER CHEST:  No clinically significant abnormality identified in the visualized lower chest      LIVER/BILIARY TREE:  Unremarkable      GALLBLADDER:  Gallbladder is surgically absent      SPLEEN:  A few calcified granulomas      PANCREAS:  Unremarkable      ADRENAL GLANDS:  Unremarkable      KIDNEYS/URETERS:  A right nephroureteral stent remains in place  No hydronephrosis or ureteral calculi  Nonobstructive 4 mm lower pole right renal calculus      Numerous areas of right renal cortical hypoenhancement suspicious for pyelonephritis      No perinephric collection      STOMACH AND BOWEL:  Unremarkable      APPENDIX:  No findings to suggest appendicitis      ABDOMINOPELVIC CAVITY:  No ascites  No pneumoperitoneum  No lymphadenopathy      VESSELS:  Unremarkable for patient's age      PELVIS     REPRODUCTIVE ORGANS:  Surgical changes of prior hysterectomy      URINARY BLADDER:  Unremarkable      ABDOMINAL WALL/INGUINAL REGIONS:  Unremarkable      OSSEOUS STRUCTURES:  No acute fracture or destructive osseous lesion      IMPRESSION:     Numerous areas of right renal cortical hypoenhancement suspicious for pyelonephritis      Right nephroureteral stent remains in place  No hydronephrosis   No ureteral calculi          Labs:  Recent Labs     01/13/21  1529 01/14/21  0655 01/15/21  0547   WBC 13 51* 11 56* 7 05       Recent Labs     01/13/21  1529 01/14/21  0655 01/15/21  0547   HGB 11 4* 11 0* 11 0*     Recent Labs     01/13/21  1529 01/14/21  0655 01/15/21  0547   HCT 34 4* 34 1* 34 6*     Recent Labs     01/13/21  1529 01/14/21  0655 01/15/21  0547   CREATININE 0 66 0 75 0 84     Lab Results   Component Value Date    HGB 11 0 (L) 01/15/2021    HCT 34 6 (L) 01/15/2021    WBC 7 05 01/15/2021     01/15/2021     Lab Results   Component Value Date    K 3 7 01/15/2021     01/15/2021    CO2 26 01/15/2021    BUN 16 01/15/2021    CREATININE 0 84 01/15/2021    CALCIUM 9 2 01/15/2021     Urinalysis:  Innumerable WBC's per HPF, 2-4 RBC's per HPF and innumerable bacteria  Urine Culture: Growth: >100,000 cfu/ml Pseudomonas aeruginosa    History:    Past Medical History:   Diagnosis Date    Allergy to dust     Anemia     Calculus of ureter     Chronic pain disorder     neck and back- sees pain management    Colitis     Cracked skin     on fingers    Diverticulosis     Dysphagia     GERD (gastroesophageal reflux disease)     H/O: GI bleed     Hiatal hernia     History of DVT in adulthood     flaquito    Hydronephrosis     Hyperlipidemia     Inguinal hernia     right side    Irritable bowel     Migraines     Narcolepsy     Narcolepsy 8/6/2020    OAB (overactive bladder)     Osteoarthritis     Osteoporosis     Other chronic pain     degenerative disc disease    PONV (postoperative nausea and vomiting)     migraines    Smoking 8/6/2020    Tobacco abuse     Wears dentures     full upper    Wears glasses     Wears glasses      Past Surgical History:   Procedure Laterality Date    ABDOMINAL ADHESION SURGERY      ARM SKIN LESION BIOPSY / EXCISION      lymph node excision    BLADDER SURGERY      CHOLECYSTECTOMY      COLONOSCOPY  08/06/2020    FL RETROGRADE PYELOGRAM  1/5/2021    HYSTERECTOMY  1977    JOINT REPLACEMENT Right     KNEE SURGERY Right     replacement    LAMINECTOMY      L5-6-7    LAPAROSCOPY      LYMPH NODE DISSECTION Left     removed -no cancer-  limb restriction    MOLE REMOVAL      OOPHORECTOMY Bilateral 1977    OTHER SURGICAL HISTORY      enlarged lymph node removed left arm   no cancer    MN CYSTO/URETERO W/LITHOTRIPSY &INDWELL STENT INSRT Right 1/5/2021    Procedure: CYSTO, LASER  URETEROSCOPY, RETRO PYELOGRAM, STENT REPLACMENT;  Surgeon: Julito Arthur MD;  Location[facility];  Service: Urology    MN CYSTOURETHROSCOPY,URETER CATHETER Right 12/2/2020    Procedure: CYSTOSCOPY AND INSERTION STENT URETERAL;  Surgeon: Luz Monroy MD;  [facility];  Service: Urology    MN REPAIR ING HERNIA,5+Y/O,REDUCIBL Right 6/25/2020    Procedure: REPAIR HERNIA INGUINAL  WITH MESH;  Surgeon: Monserrat Zavala MD;  Location: [facility];  Service: General    SPINAL FUSION      UPPER GASTROINTESTINAL ENDOSCOPY  08/06/2020    WISDOM TOOTH EXTRACTION       Family History   Problem Relation Age of Onset    Hypertension Father     Cancer Father     Diabetes Mother     Hypertension Mother     Stroke Brother     Nephrolithiasis Brother     Diabetes Sister     No Known Problems Daughter     No Known Problems Maternal Grandmother     No Known Problems Maternal Grandfather     No Known Problems Paternal Grandmother     No Known Problems Paternal Grandfather     No Known Problems Sister     No Known Problems Maternal Aunt     No Known Problems Maternal Aunt     No Known Problems Maternal Aunt     No Known Problems Paternal Aunt     No Known Problems Paternal Aunt     No Known Problems Paternal Aunt      Social History     Socioeconomic History    Marital status: /Civil Union     Spouse name: None    Number of children: None    Years of education: None    Highest education level: None   Occupational History    None   Social Needs    Financial resource strain: None    Food insecurity     Worry: None     Inability: None    Transportation needs     Medical: None     Non-medical: None   Tobacco Use    Smoking status: Current Every Day Smoker     Packs/day: 0 50     Years: 46 00     Pack years: 23 00     Types: Cigarettes    Smokeless tobacco: Never Used    Tobacco comment: as of 12- pt has reduced to 3 cigarettes per day   Substance and Sexual Activity    Alcohol use: Not Currently     Frequency: Monthly or less     Drinks per session: 1 or 2     Binge frequency: Never    Drug use: Yes     Types: Marijuana     Comment: medicinal marijuana    Sexual activity: Not Currently   Lifestyle    Physical activity     Days per week: None     Minutes per session: None    Stress: None   Relationships    Social connections     Talks on phone: None     Gets together: None     Attends Mosque service: None     Active member of club or organization: None     Attends meetings of clubs or organizations: None     Relationship status: None    Intimate partner violence     Fear of current or ex partner: None     Emotionally abused: None     Physically abused: None     Forced sexual activity: None   Other Topics Concern    None   Social History Narrative    None       RADHA Martínez  Date: 1/15/2021 Time: 2:00 PM

## 2021-01-16 VITALS
TEMPERATURE: 97 F | SYSTOLIC BLOOD PRESSURE: 97 MMHG | RESPIRATION RATE: 16 BRPM | OXYGEN SATURATION: 96 % | DIASTOLIC BLOOD PRESSURE: 53 MMHG | HEART RATE: 72 BPM | WEIGHT: 111.77 LBS | BODY MASS INDEX: 19.19 KG/M2

## 2021-01-16 LAB
ANION GAP SERPL CALCULATED.3IONS-SCNC: 8 MMOL/L (ref 4–13)
BACTERIA BLD CULT: ABNORMAL
BASOPHILS # BLD AUTO: 0.04 THOUSANDS/ΜL (ref 0–0.1)
BASOPHILS NFR BLD AUTO: 1 % (ref 0–1)
BUN SERPL-MCNC: 19 MG/DL (ref 5–25)
CALCIUM SERPL-MCNC: 9.1 MG/DL (ref 8.3–10.1)
CHLORIDE SERPL-SCNC: 102 MMOL/L (ref 100–108)
CO2 SERPL-SCNC: 26 MMOL/L (ref 21–32)
CREAT SERPL-MCNC: 0.86 MG/DL (ref 0.6–1.3)
EOSINOPHIL # BLD AUTO: 0.36 THOUSAND/ΜL (ref 0–0.61)
EOSINOPHIL NFR BLD AUTO: 9 % (ref 0–6)
ERYTHROCYTE [DISTWIDTH] IN BLOOD BY AUTOMATED COUNT: 13.3 % (ref 11.6–15.1)
GFR SERPL CREATININE-BSD FRML MDRD: 73 ML/MIN/1.73SQ M
GLUCOSE SERPL-MCNC: 102 MG/DL (ref 65–140)
GRAM STN SPEC: ABNORMAL
HCT VFR BLD AUTO: 30.8 % (ref 34.8–46.1)
HGB BLD-MCNC: 10.1 G/DL (ref 11.5–15.4)
IMM GRANULOCYTES # BLD AUTO: 0.02 THOUSAND/UL (ref 0–0.2)
IMM GRANULOCYTES NFR BLD AUTO: 1 % (ref 0–2)
LYMPHOCYTES # BLD AUTO: 1.01 THOUSANDS/ΜL (ref 0.6–4.47)
LYMPHOCYTES NFR BLD AUTO: 25 % (ref 14–44)
MCH RBC QN AUTO: 31.9 PG (ref 26.8–34.3)
MCHC RBC AUTO-ENTMCNC: 32.8 G/DL (ref 31.4–37.4)
MCV RBC AUTO: 97 FL (ref 82–98)
MONOCYTES # BLD AUTO: 0.39 THOUSAND/ΜL (ref 0.17–1.22)
MONOCYTES NFR BLD AUTO: 10 % (ref 4–12)
NEUTROPHILS # BLD AUTO: 2.28 THOUSANDS/ΜL (ref 1.85–7.62)
NEUTS SEG NFR BLD AUTO: 54 % (ref 43–75)
NRBC BLD AUTO-RTO: 0 /100 WBCS
PLATELET # BLD AUTO: 233 THOUSANDS/UL (ref 149–390)
PMV BLD AUTO: 10.5 FL (ref 8.9–12.7)
POTASSIUM SERPL-SCNC: 4.2 MMOL/L (ref 3.5–5.3)
RBC # BLD AUTO: 3.17 MILLION/UL (ref 3.81–5.12)
SODIUM SERPL-SCNC: 136 MMOL/L (ref 136–145)
WBC # BLD AUTO: 4.1 THOUSAND/UL (ref 4.31–10.16)

## 2021-01-16 PROCEDURE — 85025 COMPLETE CBC W/AUTO DIFF WBC: CPT | Performed by: STUDENT IN AN ORGANIZED HEALTH CARE EDUCATION/TRAINING PROGRAM

## 2021-01-16 PROCEDURE — 80048 BASIC METABOLIC PNL TOTAL CA: CPT | Performed by: STUDENT IN AN ORGANIZED HEALTH CARE EDUCATION/TRAINING PROGRAM

## 2021-01-16 PROCEDURE — 99239 HOSP IP/OBS DSCHRG MGMT >30: CPT | Performed by: STUDENT IN AN ORGANIZED HEALTH CARE EDUCATION/TRAINING PROGRAM

## 2021-01-16 RX ORDER — OXYCODONE HYDROCHLORIDE 5 MG/1
5 TABLET ORAL EVERY 6 HOURS PRN
Qty: 15 TABLET | Refills: 0 | Status: SHIPPED | OUTPATIENT
Start: 2021-01-16 | End: 2021-01-21

## 2021-01-16 RX ORDER — LEVOFLOXACIN 750 MG/1
750 TABLET ORAL EVERY 24 HOURS
Qty: 14 TABLET | Refills: 0 | Status: SHIPPED | OUTPATIENT
Start: 2021-01-16 | End: 2021-01-30

## 2021-01-16 RX ORDER — LEVOFLOXACIN 750 MG/1
750 TABLET ORAL EVERY 24 HOURS
Qty: 7 TABLET | Refills: 0 | Status: SHIPPED | OUTPATIENT
Start: 2021-01-16 | End: 2021-01-16 | Stop reason: SDUPTHER

## 2021-01-16 RX ADMIN — PANTOPRAZOLE SODIUM 40 MG: 40 TABLET, DELAYED RELEASE ORAL at 05:51

## 2021-01-16 RX ADMIN — CEFEPIME HYDROCHLORIDE 2000 MG: 2 INJECTION, POWDER, FOR SOLUTION INTRAVENOUS at 08:59

## 2021-01-16 RX ADMIN — CEFEPIME HYDROCHLORIDE 2000 MG: 2 INJECTION, POWDER, FOR SOLUTION INTRAVENOUS at 01:11

## 2021-01-16 RX ADMIN — KETOROLAC TROMETHAMINE 15 MG: 30 INJECTION, SOLUTION INTRAMUSCULAR at 01:58

## 2021-01-16 RX ADMIN — HEPARIN SODIUM 5000 UNITS: 5000 INJECTION INTRAVENOUS; SUBCUTANEOUS at 05:51

## 2021-01-16 RX ADMIN — MODAFINIL 100 MG: 100 TABLET ORAL at 05:51

## 2021-01-16 RX ADMIN — KETOROLAC TROMETHAMINE 15 MG: 30 INJECTION, SOLUTION INTRAMUSCULAR at 08:58

## 2021-01-16 NOTE — DISCHARGE SUMMARY
Discharge- Alfa Guadalupe 1960, 56 y o  female MRN: [id number]    Unit/Bed#: E5 -01 Encounter: [id number]    Primary Care Provider: Tammy Martínez MD   Date and time admitted to hospital: 1/13/2021  2:57 PM        * Sepsis [facility])  Assessment & Plan  This is a 57-year-old female with history of right ureteral stone status post ureteroscopy with failed retrieval blood with stent placement on 01/05/2021 presenting with fever and diffuse body aches  · Sepsis POA criteria met with fever, leukocytosis  · Secondary to pyelonephritis  · IV cefepime  · Urine cx growing Pseudomonas  · Urology consulted, recommending outpatient treatment with lithotripsy in 03/2021  · D/C with levaquin to complete abx course, follow up with urology  · Oxycodone prn given for several days    Pyelonephritis  Assessment & Plan  · CT scan reveals numerous areas of right renal cortical hypoenhancement suspicious for pyelonephritis, right nephroureteral stent remains in place, no hydronephrosis      Narcolepsy  Assessment & Plan  · Continue modafinil    Pure hypercholesterolemia  Assessment & Plan  · Continue statin    Fibromyalgia  Assessment & Plan  · Continue Cymbalta          Discharging Physician / Practitioner: Aurelia Simmons MD  PCP: Tammy Martínez MD  Admission Date:   Admission Orders (From admission, onward)     Ordered        01/13/21 1739  Inpatient Admission  Once                   Discharge Date: 01/16/21    Resolved Problems  Date Reviewed: 1/16/2021    None          Consultations During Hospital Stay:  · urology    Procedures Performed:   · none    Significant Findings / Test Results:   Ct Abdomen Pelvis With Contrast    Result Date: 1/13/2021  · Impression: Numerous areas of right renal cortical hypoenhancement suspicious for pyelonephritis  Right nephroureteral stent remains in place  No hydronephrosis  No ureteral calculi  The study was marked in [facility] for immediate notification   Workstation performed: [id number] Incidental Findings:   · none     Test Results Pending at Discharge (will require follow up):   · none     Outpatient Tests Requested:  · none    Complications:  none    Reason for Admission:  Pyelonephritis    Hospital Course: Isamar Marr is a 56 y o  female patient who originally presented to the hospital on 1/13/2021 due to generalized weakness and fatigue  CT imaging was done showing pyelonephritis of right kidney  She previously had a right nephro urethral stent placed  Urology was consulted and recommend medical management with continue antibiotics, with history of known renal calculus recommend outpatient treatment which is scheduled in March  At this time recommend current treatment with antibiotics for resolution  Urine culture grew Pseudomonas sensitive to fluoroquinolones  Patient was discharged with Levaquin to complete a follow-up with Urology outpatient  Please see above list of diagnoses and related plan for additional information  Condition at Discharge: good     Discharge Day Visit / Exam:     Subjective:  Patient seen today, no events overnight  Fevers have resolved  Discharge home today  Vitals: Blood Pressure: 97/53 (01/16/21 0818)  Pulse: 72 (01/16/21 0818)  Temperature: (!) 97 °F (36 1 °C) (01/16/21 0818)  Temp Source: Temporal (01/16/21 0818)  Respirations: 16 (01/16/21 0818)  Weight - Scale: 50 7 kg (111 lb 12 4 oz) (01/13/21 1500)  SpO2: 96 % (01/16/21 0818)  Exam:   Physical Exam  Vitals signs and nursing note reviewed  Constitutional:       Appearance: Normal appearance  She is normal weight  HENT:      Head: Normocephalic and atraumatic  Eyes:      Conjunctiva/sclera: Conjunctivae normal       Pupils: Pupils are equal, round, and reactive to light  Cardiovascular:      Rate and Rhythm: Normal rate  Pulses: Normal pulses  Pulmonary:      Breath sounds: No wheezing or rhonchi  Abdominal:      General: There is no distension  Tenderness:  There is no abdominal tenderness  Musculoskeletal: Normal range of motion  General: No swelling  Skin:     General: Skin is warm and dry  Neurological:      General: No focal deficit present  Mental Status: She is alert and oriented to person, place, and time  Discussion with Family: Patient    Discharge instructions/Information to patient and family:   See after visit summary for information provided to patient and family  Provisions for Follow-Up Care:  See after visit summary for information related to follow-up care and any pertinent home health orders  Disposition:     Home    For Discharges to [facility] SNF:   · Not Applicable to this Patient - Not Applicable to this Patient    Planned Readmission: None     Discharge Statement:  I spent 35 minutes discharging the patient  This time was spent on the day of discharge  I had direct contact with the patient on the day of discharge  Greater than 50% of the total time was spent examining patient, answering all patient questions, arranging and discussing plan of care with patient as well as directly providing post-discharge instructions  Additional time then spent on discharge activities  Discharge Medications:  See after visit summary for reconciled discharge medications provided to patient and family        ** Please Note: This note has been constructed using a voice recognition system **

## 2021-01-16 NOTE — PLAN OF CARE
Problem: Potential for Falls  Goal: Patient will remain free of falls  Description: INTERVENTIONS:  - Assess patient frequently for physical needs  -  Identify cognitive and physical deficits and behaviors that affect risk of falls    -  [place] fall precautions as indicated by assessment   - Educate patient/family on patient safety including physical limitations  - Instruct patient to call for assistance with activity based on assessment  - Modify environment to reduce risk of injury  - Consider OT/PT consult to assist with strengthening/mobility  Outcome: Progressing     Problem: PAIN - ADULT  Goal: Verbalizes/displays adequate comfort level or baseline comfort level  Description: Interventions:  - Encourage patient to monitor pain and request assistance  - Assess pain using appropriate pain scale  - Administer analgesics based on type and severity of pain and evaluate response  - Implement non-pharmacological measures as appropriate and evaluate response  - Consider cultural and social influences on pain and pain management  - Notify physician/advanced practitioner if interventions unsuccessful or patient reports new pain  Outcome: Progressing     Problem: INFECTION - ADULT  Goal: Absence or prevention of progression during hospitalization  Description: INTERVENTIONS:  - Assess and monitor for signs and symptoms of infection  - Monitor lab/diagnostic results  - Monitor all insertion sites, i e  indwelling lines, tubes, and drains  - Monitor endotracheal if appropriate and nasal secretions for changes in amount and color  - [place] appropriate cooling/warming therapies per order  - Administer medications as ordered  - Instruct and encourage patient and family to use good hand hygiene technique  - Identify and instruct in appropriate isolation precautions for identified infection/condition  Outcome: Progressing  Goal: Absence of fever/infection during neutropenic period  Description: INTERVENTIONS:  - Monitor WBC    Outcome: Progressing     Problem: SAFETY ADULT  Goal: Patient will remain free of falls  Description: INTERVENTIONS:  - Assess patient frequently for physical needs  -  Identify cognitive and physical deficits and behaviors that affect risk of falls    -  [place] fall precautions as indicated by assessment   - Educate patient/family on patient safety including physical limitations  - Instruct patient to call for assistance with activity based on assessment  - Modify environment to reduce risk of injury  - Consider OT/PT consult to assist with strengthening/mobility  Outcome: Progressing  Goal: Maintain or return to baseline ADL function  Description: INTERVENTIONS:  -  Assess patient's ability to carry out ADLs; assess patient's baseline for ADL function and identify physical deficits which impact ability to perform ADLs (bathing, care of mouth/teeth, toileting, grooming, dressing, etc )  - Assess/evaluate cause of self-care deficits   - Assess range of motion  - Assess patient's mobility; develop plan if impaired  - Assess patient's need for assistive devices and provide as appropriate  - Encourage maximum independence but intervene and supervise when necessary  - Involve family in performance of ADLs  - Assess for home care needs following discharge   - Consider OT consult to assist with ADL evaluation and planning for discharge  - Provide patient education as appropriate  Outcome: Progressing  Goal: Maintain or return mobility status to optimal level  Description: INTERVENTIONS:  - Assess patient's baseline mobility status (ambulation, transfers, stairs, etc )    - Identify cognitive and physical deficits and behaviors that affect mobility  - Identify mobility aids required to assist with transfers and/or ambulation (gait belt, sit-to-stand, lift, walker, cane, etc )  - [place] fall precautions as indicated by assessment  - Record patient progress and toleration of activity level on Mobility SBAR; progress patient to next Phase/Stage  - Instruct patient to call for assistance with activity based on assessment  - Consider rehabilitation consult to assist with strengthening/weightbearing, etc   Outcome: Progressing     Problem: DISCHARGE PLANNING  Goal: Discharge to home or other facility with appropriate resources  Description: INTERVENTIONS:  - Identify barriers to discharge w/patient and caregiver  - Arrange for needed discharge resources and transportation as appropriate  - Identify discharge learning needs (meds, wound care, etc )  - Arrange for interpretive services to assist at discharge as needed  - Refer to Case Management Department for coordinating discharge planning if the patient needs post-hospital services based on physician/advanced practitioner order or complex needs related to functional status, cognitive ability, or social support system  Outcome: Progressing     Problem: Knowledge Deficit  Goal: Patient/family/caregiver demonstrates understanding of disease process, treatment plan, medications, and discharge instructions  Description: Complete learning assessment and assess knowledge base    Interventions:  - Provide teaching at level of understanding  - Provide teaching via preferred learning methods  Outcome: Progressing     Problem: METABOLIC, FLUID AND ELECTROLYTES - ADULT  Goal: Electrolytes maintained within normal limits  Description: INTERVENTIONS:  - Monitor labs and assess patient for signs and symptoms of electrolyte imbalances  - Administer electrolyte replacement as ordered  - Monitor response to electrolyte replacements, including repeat lab results as appropriate  - Instruct patient on fluid and nutrition as appropriate  Outcome: Progressing  Goal: Fluid balance maintained  Description: INTERVENTIONS:  - Monitor labs   - Monitor I/O and WT  - Instruct patient on fluid and nutrition as appropriate  - Assess for signs & symptoms of volume excess or deficit  Outcome: Progressing  Goal: Glucose maintained within target range  Description: INTERVENTIONS:  - Monitor Blood Glucose as ordered  - Assess for signs and symptoms of hyperglycemia and hypoglycemia  - Administer ordered medications to maintain glucose within target range  - Assess nutritional intake and initiate nutrition service referral as needed  Outcome: Progressing

## 2021-01-16 NOTE — ASSESSMENT & PLAN NOTE
· CT scan reveals numerous areas of right renal cortical hypoenhancement suspicious for pyelonephritis, right nephroureteral stent remains in place, no hydronephrosis

## 2021-01-16 NOTE — ASSESSMENT & PLAN NOTE
This is a 57-year-old female with history of right ureteral stone status post ureteroscopy with failed retrieval blood with stent placement on 01/05/2021 presenting with fever and diffuse body aches      · Sepsis POA criteria met with fever, leukocytosis  · Secondary to pyelonephritis  · IV cefepime  · Urine cx growing Pseudomonas  · Urology consulted, recommending outpatient treatment with lithotripsy in 03/2021  · D/C with levaquin to complete abx course, follow up with urology  · Oxycodone prn given for several days

## 2021-01-18 ENCOUNTER — TRANSITIONAL CARE MANAGEMENT (OUTPATIENT)
Dept: FAMILY MEDICINE CLINIC | Facility: CLINIC | Age: 61
End: 2021-01-18

## 2021-01-18 LAB — BACTERIA BLD CULT: NORMAL

## 2021-01-21 ENCOUNTER — OFFICE VISIT (OUTPATIENT)
Dept: FAMILY MEDICINE CLINIC | Facility: CLINIC | Age: 61
End: 2021-01-21
Payer: COMMERCIAL

## 2021-01-21 VITALS
RESPIRATION RATE: 18 BRPM | BODY MASS INDEX: 19.12 KG/M2 | WEIGHT: 112 LBS | TEMPERATURE: 98 F | OXYGEN SATURATION: 98 % | HEART RATE: 92 BPM | DIASTOLIC BLOOD PRESSURE: 62 MMHG | SYSTOLIC BLOOD PRESSURE: 98 MMHG | HEIGHT: 64 IN

## 2021-01-21 DIAGNOSIS — A41.52 SEPSIS DUE TO PSEUDOMONAS SPECIES WITHOUT ACUTE ORGAN DYSFUNCTION (HCC): Primary | Chronic | ICD-10-CM

## 2021-01-21 DIAGNOSIS — E87.1 HYPONATREMIA: ICD-10-CM

## 2021-01-21 DIAGNOSIS — E88.09 SERUM ALBUMIN DECREASED: ICD-10-CM

## 2021-01-21 DIAGNOSIS — N12 PYELONEPHRITIS: ICD-10-CM

## 2021-01-21 DIAGNOSIS — Z12.4 SCREENING FOR CERVICAL CANCER: ICD-10-CM

## 2021-01-21 DIAGNOSIS — J98.11 ATELECTASIS OF BOTH LUNGS: ICD-10-CM

## 2021-01-21 DIAGNOSIS — R79.0 LOW MAGNESIUM LEVEL: ICD-10-CM

## 2021-01-21 DIAGNOSIS — N20.0 RIGHT KIDNEY STONE: ICD-10-CM

## 2021-01-21 DIAGNOSIS — E83.51 HYPOCALCEMIA: ICD-10-CM

## 2021-01-21 DIAGNOSIS — D72.819 LEUKOPENIA, UNSPECIFIED TYPE: ICD-10-CM

## 2021-01-21 DIAGNOSIS — R74.8 ABNORMAL SERUM LEVEL OF LIPASE: ICD-10-CM

## 2021-01-21 DIAGNOSIS — D64.9 ANEMIA, UNSPECIFIED TYPE: ICD-10-CM

## 2021-01-21 DIAGNOSIS — E87.6 HYPOKALEMIA: ICD-10-CM

## 2021-01-21 PROCEDURE — 3008F BODY MASS INDEX DOCD: CPT | Performed by: FAMILY MEDICINE

## 2021-01-21 PROCEDURE — 3074F SYST BP LT 130 MM HG: CPT | Performed by: FAMILY MEDICINE

## 2021-01-21 PROCEDURE — 99215 OFFICE O/P EST HI 40 MIN: CPT | Performed by: FAMILY MEDICINE

## 2021-01-21 PROCEDURE — 4004F PT TOBACCO SCREEN RCVD TLK: CPT | Performed by: FAMILY MEDICINE

## 2021-01-21 PROCEDURE — 3078F DIAST BP <80 MM HG: CPT | Performed by: FAMILY MEDICINE

## 2021-01-21 PROCEDURE — 1111F DSCHRG MED/CURRENT MED MERGE: CPT | Performed by: FAMILY MEDICINE

## 2021-01-21 NOTE — PROGRESS NOTES
Assessment/Plan:     No problem-specific Assessment & Plan notes found for this encounter  Diagnoses and all orders for this visit:    Sepsis due to Pseudomonas species without acute organ dysfunction ([address] )  Comments:  Symptoms resolved    Pyelonephritis  Comments:  Symptoms improved  Continue antibiotic  To follow with Urology  Orders:  -     Urine culture; Future  -     UA (URINE) with reflex to Scope  -     Comprehensive metabolic panel; Future    Hyponatremia  Comments:  Resolved  Orders:  -     Comprehensive metabolic panel; Future    Leukopenia, unspecified type  -     CBC and differential; Future  -     Urine culture; Future  -     Comprehensive metabolic panel; Future    Anemia, unspecified type  -     CBC and differential; Future  -     Comprehensive metabolic panel; Future    Screening for cervical cancer    Serum albumin decreased  -     Comprehensive metabolic panel; Future    Hypokalemia  Comments:  resolved  Orders:  -     Comprehensive metabolic panel; Future    Low magnesium level  Comments:  Check magnesium level  Order exit    Abnormal serum level of lipase  Comments:  Low lipase    Atelectasis of both lungs  Comments:  advise to take deep breat few times a day    Hypocalcemia  Comments:  resolved   Orders:  -     Comprehensive metabolic panel; Future    Right kidney stone  Comments:  pt will be following with urology  Orders:  -     UA (URINE) with reflex to Scope    Other orders  -     Cancel: Ambulatory referral to Gynecology; Future         Subjective:     Patient ID: Zbigniew Childers is a 56 y o  female  HPI   Post admission on January 13, 2021  Patient stated that when she wake up in the morning, she had bodyacheall over  Dysuria  her temperature was 103, she was delirious  She went to she was diagnosed with pyelonephritis and sepsis  Still taking Levaquin her symptoms has improved  Patient stated she still feel tired if she does things around the house    She was expected to go back to work on Monday but she is not sure if she can  She is going to follow with Urology    Sepsis  Denied fever or chills  Urinary tract infection   pt is taking levaquin, patient stated she had to finish course of 2 weeks  Patient denied the fever chills flank pain urinary frequency ache, dysuria or hematuria  Renal stone  Patient stated she was scheduled for lithotripsy in March  Denied flank or hematuria  Narcolepsy , doing good  Denied falling sleep  Abnormal chest x-ray  Denied chest pain, cough or hemoptysis  Denied shortness of breath  Discharge summary for admission 1/13-1/16 /21noted    CT scan of the abdomen and pelvis and labs done with last admission reviewed results with memory  Also she had Labs  Done January 6, 2021 and  December 21, 2020  Chest X ray  Or reviewed and discussed result with patient    Review of Systems   Constitutional: Negative for activity change, appetite change, chills, fatigue, fever and unexpected weight change  HENT: Negative for congestion, ear discharge, ear pain, hearing loss, nosebleeds, rhinorrhea, sinus pressure, sore throat, tinnitus, trouble swallowing and voice change  Eyes: Negative for photophobia, pain and visual disturbance  Respiratory: Negative for cough, chest tightness, shortness of breath and wheezing  Cardiovascular: Negative for chest pain, palpitations and leg swelling  Gastrointestinal: Negative for abdominal pain, anal bleeding, blood in stool, constipation, diarrhea, nausea and vomiting  Endocrine: Negative for cold intolerance, heat intolerance, polydipsia and polyuria  Genitourinary: Negative for dysuria, frequency, hematuria and urgency  Musculoskeletal: Negative for arthralgias, back pain, gait problem, joint swelling, myalgias and neck pain  Skin: Negative for rash  Neurological: Negative for dizziness, tremors, seizures, syncope, weakness, light-headedness and headaches     Hematological: Negative for adenopathy  Does not bruise/bleed easily  Psychiatric/Behavioral: Negative for agitation, behavioral problems, confusion, dysphoric mood, hallucinations and sleep disturbance  The patient is not nervous/anxious  Objective:     Physical Exam  Constitutional:       General: She is not in acute distress  Appearance: Normal appearance  She is well-developed  She is not ill-appearing, toxic-appearing or diaphoretic  HENT:      Head: Normocephalic  Eyes:      General: No scleral icterus  Neck:      Musculoskeletal: Normal range of motion and neck supple  Thyroid: No thyromegaly  Cardiovascular:      Rate and Rhythm: Normal rate and regular rhythm  Heart sounds: No murmur  Pulmonary:      Effort: Pulmonary effort is normal       Breath sounds: Normal breath sounds  Abdominal:      General: Abdomen is flat  Bowel sounds are normal  There is no distension  Palpations: Abdomen is soft  There is no mass  Tenderness: There is no abdominal tenderness  There is no right CVA tenderness, left CVA tenderness or rebound  Musculoskeletal:         General: No tenderness  Lymphadenopathy:      Cervical: No cervical adenopathy  Skin:     Coloration: Skin is not pale  Findings: No erythema or rash  Neurological:      General: No focal deficit present  Mental Status: She is alert and oriented to person, place, and time  Cranial Nerves: No cranial nerve deficit  Psychiatric:         Mood and Affect: Mood normal          Behavior: Behavior normal          Thought Content: Thought content normal          Judgment: Judgment normal            Vitals:    01/21/21 1111   BP: 98/62   Pulse: 92   Resp: 18   Temp: 98 °F (36 7 °C)   SpO2: 98%   Weight: 50 8 kg (112 lb)   Height: 5' 4" (1 626 m)       Transitional Care Management Review: Nette Tesfaye is a 56 y o  female here for TCM follow up       During the TCM phone call patient stated:    TCM Call (since 12/22/2020)     Date and time call was made  1/18/2021  4:09 PM    Hospital care reviewed  Records reviewed    Patient [facility] hospitial[facility]        Date of Admission  01/13/21    Date of discharge  01/16/21    Diagnosis  Sepsis    Disposition  Home    Were the patients medications reviewed and updated  Yes    Current Symptoms  Leg pain - left side; Leg pain - right side; Fatigue    Lower abdominal pain severity  Moderate    Lower abdominal pain onset  Ongoing; Unable to assess    Left side leg pain severity  Mild    Back pain, left side, severity  Moderate    Back pain, left side, onset  Ongoing; Unable to assess    Upper abdominal pain severity  Moderate    Upper abdominal pain onset  Ongoing; Unable to assess    Neausea severity  Moderate    Middle abdominal pain severity  Moderate    Middle abdominal pain onset  Ongoing; Unable to assess    Back pain, right side, onset  Ongoing; Unable to assess      TCM Call (since 12/22/2020)     Post hospital issues  None    Should patient be enrolled in anticoag monitoring? No    Scheduled for follow up?   Yes    Patients specialists  Urologist    Urologist name  Dr Sierra Killian - patient has surgery scheduled on 1/5/2020    Did you obtain your prescribed medications  Yes    Do you need help managing your prescriptions or medications  No    Is transportation to your appointment needed  No    I have advised the patient to call PCP with any new or worsening symptoms  Ana Franklin, CAM    Living Arrangements  Spouse or Significiant other    Support System  Spouse    The type of support provided  None    Are you recieving any outpatient services  No    Are you recieving home care services  No    Are you using any community resources  No    Current waiver services  No    Have you fallen in the last 12 months  No    Interperter language line needed  No    Counseling  Patient    Comments  TCM--1/13/2021-1/16/2021--1/27/2021-Sepsis          Mary Nieto MD

## 2021-01-22 ENCOUNTER — TELEPHONE (OUTPATIENT)
Dept: FAMILY MEDICINE CLINIC | Facility: CLINIC | Age: 61
End: 2021-01-22

## 2021-01-22 NOTE — TELEPHONE ENCOUNTER
Richelle Walker is calling to see if she would qualify for the Covid vaccine and if so, do you think it is ok for her to receive it considering her medical history  Please advise

## 2021-01-25 ENCOUNTER — TELEPHONE (OUTPATIENT)
Dept: SLEEP CENTER | Facility: CLINIC | Age: 61
End: 2021-01-25

## 2021-01-25 NOTE — TELEPHONE ENCOUNTER
----- Message from [facility] TANVI  Nelson sent at 1/22/2021  5:59 PM EST -----  Regarding: Non-Urgent Medical Question  Contact: [phone]  First, this message is for Dr Nehal Bonds, as Dr Ewa Zaman is not my treating physician  I submitted my paperwork to restore my 's license for your completion some time ago  I have not had any indication that this paperwork was completed and mailed back to [facility]  Please check that this paperwork was actually received and not lost in the shuffle of paperwork  I have waited a long time for this to get reinstated  Your prompt attention would be greatly appreciated

## 2021-01-26 NOTE — TELEPHONE ENCOUNTER
To my knowledge only contraindication if patient had severe reaction to the vaccine or immediate reaction

## 2021-01-26 NOTE — TELEPHONE ENCOUNTER
Patient aware of providers message  Patient will be getting her first vaccine through [employer] [employer] [facility] today

## 2021-01-29 NOTE — TELEPHONE ENCOUNTER
Spoke to patient and advised her that Dr Rafaela Toney does not have forms  She states that she mailed them to Dr Matthieu Buckley private office in [facility] as directed by Dr Rafaela Toney  She is upset as she said it took her almost 6 months to get the papers and they have to be the original forms  She now has to contact [facility] to get new forms  Advised patient to call private office to inquire  Patient states she has the phone number and will call

## 2021-02-10 ENCOUNTER — LAB (OUTPATIENT)
Dept: LAB | Facility: CLINIC | Age: 61
End: 2021-02-10
Payer: COMMERCIAL

## 2021-02-10 DIAGNOSIS — E87.1 HYPONATREMIA: ICD-10-CM

## 2021-02-10 DIAGNOSIS — E88.09 SERUM ALBUMIN DECREASED: ICD-10-CM

## 2021-02-10 DIAGNOSIS — D72.819 LEUKOPENIA, UNSPECIFIED TYPE: ICD-10-CM

## 2021-02-10 DIAGNOSIS — N12 PYELONEPHRITIS: ICD-10-CM

## 2021-02-10 DIAGNOSIS — D64.9 ANEMIA, UNSPECIFIED TYPE: ICD-10-CM

## 2021-02-10 DIAGNOSIS — E83.51 HYPOCALCEMIA: ICD-10-CM

## 2021-02-10 DIAGNOSIS — E87.6 HYPOKALEMIA: ICD-10-CM

## 2021-02-10 LAB
ALBUMIN SERPL BCP-MCNC: 3.5 G/DL (ref 3.5–5)
ALP SERPL-CCNC: 91 U/L (ref 46–116)
ALT SERPL W P-5'-P-CCNC: 17 U/L (ref 12–78)
ANION GAP SERPL CALCULATED.3IONS-SCNC: 7 MMOL/L (ref 4–13)
AST SERPL W P-5'-P-CCNC: 9 U/L (ref 5–45)
BACTERIA UR QL AUTO: ABNORMAL /HPF
BASOPHILS # BLD AUTO: 0.05 THOUSANDS/ΜL (ref 0–0.1)
BASOPHILS NFR BLD AUTO: 1 % (ref 0–1)
BILIRUB SERPL-MCNC: 0.39 MG/DL (ref 0.2–1)
BILIRUB UR QL STRIP: NEGATIVE
BUN SERPL-MCNC: 16 MG/DL (ref 5–25)
CALCIUM SERPL-MCNC: 10 MG/DL (ref 8.3–10.1)
CAOX CRY URNS QL MICRO: ABNORMAL /HPF
CHLORIDE SERPL-SCNC: 106 MMOL/L (ref 100–108)
CLARITY UR: ABNORMAL
CO2 SERPL-SCNC: 27 MMOL/L (ref 21–32)
COLOR UR: ABNORMAL
CREAT SERPL-MCNC: 0.84 MG/DL (ref 0.6–1.3)
EOSINOPHIL # BLD AUTO: 0.53 THOUSAND/ΜL (ref 0–0.61)
EOSINOPHIL NFR BLD AUTO: 8 % (ref 0–6)
ERYTHROCYTE [DISTWIDTH] IN BLOOD BY AUTOMATED COUNT: 13.9 % (ref 11.6–15.1)
GFR SERPL CREATININE-BSD FRML MDRD: 75 ML/MIN/1.73SQ M
GLUCOSE SERPL-MCNC: 84 MG/DL (ref 65–140)
GLUCOSE UR STRIP-MCNC: NEGATIVE MG/DL
HCT VFR BLD AUTO: 31.7 % (ref 34.8–46.1)
HGB BLD-MCNC: 10 G/DL (ref 11.5–15.4)
HGB UR QL STRIP.AUTO: ABNORMAL
HYALINE CASTS #/AREA URNS LPF: ABNORMAL /LPF
IMM GRANULOCYTES # BLD AUTO: 0.03 THOUSAND/UL (ref 0–0.2)
IMM GRANULOCYTES NFR BLD AUTO: 1 % (ref 0–2)
KETONES UR STRIP-MCNC: ABNORMAL MG/DL
LEUKOCYTE ESTERASE UR QL STRIP: ABNORMAL
LYMPHOCYTES # BLD AUTO: 1.41 THOUSANDS/ΜL (ref 0.6–4.47)
LYMPHOCYTES NFR BLD AUTO: 22 % (ref 14–44)
MCH RBC QN AUTO: 30.6 PG (ref 26.8–34.3)
MCHC RBC AUTO-ENTMCNC: 31.5 G/DL (ref 31.4–37.4)
MCV RBC AUTO: 97 FL (ref 82–98)
MONOCYTES # BLD AUTO: 0.33 THOUSAND/ΜL (ref 0.17–1.22)
MONOCYTES NFR BLD AUTO: 5 % (ref 4–12)
NEUTROPHILS # BLD AUTO: 4.01 THOUSANDS/ΜL (ref 1.85–7.62)
NEUTS SEG NFR BLD AUTO: 63 % (ref 43–75)
NITRITE UR QL STRIP: NEGATIVE
NON-SQ EPI CELLS URNS QL MICRO: ABNORMAL /HPF
NRBC BLD AUTO-RTO: 0 /100 WBCS
PH UR STRIP.AUTO: 6 [PH]
PLATELET # BLD AUTO: 254 THOUSANDS/UL (ref 149–390)
PMV BLD AUTO: 10.7 FL (ref 8.9–12.7)
POTASSIUM SERPL-SCNC: 4.3 MMOL/L (ref 3.5–5.3)
PROT SERPL-MCNC: 7 G/DL (ref 6.4–8.2)
PROT UR STRIP-MCNC: ABNORMAL MG/DL
RBC # BLD AUTO: 3.27 MILLION/UL (ref 3.81–5.12)
RBC #/AREA URNS AUTO: ABNORMAL /HPF
SODIUM SERPL-SCNC: 140 MMOL/L (ref 136–145)
SP GR UR STRIP.AUTO: 1.03 (ref 1–1.03)
UROBILINOGEN UR QL STRIP.AUTO: 1 E.U./DL
WBC # BLD AUTO: 6.36 THOUSAND/UL (ref 4.31–10.16)
WBC #/AREA URNS AUTO: ABNORMAL /HPF

## 2021-02-10 PROCEDURE — 85025 COMPLETE CBC W/AUTO DIFF WBC: CPT

## 2021-02-10 PROCEDURE — 36415 COLL VENOUS BLD VENIPUNCTURE: CPT

## 2021-02-10 PROCEDURE — 81001 URINALYSIS AUTO W/SCOPE: CPT | Performed by: FAMILY MEDICINE

## 2021-02-10 PROCEDURE — 80053 COMPREHEN METABOLIC PANEL: CPT

## 2021-02-11 ENCOUNTER — TELEPHONE (OUTPATIENT)
Dept: FAMILY MEDICINE CLINIC | Facility: CLINIC | Age: 61
End: 2021-02-11

## 2021-02-11 ENCOUNTER — TELEPHONE (OUTPATIENT)
Dept: UROLOGY | Facility: MEDICAL CENTER | Age: 61
End: 2021-02-11

## 2021-02-11 DIAGNOSIS — N39.0 URINARY TRACT INFECTION WITHOUT HEMATURIA, SITE UNSPECIFIED: Primary | ICD-10-CM

## 2021-02-11 DIAGNOSIS — N20.0 RENAL CALCULUS: ICD-10-CM

## 2021-02-11 DIAGNOSIS — D64.9 ANEMIA, UNSPECIFIED TYPE: Primary | ICD-10-CM

## 2021-02-11 NOTE — TELEPHONE ENCOUNTER
----- Message from Rachel Glover RN sent at 2/11/2021 11:25 AM EST -----  Regarding: FW: Test Results Question  Contact: [phone]    ----- Message -----  From: Mahesh Pradhan  Sent: 2/11/2021   9:46 AM EST  To: [facility]  Subject: Test Results Question                            DR Saray Hinkle BLOOD AND URINE TESTS AS PART OF MY HOSPITAL TRANSITION FOLLOWING TREATMENT FOR SEPSIS  I JUST HAD THE TESTS DONE AND THERE ARE RED FLAGS ALL OVER THEM  I HAVE NOT BEEN FEELING WELL AGAIN  (NAUSEA, FLANK AND BACK PAIN, CRAMPING WHILE URINATION, AND LITTLE TO NO BLADDER CONTROL)    I AM SCHEDULED FOR MY SHOCK WAVE PROCEDURE NEXT MONTH  I DO NOT WANT IT TO BE DELAYED BECAUSE OF FURTHER INFECTION  PLEASE REVIEW THE TEST RESULTS AND PLEASE ADVISE   Jeanne Beltre

## 2021-02-11 NOTE — TELEPHONE ENCOUNTER
Urinalysis was performed but unfortunately there was not a urine culture ordered  I did add on a urine culture to the urine test that is in the lab  Will need to await results before deciding upon antibiotic therapy

## 2021-02-12 DIAGNOSIS — N39.0 URINARY TRACT INFECTION WITHOUT HEMATURIA, SITE UNSPECIFIED: Primary | ICD-10-CM

## 2021-02-12 RX ORDER — CIPROFLOXACIN 500 MG/1
500 TABLET, FILM COATED ORAL EVERY 12 HOURS SCHEDULED
Qty: 14 TABLET | Refills: 0 | Status: SHIPPED | OUTPATIENT
Start: 2021-02-12 | End: 2021-02-19

## 2021-02-15 ENCOUNTER — TELEPHONE (OUTPATIENT)
Dept: UROLOGY | Facility: CLINIC | Age: 61
End: 2021-02-15

## 2021-02-15 NOTE — TELEPHONE ENCOUNTER
Patient called because she got a call from the pharmacy that there was a medication to be picked up that was from us  She said she didn't know anything about this being done and wants to know what its treating for   She wants a call back today about this

## 2021-02-15 NOTE — TELEPHONE ENCOUNTER
Call placed to patient she is now made aware that Dr Subramanian sent over antibiotic due to her abnormal urine results

## 2021-02-19 ENCOUNTER — PATIENT MESSAGE (OUTPATIENT)
Dept: UROLOGY | Facility: MEDICAL CENTER | Age: 61
End: 2021-02-19

## 2021-02-19 ENCOUNTER — TELEPHONE (OUTPATIENT)
Dept: UROLOGY | Facility: HOSPITAL | Age: 61
End: 2021-02-19

## 2021-02-19 NOTE — TELEPHONE ENCOUNTER
I spoke to patient, discussed her symptoms  Hard to know if these symptoms are a side effect of Cipro  We agreed to stop the Cipro and see if she does  Unfortunate no other oral antibiotics will work against the Pseudomonas in her urine  Regarding: Prescription Question  Contact: [phone]  ----- Message from Tg Breen MA sent at 2/19/2021 10:18 AM EST -----       ----- Message from Trine Sicard to Song Alvarado MD sent at 2/19/2021  6:10 AM -----   I am having some physical issues which I think may be related to my current prescription of Ciprofloxacin  (based on the medication guide from the pharmacy)  This includes:     Swelling of both hands  Numbness on my entire left side from my foot to my face  (No indicators of stroke)  Pins and needles in my left hand and foot  Severe weakness and pain in both hands, worse on my left side  Unusual pain in left shoulder and left hip  I also need to mention that I take 90 mg of Duloxetine nightly to control my Fibromyalgia and back pain and also have a history of prolonged QT interval      I only started this prescription on Tuesday evening since I was never notified that it had been called in to the pharmacy  Please advise if I should continue this prescription

## 2021-02-25 ENCOUNTER — LAB (OUTPATIENT)
Dept: LAB | Facility: MEDICAL CENTER | Age: 61
End: 2021-02-25
Payer: COMMERCIAL

## 2021-02-25 DIAGNOSIS — N20.0 RENAL CALCULUS: ICD-10-CM

## 2021-02-25 DIAGNOSIS — D64.9 ANEMIA, UNSPECIFIED TYPE: ICD-10-CM

## 2021-02-25 DIAGNOSIS — N39.0 URINARY TRACT INFECTION WITHOUT HEMATURIA, SITE UNSPECIFIED: ICD-10-CM

## 2021-02-25 LAB
RETICS # AUTO: NORMAL 10*3/UL (ref 14097–95744)
RETICS # CALC: 1.53 % (ref 0.37–1.87)

## 2021-02-25 PROCEDURE — 87077 CULTURE AEROBIC IDENTIFY: CPT

## 2021-02-25 PROCEDURE — 87086 URINE CULTURE/COLONY COUNT: CPT

## 2021-02-25 PROCEDURE — 36415 COLL VENOUS BLD VENIPUNCTURE: CPT

## 2021-02-25 PROCEDURE — 84166 PROTEIN E-PHORESIS/URINE/CSF: CPT | Performed by: PATHOLOGY

## 2021-02-25 PROCEDURE — 85045 AUTOMATED RETICULOCYTE COUNT: CPT

## 2021-02-25 PROCEDURE — 84165 PROTEIN E-PHORESIS SERUM: CPT

## 2021-02-25 PROCEDURE — 84166 PROTEIN E-PHORESIS/URINE/CSF: CPT

## 2021-02-25 PROCEDURE — 83010 ASSAY OF HAPTOGLOBIN QUANT: CPT

## 2021-02-25 PROCEDURE — 87186 SC STD MICRODIL/AGAR DIL: CPT

## 2021-02-25 PROCEDURE — 84165 PROTEIN E-PHORESIS SERUM: CPT | Performed by: PATHOLOGY

## 2021-02-26 LAB
ALBUMIN SERPL ELPH-MCNC: 4.2 G/DL (ref 3.5–5)
ALBUMIN SERPL ELPH-MCNC: 58.3 % (ref 52–65)
ALBUMIN UR ELPH-MCNC: 100 %
ALPHA1 GLOB MFR UR ELPH: 0 %
ALPHA1 GLOB SERPL ELPH-MCNC: 0.73 G/DL (ref 0.1–0.4)
ALPHA1 GLOB SERPL ELPH-MCNC: 10.2 % (ref 2.5–5)
ALPHA2 GLOB MFR UR ELPH: 0 %
ALPHA2 GLOB SERPL ELPH-MCNC: 0.89 G/DL (ref 0.4–1.2)
ALPHA2 GLOB SERPL ELPH-MCNC: 12.4 % (ref 7–13)
B-GLOBULIN MFR UR ELPH: 0 %
BETA GLOB ABNORMAL SERPL ELPH-MCNC: 0.42 G/DL (ref 0.4–0.8)
BETA1 GLOB SERPL ELPH-MCNC: 5.9 % (ref 5–13)
BETA2 GLOB SERPL ELPH-MCNC: 4.4 % (ref 2–8)
BETA2+GAMMA GLOB SERPL ELPH-MCNC: 0.32 G/DL (ref 0.2–0.5)
GAMMA GLOB ABNORMAL SERPL ELPH-MCNC: 0.63 G/DL (ref 0.5–1.6)
GAMMA GLOB MFR UR ELPH: 0 %
GAMMA GLOB SERPL ELPH-MCNC: 8.8 % (ref 12–22)
HAPTOGLOB SERPL-MCNC: 210 MG/DL (ref 37–355)
IGG/ALB SER: 1.4 {RATIO} (ref 1.1–1.8)
PROT PATTERN SERPL ELPH-IMP: ABNORMAL
PROT PATTERN UR ELPH-IMP: ABNORMAL
PROT SERPL-MCNC: 7.2 G/DL (ref 6.4–8.2)
PROT UR-MCNC: 33 MG/DL

## 2021-02-27 LAB
BACTERIA UR CULT: ABNORMAL
BACTERIA UR CULT: ABNORMAL

## 2021-03-01 ENCOUNTER — TELEPHONE (OUTPATIENT)
Dept: UROLOGY | Facility: AMBULATORY SURGERY CENTER | Age: 61
End: 2021-03-01

## 2021-03-01 DIAGNOSIS — N39.0 URINARY TRACT INFECTION WITHOUT HEMATURIA, SITE UNSPECIFIED: Primary | ICD-10-CM

## 2021-03-01 RX ORDER — NITROFURANTOIN 25; 75 MG/1; MG/1
100 CAPSULE ORAL 2 TIMES DAILY
Qty: 7 CAPSULE | Refills: 0 | Status: SHIPPED | OUTPATIENT
Start: 2021-03-01 | End: 2021-03-16

## 2021-03-01 NOTE — TELEPHONE ENCOUNTER
Andreia Boyd from [facility] calling to question script for Macrobid from today  Please call [phone]  Thank you!

## 2021-03-04 ENCOUNTER — PATIENT MESSAGE (OUTPATIENT)
Dept: UROLOGY | Facility: MEDICAL CENTER | Age: 61
End: 2021-03-04

## 2021-03-04 ENCOUNTER — TELEPHONE (OUTPATIENT)
Dept: FAMILY MEDICINE CLINIC | Facility: CLINIC | Age: 61
End: 2021-03-04

## 2021-03-04 DIAGNOSIS — N39.41 URGE INCONTINENCE: Primary | ICD-10-CM

## 2021-03-04 DIAGNOSIS — R39.15 URGENCY OF URINATION: ICD-10-CM

## 2021-03-04 NOTE — TELEPHONE ENCOUNTER
UA and Urine culture is positive to follow with Urology  Labs are remarkable for protein in the urine   Hemoccult    Not done  To consider 24 hour urine for protein 1 urine infection cleares up  Recheck CBC to see anemia is improving

## 2021-03-04 NOTE — TELEPHONE ENCOUNTER
Patient called regarding her test results  Patient states that they are all red flagged   Please advise

## 2021-03-05 RX ORDER — SOLIFENACIN SUCCINATE 10 MG/1
10 TABLET, FILM COATED ORAL DAILY
Qty: 90 TABLET | Refills: 3 | Status: SHIPPED | OUTPATIENT
Start: 2021-03-05 | End: 2021-03-16

## 2021-03-05 RX ORDER — SOLIFENACIN SUCCINATE 10 MG/1
10 TABLET, FILM COATED ORAL DAILY
Qty: 30 TABLET | Refills: 0 | Status: SHIPPED | OUTPATIENT
Start: 2021-03-05 | End: 2021-04-09 | Stop reason: SDUPTHER

## 2021-03-05 NOTE — TELEPHONE ENCOUNTER
I reached out to the patient to discuss further  She will be out of medication in TWO DAYS  The Solifenacin has worked much better than the Oxybutynin she took in the past   However, she is not sure if she is to continue this moving forward  Next visit with Dr Manuel Benites is scheduled in December, 2021  If continuing, she would need a 30 day supply to [facility] and a 90 days supply to [facility] order pharmacy    Message forwarded to the Advanced Practitioner covering the [facility] location for further determination and/or approval

## 2021-03-09 NOTE — TELEPHONE ENCOUNTER
Called patient regarding test results  Patient will discuss everything with Dr Mignon Sales and see what other blood work she might want done, patient will be going for other blood work for surgery so she can have it all done at one time

## 2021-03-10 DIAGNOSIS — Z23 ENCOUNTER FOR IMMUNIZATION: ICD-10-CM

## 2021-03-11 ENCOUNTER — OFFICE VISIT (OUTPATIENT)
Dept: FAMILY MEDICINE CLINIC | Facility: CLINIC | Age: 61
End: 2021-03-11
Payer: COMMERCIAL

## 2021-03-11 VITALS
WEIGHT: 114.6 LBS | TEMPERATURE: 97.4 F | DIASTOLIC BLOOD PRESSURE: 72 MMHG | OXYGEN SATURATION: 97 % | SYSTOLIC BLOOD PRESSURE: 122 MMHG | HEIGHT: 64 IN | HEART RATE: 76 BPM | BODY MASS INDEX: 19.56 KG/M2

## 2021-03-11 DIAGNOSIS — G47.11 IDIOPATHIC HYPERSOMNIA: ICD-10-CM

## 2021-03-11 DIAGNOSIS — E87.1 HYPONATREMIA: ICD-10-CM

## 2021-03-11 DIAGNOSIS — R80.9 PROTEINURIA, UNSPECIFIED TYPE: ICD-10-CM

## 2021-03-11 DIAGNOSIS — R79.0 LOW MAGNESIUM LEVEL: Primary | ICD-10-CM

## 2021-03-11 DIAGNOSIS — E83.51 HYPOCALCEMIA: ICD-10-CM

## 2021-03-11 DIAGNOSIS — E87.6 HYPOKALEMIA: ICD-10-CM

## 2021-03-11 DIAGNOSIS — F17.200 SMOKING: ICD-10-CM

## 2021-03-11 DIAGNOSIS — N20.0 RIGHT KIDNEY STONE: ICD-10-CM

## 2021-03-11 DIAGNOSIS — G47.419 NARCOLEPSY WITHOUT CATAPLEXY: ICD-10-CM

## 2021-03-11 DIAGNOSIS — R94.4 DECREASED GFR: ICD-10-CM

## 2021-03-11 DIAGNOSIS — N39.0 URINARY TRACT INFECTION WITHOUT HEMATURIA, SITE UNSPECIFIED: ICD-10-CM

## 2021-03-11 DIAGNOSIS — D72.819 LEUKOPENIA, UNSPECIFIED TYPE: ICD-10-CM

## 2021-03-11 DIAGNOSIS — D64.9 ANEMIA, UNSPECIFIED TYPE: ICD-10-CM

## 2021-03-11 PROCEDURE — 3008F BODY MASS INDEX DOCD: CPT | Performed by: FAMILY MEDICINE

## 2021-03-11 PROCEDURE — 99214 OFFICE O/P EST MOD 30 MIN: CPT | Performed by: FAMILY MEDICINE

## 2021-03-11 PROCEDURE — 4004F PT TOBACCO SCREEN RCVD TLK: CPT | Performed by: FAMILY MEDICINE

## 2021-03-11 NOTE — PROGRESS NOTES
Assessment/Plan:       No problem-specific Assessment & Plan notes found for this encounter  Diagnoses and all orders for this visit:    Low magnesium level  -     Magnesium; Future    Anemia, unspecified type  Comments:  check hemoccults , order exist   Orders:  -     Ambulatory referral to Hematology / Oncology; Future    Leukopenia, unspecified type  Comments:  resolved    Hyponatremia  Comments:  resolved     Hypokalemia  Comments:  corrected    Urinary tract infection without hematuria, site unspecified  Comments:  following with urology  Orders:  -     Urine culture; Future  -     UA (URINE) with reflex to Scope  -     Urine Microscopic    Right kidney stone  Comments:  following with urology  Proteinuria, unspecified type  Comments:  Recheck urine for protein after urinary tract infection results    Hypocalcemia  Comments:  resolved    Decreased GFR  Comments:  increase fluid intake  Orders:  -     Basic metabolic panel; Future    Smoking  Comments:  refused  screening CT scan of chest   advise to stop smoking     Narcolepsy without cataplexy    Idiopathic hypersomnia  Comments: [facility]    Patient Instructions   To follow up with test results      Orders Placed This Encounter   Procedures    Urine culture     Standing Status:   Future     Number of Occurrences:   1     Standing Expiration Date:   3/11/2022    UA (URINE) with reflex to Scope    Magnesium     Standing Status:   Future     Number of Occurrences:   1     Standing Expiration Date:   3/11/2022    Basic metabolic panel     This is a patient instruction: Patient fasting for 8 hours or longer recommended       Standing Status:   Future     Number of Occurrences:   1     Standing Expiration Date:   3/11/2022    Urine Microscopic    Ambulatory referral to Hematology / Oncology     Standing Status:   Future     Standing Expiration Date:   3/11/2022     Referral Priority:   Routine     Referral Type: Consult - AMB     Referral Reason:   Specialty Services Required     Requested Specialty:   Hematology and Oncology     Number of Visits Requested:   1     Expiration Date:   3/11/2022         Subjective:     Patient ID: Ryan Guillory is a 56 y o  female      HPI    UTI  Dr Major Cheema placed her on antibiotic  Renal stone   , call to schedule her lithotripsy advised her to ask me to place a urine culture before the procedure  Low Na  Leukopenia  Anemia  , need refill on arm 0 done a follow-up, she transferred from Dr Chapo guzman and Dr Amna Benavides were not see her before may  Hypersomnia  Patient stated she has significant daytime sleepiness if she does not take Armodafinil     Patient doing well with the medication  Patient stated she usually follow with Dr Teja Ozuna, but recently she had some conflict with him   She scheduled a follow-up office visit with Dr Mj Vasquez  in May  She might ran out of her medication and she asked me if I can refill it in the meantime, patient denied any side effect with the medication  Patient will call our office when she is on up to her medication, she still have some back home  Has been smoking since she was 11years old  She smokes a whole pack a day for 5 year  Patient has been usually smoking half pack or less    Test results  Lab done on February 10th, February 25th, 2021  Mammogram scheduled  Review of Systems   Constitutional: Negative for chills, diaphoresis and fatigue  HENT: Negative for ear pain, sore throat, trouble swallowing and voice change  Eyes: Negative for visual disturbance  Respiratory: Negative for cough, chest tightness and shortness of breath  Cardiovascular: Negative for chest pain, palpitations and leg swelling  Gastrointestinal: Negative for abdominal pain, blood in stool, constipation, diarrhea and nausea  Endocrine: Negative for polydipsia and polyuria     Genitourinary: Negative for dysuria, flank pain, frequency, hematuria, pelvic pain, urgency, vaginal bleeding and vaginal discharge  Musculoskeletal: Negative for arthralgias, back pain, gait problem, myalgias and neck pain  Skin: Negative for rash  Allergic/Immunologic: Negative for food allergies  Neurological: Negative for dizziness, tremors, seizures, syncope, facial asymmetry, speech difficulty, weakness, light-headedness, numbness and headaches  Hematological: Negative for adenopathy  Does not bruise/bleed easily  Psychiatric/Behavioral: Negative for agitation, behavioral problems, confusion and dysphoric mood  The patient is not nervous/anxious  Objective:     Physical Exam  Constitutional:       General: She is not in acute distress  Appearance: Normal appearance  She is well-developed  HENT:      Head: Normocephalic and atraumatic  Eyes:      General: No scleral icterus  Right eye: No discharge  Left eye: No discharge  Extraocular Movements: Extraocular movements intact  Conjunctiva/sclera: Conjunctivae normal       Pupils: Pupils are equal, round, and reactive to light  Neck:      Musculoskeletal: Neck supple  Thyroid: No thyromegaly  Vascular: No JVD  Cardiovascular:      Rate and Rhythm: Normal rate and regular rhythm  Heart sounds: Normal heart sounds  No murmur  Comments: Extremities  No edema  Pulmonary:      Effort: Pulmonary effort is normal       Breath sounds: Normal breath sounds  Abdominal:      General: Abdomen is flat  Bowel sounds are normal  There is no distension  Palpations: Abdomen is soft  There is no mass  Tenderness: There is no abdominal tenderness  There is no right CVA tenderness, left CVA tenderness, guarding or rebound  Hernia: No hernia is present  Musculoskeletal:      Right lower leg: No edema  Left lower leg: No edema  Lymphadenopathy:      Cervical: No cervical adenopathy  Skin:     Findings: No rash     Neurological:      General: No focal deficit present  Mental Status: She is alert and oriented to person, place, and time  Cranial Nerves: No cranial nerve deficit  Motor: No weakness or abnormal muscle tone  Coordination: Coordination normal       Gait: Gait normal    Psychiatric:         Mood and Affect: Mood normal          Behavior: Behavior normal          Thought Content:  Thought content normal          Judgment: Judgment normal

## 2021-03-14 ENCOUNTER — LAB (OUTPATIENT)
Dept: LAB | Facility: HOSPITAL | Age: 61
End: 2021-03-14
Attending: FAMILY MEDICINE
Payer: COMMERCIAL

## 2021-03-14 DIAGNOSIS — N39.0 URINARY TRACT INFECTION WITHOUT HEMATURIA, SITE UNSPECIFIED: ICD-10-CM

## 2021-03-14 DIAGNOSIS — R79.0 LOW MAGNESIUM LEVEL: ICD-10-CM

## 2021-03-14 DIAGNOSIS — N20.0 RENAL CALCULUS: ICD-10-CM

## 2021-03-14 DIAGNOSIS — R94.4 DECREASED GFR: ICD-10-CM

## 2021-03-14 PROBLEM — D72.819 LEUKOPENIA: Status: ACTIVE | Noted: 2021-03-14

## 2021-03-14 PROBLEM — E87.1 HYPONATREMIA: Status: ACTIVE | Noted: 2021-03-14

## 2021-03-14 PROBLEM — E87.6 HYPOKALEMIA: Status: ACTIVE | Noted: 2021-03-14

## 2021-03-14 LAB
ALBUMIN SERPL BCP-MCNC: 3.4 G/DL (ref 3.5–5)
ALP SERPL-CCNC: 102 U/L (ref 46–116)
ALT SERPL W P-5'-P-CCNC: 22 U/L (ref 12–78)
ANION GAP SERPL CALCULATED.3IONS-SCNC: 8 MMOL/L (ref 4–13)
APTT PPP: 27 SECONDS (ref 23–37)
AST SERPL W P-5'-P-CCNC: 14 U/L (ref 5–45)
BACTERIA UR QL AUTO: ABNORMAL /HPF
BASOPHILS # BLD AUTO: 0.12 THOUSANDS/ΜL (ref 0–0.1)
BASOPHILS NFR BLD AUTO: 2 % (ref 0–1)
BILIRUB SERPL-MCNC: 0.42 MG/DL (ref 0.2–1)
BILIRUB UR QL STRIP: NEGATIVE
BUN SERPL-MCNC: 10 MG/DL (ref 5–25)
CALCIUM ALBUM COR SERPL-MCNC: 9.7 MG/DL (ref 8.3–10.1)
CALCIUM SERPL-MCNC: 9.2 MG/DL (ref 8.3–10.1)
CHLORIDE SERPL-SCNC: 102 MMOL/L (ref 100–108)
CLARITY UR: ABNORMAL
CO2 SERPL-SCNC: 29 MMOL/L (ref 21–32)
COLOR UR: YELLOW
CREAT SERPL-MCNC: 0.72 MG/DL (ref 0.6–1.3)
EOSINOPHIL # BLD AUTO: 0.6 THOUSAND/ΜL (ref 0–0.61)
EOSINOPHIL NFR BLD AUTO: 8 % (ref 0–6)
ERYTHROCYTE [DISTWIDTH] IN BLOOD BY AUTOMATED COUNT: 14.1 % (ref 11.6–15.1)
GFR SERPL CREATININE-BSD FRML MDRD: 90 ML/MIN/1.73SQ M
GLUCOSE P FAST SERPL-MCNC: 98 MG/DL (ref 65–99)
GLUCOSE UR STRIP-MCNC: NEGATIVE MG/DL
HCT VFR BLD AUTO: 39.3 % (ref 34.8–46.1)
HGB BLD-MCNC: 12.4 G/DL (ref 11.5–15.4)
HGB UR QL STRIP.AUTO: ABNORMAL
IMM GRANULOCYTES # BLD AUTO: 0.03 THOUSAND/UL (ref 0–0.2)
IMM GRANULOCYTES NFR BLD AUTO: 0 % (ref 0–2)
INR PPP: 0.96 (ref 0.84–1.19)
KETONES UR STRIP-MCNC: NEGATIVE MG/DL
LEUKOCYTE ESTERASE UR QL STRIP: ABNORMAL
LYMPHOCYTES # BLD AUTO: 1.25 THOUSANDS/ΜL (ref 0.6–4.47)
LYMPHOCYTES NFR BLD AUTO: 17 % (ref 14–44)
MAGNESIUM SERPL-MCNC: 1.9 MG/DL (ref 1.6–2.6)
MCH RBC QN AUTO: 30.2 PG (ref 26.8–34.3)
MCHC RBC AUTO-ENTMCNC: 31.6 G/DL (ref 31.4–37.4)
MCV RBC AUTO: 96 FL (ref 82–98)
MONOCYTES # BLD AUTO: 0.29 THOUSAND/ΜL (ref 0.17–1.22)
MONOCYTES NFR BLD AUTO: 4 % (ref 4–12)
NEUTROPHILS # BLD AUTO: 4.92 THOUSANDS/ΜL (ref 1.85–7.62)
NEUTS SEG NFR BLD AUTO: 69 % (ref 43–75)
NITRITE UR QL STRIP: NEGATIVE
NON-SQ EPI CELLS URNS QL MICRO: ABNORMAL /HPF
NRBC BLD AUTO-RTO: 0 /100 WBCS
OTHER STN SPEC: ABNORMAL
PH UR STRIP.AUTO: 7.5 [PH]
PLATELET # BLD AUTO: 323 THOUSANDS/UL (ref 149–390)
PMV BLD AUTO: 9.8 FL (ref 8.9–12.7)
POTASSIUM SERPL-SCNC: 4.6 MMOL/L (ref 3.5–5.3)
PROT SERPL-MCNC: 6.9 G/DL (ref 6.4–8.2)
PROT UR STRIP-MCNC: NEGATIVE MG/DL
PROTHROMBIN TIME: 12.6 SECONDS (ref 11.6–14.5)
RBC # BLD AUTO: 4.1 MILLION/UL (ref 3.81–5.12)
RBC #/AREA URNS AUTO: ABNORMAL /HPF
SODIUM SERPL-SCNC: 139 MMOL/L (ref 136–145)
SP GR UR STRIP.AUTO: 1.02 (ref 1–1.03)
UROBILINOGEN UR QL STRIP.AUTO: 0.2 E.U./DL
WBC # BLD AUTO: 7.21 THOUSAND/UL (ref 4.31–10.16)
WBC #/AREA URNS AUTO: ABNORMAL /HPF

## 2021-03-14 PROCEDURE — 87077 CULTURE AEROBIC IDENTIFY: CPT

## 2021-03-14 PROCEDURE — 85610 PROTHROMBIN TIME: CPT

## 2021-03-14 PROCEDURE — 87086 URINE CULTURE/COLONY COUNT: CPT

## 2021-03-14 PROCEDURE — 83735 ASSAY OF MAGNESIUM: CPT

## 2021-03-14 PROCEDURE — 81001 URINALYSIS AUTO W/SCOPE: CPT | Performed by: FAMILY MEDICINE

## 2021-03-14 PROCEDURE — 80053 COMPREHEN METABOLIC PANEL: CPT

## 2021-03-14 PROCEDURE — 87186 SC STD MICRODIL/AGAR DIL: CPT

## 2021-03-14 PROCEDURE — 36415 COLL VENOUS BLD VENIPUNCTURE: CPT

## 2021-03-14 PROCEDURE — 85730 THROMBOPLASTIN TIME PARTIAL: CPT

## 2021-03-14 PROCEDURE — 85025 COMPLETE CBC W/AUTO DIFF WBC: CPT

## 2021-03-16 ENCOUNTER — TELEPHONE (OUTPATIENT)
Dept: UROLOGY | Facility: AMBULATORY SURGERY CENTER | Age: 61
End: 2021-03-16

## 2021-03-16 ENCOUNTER — TELEPHONE (OUTPATIENT)
Dept: UROLOGY | Facility: MEDICAL CENTER | Age: 61
End: 2021-03-16

## 2021-03-16 ENCOUNTER — APPOINTMENT (OUTPATIENT)
Dept: LAB | Facility: MEDICAL CENTER | Age: 61
End: 2021-03-16
Payer: COMMERCIAL

## 2021-03-16 DIAGNOSIS — D64.9 ANEMIA, UNSPECIFIED TYPE: ICD-10-CM

## 2021-03-16 DIAGNOSIS — N39.0 URINARY TRACT INFECTION WITHOUT HEMATURIA, SITE UNSPECIFIED: Primary | ICD-10-CM

## 2021-03-16 LAB
BACTERIA UR CULT: ABNORMAL
HEMOCCULT STL QL IA: NEGATIVE

## 2021-03-16 PROCEDURE — G0328 FECAL BLOOD SCRN IMMUNOASSAY: HCPCS

## 2021-03-16 RX ORDER — FLUTICASONE PROPIONATE 50 MCG
1 SPRAY, SUSPENSION (ML) NASAL DAILY
COMMUNITY
End: 2021-12-23 | Stop reason: ALTCHOICE

## 2021-03-16 RX ORDER — AMPICILLIN 500 MG/1
500 CAPSULE ORAL 4 TIMES DAILY
Qty: 28 CAPSULE | Refills: 0 | Status: SHIPPED | OUTPATIENT
Start: 2021-03-16 | End: 2021-03-23

## 2021-03-16 NOTE — TELEPHONE ENCOUNTER
Patient managed by Esther Yoo is calling to say she had pre op testing and was told she has infection  She is scheduled for 3/25/21 for procedure  Patient is very frustrated and would like to get this treated as soon as possible she stated it is a recurring infection

## 2021-03-16 NOTE — TELEPHONE ENCOUNTER
Pt found to have UTI with  Pre op testing  Results for your review  This is the second infection since 2/25 with the same bacteria  She is scheduled for lithotripsy with Dr Nitza Sagastume on 3/25 and is upset because she doesn't want to have surgery rescheduled      Sending to Dr Nitza Sagastume for review

## 2021-03-16 NOTE — TELEPHONE ENCOUNTER
Please call the patient  The note from pre-admission testing says she is having burning on urination and not feeling well  She can begin the antibiotic prescribed  She should be evaluated in the office prior to her procedure on the 25th  If she has active infection we cannot do the procedure

## 2021-03-16 NOTE — TELEPHONE ENCOUNTER
A prescription for ampicillin was E prescribed to her pharmacy  She should take the medication 4 times daily  She can start the medication on Thursday and take up until the morning of surgery  We will give her IV antibiotics at the time of her procedure

## 2021-03-16 NOTE — PRE-PROCEDURE INSTRUCTIONS
Pre-Surgery Instructions:   Medication Instructions    Armodafinil 250 MG tablet HOLD morning of surgery    BACILLUS COAGULANS-INULIN PO HOLD 7 days prior to surgery   calcium carbonate 1250 MG capsule HOLD 7 days prior to surgery    Cholecalciferol 1000 units tablet HOLD 7 days prior to surgery     Citicoline Sodium 500 MG TABS HOLD 7 days prior to surgery    Coenzyme Q10 200 MG/GM POWD HOLD 7 days prior to surgery     denosumab (PROLIA) 60 mg/mL Last dose 3/2/21 - takes 2x/year - NOT needed for surgery     dicyclomine (BENTYL) 20 mg tablet Take morning of surgery with sip of water    DULoxetine (CYMBALTA) 30 mg delayed release capsule Take night before surgery    ferrous sulfate 325 (65 Fe) mg tablet Take night before surgery   fluticasone (FLONASE) 50 mcg/act nasal spray May use morning of surgery    gabapentin (NEURONTIN) 300 mg capsule Takes at 12 noon and bedtime-not needed for morning of surgery    Ginger, Zingiber officinalis, (GINGER ROOT) 550 MG CAPS HOLD 7 days prior to surgery    Ginkgo Biloba 40 MG TABS HOLD 7days prior to surgery    mesalamine (LIALDA) 1 2 g EC tablet Uses only as needed - HOLD for day of surgery    metaxalone (SKELAXIN) 800 mg tablet Take morning of surgery with sip of water    Multiple Vitamins-Minerals (CENTRUM SILVER PO) HOLD 7 days prior to surgery    mupirocin (BACTROBAN) 2 % ointment HOLD day of surgery    NON FORMULARY Uses as needed- HOLD DOS    NON FORMULARY HOLD for DOS- pt states isnt currently using    omeprazole (PriLOSEC) 40 MG capsule Take morning of surgery with sip of water    prochlorperazine (COMPAZINE) 10 mg tablet HOLD for DOS- uses as needed   If needed may take with sip of water    rosuvastatin (CRESTOR) 5 mg tablet Take morning of surgery with sip of water    solifenacin (VESICARE) 10 MG tablet Take night before surgery     Turmeric (QC Tumeric Complex) 500 MG CAPS HOLD 7 days prior to surgery    ZOLMitriptan (ZOMIG) 2 5 MG tablet Uses with migraines as needed- pt states MUST take this morning of surgery with sip of water due to anes  Reviewed all medications and instructions for day of surgery with pt  Reviewed all showering instructions and COVID visitation policy with pt  Instructed pt that [address] [place] [address] is location for DOS- pt instructed that pre op nurse will call on 3/24/21 to give further instructions for DOS  Instructed on using only Tylenol 7 days prior to surgery, NO NSAID use within 7 days prior to surgery  Pt states that "Tylenol is useless, doesn't work " Instructed pt of needing transport to and from hospital for DOS and to bring photo ID and insurance card with pt  Pt instructed to remove all jewelry and valuables for DOS  Pt did express not feeling well since Saturday- this RN encouraged pt to reach out to surgeons office with symptoms stated  This RN also forwarded this information to surgeon and anes for review of pts condition  Pt verbalized all instructions given at this time with call  My Surgical Experience    The following information was developed to assist you to prepare for your operation  What do I need to do before coming to the hospital?   Arrange for a responsible person to drive you to and from the hospital    Arrange care for your children at home  Children are not allowed in the recovery areas of the hospital   Plan to wear clothing that is easy to put on and take off  If you are having shoulder surgery, wear a shirt that buttons or zippers in the front  Bathing  o Shower the evening before and the morning of your surgery with an antibacterial soap  Please refer to the Pre Op Showering Instructions for Surgery Patients Sheet   o Remove nail polish and all body piercing jewelry  o Do not shave any body part for at least 24 hours before surgery-this includes face, arms, legs and upper body  Food  o Nothing to eat or drink after midnight the night before your surgery   This includes candy and chewing gum  o Exception: If your surgery is after 12:00pm (noon), you may have clear liquids such as 7-Up®, ginger ale, apple or cranberry juice, Jell-O®, water, or clear broth until 8:00 am  o Do not drink milk or juice with pulp on the morning before surgery  o Do not drink alcohol 24 hours before surgery  Medicine  o Follow instructions you received from your surgeon about which medicines you may take on the day of surgery  o If instructed to take medicine on the morning of surgery, take pills with just a small sip of water  Call your prescribing doctor for specific infroamtion on what to do if you take insulin    What should I bring to the hospital?    Bring:  Noreene Height or a walker, if you have them, for foot or knee surgery   A list of the daily medicines, vitamins, minerals, herbals and nutritional supplements you take  Include the dosages of medicines and the time you take them each day   Glasses, dentures or hearing aids   Minimal clothing; you will be wearing hospital sleepwear   Photo ID; required to verify your identity   If you have a Living Will or Power of , bring a copy of the documents   If you have an ostomy, bring an extra pouch and any supplies you use    Do not bring   Medicines or inhalers   Money, valuables or jewelry    What other information should I know about the day of surgery?  Notify your surgeons if you develop a cold, sore throat, cough, fever, rash or any other illness   Report to the Ambulatory Surgical/Same Day Surgery Unit   You will be instructed to stop at Registration only if you have not been pre-registered   Inform your  fi they do not stay that they will be asked by the staff to leave a phone number where they can be reached   Be available to be reached before surgery   In the event the operating room schedule changes, you may be asked to come in earlier or later than expected    *It is important to tell your doctor and others involved in your health care if you are taking or have been taking any non-prescription drugs, vitamins, minerals, herbals or other nutritional supplements   Any of these may interact with some food or medicines and cause a reaction

## 2021-03-17 NOTE — TELEPHONE ENCOUNTER
Mansi Hernández MD 13 hours ago (7:14 PM)        Please call the patient  The note from pre-admission testing says she is having burning on urination and not feeling well  She can begin the antibiotic prescribed  She should be evaluated in the office prior to her procedure on the 25th  If she has active infection we cannot do the procedure           Documentation

## 2021-03-18 ENCOUNTER — TELEPHONE (OUTPATIENT)
Dept: UROLOGY | Facility: MEDICAL CENTER | Age: 61
End: 2021-03-18

## 2021-03-18 DIAGNOSIS — N39.0 URINARY TRACT INFECTION WITHOUT HEMATURIA, SITE UNSPECIFIED: Primary | ICD-10-CM

## 2021-03-18 NOTE — TELEPHONE ENCOUNTER
Called pt and advised that her surgery on 3/25 would not be cancelled at this time per Dr Lamine Reyes  She is to go for UC after completion of antibiotic    She stated she started taking it on 3/17

## 2021-03-23 ENCOUNTER — TELEPHONE (OUTPATIENT)
Dept: FAMILY MEDICINE CLINIC | Facility: CLINIC | Age: 61
End: 2021-03-23

## 2021-03-23 NOTE — H&P
HISTORY AND PHYSICAL  ? ? Patient Name: Vanesa Muñoz  Patient MRN: [id number]  Attending Provider: Mansi Hernández MD  Service: Urology  Chief Complaint    Right renal stone    HPI   Vanesa Muñoz is a 56 y o  female with a small right renal stone  Ureteroscopy was not successful as the stone could not be accessed in the kidney  I plan right ESWL  Potential risks and complications discussed, and informed consent was given by the patient  Medications  Meds/Allergies   No current facility-administered medications for this encounter  Cannot display prior to admission medications because the patient has not been admitted in this contact  No current facility-administered medications for this encounter       Current Outpatient Medications:     Armodafinil 250 MG tablet, Take 1 tablet (250 mg total) by mouth daily (Patient taking differently: Take 250 mg by mouth daily Takes in the am), Disp: 30 tablet, Rfl: 5    BACILLUS COAGULANS-INULIN PO, Take 1 capsule by mouth daily Takes in the am, Disp: , Rfl:     calcium carbonate 1250 MG capsule, Take 600 mg by mouth daily , Disp: , Rfl:     Cholecalciferol 1000 units tablet, Take 1,000 Units by mouth daily Takes in the afternoon, Disp: , Rfl:     Citicoline Sodium 500 MG TABS, Take 500 mg by mouth daily Takes in the am, Disp: , Rfl:     Coenzyme Q10 200 MG/GM POWD, Take 200 mg by mouth daily Takes in the am, Disp: , Rfl:     denosumab (PROLIA) 60 mg/mL, Inject 60 mg under the skin Every 6 months, Disp: , Rfl:     dicyclomine (BENTYL) 20 mg tablet, Take 1 tablet (20 mg total) by mouth 4 (four) times a day (before meals and at bedtime), Disp: 20 tablet, Rfl: 0    DULoxetine (CYMBALTA) 30 mg delayed release capsule, Take 3 capsules (90 mg total) by mouth daily (Patient taking differently: Take 90 mg by mouth every evening ), Disp: 90 capsule, Rfl: 1    ferrous sulfate 325 (65 Fe) mg tablet, Take 325 mg by mouth daily with breakfast , Disp: , Rfl:    fluticasone (FLONASE) 50 mcg/act nasal spray, 1 spray into each nostril daily Takes in the am , Disp: , Rfl:     gabapentin (NEURONTIN) 300 mg capsule, 300 mg at 1200 in addition to 600 mg at bedtime  , Disp: , Rfl:     Ginger, Zingiber officinalis, (GINGER ROOT) 550 MG CAPS, Take 750 mg by mouth 2 (two) times a day , Disp: , Rfl:     Ginkgo Biloba 40 MG TABS, Take 40 mg by mouth daily Take in the am, Disp: , Rfl:     mesalamine (LIALDA) 1 2 g EC tablet, Take 2 4 g by mouth daily with breakfast, Disp: , Rfl:     metaxalone (SKELAXIN) 800 mg tablet, Take 800 mg by mouth 3 (three) times a day , Disp: , Rfl:     Multiple Vitamins-Minerals (CENTRUM SILVER PO), Take 1 tablet by mouth daily Takes in the am, Disp: , Rfl:     mupirocin (BACTROBAN) 2 % ointment, Apply topically 3 (three) times a day Apply topically on hands to open cuts to prevent infection, Disp: 30 g, Rfl: 0    NON FORMULARY, Place 30 mg under the tongue as needed --- tincture prn, Disp: , Rfl:     NON FORMULARY, Take 2 tablets by mouth daily Take Health and Sooth take two capsule daily , Disp: , Rfl:     omeprazole (PriLOSEC) 40 MG capsule, Take 40 mg by mouth daily before breakfast Takes in the am, Disp: , Rfl:     prochlorperazine (COMPAZINE) 10 mg tablet, Take 1 tablet (10 mg total) by mouth every 6 (six) hours as needed for nausea or vomiting, Disp: 12 tablet, Rfl: 1    rosuvastatin (CRESTOR) 5 mg tablet, Take 1 tablet (5 mg total) by mouth daily (Patient taking differently: Take 5 mg by mouth daily Take in the am), Disp: 90 tablet, Rfl: 0    solifenacin (VESICARE) 10 MG tablet, Take 1 tablet (10 mg total) by mouth daily (Patient taking differently: Take 10 mg by mouth daily Takes in the evening), Disp: 30 tablet, Rfl: 0    Turmeric (QC Tumeric Complex) 500 MG CAPS, Take 500 mg by mouth 2 (two) times a day , Disp: , Rfl:     ZOLMitriptan (ZOMIG) 2 5 MG tablet, TAKE 1 TABLET BY MOUTH AT ONSET OF MIGRAINE, MAY REPEAT IN 2 HOURS IF NEEDED  LIMIT 2/24 HOURS, 4/WEEK, 8/MONTH, Disp: 12 tablet, Rfl: 2    ampicillin (PRINCIPEN) 500 mg capsule, Take 1 capsule (500 mg total) by mouth 4 (four) times a day for 7 days, Disp: 28 capsule, Rfl: 0  Review of Systems  10 point review of systems negative except as noted in HPI  Allergies  Allergies   Allergen Reactions    Celecoxib Other (See Comments)     Increased Heart Rate, Palpitations    Sumatriptan Anaphylaxis     Other reaction(s): SUMATRIPTAN (IMITREX) (Throat closure)  Pt analilia zolmitriptan      Tramadol Other (See Comments)     GI Upset- severe vomiting and systemic body aches    Medical Tape Dermatitis, Rash and Blisters     Adhesive from medication patches and bandaides- ok with paper tape     PMH  Past Medical History:   Diagnosis Date    Allergy to dust     Anemia     Calculus of ureter     Chronic pain disorder     neck and back- sees pain management    Colitis     Cracked skin     on fingers    Diverticulosis     Dysphagia     GERD (gastroesophageal reflux disease)     H/O: GI bleed     Hiatal hernia     History of DVT in adulthood 2019 2019    Hydronephrosis     Hyperlipidemia     Inguinal hernia     right side    Irregular heart beat     Pt states with prolonged QT interval - was seen by cardiologist( [facility]) pt told "to not worry about it"    Irritable bowel     Migraines     Narcolepsy     Narcolepsy 8/6/2020    OAB (overactive bladder)     Osteoarthritis     Osteoporosis     Other chronic pain     degenerative disc disease    PONV (postoperative nausea and vomiting)     migraines    Smoking 8/6/2020    Tobacco abuse     Wears dentures     full upper    Wears glasses     Wears glasses      Past surgical history  Past Surgical History:   Procedure Laterality Date    ABDOMINAL ADHESION SURGERY      ARM SKIN LESION BIOPSY / EXCISION      lymph node excision    BLADDER SURGERY      CHOLECYSTECTOMY      COLONOSCOPY  08/06/2020    FL RETROGRADE PYELOGRAM  1/5/2021  HYSTERECTOMY  1977    JOINT REPLACEMENT Right     knee    KNEE SURGERY Right     replacement    LAMINECTOMY      L5-6-7    LAPAROSCOPY      LYMPH NODE DISSECTION Left     removed -no cancer-  limb restriction    MOLE REMOVAL      OOPHORECTOMY Bilateral 1977    OTHER SURGICAL HISTORY      enlarged lymph node removed left arm   no cancer    GA CYSTO/URETERO W/LITHOTRIPSY &INDWELL STENT INSRT Right 1/5/2021    Procedure: CYSTO, LASER  URETEROSCOPY, RETRO PYELOGRAM, STENT REPLACMENT;  Surgeon: Julieta Alexander MD;  [facility];  Service: Urology    GA CYSTOURETHROSCOPY,URETER CATHETER Right 12/2/2020    Procedure: CYSTOSCOPY AND INSERTION STENT URETERAL;  Surgeon: Megan Hood MD;  [facility];  Service: Urology    GA REPAIR ING HERNIA,5+Y/O,REDUCIBL Right 6/25/2020    Procedure: REPAIR HERNIA INGUINAL  WITH MESH;  Surgeon: Marci Keane MD;  Location: [facility];  Service: General    SPINAL FUSION      UPPER GASTROINTESTINAL ENDOSCOPY  08/06/2020    WISDOM TOOTH EXTRACTION       Social history  Social History     Tobacco Use    Smoking status: Current Every Day Smoker     Packs/day: 0 50     Years: 47 00     Pack years: 23 50     Types: Cigarettes    Smokeless tobacco: Never Used    Tobacco comment: pt states is at 1/2 pk day currently - was previously at one pack /day   Substance Use Topics    Alcohol use: Not Currently    Drug use: Yes     Types: Marijuana     Comment: medicinal marijuana     ? Physical Exam      Ht 5' 3" (1 6 m)   Wt 47 6 kg (105 lb)   BMI 18 60 kg/m²   General appearance: alert and oriented, in no acute distress  Head: Normocephalic, without obvious abnormality, atraumatic  Neck: supple, symmetrical, trachea midline  Back: symmetric, no curvature  ROM normal  No CVA tenderness    Lungs: Normal respiratory effort  Heart: regular rate and rhythm  Abdomen: soft, non-tender; bowel sounds normal; no masses,  no organomegaly  Extremities: extremities normal, warm and well-perfused; no cyanosis, clubbing, or edema  Neurologic: Grossly normal     Saleem Mosley MD

## 2021-03-23 NOTE — TELEPHONE ENCOUNTER
Juma Felder on March  Remarkable only for positive urine culture which patient is taking ampicillin for it    Patient to follow with Urology

## 2021-03-24 ENCOUNTER — APPOINTMENT (OUTPATIENT)
Dept: LAB | Facility: CLINIC | Age: 61
End: 2021-03-24
Payer: COMMERCIAL

## 2021-03-24 ENCOUNTER — ANESTHESIA EVENT (OUTPATIENT)
Dept: PERIOP | Facility: HOSPITAL | Age: 61
End: 2021-03-24
Payer: COMMERCIAL

## 2021-03-24 DIAGNOSIS — N39.0 URINARY TRACT INFECTION WITHOUT HEMATURIA, SITE UNSPECIFIED: ICD-10-CM

## 2021-03-24 PROCEDURE — 87086 URINE CULTURE/COLONY COUNT: CPT

## 2021-03-24 NOTE — ANESTHESIA PREPROCEDURE EVALUATION
Procedure:  LITHOTRIPSY EXTRACORPORAL SHOCKWAVE (ESWL) (Right Ureter)    Relevant Problems   CARDIO   (+) Hypercholesterolemia   (+) Migraine headache   (+) Migraine without aura and without status migrainosus, not intractable   (+) Pure hypercholesterolemia      /RENAL   (+) Calculus of kidney   (+) Ureteral stone with hydronephrosis      HEMATOLOGY   (+) Anemia      MUSCULOSKELETAL   (+) Fibromyalgia   (+) Spondylosis of cervical region without myelopathy or radiculopathy   (+) Spondylosis of lumbar spine      NEURO/PSYCH   (+) Fibromyalgia   (+) History of hydronephrosis      PULMONARY   (+) Smoking      Other   (+) Encounter for hepatitis C screening test for low risk patient   (+) Hypocalcemia (Resolved)   (+) Hypokalemia   (+) Hyponatremia   (+) Lumbar radiculopathy   (+) Sepsis (HCC)        Physical Exam    Airway    Mallampati score: II  TM Distance: >3 FB  Neck ROM: full     Dental   upper dentures,     Cardiovascular  Cardiovascular exam normal    Pulmonary  Pulmonary exam normal     Other Findings  Sinus rhythm with occasional Premature atrial complexes  Nonspecific ST and T wave abnormality  Abnormal ECG  When compared with ECG of 29-AUG-2019 14:21,  Nonspecific T wave abnormality now evident in Inferior leads  QT has shortened  Confirmed by Leigh Jacobs ([id number]) on 12/2/2020 9:13:46 AM      Anesthesia Plan  ASA Score- 3     Anesthesia Type- general with ASA Monitors  Additional Monitors:   Airway Plan: LMA  Plan Factors-Exercise tolerance (METS): >4 METS  Chart reviewed  Patient is a current smoker  Patient instructed to abstain from smoking on day of procedure  Patient did not smoke on day of surgery  Induction- intravenous  Postoperative Plan-     Informed Consent- Anesthetic plan and risks discussed with patient  I personally reviewed this patient with the CRNA  Discussed and agreed on the Anesthesia Plan with the CRNA  Maunel De Dios

## 2021-03-25 ENCOUNTER — HOSPITAL ENCOUNTER (OUTPATIENT)
Facility: HOSPITAL | Age: 61
Setting detail: OUTPATIENT SURGERY
Discharge: HOME | End: 2021-03-25
Attending: UROLOGY | Admitting: UROLOGY
Payer: COMMERCIAL

## 2021-03-25 ENCOUNTER — TELEPHONE (OUTPATIENT)
Dept: UROLOGY | Facility: MEDICAL CENTER | Age: 61
End: 2021-03-25

## 2021-03-25 ENCOUNTER — ANESTHESIA (OUTPATIENT)
Dept: PERIOP | Facility: HOSPITAL | Age: 61
End: 2021-03-25
Payer: COMMERCIAL

## 2021-03-25 VITALS
DIASTOLIC BLOOD PRESSURE: 71 MMHG | SYSTOLIC BLOOD PRESSURE: 140 MMHG | WEIGHT: 109 LBS | BODY MASS INDEX: 19.31 KG/M2 | TEMPERATURE: 98.4 F | OXYGEN SATURATION: 98 % | HEIGHT: 63 IN | HEART RATE: 69 BPM | RESPIRATION RATE: 16 BRPM

## 2021-03-25 DIAGNOSIS — N39.0 COMPLICATED UTI (URINARY TRACT INFECTION): Primary | ICD-10-CM

## 2021-03-25 DIAGNOSIS — N20.0 RENAL CALCULUS: ICD-10-CM

## 2021-03-25 PROCEDURE — NC001 PR NO CHARGE: Performed by: UROLOGY

## 2021-03-25 PROCEDURE — 50590 FRAGMENTING OF KIDNEY STONE: CPT | Performed by: UROLOGY

## 2021-03-25 RX ORDER — ONDANSETRON 2 MG/ML
INJECTION INTRAMUSCULAR; INTRAVENOUS AS NEEDED
Status: DISCONTINUED | OUTPATIENT
Start: 2021-03-25 | End: 2021-03-25

## 2021-03-25 RX ORDER — LORAZEPAM 2 MG/ML
0.5 INJECTION INTRAMUSCULAR ONCE
Status: COMPLETED | OUTPATIENT
Start: 2021-03-25 | End: 2021-03-25

## 2021-03-25 RX ORDER — ACETAMINOPHEN 325 MG/1
650 TABLET ORAL EVERY 6 HOURS PRN
Status: DISCONTINUED | OUTPATIENT
Start: 2021-03-25 | End: 2021-03-25 | Stop reason: HOSPADM

## 2021-03-25 RX ORDER — ONDANSETRON 2 MG/ML
4 INJECTION INTRAMUSCULAR; INTRAVENOUS ONCE AS NEEDED
Status: DISCONTINUED | OUTPATIENT
Start: 2021-03-25 | End: 2021-03-25 | Stop reason: HOSPADM

## 2021-03-25 RX ORDER — DEXAMETHASONE SODIUM PHOSPHATE 10 MG/ML
INJECTION, SOLUTION INTRAMUSCULAR; INTRAVENOUS AS NEEDED
Status: DISCONTINUED | OUTPATIENT
Start: 2021-03-25 | End: 2021-03-25

## 2021-03-25 RX ORDER — SODIUM CHLORIDE, SODIUM LACTATE, POTASSIUM CHLORIDE, CALCIUM CHLORIDE 600; 310; 30; 20 MG/100ML; MG/100ML; MG/100ML; MG/100ML
125 INJECTION, SOLUTION INTRAVENOUS CONTINUOUS
Status: DISCONTINUED | OUTPATIENT
Start: 2021-03-25 | End: 2021-03-25 | Stop reason: HOSPADM

## 2021-03-25 RX ORDER — FENTANYL CITRATE/PF 50 MCG/ML
25 SYRINGE (ML) INJECTION
Status: DISCONTINUED | OUTPATIENT
Start: 2021-03-25 | End: 2021-03-25 | Stop reason: HOSPADM

## 2021-03-25 RX ORDER — AMPICILLIN 500 MG/1
500 CAPSULE ORAL 2 TIMES DAILY
Qty: 6 CAPSULE | Refills: 0 | Status: SHIPPED | OUTPATIENT
Start: 2021-03-25 | End: 2021-03-28

## 2021-03-25 RX ORDER — MEPERIDINE HYDROCHLORIDE 25 MG/ML
12.5 INJECTION INTRAMUSCULAR; INTRAVENOUS; SUBCUTANEOUS ONCE
Status: COMPLETED | OUTPATIENT
Start: 2021-03-25 | End: 2021-03-25

## 2021-03-25 RX ORDER — FENTANYL CITRATE 50 UG/ML
INJECTION, SOLUTION INTRAMUSCULAR; INTRAVENOUS AS NEEDED
Status: DISCONTINUED | OUTPATIENT
Start: 2021-03-25 | End: 2021-03-25

## 2021-03-25 RX ORDER — EPHEDRINE SULFATE 50 MG/ML
INJECTION INTRAVENOUS AS NEEDED
Status: DISCONTINUED | OUTPATIENT
Start: 2021-03-25 | End: 2021-03-25

## 2021-03-25 RX ORDER — OXYCODONE HYDROCHLORIDE 5 MG/1
5 TABLET ORAL EVERY 4 HOURS PRN
Status: DISCONTINUED | OUTPATIENT
Start: 2021-03-25 | End: 2021-03-25 | Stop reason: HOSPADM

## 2021-03-25 RX ORDER — PROPOFOL 10 MG/ML
INJECTION, EMULSION INTRAVENOUS AS NEEDED
Status: DISCONTINUED | OUTPATIENT
Start: 2021-03-25 | End: 2021-03-25

## 2021-03-25 RX ORDER — OXYCODONE HYDROCHLORIDE 5 MG/1
TABLET ORAL
Qty: 6 TABLET | Refills: 0 | Status: SHIPPED | OUTPATIENT
Start: 2021-03-25 | End: 2021-04-07 | Stop reason: ALTCHOICE

## 2021-03-25 RX ORDER — ONDANSETRON 2 MG/ML
4 INJECTION INTRAMUSCULAR; INTRAVENOUS EVERY 6 HOURS PRN
Status: DISCONTINUED | OUTPATIENT
Start: 2021-03-25 | End: 2021-03-25 | Stop reason: HOSPADM

## 2021-03-25 RX ADMIN — LORAZEPAM 0.5 MG: 2 INJECTION INTRAMUSCULAR; INTRAVENOUS at 16:50

## 2021-03-25 RX ADMIN — MEPERIDINE HYDROCHLORIDE 12.5 MG: 25 INJECTION INTRAMUSCULAR; INTRAVENOUS; SUBCUTANEOUS at 16:36

## 2021-03-25 RX ADMIN — DEXAMETHASONE SODIUM PHOSPHATE 4 MG: 10 INJECTION, SOLUTION INTRAMUSCULAR; INTRAVENOUS at 15:58

## 2021-03-25 RX ADMIN — FENTANYL CITRATE 50 MCG: 50 INJECTION, SOLUTION INTRAMUSCULAR; INTRAVENOUS at 15:36

## 2021-03-25 RX ADMIN — AMPICILLIN SODIUM 2000 MG: 2 INJECTION, POWDER, FOR SOLUTION INTRAMUSCULAR; INTRAVENOUS at 15:20

## 2021-03-25 RX ADMIN — ONDANSETRON HYDROCHLORIDE 4 MG: 2 INJECTION, SOLUTION INTRAMUSCULAR; INTRAVENOUS at 15:58

## 2021-03-25 RX ADMIN — GENTAMICIN SULFATE 80 MG: 40 INJECTION, SOLUTION INTRAMUSCULAR; INTRAVENOUS at 15:48

## 2021-03-25 RX ADMIN — SODIUM CHLORIDE, SODIUM LACTATE, POTASSIUM CHLORIDE, AND CALCIUM CHLORIDE: .6; .31; .03; .02 INJECTION, SOLUTION INTRAVENOUS at 15:20

## 2021-03-25 RX ADMIN — EPHEDRINE SULFATE 10 MG: 50 INJECTION, SOLUTION INTRAVENOUS at 16:04

## 2021-03-25 RX ADMIN — FENTANYL CITRATE 50 MCG: 50 INJECTION, SOLUTION INTRAMUSCULAR; INTRAVENOUS at 16:07

## 2021-03-25 RX ADMIN — PROPOFOL 150 MG: 10 INJECTION, EMULSION INTRAVENOUS at 15:36

## 2021-03-25 NOTE — INTERVAL H&P NOTE
H&P reviewed  After examining the patient I find no changes in the patients condition since the H&P had been written  Vitals:    03/25/21 1348   BP: 143/78   Pulse: 60   Resp: 20   Temp: (!) 97 4 °F (36 3 °C)   SpO2: 96%    Procedure reviewed the risks discussed with the patient in the ambulatory unit and consent signed  She was later seen in the holding unit  Questions were answered and laterality marked-right

## 2021-03-25 NOTE — OP NOTE
2OPERATIVE REPORT  PATIENT NAME: Juan Paulson    :  1960  MRN: [id number]  Pt Location:  OR ROOM 10    SURGERY DATE: 3/25/2021    Surgeon(s) and Role:     * Jose Alvarado MD - Primary    Preop Diagnosis:  Renal calculus [N20 0]    Post-Op Diagnosis Codes:     * Renal calculus [N20 0]    Procedure(s) (LRB):  LITHOTRIPSY EXTRACORPORAL SHOCKWAVE (ESWL) (Right)    Specimen(s):  * No specimens in log *    Estimated Blood Loss:   Minimal    Drains:  Ureteral Drain/Stent Right ureter 6 Fr  (Active)   Number of days: 79       Anesthesia Type:   General    Operative Indications:  Renal calculus [N20 0]      Operative Findings: The stone was visualized in the right lower pole fluoroscopically  Extracorporeal shockwave lithotripsy was performed  The stone was not visible at the conclusion of the procedure  Complications:   None    Procedure and Technique:  The patient was brought to the operating room properly identified, and placed on the lithotripsy table  A preliminary fluoroscopic image was obtained and the stone visualized  Comparison to CT scan was made  A calcification in the area of mid ureter was felt to be outside the urinary tract  General anesthesia was administered  Compression boots were then employed  Intravenous antibiotic administered  An appropriate time-out performed  The stone was then visualized utilizing multiplanar fluoroscopy and placed in the focal zone of the Lithotripter  Extracorporeal shockwave lithotripsy then commenced at a power setting of 1  The power was gradually raised to a maximum of 7  Fluoroscopy was performed intermittently to ensure the stone remained in the focal zone of the Lithotripter  The stone was seen to break up nicely  When 2500 shocks were  administered the stone was no longer visible and the procedure was terminated  The patient tolerated the procedure well    The patient was awakened from anesthesia and taken to the recovery room in satisfactory condition       I was present for the entire procedure    Patient Disposition:  PACU     SIGNATURE: Raul Dove MD  DATE: March 25, 2021  TIME: 4:11 PM

## 2021-03-25 NOTE — TELEPHONE ENCOUNTER
Patient underwent ESWL on March 25th  She has a stent in place  Please arrange a 2 week office visit with a provider for stent removal   I will order KUB to check to ensure the stone is well broken prior to the stent is removed

## 2021-03-25 NOTE — ANESTHESIA POSTPROCEDURE EVALUATION
Post-Op Assessment Note    CV Status:  Stable    Pain management: adequate     Mental Status:  Alert and awake   Hydration Status:  Euvolemic   PONV Controlled:  Controlled   Airway Patency:  Patent      Post Op Vitals Reviewed: Yes      Staff: CRNA         No complications documented      /85 (03/25/21 1617)    Temp 97 8 °F (36 6 °C) (03/25/21 1617)    Pulse 80 (03/25/21 1617)   Resp 12 (03/25/21 1617)    SpO2 100 % (03/25/21 1617)

## 2021-03-25 NOTE — DISCHARGE INSTR - AVS FIRST PAGE
Use Tylenol or ibuprofen as your first-line pain medication  The narcotic prescribed can be used for severe pain  Drink plenty of fluids and strain urine  Call the office for fever, severe pain not relieved by pain medication, heavy urinary bleeding with with clots  Ureteral stents are placed at the time of surgery to help keep the ureter opened and draining and allow it to heal    They do need to be removed or changed at intervals  to prevent future kidney damage  It is normal to have bladder irritability from the stent  The symptoms include urgency and frequency  Blood in the urine is also common  Urinary bleeding can come and go and is generally of no significance  It is also very normal to have back pain on the side of the stent when you urinate  Please call the office for fever, heavy bleeding with clots and difficulty voiding, difficulty voiding and severe pain that is not relieved by pain medication prescribed

## 2021-03-25 NOTE — DISCHARGE INSTRUCTIONS
Lithotripsy   AMBULATORY CARE:   What you need to know about lithotripsy:  Lithotripsy is a procedure that uses sound waves to break up stones in the kidney, ureter, or bladder  The stone pieces then pass out of your body through your urine  How to prepare for lithotripsy: Your healthcare provider will talk to you about how to prepare for the procedure  He or she may tell you not to eat or drink anything after midnight on the day of your surgery  He or she will tell you what medicines to take or not take on the day of your procedure  You may need to stop taking any medicines that thin your blood 1 week or more before your procedure  These medicines include aspirin, ibuprofen, and anticoagulants  What will happen during lithotripsy: You may be given medicine to keep you relaxed and pain free during the procedure  You may instead be given general anesthesia to keep you asleep during the procedure  X-rays or an ultrasound are used to find the kidney stone  You may lie on a cushion filled with water or sit in a bath of warm water  High-energy sound waves are aimed at your kidney stone  The sound waves break the stone into tiny pieces  You will pass these pieces in a few days when you urinate  A stent (tube) may be put into your kidney or ureter through your bladder or back  The stent helps the pieces of stone pass out of your body  What will happen after lithotripsy: You may have blood in your urine for 1 to 2 days  You may also have bruising and discomfort in your back or abdomen  You may have pain whenever you pass pieces of your kidney stone  This may happen over a few weeks  Risks of lithotripsy: You may develop bleeding around your kidney or get a kidney infection  The pieces of stone may block the flow of urine from your kidney  You may need another lithotripsy, or other procedure, if pieces of stone are left in your body  You may develop a stomach or intestine ulcer   Your kidney may not work correctly after the procedure  Your kidney may stop working completely  This can be life-threatening  Call your local emergency number (911 in the [address]) if:   · You have chest pain  Call your doctor if:   · You have trouble thinking clearly  · You have severe lower back pain  · You urinate bright red blood  · You have severe vomiting  · You cannot urinate  · You have a fever and chills  · You feel burning when you urinate  · You feel the urge to urinate often and immediately  · You have questions or concerns about your condition or care  Medicines:   · Medicines  can help decrease pain or prevent an infection  Medicines may also help you pass the stones or prevent more stones from forming  · Take your medicine as directed  Contact your healthcare provider if you think your medicine is not helping or if you have side effects  Tell him or her if you are allergic to any medicine  Keep a list of the medicines, vitamins, and herbs you take  Include the amounts, and when and why you take them  Bring the list or the pill bottles to follow-up visits  Carry your medicine list with you in case of an emergency  Self-care:   · Strain your urine every time you go to the bathroom  Urinate through a strainer or a piece of thin cloth to catch the stones  Take the pieces to your follow-up visits  · If you have a stent, do not pull on it  This can cause pain and bleeding  · Apply heat  on your lower back for 20 to 30 minutes every 2 hours for as many days as directed  Heat helps decrease pain and muscle spasms  · Drink liquids as directed  You may need to drink more liquid than usual  This will help flush any remaining small pieces of stone  Liquids can also help prevent more stones from forming  Ask how much liquid to drink each day and which liquids are best for you  Do not drink caffeine  · Rest  when you feel it is needed  Slowly start to do more each day      · Eat a variety of healthy foods  Ask if you need to make changes to the foods you eat  Healthy foods include fruits, vegetables, whole-grain breads, low-fat dairy products, beans, and fish  You may need to limit nuts, chocolate, coffee, and certain green leafy vegetables  You may also need to limit meat and salt  Follow up with your doctor as directed: You may need to return to have your stent removed  Write down your questions so you remember to ask them during your visits  © Copyright [address] Information is for End User's use only and may not be sold, redistributed or otherwise used for commercial purposes  All illustrations and images included in CareNotes® are the copyrighted property of [employer]  or [facility] Shara Urias   The above information is an  only  It is not intended as medical advice for individual conditions or treatments  Talk to your doctor, nurse or pharmacist before following any medical regimen to see if it is safe and effective for you

## 2021-03-26 LAB — BACTERIA UR CULT: NORMAL

## 2021-03-29 ENCOUNTER — TELEPHONE (OUTPATIENT)
Dept: FAMILY MEDICINE CLINIC | Facility: CLINIC | Age: 61
End: 2021-03-29

## 2021-03-29 DIAGNOSIS — G47.11 IDIOPATHIC HYPERSOMNIA: ICD-10-CM

## 2021-03-29 RX ORDER — ARMODAFINIL 250 MG/1
250 TABLET ORAL DAILY
Qty: 30 TABLET | Refills: 0 | Status: SHIPPED | OUTPATIENT
Start: 2021-03-29 | End: 2021-05-17 | Stop reason: SDUPTHER

## 2021-03-29 NOTE — TELEPHONE ENCOUNTER
Call placed to patient and spoke with her  Offered her appointment on 4-7-2021 at 2:45pm with Dr Jeanine Trinh for cysto with stent removal  Pt is aware she needs KUB done prior to this appointment to ensure that stent can be removed  Pt will have testing done this week for Dr Jeanine Trinh to review

## 2021-03-29 NOTE — TELEPHONE ENCOUNTER
Dawna Bowers called she states that last time she saw you, she had spoken to you in regards to armodafinil  Mg, she is out of the medication and she no longer is seeing the provider that started her on the medication  She states that you had mention to her that you were going to look into it and let her know by the end of the month, and now she has no more medication and she takes it daily for idiopathic hypersomnia

## 2021-03-29 NOTE — TELEPHONE ENCOUNTER
Called patient regarding her medication, armodafinil  She stated   She has gotten better from the [facility] and the schedule her with a new doctor in July so she need supply until then  Discussed will refill 1 month at the time  The side effect of the medication including dizziness  Dependence and tolerance  Keara Ling Keep it in safe, block place away from children  Patient has a visit with us within a week  We discussed medication further    PDMP reviewed

## 2021-03-29 NOTE — TELEPHONE ENCOUNTER
Received auto fax for refill of Armodafinil 250 MG tablet   Per the patient Dr Anu Ng patient's PCP will refill [facility]     Patient scheduled for June

## 2021-03-30 ENCOUNTER — APPOINTMENT (OUTPATIENT)
Dept: RADIOLOGY | Facility: CLINIC | Age: 61
End: 2021-03-30
Payer: COMMERCIAL

## 2021-03-30 DIAGNOSIS — N20.0 RENAL CALCULUS: ICD-10-CM

## 2021-03-30 PROCEDURE — 74018 RADEX ABDOMEN 1 VIEW: CPT

## 2021-04-01 DIAGNOSIS — R35.0 FREQUENCY OF URINATION: ICD-10-CM

## 2021-04-01 DIAGNOSIS — K52.9 CHRONIC DIARRHEA: Primary | ICD-10-CM

## 2021-04-01 DIAGNOSIS — R12 HEARTBURN: ICD-10-CM

## 2021-04-01 DIAGNOSIS — R10.9 ABDOMINAL PAIN, UNSPECIFIED ABDOMINAL LOCATION: ICD-10-CM

## 2021-04-01 RX ORDER — OMEPRAZOLE 40 MG/1
40 CAPSULE, DELAYED RELEASE ORAL
Qty: 90 CAPSULE | Refills: 3 | Status: SHIPPED | OUTPATIENT
Start: 2021-04-01 | End: 2021-09-20 | Stop reason: SDUPTHER

## 2021-04-01 RX ORDER — DICYCLOMINE HCL 20 MG
20 TABLET ORAL 4 TIMES DAILY PRN
Qty: 240 TABLET | Refills: 3 | Status: SHIPPED | OUTPATIENT
Start: 2021-04-01

## 2021-04-07 ENCOUNTER — PROCEDURE VISIT (OUTPATIENT)
Dept: UROLOGY | Facility: MEDICAL CENTER | Age: 61
End: 2021-04-07
Payer: COMMERCIAL

## 2021-04-07 VITALS — BODY MASS INDEX: 19.31 KG/M2 | HEIGHT: 63 IN | DIASTOLIC BLOOD PRESSURE: 70 MMHG | SYSTOLIC BLOOD PRESSURE: 132 MMHG

## 2021-04-07 DIAGNOSIS — N39.0 RECURRENT UTI: ICD-10-CM

## 2021-04-07 DIAGNOSIS — N20.0 RENAL CALCULUS: Primary | ICD-10-CM

## 2021-04-07 LAB
SL AMB  POCT GLUCOSE, UA: ABNORMAL
SL AMB LEUKOCYTE ESTERASE,UA: ABNORMAL
SL AMB POCT BILIRUBIN,UA: ABNORMAL
SL AMB POCT BLOOD,UA: ABNORMAL
SL AMB POCT CLARITY,UA: CLEAR
SL AMB POCT COLOR,UA: YELLOW
SL AMB POCT KETONES,UA: ABNORMAL
SL AMB POCT NITRITE,UA: ABNORMAL
SL AMB POCT PH,UA: 7
SL AMB POCT SPECIFIC GRAVITY,UA: 1.02
SL AMB POCT URINE PROTEIN: ABNORMAL
SL AMB POCT UROBILINOGEN: 0.2

## 2021-04-07 PROCEDURE — 52310 CYSTOSCOPY AND TREATMENT: CPT | Performed by: UROLOGY

## 2021-04-07 PROCEDURE — 81003 URINALYSIS AUTO W/O SCOPE: CPT | Performed by: UROLOGY

## 2021-04-07 PROCEDURE — 99213 OFFICE O/P EST LOW 20 MIN: CPT | Performed by: UROLOGY

## 2021-04-07 RX ORDER — DICLOFENAC SODIUM 75 MG/1
75 TABLET, DELAYED RELEASE ORAL DAILY
COMMUNITY
Start: 2021-04-06

## 2021-04-07 NOTE — PROGRESS NOTES
HISTORY:    Doing well after shockwave lithotripsy two weeks ago for difficult to access right lower pole stone  She had mild gross hematuria afterwards, but no real symptoms  ASSESSMENT / PLAN:    Stent removed today   Stone risk profile  Follow-up one year with CT    The following portions of the patient's history were reviewed and updated as appropriate: allergies, current medications, past family history, past medical history, past social history, past surgical history and problem list     Review of Systems   All other systems reviewed and are negative  Objective:     Physical Exam  Constitutional:       General: She is not in acute distress  Appearance: She is well-developed  She is not diaphoretic  HENT:      Head: Normocephalic and atraumatic  Eyes:      General: No scleral icterus  Pulmonary:      Effort: Pulmonary effort is normal    Skin:     Coloration: Skin is not pale  Neurological:      Mental Status: She is alert and oriented to person, place, and time  Psychiatric:         Behavior: Behavior normal          Thought Content: Thought content normal          Judgment: Judgment normal               Cystoscopy     Date/Time 4/7/2021 4:48 PM     Performed by  Jabari Lubin MD     Authorized by Jabari Lubin MD          Procedure Details:  Procedure type: simple removal of a foreign body, stone, or stent    Patient tolerance: Patient tolerated the procedure well with no immediate complications    Additional Procedure Details:     Patient presents for cystoscopy  I described the procedure, answered any questions, and we discussed potential risks and complications  Patient expressed understanding, and signed an informed consent document  The patient was carefully placed in lithotomy position on the examining table  The urethra was prepped with sterile disinfectant    The 15 [place] flexible cystoscope was passed in the urethra with the following findings:    Urethra:  No strictures     Bladder:  Mild inflammation   Stent removed without difficulty    Residual urine estimated to be minimal     The patient tolerated the procedure well and was escorted from the examining table      No results found for: PSA]  BUN   Date Value Ref Range Status   03/14/2021 10 5 - 25 mg/dL Final     Creatinine   Date Value Ref Range Status   03/14/2021 0 72 0 60 - 1 30 mg/dL Final     Comment:     Standardized to IDMS reference method     No components found for: CBC      Patient Active Problem List   Diagnosis    Urge incontinence    Frequency of urination    History of hydronephrosis    Migraine headache    Fibromyalgia    Pure hypercholesterolemia    Allergy    Low iron    Idiopathic hypersomnia    Osteoporosis    Diverticulosis    Chronic diarrhea    Migraine without aura and without status migrainosus, not intractable    Acute cystitis    Renal calculus    Complicated UTI (urinary tract infection)    Anemia    Irritable bowel syndrome    Edema of both legs    Prolonged QT interval    Rash    Screening mammogram, encounter for    Screening for colon cancer    Spondylosis of cervical region without myelopathy or radiculopathy    Neck pain    Encounter for hepatitis C screening test for low risk patient    Encounter for immunization    Xerosis of skin    Spondylosis of lumbar spine    Senile osteoporosis    Irritable bowel syndrome without diarrhea    Hypercholesterolemia    Cervical radiculopathy    Narcolepsy without cataplexy    Microscopic hematuria    Calculus of ureter    Inguinal hernia of right side without obstruction or gangrene    S/P right inguinal hernia repair    Diarrhea    Weight loss    Elevated blood sugar    Pap smear for cervical cancer screening    Lumbar radiculopathy    Smoking    Narcolepsy    Colitis    Urgency of urination    Ureteral stone with hydronephrosis    Flank pain    Low magnesium level    Sepsis ([address] )    Pyelonephritis    Hyponatremia    Leukopenia    Hypokalemia    Wears dentures    Recurrent UTI        Diagnoses and all orders for this visit:    Renal calculus  -     POCT urine dip auto non-scope  -     Stone risk profile    Recurrent UTI  -     Urine culture; Future    Other orders  -     diclofenac (VOLTAREN) 75 mg EC tablet           Patient ID: Bria Lincoln is a 56 y o  female  Current Outpatient Medications:     Armodafinil 250 MG tablet, Take 1 tablet (250 mg total) by mouth daily, Disp: 30 tablet, Rfl: 0    BACILLUS COAGULANS-INULIN PO, Take 1 capsule by mouth daily Takes in the am, Disp: , Rfl:     calcium carbonate 1250 MG capsule, Take 600 mg by mouth daily , Disp: , Rfl:     Cholecalciferol 1000 units tablet, Take 1,000 Units by mouth daily Takes in the afternoon, Disp: , Rfl:     Citicoline Sodium 500 MG TABS, Take 500 mg by mouth daily Takes in the am, Disp: , Rfl:     Coenzyme Q10 200 MG/GM POWD, Take 200 mg by mouth daily Takes in the am, Disp: , Rfl:     denosumab (PROLIA) 60 mg/mL, Inject 60 mg under the skin Every 6 months, Disp: , Rfl:     diclofenac (VOLTAREN) 75 mg EC tablet, , Disp: , Rfl:     dicyclomine (BENTYL) 20 mg tablet, Take 1 tablet (20 mg total) by mouth 4 (four) times a day as needed (abdominal pain), Disp: 240 tablet, Rfl: 3    ferrous sulfate 325 (65 Fe) mg tablet, Take 325 mg by mouth daily with breakfast , Disp: , Rfl:     fluticasone (FLONASE) 50 mcg/act nasal spray, 1 spray into each nostril daily Takes in the am , Disp: , Rfl:     gabapentin (NEURONTIN) 300 mg capsule, 300 mg at 1200 in addition to 600 mg at bedtime  , Disp: , Rfl:     Ginger, Zingiber officinalis, (GINGER ROOT) 550 MG CAPS, Take 750 mg by mouth 2 (two) times a day , Disp: , Rfl:     Ginkgo Biloba 40 MG TABS, Take 40 mg by mouth daily Take in the am, Disp: , Rfl:     mesalamine (LIALDA) 1 2 g EC tablet, Take 2 4 g by mouth daily with breakfast, Disp: , Rfl:     Multiple Vitamins-Minerals (CENTRUM SILVER PO), Take 1 tablet by mouth daily Takes in the am, Disp: , Rfl:     mupirocin (BACTROBAN) 2 % ointment, Apply topically 3 (three) times a day Apply topically on hands to open cuts to prevent infection, Disp: 30 g, Rfl: 0    NON FORMULARY, Place 30 mg under the tongue as needed --- tincture prn, Disp: , Rfl:     NON FORMULARY, Take 2 tablets by mouth daily Take Health and Sooth take two capsule daily , Disp: , Rfl:     omeprazole (PriLOSEC) 40 MG capsule, Take 1 capsule (40 mg total) by mouth daily before breakfast Takes in the am, Disp: 90 capsule, Rfl: 3    prochlorperazine (COMPAZINE) 10 mg tablet, Take 1 tablet (10 mg total) by mouth every 6 (six) hours as needed for nausea or vomiting, Disp: 12 tablet, Rfl: 1    DULoxetine (CYMBALTA) 30 mg delayed release capsule, Take 3 capsules (90 mg total) by mouth daily (Patient taking differently: Take 90 mg by mouth every evening ), Disp: 90 capsule, Rfl: 1    metaxalone (SKELAXIN) 800 mg tablet, Take 800 mg by mouth 3 (three) times a day , Disp: , Rfl:     rosuvastatin (CRESTOR) 5 mg tablet, Take 1 tablet (5 mg total) by mouth daily (Patient taking differently: Take 5 mg by mouth daily Take in the am), Disp: 90 tablet, Rfl: 0    solifenacin (VESICARE) 10 MG tablet, Take 1 tablet (10 mg total) by mouth daily (Patient taking differently: Take 10 mg by mouth daily Takes in the evening), Disp: 30 tablet, Rfl: 0    Turmeric (QC Tumeric Complex) 500 MG CAPS, Take 500 mg by mouth 2 (two) times a day , Disp: , Rfl:     ZOLMitriptan (ZOMIG) 2 5 MG tablet, TAKE 1 TABLET BY MOUTH AT ONSET OF MIGRAINE, MAY REPEAT IN 2 HOURS IF NEEDED   LIMIT 2/24 HOURS, 4/WEEK, 8/MONTH, Disp: 12 tablet, Rfl: 2    Past Medical History:   Diagnosis Date    Allergy to dust     Anemia     Calculus of ureter     Chronic pain disorder     neck and back- sees pain management    Colitis     Cracked skin     on fingers    Diverticulosis     Dysphagia     GERD (gastroesophageal reflux disease)     H/O: GI bleed     Hiatal hernia     History of DVT in adulthood 2019 2019    Hydronephrosis     Hyperlipidemia     Inguinal hernia     right side    Irregular heart beat     Pt states with prolonged QT interval - was seen by cardiologist( [facility]) pt told "to not worry about it"    Irritable bowel     Kidney stone     Migraines     Narcolepsy     Narcolepsy 8/6/2020    OAB (overactive bladder)     Osteoarthritis     Osteoporosis     Other chronic pain     degenerative disc disease    PONV (postoperative nausea and vomiting)     migraines    Smoking 8/6/2020    Tobacco abuse     Wears dentures     full upper    Wears glasses     Wears glasses        Past Surgical History:   Procedure Laterality Date    ABDOMINAL ADHESION SURGERY      ARM SKIN LESION BIOPSY / EXCISION      lymph node excision    BLADDER SURGERY      CHOLECYSTECTOMY      COLONOSCOPY  08/06/2020    FL RETROGRADE PYELOGRAM  1/5/2021    HERNIA REPAIR      HYSTERECTOMY  1977    JOINT REPLACEMENT Right     knee    KNEE SURGERY Right     replacement    LAMINECTOMY      L5-6-7    LAPAROSCOPY      LYMPH NODE DISSECTION Left     removed -no cancer-  limb restriction    MOLE REMOVAL      OOPHORECTOMY Bilateral 1977    OTHER SURGICAL HISTORY      enlarged lymph node removed left arm   no cancer    NH CYSTO/URETERO W/LITHOTRIPSY &INDWELL STENT INSRT Right 1/5/2021    Procedure: CYSTO, LASER  URETEROSCOPY, RETRO PYELOGRAM, STENT REPLACMENT;  Surgeon: Memo Tian MD;  [facility];  Service: Urology    NH CYSTOURETHROSCOPY,URETER CATHETER Right 12/2/2020    Procedure: CYSTOSCOPY AND INSERTION STENT URETERAL;  Surgeon: Maura Ryder MD;  [facility];  Service: Urology    [facility] Right 3/25/2021    Procedure: LITHOTRIPSY EXTRACORPORAL SHOCKWAVE (ESWL);   Surgeon: Rosa Antony MD;  Location: [address] [place] [address] MAIN OR;  Service: Urology    [address] [place] [address] HERNIA,5+Y/O,REDUCIBL Right 6/25/2020    Procedure: REPAIR HERNIA INGUINAL  WITH MESH;  Surgeon: Misty Alvarez MD;  [facility];  Service: General    SPINAL FUSION      UPPER GASTROINTESTINAL ENDOSCOPY  08/06/2020    WISDOM TOOTH EXTRACTION         Social History

## 2021-04-07 NOTE — PATIENT INSTRUCTIONS
DRINK 3 QUARTS (96 Oz ) LIQUIDS EACH DAY - ALL LIQUIDS COUNT       ** THESE FOODS ARE HIGH IN [place] - TRY TO LIMIT HOW MUCH OF THESE YOU EAT:  Coke and Pepsi  Nuts  Dark Leafy Greens:     Spinach and Kale, Rhubarb, Chard  Asparagus  Beets  Sweet potatoes   Blueberries, Strawberries   Dark tea (Green tea is okay)  Tofu    ADD LEMON JUICE 3 OZ  DAILY - Fresh squeezed or lemon juice concentrate - Not MinuteMaid, [place]  [place] ([facility] Hill, Crystal Lite, etc         ** Recipe for LICHA'S OLDE TYME LEMONADE:         One ounce of lemon juice, glass of water, sweetener of your choice    Coffee is okay! Cranberry juice is good to prevent infections, but does not help for stones

## 2021-04-08 ENCOUNTER — ANNUAL EXAM (OUTPATIENT)
Dept: FAMILY MEDICINE CLINIC | Facility: CLINIC | Age: 61
End: 2021-04-08
Payer: COMMERCIAL

## 2021-04-08 VITALS
SYSTOLIC BLOOD PRESSURE: 134 MMHG | HEART RATE: 86 BPM | HEIGHT: 63 IN | DIASTOLIC BLOOD PRESSURE: 68 MMHG | OXYGEN SATURATION: 96 % | BODY MASS INDEX: 20.38 KG/M2 | TEMPERATURE: 97.3 F | WEIGHT: 115 LBS

## 2021-04-08 DIAGNOSIS — G47.419 NARCOLEPSY WITHOUT CATAPLEXY: ICD-10-CM

## 2021-04-08 DIAGNOSIS — Z01.411 ENCNTR FOR GYN EXAM (GENERAL) (ROUTINE) W ABNORMAL FINDINGS: Primary | ICD-10-CM

## 2021-04-08 DIAGNOSIS — R79.0 LOW MAGNESIUM LEVEL: ICD-10-CM

## 2021-04-08 DIAGNOSIS — R25.1 EPISODE OF SHAKING: ICD-10-CM

## 2021-04-08 DIAGNOSIS — N39.0 URINARY TRACT INFECTION WITHOUT HEMATURIA, SITE UNSPECIFIED: ICD-10-CM

## 2021-04-08 DIAGNOSIS — R25.1 TREMOR: ICD-10-CM

## 2021-04-08 DIAGNOSIS — D64.9 ANEMIA, UNSPECIFIED TYPE: ICD-10-CM

## 2021-04-08 PROCEDURE — 99396 PREV VISIT EST AGE 40-64: CPT | Performed by: FAMILY MEDICINE

## 2021-04-08 PROCEDURE — G0145 SCR C/V CYTO,THINLAYER,RESCR: HCPCS | Performed by: FAMILY MEDICINE

## 2021-04-08 NOTE — PROGRESS NOTES
Assessment/Plan:       No problem-specific Assessment & Plan notes found for this encounter  Diagnoses and all orders for this visit:    Encntr for gyn exam (general) (routine) w abnormal findings  Comments:  Vaginal Pap smear done  No HPV done per her request   Orders:  -     Liquid-based pap, screening    Episode of shaking  Comments:  Questionable chills Call if not better or worse  Urine culture is pending   Orders:  -     Comprehensive metabolic panel; Future  -     TSH, 3rd generation with Free T4 reflex; Future  -     CBC and differential; Future  -     Blood culture; Future  -     XR chest pa & lateral; Future    Tremor  Comments:  Chronic but worse  Advised to follow with the Neurology    Anemia, unspecified type  Comments:  Corrected patient had GI workup including EGD and colonoscopy    Low magnesium level  Comments:  Corrected    Urinary tract infection without hematuria, site unspecified  Comments:  Recent urine culture was negative and also there is an urine culture ordered by Urology results pending    Narcolepsy without cataplexy  Comments:  side effect of armodonafil including dependency reviewed  Do not stop suddenly,keep it in safe place,keep it out away of children  Do not drive if feeling not rig        Patient Instructions   To follow up with test results      Orders Placed This Encounter   Procedures    Blood culture     [facility] x2     Standing Status:   Future     Standing Expiration Date:   4/8/2022    XR chest pa & lateral     Standing Status:   Future     Standing Expiration Date:   4/8/2022     Scheduling Instructions:      Bring along any outside films relating to this procedure  Order Specific Question:   Reason for Exam:     Answer:   shaking    Comprehensive metabolic panel     This is a patient instruction: Patient fasting for 8 hours or longer recommended       Standing Status:   Future     Standing Expiration Date:   4/8/2022    TSH, 3rd generation with Free T4 reflex Standing Status:   Future     Standing Expiration Date:   4/8/2022    CBC and differential     This is a patient instruction: This test is non-fasting  Please drink two glasses of water morning of bloodwork  Standing Status:   Future     Standing Expiration Date:   4/8/2022         Subjective:     Patient ID: Josh Daugherty is a 56 y o  female      HPI  Routine gyn  Pap smear more than 10 years ago was normal   Not sexually active  Never had TSD  Denied vaginal bleeding or discharge  Menopause  Had hysterectomy with bilateral oophorectomy at 27 years  Patient had severe dysmenorrhea turn out to be to severe scars tisuue assuming secondary to severe infection  Denied breast mass or pain  Her mammogram is scheduled      Review of Systems   Constitutional: Negative for chills, diaphoresis and fatigue  HENT: Negative for ear pain, sore throat, trouble swallowing and voice change  Eyes: Negative for visual disturbance  Respiratory: Negative for cough, chest tightness and shortness of breath  Cardiovascular: Negative for chest pain, palpitations and leg swelling  Gastrointestinal: Negative for abdominal pain, blood in stool, constipation, diarrhea and nausea  Endocrine: Negative for polydipsia and polyuria  Genitourinary: Negative for dysuria, flank pain, frequency, hematuria, pelvic pain, urgency, vaginal bleeding and vaginal discharge  Musculoskeletal: Negative for arthralgias, back pain, gait problem, myalgias and neck pain  Skin: Negative for rash  Allergic/Immunologic: Negative for food allergies  Neurological: Positive for tremors  Negative for dizziness, seizures, weakness, light-headedness, numbness and headaches  Hematological: Negative for adenopathy  Does not bruise/bleed easily  Psychiatric/Behavioral: Negative for confusion and dysphoric mood  The patient is not nervous/anxious          Objective:     Physical Exam  Constitutional:       General: She is not in acute distress  Appearance: She is well-developed  HENT:      Head: Normocephalic and atraumatic  Eyes:      General: No scleral icterus  Conjunctiva/sclera: Conjunctivae normal       Pupils: Pupils are equal, round, and reactive to light  Neck:      Musculoskeletal: Neck supple  Thyroid: No thyromegaly  Vascular: No JVD  Cardiovascular:      Rate and Rhythm: Normal rate and regular rhythm  Heart sounds: Normal heart sounds  No murmur  Comments: Extremities  No edema  Pulmonary:      Effort: Pulmonary effort is normal       Breath sounds: Normal breath sounds  Abdominal:      General: Abdomen is flat  Bowel sounds are normal  There is no distension  Palpations: Abdomen is soft  There is no mass  Tenderness: There is no abdominal tenderness  There is no guarding or rebound  Hernia: No hernia is present  Genitourinary:     General: Normal vulva  Comments: Breast   Both breast   With no mass, no tenderness  No skin changes  Nipple is normal bilaterally  Negative axillary lymph node bilaterally  Vagina   No discharge  Positive atrophy  No lesion or erythema  Pelvic exam   No mass no tenderness  Rectal exam not done because patient just had colonoscopy recently  She declined  Musculoskeletal:      Right lower leg: No edema  Left lower leg: No edema  Lymphadenopathy:      Cervical: No cervical adenopathy  Skin:     Findings: No rash  Neurological:      Mental Status: She is alert and oriented to person, place, and time  Cranial Nerves: No cranial nerve deficit  Motor: No abnormal muscle tone  Coordination: Coordination normal    Psychiatric:         Mood and Affect: Mood normal          Behavior: Behavior normal          Thought Content:  Thought content normal

## 2021-04-09 DIAGNOSIS — N39.41 URGE INCONTINENCE: ICD-10-CM

## 2021-04-09 DIAGNOSIS — R39.15 URGENCY OF URINATION: ICD-10-CM

## 2021-04-09 RX ORDER — SOLIFENACIN SUCCINATE 10 MG/1
10 TABLET, FILM COATED ORAL EVERY EVENING
Qty: 90 TABLET | Refills: 3 | Status: SHIPPED | OUTPATIENT
Start: 2021-04-09 | End: 2021-05-17 | Stop reason: SDUPTHER

## 2021-04-09 NOTE — TELEPHONE ENCOUNTER
Patient left a message on the Medication Refill voice mail line requesting a new prescription for Solifenacin (VESICARE) 10mg, 90 day supply to [facility] order pharmacy

## 2021-04-09 NOTE — TELEPHONE ENCOUNTER
Patient was switched to Solifenacin from Oxybutynin back in December, 2020 per Dr Juliana Campbell order    Script for the requested medication was queued and forwarded to the Advanced Practitioner covering the [facility] location for approval

## 2021-04-10 NOTE — PROGRESS NOTES
Assessment/Plan:       No problem-specific Assessment & Plan notes found for this encounter  Diagnoses and all orders for this visit:    Encntr for gyn exam (general) (routine) w abnormal findings  Comments:  Vaginal Pap smear done  No HPV done per her request   Orders:  -     Liquid-based pap, screening    Episode of shaking  Comments:  Questionable chills Call if not better or worse  Urine culture is pending   Orders:  -     Comprehensive metabolic panel; Future  -     TSH, 3rd generation with Free T4 reflex; Future  -     CBC and differential; Future  -     Blood culture; Future  -     XR chest pa & lateral; Future    Tremor  Comments:  Chronic but worse  Advised to follow with the Neurology    Anemia, unspecified type  Comments:  Corrected patient had GI workup including EGD and colonoscopy    Low magnesium level  Comments:  Corrected    Urinary tract infection without hematuria, site unspecified  Comments:  Recent urine culture was negative and also there is an urine culture ordered by Urology results pending    Narcolepsy without cataplexy  Comments:  side effect of armodonafil including dependency reviewed  Do not stop suddenly,keep it in safe place,keep it out away of children  Do not drive if feeling not rig        Patient Instructions   To follow up with test results      Orders Placed This Encounter   Procedures    Blood culture     [facility] x2     Standing Status:   Future     Standing Expiration Date:   4/8/2022    XR chest pa & lateral     Standing Status:   Future     Standing Expiration Date:   4/8/2022     Scheduling Instructions:      Bring along any outside films relating to this procedure  Order Specific Question:   Reason for Exam:     Answer:   shaking    Comprehensive metabolic panel     This is a patient instruction: Patient fasting for 8 hours or longer recommended       Standing Status:   Future     Standing Expiration Date:   4/8/2022    TSH, 3rd generation with Free T4 reflex Standing Status:   Future     Standing Expiration Date:   4/8/2022    CBC and differential     This is a patient instruction: This test is non-fasting  Please drink two glasses of water morning of bloodwork  Standing Status:   Future     Standing Expiration Date:   4/8/2022         Subjective:     Patient ID: Alexandre Neal is a 56 y o  female      HPI  New complaint  Shaking  Patient stated 2 weeks ago for stent removal   Postprocedure patient start shaking  They pain did on hypothermia and anesthesia  Patient stated she has been shaking since then occasionally could happen any time  She feel her body is shaking but she does not think she has a chills  Denied fever  Denied cough or chest pain denied nausea or vomiting  Denied abdominal pain denied problem with stool or urination  Denied vaginal discharge  Tremor patient with known tremor of both hands  Mild  But she said since her procedure 2 weeks ago her tremors had increased of both hands  Denied seizure activity  Urinary tract infection  Denied dysuria, urinary frequency or hematuria  Patient had stent removed 2 weeks ago  Idiopathic hypersomnia  Patient used to follow with Dr Vinicio Hallman, patient had some conflict with him  She scheduled herself with Dr austin   She just got a letter stating Dr carlos guzman has retired and they gave her an appointment with another doctor in July  Patient stated she   need refill on her medication and she sees a new doctor  Test results  Lab done on March 14 and 16 /2021  Discussed result with patient    Review of Systems   Constitutional: Negative for activity change, appetite change, chills, fatigue, fever and unexpected weight change  HENT: Negative for congestion, ear discharge, ear pain, hearing loss, nosebleeds, rhinorrhea, sinus pressure, sore throat, tinnitus, trouble swallowing and voice change  Eyes: Negative for photophobia, pain and visual disturbance     Respiratory: Negative for cough, chest tightness, shortness of breath and wheezing  Cardiovascular: Negative for chest pain, palpitations and leg swelling  Gastrointestinal: Negative for abdominal pain, anal bleeding, blood in stool, constipation, diarrhea, nausea and vomiting  Endocrine: Negative for cold intolerance, heat intolerance, polydipsia and polyuria  Genitourinary: Negative for dysuria, frequency, hematuria and urgency  Musculoskeletal: Negative for arthralgias, back pain, gait problem, joint swelling, myalgias and neck pain  Skin: Negative for rash  Neurological: Positive for tremors  Negative for dizziness, seizures, syncope, weakness, light-headedness and headaches  Hematological: Negative for adenopathy  Does not bruise/bleed easily  Psychiatric/Behavioral: Negative for agitation, behavioral problems, confusion, dysphoric mood, hallucinations and sleep disturbance  The patient is not nervous/anxious  Objective:     Physical Exam  Constitutional:       General: She is not in acute distress  Appearance: Normal appearance  She is well-developed  She is not ill-appearing, toxic-appearing or diaphoretic  HENT:      Head: Normocephalic and atraumatic  Eyes:      General: No scleral icterus  Conjunctiva/sclera: Conjunctivae normal       Pupils: Pupils are equal, round, and reactive to light  Neck:      Thyroid: No thyromegaly  Vascular: No JVD  Cardiovascular:      Rate and Rhythm: Normal rate and regular rhythm  Heart sounds: Normal heart sounds  No murmur  Comments: Extremities  No edema  Pulmonary:      Effort: Pulmonary effort is normal       Breath sounds: Normal breath sounds  Abdominal:      Palpations: Abdomen is soft  There is no mass  Tenderness: There is no abdominal tenderness  There is no guarding or rebound  Hernia: No hernia is present  Musculoskeletal:      Right lower leg: No edema  Left lower leg: No edema     Lymphadenopathy:      Cervical: No cervical adenopathy  Skin:     Findings: No rash  Neurological:      General: No focal deficit present  Mental Status: She is alert  Cranial Nerves: No cranial nerve deficit  Sensory: No sensory deficit  Motor: No weakness or abnormal muscle tone  Coordination: Coordination normal       Gait: Gait normal       Comments: There is  mild gross tremor of both hands with both arm being extended  No tremor of the head or lower extremities   Psychiatric:         Mood and Affect: Mood normal          Behavior: Behavior normal          Thought Content:  Thought content normal          Judgment: Judgment normal

## 2021-04-14 LAB
LAB AP GYN PRIMARY INTERPRETATION: NORMAL
Lab: NORMAL

## 2021-04-15 ENCOUNTER — APPOINTMENT (OUTPATIENT)
Dept: LAB | Facility: MEDICAL CENTER | Age: 61
End: 2021-04-15
Payer: COMMERCIAL

## 2021-04-15 ENCOUNTER — APPOINTMENT (OUTPATIENT)
Dept: RADIOLOGY | Facility: CLINIC | Age: 61
End: 2021-04-15
Payer: COMMERCIAL

## 2021-04-15 DIAGNOSIS — R25.1 EPISODE OF SHAKING: ICD-10-CM

## 2021-04-15 PROCEDURE — 71046 X-RAY EXAM CHEST 2 VIEWS: CPT

## 2021-04-19 ENCOUNTER — TELEPHONE (OUTPATIENT)
Dept: GASTROENTEROLOGY | Facility: MEDICAL CENTER | Age: 61
End: 2021-04-19

## 2021-04-19 ENCOUNTER — APPOINTMENT (OUTPATIENT)
Dept: LAB | Facility: CLINIC | Age: 61
End: 2021-04-19
Payer: COMMERCIAL

## 2021-04-19 ENCOUNTER — APPOINTMENT (OUTPATIENT)
Dept: LAB | Facility: MEDICAL CENTER | Age: 61
End: 2021-04-19
Attending: INTERNAL MEDICINE
Payer: COMMERCIAL

## 2021-04-19 ENCOUNTER — OFFICE VISIT (OUTPATIENT)
Dept: GASTROENTEROLOGY | Facility: MEDICAL CENTER | Age: 61
End: 2021-04-19
Payer: COMMERCIAL

## 2021-04-19 VITALS
HEIGHT: 63 IN | BODY MASS INDEX: 20.38 KG/M2 | DIASTOLIC BLOOD PRESSURE: 77 MMHG | WEIGHT: 115 LBS | TEMPERATURE: 98.7 F | HEART RATE: 101 BPM | SYSTOLIC BLOOD PRESSURE: 119 MMHG

## 2021-04-19 DIAGNOSIS — K52.9 CHRONIC DIARRHEA: ICD-10-CM

## 2021-04-19 DIAGNOSIS — K52.9 COLITIS: ICD-10-CM

## 2021-04-19 DIAGNOSIS — K52.9 CHRONIC DIARRHEA: Primary | ICD-10-CM

## 2021-04-19 DIAGNOSIS — R10.13 DYSPEPSIA: ICD-10-CM

## 2021-04-19 DIAGNOSIS — K58.2 IRRITABLE BOWEL SYNDROME WITH BOTH CONSTIPATION AND DIARRHEA: ICD-10-CM

## 2021-04-19 DIAGNOSIS — K57.90 DIVERTICULOSIS: ICD-10-CM

## 2021-04-19 DIAGNOSIS — K59.00 CONSTIPATION, UNSPECIFIED CONSTIPATION TYPE: ICD-10-CM

## 2021-04-19 LAB — CRP SERPL QL: <3 MG/L

## 2021-04-19 PROCEDURE — 99213 OFFICE O/P EST LOW 20 MIN: CPT | Performed by: INTERNAL MEDICINE

## 2021-04-19 PROCEDURE — 84560 ASSAY OF URINE/URIC ACID: CPT | Performed by: UROLOGY

## 2021-04-19 PROCEDURE — 82140 ASSAY OF AMMONIA: CPT | Performed by: UROLOGY

## 2021-04-19 PROCEDURE — 86140 C-REACTIVE PROTEIN: CPT

## 2021-04-19 PROCEDURE — 84133 ASSAY OF URINE POTASSIUM: CPT | Performed by: UROLOGY

## 2021-04-19 PROCEDURE — 3008F BODY MASS INDEX DOCD: CPT | Performed by: INTERNAL MEDICINE

## 2021-04-19 PROCEDURE — 82131 AMINO ACIDS SINGLE QUANT: CPT | Performed by: UROLOGY

## 2021-04-19 PROCEDURE — 82507 ASSAY OF CITRATE: CPT | Performed by: UROLOGY

## 2021-04-19 PROCEDURE — 83735 ASSAY OF MAGNESIUM: CPT | Performed by: UROLOGY

## 2021-04-19 PROCEDURE — 82436 ASSAY OF URINE CHLORIDE: CPT | Performed by: UROLOGY

## 2021-04-19 PROCEDURE — 81003 URINALYSIS AUTO W/O SCOPE: CPT | Performed by: UROLOGY

## 2021-04-19 PROCEDURE — 84105 ASSAY OF URINE PHOSPHORUS: CPT | Performed by: UROLOGY

## 2021-04-19 PROCEDURE — 4004F PT TOBACCO SCREEN RCVD TLK: CPT | Performed by: INTERNAL MEDICINE

## 2021-04-19 PROCEDURE — 84392 ASSAY OF URINE SULFATE: CPT | Performed by: UROLOGY

## 2021-04-19 PROCEDURE — 36415 COLL VENOUS BLD VENIPUNCTURE: CPT

## 2021-04-19 PROCEDURE — 82570 ASSAY OF URINE CREATININE: CPT | Performed by: UROLOGY

## 2021-04-19 PROCEDURE — 84300 ASSAY OF URINE SODIUM: CPT | Performed by: UROLOGY

## 2021-04-19 PROCEDURE — 82340 ASSAY OF CALCIUM IN URINE: CPT | Performed by: UROLOGY

## 2021-04-19 PROCEDURE — 83935 ASSAY OF URINE OSMOLALITY: CPT | Performed by: UROLOGY

## 2021-04-19 PROCEDURE — 83945 ASSAY OF OXALATE: CPT | Performed by: UROLOGY

## 2021-04-19 NOTE — TELEPHONE ENCOUNTER
Our mutual patient is scheduled for procedure: On: 10/7/21      With: Dr Nehal Torres     He/She is taking the following blood thinner:                     Voltaren                Can this be stopped _5   days prior to the procedure?        Physician Approving clearance: ________________________

## 2021-04-19 NOTE — PROGRESS NOTES
[facility] - Outpatient Follow-up Note  Edu Moses 56 y o  female MRN: [id number]  Encounter: [id number]          ASSESSMENT AND PLAN:    Edu Moses is a 56 y o  female who presents with complaint of chronic diarrhea alternating with constipation as well as diverticulosis and colitis (SCAD vs Crohn's), with symptom improvement on Lialda  Overall doing well  She has a kidney stone  She has some dyspepsia  CMP, CBC relatively normal      1  Chronic diarrhea    2  Constipation, unspecified constipation type    3  Irritable bowel syndrome with both constipation and diarrhea    4  Dyspepsia    5  Diverticulosis    6  Colitis        Orders Placed This Encounter   Procedures    Calprotectin,Fecal    C-reactive protein    Colonoscopy     Continue Lialda  Low threshold to increase the dose  Repeat calprotectin  Colonoscopy Summer 2021  Repeat blood work and stool tests at next visit  Continue PPI  Bentyl PRN  Compazine PRN  ______________________________________________________________________    SUBJECTIVE:    Edu Moses is a 56 y o  female who presents with complaint of colitis, diarrhea, constipation    She got rid of her kidney stone  She has alternating diarrhea and constipation  She has loose or soft bowels, maybe 2 days of constipation when she takes stool softeners, and then 5 days of loose bowels  She has some abdominal pain on occasion  She is supposed to drink lemon juice for the kidney stone and it gives her abdominal discomfort (espeically in the epigastric region)  + dyspepsia  No BRBPR or black stools  She has mouth sores  She has anemia but attributes it to her kidney stones  + fibromyalgia and hip pain and back pain  No rashes  + Weight gain  Rare dysphagia  She remains on omeprazole       Answers for HPI/ROS submitted by the patient on 4/12/2021   Abdominal pain  Chronicity: chronic  Onset: more than 1 year ago  Onset quality: gradual  Frequency: intermittently  Progression since onset: waxing and waning  Pain location: generalized abdominal region  Pain - numeric: 6/10  Pain quality: cramping  anorexia: Yes  arthralgias: No  belching: No  constipation: No  diarrhea: Yes  dysuria: No  fever: No  flatus: No  frequency: Yes  headaches: No  hematochezia: No  hematuria: No  melena: No  myalgias: No  nausea: Yes  weight loss: Yes  Aggravated by: eating  Relieved by: certain positions, recumbency  Diagnostic workup: CT scan      REVIEW OF SYSTEMS IS OTHERWISE NEGATIVE    10 point ROS reviewed and negative, except as above      Historical Information   Past Medical History:   Diagnosis Date    Allergy to dust     Anemia     Calculus of ureter     Chronic pain disorder     neck and back- sees pain management    Colitis     Cracked skin     on fingers    Diverticulosis     Dysphagia     GERD (gastroesophageal reflux disease)     H/O: GI bleed     Hiatal hernia     History of DVT in adulthood 2019 2019    Hydronephrosis     Hyperlipidemia     Inguinal hernia     right side    Irregular heart beat     Pt states with prolonged QT interval - was seen by cardiologist( [facility]) pt told "to not worry about it"    Irritable bowel     Kidney stone     Migraines     Narcolepsy     Narcolepsy 8/6/2020    OAB (overactive bladder)     Osteoarthritis     Osteoporosis     Other chronic pain     degenerative disc disease    PONV (postoperative nausea and vomiting)     migraines    Smoking 8/6/2020    Tobacco abuse     Wears dentures     full upper    Wears glasses     Wears glasses      Past Surgical History:   Procedure Laterality Date    ABDOMINAL ADHESION SURGERY      ARM SKIN LESION BIOPSY / EXCISION      lymph node excision    BLADDER SURGERY      CHOLECYSTECTOMY      COLONOSCOPY  08/06/2020    FL RETROGRADE PYELOGRAM  1/5/2021    HERNIA REPAIR      HYSTERECTOMY  1977    JOINT REPLACEMENT Right     knee    KNEE SURGERY Right replacement    LAMINECTOMY      L5-6-7    LAPAROSCOPY      LYMPH NODE DISSECTION Left     removed -no cancer-  limb restriction    MOLE REMOVAL      OOPHORECTOMY Bilateral 1977    OTHER SURGICAL HISTORY      enlarged lymph node removed left arm   no cancer    KS CYSTO/URETERO W/LITHOTRIPSY &INDWELL STENT INSRT Right 1/5/2021    Procedure: CYSTO, LASER  URETEROSCOPY, RETRO PYELOGRAM, STENT REPLACMENT;  Surgeon: Adri Riley MD;  Location[facility];  Service: Urology    KS CYSTOURETHROSCOPY,URETER CATHETER Right 12/2/2020    Procedure: CYSTOSCOPY AND INSERTION STENT URETERAL;  Surgeon: Anita Woodson MD;  [facility];  Service: Urology    [place] ESWL Right 3/25/2021    Procedure: LITHOTRIPSY EXTRACORPORAL SHOCKWAVE (ESWL);   Surgeon: Ed Hollis MD;  Location: [facility] MAIN OR;  Service: Urology    [address] [facility] HERNIA,5+Y/O,REDUCIBL Right 6/25/2020    Procedure: REPAIR HERNIA INGUINAL  WITH MESH;  Surgeon: Miranda Garcia MD;  Location: [facility];  Service: General    SPINAL FUSION      UPPER GASTROINTESTINAL ENDOSCOPY  08/06/2020    WISDOM TOOTH EXTRACTION       Social History   Social History     Substance and Sexual Activity   Alcohol Use Not Currently    Comment: seldom     Social History     Substance and Sexual Activity   Drug Use Yes    Types: Marijuana    Comment: medicinal marijuana; last dose was 3/16     Social History     Tobacco Use   Smoking Status Current Every Day Smoker    Packs/day: 0 50    Years: 47 00    Pack years: 23 50    Types: Cigarettes   Smokeless Tobacco Never Used   Tobacco Comment    pt states is at 1/2 pk day currently - was previously at one pack /day     Family History   Problem Relation Age of Onset    Hypertension Father     Cancer Father     Hypertension Mother     Heart attack Mother     Stroke Brother     Nephrolithiasis Brother     Diabetes Sister     No Known Problems Daughter     No Known Problems Maternal Grandmother  No Known Problems Maternal Grandfather     No Known Problems Paternal Grandmother     No Known Problems Paternal Grandfather     Diabetes Sister     No Known Problems Maternal Aunt     No Known Problems Maternal Aunt     No Known Problems Maternal Aunt     No Known Problems Paternal Aunt     No Known Problems Paternal Aunt     No Known Problems Paternal Aunt        Meds/Allergies       Current Outpatient Medications:     Armodafinil 250 MG tablet    BACILLUS COAGULANS-INULIN PO    calcium carbonate 1250 MG capsule    Cholecalciferol 1000 units tablet    Citicoline Sodium 500 MG TABS    Coenzyme Q10 200 MG/GM POWD    denosumab (PROLIA) 60 mg/mL    diclofenac (VOLTAREN) 75 mg EC tablet    dicyclomine (BENTYL) 20 mg tablet    ferrous sulfate 325 (65 Fe) mg tablet    fluticasone (FLONASE) 50 mcg/act nasal spray    gabapentin (NEURONTIN) 300 mg capsule    Lori, Zingiber officinalis, (LORI ROOT) 550 MG CAPS    Ginkgo Biloba 40 MG TABS    mesalamine (LIALDA) 1 2 g EC tablet    Multiple Vitamins-Minerals (CENTRUM SILVER PO)    mupirocin (BACTROBAN) 2 % ointment    NON FORMULARY    NON FORMULARY    omeprazole (PriLOSEC) 40 MG capsule    prochlorperazine (COMPAZINE) 10 mg tablet    rosuvastatin (CRESTOR) 5 mg tablet    solifenacin (VESICARE) 10 MG tablet    Turmeric (QC Tumeric Complex) 500 MG CAPS    ZOLMitriptan (ZOMIG) 2 5 MG tablet    DULoxetine (CYMBALTA) 30 mg delayed release capsule    metaxalone (SKELAXIN) 800 mg tablet    Allergies   Allergen Reactions    Celecoxib Other (See Comments)     Increased Heart Rate, Palpitations    Sumatriptan Anaphylaxis     Other reaction(s): SUMATRIPTAN (IMITREX) (Throat closure)  Pt analilia zolmitriptan      Tramadol Other (See Comments)     GI Upset- severe vomiting and systemic body aches    Medical Tape Dermatitis, Rash and Blisters     Adhesive from medication patches and bandaides- ok with paper tape           Objective     Blood pressure 119/77, pulse 101, temperature 98 7 °F (37 1 °C), temperature source Tympanic, height 5' 3" (1 6 m), weight 52 2 kg (115 lb), not currently breastfeeding  Body mass index is 20 37 kg/m²  PHYSICAL EXAMINATION:    General Appearance:   Alert, cooperative, no distress   HEENT:  Normocephalic, atraumatic, anicteric  Neck supple, symmetrical, trachea midline  Lungs:   Equal chest rise and unlabored breathing, normal effort, no coughing  Cardiovascular:   No visualized JVD  Abdomen:   No abdominal distension  Skin:   No jaundice, rashes, or lesions  Musculoskeletal:   Normal range of motion visualized  Psych:  Normal affect and normal insight  Neuro:  Alert and appropriate  Lab Results:   Appointment on 04/19/2021   Component Date Value    CRP 04/19/2021 <3 0        Lab Results   Component Value Date    WBC 5 51 04/15/2021    HGB 12 4 04/15/2021    HCT 39 8 04/15/2021    MCV 98 04/15/2021     04/15/2021       Lab Results   Component Value Date    SODIUM 141 04/15/2021    K 4 6 04/15/2021     (H) 04/15/2021    CO2 28 04/15/2021    AGAP 4 04/15/2021    BUN 11 04/15/2021    CREATININE 0 72 04/15/2021    GLUC 84 02/10/2021    GLUF 101 (H) 04/15/2021    CALCIUM 9 1 04/15/2021    AST 20 04/15/2021    ALT 27 04/15/2021    ALKPHOS 90 04/15/2021    TP 7 1 04/15/2021    TBILI 0 30 04/15/2021    EGFR 90 04/15/2021       Lab Results   Component Value Date    CRP <3 0 04/19/2021       Lab Results   Component Value Date    KBR9IOPTVEZD 2 150 04/15/2021       Lab Results   Component Value Date    IRON 80 12/27/2020    TIBC 185 (L) 12/27/2020    FERRITIN 213 12/27/2020       Radiology Results:   Xr Chest Pa & Lateral    Result Date: 4/18/2021  Narrative: CHEST INDICATION:   R25 1: Tremor, unspecified  COMPARISON:  Chest radiograph from 12/30/2020 and chest CT from 11/2/2018  EXAM PERFORMED/VIEWS:  XR CHEST PA & LATERAL  FINDINGS: Cardiomediastinal silhouette normal  Lungs clear   No effusion or pneumothorax  Osseous structures normal for age  Cervical fusion  Impression: No acute cardiopulmonary disease  Workstation performed: [id number]     Xr Abdomen 1 View Kub    Result Date: 4/3/2021  Narrative: ABDOMEN INDICATION:   N20 0: Calculus of kidney  COMPARISON:  Abdomen and pelvic CT from 1/13/2021  VIEWS:  AP supine FINDINGS: Right ureteral stent remains in place  No radiopaque renal calculi seen  No radiopaque ureteral calculi identified  Nonobstructive bowel gas pattern  No acute osseous abnormality is seen  Impression: Right ureteral stent remains in place  No radiopaque renal calculi seen   Workstation performed: [id number]

## 2021-04-19 NOTE — PATIENT INSTRUCTIONS
The patient is scheduled at [facility] for a colon with Dr Harper Duty on 10/7/2021  Miralax/dulcolax prep instructions have been gone over in the office, with the patient, by the MA  The patient is aware that they will receive a call with the arrival time the day prior to procedure and that they will need a  the day of the procedure   I have asked the patient to call with any questions that they might have prior to procedure

## 2021-04-20 NOTE — TELEPHONE ENCOUNTER
Request sent to Everardo Shelton at [facility] to hold Voltaren due to procedure

## 2021-05-03 LAB
AMMONIA 24H UR-MRATE: 20 MEQ/24 HR
AMMONIA UR-SCNC: ABNORMAL UG/DL
CA H2 PHOS DIHYD CRY URNS QL MICRO: 1.04 RATIO (ref 0–3)
CALCIUM 24H UR-MCNC: 5.1 MG/DL
CALCIUM 24H UR-MRATE: 132.1 MG/24 HR (ref 100–300)
CHLORIDE 24H UR-SCNC: 69 MMOL/L
CHLORIDE 24H UR-SRATE: 179 MMOL/24 HR (ref 110–250)
CITRATE 24H UR-MCNC: 116 MG/L
CITRATE 24H UR-MRATE: 300 MG/24 HR (ref 320–1240)
COM CRY STONE QL IR: 2.65 RATIO (ref 0–6)
CREAT 24H UR-MCNC: 32.6 MG/DL
CREAT 24H UR-MRATE: 844.3 MG/24 HR (ref 800–1800)
CYSTINE 24H UR-MCNC: 3.45 MG/L
CYSTINE 24H UR-MRATE: 8.94 MG/24 HR (ref 10–100)
MAGNESIUM 24H UR-MRATE: 62 MG/24 HR (ref 12–293)
MAGNESIUM UR-MCNC: 2.4 MG/DL
NA URATE CRY STONE QL IR: 1.21 RATIO (ref 0–4)
OSMOLALITY UR: 311 MOSMOL/KG (ref 300–900)
OXALATE 24H UR-MRATE: 31 MG/24 HR (ref 4–31)
OXALATE UR-MCNC: 12 MG/L
PH 24H UR: 6.7 [PH]
PHOSPHATE 24H UR-MCNC: 25.8 MG/DL
PHOSPHATE 24H UR-MRATE: 668.2 MG/24 HR (ref 400–1300)
PLEASE NOTE (STONE RISK): ABNORMAL
POTASSIUM 24H UR-SCNC: 58.5 MMOL/24 HR (ref 25–125)
POTASSIUM UR-SCNC: 22.6 MMOL/L
PRESERVED URINE: 2590 ML/24 HR (ref 600–1600)
SODIUM 24H UR-SCNC: 81 MMOL/L
SODIUM 24H UR-SRATE: 210 MMOL/24 HR (ref 39–258)
SPECIMEN VOL 24H UR: 2590 ML/24 HR (ref 600–1600)
SULFATE 24H UR-MCNC: 18 MEQ/24 HR (ref 0–30)
SULFATE UR-MCNC: 7 MEQ/L
TRI-PHOS CRY STONE MICRO: 0.03 RATIO (ref 0–1)
URATE 24H UR-MCNC: 12.4 MG/DL
URATE 24H UR-MRATE: 321 MG/24 HR (ref 250–750)
URATE DIHYD CRY STONE QL IR: 0.12 RATIO (ref 0–1.2)

## 2021-05-04 ENCOUNTER — TELEPHONE (OUTPATIENT)
Dept: GASTROENTEROLOGY | Facility: MEDICAL CENTER | Age: 61
End: 2021-05-04

## 2021-05-04 NOTE — TELEPHONE ENCOUNTER
Left voice message for patient informing her that Dr Katie Candelaria approved the 5 day voltaren hold prior to her procedure with Dr [place]aux   Patient was told that she can call the office if she has any further questions

## 2021-05-17 DIAGNOSIS — G47.11 IDIOPATHIC HYPERSOMNIA: ICD-10-CM

## 2021-05-17 DIAGNOSIS — N39.41 URGE INCONTINENCE: ICD-10-CM

## 2021-05-17 DIAGNOSIS — E78.00 PURE HYPERCHOLESTEROLEMIA: ICD-10-CM

## 2021-05-17 DIAGNOSIS — R39.15 URGENCY OF URINATION: ICD-10-CM

## 2021-05-18 RX ORDER — ROSUVASTATIN CALCIUM 5 MG/1
5 TABLET, COATED ORAL DAILY
Qty: 90 TABLET | Refills: 0 | Status: SHIPPED | OUTPATIENT
Start: 2021-05-18 | End: 2021-08-23 | Stop reason: SDUPTHER

## 2021-05-18 RX ORDER — ARMODAFINIL 250 MG/1
250 TABLET ORAL DAILY
Qty: 30 TABLET | Refills: 0 | Status: SHIPPED | OUTPATIENT
Start: 2021-05-18 | End: 2021-06-17 | Stop reason: SDUPTHER

## 2021-05-19 RX ORDER — SOLIFENACIN SUCCINATE 10 MG/1
10 TABLET, FILM COATED ORAL EVERY EVENING
Qty: 90 TABLET | Refills: 3 | Status: SHIPPED | OUTPATIENT
Start: 2021-05-19 | End: 2021-09-15 | Stop reason: SDUPTHER

## 2021-05-19 NOTE — TELEPHONE ENCOUNTER
Script was mistakenly sent to [facility] when it needed to go to [facility] order pharmacy      Script requeued and forwarded to the Advanced Practitioner covering the [facility] location for approval

## 2021-05-20 ENCOUNTER — OFFICE VISIT (OUTPATIENT)
Dept: FAMILY MEDICINE CLINIC | Facility: CLINIC | Age: 61
End: 2021-05-20
Payer: COMMERCIAL

## 2021-05-20 VITALS
WEIGHT: 114 LBS | SYSTOLIC BLOOD PRESSURE: 121 MMHG | TEMPERATURE: 97.3 F | DIASTOLIC BLOOD PRESSURE: 64 MMHG | OXYGEN SATURATION: 96 % | HEIGHT: 63 IN | HEART RATE: 80 BPM | BODY MASS INDEX: 20.2 KG/M2

## 2021-05-20 DIAGNOSIS — M25.512 CHRONIC LEFT SHOULDER PAIN: ICD-10-CM

## 2021-05-20 DIAGNOSIS — F17.200 SMOKING: ICD-10-CM

## 2021-05-20 DIAGNOSIS — Z00.00 WELL ADULT EXAM: Primary | ICD-10-CM

## 2021-05-20 DIAGNOSIS — R73.9 ELEVATED BLOOD SUGAR: ICD-10-CM

## 2021-05-20 DIAGNOSIS — G89.29 CHRONIC LEFT SHOULDER PAIN: ICD-10-CM

## 2021-05-20 PROCEDURE — 4004F PT TOBACCO SCREEN RCVD TLK: CPT | Performed by: FAMILY MEDICINE

## 2021-05-20 PROCEDURE — 99396 PREV VISIT EST AGE 40-64: CPT | Performed by: FAMILY MEDICINE

## 2021-05-20 NOTE — PROGRESS NOTES
Assessment/Plan:       No problem-specific Assessment & Plan notes found for this encounter  Diagnoses and all orders for this visit:    Well adult exam  Comments:  to see oph  Elevated blood sugar  -     Hemoglobin A1C; Future    Smoking  Comments:  Discussed with patient smoking cessation  Patient stated is not ready yet    Chronic left shoulder pain  Comments: To consider physical therapy  Orders:  -     XR shoulder 2+ vw left; Future        Patient Instructions   To follow with test results      Orders Placed This Encounter   Procedures    XR shoulder 2+ vw left     Standing Status:   Future     Standing Expiration Date:   5/20/2025     Scheduling Instructions:      Bring along any outside films relating to this procedure   Hemoglobin A1C     Standing Status:   Future     Standing Expiration Date:   5/20/2022         Subjective:     Patient ID: Ziyad Esqueda is a 56 y o  female      HPI    she smokes   Not drinking , no drugs  Last eye exam about 4 years , she wear glasses  She smokes  She started smoking at 11years old for 10 years she smoked 1 pack a day at the rest of her year less than half pack/day   no drniking , no drugs  Dental care , will see her dentist soon  Gyn exam is up-to-date  Breast exam is up-to-date  Mammogram scheduled next  Family history of diabetes  To sister are diabetic  Pap smear  Done recently  colonscopy ,scheduled, follow on colonic polyps  Diet  Regular    exercise   None   shoulderpain xq1 year  Lab done on April 15, 2021    Review of Systems   Constitutional: Negative for chills, diaphoresis, fatigue, fever and unexpected weight change  HENT: Negative for dental problem, drooling, ear discharge, ear pain, facial swelling, mouth sores, postnasal drip, rhinorrhea, sinus pressure, sinus pain, sneezing, sore throat, trouble swallowing and voice change  Eyes: Negative for photophobia, pain, discharge, redness, itching and visual disturbance     Respiratory: Negative for cough, chest tightness and shortness of breath  Cardiovascular: Negative for chest pain, palpitations and leg swelling  Gastrointestinal: Negative for abdominal pain, blood in stool, constipation, diarrhea and nausea  Endocrine: Negative for cold intolerance, heat intolerance, polydipsia and polyuria  Genitourinary: Negative for dysuria, flank pain, frequency, hematuria, pelvic pain, urgency, vaginal bleeding and vaginal discharge  Musculoskeletal: Negative for back pain, gait problem, myalgias and neck pain  Skin: Negative for rash  Allergic/Immunologic: Negative for food allergies  Neurological: Negative for dizziness, tremors, seizures, speech difficulty, weakness, light-headedness, numbness and headaches  Hematological: Negative for adenopathy  Does not bruise/bleed easily  Psychiatric/Behavioral: Negative for agitation, behavioral problems, confusion, dysphoric mood, hallucinations, self-injury, sleep disturbance and suicidal ideas  The patient is not nervous/anxious and is not hyperactive  Objective:     Physical Exam  Constitutional:       General: She is not in acute distress  Appearance: Normal appearance  She is well-developed  She is not ill-appearing or diaphoretic  HENT:      Head: Normocephalic and atraumatic  Right Ear: Tympanic membrane, ear canal and external ear normal       Left Ear: Tympanic membrane, ear canal and external ear normal       Nose:      Comments: Nose and throat not examined , pt has a mask due to covid pandemic  Eyes:      General: No scleral icterus  Conjunctiva/sclera: Conjunctivae normal       Pupils: Pupils are equal, round, and reactive to light  Neck:      Musculoskeletal: Neck supple  No neck rigidity  Thyroid: No thyromegaly  Vascular: No carotid bruit or JVD  Cardiovascular:      Rate and Rhythm: Normal rate and regular rhythm        Pulses:           Carotid pulses are 3+ on the right side and 3+ on the left side  Dorsalis pedis pulses are 3+ on the right side and 3+ on the left side  Posterior tibial pulses are 3+ on the right side and 3+ on the left side  Heart sounds: Normal heart sounds  No murmur  Pulmonary:      Effort: Pulmonary effort is normal  No respiratory distress  Breath sounds: Normal breath sounds  No stridor  No wheezing or rales  Abdominal:      General: Bowel sounds are normal  There is no distension  Palpations: Abdomen is soft  There is no mass  Tenderness: There is no abdominal tenderness  There is no guarding or rebound  Hernia: No hernia is present  Musculoskeletal: Normal range of motion  General: No deformity  Right lower leg: No edema  Left lower leg: No edema  Comments: Left shoulder  No swelling  No tenderness  No redness  Has for range of motion  Lymphadenopathy:      Cervical: No cervical adenopathy  Skin:     Coloration: Skin is not jaundiced or pale  Findings: No bruising or rash  Neurological:      General: No focal deficit present  Mental Status: She is alert and oriented to person, place, and time  Cranial Nerves: No cranial nerve deficit  Sensory: No sensory deficit  Motor: No weakness or abnormal muscle tone  Coordination: Coordination normal       Gait: Gait normal       Deep Tendon Reflexes: Reflexes normal       Comments: Negative romberg  Neg babinski B/L  Psychiatric:         Mood and Affect: Mood normal          Behavior: Behavior normal          Thought Content:  Thought content normal          Judgment: Judgment normal

## 2021-05-27 ENCOUNTER — HOSPITAL ENCOUNTER (OUTPATIENT)
Dept: MAMMOGRAPHY | Facility: MEDICAL CENTER | Age: 61
Discharge: HOME | End: 2021-05-27
Payer: COMMERCIAL

## 2021-05-27 ENCOUNTER — APPOINTMENT (OUTPATIENT)
Dept: RADIOLOGY | Facility: MEDICAL CENTER | Age: 61
End: 2021-05-27
Payer: COMMERCIAL

## 2021-05-27 ENCOUNTER — APPOINTMENT (OUTPATIENT)
Dept: LAB | Facility: MEDICAL CENTER | Age: 61
End: 2021-05-27
Payer: COMMERCIAL

## 2021-05-27 VITALS — HEIGHT: 63 IN | WEIGHT: 114 LBS | BODY MASS INDEX: 20.2 KG/M2

## 2021-05-27 DIAGNOSIS — M25.512 CHRONIC LEFT SHOULDER PAIN: ICD-10-CM

## 2021-05-27 DIAGNOSIS — G89.29 CHRONIC LEFT SHOULDER PAIN: ICD-10-CM

## 2021-05-27 DIAGNOSIS — Z12.31 SCREENING MAMMOGRAM, ENCOUNTER FOR: ICD-10-CM

## 2021-05-27 DIAGNOSIS — R73.9 ELEVATED BLOOD SUGAR: ICD-10-CM

## 2021-05-27 LAB
EST. AVERAGE GLUCOSE BLD GHB EST-MCNC: 111 MG/DL
HBA1C MFR BLD: 5.5 %

## 2021-05-27 PROCEDURE — 83036 HEMOGLOBIN GLYCOSYLATED A1C: CPT

## 2021-05-27 PROCEDURE — 77067 SCR MAMMO BI INCL CAD: CPT

## 2021-05-27 PROCEDURE — 77063 BREAST TOMOSYNTHESIS BI: CPT

## 2021-05-27 PROCEDURE — 36415 COLL VENOUS BLD VENIPUNCTURE: CPT

## 2021-05-27 PROCEDURE — 73030 X-RAY EXAM OF SHOULDER: CPT

## 2021-06-02 ENCOUNTER — TELEPHONE (OUTPATIENT)
Dept: FAMILY MEDICINE CLINIC | Facility: CLINIC | Age: 61
End: 2021-06-02

## 2021-06-02 DIAGNOSIS — M25.512 CHRONIC LEFT SHOULDER PAIN: Primary | ICD-10-CM

## 2021-06-02 DIAGNOSIS — G89.29 CHRONIC LEFT SHOULDER PAIN: Primary | ICD-10-CM

## 2021-06-02 NOTE — TELEPHONE ENCOUNTER
----- Message from Kellie Cuellar MD sent at 6/1/2021 12:34 PM EDT -----  X-ray of the shoulder   Shows mild osteoarthritis of the glenohumeral and acromioclavicular joints    For patient to physical therapy

## 2021-06-15 ENCOUNTER — EVALUATION (OUTPATIENT)
Dept: PHYSICAL THERAPY | Facility: CLINIC | Age: 61
End: 2021-06-15
Payer: COMMERCIAL

## 2021-06-15 DIAGNOSIS — M25.512 CHRONIC LEFT SHOULDER PAIN: Primary | ICD-10-CM

## 2021-06-15 DIAGNOSIS — G89.29 CHRONIC LEFT SHOULDER PAIN: Primary | ICD-10-CM

## 2021-06-15 DIAGNOSIS — M54.2 NECK PAIN: ICD-10-CM

## 2021-06-15 PROCEDURE — 97112 NEUROMUSCULAR REEDUCATION: CPT | Performed by: PHYSICAL THERAPIST

## 2021-06-15 PROCEDURE — 97162 PT EVAL MOD COMPLEX 30 MIN: CPT | Performed by: PHYSICAL THERAPIST

## 2021-06-15 NOTE — PROGRESS NOTES
PT Evaluation     Today's date: 6/15/2021  Patient name: Ed Ohara  : 1960  MRN: [id number]  Referring provider: Adam Egan MD  Dx:   Encounter Diagnosis     ICD-10-CM    1  Chronic left shoulder pain  M25 512     G89 29    2  Neck pain  M54 2        Start Time: 1745  Stop Time: 7099  Total time in clinic (min): 1425 minutes    Assessment  Assessment details: 56year old female patient reports to PT with chronic L shoulder and L UE symptoms of pain, numbness/tingling that radiate down to her L hand  No red flags noted and UQS revealed decreased sensation in C5 dermatome, which could be related to patient's prior cervical surgery  Strength testing inconclusive as L UE symptoms increased during all isometrics  Patient L shoulder pain seems to be originating from her neck as patient's cervical ROM increased L shoulder and UE pain and patient had significant neural tension on L UE  Patient will benefit from skilled PT services to address current impairments and functional limitations to help patient return to her PLOF  Impairments: abnormal or restricted ROM, abnormal movement, activity intolerance, impaired physical strength, lacks appropriate home exercise program, pain with function and poor posture     Symptom irritability: moderateUnderstanding of Dx/Px/POC: good   Prognosis: fair    Goals  STG  1  Patient will be independent with completion of HEP throughout therapy  2  Patient will have at worst 7/10 pain so patient can sleep with less discomfort in her recliner in 3 weeks  LTG  1  Patient will reach overhead with L UE without increased difficulty in 5 weeks  2  Patient will hold items in L UE replicating her work movements without increased difficulty in 6 weeks         Plan  Patient would benefit from: skilled physical therapy  Planned therapy interventions: joint mobilization, abdominal trunk stabilization, manual therapy, neuromuscular re-education, patient education, strengthening, stretching, therapeutic activities, therapeutic exercise, home exercise program, flexibility and functional ROM exercises  Frequency: 2x week  Duration in weeks: 6  Treatment plan discussed with: patient        Subjective Evaluation    History of Present Illness  Mechanism of injury: Patient reports with L shoulder pain that she noticed some problems last summer already  Patient notes over last 6 months her L shoulder pain has gotten worse  Patient is having difficulty sleeping due to her symptoms (patient sleeps in a recliner due to back pain as well)  Patient also has neck pain and numbness/tingling that shoots down her whole L UE  Patient notes the pain down her L UE can happen unexpectedly  Patient also has difficulty reaching and lifting with her L UE  Patient would also like to get back to gardening  Patient's job requires her to use her B UEs but due to her shoulder pain she is having difficulty completing her job  Patient has cervical fusion at C5/C6 that was  and then the surgeon had to go "back in and clean up some things" for another surgery patient notes  Pain  Current pain ratin  At best pain ratin  At worst pain ratin  Quality: dull ache, burning, radiating and sharp  Relieving factors: medications, change in position and heat  Aggravating factors: lifting and overhead activity    Treatments  Current treatment: physical therapy  Patient Goals  Patient goals for therapy: decreased pain, increased motion, increased strength and return to sport/leisure activities          Objective     Concurrent Complaints  Positive for disturbed sleep  Negative for night pain, dizziness, faints, headaches and nausea/motion sickness    Palpation     Additional Palpation Details   L scalene palpation increased L UE pain    Neurological Testing     Sensation   Cervical/Thoracic     Right   Intact: light touch    Comments   Left light touch: diminished C5       Active Range of Motion Cervical/Thoracic Spine       Cervical    Flexion:  WFL  Extension: Neck active extension: increased pain down L UE      with pain Restriction level: moderate  Left rotation: Neck active rotation left: increased pain down L UE  with pain Restriction level: moderate  Right rotation:  Restriction level: minimal  Left Shoulder   Flexion: WFL and with pain  Abduction: WFL and with pain    Right Shoulder   Flexion: WFL  Abduction: WFL    Passive Range of Motion   Left Shoulder   Flexion: WFL  Abduction: WFL  External rotation 90°: WFL and with pain  Internal rotation 90°: WFL and with pain    Right Shoulder   Flexion: WFL  Abduction: WFL  External rotation 90°: WFL  Internal rotation 90°: WFL    Joint Play     Comments: L first rib mobility assessment increased L UE symptoms    Strength/Myotome Testing     Additional Strength Details  Strength testing inconclusive as most L UE MMT increased neck and L shoulder pain    Tests   Cervical     Left   Positive Spurling's Test A  Left Shoulder   Positive [id number]     Neuro Exam:     Headaches   Patient reports headaches: No               Precautions: cervical fusion C5/C6 2014, "mild carpal tunnel B,"       Manuals 6/15            L C7, C4 mobs                           Median nerve glides                          Neuro Re-Ed             Chin tucks                                                                                           Ther Ex             Cervical flexion R rotation r            Supine thoracic ext over horizontal r                         UBE bwd             No moneys             Standing straight arm pull downs             sidelying shoulder ER                          Ther Activity                                       Gait Training                                       Modalities

## 2021-06-17 ENCOUNTER — OFFICE VISIT (OUTPATIENT)
Dept: SLEEP CENTER | Facility: CLINIC | Age: 61
End: 2021-06-17
Payer: COMMERCIAL

## 2021-06-17 VITALS
DIASTOLIC BLOOD PRESSURE: 72 MMHG | HEIGHT: 63 IN | BODY MASS INDEX: 19.49 KG/M2 | WEIGHT: 110 LBS | SYSTOLIC BLOOD PRESSURE: 114 MMHG

## 2021-06-17 DIAGNOSIS — G47.419 NARCOLEPSY WITHOUT CATAPLEXY: ICD-10-CM

## 2021-06-17 DIAGNOSIS — G47.11 IDIOPATHIC HYPERSOMNIA: Primary | ICD-10-CM

## 2021-06-17 PROCEDURE — 3008F BODY MASS INDEX DOCD: CPT | Performed by: FAMILY MEDICINE

## 2021-06-17 PROCEDURE — 99214 OFFICE O/P EST MOD 30 MIN: CPT | Performed by: NURSE PRACTITIONER

## 2021-06-17 RX ORDER — ARMODAFINIL 250 MG/1
250 TABLET ORAL DAILY
Qty: 90 TABLET | Refills: 1 | Status: SHIPPED | OUTPATIENT
Start: 2021-06-17 | End: 2021-08-23 | Stop reason: SDUPTHER
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
## 2021-06-17 NOTE — PATIENT INSTRUCTIONS
1   Schedule MSLT with diagnostic sleep study   Please complete 2 week sleep diary and bring it with you on the night of the sleep study  If you do not complete and bring it, your study may need to be cancelled  Hold armodafinil for 3-5 days prior to the sleep study  You will need to hold the sedating medications on the day time part of the study  Hold the Cymbalta on the night of the study     2  Schedule follow-up visit to review results of the study  3  Contact the [facility] Center with any questions or concerns prior to your next visit    Nursing Support:  When: Monday through Friday 7A-5PM except holidays  Where: Our direct line is [phone]  If you are having a true emergency please call 911  In the event that the line is busy or it is after hours please leave a voice message and we will return your call  Please speak clearly, leaving your full name, birth date, best number to reach you and the reason for your call  Medication refills: We will need the name of the medication, the dosage, the ordering provider, whether you get a 30 or 90 day refill, and the pharmacy name and address  Medications will be ordered by the provider only  Nurses cannot call in prescriptions  Please allow 7 days for medication refills  Physician requested updates: If your provider requested that you call with an update after starting medication, please be ready to provide us the medication and dosage, what time you take your medication, the time you attempt to fall asleep, time you fall asleep, when you wake up, and what time you get out of bed  Sleep Study Results: We will contact you with sleep study results and/or next steps after the physician has reviewed your testing

## 2021-06-21 ENCOUNTER — OFFICE VISIT (OUTPATIENT)
Dept: PHYSICAL THERAPY | Facility: CLINIC | Age: 61
End: 2021-06-21
Payer: COMMERCIAL

## 2021-06-21 DIAGNOSIS — G89.29 CHRONIC LEFT SHOULDER PAIN: Primary | ICD-10-CM

## 2021-06-21 DIAGNOSIS — M54.2 NECK PAIN: ICD-10-CM

## 2021-06-21 DIAGNOSIS — M25.512 CHRONIC LEFT SHOULDER PAIN: Primary | ICD-10-CM

## 2021-06-21 PROCEDURE — 97140 MANUAL THERAPY 1/> REGIONS: CPT | Performed by: PHYSICAL THERAPIST

## 2021-06-21 PROCEDURE — 97110 THERAPEUTIC EXERCISES: CPT | Performed by: PHYSICAL THERAPIST

## 2021-06-21 PROCEDURE — 97112 NEUROMUSCULAR REEDUCATION: CPT | Performed by: PHYSICAL THERAPIST

## 2021-06-21 NOTE — PROGRESS NOTES
Daily Note     Today's date: 2021  Patient name: Bria Lincoln  : 1960  MRN: [id number]  Referring provider: Almas Bauer MD  Dx:   Encounter Diagnosis     ICD-10-CM    1  Chronic left shoulder pain  M25 512     G89 29    2  Neck pain  M54 2                   Subjective: Patient notes had migraine day after IE and has been getting them  Notes not much relief noted yet  Objective: See treatment diary below      Assessment: Tolerated treatment well  Patient would benefit from continued PT  Reproduction of symptoms with most cervical manuals, scap retractions, and pec minor stretch  Symptoms were peripheralized but allowed to centralize prior to continuing manuals/exercises that aggravated symptoms  Plan: Progress treatment as tolerated         Precautions: cervical fusion C5/C6 , "mild carpal tunnel B,"       Manuals 6/15 6/21           L C7, C4 mobs   MK                        Median nerve glides  2x10                         Neuro Re-Ed             Chin tucks  12x 5" holds                                                                                          Ther Ex             Cervical flexion R rotation r 12x            Supine thoracic ext over horizontal r 1 5 min                         UBE bwd  4 min            No moneys             Standing straight arm pull downs  2x10 grn            sidelying shoulder ER  2x8            pec minor stretch supine over towels  5x 10" holds            Ther Activity                                       Gait Training                                       Modalities

## 2021-06-24 ENCOUNTER — OFFICE VISIT (OUTPATIENT)
Dept: PHYSICAL THERAPY | Facility: CLINIC | Age: 61
End: 2021-06-24
Payer: COMMERCIAL

## 2021-06-24 DIAGNOSIS — M54.2 NECK PAIN: ICD-10-CM

## 2021-06-24 DIAGNOSIS — G89.29 CHRONIC LEFT SHOULDER PAIN: Primary | ICD-10-CM

## 2021-06-24 DIAGNOSIS — M25.512 CHRONIC LEFT SHOULDER PAIN: Primary | ICD-10-CM

## 2021-06-24 PROCEDURE — 97112 NEUROMUSCULAR REEDUCATION: CPT | Performed by: PHYSICAL THERAPIST

## 2021-06-24 PROCEDURE — 97140 MANUAL THERAPY 1/> REGIONS: CPT | Performed by: PHYSICAL THERAPIST

## 2021-06-24 PROCEDURE — 97110 THERAPEUTIC EXERCISES: CPT | Performed by: PHYSICAL THERAPIST

## 2021-06-24 NOTE — PROGRESS NOTES
Daily Note     Today's date: 2021  Patient name: Jamie Oneill  : 1960  MRN: [id number]  Referring provider: Edgar Pena MD  Dx:   Encounter Diagnosis     ICD-10-CM    1  Chronic left shoulder pain  M25 512     G89 29    2  Neck pain  M54 2                   Subjective: Patient notes has had a migraine for the last couple of days  Objective: See treatment diary below      Assessment: Tolerated treatment well  Patient would benefit from continued PT  Manuals reduced this visit as patient has been having migraines for the majority of the week so far  Plan: Progress treatment as tolerated         Precautions: cervical fusion C5/C6 , "mild carpal tunnel B,"       Manuals 6/15 6/21 6/24          L C7, C4 mobs   [address] MK                       Median nerve glides  2x10                         Neuro Re-Ed             Chin tucks  12x 5" holds  12x 5" holds                                                                                         Ther Ex             Cervical flexion R rotation r 12x  12x           Supine thoracic ext over horizontal r 1 5 min  2 min                        UBE bwd  4 min  4 min           No moneys             Standing straight arm pull downs  2x10 grn  2x10 grn           sidelying shoulder ER  2x8  2x12           pec minor stretch supine over towels  5x 10" holds  5x 15" holds           Ther Activity                                       Gait Training                                       Modalities

## 2021-06-28 ENCOUNTER — OFFICE VISIT (OUTPATIENT)
Dept: PHYSICAL THERAPY | Facility: CLINIC | Age: 61
End: 2021-06-28
Payer: COMMERCIAL

## 2021-06-28 DIAGNOSIS — G89.29 CHRONIC LEFT SHOULDER PAIN: Primary | ICD-10-CM

## 2021-06-28 DIAGNOSIS — M25.512 CHRONIC LEFT SHOULDER PAIN: Primary | ICD-10-CM

## 2021-06-28 DIAGNOSIS — M54.2 NECK PAIN: ICD-10-CM

## 2021-06-28 PROCEDURE — 97110 THERAPEUTIC EXERCISES: CPT | Performed by: PHYSICAL THERAPIST

## 2021-06-28 PROCEDURE — 97112 NEUROMUSCULAR REEDUCATION: CPT | Performed by: PHYSICAL THERAPIST

## 2021-06-28 PROCEDURE — 97140 MANUAL THERAPY 1/> REGIONS: CPT | Performed by: PHYSICAL THERAPIST

## 2021-06-28 NOTE — PROGRESS NOTES
Daily Note     Today's date: 2021  Patient name: Yasmani Mejia  : 1960  MRN: [id number]  Referring provider: Ludin Rivera MD  Dx:   Encounter Diagnosis     ICD-10-CM    1  Chronic left shoulder pain  M25 512     G89 29    2  Neck pain  M54 2                   Subjective: Patient notes "the ball trick" for her migraines is helping  Objective: See treatment diary below      Assessment: Tolerated treatment well  Patient would benefit from continued PT  Plan: Progress treatment as tolerated         Precautions: cervical fusion C5/C6 , "mild carpal tunnel B,"       Manuals 6/15 6/21 6/24 6/28         L C7, C4 mobs   [address] [facility]                      Median nerve glides  2x10                         Neuro Re-Ed             Chin tucks  12x 5" holds  12x 5" holds  15x 5" holds                                                                                        Ther Ex             Cervical flexion R rotation r 12x  12x  15x          Supine thoracic ext over horizontal r 1 5 min  2 min  2 min                       UBE bwd  4 min  4 min  4 min          No moneys             Standing straight arm pull downs  2x10 grn  2x10 grn  2x12 grn          sidelying shoulder ER  2x8  2x12  3x10          pec minor stretch supine over towels  5x 10" holds  5x 15" holds  5x 15" holds          Ther Activity                                       Gait Training                                       Modalities

## 2021-07-01 ENCOUNTER — APPOINTMENT (OUTPATIENT)
Dept: PHYSICAL THERAPY | Facility: CLINIC | Age: 61
End: 2021-07-01
Payer: COMMERCIAL

## 2021-07-01 NOTE — TELEPHONE ENCOUNTER
8/11/2020 LMOM FOR PT TO CALL BACK-[employer] IN [place]-??[employer]???/ Medication refill approved. Please let patient know. Thank you!

## 2021-07-06 ENCOUNTER — OFFICE VISIT (OUTPATIENT)
Dept: PHYSICAL THERAPY | Facility: CLINIC | Age: 61
End: 2021-07-06
Payer: COMMERCIAL

## 2021-07-06 DIAGNOSIS — M25.512 CHRONIC LEFT SHOULDER PAIN: Primary | ICD-10-CM

## 2021-07-06 DIAGNOSIS — G89.29 CHRONIC LEFT SHOULDER PAIN: Primary | ICD-10-CM

## 2021-07-06 DIAGNOSIS — M54.2 NECK PAIN: ICD-10-CM

## 2021-07-06 PROCEDURE — 97140 MANUAL THERAPY 1/> REGIONS: CPT | Performed by: PHYSICAL THERAPIST

## 2021-07-06 PROCEDURE — 97112 NEUROMUSCULAR REEDUCATION: CPT | Performed by: PHYSICAL THERAPIST

## 2021-07-06 PROCEDURE — 97110 THERAPEUTIC EXERCISES: CPT | Performed by: PHYSICAL THERAPIST

## 2021-07-06 NOTE — PROGRESS NOTES
Daily Note     Today's date: 2021  Patient name: Loretta Bashir  : 1960  MRN: [id number]  Referring provider: Lashae Huerta MD  Dx:   Encounter Diagnosis     ICD-10-CM    1  Chronic left shoulder pain  M25 512     G89 29    2  Neck pain  M54 2                   Subjective: Patient notes had bad migraine last week which is why she had to cancel  Notes today has some L UE symptoms but notes it happens every now and then  Objective: See treatment diary below      Assessment: Tolerated treatment well  Patient would benefit from continued PT  Plan: Progress treatment as tolerated         Precautions: cervical fusion C5/C6 , "mild carpal tunnel B,"       Manuals 6/15 6/21 6/24 6/28 7/6        L C7, C4 side glides/first rib mob   [address]        L cervical STM     [address]         Median nerve glides  2x10                         Neuro Re-Ed             Chin tucks  12x 5" holds  12x 5" holds  15x 5" holds  15x 5" holds                                                                                       Ther Ex             Cervical flexion R rotation r 12x  12x  15x  15x         Supine thoracic ext over horizontal r 1 5 min  2 min  2 min  2 5 min                      UBE bwd  4 min  4 min  4 min  4 min         No moneys             Standing straight arm pull downs  2x10 grn  2x10 grn  2x12 grn  3x10 grn         sidelying shoulder ER  2x8  2x12  3x10  2x10 1 lb         pec minor stretch supine over towels  5x 10" holds  5x 15" holds  5x 15" holds  5x 15" holds doorway stretch        Ther Activity                                       Gait Training                                       Modalities

## 2021-07-08 ENCOUNTER — PATIENT MESSAGE (OUTPATIENT)
Dept: SLEEP CENTER | Facility: CLINIC | Age: 61
End: 2021-07-08

## 2021-07-08 ENCOUNTER — OFFICE VISIT (OUTPATIENT)
Dept: PHYSICAL THERAPY | Facility: CLINIC | Age: 61
End: 2021-07-08
Payer: COMMERCIAL

## 2021-07-08 DIAGNOSIS — G89.29 CHRONIC LEFT SHOULDER PAIN: Primary | ICD-10-CM

## 2021-07-08 DIAGNOSIS — M25.512 CHRONIC LEFT SHOULDER PAIN: Primary | ICD-10-CM

## 2021-07-08 DIAGNOSIS — G47.11 IDIOPATHIC HYPERSOMNIA: ICD-10-CM

## 2021-07-08 DIAGNOSIS — M54.2 NECK PAIN: ICD-10-CM

## 2021-07-08 PROCEDURE — 97110 THERAPEUTIC EXERCISES: CPT | Performed by: PHYSICAL THERAPIST

## 2021-07-08 PROCEDURE — 97112 NEUROMUSCULAR REEDUCATION: CPT | Performed by: PHYSICAL THERAPIST

## 2021-07-08 PROCEDURE — 97140 MANUAL THERAPY 1/> REGIONS: CPT | Performed by: PHYSICAL THERAPIST

## 2021-07-08 NOTE — PROGRESS NOTES
Daily Note     Today's date: 2021  Patient name: Pelon Cuellar  : 1960  MRN: [id number]  Referring provider: Taye Jacobs MD  Dx:   Encounter Diagnosis     ICD-10-CM    1  Chronic left shoulder pain  M25 512     G89 29    2  Neck pain  M54 2                   Subjective: patient mentions today is not a great day  On Tuesday her R hand started to swell and yesterday and today her shoulder has been bothering her  She believes it to also have to do with the weather and the change in pressure  Objective: See treatment diary below      Assessment: Patient tolerated treatment well  She was able to complete program as noted below with no issues secondary to the R hand  She had no reports of increased symptoms and demonstrates good technique throughout  She would benefit from continued PT  Plan: Continue per plan of care        Precautions: cervical fusion C5/C6 , "mild carpal tunnel B,"       Manuals 6/15 6/21 6/24 6/28 7/6 7/8       L C7, C4 side glides/first rib mob   [facility]       L cervical STM     [facility]       Median nerve glides  2x10                         Neuro Re-Ed             Chin tucks  12x 5" holds  12x 5" holds  15x 5" holds  15x 5" holds  15x5" holds                                                                                     Ther Ex             Cervical flexion R rotation r 12x  12x  15x  15x  20x       Supine thoracic ext over horizontal r 1 5 min  2 min  2 min  2 5 min  2 5 min                    UBE bwd  4 min  4 min  4 min  4 min  5 min       No moneys             Standing straight arm pull downs  2x10 grn  2x10 grn  2x12 grn  3x10 grn  3x10 grn       sidelying shoulder ER  2x8  2x12  3x10  2x10 1 lb  2x12 1lb       pec minor stretch supine over towels  5x 10" holds  5x 15" holds  5x 15" holds  5x 15" holds doorway stretch 5x15" doorway       Ther Activity                                       Gait Training                                       Modalities

## 2021-07-09 ENCOUNTER — TELEPHONE (OUTPATIENT)
Dept: SLEEP CENTER | Facility: CLINIC | Age: 61
End: 2021-07-09

## 2021-07-09 DIAGNOSIS — G47.11 IDIOPATHIC HYPERSOMNIA: Primary | ICD-10-CM

## 2021-07-09 RX ORDER — ARMODAFINIL 250 MG/1
250 TABLET ORAL DAILY
Qty: 30 TABLET | Refills: 0 | Status: SHIPPED | OUTPATIENT
Start: 2021-07-09 | End: 2021-09-29 | Stop reason: SDUPTHER

## 2021-07-09 NOTE — TELEPHONE ENCOUNTER
Bernice Ruelas spoke with [facility] mail order  They have not shipped medication and advised Armodafinil needed prior authorization, which I started on Cover My Meds  Are you willing to prescribe a 30 day supply to go to [facility] in [place]?

## 2021-07-12 ENCOUNTER — TELEPHONE (OUTPATIENT)
Dept: SLEEP CENTER | Facility: CLINIC | Age: 61
End: 2021-07-12

## 2021-07-12 ENCOUNTER — OFFICE VISIT (OUTPATIENT)
Dept: PHYSICAL THERAPY | Facility: CLINIC | Age: 61
End: 2021-07-12
Payer: COMMERCIAL

## 2021-07-12 DIAGNOSIS — M54.2 NECK PAIN: ICD-10-CM

## 2021-07-12 DIAGNOSIS — M25.512 CHRONIC LEFT SHOULDER PAIN: Primary | ICD-10-CM

## 2021-07-12 DIAGNOSIS — G89.29 CHRONIC LEFT SHOULDER PAIN: Primary | ICD-10-CM

## 2021-07-12 PROCEDURE — 97110 THERAPEUTIC EXERCISES: CPT

## 2021-07-12 PROCEDURE — 97112 NEUROMUSCULAR REEDUCATION: CPT

## 2021-07-12 PROCEDURE — 97140 MANUAL THERAPY 1/> REGIONS: CPT

## 2021-07-12 NOTE — PROGRESS NOTES
Daily Note     Today's date: 2021  Patient name: Blank Landeros  : 1960  MRN: [id number]  Referring provider: Beryl Thomas MD  Dx:   Encounter Diagnosis     ICD-10-CM    1  Chronic left shoulder pain  M25 512     G89 29    2  Neck pain  M54 2        Start Time: 1615  Stop Time: 1700  Total time in clinic (min): 45 minutes    Subjective: Pt states she is having an "ok" day  She has a little bit of pain  Objective: See treatment diary below      Assessment: Tolerated treatment fair  She complained of "tiredness" in shoulder post TE  Patient demonstrated fatigue post treatment and would benefit from continued PT      Plan: Continue per plan of care        Precautions: cervical fusion C5/C6 , "mild carpal tunnel B,"       Manuals 6/15 6/21 6/24 6/28 7/6 7/8 7/12      L C7, C4 side glides/first rib mob   [facility] GF      L cervical STM     [facility] GF      Median nerve glides  2x10                         Neuro Re-Ed             Chin tucks  12x 5" holds  12x 5" holds  15x 5" holds  15x 5" holds  15x5" holds 15x5" holds                                                                                    Ther Ex             Cervical flexion R rotation r 12x  12x  15x  15x  20x 20x      Supine thoracic ext over horizontal r 1 5 min  2 min  2 min  2 5 min  2 5 min 2 5 min                   UBE bwd  4 min  4 min  4 min  4 min  5 min 5 min      No moneys             Standing straight arm pull downs  2x10 grn  2x10 grn  2x12 grn  3x10 grn  3x10 grn 3x10 grn      sidelying shoulder ER  2x8  2x12  3x10  2x10 1 lb  2x12 1lb 2x12 1lb      pec minor stretch supine over towels  5x 10" holds  5x 15" holds  5x 15" holds  5x 15" holds doorway stretch 5x15" doorway 5x15" doorway      Ther Activity                                       Gait Training                                       Modalities

## 2021-07-12 NOTE — TELEPHONE ENCOUNTER
Left message that a 30 day supply was sent to [facility] while waiting for prior authorization from her insurance

## 2021-07-13 NOTE — TELEPHONE ENCOUNTER
Received denial from [facility] for Armodafinil  Per denial, patient does not meet the coverage criteria:  ~No medical record documentation of a diagnosis of obstructive sleep apnea/hypopnea syndrome requiring treatment with CPAP OR narcolepsy OR shift work disorder  Ольга Kwok, how would you like to proceed?

## 2021-07-14 ENCOUNTER — HOSPITAL ENCOUNTER (OUTPATIENT)
Dept: SLEEP CENTER | Facility: CLINIC | Age: 61
Discharge: HOME | End: 2021-07-14
Payer: COMMERCIAL

## 2021-07-14 DIAGNOSIS — G47.419 NARCOLEPSY WITHOUT CATAPLEXY: ICD-10-CM

## 2021-07-14 DIAGNOSIS — G47.11 IDIOPATHIC HYPERSOMNIA: ICD-10-CM

## 2021-07-14 PROCEDURE — 95810 POLYSOM 6/> YRS 4/> PARAM: CPT

## 2021-07-14 PROCEDURE — 95810 POLYSOM 6/> YRS 4/> PARAM: CPT | Performed by: INTERNAL MEDICINE

## 2021-07-15 ENCOUNTER — HOSPITAL ENCOUNTER (OUTPATIENT)
Dept: SLEEP CENTER | Facility: CLINIC | Age: 61
Discharge: HOME | End: 2021-07-15
Payer: COMMERCIAL

## 2021-07-15 PROCEDURE — 95805 MULTIPLE SLEEP LATENCY TEST: CPT | Performed by: INTERNAL MEDICINE

## 2021-07-15 PROCEDURE — 95805 MULTIPLE SLEEP LATENCY TEST: CPT

## 2021-07-15 NOTE — PROGRESS NOTES
Pre-Sleep Study       Sleep testing procedure explained to patient:YES    Urine drug screen performed: No: Provide Reason Not needed      Study Documentation    Patient reports:    Nap: 1: Sleep yes Dream no    Nap 2:  Sleep yes Dream no    Nap 3:  Sleep no Dream no    Nap 4: Sleep yes Dream no    Nap 5:  Sleep yes Dream no    EKG abnormalities: no     EEG abnormalities: no    Naps completed: 5

## 2021-07-15 NOTE — PROGRESS NOTES
Sleep Study Documentation    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   Caffeine use:NO    Alcohol:Dayshift workers after 5PM: Alcohol use:NO    Typical day for patient:YES       Study Documentation    Sleep Study Indications: Suspected narcolepsy, Irrepressible Sleep, Excessive daytime sleepiness    Sleep Study: Diagnostic   Snore:Loud  Supplemental O2: no    O2 flow rate (L/min) range   O2 flow rate (L/min) final   Minimum SaO2 91%  Baseline SaO2 96%    EKG abnormalities: no     EEG abnormalities: no    Sleep Study Recorded < 2 hours: N/A    Sleep Study Recorded > 2 hours but incomplete study: N/A    Sleep Study Recorded 6 hours but no sleep obtained: NO    Patient classification: employed       Post-Sleep Study    Medication used at bedtime or during sleep study:NO    Patient reports time it took to fall asleep:20 to 30 minutes    Patient reports waking up during study:3 or more times  Patient reports returning to sleep without difficulty  Patient reports sleeping more than 8 hours without dreaming  Patient reports sleep during study:worse than usual    Patient rated sleepiness: Somewhat sleepy or tired    PAP treatment:no

## 2021-07-19 ENCOUNTER — OFFICE VISIT (OUTPATIENT)
Dept: PHYSICAL THERAPY | Facility: CLINIC | Age: 61
End: 2021-07-19
Payer: COMMERCIAL

## 2021-07-19 DIAGNOSIS — M54.2 NECK PAIN: ICD-10-CM

## 2021-07-19 DIAGNOSIS — M25.512 CHRONIC LEFT SHOULDER PAIN: Primary | ICD-10-CM

## 2021-07-19 DIAGNOSIS — G89.29 CHRONIC LEFT SHOULDER PAIN: Primary | ICD-10-CM

## 2021-07-19 PROCEDURE — 97112 NEUROMUSCULAR REEDUCATION: CPT | Performed by: PHYSICAL THERAPIST

## 2021-07-19 PROCEDURE — 97110 THERAPEUTIC EXERCISES: CPT | Performed by: PHYSICAL THERAPIST

## 2021-07-19 PROCEDURE — 97140 MANUAL THERAPY 1/> REGIONS: CPT | Performed by: PHYSICAL THERAPIST

## 2021-07-19 NOTE — PROGRESS NOTES
Daily Note     Today's date: 2021  Patient name: Keyanna Adams  : 1960  MRN: [id number]  Referring provider: Emmie Menendez MD  Dx:   Encounter Diagnosis     ICD-10-CM    1  Chronic left shoulder pain  M25 512     G89 29    2  Neck pain  M54 2                   Subjective: Patient notes is in a lot of pain in both arms and neck  Objective: See treatment diary below      Assessment: Tolerated treatment well  Patient would benefit from continued PT  Plan: Continue per plan of care        Precautions: cervical fusion C5/C6 , "mild carpal tunnel B,"       Manuals 6/15 6/21 6/24 6/28 7/6 7/8 7/12 7/19     L C7, C4 side glides/first rib mob   [address]     L cervical STM     [address]  SK GF [address]     Median nerve glides  2x10                         Neuro Re-Ed             Chin tucks  12x 5" holds  12x 5" holds  15x 5" holds  15x 5" holds  15x5" holds 15x5" holds 20x 5" holds                                                                                    Ther Ex             Cervical flexion R rotation r 12x  12x  15x  15x  20x 20x 20x     Supine thoracic ext over horizontal r 1 5 min  2 min  2 min  2 5 min  2 5 min 2 5 min 2 5 min                   UBE bwd  4 min  4 min  4 min  4 min  5 min 5 min 5 min      No moneys             Standing straight arm pull downs  2x10 grn  2x10 grn  2x12 grn  3x10 grn  3x10 grn 3x10 grn 3x12 grn      sidelying shoulder ER  2x8  2x12  3x10  2x10 1 lb  2x12 1lb 2x12 1lb 3x12 1 lb      pec minor stretch supine over towels  5x 10" holds  5x 15" holds  5x 15" holds  5x 15" holds doorway stretch 5x15" doorway 5x15" doorway 5x 15" holds doorway      Ther Activity                                       Gait Training                                       Modalities

## 2021-07-19 NOTE — TELEPHONE ENCOUNTER
Left voice message for patient  Advised Armodafinil has been denied by her insurance company and to call back to discuss Jacquie's recommendations

## 2021-07-22 ENCOUNTER — OFFICE VISIT (OUTPATIENT)
Dept: PHYSICAL THERAPY | Facility: CLINIC | Age: 61
End: 2021-07-22
Payer: COMMERCIAL

## 2021-07-22 ENCOUNTER — TELEPHONE (OUTPATIENT)
Dept: SLEEP CENTER | Facility: CLINIC | Age: 61
End: 2021-07-22

## 2021-07-22 DIAGNOSIS — M25.512 CHRONIC LEFT SHOULDER PAIN: Primary | ICD-10-CM

## 2021-07-22 DIAGNOSIS — G89.29 CHRONIC LEFT SHOULDER PAIN: Primary | ICD-10-CM

## 2021-07-22 DIAGNOSIS — M54.2 NECK PAIN: ICD-10-CM

## 2021-07-22 PROCEDURE — 97140 MANUAL THERAPY 1/> REGIONS: CPT | Performed by: PHYSICAL THERAPIST

## 2021-07-22 PROCEDURE — 97110 THERAPEUTIC EXERCISES: CPT | Performed by: PHYSICAL THERAPIST

## 2021-07-22 PROCEDURE — 97112 NEUROMUSCULAR REEDUCATION: CPT | Performed by: PHYSICAL THERAPIST

## 2021-07-22 NOTE — TELEPHONE ENCOUNTER
Spoke with patient, advised above  Patient states Modafinil does not work    She has been using [address] to get her medication

## 2021-07-22 NOTE — TELEPHONE ENCOUNTER
----- Message from Benjamin Hennessy, [address] sent at 7/19/2021  3:32 PM EDT -----  No ANAM and minimal PLMs noted during diagnostic study  Hypersomnia was confirmed during MSLT, however there were no SoREMs to confirm a diagnosis of narcolepsy  Patient to schedule follow up visit to review results of the study and plan of treatment

## 2021-07-22 NOTE — PROGRESS NOTES
Daily Note     Today's date: 2021  Patient name: Kane Peterson  : 1960  MRN: [id number]  Referring provider: Mp Redding MD  Dx:   Encounter Diagnosis     ICD-10-CM    1  Chronic left shoulder pain  M25 512     G89 29    2  Neck pain  M54 2                   Subjective: Patient notes is in a lot of pain in both arms and neck  Objective: See treatment diary below      Assessment: Tolerated treatment well  Patient would benefit from continued PT  Focusing on function instead of symptoms  Plan: Continue per plan of care        Precautions: cervical fusion C5/C6 , "mild carpal tunnel B,"       Manuals 6/15 6/21 6/24 6/28 7/6 7/8 7/12 7/19 7/22    L C7, C4 side glides/first rib mob   [address] GF [address]    L cervical STM     [address]  SK GF [address]    Median nerve glides  2x10                         Neuro Re-Ed             Chin tucks  12x 5" holds  12x 5" holds  15x 5" holds  15x 5" holds  15x5" holds 15x5" holds 20x 5" holds  10x 10" holds                                                                                  Ther Ex             Cervical flexion R rotation r 12x  12x  15x  15x  20x 20x 20x 20x     Supine thoracic ext over horizontal r 1 5 min  2 min  2 min  2 5 min  2 5 min 2 5 min 2 5 min  2 5 min                  UBE bwd  4 min  4 min  4 min  4 min  5 min 5 min 5 min  5 min     No moneys             Standing straight arm pull downs  2x10 grn  2x10 grn  2x12 grn  3x10 grn  3x10 grn 3x10 grn 3x12 grn  2x12 blue     sidelying shoulder ER  2x8  2x12  3x10  2x10 1 lb  2x12 1lb 2x12 1lb 3x12 1 lb  2x12 2 lbs     pec minor stretch supine over towels  5x 10" holds  5x 15" holds  5x 15" holds  5x 15" holds doorway stretch 5x15" doorway 5x15" doorway 5x 15" holds doorway  5x20" holds    Ther Activity                                       Gait Training                                       Modalities
[facility]

## 2021-07-22 NOTE — TELEPHONE ENCOUNTER
Called patient and reviewed sleep study results    Reminded patient of follow up with Neelam Rain on 9/30/21 at 1:45pm

## 2021-07-26 ENCOUNTER — OFFICE VISIT (OUTPATIENT)
Dept: PHYSICAL THERAPY | Facility: CLINIC | Age: 61
End: 2021-07-26
Payer: COMMERCIAL

## 2021-07-26 DIAGNOSIS — M25.512 CHRONIC LEFT SHOULDER PAIN: Primary | ICD-10-CM

## 2021-07-26 DIAGNOSIS — M54.2 NECK PAIN: ICD-10-CM

## 2021-07-26 DIAGNOSIS — G89.29 CHRONIC LEFT SHOULDER PAIN: Primary | ICD-10-CM

## 2021-07-26 PROCEDURE — 97110 THERAPEUTIC EXERCISES: CPT | Performed by: PHYSICAL THERAPIST

## 2021-07-26 PROCEDURE — 97140 MANUAL THERAPY 1/> REGIONS: CPT | Performed by: PHYSICAL THERAPIST

## 2021-07-26 PROCEDURE — 97112 NEUROMUSCULAR REEDUCATION: CPT | Performed by: PHYSICAL THERAPIST

## 2021-07-26 NOTE — PROGRESS NOTES
Daily Note     Today's date: 2021  Patient name: Kait Kaplan  : 1960  MRN: [id number]  Referring provider: Jerson Nunez MD  Dx:   Encounter Diagnosis     ICD-10-CM    1  Chronic left shoulder pain  M25 512     G89 29    2  Neck pain  M54 2                   Subjective: Patient notes gardened over weekend and was able to do it in two 2 hour spurts with a break in between  Objective: See treatment diary below      Assessment: Tolerated treatment well  Patient would benefit from continued PT  Focusing on function instead of symptoms  Plan: Continue per plan of care        Precautions: cervical fusion C5/C6 , "mild carpal tunnel B,"       Manuals    L C7, C4 side glides/first rib mob  [address] GF [address]   L cervical STM   [address]  SK GF [address]   Median nerve glides                      Neuro Re-Ed           Chin tucks 12x 5" holds  15x 5" holds  15x 5" holds  15x5" holds 15x5" holds 20x 5" holds  10x 10" holds 10x 10" holds                                                                      Ther Ex           Cervical flexion R rotation 12x  15x  15x  20x 20x 20x 20x  20x    Supine thoracic ext over horizontal 2 min  2 min  2 5 min  2 5 min 2 5 min 2 5 min  2 5 min  2 5 min               UBE bwd 4 min  4 min  4 min  5 min 5 min 5 min  5 min  5 min    No moneys        2x12 orange    Standing straight arm pull downs 2x10 grn  2x12 grn  3x10 grn  3x10 grn 3x10 grn 3x12 grn  2x12 blue  2x12 blue    sidelying shoulder ER 2x12  3x10  2x10 1 lb  2x12 1lb 2x12 1lb 3x12 1 lb  2x12 2 lbs     pec minor stretch supine over towels 5x 15" holds  5x 15" holds  5x 15" holds doorway stretch 5x15" doorway 5x15" doorway 5x 15" holds doorway  5x20" holds 5x20" holds    Ther Activity                                 Gait Training                                 Modalities

## 2021-07-28 DIAGNOSIS — G43.009 MIGRAINE WITHOUT AURA AND WITHOUT STATUS MIGRAINOSUS, NOT INTRACTABLE: ICD-10-CM

## 2021-07-28 RX ORDER — ZOLMITRIPTAN 2.5 MG/1
TABLET, FILM COATED ORAL
Qty: 12 TABLET | Refills: 2 | Status: SHIPPED | OUTPATIENT
Start: 2021-07-28 | End: 2021-12-28 | Stop reason: SDUPTHER

## 2021-07-28 NOTE — TELEPHONE ENCOUNTER
Received refill request from [facility] pharmacy for zolmitriptan 2 5 mg    Order pended below, please sign if agreeable

## 2021-07-29 ENCOUNTER — OFFICE VISIT (OUTPATIENT)
Dept: PHYSICAL THERAPY | Facility: CLINIC | Age: 61
End: 2021-07-29
Payer: COMMERCIAL

## 2021-07-29 DIAGNOSIS — G89.29 CHRONIC LEFT SHOULDER PAIN: Primary | ICD-10-CM

## 2021-07-29 DIAGNOSIS — M25.512 CHRONIC LEFT SHOULDER PAIN: Primary | ICD-10-CM

## 2021-07-29 DIAGNOSIS — M54.2 NECK PAIN: ICD-10-CM

## 2021-07-29 PROCEDURE — 97140 MANUAL THERAPY 1/> REGIONS: CPT | Performed by: PHYSICAL THERAPIST

## 2021-07-29 PROCEDURE — 97112 NEUROMUSCULAR REEDUCATION: CPT | Performed by: PHYSICAL THERAPIST

## 2021-07-29 PROCEDURE — 97110 THERAPEUTIC EXERCISES: CPT | Performed by: PHYSICAL THERAPIST

## 2021-07-29 NOTE — PROGRESS NOTES
Daily Note     Today's date: 2021  Patient name: Guillermo Mccabe  : 1960  MRN: [id number]  Referring provider: Hernandez Lino MD  Dx:   Encounter Diagnosis     ICD-10-CM    1  Chronic left shoulder pain  M25 512     G89 29    2  Neck pain  M54 2                   Subjective: Patient notes gardened over weekend and was able to do it in two 2 hour spurts with a break in between  Objective: See treatment diary below      Assessment: Tolerated treatment well  Patient would benefit from continued PT  Focusing on function instead of symptoms  Plan: Continue per plan of care        Precautions: cervical fusion C5/C6 , "mild carpal tunnel B,"       Manuals    L C7, C4 side glides/first rib mob  [address]   L cervical STM  [address]  SK GF [address]   Median nerve glides                      Neuro Re-Ed           Chin tucks 15x 5" holds  15x 5" holds  15x5" holds 15x5" holds 20x 5" holds  10x 10" holds 10x 10" holds  10x 10" holds                                                                      Ther Ex           Cervical flexion R rotation 15x  15x  20x 20x 20x 20x  20x     Supine thoracic ext over horizontal 2 min  2 5 min  2 5 min 2 5 min 2 5 min  2 5 min  2 5 min  2 5 min               UBE bwd 4 min  4 min  5 min 5 min 5 min  5 min  5 min  5 min    No moneys       2x12 orange  3x10 orange    Standing straight arm pull downs 2x12 grn  3x10 grn  3x10 grn 3x10 grn 3x12 grn  2x12 blue  2x12 blue  2x15 blue    sidelying shoulder ER 3x10  2x10 1 lb  2x12 1lb 2x12 1lb 3x12 1 lb  2x12 2 lbs      pec minor stretch supine over towels 5x 15" holds  5x 15" holds doorway stretch 5x15" doorway 5x15" doorway 5x 15" holds doorway  5x20" holds 5x20" holds  5x 20" holds    Neck isometrics          5x 5" holds 4 ways              Ther Activity                                 Gait Training                                 Modalities

## 2021-08-03 ENCOUNTER — OFFICE VISIT (OUTPATIENT)
Dept: PHYSICAL THERAPY | Facility: CLINIC | Age: 61
End: 2021-08-03
Payer: COMMERCIAL

## 2021-08-03 DIAGNOSIS — M25.512 CHRONIC LEFT SHOULDER PAIN: Primary | ICD-10-CM

## 2021-08-03 DIAGNOSIS — G89.29 CHRONIC LEFT SHOULDER PAIN: Primary | ICD-10-CM

## 2021-08-03 DIAGNOSIS — M54.2 NECK PAIN: ICD-10-CM

## 2021-08-03 PROCEDURE — 97112 NEUROMUSCULAR REEDUCATION: CPT | Performed by: PHYSICAL THERAPIST

## 2021-08-03 PROCEDURE — 97140 MANUAL THERAPY 1/> REGIONS: CPT | Performed by: PHYSICAL THERAPIST

## 2021-08-03 PROCEDURE — 97110 THERAPEUTIC EXERCISES: CPT | Performed by: PHYSICAL THERAPIST

## 2021-08-03 NOTE — PROGRESS NOTES
Daily Note     Today's date: 8/3/2021  Patient name: Grayson Fuentes  : 1960  MRN: [id number]  Referring provider: Portia Sanders MD  Dx:   Encounter Diagnosis     ICD-10-CM    1  Chronic left shoulder pain  M25 512     G89 29    2  Neck pain  M54 2                   Subjective: Patient notes gardened over the weekend and now has pain in her scapular region  Objective: See treatment diary below      Assessment: Tolerated treatment well  Patient would benefit from continued PT  Focusing on function instead of symptoms  Plan: Continue per plan of care        Precautions: cervical fusion C5/C6 , "mild carpal tunnel B,"       Manuals 6/28 7/6 7/8 7/12 7/19 7/22 7/26 7/29 8/3   L C7, C4 side glides/first rib mob  [address]   L cervical STM  [address]  SK GF [address]   Median nerve glides         [address]               Neuro Re-Ed            Chin tucks 15x 5" holds  15x 5" holds  15x5" holds 15x5" holds 20x 5" holds  10x 10" holds 10x 10" holds  10x 10" holds  10x 10" holds                                                                            Ther Ex            Cervical flexion R rotation 15x  15x  20x 20x 20x 20x  20x      Supine thoracic ext over horizontal 2 min  2 5 min  2 5 min 2 5 min 2 5 min  2 5 min  2 5 min  2 5 min  3 min                UBE bwd 4 min  4 min  5 min 5 min 5 min  5 min  5 min  5 min  5 min    No moneys       2x12 orange  3x10 orange  3x10 orange    Standing straight arm pull downs 2x12 grn  3x10 grn  3x10 grn 3x10 grn 3x12 grn  2x12 blue  2x12 blue  2x15 blue  20x 3" holds blue    sidelying shoulder ER 3x10  2x10 1 lb  2x12 1lb 2x12 1lb 3x12 1 lb  2x12 2 lbs       pec minor stretch supine over towels 5x 15" holds  5x 15" holds doorway stretch 5x15" doorway 5x15" doorway 5x 15" holds doorway  5x20" holds 5x20" holds  5x 20" holds  5x 25" holds    Standing W's         2x8 red    Seated CT butterfly stretch         10x 3" holds    Ther Activity Gait Training                                    Modalities

## 2021-08-05 ENCOUNTER — OFFICE VISIT (OUTPATIENT)
Dept: PHYSICAL THERAPY | Facility: CLINIC | Age: 61
End: 2021-08-05
Payer: COMMERCIAL

## 2021-08-05 DIAGNOSIS — M54.2 NECK PAIN: ICD-10-CM

## 2021-08-05 DIAGNOSIS — G89.29 CHRONIC LEFT SHOULDER PAIN: Primary | ICD-10-CM

## 2021-08-05 DIAGNOSIS — M25.512 CHRONIC LEFT SHOULDER PAIN: Primary | ICD-10-CM

## 2021-08-05 PROCEDURE — 97110 THERAPEUTIC EXERCISES: CPT | Performed by: PHYSICAL THERAPIST

## 2021-08-05 PROCEDURE — 97140 MANUAL THERAPY 1/> REGIONS: CPT | Performed by: PHYSICAL THERAPIST

## 2021-08-05 PROCEDURE — 97112 NEUROMUSCULAR REEDUCATION: CPT | Performed by: PHYSICAL THERAPIST

## 2021-08-05 NOTE — PROGRESS NOTES
Daily Note     Today's date: 2021  Patient name: Roshan Wilkins  : 1960  MRN: [id number]  Referring provider: Gonzalo Villarreal MD  Dx:   Encounter Diagnosis     ICD-10-CM    1  Chronic left shoulder pain  M25 512     G89 29    2  Neck pain  M54 2                   Subjective: Patient notes the whole L side of her body is numb  Objective: See treatment diary below      Assessment: Tolerated treatment well  Patient would benefit from continued PT  Focusing on function instead of symptoms  Plan: Continue per plan of care        Precautions: cervical fusion C5/C6 , "mild carpal tunnel B,"       Manuals 7/12 7/19 7/22 7/26 7/29 8/3 8/5   L C7, C4 side glides/first rib mob  [address]   L cervical STM GF [address]   Median nerve glides      [address]              Neuro Re-Ed          Chin tucks 15x5" holds 20x 5" holds  10x 10" holds 10x 10" holds  10x 10" holds  10x 10" holds  10x 5" holds seated                                                                Ther Ex          Cervical flexion R rotation 20x 20x 20x  20x       Supine thoracic ext over horizontal 2 5 min 2 5 min  2 5 min  2 5 min  2 5 min  3 min  seated thoracic ext over chair 15x              UBE bwd 5 min 5 min  5 min  5 min  5 min  5 min  5 min    No moneys    2x12 orange  3x10 orange  3x10 orange  20x 3" holds    Standing straight arm pull downs 3x10 grn 3x12 grn  2x12 blue  2x12 blue  2x15 blue  20x 3" holds blue  20x 3" holds blue    sidelying shoulder ER 2x12 1lb 3x12 1 lb  2x12 2 lbs        pec minor stretch supine over towels 5x15" doorway 5x 15" holds doorway  5x20" holds 5x20" holds  5x 20" holds  5x 25" holds  5x 20" holds    Standing W's      2x8 red  2x10 red    Seated CT butterfly stretch      10x 3" holds  12x 3" holds    Ther Activity                              Gait Training                              Modalities

## 2021-08-09 ENCOUNTER — OFFICE VISIT (OUTPATIENT)
Dept: PHYSICAL THERAPY | Facility: CLINIC | Age: 61
End: 2021-08-09
Payer: COMMERCIAL

## 2021-08-09 DIAGNOSIS — M25.512 CHRONIC LEFT SHOULDER PAIN: Primary | ICD-10-CM

## 2021-08-09 DIAGNOSIS — M54.2 NECK PAIN: ICD-10-CM

## 2021-08-09 DIAGNOSIS — G89.29 CHRONIC LEFT SHOULDER PAIN: Primary | ICD-10-CM

## 2021-08-09 PROCEDURE — 97140 MANUAL THERAPY 1/> REGIONS: CPT | Performed by: PHYSICAL THERAPIST

## 2021-08-09 PROCEDURE — 97112 NEUROMUSCULAR REEDUCATION: CPT | Performed by: PHYSICAL THERAPIST

## 2021-08-09 PROCEDURE — 97110 THERAPEUTIC EXERCISES: CPT | Performed by: PHYSICAL THERAPIST

## 2021-08-09 NOTE — PROGRESS NOTES
Daily Note     Today's date: 2021  Patient name: Rei Hidalgo  : 1960  MRN: [id number]  Referring provider: Spencer Smith MD  Dx:   Encounter Diagnosis     ICD-10-CM    1  Chronic left shoulder pain  M25 512     G89 29    2  Neck pain  M54 2                   Subjective: Patient notes had issues with her "insides" over the weekend but no issues with her neck or L UE  Objective: See treatment diary below      Assessment: Tolerated treatment well  Patient would benefit from continued PT  Focusing on function instead of symptoms  Plan: Continue per plan of care        Precautions: cervical fusion C5/C6 , "mild carpal tunnel B,"        Manuals 7/12 7/19 7/22 7/26 7/29 8/3 8/5 8/9   L C7, C4 side glides/first rib mob  GF [address]    L cervical STM GF [address]   Median nerve glides      [address]                Neuro Re-Ed           Chin tucks 15x5" holds 20x 5" holds  10x 10" holds 10x 10" holds  10x 10" holds  10x 10" holds  10x 5" holds seated  12x 5" holds seated                                                                      Ther Ex           Cervical flexion R rotation 20x 20x 20x  20x        Supine thoracic ext over horizontal 2 5 min 2 5 min  2 5 min  2 5 min  2 5 min  3 min  seated thoracic ext over chair 15x  seated thoracic ext over chair 15x               UBE bwd 5 min 5 min  5 min  5 min  5 min  5 min  5 min  3 min    No moneys    2x12 orange  3x10 orange  3x10 orange  20x 3" holds     Standing straight arm pull downs 3x10 grn 3x12 grn  2x12 blue  2x12 blue  2x15 blue  20x 3" holds blue  20x 3" holds blue  20x 5" holds blue    sidelying shoulder ER 2x12 1lb 3x12 1 lb  2x12 2 lbs         pec minor stretch supine over towels 5x15" doorway 5x 15" holds doorway  5x20" holds 5x20" holds  5x 20" holds  5x 25" holds  5x 20" holds  5x 20" holds    Standing W's      2x8 red  2x10 red  2x10 red    Seated CT butterfly stretch      10x 3" holds  12x 3" holds  12x 3" holds Ther Activity                                 Gait Training                                 Modalities

## 2021-08-12 ENCOUNTER — OFFICE VISIT (OUTPATIENT)
Dept: PHYSICAL THERAPY | Facility: CLINIC | Age: 61
End: 2021-08-12
Payer: COMMERCIAL

## 2021-08-12 ENCOUNTER — OFFICE VISIT (OUTPATIENT)
Dept: NEUROLOGY | Facility: CLINIC | Age: 61
End: 2021-08-12
Payer: COMMERCIAL

## 2021-08-12 VITALS
SYSTOLIC BLOOD PRESSURE: 110 MMHG | WEIGHT: 116 LBS | BODY MASS INDEX: 20.55 KG/M2 | HEIGHT: 63 IN | RESPIRATION RATE: 18 BRPM | DIASTOLIC BLOOD PRESSURE: 60 MMHG | HEART RATE: 72 BPM | TEMPERATURE: 97.6 F

## 2021-08-12 DIAGNOSIS — Z98.1 HISTORY OF FUSION OF CERVICAL SPINE: ICD-10-CM

## 2021-08-12 DIAGNOSIS — G43.009 MIGRAINE WITHOUT AURA AND WITHOUT STATUS MIGRAINOSUS, NOT INTRACTABLE: Primary | ICD-10-CM

## 2021-08-12 DIAGNOSIS — M25.512 CHRONIC LEFT SHOULDER PAIN: Primary | ICD-10-CM

## 2021-08-12 DIAGNOSIS — M54.2 NECK PAIN: ICD-10-CM

## 2021-08-12 DIAGNOSIS — G89.29 CHRONIC LEFT SHOULDER PAIN: Primary | ICD-10-CM

## 2021-08-12 PROCEDURE — 97110 THERAPEUTIC EXERCISES: CPT | Performed by: PHYSICAL THERAPIST

## 2021-08-12 PROCEDURE — 97140 MANUAL THERAPY 1/> REGIONS: CPT | Performed by: PHYSICAL THERAPIST

## 2021-08-12 PROCEDURE — 99213 OFFICE O/P EST LOW 20 MIN: CPT | Performed by: PHYSICIAN ASSISTANT

## 2021-08-12 PROCEDURE — 97112 NEUROMUSCULAR REEDUCATION: CPT | Performed by: PHYSICAL THERAPIST

## 2021-08-12 NOTE — PROGRESS NOTES
Daily Note     Today's date: 2021  Patient name: Sarah Lewis  : 1960  MRN: [id number]  Referring provider: Jun Damon MD  Dx:   Encounter Diagnosis     ICD-10-CM    1  Chronic left shoulder pain  M25 512     G89 29    2  Neck pain  M54 2                   Subjective: Patient notes overall pain in her L UE is better  Objective: See treatment diary below      Assessment: Tolerated treatment well  Patient would benefit from continued PT  Focusing on function instead of symptoms  Plan: Continue per plan of care        Precautions: cervical fusion C5/C6 , "mild carpal tunnel B,"        Manuals 7/22 7/26 7/29 8/3 8/5 8/9 8/12    L C7, C4 side glides/first rib mob  [address]  [address]    L cervical STM [address]    Median nerve glides    [address]                  Neuro Re-Ed           Chin tucks 10x 10" holds 10x 10" holds  10x 10" holds  10x 10" holds  10x 5" holds seated  12x 5" holds seated  15x 5" holds seated                                                                       Ther Ex           Cervical flexion R rotation 20x  20x          Supine thoracic ext over horizontal 2 5 min  2 5 min  2 5 min  3 min  seated thoracic ext over chair 15x  sated theoracic ext over chair 15x  15x                UBE bwd 5 min  5 min  5 min  5 min  5 min  3 min  3 min     No moneys  2x12 orange  3x10 orange  3x10 orange  20x 3" holds       Standing straight arm pull downs 2x12 blue  2x12 blue  2x15 blue  20x 3" holds blue  20x 3" holds blue  20x 5" holds blue      sidelying shoulder ER 2x12 2 lbs           pec minor stretch supine over towels 5x20" holds 5x20" holds  5x 20" holds  5x 25" holds  5x 20" holds  5x 20" holds  5x 20" holds     Standing W's    2x8 red  2x10 red  2x10 red  2x12 red     Seated CT butterfly stretch    10x 3" holds  12x 3" holds  12x 3" holds      Standing horizontal abd        2x8 red     Ther Activity                                 Gait Training Modalities

## 2021-08-16 ENCOUNTER — APPOINTMENT (OUTPATIENT)
Dept: PHYSICAL THERAPY | Facility: CLINIC | Age: 61
End: 2021-08-16
Payer: COMMERCIAL

## 2021-08-19 ENCOUNTER — OFFICE VISIT (OUTPATIENT)
Dept: PHYSICAL THERAPY | Facility: CLINIC | Age: 61
End: 2021-08-19
Payer: COMMERCIAL

## 2021-08-19 ENCOUNTER — OFFICE VISIT (OUTPATIENT)
Dept: FAMILY MEDICINE CLINIC | Facility: CLINIC | Age: 61
End: 2021-08-19
Payer: COMMERCIAL

## 2021-08-19 VITALS
WEIGHT: 113 LBS | TEMPERATURE: 96.6 F | RESPIRATION RATE: 16 BRPM | HEIGHT: 63 IN | DIASTOLIC BLOOD PRESSURE: 64 MMHG | BODY MASS INDEX: 20.02 KG/M2 | SYSTOLIC BLOOD PRESSURE: 102 MMHG | HEART RATE: 69 BPM | OXYGEN SATURATION: 98 %

## 2021-08-19 DIAGNOSIS — R10.31 RIGHT LOWER QUADRANT ABDOMINAL PAIN: Primary | ICD-10-CM

## 2021-08-19 DIAGNOSIS — E78.00 PURE HYPERCHOLESTEROLEMIA: ICD-10-CM

## 2021-08-19 DIAGNOSIS — R25.1 TREMOR: ICD-10-CM

## 2021-08-19 DIAGNOSIS — M25.512 CHRONIC LEFT SHOULDER PAIN: ICD-10-CM

## 2021-08-19 DIAGNOSIS — R73.9 ELEVATED BLOOD SUGAR: ICD-10-CM

## 2021-08-19 DIAGNOSIS — M54.2 NECK PAIN: ICD-10-CM

## 2021-08-19 DIAGNOSIS — G47.11 IDIOPATHIC HYPERSOMNIA: ICD-10-CM

## 2021-08-19 DIAGNOSIS — M25.512 CHRONIC LEFT SHOULDER PAIN: Primary | ICD-10-CM

## 2021-08-19 DIAGNOSIS — G89.29 CHRONIC LEFT SHOULDER PAIN: ICD-10-CM

## 2021-08-19 DIAGNOSIS — G89.29 CHRONIC LEFT SHOULDER PAIN: Primary | ICD-10-CM

## 2021-08-19 DIAGNOSIS — F17.200 SMOKING: ICD-10-CM

## 2021-08-19 PROCEDURE — 97140 MANUAL THERAPY 1/> REGIONS: CPT | Performed by: PHYSICAL THERAPIST

## 2021-08-19 PROCEDURE — 97112 NEUROMUSCULAR REEDUCATION: CPT | Performed by: PHYSICAL THERAPIST

## 2021-08-19 PROCEDURE — 4004F PT TOBACCO SCREEN RCVD TLK: CPT | Performed by: FAMILY MEDICINE

## 2021-08-19 PROCEDURE — 99214 OFFICE O/P EST MOD 30 MIN: CPT | Performed by: FAMILY MEDICINE

## 2021-08-19 PROCEDURE — 97110 THERAPEUTIC EXERCISES: CPT | Performed by: PHYSICAL THERAPIST

## 2021-08-19 NOTE — PROGRESS NOTES
Assessment/Plan:       No problem-specific Assessment & Plan notes found for this encounter  Diagnoses and all orders for this visit:    Right lower quadrant abdominal pain  Comments:  with lifting,1 m  ? recurrent hernia   Advised patient if pain persist or get worse to go to the ER  Orders:  -     CT abdomen pelvis w contrast; Future  -     UA w Reflex to Microscopic w Reflex to Culture  -     Comprehensive metabolic panel; Future  -     CBC and differential; Future    Chronic left shoulder pain  Comments:  Improving  Following with physical therapy  Patient to call if pain did not resolve completely    Elevated blood sugar  Comments:  Hemoglobin A1c is normal  To watch sweet and carbohydrate intake    Pure hypercholesterolemia  Comments: To watch fat and cholesterol intake  Orders:  -     Lipid Panel with Direct LDL reflex; Future    Idiopathic hypersomnia  Comments: Follow with the Sleep Center    Tremor  Comments:  Discussed check brain MRI  Or to follow with  Neurology   Patient stated she is going to  follow with  neurology    Smoking  Comments:  Advised to stop smoking        Patient Instructions   Follow up with test results  Abdominal pain the worse or persist go to the ER      Orders Placed This Encounter   Procedures    CT abdomen pelvis w contrast     Standing Status:   Future     Standing Expiration Date:   8/19/2025     Scheduling Instructions: For procedures with both oral (PO) and IV contrast:            The patient will need to drink barium for this test  Barium needs to be picked up in the registration area at least one day prior to your study  For out of network (non-[facility]) orders please bring your prescription when picking up oral contrast  For AM Appointments: Drink one bottle of barium before bed time the evening before your scheduled test   Drink 1/2 of the second bottle one hour prior to your test  Please bring other 1/2 bottle with you to drink at the time of your study  For PM Appointments: Drink one bottle of barium before 9:00am on the day of your test  Drink 1/2 of the second bottle one hour prior to your test  Please bring other 1/2 bottle with you to drink at the time of your study  Nothing to eat 3 hours prior to your test  In addition to the barium, clear liquids are also permitted up until the time of the scan  Clear liquids includes water, black coffee or tea, apple juice or clear broth  If possible wear clothing without any metal in the abdomen area  Sweat suit,       sports bra or bra without underwire may eliminate the need to change  Please bring your insurance cards, a form of photo ID and a list of your medications with you  Arrive 15 minutes prior to your appointment time in order to register  On the day of your test, please bring any prior CT or MRI studies of this area with you that were not performed at a [facility]  For procedures with ONLY IV contrast:            Nothing to eat 3 hours prior to your test  Clear liquids are permitted up until the time of the scan  Clear liquids includes water, black coffee or tea, apple juice or clear broth  If possible wear clothing without any metal in the abdomen area  Sweat suit, sports bra or bra without underwire may eliminate the need to change  Please bring your insurance cards, a form of photo ID and a list of your medications with you  Arrive 15 minutes prior to your appointment time in order to register  On the day of your test, please bring any prior CT or MRI studies of this area with you that were not performed at a [facility]  To schedule this appointment, please contact Central Scheduling at [phone]  Order Specific Question:   What is the patient's sedation requirement?      Answer:   No Sedation     Order Specific Question:   Contrast information:     Answer:   PO & IV     Order Specific Question:   Did the patient ever have a reaction to x-ray dye? If yes, please verify the type of allergy and order the contrast allergy prep  Answer:   No     Order Specific Question:   Release to patient through Mychart     Answer:   Immediate     Order Specific Question:   Reason for Exam (FREE TEXT)     Answer:   right lower abd psin, R/O recurrecnt hernia    Lipid Panel with Direct LDL reflex     This is a patient instruction: This test requires patient fasting for 10-12 hours or longer  Drinking of black coffee or black tea is acceptable  Standing Status:   Future     Standing Expiration Date:   8/19/2022    UA w Reflex to Microscopic w Reflex to Culture    Comprehensive metabolic panel     This is a patient instruction: Patient fasting for 8 hours or longer recommended  Standing Status:   Future     Standing Expiration Date:   8/19/2022    CBC and differential     This is a patient instruction: This test is non-fasting  Please drink two glasses of water morning of bloodwork  Standing Status:   Future     Standing Expiration Date:   8/19/2022         Subjective:     Patient ID: Laura Garcia is a 56 y o  female      HPI  Follow up chronic medical problems   chronic left shoulder pain , having PT,improving   Elevated Bs  Denied polyuria or polydipsia   migrain   Saw neurology on 8-12-21  Idiopathic hypersomnia seen at sleep center at 6-17-21 she stated they are considering clearing her for driving but she has to go through cognitive testing 1st  Tremor  Patient stated she her hand shakes if she grab on something  But also admit to shaking of her feet sometimes with sitting  She said she so neurology but she forgot discussed does symptoms with the Neurology  Denied any seizure activity  Smoking review history of smoking she said 1 pack a day but less than 20 years  She smokes 10 cigarettes a day presently  Acute complaints  Abdominal pain    Patient stated in the last 3 -4 weeks she noticed mild discomfort at the right inguinal area with bending forward  She check this area and she believe her hernia is coming back  Denied otherwise abdominal pain  Denied problem with bowel movement or urination  Denied fever or chills  She still have some discomfort with leaning forward only    Test results   Labs done on April 15, 19, and May 27  X ray left shoulder  Discussed result with patient    Review of Systems   Constitutional: Negative for activity change, appetite change, chills, fatigue, fever and unexpected weight change  HENT: Negative for congestion, ear discharge, ear pain, hearing loss, nosebleeds, rhinorrhea, sinus pressure, sore throat, tinnitus, trouble swallowing and voice change  Eyes: Negative for photophobia, pain and visual disturbance  Respiratory: Negative for cough, chest tightness, shortness of breath and wheezing  Cardiovascular: Negative for chest pain, palpitations and leg swelling  Gastrointestinal: Negative for anal bleeding, blood in stool, constipation, diarrhea, nausea and vomiting  Endocrine: Negative for cold intolerance, heat intolerance, polydipsia and polyuria  Genitourinary: Negative for dysuria, frequency, hematuria, urgency and vaginal bleeding  Musculoskeletal: Negative for arthralgias, back pain, gait problem, joint swelling, myalgias and neck pain  Skin: Negative for rash  Neurological: Negative for dizziness, tremors, seizures, syncope, facial asymmetry, weakness, light-headedness, numbness and headaches  Hematological: Negative for adenopathy  Does not bruise/bleed easily  Psychiatric/Behavioral: Negative for agitation, behavioral problems, confusion, dysphoric mood, hallucinations and sleep disturbance  The patient is not nervous/anxious and is not hyperactive  Objective:     Physical Exam  Constitutional:       General: She is not in acute distress  Appearance: Normal appearance  She is well-developed  HENT:      Head: Normocephalic and atraumatic     Eyes:      General: No scleral icterus  Conjunctiva/sclera: Conjunctivae normal       Pupils: Pupils are equal, round, and reactive to light  Neck:      Thyroid: No thyromegaly  Vascular: No JVD  Cardiovascular:      Rate and Rhythm: Normal rate and regular rhythm  Heart sounds: Normal heart sounds  No murmur heard  Comments: Extremities  No edema  Pulmonary:      Effort: Pulmonary effort is normal       Breath sounds: Normal breath sounds  Abdominal:      General: Abdomen is flat  Bowel sounds are normal  There is no distension  Palpations: Abdomen is soft  There is no mass  Tenderness: There is no abdominal tenderness  There is no guarding or rebound  Comments: Positive fullness at right pubic area with standing resolved with laying down  No tenderness   Musculoskeletal:      Cervical back: Neck supple  Right lower leg: No edema  Left lower leg: No edema  Lymphadenopathy:      Cervical: No cervical adenopathy  Skin:     Findings: No rash  Neurological:      General: No focal deficit present  Mental Status: She is alert and oriented to person, place, and time  Cranial Nerves: No cranial nerve deficit  Sensory: No sensory deficit  Motor: No weakness or abnormal muscle tone  Coordination: Coordination normal       Gait: Gait normal       Comments: No rest tremor of the all extremities seen or tremor with extending her arms  Psychiatric:         Mood and Affect: Mood normal          Behavior: Behavior normal          Thought Content:  Thought content normal          Judgment: Judgment normal

## 2021-08-19 NOTE — PROGRESS NOTES
Daily Note     Today's date: 2021  Patient name: Roberto Raines  : 1960  MRN: [id number]  Referring provider: Saravanan Rodriguez MD  Dx:   Encounter Diagnosis     ICD-10-CM    1  Chronic left shoulder pain  M25 512     G89 29    2  Neck pain  M54 2                   Subjective: Patient notes overall pain in her L UE is better  Had migraine this morning  Day after last visit had a lot of pain  Objective: See treatment diary below      Assessment: Tolerated treatment well  Patient would benefit from continued PT  Focusing on function instead of symptoms  Plan: Continue per plan of care        Precautions: cervical fusion C5/C6 , "mild carpal tunnel B,"        Manuals 7/22 7/26 7/29 8/3 8/5 8/9 8/12 8/19   L C7, C4 side glides/first rib mob  [address]  [address]   L cervical STM [address]   Median nerve glides    [address]                  Neuro Re-Ed           Chin tucks 10x 10" holds 10x 10" holds  10x 10" holds  10x 10" holds  10x 5" holds seated  12x 5" holds seated  15x 5" holds seated  15x 5" holds                                                                      Ther Ex           Cervical flexion R rotation 20x  20x          Supine thoracic ext over horizontal 2 5 min  2 5 min  2 5 min  3 min  seated thoracic ext over chair 15x  sated theoracic ext over chair 15x  15x  15x               UBE bwd 5 min  5 min  5 min  5 min  5 min  3 min  3 min  3 min    No moneys  2x12 orange  3x10 orange  3x10 orange  20x 3" holds       Standing straight arm pull downs 2x12 blue  2x12 blue  2x15 blue  20x 3" holds blue  20x 3" holds blue  20x 5" holds blue      sidelying shoulder ER 2x12 2 lbs           pec minor stretch supine over towels 5x20" holds 5x20" holds  5x 20" holds  5x 25" holds  5x 20" holds  5x 20" holds  5x 20" holds  5x 20" holds   Standing W's    2x8 red  2x10 red  2x10 red  2x12 red  3x10    Seated CT butterfly stretch    10x 3" holds  12x 3" holds  12x 3" holds      Standing horizontal abd        2x8 red  2x8 red   Ther Activity                                 Gait Training                                 Modalities

## 2021-08-22 DIAGNOSIS — G47.11 IDIOPATHIC HYPERSOMNIA: Primary | ICD-10-CM

## 2021-08-23 DIAGNOSIS — E78.00 PURE HYPERCHOLESTEROLEMIA: ICD-10-CM

## 2021-08-23 DIAGNOSIS — G43.009 MIGRAINE WITHOUT AURA AND WITHOUT STATUS MIGRAINOSUS, NOT INTRACTABLE: ICD-10-CM

## 2021-08-23 RX ORDER — ARMODAFINIL 250 MG/1
TABLET ORAL
Qty: 30 TABLET | Refills: 1 | Status: SHIPPED | OUTPATIENT
Start: 2021-08-23 | End: 2021-09-30 | Stop reason: SDUPTHER

## 2021-08-23 RX ORDER — ROSUVASTATIN CALCIUM 5 MG/1
5 TABLET, COATED ORAL DAILY
Qty: 90 TABLET | Refills: 0 | Status: SHIPPED | OUTPATIENT
Start: 2021-08-23 | End: 2021-12-13 | Stop reason: SDUPTHER

## 2021-08-23 RX ORDER — ZOLMITRIPTAN 2.5 MG/1
TABLET, FILM COATED ORAL
Qty: 12 TABLET | Refills: 0 | Status: CANCELLED | OUTPATIENT
Start: 2021-08-23

## 2021-08-23 NOTE — TELEPHONE ENCOUNTER
Zomig last sent to [facility] pharmacy on 7/28 with 2 additional refills  No refills required at this time       Sent patient MyChart message making her aware

## 2021-08-24 ENCOUNTER — OFFICE VISIT (OUTPATIENT)
Dept: PHYSICAL THERAPY | Facility: CLINIC | Age: 61
End: 2021-08-24
Payer: COMMERCIAL

## 2021-08-24 DIAGNOSIS — M25.512 CHRONIC LEFT SHOULDER PAIN: Primary | ICD-10-CM

## 2021-08-24 DIAGNOSIS — M54.2 NECK PAIN: ICD-10-CM

## 2021-08-24 DIAGNOSIS — G89.29 CHRONIC LEFT SHOULDER PAIN: Primary | ICD-10-CM

## 2021-08-24 PROCEDURE — 97110 THERAPEUTIC EXERCISES: CPT | Performed by: PHYSICAL THERAPIST

## 2021-08-24 PROCEDURE — 97112 NEUROMUSCULAR REEDUCATION: CPT | Performed by: PHYSICAL THERAPIST

## 2021-08-24 NOTE — PROGRESS NOTES
Daily Note     Today's date: 2021  Patient name: Laura Garcia  : 1960  MRN: [id number]  Referring provider: Remedios Cardenas MD  Dx:   Encounter Diagnosis     ICD-10-CM    1  Chronic left shoulder pain  M25 512     G89 29    2  Neck pain  M54 2                   Subjective: Patient notes overall pain in her L UE is better  Notes R UE is bothering her now  Objective: See treatment diary below      Assessment: Tolerated treatment well  Patient would benefit from continued PT  Focusing on function instead of symptoms  Plan: Continue per plan of care        Precautions: cervical fusion C5/C6 , "mild carpal tunnel B,"        Manuals 7/26 7/29 8/3 8/5 8/9 8/12 8/19 8/24   L C7, C4 side glides/first rib mob  [address]  [address]   L cervical STM [address]   Median nerve glides   [address]                   Neuro Re-Ed           Chin tucks 10x 10" holds  10x 10" holds  10x 10" holds  10x 5" holds seated  12x 5" holds seated  15x 5" holds seated  15x 5" holds  10x 10" holds seated                                                                      Ther Ex           Cervical flexion R rotation 20x           Supine thoracic ext over horizontal 2 5 min  2 5 min  3 min  seated thoracic ext over chair 15x  sated theoracic ext over chair 15x  15x  15x  15x               UBE bwd 5 min  5 min  5 min  5 min  3 min  3 min  3 min  3 min bwd    No moneys 2x12 orange  3x10 orange  3x10 orange  20x 3" holds        Standing straight arm pull downs 2x12 blue  2x15 blue  20x 3" holds blue  20x 3" holds blue  20x 5" holds blue       sidelying shoulder ER           pec minor stretch supine over towels 5x20" holds  5x 20" holds  5x 25" holds  5x 20" holds  5x 20" holds  5x 20" holds  5x 20" holds 5x 20" holds    Standing W's   2x8 red  2x10 red  2x10 red  2x12 red  3x10  3x10 red    Seated CT butterfly stretch   10x 3" holds  12x 3" holds  12x 3" holds       Standing horizontal abd       2x8 red  2x8 red 2x12 red   Ther Activity                                 Gait Training                                 Modalities

## 2021-08-26 ENCOUNTER — OFFICE VISIT (OUTPATIENT)
Dept: PHYSICAL THERAPY | Facility: CLINIC | Age: 61
End: 2021-08-26
Payer: COMMERCIAL

## 2021-08-26 DIAGNOSIS — M25.512 CHRONIC LEFT SHOULDER PAIN: Primary | ICD-10-CM

## 2021-08-26 DIAGNOSIS — G89.29 CHRONIC LEFT SHOULDER PAIN: Primary | ICD-10-CM

## 2021-08-26 DIAGNOSIS — M54.2 NECK PAIN: ICD-10-CM

## 2021-08-26 PROCEDURE — 97112 NEUROMUSCULAR REEDUCATION: CPT | Performed by: PHYSICAL THERAPIST

## 2021-08-26 PROCEDURE — 97110 THERAPEUTIC EXERCISES: CPT | Performed by: PHYSICAL THERAPIST

## 2021-08-26 PROCEDURE — 97140 MANUAL THERAPY 1/> REGIONS: CPT | Performed by: PHYSICAL THERAPIST

## 2021-08-26 NOTE — PROGRESS NOTES
Daily Note     Today's date: 2021  Patient name: Almas Job  : 1960  MRN: [id number]  Referring provider: Jay Page MD  Dx:   Encounter Diagnosis     ICD-10-CM    1  Chronic left shoulder pain  M25 512     G89 29    2  Neck pain  M54 2                   Subjective: Patient notes overall pain in her L UE is better  Notes R UE is bothering her now  Overall not a good day whole body aches  Objective: See treatment diary below      Assessment: Tolerated treatment well  Patient would benefit from continued PT  Focusing on function instead of symptoms  Plan: Continue per plan of care        Precautions: cervical fusion C5/C6 , "mild carpal tunnel B,"        Manuals 7/29 8/3 8/5 8/9 8/12 8/19 8/24 8/26   L C7, C4 side glides/first rib mob  [address]  [address]   L cervical STM [address]   Median nerve glides  [address]                    Neuro Re-Ed           Chin tucks 10x 10" holds  10x 10" holds  10x 5" holds seated  12x 5" holds seated  15x 5" holds seated  15x 5" holds  10x 10" holds seated  10x 10" holds seated                                                                      Ther Ex           Cervical flexion R rotation           Supine thoracic ext over horizontal 2 5 min  3 min  seated thoracic ext over chair 15x  sated theoracic ext over chair 15x  15x  15x  15x  20x              UBE bwd 5 min  5 min  5 min  3 min  3 min  3 min  3 min bwd  3 min bwd    No moneys 3x10 orange  3x10 orange  20x 3" holds         Standing Y's        2x10 red    sidelying shoulder ER           pec minor stretch supine over towels 5x 20" holds  5x 25" holds  5x 20" holds  5x 20" holds  5x 20" holds  5x 20" holds 5x 20" holds  20x 5" holds doorway    Standing W's  2x8 red  2x10 red  2x10 red  2x12 red  3x10  3x10 red  20x 3" holds   Seated CT butterfly stretch  10x 3" holds  12x 3" holds  12x 3" holds        Standing horizontal abd      2x8 red  2x8 red 2x12 red 20x 3" holds    Ther Activity Gait Training                                 Modalities

## 2021-08-30 ENCOUNTER — OFFICE VISIT (OUTPATIENT)
Dept: PHYSICAL THERAPY | Facility: CLINIC | Age: 61
End: 2021-08-30
Payer: COMMERCIAL

## 2021-08-30 DIAGNOSIS — G89.29 CHRONIC LEFT SHOULDER PAIN: Primary | ICD-10-CM

## 2021-08-30 DIAGNOSIS — M25.512 CHRONIC LEFT SHOULDER PAIN: Primary | ICD-10-CM

## 2021-08-30 DIAGNOSIS — M54.2 NECK PAIN: ICD-10-CM

## 2021-08-30 PROCEDURE — 97112 NEUROMUSCULAR REEDUCATION: CPT | Performed by: PHYSICAL THERAPIST

## 2021-08-30 PROCEDURE — 97110 THERAPEUTIC EXERCISES: CPT | Performed by: PHYSICAL THERAPIST

## 2021-08-30 NOTE — PROGRESS NOTES
Daily Note     Today's date: 2021  Patient name: Mary Alice Troncoso  : 1960  MRN: [id number]  Referring provider: Mahsa Esteves MD  Dx:   Encounter Diagnosis     ICD-10-CM    1  Chronic left shoulder pain  M25 512     G89 29    2  Neck pain  M54 2                   Subjective: Patient notes no numbness/tingling much in her arms anymore, just gets some achiness in B shoulders  Objective: See treatment diary below      Assessment: Tolerated treatment well  Patient would benefit from continued PT  Focusing on function instead of symptoms  Plan: Continue per plan of care        Precautions: cervical fusion C5/C6 , "mild carpal tunnel B,"        Manuals    L C7, C4 side glides/first rib mob  [address]  [address]   L cervical STM [address]   Median nerve glides                    Neuro Re-Ed          Chin tucks 10x 5" holds seated  12x 5" holds seated  15x 5" holds seated  15x 5" holds  10x 10" holds seated  10x 10" holds seated  10x 10" holds seated                                                                Ther Ex          Cervical flexion R rotation          Supine thoracic ext over horizontal seated thoracic ext over chair 15x  sated theoracic ext over chair 15x  15x  15x  15x  20x 20x              UBE bwd 5 min  3 min  3 min  3 min  3 min bwd  3 min bwd  3 min bwd    No moneys 20x 3" holds          Standing Y's      2x10 red  2x12 red   sidelying shoulder ER          pec minor stretch supine over towels 5x 20" holds  5x 20" holds  5x 20" holds  5x 20" holds 5x 20" holds  20x 5" holds doorway  5x 20" holds doorway    Standing W's 2x10 red  2x10 red  2x12 red  3x10  3x10 red  20x 3" holds 20x 5" holds red    Seated CT butterfly stretch 12x 3" holds  12x 3" holds         Standing horizontal abd    2x8 red  2x8 red 2x12 red 20x 3" holds  20x 5" holds    Ther Activity                              Gait Training                              Modalities

## 2021-09-02 ENCOUNTER — TELEPHONE (OUTPATIENT)
Dept: NEUROLOGY | Facility: CLINIC | Age: 61
End: 2021-09-02

## 2021-09-02 ENCOUNTER — OFFICE VISIT (OUTPATIENT)
Dept: PHYSICAL THERAPY | Facility: CLINIC | Age: 61
End: 2021-09-02
Payer: COMMERCIAL

## 2021-09-02 DIAGNOSIS — G89.29 CHRONIC LEFT SHOULDER PAIN: ICD-10-CM

## 2021-09-02 DIAGNOSIS — M54.2 NECK PAIN: Primary | ICD-10-CM

## 2021-09-02 DIAGNOSIS — M25.512 CHRONIC LEFT SHOULDER PAIN: ICD-10-CM

## 2021-09-02 PROCEDURE — 97140 MANUAL THERAPY 1/> REGIONS: CPT | Performed by: PHYSICAL THERAPIST

## 2021-09-02 PROCEDURE — 97112 NEUROMUSCULAR REEDUCATION: CPT | Performed by: PHYSICAL THERAPIST

## 2021-09-02 PROCEDURE — 97110 THERAPEUTIC EXERCISES: CPT | Performed by: PHYSICAL THERAPIST

## 2021-09-02 NOTE — TELEPHONE ENCOUNTER
Pt called back to confirm appt  She wanted me to literally confirm her appt - aware that she will not get the reminder calls  She will do a pre-check-in via MY CHART closer to the date  Went over all appt details

## 2021-09-02 NOTE — TELEPHONE ENCOUNTER
Called pt to confirm upcoming appointment with [facility] on 9/10  No answer, left voicemail to call back to confirm

## 2021-09-02 NOTE — PROGRESS NOTES
Daily Note     Today's date: 2021  Patient name: Amy Walton  : 1960  MRN: [id number]  Referring provider: Raven Shipley MD  Dx:   Encounter Diagnosis     ICD-10-CM    1  Neck pain  M54 2    2  Chronic left shoulder pain  M25 512     G89 29                   Subjective: Patient notes no numbness/tingling much in her arms anymore, just gets some achiness in B shoulders  L UE feeling better than R UE  Objective: See treatment diary below      Assessment: Tolerated treatment well  Patient would benefit from continued PT  Focusing on function instead of symptoms  Plan: Continue per plan of care        Precautions: cervical fusion C5/C6 , "mild carpal tunnel B,"        Manuals    L C7, C4 side glides/first rib mob  [address]  [address]   [address] cervical  [address]   Median nerve glides                      Neuro Re-Ed           Chin tucks 10x 5" holds seated  12x 5" holds seated  15x 5" holds seated  15x 5" holds  10x 10" holds seated  10x 10" holds seated  10x 10" holds seated  12x 10" holds seated                                                                      Ther Ex           Cervical flexion R rotation           Supine thoracic ext over horizontal seated thoracic ext over chair 15x  sated theoracic ext over chair 15x  15x  15x  15x  20x 20x  20x              UBE bwd 5 min  3 min  3 min  3 min  3 min bwd  3 min bwd  3 min bwd  3 min bwd    No moneys 20x 3" holds           Standing Y's      2x10 red  2x12 red 3x10 red    sidelying shoulder ER           pec minor stretch supine over towels 5x 20" holds  5x 20" holds  5x 20" holds  5x 20" holds 5x 20" holds  20x 5" holds doorway  5x 20" holds doorway  3x30" holds doorway    Standing W's 2x10 red  2x10 red  2x12 red  3x10  3x10 red  20x 3" holds 20x 5" holds red  20x 5" holds red    Seated CT butterfly stretch 12x 3" holds  12x 3" holds          Standing horizontal abd    2x8 red  2x8 red 2x12 red 20x 3" holds  20x 5" holds  20x 5" holds red    Ther Activity                                 Gait Training                                 Modalities

## 2021-09-07 ENCOUNTER — HOSPITAL ENCOUNTER (OUTPATIENT)
Dept: CT IMAGING | Facility: HOSPITAL | Age: 61
Discharge: HOME | End: 2021-09-07
Attending: FAMILY MEDICINE
Payer: COMMERCIAL

## 2021-09-07 DIAGNOSIS — R10.31 RIGHT LOWER QUADRANT ABDOMINAL PAIN: ICD-10-CM

## 2021-09-07 PROCEDURE — 74177 CT ABD & PELVIS W/CONTRAST: CPT

## 2021-09-07 PROCEDURE — G1004 CDSM NDSC: HCPCS

## 2021-09-07 RX ADMIN — IOHEXOL 100 ML: 350 INJECTION, SOLUTION INTRAVENOUS at 18:36

## 2021-09-09 ENCOUNTER — OFFICE VISIT (OUTPATIENT)
Dept: PHYSICAL THERAPY | Facility: CLINIC | Age: 61
End: 2021-09-09
Payer: COMMERCIAL

## 2021-09-09 DIAGNOSIS — M25.512 CHRONIC LEFT SHOULDER PAIN: ICD-10-CM

## 2021-09-09 DIAGNOSIS — M54.2 NECK PAIN: Primary | ICD-10-CM

## 2021-09-09 DIAGNOSIS — G89.29 CHRONIC LEFT SHOULDER PAIN: ICD-10-CM

## 2021-09-09 PROCEDURE — 97112 NEUROMUSCULAR REEDUCATION: CPT | Performed by: PHYSICAL THERAPIST

## 2021-09-09 PROCEDURE — 97140 MANUAL THERAPY 1/> REGIONS: CPT | Performed by: PHYSICAL THERAPIST

## 2021-09-09 PROCEDURE — 97110 THERAPEUTIC EXERCISES: CPT | Performed by: PHYSICAL THERAPIST

## 2021-09-09 NOTE — PROGRESS NOTES
Daily Note     Today's date: 2021  Patient name: Mare Hollingsworth  : 1960  MRN: [id number]  Referring provider: Kieran Miller MD  Dx:   Encounter Diagnosis     ICD-10-CM    1  Neck pain  M54 2    2  Chronic left shoulder pain  M25 512     G89 29                   Subjective: Patient notes no numbness/tingling much in her arms anymore, just gets some achiness in B shoulders  L UE feeling better than R UE  Objective: See treatment diary below      Assessment: Tolerated treatment well  Patient would benefit from continued PT  Focusing on function instead of symptoms  Plan: Continue per plan of care        Precautions: cervical fusion C5/C6 , "mild carpal tunnel B,"        Manuals    L C7, C4 side glides/first rib mob  [address]   L cervical STM [address]   Median nerve glides                    Neuro Re-Ed          Chin tucks 15x 5" holds seated  15x 5" holds  10x 10" holds seated  10x 10" holds seated  10x 10" holds seated  12x 10" holds seated  20x 5" holds quadruped                                                               Ther Ex          Cervical flexion R rotation          Supine thoracic ext over horizontal 15x  15x  15x  20x 20x  20x 20x              UBE bwd 3 min  3 min  3 min bwd  3 min bwd  3 min bwd  3 min bwd  4 min bwd   No moneys          Standing Y's    2x10 red  2x12 red 3x10 red  3x10 red    sidelying shoulder ER          pec minor stretch supine over towels 5x 20" holds  5x 20" holds 5x 20" holds  20x 5" holds doorway  5x 20" holds doorway  3x30" holds doorway  3x30"    Standing W's 2x12 red  3x10  3x10 red  20x 3" holds 20x 5" holds red  20x 5" holds red  2x10 grn    Seated CT butterfly stretch          Standing horizontal abd  2x8 red  2x8 red 2x12 red 20x 3" holds  20x 5" holds  20x 5" holds red  2x10 grn    Ther Activity                              Gait Training                              Modalities

## 2021-09-10 ENCOUNTER — OFFICE VISIT (OUTPATIENT)
Dept: NEUROLOGY | Facility: CLINIC | Age: 61
End: 2021-09-10
Payer: COMMERCIAL

## 2021-09-10 VITALS
WEIGHT: 121.2 LBS | HEART RATE: 80 BPM | TEMPERATURE: 97.4 F | DIASTOLIC BLOOD PRESSURE: 68 MMHG | BODY MASS INDEX: 21.48 KG/M2 | SYSTOLIC BLOOD PRESSURE: 102 MMHG | HEIGHT: 63 IN

## 2021-09-10 DIAGNOSIS — G47.11 IDIOPATHIC HYPERSOMNIA: ICD-10-CM

## 2021-09-10 DIAGNOSIS — G43.009 MIGRAINE WITHOUT AURA AND WITHOUT STATUS MIGRAINOSUS, NOT INTRACTABLE: ICD-10-CM

## 2021-09-10 DIAGNOSIS — R25.2 JERKING MOVEMENTS OF EXTREMITIES: Primary | ICD-10-CM

## 2021-09-10 DIAGNOSIS — M79.7 FIBROMYALGIA: ICD-10-CM

## 2021-09-10 DIAGNOSIS — Z98.1 HISTORY OF FUSION OF CERVICAL SPINE: ICD-10-CM

## 2021-09-10 PROCEDURE — 4004F PT TOBACCO SCREEN RCVD TLK: CPT | Performed by: PHYSICIAN ASSISTANT

## 2021-09-10 PROCEDURE — 99215 OFFICE O/P EST HI 40 MIN: CPT | Performed by: PHYSICIAN ASSISTANT

## 2021-09-10 NOTE — PROGRESS NOTES
Patient ID: Verenice Reeves is a 56 y o  female  Assessment/Plan:       Diagnoses and all orders for this visit:    Jerking movements of extremities  -     MRI brain without contrast; Future  -     EMG 1 Upper/1 Lower Neuropathy; Future    Migraine without aura and without status migrainosus, not intractable  -     MRI brain without contrast; Future    Fibromyalgia    History of fusion of cervical spine    Idiopathic hypersomnia         Re: tremor/ shaking of primarily RUE- my thoughts are that tremor/ shaking occurs when her muscle is challenged- in the office there was very little if any tremor with intention (FTN testing), and no rest tremor/ pill rolling  She may benefit from PT or muscle strengthening exercises to increase endurance of muscle  Brain MRI ordered to r/o abn since tremor is more so on the R side  EMG ordered to r/o muscular etiology  Will consider EEG in the future if sxs progress/ worsen  Migraines controlled, continue maxalt prn  I completed and signed [place] forms today for her to drive again  She is okay to drive from a neurological perspective, and there is no concern for cognitive decline  She is primarily getting forms signed by sleep medicine; originally driving was restricted due to ?narcolepsy  Recent sleep study reveals idiopathic hypersomnia but no narcolepsy  She is controlled on armodafinil 250 mg daily  She is a very compliant patient- she understands only to drive locally, and not at night time  Consider repeat C spine MRI with h/o fusion  Will consider CPK  The patient should not hesitate to call me prior to her follow up with any questions or concerns  Subjective:    HPI    The patient reports she had a headache today with nausea, no vomiting  It was located in the left frontoparietal region which is the typical location for a migraine headache sometimes it is behind the left eye    She thinks pain after physical therapy for her shoulders is triggering the headaches  She gets relief of the majority of her migraine after taking Maxalt and she is happy with this medication  She declines anything else  She denies migraine aura or visual changes  She reports shaking symptoms, which first started during anesthesia during hospital admission in March for lithotripsy for kidney stones  She states after waking from anesthesia she was shaking  It sometimes occurs now in the right greater than left upper extremity, more frequent in the right upper extremity, and can occur in the legs as well  She denies noticing any trigger for this  It can happen when she is holding her arms up or leaning her arms against something, or if she is holding something in her hand  She would also described as a twitching or shaking sensation  She states it is getting worse over time  She describes that it feels like her hand is shaking, but no muscle fasciculations that she is aware of  It is a high-frequency tremor, and not necessarily a jerking  She has no seizure-like symptoms such as loss of consciousness, confusion, incontinence or tongue biting  Of note she is taking a muscle relaxant for fibromyalgia which is a long-standing diagnosis  She noticed her toes twitching back and forth, and she sought through her shoes, while she was standing at work  She also occasionally notices the twitching in the face, and no specific region  If she notices the twitching in her hand, mainly when she holds her hand up in the air or rests it on a desk or chair, she can hold her hand with her other hand and the twitching will stop for 15 or 20 minutes and then start back up again  She works in the  in [employer]  Sometimes has long periods of standing  She does not notice a tremor with writing, but she sometimes feels like her words "jumble together" when she writes      The following portions of the patient's history were reviewed and updated as appropriate: She  has a past medical history of Allergy to dust, Anemia, Calculus of ureter, Chronic pain disorder, Colitis, Cracked skin, Diverticulosis, Dysphagia, GERD (gastroesophageal reflux disease), H/O: GI bleed, Hiatal hernia, History of DVT in adulthood (2019), Hydronephrosis, Hyperlipidemia, Inguinal hernia, Irregular heart beat, Irritable bowel, Kidney stone, Migraines, Narcolepsy, Narcolepsy (8/6/2020), OAB (overactive bladder), Osteoarthritis, Osteoporosis, Other chronic pain, PONV (postoperative nausea and vomiting), Smoking (8/6/2020), Tobacco abuse, Wears dentures, Wears glasses, and Wears glasses    She   Patient Active Problem List    Diagnosis Date Noted    Jerking movements of extremities 09/12/2021    History of fusion of cervical spine 09/12/2021    ANAM (obstructive sleep apnea)     Recurrent UTI 04/07/2021    Wears dentures     Hyponatremia 03/14/2021    Leukopenia 03/14/2021    Hypokalemia 03/14/2021    Sepsis ([address] ) 01/13/2021    Pyelonephritis 01/13/2021    Low magnesium level 01/10/2021    Flank pain 12/25/2020    Ureteral stone with hydronephrosis 12/02/2020    Urgency of urination 11/19/2020    Colitis 11/09/2020    Smoking 08/06/2020    Narcolepsy 08/06/2020    Lumbar radiculopathy     S/P right inguinal hernia repair 07/02/2020    Diarrhea 07/02/2020    Weight loss 07/02/2020    Elevated blood sugar 07/02/2020    Pap smear for cervical cancer screening 07/02/2020    Inguinal hernia of right side without obstruction or gangrene 06/25/2020    Microscopic hematuria 05/27/2020    Calculus of ureter 05/27/2020    Narcolepsy without cataplexy     Cervical radiculopathy     Encounter for immunization 11/23/2019    Xerosis of skin 11/23/2019    Encounter for hepatitis C screening test for low risk patient 10/04/2019    Spondylosis of cervical region without myelopathy or radiculopathy     Neck pain     Complicated UTI (urinary tract infection) 09/01/2019    Anemia 09/01/2019    Irritable bowel syndrome 09/01/2019    Edema of both legs 09/01/2019    Prolonged QT interval 09/01/2019    Rash 09/01/2019    Screening mammogram, encounter for 09/01/2019    Screening for colon cancer 09/01/2019    Acute cystitis 08/29/2019    Renal calculus 08/29/2019    Migraine without aura and without status migrainosus, not intractable 07/29/2019    Migraine headache 06/25/2019    Fibromyalgia 06/25/2019    Pure hypercholesterolemia 06/25/2019    Allergy 06/25/2019    Low iron 06/25/2019    Idiopathic hypersomnia 06/25/2019    Osteoporosis 06/25/2019    Diverticulosis 06/25/2019    Chronic diarrhea 06/25/2019    Hypercholesterolemia 04/04/2019    Urge incontinence 01/11/2019    Frequency of urination 01/11/2019    History of hydronephrosis 01/11/2019    Irritable bowel syndrome without diarrhea 12/03/2015    Spondylosis of lumbar spine 02/19/2015    Senile osteoporosis 02/18/2014     She  has a past surgical history that includes Laminectomy; Knee surgery (Right); Cholecystectomy; Spinal fusion; Bladder surgery; Abdominal adhesion surgery; [place] tooth extraction; Mole removal; Other surgical history; Hysterectomy (1977); Oophorectomy (Bilateral, 1977); LAPAROSCOPY; pr repair ing hernia,5+y/o,reducibl (Right, 6/25/2020); Colonoscopy (08/06/2020); Upper gastrointestinal endoscopy (08/06/2020); pr cystourethroscopy,ureter catheter (Right, 12/2/2020); Lymph node dissection (Left); Arm skin lesion biopsy / excision; pr cysto/uretero w/lithotripsy &indwell stent insrt (Right, 1/5/2021); [place] retrograde pyelogram (1/5/2021); Joint replacement (Right); Hernia repair; and pr fragment kidney stone/ eswl (Right, 3/25/2021)  Her family history includes Cancer in her father; Diabetes in her sister and sister;  Heart attack in her mother; Hypertension in her father and mother; Nephrolithiasis in her brother; No Known Problems in her daughter, maternal aunt, maternal aunt, maternal aunt, maternal grandfather, maternal grandmother, paternal aunt, paternal aunt, paternal aunt, paternal grandfather, and paternal grandmother; Stroke in her brother  She  reports that she has been smoking cigarettes  She has a 23 50 pack-year smoking history  She has never used smokeless tobacco  She reports previous alcohol use  She reports current drug use  Drug: Marijuana  Current Outpatient Medications   Medication Sig Dispense Refill    Armodafinil 250 MG tablet Take 1 tablet (250 mg total) by mouth daily 30 tablet 0    BACILLUS COAGULANS-INULIN PO Take 1 capsule by mouth daily Takes in the am      calcium carbonate 1250 MG capsule Take 600 mg by mouth daily       Cholecalciferol 1000 units tablet Take 1,000 Units by mouth daily Takes in the afternoon      Coenzyme Q10 200 MG/GM POWD Take 200 mg by mouth daily Takes in the am      denosumab (PROLIA) 60 mg/mL Inject 60 mg under the skin Every 6 months      diclofenac (VOLTAREN) 75 mg EC tablet       dicyclomine (BENTYL) 20 mg tablet Take 1 tablet (20 mg total) by mouth 4 (four) times a day as needed (abdominal pain) 240 tablet 3    DULoxetine (CYMBALTA) 30 mg delayed release capsule Take 3 capsules (90 mg total) by mouth daily (Patient taking differently: Take 90 mg by mouth every evening ) 90 capsule 1    ferrous sulfate 325 (65 Fe) mg tablet Take 325 mg by mouth daily with breakfast       fluticasone (FLONASE) 50 mcg/act nasal spray 1 spray into each nostril daily Takes in the am       gabapentin (NEURONTIN) 300 mg capsule 300 mg at 1200 in addition to 600 mg at bedtime        Ginger, Zingiber officinalis, (GINGER ROOT PO) Take 700 mg by mouth 2 (two) times a day       Ginkgo Biloba 40 MG TABS Take 40 mg by mouth daily Take in the am      mesalamine (LIALDA) 1 2 g EC tablet Take 2 4 g by mouth daily with breakfast      metaxalone (SKELAXIN) 800 mg tablet Take 800 mg by mouth 3 (three) times a day       Multiple Vitamins-Minerals (CENTRUM SILVER PO) Take 1 tablet by mouth daily Takes in the am      omeprazole (PriLOSEC) 40 MG capsule Take 1 capsule (40 mg total) by mouth daily before breakfast Takes in the am 90 capsule 3    prochlorperazine (COMPAZINE) 10 mg tablet Take 1 tablet (10 mg total) by mouth every 6 (six) hours as needed for nausea or vomiting 12 tablet 1    rosuvastatin (CRESTOR) 5 mg tablet Take 1 tablet (5 mg total) by mouth daily 90 tablet 0    solifenacin (VESICARE) 10 MG tablet Take 1 tablet (10 mg total) by mouth every evening 90 tablet 3    Turmeric (QC Tumeric Complex) 500 MG CAPS Take 700 mg by mouth 2 (two) times a day       ZOLMitriptan (ZOMIG) 2 5 MG tablet TAKE 1 TABLET BY MOUTH AT ONSET OF MIGRAINE, MAY REPEAT IN 2 HOURS IF NEEDED  LIMIT 2/24 HOURS, 4/WEEK, 8/MONTH 12 tablet 2    Armodafinil 250 MG tablet TAKE ONE TABLET BY MOUTH EVERY DAY 30 tablet 1    Citicoline Sodium 500 MG TABS Take 500 mg by mouth daily Takes in the am (Patient not taking: Reported on 8/19/2021)      mupirocin (BACTROBAN) 2 % ointment Apply topically 3 (three) times a day Apply topically on hands to open cuts to prevent infection 30 g 0    NON FORMULARY Place 30 mg under the tongue as needed --- tincture prn      NON FORMULARY Take 2 tablets by mouth daily Take Health and Sooth take two capsule daily        No current facility-administered medications for this visit  She is allergic to celecoxib, sumatriptan, tramadol, and medical tape            Objective:    Blood pressure 102/68, pulse 80, temperature (!) 97 4 °F (36 3 °C), temperature source Temporal, height 5' 3" (1 6 m), weight 55 kg (121 lb 3 2 oz), not currently breastfeeding  Body mass index is 21 47 kg/m²  Physical Exam    Neurological Exam  Vital signs reviewed  Well developed, well nourished  Head: Normocephalic, atraumatic  Speech is fluent and articulate    Neck: Neck flexors 5/5  CN 2-12: intact and symmetric, including EOMs which are normal b/l and PERRL  MSK: 5/5 t/o  ROM normal x all 4 extr  Sensation: Inact to LT x4 extr  Romberg borderline positive  Reflexes: 2+ and symmetric in all 4 extr  Coordination: Nml x4 extr  Gait: Steady normal gait  Cannot walk in tandem  ROS:    Review of Systems   Constitutional: Negative  Negative for appetite change and fever  HENT: Negative  Negative for hearing loss, tinnitus, trouble swallowing and voice change  Eyes: Negative  Negative for photophobia and pain  Respiratory: Negative  Negative for shortness of breath  Cardiovascular: Negative  Negative for palpitations  Gastrointestinal: Negative  Negative for nausea and vomiting  Endocrine: Negative  Negative for cold intolerance  Genitourinary: Positive for frequency and urgency  Negative for dysuria  Musculoskeletal: Negative  Negative for myalgias and neck pain  Skin: Negative  Negative for rash  Neurological: Positive for tremors (anywhere on body, on and off ) and headaches  Negative for dizziness, seizures, syncope, facial asymmetry, speech difficulty, weakness, light-headedness and numbness  Hematological: Negative  Does not bruise/bleed easily  Psychiatric/Behavioral: Negative  Negative for confusion, hallucinations and sleep disturbance  The following portions of the patient's history were reviewed and updated as appropriate: allergies, current medications/ medication history, past family history, past medical history, past social history, past surgical history and problem list     Review of systems was reviewed and otherwise unremarkable from a neurological perspective

## 2021-09-12 PROBLEM — R25.2 JERKING MOVEMENTS OF EXTREMITIES: Status: ACTIVE | Noted: 2021-09-12

## 2021-09-12 PROBLEM — Z98.1 HISTORY OF FUSION OF CERVICAL SPINE: Status: ACTIVE | Noted: 2021-09-12

## 2021-09-13 ENCOUNTER — OFFICE VISIT (OUTPATIENT)
Dept: PHYSICAL THERAPY | Facility: CLINIC | Age: 61
End: 2021-09-13
Payer: COMMERCIAL

## 2021-09-13 DIAGNOSIS — M25.512 CHRONIC LEFT SHOULDER PAIN: ICD-10-CM

## 2021-09-13 DIAGNOSIS — G89.29 CHRONIC LEFT SHOULDER PAIN: ICD-10-CM

## 2021-09-13 DIAGNOSIS — M54.2 NECK PAIN: Primary | ICD-10-CM

## 2021-09-13 PROCEDURE — 97112 NEUROMUSCULAR REEDUCATION: CPT | Performed by: PHYSICAL THERAPIST

## 2021-09-13 PROCEDURE — 97140 MANUAL THERAPY 1/> REGIONS: CPT | Performed by: PHYSICAL THERAPIST

## 2021-09-13 PROCEDURE — 97110 THERAPEUTIC EXERCISES: CPT | Performed by: PHYSICAL THERAPIST

## 2021-09-13 NOTE — PROGRESS NOTES
Daily Note     Today's date: 2021  Patient name: Yun Hughes  : 1960  MRN: [id number]  Referring provider: Kailash Alvarez MD  Dx:   Encounter Diagnosis     ICD-10-CM    1  Neck pain  M54 2    2  Chronic left shoulder pain  M25 512     G89 29                   Subjective: Patient notes no numbness/tingling much in her arms anymore, just gets some achiness in B shoulders  L UE feeling better than R UE  Objective: See treatment diary below      Assessment: Tolerated treatment well  Patient would benefit from continued PT  Focusing on function instead of symptoms  Plan: Continue per plan of care        Precautions: cervical fusion C5/C6 , "mild carpal tunnel B,"        Manuals    L C7, C4 side glides/first rib mob  [address]   L cervical STM [address]   Median nerve glides                    Neuro Re-Ed          Chin tucks 15x 5" holds  10x 10" holds seated  10x 10" holds seated  10x 10" holds seated  12x 10" holds seated  20x 5" holds quadruped 20x 5" holds quadruped                                                                Ther Ex          Cervical flexion R rotation          Supine thoracic ext over horizontal 15x  15x  20x 20x  20x 20x  20x              UBE bwd 3 min  3 min bwd  3 min bwd  3 min bwd  3 min bwd  4 min bwd 4 min bwd    No moneys          Standing Y's   2x10 red  2x12 red 3x10 red  3x10 red  3x10 red    sidelying shoulder ER          pec minor stretch supine over towels 5x 20" holds 5x 20" holds  20x 5" holds doorway  5x 20" holds doorway  3x30" holds doorway  3x30"  3x30"    Standing W's 3x10  3x10 red  20x 3" holds 20x 5" holds red  20x 5" holds red  2x10 grn  2x10 grn    Seated CT butterfly stretch          Standing horizontal abd  2x8 red 2x12 red 20x 3" holds  20x 5" holds  20x 5" holds red  2x10 grn  2x12 grn    Ther Activity                              Gait Training                              Modalities

## 2021-09-15 DIAGNOSIS — R39.15 URGENCY OF URINATION: ICD-10-CM

## 2021-09-15 DIAGNOSIS — N39.41 URGE INCONTINENCE: ICD-10-CM

## 2021-09-15 RX ORDER — SOLIFENACIN SUCCINATE 10 MG/1
10 TABLET, FILM COATED ORAL EVERY EVENING
Qty: 90 TABLET | Refills: 3 | Status: SHIPPED | OUTPATIENT
Start: 2021-09-15

## 2021-09-15 NOTE — TELEPHONE ENCOUNTER
Patient of Dr Faby Hernandez requesting a refill on her Solifenacin  She was last seen by Dr Faby Hernandez on 04/07/21

## 2021-09-16 ENCOUNTER — OFFICE VISIT (OUTPATIENT)
Dept: PHYSICAL THERAPY | Facility: CLINIC | Age: 61
End: 2021-09-16
Payer: COMMERCIAL

## 2021-09-16 DIAGNOSIS — M25.512 CHRONIC LEFT SHOULDER PAIN: ICD-10-CM

## 2021-09-16 DIAGNOSIS — M54.2 NECK PAIN: Primary | ICD-10-CM

## 2021-09-16 DIAGNOSIS — G89.29 CHRONIC LEFT SHOULDER PAIN: ICD-10-CM

## 2021-09-16 PROCEDURE — 97110 THERAPEUTIC EXERCISES: CPT | Performed by: PHYSICAL THERAPIST

## 2021-09-16 PROCEDURE — 97140 MANUAL THERAPY 1/> REGIONS: CPT | Performed by: PHYSICAL THERAPIST

## 2021-09-16 PROCEDURE — 97112 NEUROMUSCULAR REEDUCATION: CPT | Performed by: PHYSICAL THERAPIST

## 2021-09-16 NOTE — PROGRESS NOTES
Daily Note     Today's date: 2021  Patient name: Jose Kenny  : 1960  MRN: [id number]  Referring provider: Makenzie Elizondo MD  Dx:   Encounter Diagnosis     ICD-10-CM    1  Neck pain  M54 2    2  Chronic left shoulder pain  M25 512     G89 29                   Subjective: Patient notes no numbness/tingling much in her arms anymore, just gets some achiness in B shoulders  L UE feeling better than R UE  Objective: See treatment diary below      Assessment: Tolerated treatment well  Patient would benefit from continued PT  Focusing on function instead of symptoms  Plan: Continue per plan of care        Precautions: cervical fusion C5/C6 , "mild carpal tunnel B,"        Manuals    L C7, C4 side glides/first rib mob  [address]   L cervical STM [address]   Median nerve glides                    Neuro Re-Ed          Chin tucks 10x 10" holds seated  10x 10" holds seated  10x 10" holds seated  12x 10" holds seated  20x 5" holds quadruped 20x 5" holds quadruped  20x 5" hold quadruped                                                               Ther Ex          Cervical flexion R rotation          Supine thoracic ext over horizontal 15x  20x 20x  20x 20x  20x  20x              UBE bwd 3 min bwd  3 min bwd  3 min bwd  3 min bwd  4 min bwd 4 min bwd  4 min bwd    No moneys          Standing Y's  2x10 red  2x12 red 3x10 red  3x10 red  3x10 red  3x10 red    sidelying shoulder ER          pec minor stretch supine over towels 5x 20" holds  20x 5" holds doorway  5x 20" holds doorway  3x30" holds doorway  3x30"  3x30"  3x30"    Standing W's 3x10 red  20x 3" holds 20x 5" holds red  20x 5" holds red  2x10 grn  2x10 grn  2x12 grn   Seated CT butterfly stretch          Standing horizontal abd  2x12 red 20x 3" holds  20x 5" holds  20x 5" holds red  2x10 grn  2x12 grn  2x12 grn    Ther Activity                              Gait Training Modalities

## 2021-09-17 ENCOUNTER — TELEPHONE (OUTPATIENT)
Dept: FAMILY MEDICINE CLINIC | Facility: CLINIC | Age: 61
End: 2021-09-17

## 2021-09-17 NOTE — TELEPHONE ENCOUNTER
----- Message from Cuba Redding MD sent at 9/16/2021  5:22 PM EDT -----  Abdominal CT scan shows no acute process    Only has colonic diverticulosis  Patient to follow if her symptoms persist

## 2021-09-24 ENCOUNTER — APPOINTMENT (EMERGENCY)
Dept: CT IMAGING | Facility: HOSPITAL | Age: 61
DRG: 390 | End: 2021-09-24
Payer: COMMERCIAL

## 2021-09-24 ENCOUNTER — HOSPITAL ENCOUNTER (INPATIENT)
Facility: HOSPITAL | Age: 61
LOS: 4 days | Discharge: HOME | DRG: 390 | End: 2021-09-28
Attending: EMERGENCY MEDICINE | Admitting: SURGERY
Payer: COMMERCIAL

## 2021-09-24 ENCOUNTER — APPOINTMENT (INPATIENT)
Dept: RADIOLOGY | Facility: HOSPITAL | Age: 61
DRG: 390 | End: 2021-09-24
Payer: COMMERCIAL

## 2021-09-24 DIAGNOSIS — K56.609 SMALL BOWEL OBSTRUCTION (HCC): Primary | ICD-10-CM

## 2021-09-24 DIAGNOSIS — K56.609 SBO (SMALL BOWEL OBSTRUCTION) (HCC): ICD-10-CM

## 2021-09-24 DIAGNOSIS — R11.2 NAUSEA AND VOMITING: ICD-10-CM

## 2021-09-24 LAB
ALBUMIN SERPL BCP-MCNC: 3.9 G/DL (ref 3.5–5)
ALP SERPL-CCNC: 94 U/L (ref 46–116)
ALT SERPL W P-5'-P-CCNC: 24 U/L (ref 12–78)
ANION GAP SERPL CALCULATED.3IONS-SCNC: 9 MMOL/L (ref 4–13)
AST SERPL W P-5'-P-CCNC: 21 U/L (ref 5–45)
BASOPHILS # BLD AUTO: 0.05 THOUSANDS/ΜL (ref 0–0.1)
BASOPHILS NFR BLD AUTO: 1 % (ref 0–1)
BILIRUB SERPL-MCNC: 0.48 MG/DL (ref 0.2–1)
BUN SERPL-MCNC: 24 MG/DL (ref 5–25)
CALCIUM SERPL-MCNC: 9.3 MG/DL (ref 8.3–10.1)
CHLORIDE SERPL-SCNC: 100 MMOL/L (ref 100–108)
CO2 SERPL-SCNC: 29 MMOL/L (ref 21–32)
CREAT SERPL-MCNC: 0.72 MG/DL (ref 0.6–1.3)
EOSINOPHIL # BLD AUTO: 0.05 THOUSAND/ΜL (ref 0–0.61)
EOSINOPHIL NFR BLD AUTO: 1 % (ref 0–6)
ERYTHROCYTE [DISTWIDTH] IN BLOOD BY AUTOMATED COUNT: 12.5 % (ref 11.6–15.1)
GFR SERPL CREATININE-BSD FRML MDRD: 90 ML/MIN/1.73SQ M
GLUCOSE SERPL-MCNC: 105 MG/DL (ref 65–140)
HCT VFR BLD AUTO: 40 % (ref 34.8–46.1)
HGB BLD-MCNC: 13.6 G/DL (ref 11.5–15.4)
IMM GRANULOCYTES # BLD AUTO: 0.05 THOUSAND/UL (ref 0–0.2)
IMM GRANULOCYTES NFR BLD AUTO: 1 % (ref 0–2)
LACTATE SERPL-SCNC: 0.6 MMOL/L (ref 0.5–2)
LIPASE SERPL-CCNC: 80 U/L (ref 73–393)
LYMPHOCYTES # BLD AUTO: 0.68 THOUSANDS/ΜL (ref 0.6–4.47)
LYMPHOCYTES NFR BLD AUTO: 7 % (ref 14–44)
MCH RBC QN AUTO: 32.2 PG (ref 26.8–34.3)
MCHC RBC AUTO-ENTMCNC: 34 G/DL (ref 31.4–37.4)
MCV RBC AUTO: 95 FL (ref 82–98)
MONOCYTES # BLD AUTO: 0.24 THOUSAND/ΜL (ref 0.17–1.22)
MONOCYTES NFR BLD AUTO: 2 % (ref 4–12)
NEUTROPHILS # BLD AUTO: 9.22 THOUSANDS/ΜL (ref 1.85–7.62)
NEUTS SEG NFR BLD AUTO: 88 % (ref 43–75)
NRBC BLD AUTO-RTO: 0 /100 WBCS
PLATELET # BLD AUTO: 299 THOUSANDS/UL (ref 149–390)
PMV BLD AUTO: 10.1 FL (ref 8.9–12.7)
POTASSIUM SERPL-SCNC: 4 MMOL/L (ref 3.5–5.3)
PROT SERPL-MCNC: 7.3 G/DL (ref 6.4–8.2)
RBC # BLD AUTO: 4.22 MILLION/UL (ref 3.81–5.12)
SODIUM SERPL-SCNC: 138 MMOL/L (ref 136–145)
TROPONIN I SERPL-MCNC: <0.02 NG/ML
WBC # BLD AUTO: 10.29 THOUSAND/UL (ref 4.31–10.16)

## 2021-09-24 PROCEDURE — 96361 HYDRATE IV INFUSION ADD-ON: CPT

## 2021-09-24 PROCEDURE — 96375 TX/PRO/DX INJ NEW DRUG ADDON: CPT

## 2021-09-24 PROCEDURE — 74177 CT ABD & PELVIS W/CONTRAST: CPT

## 2021-09-24 PROCEDURE — 85025 COMPLETE CBC W/AUTO DIFF WBC: CPT | Performed by: PHYSICIAN ASSISTANT

## 2021-09-24 PROCEDURE — 83690 ASSAY OF LIPASE: CPT | Performed by: PHYSICIAN ASSISTANT

## 2021-09-24 PROCEDURE — G1004 CDSM NDSC: HCPCS

## 2021-09-24 PROCEDURE — 36415 COLL VENOUS BLD VENIPUNCTURE: CPT | Performed by: PHYSICIAN ASSISTANT

## 2021-09-24 PROCEDURE — 83605 ASSAY OF LACTIC ACID: CPT | Performed by: PHYSICIAN ASSISTANT

## 2021-09-24 PROCEDURE — 71045 X-RAY EXAM CHEST 1 VIEW: CPT

## 2021-09-24 PROCEDURE — 99285 EMERGENCY DEPT VISIT HI MDM: CPT | Performed by: PHYSICIAN ASSISTANT

## 2021-09-24 PROCEDURE — 96374 THER/PROPH/DIAG INJ IV PUSH: CPT

## 2021-09-24 PROCEDURE — 99285 EMERGENCY DEPT VISIT HI MDM: CPT

## 2021-09-24 PROCEDURE — 93005 ELECTROCARDIOGRAM TRACING: CPT

## 2021-09-24 PROCEDURE — 84484 ASSAY OF TROPONIN QUANT: CPT | Performed by: PHYSICIAN ASSISTANT

## 2021-09-24 PROCEDURE — 96376 TX/PRO/DX INJ SAME DRUG ADON: CPT

## 2021-09-24 PROCEDURE — 80053 COMPREHEN METABOLIC PANEL: CPT | Performed by: PHYSICIAN ASSISTANT

## 2021-09-24 RX ORDER — KETOROLAC TROMETHAMINE 30 MG/ML
15 INJECTION, SOLUTION INTRAMUSCULAR; INTRAVENOUS ONCE
Status: COMPLETED | OUTPATIENT
Start: 2021-09-24 | End: 2021-09-25

## 2021-09-24 RX ORDER — ONDANSETRON 2 MG/ML
4 INJECTION INTRAMUSCULAR; INTRAVENOUS ONCE
Status: COMPLETED | OUTPATIENT
Start: 2021-09-24 | End: 2021-09-24

## 2021-09-24 RX ORDER — HEPARIN SODIUM 5000 [USP'U]/ML
5000 INJECTION, SOLUTION INTRAVENOUS; SUBCUTANEOUS EVERY 8 HOURS SCHEDULED
Status: DISCONTINUED | OUTPATIENT
Start: 2021-09-24 | End: 2021-09-28 | Stop reason: HOSPADM

## 2021-09-24 RX ORDER — ONDANSETRON 2 MG/ML
4 INJECTION INTRAMUSCULAR; INTRAVENOUS EVERY 4 HOURS PRN
Status: DISCONTINUED | OUTPATIENT
Start: 2021-09-24 | End: 2021-09-28 | Stop reason: HOSPADM

## 2021-09-24 RX ORDER — KETOROLAC TROMETHAMINE 30 MG/ML
15 INJECTION, SOLUTION INTRAMUSCULAR; INTRAVENOUS ONCE
Status: COMPLETED | OUTPATIENT
Start: 2021-09-24 | End: 2021-09-24

## 2021-09-24 RX ORDER — HYDROMORPHONE HCL/PF 1 MG/ML
0.2 SYRINGE (ML) INJECTION
Status: DISCONTINUED | OUTPATIENT
Start: 2021-09-24 | End: 2021-09-26

## 2021-09-24 RX ORDER — SODIUM CHLORIDE, SODIUM GLUCONATE, SODIUM ACETATE, POTASSIUM CHLORIDE, MAGNESIUM CHLORIDE, SODIUM PHOSPHATE, DIBASIC, AND POTASSIUM PHOSPHATE .53; .5; .37; .037; .03; .012; .00082 G/100ML; G/100ML; G/100ML; G/100ML; G/100ML; G/100ML; G/100ML
125 INJECTION, SOLUTION INTRAVENOUS CONTINUOUS
Status: DISCONTINUED | OUTPATIENT
Start: 2021-09-24 | End: 2021-09-26

## 2021-09-24 RX ORDER — HYDROMORPHONE HCL/PF 1 MG/ML
0.5 SYRINGE (ML) INJECTION
Status: DISCONTINUED | OUTPATIENT
Start: 2021-09-24 | End: 2021-09-26

## 2021-09-24 RX ADMIN — SODIUM CHLORIDE 1000 ML: 0.9 INJECTION, SOLUTION INTRAVENOUS at 19:10

## 2021-09-24 RX ADMIN — ONDANSETRON 4 MG: 2 INJECTION INTRAMUSCULAR; INTRAVENOUS at 20:21

## 2021-09-24 RX ADMIN — IOHEXOL 100 ML: 350 INJECTION, SOLUTION INTRAVENOUS at 20:36

## 2021-09-24 RX ADMIN — KETOROLAC TROMETHAMINE 15 MG: 30 INJECTION, SOLUTION INTRAMUSCULAR; INTRAVENOUS at 19:11

## 2021-09-24 RX ADMIN — ONDANSETRON 4 MG: 2 INJECTION INTRAMUSCULAR; INTRAVENOUS at 19:11

## 2021-09-25 PROBLEM — K56.609 SBO (SMALL BOWEL OBSTRUCTION) (HCC): Status: ACTIVE | Noted: 2021-09-25

## 2021-09-25 LAB
ANION GAP SERPL CALCULATED.3IONS-SCNC: 10 MMOL/L (ref 4–13)
ATRIAL RATE: 70 BPM
BUN SERPL-MCNC: 24 MG/DL (ref 5–25)
CALCIUM SERPL-MCNC: 8 MG/DL (ref 8.3–10.1)
CHLORIDE SERPL-SCNC: 105 MMOL/L (ref 100–108)
CO2 SERPL-SCNC: 22 MMOL/L (ref 21–32)
CREAT SERPL-MCNC: 0.68 MG/DL (ref 0.6–1.3)
ERYTHROCYTE [DISTWIDTH] IN BLOOD BY AUTOMATED COUNT: 12.8 % (ref 11.6–15.1)
GFR SERPL CREATININE-BSD FRML MDRD: 93 ML/MIN/1.73SQ M
GLUCOSE SERPL-MCNC: 110 MG/DL (ref 65–140)
HCT VFR BLD AUTO: 40.4 % (ref 34.8–46.1)
HGB BLD-MCNC: 13.3 G/DL (ref 11.5–15.4)
MCH RBC QN AUTO: 31.4 PG (ref 26.8–34.3)
MCHC RBC AUTO-ENTMCNC: 32.9 G/DL (ref 31.4–37.4)
MCV RBC AUTO: 95 FL (ref 82–98)
P AXIS: 62 DEGREES
PLATELET # BLD AUTO: 285 THOUSANDS/UL (ref 149–390)
PMV BLD AUTO: 10.2 FL (ref 8.9–12.7)
POTASSIUM SERPL-SCNC: 3.8 MMOL/L (ref 3.5–5.3)
PR INTERVAL: 148 MS
QRS AXIS: 21 DEGREES
QRSD INTERVAL: 98 MS
QT INTERVAL: 414 MS
QTC INTERVAL: 447 MS
RBC # BLD AUTO: 4.24 MILLION/UL (ref 3.81–5.12)
SODIUM SERPL-SCNC: 137 MMOL/L (ref 136–145)
T WAVE AXIS: 49 DEGREES
VENTRICULAR RATE: 70 BPM
WBC # BLD AUTO: 8.83 THOUSAND/UL (ref 4.31–10.16)

## 2021-09-25 PROCEDURE — 93010 ELECTROCARDIOGRAM REPORT: CPT | Performed by: INTERNAL MEDICINE

## 2021-09-25 PROCEDURE — NC001 PR NO CHARGE: Performed by: SURGERY

## 2021-09-25 PROCEDURE — 85027 COMPLETE CBC AUTOMATED: CPT | Performed by: SURGERY

## 2021-09-25 PROCEDURE — 80048 BASIC METABOLIC PNL TOTAL CA: CPT | Performed by: SURGERY

## 2021-09-25 RX ADMIN — SODIUM CHLORIDE, SODIUM GLUCONATE, SODIUM ACETATE, POTASSIUM CHLORIDE, MAGNESIUM CHLORIDE, SODIUM PHOSPHATE, DIBASIC, AND POTASSIUM PHOSPHATE 125 ML/HR: .53; .5; .37; .037; .03; .012; .00082 INJECTION, SOLUTION INTRAVENOUS at 00:48

## 2021-09-25 RX ADMIN — ONDANSETRON 4 MG: 2 INJECTION INTRAMUSCULAR; INTRAVENOUS at 15:01

## 2021-09-25 RX ADMIN — ONDANSETRON 4 MG: 2 INJECTION INTRAMUSCULAR; INTRAVENOUS at 09:49

## 2021-09-25 RX ADMIN — HYDROMORPHONE HYDROCHLORIDE 0.5 MG: 1 INJECTION, SOLUTION INTRAMUSCULAR; INTRAVENOUS; SUBCUTANEOUS at 14:57

## 2021-09-25 RX ADMIN — ONDANSETRON 4 MG: 2 INJECTION INTRAMUSCULAR; INTRAVENOUS at 19:14

## 2021-09-25 RX ADMIN — HYDROMORPHONE HYDROCHLORIDE 0.5 MG: 1 INJECTION, SOLUTION INTRAMUSCULAR; INTRAVENOUS; SUBCUTANEOUS at 19:14

## 2021-09-25 RX ADMIN — SODIUM CHLORIDE, SODIUM GLUCONATE, SODIUM ACETATE, POTASSIUM CHLORIDE, MAGNESIUM CHLORIDE, SODIUM PHOSPHATE, DIBASIC, AND POTASSIUM PHOSPHATE 125 ML/HR: .53; .5; .37; .037; .03; .012; .00082 INJECTION, SOLUTION INTRAVENOUS at 09:42

## 2021-09-25 RX ADMIN — KETOROLAC TROMETHAMINE 15 MG: 30 INJECTION, SOLUTION INTRAMUSCULAR; INTRAVENOUS at 00:51

## 2021-09-25 RX ADMIN — HEPARIN SODIUM 5000 UNITS: 5000 INJECTION INTRAVENOUS; SUBCUTANEOUS at 14:57

## 2021-09-25 RX ADMIN — SODIUM CHLORIDE, SODIUM GLUCONATE, SODIUM ACETATE, POTASSIUM CHLORIDE, MAGNESIUM CHLORIDE, SODIUM PHOSPHATE, DIBASIC, AND POTASSIUM PHOSPHATE 125 ML/HR: .53; .5; .37; .037; .03; .012; .00082 INJECTION, SOLUTION INTRAVENOUS at 17:56

## 2021-09-25 RX ADMIN — HYDROMORPHONE HYDROCHLORIDE 0.5 MG: 1 INJECTION, SOLUTION INTRAMUSCULAR; INTRAVENOUS; SUBCUTANEOUS at 09:45

## 2021-09-25 RX ADMIN — HEPARIN SODIUM 5000 UNITS: 5000 INJECTION INTRAVENOUS; SUBCUTANEOUS at 00:51

## 2021-09-25 RX ADMIN — HEPARIN SODIUM 5000 UNITS: 5000 INJECTION INTRAVENOUS; SUBCUTANEOUS at 05:18

## 2021-09-25 RX ADMIN — HYDROMORPHONE HYDROCHLORIDE 0.5 MG: 1 INJECTION, SOLUTION INTRAMUSCULAR; INTRAVENOUS; SUBCUTANEOUS at 05:18

## 2021-09-25 RX ADMIN — HYDROMORPHONE HYDROCHLORIDE 0.5 MG: 1 INJECTION, SOLUTION INTRAMUSCULAR; INTRAVENOUS; SUBCUTANEOUS at 22:14

## 2021-09-25 RX ADMIN — HEPARIN SODIUM 5000 UNITS: 5000 INJECTION INTRAVENOUS; SUBCUTANEOUS at 22:14

## 2021-09-26 LAB
ANION GAP SERPL CALCULATED.3IONS-SCNC: 9 MMOL/L (ref 4–13)
BUN SERPL-MCNC: 19 MG/DL (ref 5–25)
CALCIUM SERPL-MCNC: 7.1 MG/DL (ref 8.3–10.1)
CHLORIDE SERPL-SCNC: 106 MMOL/L (ref 100–108)
CO2 SERPL-SCNC: 25 MMOL/L (ref 21–32)
CREAT SERPL-MCNC: 0.57 MG/DL (ref 0.6–1.3)
ERYTHROCYTE [DISTWIDTH] IN BLOOD BY AUTOMATED COUNT: 12.8 % (ref 11.6–15.1)
GFR SERPL CREATININE-BSD FRML MDRD: 99 ML/MIN/1.73SQ M
GLUCOSE SERPL-MCNC: 88 MG/DL (ref 65–140)
HCT VFR BLD AUTO: 37 % (ref 34.8–46.1)
HGB BLD-MCNC: 11.9 G/DL (ref 11.5–15.4)
MAGNESIUM SERPL-MCNC: 2.5 MG/DL (ref 1.6–2.6)
MCH RBC QN AUTO: 31.8 PG (ref 26.8–34.3)
MCHC RBC AUTO-ENTMCNC: 32.2 G/DL (ref 31.4–37.4)
MCV RBC AUTO: 99 FL (ref 82–98)
PLATELET # BLD AUTO: 230 THOUSANDS/UL (ref 149–390)
PMV BLD AUTO: 9.9 FL (ref 8.9–12.7)
POTASSIUM SERPL-SCNC: 3.8 MMOL/L (ref 3.5–5.3)
RBC # BLD AUTO: 3.74 MILLION/UL (ref 3.81–5.12)
SODIUM SERPL-SCNC: 140 MMOL/L (ref 136–145)
WBC # BLD AUTO: 4.06 THOUSAND/UL (ref 4.31–10.16)

## 2021-09-26 PROCEDURE — 85027 COMPLETE CBC AUTOMATED: CPT | Performed by: SURGERY

## 2021-09-26 PROCEDURE — NC001 PR NO CHARGE: Performed by: SURGERY

## 2021-09-26 PROCEDURE — 83735 ASSAY OF MAGNESIUM: CPT | Performed by: SURGERY

## 2021-09-26 PROCEDURE — 80048 BASIC METABOLIC PNL TOTAL CA: CPT | Performed by: SURGERY

## 2021-09-26 RX ORDER — ACETAMINOPHEN 325 MG/1
650 TABLET ORAL EVERY 6 HOURS PRN
Status: DISCONTINUED | OUTPATIENT
Start: 2021-09-26 | End: 2021-09-28 | Stop reason: HOSPADM

## 2021-09-26 RX ORDER — OXYCODONE HYDROCHLORIDE 5 MG/1
2.5 TABLET ORAL EVERY 4 HOURS PRN
Status: DISCONTINUED | OUTPATIENT
Start: 2021-09-26 | End: 2021-09-28 | Stop reason: HOSPADM

## 2021-09-26 RX ORDER — DEXTROSE, SODIUM CHLORIDE, AND POTASSIUM CHLORIDE 5; .45; .15 G/100ML; G/100ML; G/100ML
75 INJECTION INTRAVENOUS CONTINUOUS
Status: DISCONTINUED | OUTPATIENT
Start: 2021-09-26 | End: 2021-09-27

## 2021-09-26 RX ORDER — HYDROMORPHONE HCL IN WATER/PF 6 MG/30 ML
0.2 PATIENT CONTROLLED ANALGESIA SYRINGE INTRAVENOUS EVERY 6 HOURS PRN
Status: DISCONTINUED | OUTPATIENT
Start: 2021-09-26 | End: 2021-09-28 | Stop reason: HOSPADM

## 2021-09-26 RX ORDER — GABAPENTIN 300 MG/1
600 CAPSULE ORAL
Status: DISCONTINUED | OUTPATIENT
Start: 2021-09-26 | End: 2021-09-28 | Stop reason: HOSPADM

## 2021-09-26 RX ORDER — PRAVASTATIN SODIUM 40 MG
40 TABLET ORAL
Status: DISCONTINUED | OUTPATIENT
Start: 2021-09-26 | End: 2021-09-28 | Stop reason: HOSPADM

## 2021-09-26 RX ORDER — GABAPENTIN 300 MG/1
300 CAPSULE ORAL DAILY
Status: DISCONTINUED | OUTPATIENT
Start: 2021-09-26 | End: 2021-09-28 | Stop reason: HOSPADM

## 2021-09-26 RX ORDER — OXYCODONE HYDROCHLORIDE 5 MG/1
5 TABLET ORAL EVERY 4 HOURS PRN
Status: DISCONTINUED | OUTPATIENT
Start: 2021-09-26 | End: 2021-09-28 | Stop reason: HOSPADM

## 2021-09-26 RX ORDER — PANTOPRAZOLE SODIUM 40 MG/1
40 TABLET, DELAYED RELEASE ORAL
Status: DISCONTINUED | OUTPATIENT
Start: 2021-09-26 | End: 2021-09-28 | Stop reason: HOSPADM

## 2021-09-26 RX ADMIN — HEPARIN SODIUM 5000 UNITS: 5000 INJECTION INTRAVENOUS; SUBCUTANEOUS at 14:52

## 2021-09-26 RX ADMIN — HEPARIN SODIUM 5000 UNITS: 5000 INJECTION INTRAVENOUS; SUBCUTANEOUS at 22:45

## 2021-09-26 RX ADMIN — OXYCODONE HYDROCHLORIDE 5 MG: 5 TABLET ORAL at 22:45

## 2021-09-26 RX ADMIN — PANTOPRAZOLE SODIUM 40 MG: 40 TABLET, DELAYED RELEASE ORAL at 11:26

## 2021-09-26 RX ADMIN — OXYCODONE HYDROCHLORIDE 5 MG: 5 TABLET ORAL at 11:24

## 2021-09-26 RX ADMIN — HEPARIN SODIUM 5000 UNITS: 5000 INJECTION INTRAVENOUS; SUBCUTANEOUS at 05:56

## 2021-09-26 RX ADMIN — HYDROMORPHONE HYDROCHLORIDE 0.5 MG: 1 INJECTION, SOLUTION INTRAMUSCULAR; INTRAVENOUS; SUBCUTANEOUS at 02:15

## 2021-09-26 RX ADMIN — ONDANSETRON 4 MG: 2 INJECTION INTRAMUSCULAR; INTRAVENOUS at 18:18

## 2021-09-26 RX ADMIN — HYDROMORPHONE HYDROCHLORIDE 0.2 MG: 0.2 INJECTION, SOLUTION INTRAMUSCULAR; INTRAVENOUS; SUBCUTANEOUS at 18:49

## 2021-09-26 RX ADMIN — GABAPENTIN 600 MG: 300 CAPSULE ORAL at 22:45

## 2021-09-26 RX ADMIN — HYDROMORPHONE HYDROCHLORIDE 0.5 MG: 1 INJECTION, SOLUTION INTRAMUSCULAR; INTRAVENOUS; SUBCUTANEOUS at 05:56

## 2021-09-26 RX ADMIN — DEXTROSE, SODIUM CHLORIDE, AND POTASSIUM CHLORIDE 75 ML/HR: 5; .45; .15 INJECTION INTRAVENOUS at 11:18

## 2021-09-26 RX ADMIN — OXYCODONE HYDROCHLORIDE 5 MG: 5 TABLET ORAL at 15:43

## 2021-09-26 RX ADMIN — ONDANSETRON 4 MG: 2 INJECTION INTRAMUSCULAR; INTRAVENOUS at 11:28

## 2021-09-26 RX ADMIN — SODIUM CHLORIDE, SODIUM GLUCONATE, SODIUM ACETATE, POTASSIUM CHLORIDE, MAGNESIUM CHLORIDE, SODIUM PHOSPHATE, DIBASIC, AND POTASSIUM PHOSPHATE 125 ML/HR: .53; .5; .37; .037; .03; .012; .00082 INJECTION, SOLUTION INTRAVENOUS at 04:00

## 2021-09-26 RX ADMIN — PRAVASTATIN SODIUM 40 MG: 40 TABLET ORAL at 15:44

## 2021-09-26 RX ADMIN — GABAPENTIN 300 MG: 300 CAPSULE ORAL at 11:24

## 2021-09-27 ENCOUNTER — APPOINTMENT (INPATIENT)
Dept: MRI IMAGING | Facility: HOSPITAL | Age: 61
DRG: 390 | End: 2021-09-27
Payer: COMMERCIAL

## 2021-09-27 PROCEDURE — G1004 CDSM NDSC: HCPCS

## 2021-09-27 PROCEDURE — 70551 MRI BRAIN STEM W/O DYE: CPT

## 2021-09-27 PROCEDURE — 99232 SBSQ HOSP IP/OBS MODERATE 35: CPT | Performed by: SURGERY

## 2021-09-27 RX ORDER — DICYCLOMINE HCL 20 MG
20 TABLET ORAL 4 TIMES DAILY PRN
Status: DISCONTINUED | OUTPATIENT
Start: 2021-09-27 | End: 2021-09-28 | Stop reason: HOSPADM

## 2021-09-27 RX ADMIN — HEPARIN SODIUM 5000 UNITS: 5000 INJECTION INTRAVENOUS; SUBCUTANEOUS at 22:58

## 2021-09-27 RX ADMIN — GABAPENTIN 300 MG: 300 CAPSULE ORAL at 12:13

## 2021-09-27 RX ADMIN — GABAPENTIN 600 MG: 300 CAPSULE ORAL at 22:55

## 2021-09-27 RX ADMIN — HEPARIN SODIUM 5000 UNITS: 5000 INJECTION INTRAVENOUS; SUBCUTANEOUS at 14:47

## 2021-09-27 RX ADMIN — DICYCLOMINE HYDROCHLORIDE 20 MG: 20 TABLET ORAL at 22:55

## 2021-09-27 RX ADMIN — ONDANSETRON 4 MG: 2 INJECTION INTRAMUSCULAR; INTRAVENOUS at 08:26

## 2021-09-27 RX ADMIN — DICYCLOMINE HYDROCHLORIDE 20 MG: 20 TABLET ORAL at 14:47

## 2021-09-27 RX ADMIN — PANTOPRAZOLE SODIUM 40 MG: 40 TABLET, DELAYED RELEASE ORAL at 06:05

## 2021-09-27 RX ADMIN — PRAVASTATIN SODIUM 40 MG: 40 TABLET ORAL at 16:02

## 2021-09-27 RX ADMIN — HEPARIN SODIUM 5000 UNITS: 5000 INJECTION INTRAVENOUS; SUBCUTANEOUS at 06:05

## 2021-09-28 ENCOUNTER — TELEPHONE (OUTPATIENT)
Dept: GASTROENTEROLOGY | Facility: MEDICAL CENTER | Age: 61
End: 2021-09-28

## 2021-09-28 VITALS
RESPIRATION RATE: 17 BRPM | TEMPERATURE: 97.8 F | DIASTOLIC BLOOD PRESSURE: 66 MMHG | SYSTOLIC BLOOD PRESSURE: 108 MMHG | HEART RATE: 63 BPM | OXYGEN SATURATION: 96 %

## 2021-09-28 PROCEDURE — NC001 PR NO CHARGE: Performed by: SURGERY

## 2021-09-28 PROCEDURE — 99238 HOSP IP/OBS DSCHRG MGMT 30/<: CPT | Performed by: SURGERY

## 2021-09-28 RX ORDER — ACETAMINOPHEN 325 MG/1
650 TABLET ORAL EVERY 6 HOURS PRN
Refills: 0
Start: 2021-09-28 | End: 2021-09-29 | Stop reason: SDUPTHER

## 2021-09-28 RX ADMIN — PANTOPRAZOLE SODIUM 40 MG: 40 TABLET, DELAYED RELEASE ORAL at 06:13

## 2021-09-28 RX ADMIN — HEPARIN SODIUM 5000 UNITS: 5000 INJECTION INTRAVENOUS; SUBCUTANEOUS at 06:13

## 2021-09-28 NOTE — TELEPHONE ENCOUNTER
Left voice message for patient to call the office and reschedule office appointment due to provider being unavailable

## 2021-09-29 ENCOUNTER — TRANSITIONAL CARE MANAGEMENT (OUTPATIENT)
Dept: FAMILY MEDICINE CLINIC | Facility: CLINIC | Age: 61
End: 2021-09-29

## 2021-09-30 ENCOUNTER — OFFICE VISIT (OUTPATIENT)
Dept: FAMILY MEDICINE CLINIC | Facility: CLINIC | Age: 61
End: 2021-09-30
Payer: COMMERCIAL

## 2021-09-30 ENCOUNTER — OFFICE VISIT (OUTPATIENT)
Dept: SLEEP CENTER | Facility: CLINIC | Age: 61
End: 2021-09-30
Payer: COMMERCIAL

## 2021-09-30 VITALS
TEMPERATURE: 97.3 F | HEIGHT: 63 IN | OXYGEN SATURATION: 96 % | SYSTOLIC BLOOD PRESSURE: 102 MMHG | BODY MASS INDEX: 19.84 KG/M2 | WEIGHT: 112 LBS | DIASTOLIC BLOOD PRESSURE: 66 MMHG | HEART RATE: 84 BPM | RESPIRATION RATE: 16 BRPM

## 2021-09-30 VITALS
DIASTOLIC BLOOD PRESSURE: 68 MMHG | WEIGHT: 113 LBS | HEIGHT: 63 IN | BODY MASS INDEX: 20.02 KG/M2 | SYSTOLIC BLOOD PRESSURE: 110 MMHG

## 2021-09-30 DIAGNOSIS — Z23 ENCOUNTER FOR IMMUNIZATION: ICD-10-CM

## 2021-09-30 DIAGNOSIS — G47.11 IDIOPATHIC HYPERSOMNIA: ICD-10-CM

## 2021-09-30 DIAGNOSIS — D72.819 LEUKOPENIA, UNSPECIFIED TYPE: ICD-10-CM

## 2021-09-30 DIAGNOSIS — D75.89 INCREASED MCV: ICD-10-CM

## 2021-09-30 DIAGNOSIS — K56.609 SMALL BOWEL OBSTRUCTION (HCC): Primary | ICD-10-CM

## 2021-09-30 DIAGNOSIS — R93.89 ABNORMAL CT SCAN: ICD-10-CM

## 2021-09-30 DIAGNOSIS — R79.89 LOW SERUM CALCIUM: ICD-10-CM

## 2021-09-30 PROBLEM — G47.419 NARCOLEPSY: Chronic | Status: RESOLVED | Noted: 2020-08-06 | Resolved: 2021-09-30

## 2021-09-30 PROCEDURE — 90682 RIV4 VACC RECOMBINANT DNA IM: CPT

## 2021-09-30 PROCEDURE — 99214 OFFICE O/P EST MOD 30 MIN: CPT | Performed by: NURSE PRACTITIONER

## 2021-09-30 PROCEDURE — 90471 IMMUNIZATION ADMIN: CPT

## 2021-09-30 PROCEDURE — 1111F DSCHRG MED/CURRENT MED MERGE: CPT | Performed by: FAMILY MEDICINE

## 2021-09-30 PROCEDURE — 1111F DSCHRG MED/CURRENT MED MERGE: CPT | Performed by: NURSE PRACTITIONER

## 2021-09-30 PROCEDURE — 99495 TRANSJ CARE MGMT MOD F2F 14D: CPT | Performed by: FAMILY MEDICINE

## 2021-09-30 PROCEDURE — 3008F BODY MASS INDEX DOCD: CPT | Performed by: PHYSICIAN ASSISTANT

## 2021-09-30 RX ORDER — ARMODAFINIL 250 MG/1
250 TABLET ORAL DAILY
Qty: 30 TABLET | Refills: 5 | Status: SHIPPED | OUTPATIENT
Start: 2021-09-30 | End: 2022-03-31 | Stop reason: SDUPTHER

## 2021-10-01 ENCOUNTER — TELEPHONE (OUTPATIENT)
Dept: GASTROENTEROLOGY | Facility: MEDICAL CENTER | Age: 61
End: 2021-10-01

## 2021-10-06 ENCOUNTER — TELEPHONE (OUTPATIENT)
Dept: GASTROENTEROLOGY | Facility: MEDICAL CENTER | Age: 61
End: 2021-10-06

## 2021-10-07 ENCOUNTER — HOSPITAL ENCOUNTER (OUTPATIENT)
Dept: GASTROENTEROLOGY | Facility: MEDICAL CENTER | Age: 61
Setting detail: OUTPATIENT SURGERY
Discharge: HOME | End: 2021-10-07
Attending: INTERNAL MEDICINE | Admitting: INTERNAL MEDICINE
Payer: COMMERCIAL

## 2021-10-07 ENCOUNTER — ANESTHESIA (OUTPATIENT)
Dept: GASTROENTEROLOGY | Facility: MEDICAL CENTER | Age: 61
End: 2021-10-07

## 2021-10-07 ENCOUNTER — ANESTHESIA EVENT (OUTPATIENT)
Dept: GASTROENTEROLOGY | Facility: MEDICAL CENTER | Age: 61
End: 2021-10-07

## 2021-10-07 VITALS
RESPIRATION RATE: 20 BRPM | DIASTOLIC BLOOD PRESSURE: 56 MMHG | HEIGHT: 63 IN | WEIGHT: 106 LBS | SYSTOLIC BLOOD PRESSURE: 101 MMHG | TEMPERATURE: 97.8 F | BODY MASS INDEX: 18.78 KG/M2 | HEART RATE: 68 BPM | OXYGEN SATURATION: 99 %

## 2021-10-07 DIAGNOSIS — K52.9 CHRONIC DIARRHEA: ICD-10-CM

## 2021-10-07 PROCEDURE — 88313 SPECIAL STAINS GROUP 2: CPT | Performed by: PATHOLOGY

## 2021-10-07 PROCEDURE — 88305 TISSUE EXAM BY PATHOLOGIST: CPT | Performed by: PATHOLOGY

## 2021-10-07 PROCEDURE — 88342 IMHCHEM/IMCYTCHM 1ST ANTB: CPT | Performed by: PATHOLOGY

## 2021-10-07 PROCEDURE — 45380 COLONOSCOPY AND BIOPSY: CPT | Performed by: INTERNAL MEDICINE

## 2021-10-07 RX ORDER — SODIUM CHLORIDE 9 MG/ML
125 INJECTION, SOLUTION INTRAVENOUS CONTINUOUS
Status: DISCONTINUED | OUTPATIENT
Start: 2021-10-07 | End: 2021-10-11 | Stop reason: HOSPADM

## 2021-10-07 RX ORDER — PROPOFOL 10 MG/ML
INJECTION, EMULSION INTRAVENOUS AS NEEDED
Status: DISCONTINUED | OUTPATIENT
Start: 2021-10-07 | End: 2021-10-07

## 2021-10-07 RX ADMIN — PROPOFOL 100 MG: 10 INJECTION, EMULSION INTRAVENOUS at 08:25

## 2021-10-07 RX ADMIN — PROPOFOL 100 MG: 10 INJECTION, EMULSION INTRAVENOUS at 08:40

## 2021-10-07 RX ADMIN — SODIUM CHLORIDE 125 ML/HR: 0.9 INJECTION, SOLUTION INTRAVENOUS at 08:19

## 2021-10-07 RX ADMIN — PROPOFOL 100 MG: 10 INJECTION, EMULSION INTRAVENOUS at 08:35

## 2021-10-07 RX ADMIN — PROPOFOL 100 MG: 10 INJECTION, EMULSION INTRAVENOUS at 08:31

## 2021-10-08 ENCOUNTER — HOSPITAL ENCOUNTER (OUTPATIENT)
Dept: NEUROLOGY | Facility: CLINIC | Age: 61
Discharge: HOME | End: 2021-10-08
Payer: COMMERCIAL

## 2021-10-08 DIAGNOSIS — R25.2 JERKING MOVEMENTS OF EXTREMITIES: ICD-10-CM

## 2021-10-08 PROCEDURE — 95886 MUSC TEST DONE W/N TEST COMP: CPT | Performed by: PSYCHIATRY & NEUROLOGY

## 2021-10-08 PROCEDURE — 95911 NRV CNDJ TEST 9-10 STUDIES: CPT | Performed by: PSYCHIATRY & NEUROLOGY

## 2021-10-11 ENCOUNTER — APPOINTMENT (OUTPATIENT)
Dept: LAB | Facility: CLINIC | Age: 61
End: 2021-10-11
Payer: COMMERCIAL

## 2021-10-11 DIAGNOSIS — N39.0 RECURRENT UTI: ICD-10-CM

## 2021-10-11 PROCEDURE — 87086 URINE CULTURE/COLONY COUNT: CPT

## 2021-10-11 PROCEDURE — 87186 SC STD MICRODIL/AGAR DIL: CPT

## 2021-10-11 PROCEDURE — 87077 CULTURE AEROBIC IDENTIFY: CPT

## 2021-10-12 ENCOUNTER — TELEPHONE (OUTPATIENT)
Dept: UROLOGY | Facility: AMBULATORY SURGERY CENTER | Age: 61
End: 2021-10-12

## 2021-10-12 ENCOUNTER — TELEPHONE (OUTPATIENT)
Dept: NEUROLOGY | Facility: CLINIC | Age: 61
End: 2021-10-12

## 2021-10-12 DIAGNOSIS — G56.11 RIGHT MEDIAN NERVE NEUROPATHY: Primary | ICD-10-CM

## 2021-10-12 DIAGNOSIS — N39.0 RECURRENT UTI: Primary | ICD-10-CM

## 2021-10-12 DIAGNOSIS — R25.2 JERKING MOVEMENTS OF EXTREMITIES: ICD-10-CM

## 2021-10-12 RX ORDER — CEPHALEXIN 500 MG/1
500 CAPSULE ORAL EVERY 6 HOURS SCHEDULED
Qty: 28 CAPSULE | Refills: 0 | Status: SHIPPED | OUTPATIENT
Start: 2021-10-12 | End: 2021-10-13 | Stop reason: SDUPTHER

## 2021-10-13 ENCOUNTER — TELEPHONE (OUTPATIENT)
Dept: GASTROENTEROLOGY | Facility: CLINIC | Age: 61
End: 2021-10-13

## 2021-10-13 LAB — BACTERIA UR CULT: ABNORMAL

## 2021-10-13 RX ORDER — CEPHALEXIN 500 MG/1
500 CAPSULE ORAL EVERY 6 HOURS SCHEDULED
Qty: 28 CAPSULE | Refills: 0 | Status: SHIPPED | OUTPATIENT
Start: 2021-10-13 | End: 2021-10-20

## 2021-10-14 ENCOUNTER — TELEPHONE (OUTPATIENT)
Dept: UROLOGY | Facility: MEDICAL CENTER | Age: 61
End: 2021-10-14

## 2021-10-19 DIAGNOSIS — K52.832 LYMPHOCYTIC COLITIS: Primary | ICD-10-CM

## 2021-10-19 RX ORDER — BACLOFEN 10 MG/1
TABLET ORAL
Qty: 30 TABLET | Refills: 0 | Status: SHIPPED | OUTPATIENT
Start: 2021-10-19 | End: 2021-12-23 | Stop reason: ALTCHOICE

## 2021-10-19 RX ORDER — BUDESONIDE 3 MG/1
9 CAPSULE, COATED PELLETS ORAL DAILY
Qty: 90 CAPSULE | Refills: 3 | Status: SHIPPED | OUTPATIENT
Start: 2021-10-19 | End: 2021-10-20 | Stop reason: SDUPTHER

## 2021-10-20 DIAGNOSIS — K52.832 LYMPHOCYTIC COLITIS: ICD-10-CM

## 2021-10-20 RX ORDER — BUDESONIDE 3 MG/1
9 CAPSULE, COATED PELLETS ORAL DAILY
Qty: 90 CAPSULE | Refills: 3 | Status: SHIPPED | OUTPATIENT
Start: 2021-10-20 | End: 2022-06-29

## 2021-11-02 ENCOUNTER — OFFICE VISIT (OUTPATIENT)
Dept: GASTROENTEROLOGY | Facility: MEDICAL CENTER | Age: 61
End: 2021-11-02
Payer: COMMERCIAL

## 2021-11-02 VITALS
BODY MASS INDEX: 21.01 KG/M2 | TEMPERATURE: 98.7 F | SYSTOLIC BLOOD PRESSURE: 106 MMHG | DIASTOLIC BLOOD PRESSURE: 68 MMHG | WEIGHT: 118.6 LBS | HEART RATE: 77 BPM

## 2021-11-02 DIAGNOSIS — R12 HEARTBURN: ICD-10-CM

## 2021-11-02 DIAGNOSIS — R10.9 ABDOMINAL PAIN, UNSPECIFIED ABDOMINAL LOCATION: ICD-10-CM

## 2021-11-02 DIAGNOSIS — K52.832 LYMPHOCYTIC COLITIS: Primary | ICD-10-CM

## 2021-11-02 DIAGNOSIS — K52.9 COLITIS: ICD-10-CM

## 2021-11-02 DIAGNOSIS — K52.9 CHRONIC DIARRHEA: ICD-10-CM

## 2021-11-02 DIAGNOSIS — K62.5 RECTAL BLEED: ICD-10-CM

## 2021-11-02 PROCEDURE — 4004F PT TOBACCO SCREEN RCVD TLK: CPT | Performed by: INTERNAL MEDICINE

## 2021-11-02 PROCEDURE — 99214 OFFICE O/P EST MOD 30 MIN: CPT | Performed by: INTERNAL MEDICINE

## 2021-11-02 RX ORDER — OMEPRAZOLE 20 MG/1
20 CAPSULE, DELAYED RELEASE ORAL
Qty: 90 CAPSULE | Refills: 1 | Status: SHIPPED | OUTPATIENT
Start: 2021-11-02 | End: 2022-03-31 | Stop reason: DRUGHIGH

## 2021-11-02 RX ORDER — DIPHENOXYLATE HYDROCHLORIDE AND ATROPINE SULFATE 2.5; .025 MG/1; MG/1
1 TABLET ORAL 4 TIMES DAILY PRN
Qty: 90 TABLET | Refills: 1 | Status: SHIPPED | OUTPATIENT
Start: 2021-11-02 | End: 2021-11-05

## 2021-11-04 DIAGNOSIS — K52.832 LYMPHOCYTIC COLITIS: ICD-10-CM

## 2021-11-05 RX ORDER — DIPHENOXYLATE HYDROCHLORIDE AND ATROPINE SULFATE 2.5; .025 MG/1; MG/1
1 TABLET ORAL 4 TIMES DAILY PRN
Qty: 90 TABLET | Refills: 1 | Status: SHIPPED | OUTPATIENT
Start: 2021-11-05 | End: 2022-02-03

## 2021-11-09 ENCOUNTER — EVALUATION (OUTPATIENT)
Dept: PHYSICAL THERAPY | Facility: CLINIC | Age: 61
End: 2021-11-09
Payer: COMMERCIAL

## 2021-11-09 DIAGNOSIS — G56.11 RIGHT MEDIAN NERVE NEUROPATHY: ICD-10-CM

## 2021-11-09 PROCEDURE — 97162 PT EVAL MOD COMPLEX 30 MIN: CPT | Performed by: PHYSICAL THERAPIST

## 2021-11-09 PROCEDURE — 97112 NEUROMUSCULAR REEDUCATION: CPT | Performed by: PHYSICAL THERAPIST

## 2021-11-11 ENCOUNTER — HOSPITAL ENCOUNTER (OUTPATIENT)
Dept: SLEEP CENTER | Facility: CLINIC | Age: 61
Discharge: HOME | End: 2021-11-11
Payer: COMMERCIAL

## 2021-11-11 DIAGNOSIS — G47.11 IDIOPATHIC HYPERSOMNIA: ICD-10-CM

## 2021-11-11 PROCEDURE — 95805 MULTIPLE SLEEP LATENCY TEST: CPT | Performed by: INTERNAL MEDICINE

## 2021-11-11 PROCEDURE — 95805 MULTIPLE SLEEP LATENCY TEST: CPT

## 2021-11-12 ENCOUNTER — TELEPHONE (OUTPATIENT)
Dept: SLEEP CENTER | Facility: CLINIC | Age: 61
End: 2021-11-12

## 2021-11-16 ENCOUNTER — OFFICE VISIT (OUTPATIENT)
Dept: PHYSICAL THERAPY | Facility: CLINIC | Age: 61
End: 2021-11-16
Payer: COMMERCIAL

## 2021-11-16 DIAGNOSIS — G47.11 IDIOPATHIC HYPERSOMNIA: ICD-10-CM

## 2021-11-16 DIAGNOSIS — G56.11 RIGHT MEDIAN NERVE NEUROPATHY: Primary | ICD-10-CM

## 2021-11-16 PROCEDURE — 97140 MANUAL THERAPY 1/> REGIONS: CPT | Performed by: PHYSICAL THERAPIST

## 2021-11-16 PROCEDURE — 97112 NEUROMUSCULAR REEDUCATION: CPT | Performed by: PHYSICAL THERAPIST

## 2021-11-16 PROCEDURE — 97110 THERAPEUTIC EXERCISES: CPT | Performed by: PHYSICAL THERAPIST

## 2021-11-16 RX ORDER — ARMODAFINIL 250 MG/1
TABLET ORAL
Qty: 30 TABLET | Refills: 1 | OUTPATIENT
Start: 2021-11-16

## 2021-11-18 ENCOUNTER — OFFICE VISIT (OUTPATIENT)
Dept: PHYSICAL THERAPY | Facility: CLINIC | Age: 61
End: 2021-11-18
Payer: COMMERCIAL

## 2021-11-18 DIAGNOSIS — G56.11 RIGHT MEDIAN NERVE NEUROPATHY: Primary | ICD-10-CM

## 2021-11-18 PROCEDURE — 97110 THERAPEUTIC EXERCISES: CPT

## 2021-11-18 PROCEDURE — 97112 NEUROMUSCULAR REEDUCATION: CPT

## 2021-11-22 ENCOUNTER — TELEPHONE (OUTPATIENT)
Dept: SLEEP CENTER | Facility: CLINIC | Age: 61
End: 2021-11-22

## 2021-11-23 ENCOUNTER — OFFICE VISIT (OUTPATIENT)
Dept: PHYSICAL THERAPY | Facility: CLINIC | Age: 61
End: 2021-11-23
Payer: COMMERCIAL

## 2021-11-23 DIAGNOSIS — G56.11 MEDIAN NERVE NEUROPATHY, RIGHT: Primary | ICD-10-CM

## 2021-11-23 PROCEDURE — 97110 THERAPEUTIC EXERCISES: CPT | Performed by: PHYSICAL THERAPIST

## 2021-11-23 PROCEDURE — 97112 NEUROMUSCULAR REEDUCATION: CPT | Performed by: PHYSICAL THERAPIST

## 2021-11-23 PROCEDURE — 97140 MANUAL THERAPY 1/> REGIONS: CPT | Performed by: PHYSICAL THERAPIST

## 2021-11-30 ENCOUNTER — OFFICE VISIT (OUTPATIENT)
Dept: PHYSICAL THERAPY | Facility: CLINIC | Age: 61
End: 2021-11-30
Payer: COMMERCIAL

## 2021-11-30 DIAGNOSIS — G56.11 RIGHT MEDIAN NERVE NEUROPATHY: Primary | ICD-10-CM

## 2021-11-30 DIAGNOSIS — G56.11 MEDIAN NERVE NEUROPATHY, RIGHT: ICD-10-CM

## 2021-11-30 PROCEDURE — 97110 THERAPEUTIC EXERCISES: CPT

## 2021-11-30 PROCEDURE — 97140 MANUAL THERAPY 1/> REGIONS: CPT

## 2021-11-30 PROCEDURE — 97112 NEUROMUSCULAR REEDUCATION: CPT

## 2021-12-02 ENCOUNTER — OFFICE VISIT (OUTPATIENT)
Dept: PHYSICAL THERAPY | Facility: CLINIC | Age: 61
End: 2021-12-02
Payer: COMMERCIAL

## 2021-12-02 DIAGNOSIS — G56.11 MEDIAN NERVE NEUROPATHY, RIGHT: Primary | ICD-10-CM

## 2021-12-02 PROCEDURE — 97112 NEUROMUSCULAR REEDUCATION: CPT | Performed by: PHYSICAL THERAPIST

## 2021-12-02 PROCEDURE — 97110 THERAPEUTIC EXERCISES: CPT | Performed by: PHYSICAL THERAPIST

## 2021-12-02 PROCEDURE — 97140 MANUAL THERAPY 1/> REGIONS: CPT | Performed by: PHYSICAL THERAPIST

## 2021-12-07 ENCOUNTER — OFFICE VISIT (OUTPATIENT)
Dept: PHYSICAL THERAPY | Facility: CLINIC | Age: 61
End: 2021-12-07
Payer: COMMERCIAL

## 2021-12-07 DIAGNOSIS — G56.11 MEDIAN NERVE NEUROPATHY, RIGHT: Primary | ICD-10-CM

## 2021-12-07 PROCEDURE — 97110 THERAPEUTIC EXERCISES: CPT | Performed by: PHYSICAL THERAPIST

## 2021-12-07 PROCEDURE — 97140 MANUAL THERAPY 1/> REGIONS: CPT | Performed by: PHYSICAL THERAPIST

## 2021-12-07 PROCEDURE — 97112 NEUROMUSCULAR REEDUCATION: CPT | Performed by: PHYSICAL THERAPIST

## 2021-12-09 ENCOUNTER — OFFICE VISIT (OUTPATIENT)
Dept: PHYSICAL THERAPY | Facility: CLINIC | Age: 61
End: 2021-12-09
Payer: COMMERCIAL

## 2021-12-09 DIAGNOSIS — G56.11 MEDIAN NERVE NEUROPATHY, RIGHT: Primary | ICD-10-CM

## 2021-12-09 PROCEDURE — 97110 THERAPEUTIC EXERCISES: CPT | Performed by: PHYSICAL THERAPIST

## 2021-12-09 PROCEDURE — 97140 MANUAL THERAPY 1/> REGIONS: CPT | Performed by: PHYSICAL THERAPIST

## 2021-12-09 PROCEDURE — 97112 NEUROMUSCULAR REEDUCATION: CPT | Performed by: PHYSICAL THERAPIST

## 2021-12-13 DIAGNOSIS — E78.00 PURE HYPERCHOLESTEROLEMIA: ICD-10-CM

## 2021-12-13 RX ORDER — ROSUVASTATIN CALCIUM 5 MG/1
5 TABLET, COATED ORAL DAILY
Qty: 90 TABLET | Refills: 0 | Status: SHIPPED | OUTPATIENT
Start: 2021-12-13 | End: 2022-03-24 | Stop reason: SDUPTHER

## 2021-12-14 ENCOUNTER — TELEPHONE (OUTPATIENT)
Dept: SLEEP CENTER | Facility: CLINIC | Age: 61
End: 2021-12-14

## 2021-12-14 ENCOUNTER — OFFICE VISIT (OUTPATIENT)
Dept: PHYSICAL THERAPY | Facility: CLINIC | Age: 61
End: 2021-12-14
Payer: COMMERCIAL

## 2021-12-14 DIAGNOSIS — G56.11 MEDIAN NERVE NEUROPATHY, RIGHT: Primary | ICD-10-CM

## 2021-12-14 DIAGNOSIS — G56.11 RIGHT MEDIAN NERVE NEUROPATHY: ICD-10-CM

## 2021-12-14 PROCEDURE — 97530 THERAPEUTIC ACTIVITIES: CPT

## 2021-12-14 PROCEDURE — 97140 MANUAL THERAPY 1/> REGIONS: CPT

## 2021-12-14 PROCEDURE — 97110 THERAPEUTIC EXERCISES: CPT

## 2021-12-16 ENCOUNTER — OFFICE VISIT (OUTPATIENT)
Dept: PHYSICAL THERAPY | Facility: CLINIC | Age: 61
End: 2021-12-16
Payer: COMMERCIAL

## 2021-12-16 DIAGNOSIS — G56.11 MEDIAN NERVE NEUROPATHY, RIGHT: Primary | ICD-10-CM

## 2021-12-16 PROCEDURE — 97110 THERAPEUTIC EXERCISES: CPT

## 2021-12-16 PROCEDURE — 97140 MANUAL THERAPY 1/> REGIONS: CPT

## 2021-12-16 PROCEDURE — 97530 THERAPEUTIC ACTIVITIES: CPT

## 2021-12-20 ENCOUNTER — TELEPHONE (OUTPATIENT)
Dept: SLEEP CENTER | Facility: CLINIC | Age: 61
End: 2021-12-20

## 2021-12-21 ENCOUNTER — OFFICE VISIT (OUTPATIENT)
Dept: PHYSICAL THERAPY | Facility: CLINIC | Age: 61
End: 2021-12-21
Payer: COMMERCIAL

## 2021-12-21 DIAGNOSIS — G56.11 MEDIAN NERVE NEUROPATHY, RIGHT: Primary | ICD-10-CM

## 2021-12-21 PROCEDURE — 97140 MANUAL THERAPY 1/> REGIONS: CPT

## 2021-12-21 PROCEDURE — 97530 THERAPEUTIC ACTIVITIES: CPT

## 2021-12-21 PROCEDURE — 97110 THERAPEUTIC EXERCISES: CPT

## 2021-12-22 ENCOUNTER — APPOINTMENT (OUTPATIENT)
Dept: LAB | Facility: CLINIC | Age: 61
End: 2021-12-22
Payer: COMMERCIAL

## 2021-12-22 DIAGNOSIS — D75.89 INCREASED MCV: ICD-10-CM

## 2021-12-22 DIAGNOSIS — D72.819 LEUKOPENIA, UNSPECIFIED TYPE: ICD-10-CM

## 2021-12-22 DIAGNOSIS — R79.89 LOW SERUM CALCIUM: ICD-10-CM

## 2021-12-22 LAB
ANION GAP SERPL CALCULATED.3IONS-SCNC: 4 MMOL/L (ref 4–13)
BASOPHILS # BLD AUTO: 0.06 THOUSANDS/ΜL (ref 0–0.1)
BASOPHILS NFR BLD AUTO: 1 % (ref 0–1)
BUN SERPL-MCNC: 19 MG/DL (ref 5–25)
CALCIUM SERPL-MCNC: 9 MG/DL (ref 8.3–10.1)
CHLORIDE SERPL-SCNC: 106 MMOL/L (ref 100–108)
CO2 SERPL-SCNC: 26 MMOL/L (ref 21–32)
CREAT SERPL-MCNC: 0.72 MG/DL (ref 0.6–1.3)
EOSINOPHIL # BLD AUTO: 0.23 THOUSAND/ΜL (ref 0–0.61)
EOSINOPHIL NFR BLD AUTO: 4 % (ref 0–6)
ERYTHROCYTE [DISTWIDTH] IN BLOOD BY AUTOMATED COUNT: 13.3 % (ref 11.6–15.1)
GFR SERPL CREATININE-BSD FRML MDRD: 89 ML/MIN/1.73SQ M
GLUCOSE SERPL-MCNC: 73 MG/DL (ref 65–140)
HCT VFR BLD AUTO: 33.3 % (ref 34.8–46.1)
HGB BLD-MCNC: 10.9 G/DL (ref 11.5–15.4)
IMM GRANULOCYTES # BLD AUTO: 0.03 THOUSAND/UL (ref 0–0.2)
IMM GRANULOCYTES NFR BLD AUTO: 1 % (ref 0–2)
LYMPHOCYTES # BLD AUTO: 1.26 THOUSANDS/ΜL (ref 0.6–4.47)
LYMPHOCYTES NFR BLD AUTO: 22 % (ref 14–44)
MCH RBC QN AUTO: 31.8 PG (ref 26.8–34.3)
MCHC RBC AUTO-ENTMCNC: 32.7 G/DL (ref 31.4–37.4)
MCV RBC AUTO: 97 FL (ref 82–98)
MONOCYTES # BLD AUTO: 0.3 THOUSAND/ΜL (ref 0.17–1.22)
MONOCYTES NFR BLD AUTO: 5 % (ref 4–12)
NEUTROPHILS # BLD AUTO: 3.89 THOUSANDS/ΜL (ref 1.85–7.62)
NEUTS SEG NFR BLD AUTO: 67 % (ref 43–75)
NRBC BLD AUTO-RTO: 0 /100 WBCS
PLATELET # BLD AUTO: 246 THOUSANDS/UL (ref 149–390)
PMV BLD AUTO: 9.8 FL (ref 8.9–12.7)
POTASSIUM SERPL-SCNC: 3.6 MMOL/L (ref 3.5–5.3)
RBC # BLD AUTO: 3.43 MILLION/UL (ref 3.81–5.12)
SODIUM SERPL-SCNC: 136 MMOL/L (ref 136–145)
WBC # BLD AUTO: 5.77 THOUSAND/UL (ref 4.31–10.16)

## 2021-12-22 PROCEDURE — 36415 COLL VENOUS BLD VENIPUNCTURE: CPT

## 2021-12-22 PROCEDURE — 80048 BASIC METABOLIC PNL TOTAL CA: CPT

## 2021-12-22 PROCEDURE — 85025 COMPLETE CBC W/AUTO DIFF WBC: CPT

## 2021-12-23 ENCOUNTER — OFFICE VISIT (OUTPATIENT)
Dept: FAMILY MEDICINE CLINIC | Facility: CLINIC | Age: 61
End: 2021-12-23
Payer: COMMERCIAL

## 2021-12-23 ENCOUNTER — OFFICE VISIT (OUTPATIENT)
Dept: PHYSICAL THERAPY | Facility: CLINIC | Age: 61
End: 2021-12-23
Payer: COMMERCIAL

## 2021-12-23 VITALS
RESPIRATION RATE: 61 BRPM | WEIGHT: 115 LBS | TEMPERATURE: 97.2 F | BODY MASS INDEX: 20.37 KG/M2 | OXYGEN SATURATION: 97 % | HEART RATE: 77 BPM | DIASTOLIC BLOOD PRESSURE: 76 MMHG | SYSTOLIC BLOOD PRESSURE: 120 MMHG

## 2021-12-23 DIAGNOSIS — D72.819 LEUKOPENIA, UNSPECIFIED TYPE: ICD-10-CM

## 2021-12-23 DIAGNOSIS — R79.89 LOW SERUM CALCIUM: ICD-10-CM

## 2021-12-23 DIAGNOSIS — N28.9 ABNORMAL RENAL FUNCTION: ICD-10-CM

## 2021-12-23 DIAGNOSIS — N39.0 URINARY TRACT INFECTION WITHOUT HEMATURIA, SITE UNSPECIFIED: ICD-10-CM

## 2021-12-23 DIAGNOSIS — K56.609 SBO (SMALL BOWEL OBSTRUCTION) (HCC): ICD-10-CM

## 2021-12-23 DIAGNOSIS — R25.1 TREMOR: ICD-10-CM

## 2021-12-23 DIAGNOSIS — G56.11 MEDIAN NERVE NEUROPATHY, RIGHT: Primary | ICD-10-CM

## 2021-12-23 DIAGNOSIS — F17.200 SMOKING: ICD-10-CM

## 2021-12-23 DIAGNOSIS — D64.9 ANEMIA, UNSPECIFIED TYPE: ICD-10-CM

## 2021-12-23 DIAGNOSIS — K52.9 CHRONIC DIARRHEA: ICD-10-CM

## 2021-12-23 DIAGNOSIS — E78.00 PURE HYPERCHOLESTEROLEMIA: Primary | ICD-10-CM

## 2021-12-23 DIAGNOSIS — G56.11 RIGHT MEDIAN NERVE NEUROPATHY: ICD-10-CM

## 2021-12-23 DIAGNOSIS — D75.89 INCREASED MCV: ICD-10-CM

## 2021-12-23 DIAGNOSIS — R73.9 ELEVATED BLOOD SUGAR: ICD-10-CM

## 2021-12-23 DIAGNOSIS — R18.8 OTHER ASCITES: ICD-10-CM

## 2021-12-23 PROCEDURE — 97110 THERAPEUTIC EXERCISES: CPT

## 2021-12-23 PROCEDURE — 97140 MANUAL THERAPY 1/> REGIONS: CPT

## 2021-12-23 PROCEDURE — 4004F PT TOBACCO SCREEN RCVD TLK: CPT | Performed by: FAMILY MEDICINE

## 2021-12-23 PROCEDURE — 97530 THERAPEUTIC ACTIVITIES: CPT

## 2021-12-23 PROCEDURE — 99214 OFFICE O/P EST MOD 30 MIN: CPT | Performed by: FAMILY MEDICINE

## 2021-12-24 PROBLEM — R18.8 OTHER ASCITES: Status: ACTIVE | Noted: 2021-12-24

## 2021-12-24 PROBLEM — N39.0 URINARY TRACT INFECTION WITHOUT HEMATURIA: Status: ACTIVE | Noted: 2021-12-24

## 2021-12-24 PROBLEM — N28.9 ABNORMAL RENAL FUNCTION: Status: ACTIVE | Noted: 2021-12-24

## 2021-12-24 PROBLEM — D75.89 INCREASED MCV: Status: ACTIVE | Noted: 2021-12-24

## 2021-12-24 PROBLEM — R79.89 LOW SERUM CALCIUM: Status: ACTIVE | Noted: 2021-12-24

## 2021-12-24 PROBLEM — R25.1 TREMOR: Status: ACTIVE | Noted: 2021-12-24

## 2021-12-28 ENCOUNTER — OFFICE VISIT (OUTPATIENT)
Dept: PHYSICAL THERAPY | Facility: CLINIC | Age: 61
End: 2021-12-28
Payer: COMMERCIAL

## 2021-12-28 DIAGNOSIS — G56.11 MEDIAN NERVE NEUROPATHY, RIGHT: Primary | ICD-10-CM

## 2021-12-28 PROCEDURE — 97140 MANUAL THERAPY 1/> REGIONS: CPT

## 2021-12-28 PROCEDURE — 97110 THERAPEUTIC EXERCISES: CPT

## 2021-12-28 PROCEDURE — 97530 THERAPEUTIC ACTIVITIES: CPT

## 2021-12-30 ENCOUNTER — APPOINTMENT (OUTPATIENT)
Dept: PHYSICAL THERAPY | Facility: CLINIC | Age: 61
End: 2021-12-30
Payer: COMMERCIAL

## 2022-01-03 ENCOUNTER — PATIENT MESSAGE (OUTPATIENT)
Dept: NEUROLOGY | Facility: CLINIC | Age: 62
End: 2022-01-03

## 2022-01-03 DIAGNOSIS — G43.009 MIGRAINE WITHOUT AURA AND WITHOUT STATUS MIGRAINOSUS, NOT INTRACTABLE: ICD-10-CM

## 2022-01-03 RX ORDER — ZOLMITRIPTAN 2.5 MG/1
TABLET, FILM COATED ORAL
Qty: 12 TABLET | Refills: 3 | Status: SHIPPED | OUTPATIENT
Start: 2022-01-03 | End: 2022-07-28 | Stop reason: SDUPTHER

## 2022-01-07 ENCOUNTER — TELEPHONE (OUTPATIENT)
Dept: NEUROLOGY | Facility: CLINIC | Age: 62
End: 2022-01-07

## 2022-01-13 ENCOUNTER — TELEPHONE (OUTPATIENT)
Dept: NEUROLOGY | Facility: CLINIC | Age: 62
End: 2022-01-13

## 2022-01-13 ENCOUNTER — OFFICE VISIT (OUTPATIENT)
Dept: NEUROLOGY | Facility: CLINIC | Age: 62
End: 2022-01-13
Payer: COMMERCIAL

## 2022-01-13 VITALS
DIASTOLIC BLOOD PRESSURE: 64 MMHG | HEART RATE: 80 BPM | SYSTOLIC BLOOD PRESSURE: 108 MMHG | TEMPERATURE: 97.5 F | HEIGHT: 63 IN | WEIGHT: 115 LBS | BODY MASS INDEX: 20.38 KG/M2

## 2022-01-13 DIAGNOSIS — G56.11 RIGHT MEDIAN NERVE NEUROPATHY: ICD-10-CM

## 2022-01-13 DIAGNOSIS — G43.009 MIGRAINE WITHOUT AURA AND WITHOUT STATUS MIGRAINOSUS, NOT INTRACTABLE: ICD-10-CM

## 2022-01-13 DIAGNOSIS — Z98.1 HISTORY OF FUSION OF CERVICAL SPINE: ICD-10-CM

## 2022-01-13 DIAGNOSIS — R25.1 TREMOR: Primary | ICD-10-CM

## 2022-01-13 DIAGNOSIS — G56.21 ULNAR NEUROPATHY OF RIGHT UPPER EXTREMITY: ICD-10-CM

## 2022-01-13 DIAGNOSIS — R20.2 ARM PARESTHESIA, RIGHT: ICD-10-CM

## 2022-01-13 PROCEDURE — 99214 OFFICE O/P EST MOD 30 MIN: CPT | Performed by: PHYSICIAN ASSISTANT

## 2022-01-13 PROCEDURE — 3008F BODY MASS INDEX DOCD: CPT | Performed by: INTERNAL MEDICINE

## 2022-01-13 NOTE — TELEPHONE ENCOUNTER
LMOm to offer earlier opening at 1PM with Evangelina Nava at [facility]   If patient is interested, please reschedule to Evangelina's schedule today at 1pm (slot is on hold her ) if she declines, please keep appt at 3:30PM with [address]  Thank you

## 2022-01-13 NOTE — PROGRESS NOTES
Patient ID: Reinaldo Melendez is a 59 y o  female  Assessment/Plan:  Essential tremor vs PD tremor  Tremor was unilateral at the time of the last visit in sept, and now pt feels that it can be both sides  She feels an internal tremulousness  She has a rest tremor of the left foot on exam  Mild intention tremor at the endpoint of action b/l hands  No postural tremor in the hands  No rigidity, no masked face, mild postural instability at baseline with neck and low back pain  Trial rasagiline  Consider primidone if intention tremor persists  -- will schedule next available with attending  Labs ordered- TSH, CBC (recheck slightly low hgb)  Continue zomig PRN migraine onset  Consider Aimovig for migraine prevention  Right now she is happy with using abortive therapy only and zomig works well  The patient is having more neck pain recently and I encouraged her to contact Pain Management who she saw in the past   She tried baclofen at 1 of the last visits but this was not helpful  Continue 90 mg of Cymbalta at bed  Continue 600 mg gabapentin q h s , and can add to 300 additional mg to bedtime due to sedation if used during the day  This may help with tremor as well as neck pain  Diagnoses and all orders for this visit:    Tremor  -     TSH, 3rd generation with Free T4 reflex; Future  -     CBC and Platelet; Future    History of fusion of cervical spine  -     MRI cervical spine wo contrast; Future    Arm paresthesia, right  -     MRI cervical spine wo contrast; Future    Right median nerve neuropathy    Ulnar neuropathy of right upper extremity    Migraine without aura and without status migrainosus, not intractable            The patient should not hesitate to call me prior to her follow up with any questions or concerns  Subjective:    HPI    Since last seen the patient reports increased neck pain and knots in her neck    Her migraines are about 2-3 times per week and recently worse with neck pain   She has always stated that whenever she has more pain in a different location of her body that this triggers more migraine headaches  She tried PT in the past but pressing on the muscles triggered more pain possibly  She continues Cymbalta 90 mg daily and gabapentin 600 mg q h s     The 300 mg dose of gabapentin makes her sleepy so she tries to avoid taking that 1 during the day  Cymbalta does not seem to cause s/e  Since I last saw her she feels that her tremor may be a little bit worse  She feels that it is constantly in her feet, today it is just in 1 ft on the left, but it can sometimes be both sides  She states it is on both upper extremities as well  She states it makes it difficult for her to do her job  Baclofen did not help since the last visit  Recent brain MRI in September did not suggest any pathology to contribute to the tremor  EMG in October suggested moderate right median neuropathy, and right ulnar neuropathy as well  She has been doing physical therapy for this  There is some improved strength since PT  The patient wants to hold off on orthopedic referral at this time  The following portions of the patient's history were reviewed and updated as appropriate:   She  has a past medical history of Allergy to dust, Anemia, Calculus of ureter, Chronic pain disorder, Colitis, Colon polyp, Cracked skin, Diverticulosis, Dysphagia, GERD (gastroesophageal reflux disease), H/O: GI bleed, Hiatal hernia, History of DVT in adulthood (2019), Hydronephrosis, Hyperlipidemia, Inguinal hernia, Irregular heart beat, Irritable bowel, Kidney stone, Migraines, Narcolepsy, Narcolepsy (08/06/2020), OAB (overactive bladder), Osteoarthritis, Osteoporosis, Other chronic pain, PONV (postoperative nausea and vomiting), Smoking (8/6/2020), Tobacco abuse, Wears dentures, Wears glasses, and Wears glasses    She   Patient Active Problem List    Diagnosis Date Noted    Arm paresthesia, right 01/16/2022    Right median nerve neuropathy 01/16/2022    Ulnar neuropathy of right upper extremity 01/16/2022    Other ascites 12/24/2021    Urinary tract infection without hematuria 12/24/2021    Low serum calcium 12/24/2021    Tremor 12/24/2021    Increased MCV 12/24/2021    Abnormal renal function 12/24/2021    Carpal tunnel syndrome of right wrist     SBO (small bowel obstruction) ([facility] [address] ) 09/25/2021    Jerking movements of extremities 09/12/2021    History of fusion of cervical spine 09/12/2021    ANAM (obstructive sleep apnea)     Recurrent UTI 04/07/2021    Wears dentures     Hyponatremia 03/14/2021    Leukopenia 03/14/2021    Hypokalemia 03/14/2021    Sepsis ([facility] [address] ) 01/13/2021    Pyelonephritis 01/13/2021    Low magnesium level 01/10/2021    Flank pain 12/25/2020    Ureteral stone with hydronephrosis 12/02/2020    Urgency of urination 11/19/2020    Colitis 11/09/2020    Smoking 08/06/2020    Lumbar radiculopathy     S/P right inguinal hernia repair 07/02/2020    Diarrhea 07/02/2020    Weight loss 07/02/2020    Elevated blood sugar 07/02/2020    Pap smear for cervical cancer screening 07/02/2020    Inguinal hernia of right side without obstruction or gangrene 06/25/2020    Microscopic hematuria 05/27/2020    Calculus of ureter 05/27/2020    Cervical radiculopathy     Encounter for immunization 11/23/2019    Xerosis of skin 11/23/2019    Encounter for hepatitis C screening test for low risk patient 10/04/2019    Spondylosis of cervical region without myelopathy or radiculopathy     Neck pain     Complicated UTI (urinary tract infection) 09/01/2019    Anemia 09/01/2019    Irritable bowel syndrome 09/01/2019    Edema of both legs 09/01/2019    Prolonged QT interval 09/01/2019    Rash 09/01/2019    Screening mammogram, encounter for 09/01/2019    Screening for colon cancer 09/01/2019    Acute cystitis 08/29/2019    Renal calculus 08/29/2019    Migraine without aura and without status migrainosus, not intractable 07/29/2019    Migraine headache 06/25/2019    Fibromyalgia 06/25/2019    Pure hypercholesterolemia 06/25/2019    Allergy 06/25/2019    Low iron 06/25/2019    Idiopathic hypersomnia 06/25/2019    Osteoporosis 06/25/2019    Diverticulosis 06/25/2019    Chronic diarrhea 06/25/2019    Hypercholesterolemia 04/04/2019    Urge incontinence 01/11/2019    Frequency of urination 01/11/2019    History of hydronephrosis 01/11/2019    Irritable bowel syndrome without diarrhea 12/03/2015    Spondylosis of lumbar spine 02/19/2015    Senile osteoporosis 02/18/2014     She  has a past surgical history that includes Laminectomy; Knee surgery (Right); Cholecystectomy; Spinal fusion; Bladder surgery; Abdominal adhesion surgery; [place] tooth extraction; Mole removal; Other surgical history; Hysterectomy (1977); Oophorectomy (Bilateral, 1977); LAPAROSCOPY; pr repair ing hernia,5+y/o,reducibl (Right, 6/25/2020); Colonoscopy (08/06/2020); Upper gastrointestinal endoscopy (08/06/2020); pr cystourethroscopy,ureter catheter (Right, 12/2/2020); Lymph node dissection (Left); Arm skin lesion biopsy / excision; pr cysto/uretero w/lithotripsy &indwell stent insrt (Right, 1/5/2021); [place] retrograde pyelogram (1/5/2021); Joint replacement (Right); Hernia repair; pr fragment kidney stone/ eswl (Right, 3/25/2021); and Lymphadenectomy (1974)  Her family history includes Cancer in her father; Diabetes in her sister and sister; Heart attack in her mother; Hypertension in her father and mother; Nephrolithiasis in her brother; No Known Problems in her daughter, maternal aunt, maternal aunt, maternal aunt, maternal grandfather, maternal grandmother, paternal aunt, paternal aunt, paternal aunt, paternal grandfather, and paternal grandmother; Stroke in her brother  She  reports that she has been smoking cigarettes  She has a 23 50 pack-year smoking history   She has never used smokeless tobacco  She reports previous alcohol use  She reports previous drug use  Drug: Marijuana  Current Outpatient Medications   Medication Sig Dispense Refill    Armodafinil 250 MG tablet Take 1 tablet (250 mg total) by mouth daily 30 tablet 5    BACILLUS COAGULANS-INULIN PO Take 1 capsule by mouth daily Takes in the am      budesonide (ENTOCORT EC) 3 MG capsule Take 3 capsules (9 mg total) by mouth daily 90 capsule 3    calcium carbonate 1250 MG capsule Take 600 mg by mouth daily       Cholecalciferol 1000 units tablet Take 1,000 Units by mouth daily Takes in the afternoon      Citicoline Sodium 500 MG TABS Take 500 mg by mouth daily Takes in the am      Coenzyme Q10 200 MG/GM POWD Take 200 mg by mouth daily Takes in the am      diclofenac (VOLTAREN) 75 mg EC tablet       dicyclomine (BENTYL) 20 mg tablet Take 1 tablet (20 mg total) by mouth 4 (four) times a day as needed (abdominal pain) 240 tablet 3    diphenoxylate-atropine (LOMOTIL) 2 5-0 025 mg per tablet Take 1 tablet by mouth 4 (four) times a day as needed for diarrhea 90 tablet 1    DULoxetine (CYMBALTA) 30 mg delayed release capsule Take 3 capsules (90 mg total) by mouth daily 90 capsule 1    ferrous sulfate 325 (65 Fe) mg tablet Take 325 mg by mouth daily with breakfast       gabapentin (NEURONTIN) 300 mg capsule 300 mg at 1200 in addition to 600 mg at bedtime        Ginger, Zingiber officinalis, (GINGER ROOT PO) Take 700 mg by mouth 2 (two) times a day       Ginkgo Biloba 40 MG TABS Take 40 mg by mouth daily Take in the am      mesalamine (LIALDA) 1 2 g EC tablet Take 2 4 g by mouth daily with breakfast       metaxalone (SKELAXIN) 800 mg tablet Take 800 mg by mouth 3 (three) times a day       Multiple Vitamins-Minerals (CENTRUM SILVER PO) Take 1 tablet by mouth daily Takes in the am      omeprazole (PriLOSEC) 20 mg delayed release capsule Take 1 capsule (20 mg total) by mouth daily before breakfast 90 capsule 1    prochlorperazine (COMPAZINE) 10 mg tablet Take 1 tablet (10 mg total) by mouth every 6 (six) hours as needed for nausea or vomiting (Patient not taking: Reported on 9/30/2021) 12 tablet 1    rasagiline (AZILECT) 0 5 mg Take 1 tablet (0 5 mg total) by mouth daily 30 tablet 0    rosuvastatin (CRESTOR) 5 mg tablet Take 1 tablet (5 mg total) by mouth daily 90 tablet 0    solifenacin (VESICARE) 10 MG tablet Take 1 tablet (10 mg total) by mouth every evening 90 tablet 3    Turmeric (QC Tumeric Complex) 500 MG CAPS Take 700 mg by mouth 2 (two) times a day       ZOLMitriptan (ZOMIG) 2 5 MG tablet TAKE 1 TABLET BY MOUTH AT ONSET OF MIGRAINE, MAY REPEAT IN 2 HOURS IF NEEDED  LIMIT 2/24 HOURS, 4/WEEK, 8/MONTH 12 tablet 3     No current facility-administered medications for this visit  She is allergic to celecoxib, sumatriptan, tramadol, and medical tape            Objective:    Blood pressure 108/64, pulse 80, temperature 97 5 °F (36 4 °C), height 5' 3" (1 6 m), weight 52 2 kg (115 lb), not currently breastfeeding  Body mass index is 20 37 kg/m²  Physical Exam    Neurological Exam  Vital signs reviewed  Well developed, well nourished  Head: Normocephalic, atraumatic  Speech is fluent and articulate  Neck: Neck flexors 5/5  CN 2-12: intact and symmetric, including EOMs which are normal b/l and PERRL  MSK: 5/5 t/o  ROM normal x all 4 extr  No rigidity bilateral elbows  Sensation: Inact to LT x4 extr  Romberg borderline positive  Reflexes: 2+ and symmetric in all 4 extr  Coordination:   Mild intention tremor at the endpoint of action in both upper extremities, equal bilaterally  Mild rest tremor noted in the left foot  The patient has adequate coordination with heel tapping bilaterally  Coordination is better with finger tapping and moving the hand back and forth in the right versus left upper extremity, mild decrement of movements in the left upper extremity  Gait: Steady normal gait      ROS:    Review of Systems   Constitutional: Negative for chills and fever  HENT: Negative for ear pain and sore throat  Eyes: Negative for pain and visual disturbance  Respiratory: Negative for cough and shortness of breath  Cardiovascular: Negative for chest pain and palpitations  Gastrointestinal: Negative for abdominal pain and vomiting  Genitourinary: Negative for dysuria and hematuria  Musculoskeletal: Positive for neck pain  Negative for arthralgias and back pain  Skin: Negative for color change and rash  Neurological: Positive for tremors and headaches  Negative for seizures and syncope  All other systems reviewed and are negative  The following portions of the patient's history were reviewed and updated as appropriate: allergies, current medications/ medication history, past family history, past medical history, past social history, past surgical history and problem list     Review of systems was reviewed and otherwise unremarkable from a neurological perspective

## 2022-01-16 PROBLEM — G56.21 ULNAR NEUROPATHY OF RIGHT UPPER EXTREMITY: Status: ACTIVE | Noted: 2022-01-16

## 2022-01-16 PROBLEM — R20.2 ARM PARESTHESIA, RIGHT: Status: ACTIVE | Noted: 2022-01-16

## 2022-01-16 PROBLEM — G56.11 RIGHT MEDIAN NERVE NEUROPATHY: Status: ACTIVE | Noted: 2022-01-16

## 2022-01-17 ENCOUNTER — TELEPHONE (OUTPATIENT)
Dept: NEUROLOGY | Facility: CLINIC | Age: 62
End: 2022-01-17

## 2022-01-17 NOTE — TELEPHONE ENCOUNTER
Lmom for pt to call back and schedule appt in 3-4 months with [facility], and next available for Dr Adryan Ba

## 2022-01-17 NOTE — TELEPHONE ENCOUNTER
----- Message from Lizbeth Valenzuela PA-C sent at 1/16/2022 12:23 PM EST -----  Regarding: f/u   staff left prior to pt checking out, please reschedule when you can call her  3-4 mo [facility]  Next available Dr Shara Golden (new to Dr Terry Couch)  Thanks

## 2022-01-18 ENCOUNTER — OFFICE VISIT (OUTPATIENT)
Dept: GASTROENTEROLOGY | Facility: MEDICAL CENTER | Age: 62
End: 2022-01-18
Payer: COMMERCIAL

## 2022-01-18 VITALS
WEIGHT: 115 LBS | DIASTOLIC BLOOD PRESSURE: 82 MMHG | HEART RATE: 70 BPM | BODY MASS INDEX: 20.37 KG/M2 | SYSTOLIC BLOOD PRESSURE: 117 MMHG | TEMPERATURE: 96.3 F

## 2022-01-18 DIAGNOSIS — K52.9 CHRONIC DIARRHEA: Primary | ICD-10-CM

## 2022-01-18 DIAGNOSIS — K58.0 IRRITABLE BOWEL SYNDROME WITH DIARRHEA: ICD-10-CM

## 2022-01-18 DIAGNOSIS — R10.31 RIGHT LOWER QUADRANT ABDOMINAL PAIN: ICD-10-CM

## 2022-01-18 DIAGNOSIS — K57.90 DIVERTICULOSIS: ICD-10-CM

## 2022-01-18 DIAGNOSIS — K52.9 COLITIS: ICD-10-CM

## 2022-01-18 PROCEDURE — 99213 OFFICE O/P EST LOW 20 MIN: CPT | Performed by: INTERNAL MEDICINE

## 2022-01-18 PROCEDURE — 4004F PT TOBACCO SCREEN RCVD TLK: CPT | Performed by: INTERNAL MEDICINE

## 2022-01-18 NOTE — PATIENT INSTRUCTIONS
Low Fiber Diet   WHAT YOU NEED TO KNOW:   A low-fiber diet limits foods that are high in fiber  Fiber is the part of fruits, vegetables, and grains that is not broken down by your body  You may need to follow this diet after surgery on your intestines  You may also need to follow this diet for certain conditions such as Crohn disease or ulcerative colitis  Ask your healthcare provider or dietitian how much fiber you can have each day  DISCHARGE INSTRUCTIONS:   Foods to include:  Read food labels to check the amount of fiber that are found in foods  Some foods that are low in fiber include the following:  · Grains:  Choose grains that have less than 2 grams of fiber in each serving  Examples include the following:    ? Cream of wheat and finely ground grits    ? Dry cereal made from rice    ? White bread, white pasta, and white rice    ? Crackers, bagels, and rolls made from white or refined flour    · Other foods:      ? Canned and well-cooked fruit without skins or seeds, and juice without pulp    ? Ripe bananas and melons    ? Canned and well-cooked vegetables without skins or seeds, and vegetable juice    ? Cow's milk, lactose-free milk, soy milk, and rice milk    ? Yogurt without nuts, fruit, or granola    ? Eggs, poultry (such as chicken and turkey), fish, and tender, ground, well-cooked beef    ? Tofu and smooth peanut butter    ?  Broth and strained soups made of low-fiber foods    Foods to avoid:   · Breads, cereals, crackers, and pasta made with whole wheat or whole grains (such as whole oats)    · [employer], wild rice, quinoa, kasha, and barley    · All fresh fruit with skin, except banana and melons    · Dried fruits and fruit juice with pulp    · Canned pineapple    · Raw vegetables    · Nuts, seeds, and popcorn    · Beans, nuts, peas, and lentils    · Tough meats    · Coconut and avocado    What else you should know about a low-fiber diet:  A low-fiber diet can decrease the amount of bowel movements you have  Drink liquids as directed to avoid constipation  Ask how much liquid to drink each day and which liquids are best for you  © Copyright [employer] 2021 Information is for End User's use only and may not be sold, redistributed or otherwise used for commercial purposes  All illustrations and images included in CareNotes® are the copyrighted property of [employer]  or [employer]   The above information is an  only  It is not intended as medical advice for individual conditions or treatments  Talk to your doctor, nurse or pharmacist before following any medical regimen to see if it is safe and effective for you

## 2022-01-18 NOTE — PROGRESS NOTES
[facility] - Outpatient Follow-up Note  Jamie Oneill 59 y o  female MRN: [id number]  Encounter: [id number]          ASSESSMENT AND PLAN:    Jamie Oneill is a 59 y o  female who presents with complaint of chronic diarrhea with colonoscopy showing microscopic colitis as well as some chronic enteritis  Overall diarrhea much improved  She does have occasional RLQ abdominal pain (at site of prior SBO) and some heartburn  Colonoscopy in October with diverticulosis, and colonic erythema, erythematous ileum; biopsy showed microscopic colitis some chronic nonspecific inflammation in the terminal ileum  CMP normal including creatinine  CBC with white blood cell count of 5 77, hemoglobin of 10 9 and platelets of 692  Last CRP 3      1  Chronic diarrhea    2  Colitis    3  Diverticulosis    4  Irritable bowel syndrome with diarrhea    5  Right lower quadrant abdominal pain        No orders of the defined types were placed in this encounter  Low residue diet  Budesonide PRN  Stop Lialda for now   Repeat CBC and iron studies ordered  Consider EGD (last performed in 2020) or VCE  Consider stopping Cymablata and NSAIDs in the future  Continue omeprazole  ______________________________________________________________________    SUBJECTIVE:    Jamie Oneill is a 59 y o  female who presents with complaint of microscopic colitis  She is well overall and things have settled down  The budesonide works better and she only has to take 3 pills and it helps  She wants to stop the mesalamine  She has a solid stool followed by loose stools (like a blockage released in terms of the flow), with 1-2 stools per day without blood or black stools  She is anemic and is on iron  Occasional urgency but not routinely  Some tremors and she follows with neurologist    Some heartburn (possibly from medications and omeprazole helps), dysphagia, odynophagia, nausea, vomiting   She has some right sided abdominal pain  Prior gallbladder removed  Pain occurs once a week and lasts for 1/2 hour and then it improves  No weight loss (up and down but overall stable)  REVIEW OF SYSTEMS IS OTHERWISE NEGATIVE    10 point ROS reviewed and negative, except as above      Historical Information   Past Medical History:   Diagnosis Date    Allergy to dust     Anemia     Calculus of ureter     Chronic pain disorder     neck and back- sees pain management    Colitis     Colon polyp     Cracked skin     on fingers    Diverticulosis     Dysphagia     GERD (gastroesophageal reflux disease)     H/O: GI bleed     Hiatal hernia     History of DVT in adulthood 2019 2019    Hydronephrosis     Hyperlipidemia     Inguinal hernia     right side    Irregular heart beat     Pt states with prolonged QT interval - was seen by cardiologist( [facility]) pt told "to not worry about it"    Irritable bowel     Kidney stone     Migraines     Narcolepsy     Narcolepsy 08/06/2020    OAB (overactive bladder)     Osteoarthritis     Osteoporosis     Other chronic pain     degenerative disc disease    PONV (postoperative nausea and vomiting)     migraines    Smoking 8/6/2020    Tobacco abuse     Wears dentures     full upper    Wears glasses     Wears glasses      Past Surgical History:   Procedure Laterality Date    ABDOMINAL ADHESION SURGERY      ARM SKIN LESION BIOPSY / EXCISION      lymph node excision    BLADDER SURGERY      CHOLECYSTECTOMY      COLONOSCOPY  08/06/2020    FL RETROGRADE PYELOGRAM  1/5/2021    HERNIA REPAIR      HYSTERECTOMY  1977    JOINT REPLACEMENT Right     knee    KNEE SURGERY Right     replacement    LAMINECTOMY      L5-6-7    LAPAROSCOPY      LYMPH NODE DISSECTION Left     removed -no cancer-  limb restriction    LYMPHADENECTOMY  1974    UNDER LEFT ARM - DO NOT DRAW BLOOD/GIVE INJECTIONS IN LEFT ARM PER PATIENT     MOLE REMOVAL      OOPHORECTOMY Bilateral 1977    OTHER SURGICAL HISTORY enlarged lymph node removed left arm   no cancer    VA CYSTO/URETERO W/LITHOTRIPSY &INDWELL STENT INSRT Right 1/5/2021    Procedure: CYSTO, LASER  URETEROSCOPY, RETRO PYELOGRAM, STENT REPLACMENT;  Surgeon: Jing James MD;  [facility];  Service: Urology    VA CYSTOURETHROSCOPY,URETER CATHETER Right 12/2/2020    Procedure: CYSTOSCOPY AND INSERTION STENT URETERAL;  Surgeon: Jovani Saavedra MD;  [facility];  Service: Urology    [place] ESWL Right 3/25/2021    Procedure: LITHOTRIPSY EXTRACORPORAL SHOCKWAVE (ESWL);   Surgeon: Keith Garsia MD;  Location: [address] [place] [address] MAIN OR;  Service: Urology    [address] [facility] HERNIA,5+Y/O,REDUCIBL Right 6/25/2020    Procedure: REPAIR HERNIA INGUINAL  WITH MESH;  Surgeon: Divya Dodd MD;  [facility];  Service: General    SPINAL FUSION      UPPER GASTROINTESTINAL ENDOSCOPY  08/06/2020    WISDOM TOOTH EXTRACTION       Social History   Social History     Substance and Sexual Activity   Alcohol Use Not Currently    Comment: seldom     Social History     Substance and Sexual Activity   Drug Use Not Currently    Types: Marijuana    Comment: medicinal marijuana; last dose was 3/16     Social History     Tobacco Use   Smoking Status Current Every Day Smoker    Packs/day: 0 50    Years: 47 00    Pack years: 23 50    Types: Cigarettes   Smokeless Tobacco Never Used   Tobacco Comment    pt states is at 1/2 pk day currently - was previously at one pack /day     Family History   Problem Relation Age of Onset    Hypertension Father     Cancer Father     Hypertension Mother     Heart attack Mother     Stroke Brother     Nephrolithiasis Brother     Diabetes Sister     No Known Problems Daughter     No Known Problems Maternal Grandmother     No Known Problems Maternal Grandfather     No Known Problems Paternal Grandmother     No Known Problems Paternal Grandfather     Diabetes Sister     No Known Problems Maternal Aunt     No Known Problems Maternal Aunt     No Known Problems Maternal Aunt     No Known Problems Paternal Aunt     No Known Problems Paternal Aunt     No Known Problems Paternal Aunt        Meds/Allergies       Current Outpatient Medications:     Armodafinil 250 MG tablet    BACILLUS COAGULANS-INULIN PO    budesonide (ENTOCORT EC) 3 MG capsule    calcium carbonate 1250 MG capsule    Cholecalciferol 1000 units tablet    Citicoline Sodium 500 MG TABS    diclofenac (VOLTAREN) 75 mg EC tablet    dicyclomine (BENTYL) 20 mg tablet    diphenoxylate-atropine (LOMOTIL) 2 5-0 025 mg per tablet    DULoxetine (CYMBALTA) 30 mg delayed release capsule    ferrous sulfate 325 (65 Fe) mg tablet    gabapentin (NEURONTIN) 300 mg capsule    Lori, Zingiber officinalis, (LORI ROOT PO)    Ginkgo Biloba 40 MG TABS    Multiple Vitamins-Minerals (CENTRUM SILVER PO)    omeprazole (PriLOSEC) 20 mg delayed release capsule    rasagiline (AZILECT) 0 5 mg    rosuvastatin (CRESTOR) 5 mg tablet    solifenacin (VESICARE) 10 MG tablet    Turmeric (QC Tumeric Complex) 500 MG CAPS    ZOLMitriptan (ZOMIG) 2 5 MG tablet    Coenzyme Q10 200 MG/GM POWD    mesalamine (LIALDA) 1 2 g EC tablet    metaxalone (SKELAXIN) 800 mg tablet    prochlorperazine (COMPAZINE) 10 mg tablet    Allergies   Allergen Reactions    Celecoxib Other (See Comments)     Increased Heart Rate, Palpitations    Sumatriptan Anaphylaxis     Other reaction(s): SUMATRIPTAN (IMITREX) (Throat closure)  Pt analilia zolmitriptan   Tramadol Other (See Comments)     GI Upset- severe vomiting and systemic body aches    Medical Tape Dermatitis, Rash and Blisters     Adhesive from medication patches and bandaides- ok with paper tape           Objective     Blood pressure 117/82, pulse 70, temperature (!) 96 3 °F (35 7 °C), weight 52 2 kg (115 lb), not currently breastfeeding  Body mass index is 20 37 kg/m²        PHYSICAL EXAMINATION:    General Appearance:   Alert, cooperative, no distress   HEENT:  Normocephalic, atraumatic, anicteric  Neck supple, symmetrical, trachea midline  Lungs:   Equal chest rise and unlabored breathing, normal effort, no coughing  Cardiovascular:   No visualized JVD  Abdomen:   No abdominal distension  Skin:   No jaundice, rashes, or lesions  Musculoskeletal:   Normal range of motion visualized  Psych:  Normal affect and normal insight  Neuro:  Alert and appropriate  Lab Results:   No visits with results within 1 Day(s) from this visit     Latest known visit with results is:   Appointment on 12/22/2021   Component Date Value    WBC 12/22/2021 5 77     RBC 12/22/2021 3 43*    Hemoglobin 12/22/2021 10 9*    Hematocrit 12/22/2021 33 3*    MCV 12/22/2021 97     MCH 12/22/2021 31 8     MCHC 12/22/2021 32 7     RDW 12/22/2021 13 3     MPV 12/22/2021 9 8     Platelets 38/72/7258 246     nRBC 12/22/2021 0     Neutrophils Relative 12/22/2021 67     Immat GRANS % 12/22/2021 1     Lymphocytes Relative 12/22/2021 22     Monocytes Relative 12/22/2021 5     Eosinophils Relative 12/22/2021 4     Basophils Relative 12/22/2021 1     Neutrophils Absolute 12/22/2021 3 89     Immature Grans Absolute 12/22/2021 0 03     Lymphocytes Absolute 12/22/2021 1 26     Monocytes Absolute 12/22/2021 0 30     Eosinophils Absolute 12/22/2021 0 23     Basophils Absolute 12/22/2021 0 06     Sodium 12/22/2021 136     Potassium 12/22/2021 3 6     Chloride 12/22/2021 106     CO2 12/22/2021 26     ANION GAP 12/22/2021 4     BUN 12/22/2021 19     Creatinine 12/22/2021 0 72     Glucose 12/22/2021 73     Calcium 12/22/2021 9 0     eGFR 12/22/2021 89        Lab Results   Component Value Date    WBC 5 77 12/22/2021    HGB 10 9 (L) 12/22/2021    HCT 33 3 (L) 12/22/2021    MCV 97 12/22/2021     12/22/2021       Lab Results   Component Value Date    SODIUM 136 12/22/2021    K 3 6 12/22/2021     12/22/2021    CO2 26 12/22/2021    AGAP 4 12/22/2021 BUN 19 12/22/2021    CREATININE 0 72 12/22/2021    GLUC 73 12/22/2021    GLUF 101 (H) 04/15/2021    CALCIUM 9 0 12/22/2021    AST 21 09/24/2021    ALT 24 09/24/2021    ALKPHOS 94 09/24/2021    TP 7 3 09/24/2021    TBILI 0 48 09/24/2021    EGFR 89 12/22/2021       Lab Results   Component Value Date    CRP <3 0 04/19/2021       Lab Results   Component Value Date    SMD9KJCTRJYL 2 150 04/15/2021       Lab Results   Component Value Date    IRON 80 12/27/2020    TIBC 185 (L) 12/27/2020    FERRITIN 213 12/27/2020       Radiology Results:   No results found

## 2022-02-10 ENCOUNTER — APPOINTMENT (EMERGENCY)
Dept: RADIOLOGY | Facility: HOSPITAL | Age: 62
End: 2022-02-10
Payer: COMMERCIAL

## 2022-02-10 ENCOUNTER — TELEMEDICINE (OUTPATIENT)
Dept: FAMILY MEDICINE CLINIC | Facility: CLINIC | Age: 62
End: 2022-02-10
Payer: COMMERCIAL

## 2022-02-10 ENCOUNTER — HOSPITAL ENCOUNTER (EMERGENCY)
Facility: HOSPITAL | Age: 62
Discharge: HOME | End: 2022-02-10
Attending: EMERGENCY MEDICINE
Payer: COMMERCIAL

## 2022-02-10 VITALS
TEMPERATURE: 98.6 F | WEIGHT: 109.13 LBS | HEART RATE: 63 BPM | DIASTOLIC BLOOD PRESSURE: 68 MMHG | OXYGEN SATURATION: 96 % | BODY MASS INDEX: 19.33 KG/M2 | SYSTOLIC BLOOD PRESSURE: 119 MMHG | RESPIRATION RATE: 17 BRPM

## 2022-02-10 VITALS — TEMPERATURE: 97.1 F

## 2022-02-10 DIAGNOSIS — M54.9 BACK PAIN: Primary | ICD-10-CM

## 2022-02-10 DIAGNOSIS — M54.9 UPPER BACK PAIN: ICD-10-CM

## 2022-02-10 DIAGNOSIS — Z20.822 EXPOSURE TO COVID-19 VIRUS: ICD-10-CM

## 2022-02-10 DIAGNOSIS — R05.9 COUGH: ICD-10-CM

## 2022-02-10 DIAGNOSIS — A08.4 VIRAL GASTROENTERITIS: ICD-10-CM

## 2022-02-10 DIAGNOSIS — R06.02 SHORTNESS OF BREATH: Primary | ICD-10-CM

## 2022-02-10 LAB
ALBUMIN SERPL BCP-MCNC: 3.5 G/DL (ref 3.5–5)
ALP SERPL-CCNC: 83 U/L (ref 46–116)
ALT SERPL W P-5'-P-CCNC: 22 U/L (ref 12–78)
ANION GAP SERPL CALCULATED.3IONS-SCNC: 9 MMOL/L (ref 4–13)
AST SERPL W P-5'-P-CCNC: 16 U/L (ref 5–45)
ATRIAL RATE: 59 BPM
BASOPHILS # BLD AUTO: 0.05 THOUSANDS/ΜL (ref 0–0.1)
BASOPHILS NFR BLD AUTO: 1 % (ref 0–1)
BILIRUB SERPL-MCNC: 0.28 MG/DL (ref 0.2–1)
BILIRUB UR QL STRIP: NEGATIVE
BUN SERPL-MCNC: 28 MG/DL (ref 5–25)
CALCIUM SERPL-MCNC: 9.5 MG/DL (ref 8.3–10.1)
CHLORIDE SERPL-SCNC: 103 MMOL/L (ref 100–108)
CLARITY UR: CLEAR
CO2 SERPL-SCNC: 25 MMOL/L (ref 21–32)
COLOR UR: YELLOW
CREAT SERPL-MCNC: 0.83 MG/DL (ref 0.6–1.3)
EOSINOPHIL # BLD AUTO: 0.19 THOUSAND/ΜL (ref 0–0.61)
EOSINOPHIL NFR BLD AUTO: 3 % (ref 0–6)
ERYTHROCYTE [DISTWIDTH] IN BLOOD BY AUTOMATED COUNT: 12.3 % (ref 11.6–15.1)
GFR SERPL CREATININE-BSD FRML MDRD: 75 ML/MIN/1.73SQ M
GLUCOSE SERPL-MCNC: 100 MG/DL (ref 65–140)
GLUCOSE UR STRIP-MCNC: NEGATIVE MG/DL
HCT VFR BLD AUTO: 39 % (ref 34.8–46.1)
HGB BLD-MCNC: 13.1 G/DL (ref 11.5–15.4)
HGB UR QL STRIP.AUTO: NEGATIVE
IMM GRANULOCYTES # BLD AUTO: 0.03 THOUSAND/UL (ref 0–0.2)
IMM GRANULOCYTES NFR BLD AUTO: 0 % (ref 0–2)
KETONES UR STRIP-MCNC: NEGATIVE MG/DL
LEUKOCYTE ESTERASE UR QL STRIP: NEGATIVE
LIPASE SERPL-CCNC: 238 U/L (ref 73–393)
LYMPHOCYTES # BLD AUTO: 2.11 THOUSANDS/ΜL (ref 0.6–4.47)
LYMPHOCYTES NFR BLD AUTO: 28 % (ref 14–44)
MCH RBC QN AUTO: 31.9 PG (ref 26.8–34.3)
MCHC RBC AUTO-ENTMCNC: 33.6 G/DL (ref 31.4–37.4)
MCV RBC AUTO: 95 FL (ref 82–98)
MONOCYTES # BLD AUTO: 0.45 THOUSAND/ΜL (ref 0.17–1.22)
MONOCYTES NFR BLD AUTO: 6 % (ref 4–12)
NEUTROPHILS # BLD AUTO: 4.65 THOUSANDS/ΜL (ref 1.85–7.62)
NEUTS SEG NFR BLD AUTO: 62 % (ref 43–75)
NITRITE UR QL STRIP: NEGATIVE
NRBC BLD AUTO-RTO: 0 /100 WBCS
P AXIS: 73 DEGREES
PH UR STRIP.AUTO: 5.5 [PH] (ref 4.5–8)
PLATELET # BLD AUTO: 297 THOUSANDS/UL (ref 149–390)
PMV BLD AUTO: 9.7 FL (ref 8.9–12.7)
POTASSIUM SERPL-SCNC: 4.1 MMOL/L (ref 3.5–5.3)
PR INTERVAL: 144 MS
PROT SERPL-MCNC: 6.7 G/DL (ref 6.4–8.2)
PROT UR STRIP-MCNC: NEGATIVE MG/DL
QRS AXIS: 47 DEGREES
QRSD INTERVAL: 98 MS
QT INTERVAL: 402 MS
QTC INTERVAL: 397 MS
RBC # BLD AUTO: 4.11 MILLION/UL (ref 3.81–5.12)
SODIUM SERPL-SCNC: 137 MMOL/L (ref 136–145)
SP GR UR STRIP.AUTO: >=1.03 (ref 1–1.03)
T WAVE AXIS: 57 DEGREES
UROBILINOGEN UR QL STRIP.AUTO: 0.2 E.U./DL
VENTRICULAR RATE: 59 BPM
WBC # BLD AUTO: 7.48 THOUSAND/UL (ref 4.31–10.16)

## 2022-02-10 PROCEDURE — 85025 COMPLETE CBC W/AUTO DIFF WBC: CPT

## 2022-02-10 PROCEDURE — 83690 ASSAY OF LIPASE: CPT

## 2022-02-10 PROCEDURE — 93010 ELECTROCARDIOGRAM REPORT: CPT | Performed by: INTERNAL MEDICINE

## 2022-02-10 PROCEDURE — 96366 THER/PROPH/DIAG IV INF ADDON: CPT

## 2022-02-10 PROCEDURE — 80053 COMPREHEN METABOLIC PANEL: CPT

## 2022-02-10 PROCEDURE — 96365 THER/PROPH/DIAG IV INF INIT: CPT

## 2022-02-10 PROCEDURE — 96375 TX/PRO/DX INJ NEW DRUG ADDON: CPT

## 2022-02-10 PROCEDURE — 93005 ELECTROCARDIOGRAM TRACING: CPT

## 2022-02-10 PROCEDURE — 81003 URINALYSIS AUTO W/O SCOPE: CPT

## 2022-02-10 PROCEDURE — 99285 EMERGENCY DEPT VISIT HI MDM: CPT

## 2022-02-10 PROCEDURE — 4004F PT TOBACCO SCREEN RCVD TLK: CPT | Performed by: FAMILY MEDICINE

## 2022-02-10 PROCEDURE — 71046 X-RAY EXAM CHEST 2 VIEWS: CPT

## 2022-02-10 PROCEDURE — 36415 COLL VENOUS BLD VENIPUNCTURE: CPT

## 2022-02-10 PROCEDURE — 99214 OFFICE O/P EST MOD 30 MIN: CPT | Performed by: FAMILY MEDICINE

## 2022-02-10 PROCEDURE — 99285 EMERGENCY DEPT VISIT HI MDM: CPT | Performed by: EMERGENCY MEDICINE

## 2022-02-10 RX ORDER — SODIUM CHLORIDE, SODIUM GLUCONATE, SODIUM ACETATE, POTASSIUM CHLORIDE, MAGNESIUM CHLORIDE, SODIUM PHOSPHATE, DIBASIC, AND POTASSIUM PHOSPHATE .53; .5; .37; .037; .03; .012; .00082 G/100ML; G/100ML; G/100ML; G/100ML; G/100ML; G/100ML; G/100ML
1000 INJECTION, SOLUTION INTRAVENOUS ONCE
Status: COMPLETED | OUTPATIENT
Start: 2022-02-10 | End: 2022-02-10

## 2022-02-10 RX ORDER — KETOROLAC TROMETHAMINE 30 MG/ML
15 INJECTION, SOLUTION INTRAMUSCULAR; INTRAVENOUS ONCE
Status: COMPLETED | OUTPATIENT
Start: 2022-02-10 | End: 2022-02-10

## 2022-02-10 RX ADMIN — SODIUM CHLORIDE, SODIUM GLUCONATE, SODIUM ACETATE, POTASSIUM CHLORIDE, MAGNESIUM CHLORIDE, SODIUM PHOSPHATE, DIBASIC, AND POTASSIUM PHOSPHATE 1000 ML: .53; .5; .37; .037; .03; .012; .00082 INJECTION, SOLUTION INTRAVENOUS at 20:10

## 2022-02-10 RX ADMIN — KETOROLAC TROMETHAMINE 15 MG: 30 INJECTION, SOLUTION INTRAMUSCULAR at 21:18

## 2022-02-10 NOTE — Clinical Note
Sravanthi Haddad was seen and treated in our emergency department on 2/10/2022  Diagnosis:     Bridget Maria  may return to work on return date  She may return on this date: 02/14/2022         If you have any questions or concerns, please don't hesitate to call        Alex Andre MD    ______________________________           _______________          _______________  Hospital Representative                              Date                                Time

## 2022-02-10 NOTE — PROGRESS NOTES
COVID-19 Outpatient Progress Note    Assessment/Plan:    Problem List Items Addressed This Visit     None      Visit Diagnoses     Shortness of breath    -  Primary    Relevant Orders    Transfer to other facility (Completed)    Upper back pain        between shoulder blades    Relevant Orders    Transfer to other facility (Completed)    Cough        Relevant Orders    Transfer to other facility (Completed)    Exposure to COVID-19 virus        Relevant Orders    Transfer to other facility (Completed)         Disposition:     Patient to follow post ER    I have spent 13 minutes directly with the patient  Encounter provider Karol Crouch MD    Provider located at [address] [place] [address]  [address] [facility] [id number] [phone]    Recent Visits  No visits were found meeting these conditions  Showing recent visits within past 7 days and meeting all other requirements  Today's Visits  Date Type Provider Dept   02/10/22 Telemedicine Karol Crouch MD [facility]   Showing today's visits and meeting all other requirements  Future Appointments  No visits were found meeting these conditions  Showing future appointments within next 150 days and meeting all other requirements     This virtual check-in was done via [employer] and patient was informed that this is a secure, HIPAA-compliant platform  She agrees to proceed  Patient agrees to participate in a virtual check in via telephone or video visit instead of presenting to the office to address urgent/immediate medical needs  Patient is aware this is a billable service  After connecting through [facility], the patient was identified by name and date of birth  Jeffrey Chaudhari was informed that this was a telemedicine visit and that the exam was being conducted confidentially over secure lines  No one else was in the room   Jeffrey Chaudhari acknowledged consent and understanding of privacy and security of the telemedicine visit  I informed the patient that I have reviewed her record in Epic and presented the opportunity for her to ask any questions regarding the visit today  The patient agreed to participate  Verification of patient location:  Patient is located in the following state in which I hold an active license: [place]    Subjective: Jeffrey Chaudhari is a 59 y o  female who is concerned about COVID-19  Patient's symptoms include chills, fatigue, malaise, cough, shortness of breath, vomiting, diarrhea and myalgias  Patient denies fever, congestion, rhinorrhea, sore throat, anosmia, loss of taste, chest tightness, abdominal pain, nausea and headaches  - Date of symptom onset: 2/5/2022      COVID-19 vaccination status: Fully vaccinated with booster    Exposure:   Contact with a person who is under investigation (PUI) for or who is positive for COVID-19 within the last 14 days?: Yes    Hospitalized recently for fever and/or lower respiratory symptoms?: No      Currently a healthcare worker that is involved in direct patient care?: No      Works in a special setting where the risk of COVID-19 transmission may be high? (this may include long-term care, correctional and [facility] facilities; homeless shelters; assisted-living facilities and group homes ): No      Resident in a special setting where the risk of COVID-19 transmission may be high? (this may include long-term care, correctional and [facility] facilities; homeless shelters; assisted-living facilities and group homes ): No        Vomiting stopped on Monday  Diarrhea stopped yesterday  Temperature at home is range from 97-98  Three days ago developed upper back pain between shoulder blades    persistent , dull , on scale 1-10 is  6-7 , also SOB with exertion, moderate   Denied chest pain or hemoptysis  Patient stated at work the also many people with Elvinmarla    She knows last week she was in touch with co-worker who had COVID  Patient had home COVID yesterday was negative    Lab Results   Component Value Date    SARSCOV2 Negative 01/13/2021    SARSCOV2 Not Detected 12/30/2020     Past Medical History:   Diagnosis Date    Allergy to dust     Anemia     Calculus of ureter     Chronic pain disorder     neck and back- sees pain management    Colitis     Colon polyp     Cracked skin     on fingers    Diverticulosis     Dysphagia     GERD (gastroesophageal reflux disease)     H/O: GI bleed     Hiatal hernia     History of DVT in adulthood 2019 2019    Hydronephrosis     Hyperlipidemia     Inguinal hernia     right side    Irregular heart beat     Pt states with prolonged QT interval - was seen by cardiologist( [facility]) pt told "to not worry about it"    Irritable bowel     Kidney stone     Migraines     Narcolepsy     Narcolepsy 08/06/2020    OAB (overactive bladder)     Osteoarthritis     Osteoporosis     Other chronic pain     degenerative disc disease    PONV (postoperative nausea and vomiting)     migraines    Smoking 8/6/2020    Tobacco abuse     Wears dentures     full upper    Wears glasses     Wears glasses      Past Surgical History:   Procedure Laterality Date    ABDOMINAL ADHESION SURGERY      ARM SKIN LESION BIOPSY / EXCISION      lymph node excision    BLADDER SURGERY      CHOLECYSTECTOMY      COLONOSCOPY  08/06/2020    FL RETROGRADE PYELOGRAM  1/5/2021    HERNIA REPAIR      HYSTERECTOMY  1977    JOINT REPLACEMENT Right     knee    KNEE SURGERY Right     replacement    LAMINECTOMY      L5-6-7    LAPAROSCOPY      LYMPH NODE DISSECTION Left     removed -no cancer-  limb restriction    LYMPHADENECTOMY  1974    UNDER LEFT ARM - DO NOT DRAW BLOOD/GIVE INJECTIONS IN LEFT ARM PER PATIENT     MOLE REMOVAL      OOPHORECTOMY Bilateral 1977    OTHER SURGICAL HISTORY      enlarged lymph node removed left arm   no cancer    [place] CYSTO/URETERO W/LITHOTRIPSY &INDWELL STENT INSRT Right 1/5/2021    Procedure: Nevaeh Palma URETEROSCOPY, RETRO PYELOGRAM, STENT REPLACMENT;  Surgeon: Zenaida Valenzuela MD;  [facility];  Service: Urology    VA CYSTOURETHROSCOPY,URETER CATHETER Right 12/2/2020    Procedure: CYSTOSCOPY AND INSERTION STENT URETERAL;  Surgeon: Iwona Kumari MD;  [facility];  Service: Urology    [facility] ESWL Right 3/25/2021    Procedure: LITHOTRIPSY EXTRACORPORAL SHOCKWAVE (ESWL); Surgeon: Ferrel Boxer, MD;  Location: [facility] MA[facility] OR;  Service: Urology    VA REPAIR ING HERNIA,5+Y/O,REDUCIBL Right 6/25/2020    Procedure: REPAIR HERNIA INGUINAL  WITH MESH;  Surgeon: Madie Vargas MD;  [facility];  Service: General    SPINAL FUSION      UPPER GASTROINTESTINAL ENDOSCOPY  08/06/2020    WISDOM TOOTH EXTRACTION       Current Outpatient Medications   Medication Sig Dispense Refill    Armodafinil 250 MG tablet Take 1 tablet (250 mg total) by mouth daily 30 tablet 5    BACILLUS COAGULANS-INULIN PO Take 1 capsule by mouth daily Takes in the am      budesonide (ENTOCORT EC) 3 MG capsule Take 3 capsules (9 mg total) by mouth daily 90 capsule 3    calcium carbonate 1250 MG capsule Take 600 mg by mouth daily       Cholecalciferol 1000 units tablet Take 1,000 Units by mouth daily Takes in the afternoon      Citicoline Sodium 500 MG TABS Take 500 mg by mouth daily Takes in the am      diclofenac (VOLTAREN) 75 mg EC tablet       dicyclomine (BENTYL) 20 mg tablet Take 1 tablet (20 mg total) by mouth 4 (four) times a day as needed (abdominal pain) 240 tablet 3    DULoxetine (CYMBALTA) 30 mg delayed release capsule Take 3 capsules (90 mg total) by mouth daily 90 capsule 1    ferrous sulfate 325 (65 Fe) mg tablet Take 325 mg by mouth daily with breakfast       gabapentin (NEURONTIN) 300 mg capsule 300 mg at 1200 in addition to 600 mg at bedtime        Ginger, Zingiber officinalis, (GINGER ROOT PO) Take 700 mg by mouth 2 (two) times a day       Ginkgo Biloba 40 MG TABS Take 40 mg by mouth daily Take in the am      metaxalone (SKELAXIN) 800 mg tablet Take 800 mg by mouth 3 (three) times a day       Multiple Vitamins-Minerals (CENTRUM SILVER PO) Take 1 tablet by mouth daily Takes in the am      omeprazole (PriLOSEC) 20 mg delayed release capsule Take 1 capsule (20 mg total) by mouth daily before breakfast 90 capsule 1    prochlorperazine (COMPAZINE) 10 mg tablet Take 1 tablet (10 mg total) by mouth every 6 (six) hours as needed for nausea or vomiting 12 tablet 1    rosuvastatin (CRESTOR) 5 mg tablet Take 1 tablet (5 mg total) by mouth daily 90 tablet 0    solifenacin (VESICARE) 10 MG tablet Take 1 tablet (10 mg total) by mouth every evening 90 tablet 3    Turmeric (QC Tumeric Complex) 500 MG CAPS Take 700 mg by mouth 2 (two) times a day       ZOLMitriptan (ZOMIG) 2 5 MG tablet TAKE 1 TABLET BY MOUTH AT ONSET OF MIGRAINE, MAY REPEAT IN 2 HOURS IF NEEDED  LIMIT 2/24 HOURS, 4/WEEK, 8/MONTH 12 tablet 3    Coenzyme Q10 200 MG/GM POWD Take 200 mg by mouth daily Takes in the am      mesalamine (LIALDA) 1 2 g EC tablet Take 2 4 g by mouth daily with breakfast  (Patient not taking: Reported on 1/18/2022 )      rasagiline (AZILECT) 0 5 mg Take 1 tablet (0 5 mg total) by mouth daily (Patient not taking: Reported on 2/10/2022 ) 30 tablet 0     No current facility-administered medications for this visit  Allergies   Allergen Reactions    Celecoxib Other (See Comments)     Increased Heart Rate, Palpitations    Sumatriptan Anaphylaxis     Other reaction(s): SUMATRIPTAN (IMITREX) (Throat closure)  Pt analilia zolmitriptan   Tramadol Other (See Comments)     GI Upset- severe vomiting and systemic body aches    Medical Tape Dermatitis, Rash and Blisters     Adhesive from medication patches and bandaides- ok with paper tape       Review of Systems   Constitutional: Positive for chills and fatigue  Negative for activity change, appetite change, fever and unexpected weight change     HENT: Negative for congestion, ear discharge, ear pain, hearing loss, nosebleeds, rhinorrhea, sinus pressure, sore throat, tinnitus, trouble swallowing and voice change  Eyes: Negative for photophobia, pain and visual disturbance  Respiratory: Positive for cough and shortness of breath  Negative for chest tightness and wheezing  Cardiovascular: Negative for chest pain, palpitations and leg swelling  Gastrointestinal: Positive for diarrhea and vomiting  Negative for abdominal pain, anal bleeding, blood in stool, constipation and nausea  Endocrine: Negative for cold intolerance, heat intolerance, polydipsia and polyuria  Genitourinary: Negative for dysuria, frequency, hematuria and urgency  Musculoskeletal: Positive for myalgias  Negative for arthralgias, back pain, gait problem, joint swelling and neck pain  Skin: Negative for rash  Neurological: Negative for dizziness, tremors, seizures, syncope, weakness, light-headedness and headaches  Hematological: Negative for adenopathy  Does not bruise/bleed easily  Psychiatric/Behavioral: Negative for agitation, behavioral problems, confusion, dysphoric mood, hallucinations and sleep disturbance  The patient is not nervous/anxious  Objective:    Vitals:    02/10/22 1456   Temp: (!) 97 1 °F (36 2 °C)       Physical Exam  Constitutional:       General: She is not in acute distress  Appearance: Normal appearance  She is well-developed  She is not ill-appearing, toxic-appearing or diaphoretic  HENT:      Head: Normocephalic and atraumatic  Eyes:      General: No scleral icterus  Right eye: No discharge  Left eye: No discharge  Neck:      Vascular: No JVD  Pulmonary:      Effort: Pulmonary effort is normal       Comments: No abnormal audible breath sounds  Abdominal:      Palpations: Abdomen is soft  Comments: Per patient exam   Musculoskeletal:         General: No deformity  Cervical back: Normal range of motion and neck supple  Skin:     Coloration: Skin is not pale  Findings: No rash  Neurological:      Mental Status: She is alert and oriented to person, place, and time  Cranial Nerves: No cranial nerve deficit  Psychiatric:         Mood and Affect: Mood normal          Behavior: Behavior normal          Thought Content: Thought content normal          Judgment: Judgment normal          VIRTUAL VISIT 5 Anthony Rush verbally agrees to participate in [employer]  Pt is aware that [employer] could be limited without vital signs or the ability to perform a full hands-on physical exam  Lori Akbar understands she or the provider may request at any time to terminate the video visit and request the patient to seek care or treatment in person

## 2022-02-11 NOTE — ED PROVIDER NOTES
History  Chief Complaint   Patient presents with    Shortness of Breath     Pt reports sob, upper back pain, N/V/D beginning Monday  Denies cough or chest pain  Took home covid test today and came back negative    Back Pain     60 yo F PMHx chronic diarrhea, essential tremor, fibromyalgia presents to the ED complaining of fatigue, back pain, and diarrhea  Patient states that her symptoms began last week on Saturday  She developed nausea, vomiting and numerous episodes of diarrhea that day  She attributed her symptoms to food poisoning as her and her  ate hoagies on Thursday night, and he developed similar symptoms on Friday  Her daughter, who did not eat with them, also developed similar symptoms over the weekend   and daughter are now asymptomatic, however patient continues to feel unwell  Vomiting had subsided by Monday, and diarrhea subsided today, however, she has had a constant pain in her back since the onset of symptoms  Pain is located in her upper-mid thoracic spine and radiates below both scapulae  Pain is dull and constant, exacerbated with exertion and relieved with aspirin  Pain does not radiate into the chest, no chest pain  Patient has chronic back pain but states this is different quality  Patient denies cough, SOB is present on exertion  She has not eaten more than a few bites and has not been drinking much  Denies abdominal pain, no urinary complaints  No fevers but chills have been present  Prior to Admission Medications   Prescriptions Last Dose Informant Patient Reported? Taking?    Armodafinil 250 MG tablet  Self No No   Sig: Take 1 tablet (250 mg total) by mouth daily   BACILLUS COAGULANS-INULIN PO  Self Yes No   Sig: Take 1 capsule by mouth daily Takes in the am   Cholecalciferol 1000 units tablet  Self Yes No   Sig: Take 1,000 Units by mouth daily Takes in the afternoon   Citicoline Sodium 500 MG TABS  Self Yes No   Sig: Take 500 mg by mouth daily Takes in the am Coenzyme Q10 200 MG/GM POWD  Self Yes No   Sig: Take 200 mg by mouth daily Takes in the am   DULoxetine (CYMBALTA) 30 mg delayed release capsule  Self No No   Sig: Take 3 capsules (90 mg total) by mouth daily   Ginger, Zingiber officinalis, (GINGER ROOT PO)  Self Yes No   Sig: Take 700 mg by mouth 2 (two) times a day    Ginkgo Biloba 40 MG TABS  Self Yes No   Sig: Take 40 mg by mouth daily Take in the am   Multiple Vitamins-Minerals (CENTRUM SILVER PO)  Self Yes No   Sig: Take 1 tablet by mouth daily Takes in the am   Turmeric (QC Tumeric Complex) 500 MG CAPS  Self Yes No   Sig: Take 700 mg by mouth 2 (two) times a day    ZOLMitriptan (ZOMIG) 2 5 MG tablet   No No   Sig: TAKE 1 TABLET BY MOUTH AT ONSET OF MIGRAINE, MAY REPEAT IN 2 HOURS IF NEEDED  LIMIT 2/24 HOURS, 4/WEEK, 8/MONTH   budesonide (ENTOCORT EC) 3 MG capsule  Self No No   Sig: Take 3 capsules (9 mg total) by mouth daily   calcium carbonate 1250 MG capsule  Self Yes No   Sig: Take 600 mg by mouth daily    diclofenac (VOLTAREN) 75 mg EC tablet  Self Yes No   dicyclomine (BENTYL) 20 mg tablet  Self No No   Sig: Take 1 tablet (20 mg total) by mouth 4 (four) times a day as needed (abdominal pain)   ferrous sulfate 325 (65 Fe) mg tablet  Self Yes No   Sig: Take 325 mg by mouth daily with breakfast    gabapentin (NEURONTIN) 300 mg capsule  Self Yes No   Si mg at 1200 in addition to 600 mg at bedtime     mesalamine (LIALDA) 1 2 g EC tablet  Self Yes No   Sig: Take 2 4 g by mouth daily with breakfast    Patient not taking: Reported on 2022    metaxalone (SKELAXIN) 800 mg tablet  Self Yes No   Sig: Take 800 mg by mouth 3 (three) times a day    omeprazole (PriLOSEC) 20 mg delayed release capsule  Self No No   Sig: Take 1 capsule (20 mg total) by mouth daily before breakfast   prochlorperazine (COMPAZINE) 10 mg tablet  Self No No   Sig: Take 1 tablet (10 mg total) by mouth every 6 (six) hours as needed for nausea or vomiting   rasagiline (AZILECT) 0 5 mg Self No No   Sig: Take 1 tablet (0 5 mg total) by mouth daily   Patient not taking: Reported on 2/10/2022    rosuvastatin (CRESTOR) 5 mg tablet  Self No No   Sig: Take 1 tablet (5 mg total) by mouth daily   solifenacin (VESICARE) 10 MG tablet  Self No No   Sig: Take 1 tablet (10 mg total) by mouth every evening      Facility-Administered Medications: None       Past Medical History:   Diagnosis Date    Allergy to dust     Anemia     Calculus of ureter     Chronic pain disorder     neck and back- sees pain management    Colitis     Colon polyp     Cracked skin     on fingers    Diverticulosis     Dysphagia     GERD (gastroesophageal reflux disease)     H/O: GI bleed     Hiatal hernia     History of DVT in adulthood 2019 2019    Hydronephrosis     Hyperlipidemia     Inguinal hernia     right side    Irregular heart beat     Pt states with prolonged QT interval - was seen by cardiologist( [facility]) pt told "to not worry about it"    Irritable bowel     Kidney stone     Migraines     Narcolepsy     Narcolepsy 08/06/2020    OAB (overactive bladder)     Osteoarthritis     Osteoporosis     Other chronic pain     degenerative disc disease    PONV (postoperative nausea and vomiting)     migraines    Smoking 8/6/2020    Tobacco abuse     Wears dentures     full upper    Wears glasses     Wears glasses        Past Surgical History:   Procedure Laterality Date    ABDOMINAL ADHESION SURGERY      ARM SKIN LESION BIOPSY / EXCISION      lymph node excision    BLADDER SURGERY      CHOLECYSTECTOMY      COLONOSCOPY  08/06/2020    FL RETROGRADE PYELOGRAM  1/5/2021    HERNIA REPAIR      HYSTERECTOMY  1977    JOINT REPLACEMENT Right     knee    KNEE SURGERY Right     replacement    LAMINECTOMY      L5-6-7    LAPAROSCOPY      LYMPH NODE DISSECTION Left     removed -no cancer-  limb restriction    LYMPHADENECTOMY  1974    UNDER LEFT ARM - DO NOT DRAW BLOOD/GIVE INJECTIONS IN LEFT ARM PER PATIENT  MOLE REMOVAL      OOPHORECTOMY Bilateral 1977    OTHER SURGICAL HISTORY      enlarged lymph node removed left arm   no cancer    TN CYSTO/URETERO W/LITHOTRIPSY &INDWELL STENT INSRT Right 1/5/2021    Procedure: CYSTO, LASER  URETEROSCOPY, RETRO PYELOGRAM, STENT REPLACMENT;  Surgeon: Mercy Sanders MD;  [facility];  Service: Urology    TN CYSTOURETHROSCOPY,URETER CATHETER Right 12/2/2020    Procedure: CYSTOSCOPY AND INSERTION STENT URETERAL;  Surgeon: Taye Torres MD;  [facility];  Service: Urology    [place] ESWL Right 3/25/2021    Procedure: LITHOTRIPSY EXTRACORPORAL SHOCKWAVE (ESWL); Surgeon: Tosha Garg MD;  Location: [facility] MAIN OR;  Service: Urology    TN REPAIR ING HERNIA,5+Y/O,REDUCIBL Right 6/25/2020    Procedure: REPAIR HERNIA INGUINAL  WITH MESH;  Surgeon: Lico Berg MD;  Location: [facility];  Service: General    SPINAL FUSION      UPPER GASTROINTESTINAL ENDOSCOPY  08/06/2020    WISDOM TOOTH EXTRACTION         Family History   Problem Relation Age of Onset    Hypertension Father     Cancer Father     Hypertension Mother     Heart attack Mother     Stroke Brother     Nephrolithiasis Brother     Diabetes Sister     No Known Problems Daughter     No Known Problems Maternal Grandmother     No Known Problems Maternal Grandfather     No Known Problems Paternal Grandmother     No Known Problems Paternal Grandfather     Diabetes Sister     No Known Problems Maternal Aunt     No Known Problems Maternal Aunt     No Known Problems Maternal Aunt     No Known Problems Paternal Aunt     No Known Problems Paternal Aunt     No Known Problems Paternal Aunt      I have reviewed and agree with the history as documented      E-Cigarette/Vaping    E-Cigarette Use Never User      E-Cigarette/Vaping Substances    Nicotine No     THC No     CBD No     Flavoring No     Other No     Unknown No      Social History     Tobacco Use    Smoking status: Current Every Day Smoker     Packs/day: 0 50     Years: 47 00     Pack years: 23 50     Types: Cigarettes    Smokeless tobacco: Never Used    Tobacco comment: pt states is at 1/2 pk day currently - was previously at one pack /day   Vaping Use    Vaping Use: Never used   Substance Use Topics    Alcohol use: Not Currently     Comment: seldom    Drug use: Not Currently     Types: Marijuana     Comment: medicinal marijuana; last dose was 3/16        Review of Systems   Constitutional: Positive for chills and fatigue  Negative for fever  HENT: Negative for ear pain and sore throat  Eyes: Negative for pain and visual disturbance  Respiratory: Negative for cough, chest tightness, shortness of breath and wheezing  Cardiovascular: Negative for chest pain and palpitations  Gastrointestinal: Positive for diarrhea, nausea and vomiting  Negative for abdominal pain  Genitourinary: Negative for dysuria and hematuria  Musculoskeletal: Positive for myalgias  Negative for arthralgias and back pain  Skin: Negative for color change and rash  Neurological: Positive for weakness  Negative for seizures and syncope  All other systems reviewed and are negative  Physical Exam  ED Triage Vitals [02/10/22 1641]   Temperature Pulse Respirations Blood Pressure SpO2   98 6 °F (37 °C) (!) 114 20 145/77 98 %      Temp Source Heart Rate Source Patient Position - Orthostatic VS BP Location FiO2 (%)   Oral Monitor Sitting Right arm --      Pain Score       8             Orthostatic Vital Signs  Vitals:    02/10/22 1641 02/10/22 1858 02/10/22 2037 02/10/22 2151   BP: 145/77 119/61 117/64 119/68   Pulse: (!) 114 83 68 63   Patient Position - Orthostatic VS: Sitting Lying Lying        Physical Exam  Vitals and nursing note reviewed  Constitutional:       General: She is not in acute distress  Appearance: She is underweight  HENT:      Head: Normocephalic and atraumatic     Eyes:      Conjunctiva/sclera: Conjunctivae normal    Cardiovascular:      Rate and Rhythm: Normal rate and regular rhythm  Heart sounds: No murmur heard  Pulmonary:      Effort: Pulmonary effort is normal  No respiratory distress  Breath sounds: Normal breath sounds  No decreased breath sounds or wheezing  Chest:      Chest wall: No mass or tenderness  Abdominal:      Palpations: Abdomen is soft  Tenderness: There is no abdominal tenderness  Musculoskeletal:      Cervical back: Neck supple  Right lower leg: No tenderness  No edema  Left lower leg: No tenderness  No edema  Skin:     General: Skin is warm and dry  Capillary Refill: Capillary refill takes less than 2 seconds  Neurological:      General: No focal deficit present  Mental Status: She is oriented to person, place, and time  Cranial Nerves: No cranial nerve deficit  Motor: No weakness  Psychiatric:         Mood and Affect: Mood normal          Behavior: Behavior is cooperative           ED Medications  Medications   multi-electrolyte (ISOLYTE-S PH 7 4) bolus 1,000 mL (0 mL Intravenous Stopped 2/10/22 2151)   ketorolac (TORADOL) injection 15 mg (15 mg Intravenous Given 2/10/22 2118)       Diagnostic Studies  Results Reviewed     Procedure Component Value Units Date/Time    Lipase [[id number]]  (Normal) Collected: 02/10/22 2010    Lab Status: Final result Specimen: Blood from Arm, Right Updated: 02/10/22 2052     Lipase 238 u/L     Comprehensive metabolic panel [[id number]]  (Abnormal) Collected: 02/10/22 2010    Lab Status: Final result Specimen: Blood from Arm, Right Updated: 02/10/22 2052     Sodium 137 mmol/L      Potassium 4 1 mmol/L      Chloride 103 mmol/L      CO2 25 mmol/L      ANION GAP 9 mmol/L      BUN 28 mg/dL      Creatinine 0 83 mg/dL      Glucose 100 mg/dL      Calcium 9 5 mg/dL      AST 16 U/L      ALT 22 U/L      Alkaline Phosphatase 83 U/L      Total Protein 6 7 g/dL      Albumin 3 5 g/dL      Total Bilirubin 0 28 mg/dL eGFR 75 ml/min/1 73sq m     Narrative:      [place] guidelines for Chronic Kidney Disease (CKD):     Stage 1 with normal or high GFR (GFR > 90 mL/min/1 73 square meters)    Stage 2 Mild CKD (GFR = 60-89 mL/min/1 73 square meters)    Stage 3A Moderate CKD (GFR = 45-59 mL/min/1 73 square meters)    Stage 3B Moderate CKD (GFR = 30-44 mL/min/1 73 square meters)    Stage 4 Severe CKD (GFR = 15-29 mL/min/1 73 square meters)    Stage 5 End Stage CKD (GFR <15 mL/min/1 73 square meters)  Note: GFR calculation is accurate only with a steady state creatinine    CBC and differential [[id number]] Collected: 02/10/22 2010    Lab Status: Final result Specimen: Blood from Arm, Right Updated: 02/10/22 2023     WBC 7 48 Thousand/uL      RBC 4 11 Million/uL      Hemoglobin 13 1 g/dL      Hematocrit 39 0 %      MCV 95 fL      MCH 31 9 pg      MCHC 33 6 g/dL      RDW 12 3 %      MPV 9 7 fL      Platelets 593 Thousands/uL      nRBC 0 /100 WBCs      Neutrophils Relative 62 %      Immat GRANS % 0 %      Lymphocytes Relative 28 %      Monocytes Relative 6 %      Eosinophils Relative 3 %      Basophils Relative 1 %      Neutrophils Absolute 4 65 Thousands/µL      Immature Grans Absolute 0 03 Thousand/uL      Lymphocytes Absolute 2 11 Thousands/µL      Monocytes Absolute 0 45 Thousand/µL      Eosinophils Absolute 0 19 Thousand/µL      Basophils Absolute 0 05 Thousands/µL     Urine Macroscopic, POC [[id number]] Collected: 02/10/22 1939    Lab Status: Final result Specimen: Urine Updated: 02/10/22 1941     Color, UA Yellow     Clarity, UA Clear     pH, UA 5 5     Leukocytes, UA Negative     Nitrite, UA Negative     Protein, UA Negative mg/dl      Glucose, UA Negative mg/dl      Ketones, UA Negative mg/dl      Urobilinogen, UA 0 2 E U /dl      Bilirubin, UA Negative     Blood, UA Negative     Specific Gravity, UA >=1 030    Narrative:      CLINITEK RESULT                 XR chest 2 views   ED Interpretation by Aleks Rizvi Lina Ordonez MD (02/10 2027)   No acute cardiopulmonary abnormality            Procedures  ECG 12 Lead Documentation Only    Date/Time: 2/10/2022 8:29 PM  Performed by: Sunday Cardenas MD  Authorized by: Sunday Cardenas MD     ECG reviewed by me, the ED Provider: yes    Patient location:  ED  Previous ECG:     Previous ECG:  Compared to current    Similarity:  No change  Interpretation:     Interpretation: normal    Rate:     ECG rate assessment: bradycardic    Rhythm:     Rhythm: sinus rhythm    Ectopy:     Ectopy: none    QRS:     QRS axis:  Normal  Conduction:     Conduction: normal    ST segments:     ST segments:  Normal  T waves:     T waves: normal            ED Course                                       MDM  Number of Diagnoses or Management Options  Back pain  Viral gastroenteritis  Diagnosis management comments: 60 yo F PMHx chronic diarrhea, essential tremor, fibromyalgia presents to the ED complaining of fatigue, back pain, and diarrhea  DDx: Viral gastroenteritis, GERD, electrolyte abnormality, anemia  Blood work largely unremarkable, showing a slight elevation in BUN likely secondary to decreased PO  Chest X-ray normal and unchanged from prior  Discussed benign work-up with patient  Counseled regarding follow-up with PCP for continued back pain  Return precautions discussed for new or worsening back pain, any chest pain, or any other concerning symptoms  Patient understanding, discharged home with PCP follow-up         Disposition  Final diagnoses:   Back pain   Viral gastroenteritis     Time reflects when diagnosis was documented in both MDM as applicable and the Disposition within this note     Time User Action Codes Description Comment    2/10/2022  9:30 PM Chepe Farrell Add [M54 9] Back pain     2/10/2022  9:30 PM Chepe Farrell Add [A08 4] Viral gastroenteritis       ED Disposition     ED Disposition Condition Date/Time Comment    Discharge Stable u Feb 10, 2022  9:30 PM [id number] [address] discharge to home/self care  Follow-up Information     Follow up With Specialties Details Why Contact Info    Franca Montano MD Family Medicine Schedule an appointment as soon as possible for a visit  If symptoms worsen [address], [facility] [id number] [phone]            Discharge Medication List as of 2/10/2022  9:33 PM      CONTINUE these medications which have NOT CHANGED    Details   Armodafinil 250 MG tablet Take 1 tablet (250 mg total) by mouth daily, Starting Thu 9/30/2021, Normal      BACILLUS COAGULANS-INULIN PO Take 1 capsule by mouth daily Takes in the am, Historical Med      budesonide (ENTOCORT EC) 3 MG capsule Take 3 capsules (9 mg total) by mouth daily, Starting Wed 10/20/2021, Normal      calcium carbonate 1250 MG capsule Take 600 mg by mouth daily , Historical Med      Cholecalciferol 1000 units tablet Take 1,000 Units by mouth daily Takes in the afternoon, Starting Fri 12/9/2016, Historical Med      Citicoline Sodium 500 MG TABS Take 500 mg by mouth daily Takes in the am, Historical Med      Coenzyme Q10 200 MG/GM POWD Take 200 mg by mouth daily Takes in the am, Historical Med      diclofenac (VOLTAREN) 75 mg EC tablet Starting Tue 4/6/2021, Historical Med      dicyclomine (BENTYL) 20 mg tablet Take 1 tablet (20 mg total) by mouth 4 (four) times a day as needed (abdominal pain), Starting Thu 4/1/2021, Normal      DULoxetine (CYMBALTA) 30 mg delayed release capsule Take 3 capsules (90 mg total) by mouth daily, Starting Tue 3/24/2020, Normal      ferrous sulfate 325 (65 Fe) mg tablet Take 325 mg by mouth daily with breakfast , Starting Thu 9/4/2014, Historical Med      gabapentin (NEURONTIN) 300 mg capsule 300 mg at 1200 in addition to 600 mg at bedtime  , Historical Med      Ginger, Zingiber officinalis, (GINGER ROOT PO) Take 700 mg by mouth 2 (two) times a day , Historical Med      Ginkgo Biloba 40 MG TABS Take 40 mg by mouth daily Take in the am, Historical Med      mesalamine (LIALDA) 1 2 g EC tablet Take 2 4 g by mouth daily with breakfast , Historical Med      metaxalone (SKELAXIN) 800 mg tablet Take 800 mg by mouth 3 (three) times a day , Starting Mon 11/26/2018, Until Thu 2/10/2022, Historical Med      Multiple Vitamins-Minerals (CENTRUM SILVER PO) Take 1 tablet by mouth daily Takes in the am, Historical Med      omeprazole (PriLOSEC) 20 mg delayed release capsule Take 1 capsule (20 mg total) by mouth daily before breakfast, Starting Tue 11/2/2021, Until Thu 2/10/2022, Normal      prochlorperazine (COMPAZINE) 10 mg tablet Take 1 tablet (10 mg total) by mouth every 6 (six) hours as needed for nausea or vomiting, Starting Thu 1/7/2021, Normal      rasagiline (AZILECT) 0 5 mg Take 1 tablet (0 5 mg total) by mouth daily, Starting Tue 12/21/2021, Normal      rosuvastatin (CRESTOR) 5 mg tablet Take 1 tablet (5 mg total) by mouth daily, Starting Mon 12/13/2021, Normal      solifenacin (VESICARE) 10 MG tablet Take 1 tablet (10 mg total) by mouth every evening, Starting Wed 9/15/2021, Normal      Turmeric (QC Tumeric Complex) 500 MG CAPS Take 700 mg by mouth 2 (two) times a day , Historical Med      ZOLMitriptan (ZOMIG) 2 5 MG tablet TAKE 1 TABLET BY MOUTH AT ONSET OF MIGRAINE, MAY REPEAT IN 2 HOURS IF NEEDED  LIMIT 2/24 HOURS, 4/WEEK, 8/MONTH, Normal           No discharge procedures on file  PDMP Review       Value Time User    PDMP Reviewed  Yes 11/16/2021 12:28 PM Daniel Kniney           ED Provider  Attending physically available and evaluated Bria Leandraconnerlisa MORE managed the patient along with the ED Attending      Electronically Signed by         Mariajose Vega MD  02/10/22 9516

## 2022-02-11 NOTE — DISCHARGE INSTRUCTIONS
You were seen in the Emergency Department today for back pain and fatigue  We have performed blood work which all resulted normal  Your chest X-ray was also normal  It is likely that your symptoms are related to prolonged symptoms of viral gastroenteritis  Continue to drink plenty of fluids, begin a mild diet as best as you can, and rest      Please follow up with your primary care doctor in 2 to 3 days if your symptoms continue  Please return to the Emergency Department if you experience worsening of your current symptoms, or any other concerning symptoms

## 2022-02-11 NOTE — ED NOTES
AVS reviewed with pt by provider  Pt verbalized understanding of d/c instructions, no further questions or concerns  VSS  Pt left ambulatory with steady gait to private vehicle with family member; alert and oriented with all belongings        Maddie Duran RN  02/10/22 7121

## 2022-02-11 NOTE — ED ATTENDING ATTESTATION
2/10/2022  I, Bud Larios MD, saw and evaluated the patient  I have discussed the patient with the resident/non-physician practitioner and agree with the resident's/non-physician practitioner's findings, Plan of Care, and MDM as documented in the resident's/non-physician practitioner's note, except where noted  All available labs and Radiology studies were reviewed  I was present for key portions of any procedure(s) performed by the resident/non-physician practitioner and I was immediately available to provide assistance  At this point I agree with the current assessment done in the Emergency Department  I have conducted an independent evaluation of this patient a history and physical is as follows:  60 yo F c/o not feeling well for about 4 days, with dyspnea, upper back pain, N/V/D, without fever, chills, or cough, family members had similar symptoms that resolved more readily, but she is still feeling ill  She has been vaccinated against COVID-19 with booster, and she and family members all tested negative at home  VS normal   NAD  Ambulatory  Mild epigastric discomfort, mid back discomfort, no rebound, no guarding  ED workup for viral syndrome electrolyte disturbance, biliary colic or pancreatitis less likely with multiple family members also with similar    ED workup is reassuring, with no significant lab abnormalities  She has no localizing pain  I agree no imaging indicated at this time  She can be treated symptomatically what is likely resolving viral syndrome        ED Course         Critical Care Time  Procedures

## 2022-02-12 ENCOUNTER — HOSPITAL ENCOUNTER (OUTPATIENT)
Dept: MRI IMAGING | Facility: HOSPITAL | Age: 62
Discharge: HOME | End: 2022-02-12
Payer: COMMERCIAL

## 2022-02-12 DIAGNOSIS — Z98.1 HISTORY OF FUSION OF CERVICAL SPINE: ICD-10-CM

## 2022-02-12 DIAGNOSIS — R20.2 ARM PARESTHESIA, RIGHT: ICD-10-CM

## 2022-02-12 PROCEDURE — 72141 MRI NECK SPINE W/O DYE: CPT

## 2022-02-12 PROCEDURE — G1004 CDSM NDSC: HCPCS

## 2022-02-16 ENCOUNTER — TELEPHONE (OUTPATIENT)
Dept: NEUROLOGY | Facility: CLINIC | Age: 62
End: 2022-02-16

## 2022-02-16 DIAGNOSIS — M54.12 CERVICAL RADICULOPATHY: ICD-10-CM

## 2022-02-16 DIAGNOSIS — Z98.1 HISTORY OF FUSION OF CERVICAL SPINE: Primary | ICD-10-CM

## 2022-02-16 DIAGNOSIS — M79.18 MYOFASCIAL PAIN SYNDROME, CERVICAL: ICD-10-CM

## 2022-02-16 RX ORDER — TIZANIDINE 2 MG/1
TABLET ORAL
Qty: 60 TABLET | Refills: 2 | Status: SHIPPED | OUTPATIENT
Start: 2022-02-16 | End: 2022-03-29 | Stop reason: SDUPTHER

## 2022-02-16 NOTE — TELEPHONE ENCOUNTER
I spoke to the patient about her cervical spine MRI results  She was agreeable to C-spine CT due to artifact on the MRI  Patient having right upper extremity pain with neck pain and sometimes right hand weakness  Depending on the results of the CT, may need her to return to surgery and/or pain management  I advised her to try tizanidine at bedtime, instead of the metaxalone, to see if this works better  She understands not to take the 2 muscle relaxants together  Of note, the pt feels that the azilect is reducing the tremor a bit  Denies s/e      Clinical/clerical: Could you please contact her to schedule the CT?

## 2022-02-17 NOTE — PROGRESS NOTES
F/u CT cervical spine order was placed, in light of cervical spine MRI artifact  Pt agreeable  Reviewed briefly with Dr Bhaskar Anthony   See phone encounter

## 2022-02-17 NOTE — TELEPHONE ENCOUNTER
Called pt back to assist in scheduling CT  Pt stated she has phone # for [facility] and will be calling to schedule CT  Pt had no further questions

## 2022-03-08 ENCOUNTER — TELEPHONE (OUTPATIENT)
Dept: FAMILY MEDICINE CLINIC | Facility: CLINIC | Age: 62
End: 2022-03-08

## 2022-03-08 DIAGNOSIS — M81.0 OSTEOPOROSIS, UNSPECIFIED OSTEOPOROSIS TYPE, UNSPECIFIED PATHOLOGICAL FRACTURE PRESENCE: Primary | ICD-10-CM

## 2022-03-08 NOTE — TELEPHONE ENCOUNTER
Patient calling asking for a referral for a [facility] rheumatology she states that she was going to [facility] but the doctor retired please advise and if ok place the referral for the patient thanks

## 2022-03-20 ENCOUNTER — HOSPITAL ENCOUNTER (OUTPATIENT)
Dept: CT IMAGING | Facility: HOSPITAL | Age: 62
Discharge: HOME | End: 2022-03-20
Payer: COMMERCIAL

## 2022-03-20 DIAGNOSIS — Z98.1 HISTORY OF FUSION OF CERVICAL SPINE: ICD-10-CM

## 2022-03-20 DIAGNOSIS — M54.12 CERVICAL RADICULOPATHY: ICD-10-CM

## 2022-03-20 PROCEDURE — G1004 CDSM NDSC: HCPCS

## 2022-03-20 PROCEDURE — 72125 CT NECK SPINE W/O DYE: CPT

## 2022-03-21 ENCOUNTER — APPOINTMENT (OUTPATIENT)
Dept: LAB | Facility: MEDICAL CENTER | Age: 62
End: 2022-03-21
Payer: COMMERCIAL

## 2022-03-21 DIAGNOSIS — D64.9 ANEMIA, UNSPECIFIED TYPE: ICD-10-CM

## 2022-03-21 DIAGNOSIS — E78.00 PURE HYPERCHOLESTEROLEMIA: ICD-10-CM

## 2022-03-21 LAB
CHOLEST SERPL-MCNC: 281 MG/DL
FERRITIN SERPL-MCNC: 202 NG/ML (ref 8–388)
HDLC SERPL-MCNC: 86 MG/DL
IRON SATN MFR SERPL: 12 % (ref 15–50)
IRON SERPL-MCNC: 35 UG/DL (ref 50–170)
LDLC SERPL CALC-MCNC: 181 MG/DL (ref 0–100)
TIBC SERPL-MCNC: 303 UG/DL (ref 250–450)
TRIGL SERPL-MCNC: 70 MG/DL

## 2022-03-21 PROCEDURE — 83550 IRON BINDING TEST: CPT

## 2022-03-21 PROCEDURE — 80061 LIPID PANEL: CPT

## 2022-03-21 PROCEDURE — 82728 ASSAY OF FERRITIN: CPT

## 2022-03-21 PROCEDURE — 36415 COLL VENOUS BLD VENIPUNCTURE: CPT

## 2022-03-21 PROCEDURE — 83540 ASSAY OF IRON: CPT

## 2022-03-24 ENCOUNTER — OFFICE VISIT (OUTPATIENT)
Dept: FAMILY MEDICINE CLINIC | Facility: CLINIC | Age: 62
End: 2022-03-24
Payer: COMMERCIAL

## 2022-03-24 VITALS
HEART RATE: 83 BPM | HEIGHT: 63 IN | SYSTOLIC BLOOD PRESSURE: 132 MMHG | OXYGEN SATURATION: 97 % | WEIGHT: 115 LBS | TEMPERATURE: 96.7 F | BODY MASS INDEX: 20.38 KG/M2 | DIASTOLIC BLOOD PRESSURE: 66 MMHG

## 2022-03-24 DIAGNOSIS — F17.200 SMOKING: ICD-10-CM

## 2022-03-24 DIAGNOSIS — M81.0 OSTEOPOROSIS, UNSPECIFIED OSTEOPOROSIS TYPE, UNSPECIFIED PATHOLOGICAL FRACTURE PRESENCE: ICD-10-CM

## 2022-03-24 DIAGNOSIS — M50.30 DDD (DEGENERATIVE DISC DISEASE), CERVICAL: ICD-10-CM

## 2022-03-24 DIAGNOSIS — D50.8 OTHER IRON DEFICIENCY ANEMIA: ICD-10-CM

## 2022-03-24 DIAGNOSIS — E78.00 PURE HYPERCHOLESTEROLEMIA: Primary | ICD-10-CM

## 2022-03-24 DIAGNOSIS — N28.9 ABNORMAL RENAL FUNCTION: ICD-10-CM

## 2022-03-24 DIAGNOSIS — M54.2 NECK PAIN: ICD-10-CM

## 2022-03-24 DIAGNOSIS — R94.31 ABNORMAL EKG: ICD-10-CM

## 2022-03-24 PROBLEM — K56.609 SBO (SMALL BOWEL OBSTRUCTION) (HCC): Status: RESOLVED | Noted: 2021-09-25 | Resolved: 2022-03-24

## 2022-03-24 PROBLEM — L85.3 XEROSIS OF SKIN: Status: RESOLVED | Noted: 2019-11-23 | Resolved: 2022-03-24

## 2022-03-24 PROBLEM — N12 PYELONEPHRITIS: Status: RESOLVED | Noted: 2021-01-13 | Resolved: 2022-03-24

## 2022-03-24 PROBLEM — A41.9 SEPSIS (HCC): Chronic | Status: RESOLVED | Noted: 2021-01-13 | Resolved: 2022-03-24

## 2022-03-24 PROCEDURE — 99214 OFFICE O/P EST MOD 30 MIN: CPT | Performed by: FAMILY MEDICINE

## 2022-03-24 PROCEDURE — 4004F PT TOBACCO SCREEN RCVD TLK: CPT | Performed by: FAMILY MEDICINE

## 2022-03-24 RX ORDER — OMEPRAZOLE 40 MG/1
CAPSULE, DELAYED RELEASE ORAL
COMMUNITY
Start: 2022-03-11

## 2022-03-24 RX ORDER — ROSUVASTATIN CALCIUM 5 MG/1
5 TABLET, COATED ORAL DAILY
Qty: 90 TABLET | Refills: 0 | Status: SHIPPED | OUTPATIENT
Start: 2022-03-24 | End: 2022-06-20

## 2022-03-24 NOTE — PROGRESS NOTES
Assessment/Plan:       No problem-specific Assessment & Plan notes found for this encounter  Diagnoses and all orders for this visit:    Pure hypercholesterolemia  Comments:  Not well controlled  Change Crestor to 10 mg daily recheck lipid and liver function test in 6-8  Orders:  -     rosuvastatin (CRESTOR) 5 mg tablet; Take 1 tablet (5 mg total) by mouth daily  -     Lipid Panel with Direct LDL reflex; Future  -     AST; Future    Abnormal EKG  Comments:  recommend nuclear stress test , or referral  to cardiology   , pt declined    Abnormal renal function  Comments:  Most likely patient was dehydrated  Advised patient to hydrate self well  Orders:  -     Basic metabolic panel; Future    Neck pain  Comments: Following with the Neurology    DDD (degenerative disc disease), cervical    Other iron deficiency anemia  Comments:  to follow with for  capsule endoscopy or check small bowel series  Orders:  -     Occult Blood, Fecal Immunochemical; Future    Osteoporosis, unspecified osteoporosis type, unspecified pathological fracture presence  Comments:  recommend Bone density  , pt stated   she wants to wait   pt  is changing Rheumatology    Smoking  Comments:  councel pt to stop  smoking ,  discussed med to help her stop  smoking   pt is not interested    Other orders  -     omeprazole (PriLOSEC) 40 MG capsule        Patient Instructions   To follow with  Test reults      Orders Placed This Encounter   Procedures    Occult Blood, Fecal Immunochemical     Standing Status:   Future     Standing Expiration Date:   3/24/2023    Basic metabolic panel     This is a patient instruction: Patient fasting for 8 hours or longer recommended  Standing Status:   Future     Standing Expiration Date:   3/24/2023    Lipid Panel with Direct LDL reflex     This is a patient instruction: This test requires patient fasting for 10-12 hours or longer  Drinking of black coffee or black tea is acceptable       Standing Status: Future     Standing Expiration Date:   3/24/2023    AST     Standing Status:   Future     Standing Expiration Date:   3/24/2023         Subjective:     Patient ID: Bria Lincoln is a 59 y o  female      HPI  Neck pain  ,following with  Neurology had MRI of spine, then Ct scan of C spine  Seen at ER,  Patient stated she was having a lot of pain on her  Upper back which caused her shortness  Also she was complaining of chills, fatigue, diarrhea, nausea vomiting , body a and weakness was seen in the ER on February- 10  2022  She feels better overall  Low iron  Patient stated she has low iron for a very long She did have colonoscopy April 2021 also she had an EGD and colonoscopy in May 2020  ER report noted    Test results  Lab done on February 10, 2022 and March 21, 2022  C spine MRI and Ct scan  od cervical  Spine  Discussed result with patient    Review of Systems   Constitutional: Negative for activity change, appetite change, chills, fatigue, fever and unexpected weight change  HENT: Negative for congestion, ear discharge, ear pain, hearing loss, nosebleeds, rhinorrhea, sinus pressure, sore throat, tinnitus, trouble swallowing and voice change  Eyes: Negative for photophobia, pain and visual disturbance  Respiratory: Negative for cough, chest tightness, shortness of breath and wheezing  Cardiovascular: Negative for chest pain, palpitations and leg swelling  Gastrointestinal: Negative for abdominal pain, anal bleeding, blood in stool, constipation, diarrhea, nausea and vomiting  Endocrine: Negative for cold intolerance, heat intolerance, polydipsia and polyuria  Genitourinary: Negative for dysuria, frequency, hematuria and urgency  Musculoskeletal: Positive for neck pain  Negative for arthralgias, back pain, gait problem, joint swelling and myalgias  Skin: Negative for rash  Neurological: Negative for dizziness, tremors, seizures, syncope, weakness, light-headedness and headaches  Hematological: Negative for adenopathy  Does not bruise/bleed easily  Psychiatric/Behavioral: Negative for agitation, behavioral problems, confusion, dysphoric mood, hallucinations and sleep disturbance  The patient is not nervous/anxious  Objective:     Physical Exam  Nursing note reviewed  Constitutional:       General: She is not in acute distress  Appearance: Normal appearance  She is well-developed  She is not ill-appearing  HENT:      Head: Normocephalic  Eyes:      General: No scleral icterus  Extraocular Movements: Extraocular movements intact  Conjunctiva/sclera: Conjunctivae normal    Neck:      Thyroid: No thyromegaly  Vascular: No carotid bruit  Cardiovascular:      Rate and Rhythm: Normal rate and regular rhythm  Heart sounds: Normal heart sounds  Pulmonary:      Effort: Pulmonary effort is normal       Breath sounds: Normal breath sounds  Abdominal:      General: Bowel sounds are normal  There is no distension  Palpations: Abdomen is soft  There is no mass  Tenderness: There is no abdominal tenderness  There is no guarding or rebound  Hernia: No hernia is present  Musculoskeletal:         General: No swelling  Cervical back: Neck supple  Right lower leg: No edema  Left lower leg: No edema  Lymphadenopathy:      Cervical: No cervical adenopathy  Skin:     Findings: No rash  Neurological:      General: No focal deficit present  Mental Status: She is alert and oriented to person, place, and time  Cranial Nerves: No cranial nerve deficit  Motor: No abnormal muscle tone  Coordination: Coordination normal       Gait: Gait normal    Psychiatric:         Mood and Affect: Mood normal          Behavior: Behavior normal          Thought Content:  Thought content normal

## 2022-03-27 PROBLEM — M50.30 DDD (DEGENERATIVE DISC DISEASE), CERVICAL: Status: ACTIVE | Noted: 2022-03-27

## 2022-03-27 PROBLEM — R94.31 ABNORMAL EKG: Status: ACTIVE | Noted: 2022-03-27

## 2022-03-28 ENCOUNTER — TELEPHONE (OUTPATIENT)
Dept: NEUROLOGY | Facility: CLINIC | Age: 62
End: 2022-03-28

## 2022-03-28 DIAGNOSIS — R29.898 ARM WEAKNESS: ICD-10-CM

## 2022-03-28 DIAGNOSIS — M79.18 MYOFASCIAL PAIN SYNDROME, CERVICAL: ICD-10-CM

## 2022-03-28 DIAGNOSIS — Z98.1 HISTORY OF FUSION OF CERVICAL SPINE: Primary | ICD-10-CM

## 2022-03-28 DIAGNOSIS — M54.12 CERVICAL RADICULOPATHY: ICD-10-CM

## 2022-03-28 NOTE — TELEPHONE ENCOUNTER
Patient states that her last visit with you, you had recommended her to see an attending provider  I am unclear which provider you are asking us to schedule her with, either Dr Mj Cross for her tremors or Dr Jackie Rolle for her neuropathy  Would you be able to clarify and I will reach out to patient and get her scheduled   I did schedule a followup with you for her in August and placed her on cancellation list

## 2022-03-29 RX ORDER — TIZANIDINE 4 MG/1
TABLET ORAL
Qty: 30 TABLET | Refills: 1 | Status: SHIPPED | OUTPATIENT
Start: 2022-03-29 | End: 2022-05-27 | Stop reason: ALTCHOICE

## 2022-03-29 NOTE — TELEPHONE ENCOUNTER
Dr Francisca Larson:  I am following Ziyad Esqueda in the office for neck pain, tremor  I would like if you could see the patient for tremor  The intent of your evaluation would be for management of subspecialty problem  I am happy to follow the pt along with you  Thanks,    Connie Acuña PA-C        For documentation:  I spoke with the patient about her C-spine CT results  The patient is having bilateral arm heaviness and interferes with her work productivity  Her neck pain is getting more intolerable  Referral to Neurosurgery to rule out surgical intervention  She will increase tizanidine from 2-4 mg q h s , cannot tolerate a dose increase of gabapentin  Tried Lyrica in the past with side effects  Recently increased Cymbalta to 90 mg daily with no relief  She also tried physical therapy recently with no relief of neck pain

## 2022-03-31 ENCOUNTER — OFFICE VISIT (OUTPATIENT)
Dept: SLEEP CENTER | Facility: CLINIC | Age: 62
End: 2022-03-31
Payer: COMMERCIAL

## 2022-03-31 VITALS
HEART RATE: 94 BPM | DIASTOLIC BLOOD PRESSURE: 82 MMHG | SYSTOLIC BLOOD PRESSURE: 140 MMHG | HEIGHT: 63 IN | WEIGHT: 111 LBS | BODY MASS INDEX: 19.67 KG/M2

## 2022-03-31 DIAGNOSIS — G47.11 IDIOPATHIC HYPERSOMNIA: Primary | ICD-10-CM

## 2022-03-31 PROCEDURE — 99214 OFFICE O/P EST MOD 30 MIN: CPT | Performed by: NURSE PRACTITIONER

## 2022-03-31 PROCEDURE — 3008F BODY MASS INDEX DOCD: CPT | Performed by: FAMILY MEDICINE

## 2022-03-31 RX ORDER — ARMODAFINIL 250 MG/1
250 TABLET ORAL DAILY
Qty: 30 TABLET | Refills: 5 | Status: SHIPPED | OUTPATIENT
Start: 2022-03-31
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
## 2022-03-31 NOTE — PATIENT INSTRUCTIONS
1   Continue armodafinil 250mg daily in the am   2   Keep sleep/wake schedule consistent  3  Monitor for any side effects or changes in your condition  4   Schedule a 6 month follow up visit  Nursing Support:  When: Monday through Friday 7A-5PM except holidays  Where: Our direct line is [phone]  If you are having a true emergency please call 911  In the event that the line is busy or it is after hours please leave a voice message and we will return your call  Please speak clearly, leaving your full name, birth date, best number to reach you and the reason for your call  Medication refills: We will need the name of the medication, the dosage, the ordering provider, whether you get a 30 or 90 day refill, and the pharmacy name and address  Medications will be ordered by the provider only  Nurses cannot call in prescriptions  Please allow 7 days for medication refills  Physician requested updates: If your provider requested that you call with an update after starting medication, please be ready to provide us the medication and dosage, what time you take your medication, the time you attempt to fall asleep, time you fall asleep, when you wake up, and what time you get out of bed  Sleep Study Results: We will contact you with sleep study results and/or next steps after the physician has reviewed your testing

## 2022-04-01 ENCOUNTER — TELEPHONE (OUTPATIENT)
Dept: SLEEP CENTER | Facility: CLINIC | Age: 62
End: 2022-04-01

## 2022-04-01 NOTE — TELEPHONE ENCOUNTER
Called patient and advised of denial for Armodafinil  Patient will continue to purchase medication with [facility]

## 2022-04-01 NOTE — TELEPHONE ENCOUNTER
Received denial from [facility] for Armodafinil  Per denial letter, patient does not meet the coverage criteria:  -Do not see medical record documentation of a diagnosis of ANAM/hypopnea syndrome requiring treatment with nasal CPAP or narcolepsy or shift work disorder  To appeal this decision you may complete the "Appeal Filing Form"(scanned into media) and send it back or submit a written request   Fax: [phone]  Isabella Valentin, how would you like to proceed? Looks Armodafinil was denied last time prior auth was submitted and patient has been purchasing with [employer]

## 2022-04-03 NOTE — TELEPHONE ENCOUNTER
Please offer Friday may 27  at [facility] [place], she will need a 1 hour appointment  9 ;00 am or 11;00     She is a spnl to me     Not many options     Another option is movement disorder specialist as they specialize in tremor

## 2022-04-07 NOTE — TELEPHONE ENCOUNTER
Received message from [facility] asking on status of prior authorization of Armodafinil  Called [facility] and spoke to pharmacist   Advised medication was denied by insurance and patient plans to pay for it using [facility]

## 2022-04-11 ENCOUNTER — TELEPHONE (OUTPATIENT)
Dept: NEUROLOGY | Facility: CLINIC | Age: 62
End: 2022-04-11

## 2022-04-11 NOTE — TELEPHONE ENCOUNTER
I called and left a voicemail message for the patient explaining to please call our office to reschedule her appointment on 5/27/22 in the [facility] office to the [facility] office  Dr Hien Avila will not be in the [facility] office on this day  I left my name and number for her to return my call

## 2022-04-20 NOTE — TELEPHONE ENCOUNTER
I called and left a voicemail message about patients upcoming appointment with Chyna Tian on 5/27/22 @ 11 am in the [facility] office  I requested a call back to our office to confirm the appointment or if the patient has any issues or concerns or cannot keep this appointment

## 2022-04-27 ENCOUNTER — OFFICE VISIT (OUTPATIENT)
Dept: GASTROENTEROLOGY | Facility: MEDICAL CENTER | Age: 62
End: 2022-04-27
Payer: COMMERCIAL

## 2022-04-27 VITALS
TEMPERATURE: 98.2 F | DIASTOLIC BLOOD PRESSURE: 70 MMHG | BODY MASS INDEX: 20.3 KG/M2 | WEIGHT: 114.6 LBS | SYSTOLIC BLOOD PRESSURE: 119 MMHG | HEART RATE: 79 BPM

## 2022-04-27 DIAGNOSIS — E61.1 IRON DEFICIENCY: ICD-10-CM

## 2022-04-27 DIAGNOSIS — K52.9 COLITIS: Primary | ICD-10-CM

## 2022-04-27 DIAGNOSIS — R10.9 ABDOMINAL PAIN, UNSPECIFIED ABDOMINAL LOCATION: ICD-10-CM

## 2022-04-27 DIAGNOSIS — K52.9 CHRONIC DIARRHEA: ICD-10-CM

## 2022-04-27 DIAGNOSIS — K52.832 LYMPHOCYTIC COLITIS: ICD-10-CM

## 2022-04-27 DIAGNOSIS — R11.0 NAUSEA: ICD-10-CM

## 2022-04-27 PROCEDURE — 99214 OFFICE O/P EST MOD 30 MIN: CPT | Performed by: INTERNAL MEDICINE

## 2022-04-27 PROCEDURE — 4004F PT TOBACCO SCREEN RCVD TLK: CPT | Performed by: INTERNAL MEDICINE

## 2022-04-27 NOTE — PROGRESS NOTES
[facility] - Outpatient Follow-up Note  Meghna Nguyen 59 y o  female MRN: [id number]  Encounter: [id number]          ASSESSMENT AND PLAN:    Meghna Nguyen is a 59 y o  female who presents with complaint of chronic diarrhea with colonoscopy demonstrating microscopic colitis as well as some chronic enteritis and prior acute colitis, prior small-bowel obstruction with transition point in the right pelvis thought to be from adhesions, who presents for follow-up  She reports having a week long flare every month, which improves with budesonide but then recurs  She does not want to take the budesonide daily  She is hesitant to add standing medications  Colonoscopy in October with diverticulosis and colonic erythema as well as ileal erythema, with biopsy showing microscopic colitis and chronic nonspecific inflammation in the terminal ileum  She had a prior EGD from 2020  Most recent blood work with relatively normal CMP aside from a BUN of 28, low iron at 35 and low iron saturation, with ferritin of 202  CBC with normal white blood cell count, hemoglobin, platelets  Prior CRP less than 3      1  Colitis    2  Chronic diarrhea    3  Lymphocytic colitis    4  Abdominal pain, unspecified abdominal location    5  Nausea    6  Iron deficiency        Orders Placed This Encounter   Procedures    Calprotectin,Fecal    Pancreatic elastase, fecal    Fecal fat, qualitative    Clostridium difficile toxin by PCR with EIA    MRI enterography w wo    C-reactive protein    Celiac Disease Antibody Profile    Comprehensive metabolic panel     Low residue diet  Budesonide as needed  She declines taking it standing  She declines trial of mesalamine  Will check stool tests and blood work as noted  Consider repeat EGD, colonoscopy and capsule endoscopy for anemia in the future  Will first get MRI (as she had an EGD in 2020, colonoscopy in 2021, and she had prior SBOs)     Monitor iron studies in the future  Repeat colonoscopy in 2026 (or sooner if clinically indicated)  Consider stopping Cymbalta, NSAIDs, omeprazole in the future  Continue Prilosec 40mg for now      ______________________________________________________________________    SUBJECTIVE:    Mahesh Pradhan is a 59 y o  female who presents with complaint of colitis  She notes that her colitis will act up every so often  Maybe once a month she will have a flare with diarrhea, abdominal pain, mouth ulcers  She will take budesonide and the symptoms resolve  The flare can last a week until it settles down again  If she takes too many she gets constipated  She stops taking it once it settles down  She will have abdominal pain in the middle and general abdomen  It is constant during an episode  Heat helps  No clear relationship to food, but acidic foods might cause a flare  During a flare she can have 4-5 stools per day, watery, without BRBPR or black stools (but it is dark and she is on iron supplements)  She went back to Prilosec 40mg which is better at controlling her heartburn  No significant dysphagia, odynophagia but sometimes food will pass slowly  She will have some nausea when she has the severe diarrhea  No weight loss and it is holding  No rashes  + joint pains all the time  Answers for HPI/ROS submitted by the patient on 4/20/2022  Progression since onset: waxing and waning  Pain location: generalized abdominal region  Pain - numeric: 6/10  Pain quality: cramping  Radiates to: does not radiate  anorexia: Yes  arthralgias: No  belching: No  constipation: No  diarrhea: Yes  dysuria: No  fever: No  flatus: No  frequency: Yes  headaches: No  hematochezia: No  hematuria: No  melena: No  myalgias: No  nausea: Yes  weight loss: Yes  vomiting: Yes  Aggravated by: movement  Relieved by: bowel movements, movement, recumbency        REVIEW OF SYSTEMS IS OTHERWISE NEGATIVE    10 point ROS reviewed and negative, except as above      Historical Information   Past Medical History:   Diagnosis Date    Allergy to dust     Anemia     Calculus of ureter     Chronic pain disorder     neck and back- sees pain management    Colitis     Colon polyp     Cracked skin     on fingers    Diverticulosis     Dysphagia     GERD (gastroesophageal reflux disease)     H/O: GI bleed     Hiatal hernia     History of DVT in adulthood 2019 2019    Hydronephrosis     Hyperlipidemia     Inguinal hernia     right side    Irregular heart beat     Pt states with prolonged QT interval - was seen by cardiologist( [facility]) pt told "to not worry about it"    Irritable bowel     Kidney stone     Migraines     Narcolepsy     Narcolepsy 08/06/2020    OAB (overactive bladder)     Osteoarthritis     Osteoporosis     Other chronic pain     degenerative disc disease    PONV (postoperative nausea and vomiting)     migraines    Pyelonephritis 1/13/2021    SBO (small bowel obstruction) ([address] ) 9/25/2021    Sepsis ([address] ) 1/13/2021    Smoking 8/6/2020    Tobacco abuse     Wears dentures     full upper    Wears glasses     Wears glasses      Past Surgical History:   Procedure Laterality Date    ABDOMINAL ADHESION SURGERY      ARM SKIN LESION BIOPSY / EXCISION      lymph node excision    BLADDER SURGERY      CHOLECYSTECTOMY      COLONOSCOPY  08/06/2020    FL RETROGRADE PYELOGRAM  1/5/2021    HERNIA REPAIR      HYSTERECTOMY  1977    JOINT REPLACEMENT Right     knee    KNEE SURGERY Right     replacement    LAMINECTOMY      L5-6-7    LAPAROSCOPY      LYMPH NODE DISSECTION Left     removed -no cancer-  limb restriction    LYMPHADENECTOMY  1974    UNDER LEFT ARM - DO NOT DRAW BLOOD/GIVE INJECTIONS IN LEFT ARM PER PATIENT     MOLE REMOVAL      OOPHORECTOMY Bilateral 1977    OTHER SURGICAL HISTORY      enlarged lymph node removed left arm   no cancer    TX CYSTO/URETERO W/LITHOTRIPSY &INDWELL STENT INSRT Right 1/5/2021    Procedure: CYSTO, LASER  URETEROSCOPY, RETRO PYELOGRAM, STENT REPLACMENT;  Surgeon: Karli Crews MD;  Location: [facility];  Service: Urology    ID CYSTOURETHROSCOPY,URETER CATHETER Right 12/2/2020    Procedure: CYSTOSCOPY AND INSERTION STENT URETERAL;  Surgeon: Katia James MD;  Location: [facility];  Service: Urology    [facility]    Procedure: LITHOTRIPSY EXTRACORPORAL SHOCKWAVE (ESWL);   Surgeon: Irina Lozada MD;  Location: [address] [place] [address] MAIN OR;  Service: Urology    [facility] HERNIA,5+Y/O,REDUCIBL Right 6/25/2020    Procedure: REPAIR HERNIA INGUINAL  WITH MESH;  Surgeon: Sil Kwok MD;  Location: [facility];  Service: General    SPINAL FUSION      UPPER GASTROINTESTINAL ENDOSCOPY  08/06/2020    WISDOM TOOTH EXTRACTION       Social History   Social History     Substance and Sexual Activity   Alcohol Use Not Currently    Comment: seldom     Social History     Substance and Sexual Activity   Drug Use Not Currently    Types: Marijuana    Comment: medicinal marijuana; last dose was 3/16     Social History     Tobacco Use   Smoking Status Current Every Day Smoker    Packs/day: 0 50    Years: 47 00    Pack years: 23 50    Types: Cigarettes   Smokeless Tobacco Never Used   Tobacco Comment    pt states is at 1/2 pk day currently - was previously at one pack /day     Family History   Problem Relation Age of Onset    Hypertension Father     Cancer Father     Hypertension Mother     Heart attack Mother     Stroke Brother     Nephrolithiasis Brother     Diabetes Sister     No Known Problems Daughter     No Known Problems Maternal Grandmother     No Known Problems Maternal Grandfather     No Known Problems Paternal Grandmother     No Known Problems Paternal Grandfather     Diabetes Sister     No Known Problems Maternal Aunt     No Known Problems Maternal Aunt     No Known Problems Maternal Aunt     No Known Problems Paternal Aunt     No Known Problems Paternal Aunt     No Known Problems Paternal Aunt        Meds/Allergies       Current Outpatient Medications:     Armodafinil 250 MG tablet    BACILLUS COAGULANS-INULIN PO    calcium carbonate 1250 MG capsule    Cholecalciferol 1000 units tablet    Citicoline Sodium 500 MG TABS    diclofenac (VOLTAREN) 75 mg EC tablet    dicyclomine (BENTYL) 20 mg tablet    DULoxetine (CYMBALTA) 30 mg delayed release capsule    ferrous sulfate 325 (65 Fe) mg tablet    gabapentin (NEURONTIN) 300 mg capsule    Lori, Zingiber officinalis, (LORI ROOT PO)    Ginkgo Biloba 40 MG TABS    Multiple Vitamins-Minerals (CENTRUM SILVER PO)    omeprazole (PriLOSEC) 40 MG capsule    prochlorperazine (COMPAZINE) 10 mg tablet    solifenacin (VESICARE) 10 MG tablet    tiZANidine (ZANAFLEX) 4 mg tablet    Turmeric (QC Tumeric Complex) 500 MG CAPS    ZOLMitriptan (ZOMIG) 2 5 MG tablet    budesonide (ENTOCORT EC) 3 MG capsule    rasagiline (AZILECT) 0 5 mg    rosuvastatin (CRESTOR) 5 mg tablet    Allergies   Allergen Reactions    Celecoxib Other (See Comments)     Increased Heart Rate, Palpitations    Sumatriptan Anaphylaxis     Other reaction(s): SUMATRIPTAN (IMITREX) (Throat closure)  Pt analilia zolmitriptan   Tramadol Other (See Comments)     GI Upset- severe vomiting and systemic body aches    Medical Tape Dermatitis, Rash and Blisters     Adhesive from medication patches and bandaides- ok with paper tape           Objective     Blood pressure 119/70, pulse 79, temperature 98 2 °F (36 8 °C), temperature source Probe, weight 52 kg (114 lb 9 6 oz), not currently breastfeeding  Body mass index is 20 3 kg/m²  PHYSICAL EXAMINATION:    General Appearance:   Alert, cooperative, no distress   HEENT:  Normocephalic, atraumatic, anicteric  Neck supple, symmetrical, trachea midline  Lungs:   Equal chest rise and unlabored breathing, normal effort, no coughing  Cardiovascular:   No visualized JVD  Abdomen:   No abdominal distension     Skin:   No jaundice, rashes, or lesions  Musculoskeletal:   Normal range of motion visualized  Psych:  Normal affect and normal insight  Neuro:  Alert and appropriate  Lab Results:   No visits with results within 1 Day(s) from this visit  Latest known visit with results is:   Appointment on 03/21/2022   Component Date Value    Iron Saturation 03/21/2022 12*    TIBC 03/21/2022 303     Iron 03/21/2022 35*    Ferritin 03/21/2022 202     Cholesterol 03/21/2022 281*    Triglycerides 03/21/2022 70     HDL, Direct 03/21/2022 86     LDL Calculated 03/21/2022 181*       Lab Results   Component Value Date    WBC 7 48 02/10/2022    HGB 13 1 02/10/2022    HCT 39 0 02/10/2022    MCV 95 02/10/2022     02/10/2022       Lab Results   Component Value Date    SODIUM 137 02/10/2022    K 4 1 02/10/2022     02/10/2022    CO2 25 02/10/2022    AGAP 9 02/10/2022    BUN 28 (H) 02/10/2022    CREATININE 0 83 02/10/2022    GLUC 100 02/10/2022    GLUF 101 (H) 04/15/2021    CALCIUM 9 5 02/10/2022    AST 16 02/10/2022    ALT 22 02/10/2022    ALKPHOS 83 02/10/2022    TP 6 7 02/10/2022    TBILI 0 28 02/10/2022    EGFR 75 02/10/2022       Lab Results   Component Value Date    CRP <3 0 04/19/2021       Lab Results   Component Value Date    ZHA4MVTPGRFF 2 150 04/15/2021       Lab Results   Component Value Date    IRON 35 (L) 03/21/2022    TIBC 303 03/21/2022    FERRITIN 202 03/21/2022       Radiology Results:   No results found

## 2022-05-04 ENCOUNTER — TELEPHONE (OUTPATIENT)
Dept: NEUROSURGERY | Facility: CLINIC | Age: 62
End: 2022-05-04

## 2022-05-05 ENCOUNTER — CONSULT (OUTPATIENT)
Dept: NEUROSURGERY | Facility: CLINIC | Age: 62
End: 2022-05-05
Payer: COMMERCIAL

## 2022-05-05 VITALS
HEART RATE: 85 BPM | SYSTOLIC BLOOD PRESSURE: 114 MMHG | BODY MASS INDEX: 20.2 KG/M2 | HEIGHT: 63 IN | WEIGHT: 114 LBS | DIASTOLIC BLOOD PRESSURE: 86 MMHG | TEMPERATURE: 99.1 F | RESPIRATION RATE: 16 BRPM

## 2022-05-05 DIAGNOSIS — Z98.1 HISTORY OF FUSION OF CERVICAL SPINE: ICD-10-CM

## 2022-05-05 DIAGNOSIS — M54.12 CERVICAL RADICULOPATHY: ICD-10-CM

## 2022-05-05 PROCEDURE — 99244 OFF/OP CNSLTJ NEW/EST MOD 40: CPT | Performed by: NEUROLOGICAL SURGERY

## 2022-05-05 RX ORDER — ACETAMINOPHEN 500 MG
1000 TABLET ORAL EVERY 6 HOURS PRN
COMMUNITY
End: 2022-06-29

## 2022-05-05 NOTE — PROGRESS NOTES
Neurosurgery Office Note  Adams Manzano 59 y o  female MRN: [id number]      Assessment/Plan        Problem List Items Addressed This Visit        Nervous and Auditory    Cervical radiculopathy    Relevant Orders    XR entire spine (scoliosis) 6+ vw       Other    History of fusion of cervical spine    Relevant Orders    XR spine cervical complete 6+ vw flex/ext/obl            Discussion:  Patient is a 28-year-old female with h/o SBO due to adhesions (complication from hysterectomy), chronic diarrhea due to colitis/enteritis (followed by GI, last evaluated on 4/27/22), [facility], recent kidney stones, GERD, HLD, osteoarthritis, smoking who p/w neck pain radiating diffusely to bilateral arms and hands a/w intermittent burning in hands s/p C5-7 ACDF (preoperative symptoms of bilateral hand pain, numbness, weakness mildly improved postop) then C4-6 posterior decompression and foraminotomies (bilateral arm symptoms improved significantly postop) both at [facility] in 2014  Then since September 2021, she began experiencing worsening neck pain and diffuse bilateral arm/hand pain and paresthesias (non-dermatomal)  No persistent arm numbness/weakness, fine motor dysfunction in hands, gait imbalance, bowel/bladder changes, recent trauma  Currently taking diclofenac, duloxetine, gabapentin (did not tolerate Lyrica), tizanidine  Previously followed by Dr Will Galindo in 2020 s/p bilateral L5/S1 TFESI on 7/13/20 and 9/22/20 as well as left C3-5 MBNB on 9/25/19 and left C7-T1 NOEMY on 1/29/20  Last PT and OT in December 2021, significantly helpful especially for bilateral hands  Not taking AC  Current smoker  On exam, she is neurologically intact without long tract signs or gait ataxia      MRI c-spine on 2/12/22 and CT c-spine on 3/20/22 showed prior ACDF construct at C5-6 and C6-7 with no canal or foraminal stenosis, mild bilateral foraminal stenosis at C7/T1, loss of normal lordosis (kyphotic deformity most prominent at C3/4), bony fusion at ACDF and adjacent levels, no significant cord compression or cord signal changes  Given that there is no radiographic or clinical evidence of spinal cord compression or radiculopathy specific to any nerve distribution in cervical spine, I recommend no acute neurosurgical intervention  I will order baseline XR c-spine flexion/extension and scoliosis films to rule out dynamic instability and assess overall deformity of entire spine, respectively  She can continue conservative management per Pain Medicine and PT, follow up as needed  CHIEF COMPLAINT    Chief Complaint   Patient presents with    Consult     Neck pain        HISTORY    History of Present Illness     59y o  year old female     HPI    See Discussion    REVIEW OF SYSTEMS    Review of Systems   HENT: Positive for trouble swallowing (hx )  Gastrointestinal: Positive for nausea (hx of IBS)  Genitourinary: Positive for frequency and urgency  Musculoskeletal: Positive for back pain, gait problem (at times-due to LBP ) and neck pain (b/l arm pain-dificulty with b/l finger motion)  Sx present x 10 years    CSPINE FUSION IN 2014 AT [facility] BY DR Verito Brooks    Pain Medications: DICLOFENAC, TIZANIDINE, DULOXETINE, GABAPENTIN, TURMERIC, GINGER, tylenol   (minimun relief with meds)         Physical Therapy: Yes, OCT  TO DEC  2021 AT   (makes the pain worse)    Epidural Injections: Yes, 2020 DR GRICELDA WOLF  (very lil relief-short lasting)    Pain 7/10 neck    Neurological: Positive for dizziness, tremors, weakness (b/l), numbness (left hand x 1 day ) and headaches  Last ov with neurology 1/13/22   Hematological: Bruises/bleeds easily  All other systems reviewed and are negative          Meds/Allergies     Current Outpatient Medications   Medication Sig Dispense Refill    Armodafinil 250 MG tablet Take 1 tablet (250 mg total) by mouth daily 30 tablet 5    BACILLUS COAGULANS-INULIN PO Take 1 capsule by mouth daily Takes in the am      budesonide (ENTOCORT EC) 3 MG capsule Take 3 capsules (9 mg total) by mouth daily 90 capsule 3    calcium carbonate 1250 MG capsule Take 600 mg by mouth daily       Cholecalciferol 1000 units tablet Take 1,000 Units by mouth daily Takes in the afternoon      Citicoline Sodium 500 MG TABS Take 500 mg by mouth daily Takes in the am      diclofenac (VOLTAREN) 75 mg EC tablet       dicyclomine (BENTYL) 20 mg tablet Take 1 tablet (20 mg total) by mouth 4 (four) times a day as needed (abdominal pain) 240 tablet 3    DULoxetine (CYMBALTA) 30 mg delayed release capsule Take 3 capsules (90 mg total) by mouth daily 90 capsule 1    ferrous sulfate 325 (65 Fe) mg tablet Take 325 mg by mouth daily with breakfast       gabapentin (NEURONTIN) 300 mg capsule 300 mg at 1200 in addition to 600 mg at bedtime   Ginger, Zingiber officinalis, (GINGER ROOT PO) Take 700 mg by mouth 2 (two) times a day       Ginkgo Biloba 40 MG TABS Take 40 mg by mouth daily Take in the am      Multiple Vitamins-Minerals (CENTRUM SILVER PO) Take 1 tablet by mouth daily Takes in the am      omeprazole (PriLOSEC) 40 MG capsule       prochlorperazine (COMPAZINE) 10 mg tablet Take 1 tablet (10 mg total) by mouth every 6 (six) hours as needed for nausea or vomiting 12 tablet 1    rasagiline (AZILECT) 0 5 mg Take 1 tablet (0 5 mg total) by mouth daily 30 tablet 0    rosuvastatin (CRESTOR) 5 mg tablet Take 1 tablet (5 mg total) by mouth daily 90 tablet 0    solifenacin (VESICARE) 10 MG tablet Take 1 tablet (10 mg total) by mouth every evening 90 tablet 3    tiZANidine (ZANAFLEX) 4 mg tablet TAKE 4mg qhs prn headache/neck pain  30 tablet 1    Turmeric (QC Tumeric Complex) 500 MG CAPS Take 700 mg by mouth 2 (two) times a day       ZOLMitriptan (ZOMIG) 2 5 MG tablet TAKE 1 TABLET BY MOUTH AT ONSET OF MIGRAINE, MAY REPEAT IN 2 HOURS IF NEEDED   LIMIT 2/24 HOURS, 4/WEEK, 8/MONTH 12 tablet 3 No current facility-administered medications for this visit  Allergies   Allergen Reactions    Celecoxib Other (See Comments)     Increased Heart Rate, Palpitations    Sumatriptan Anaphylaxis     Other reaction(s): SUMATRIPTAN (IMITREX) (Throat closure)  Pt analilia zolmitriptan      Tramadol Other (See Comments)     GI Upset- severe vomiting and systemic body aches    Medical Tape Dermatitis, Rash and Blisters     Adhesive from medication patches and bandaides- ok with paper tape       PAST HISTORY    Past Medical History:   Diagnosis Date    Allergy to dust     Anemia     Calculus of ureter     Chronic pain disorder     neck and back- sees pain management    Colitis     Colon polyp     Cracked skin     on fingers    Diverticulosis     Dysphagia     GERD (gastroesophageal reflux disease)     H/O: GI bleed     Hiatal hernia     History of DVT in adulthood 2019 2019    Hydronephrosis     Hyperlipidemia     Inguinal hernia     right side    Irregular heart beat     Pt states with prolonged QT interval - was seen by cardiologist( [facility]) pt told "to not worry about it"    Irritable bowel     Kidney stone     Migraines     Narcolepsy     Narcolepsy 08/06/2020    OAB (overactive bladder)     Osteoarthritis     Osteoporosis     Other chronic pain     degenerative disc disease    PONV (postoperative nausea and vomiting)     migraines    Pyelonephritis 1/13/2021    SBO (small bowel obstruction) ([address] ) 9/25/2021    Sepsis ([address] ) 1/13/2021    Smoking 8/6/2020    Tobacco abuse     Wears dentures     full upper    Wears glasses     Wears glasses        Past Surgical History:   Procedure Laterality Date    ABDOMINAL ADHESION SURGERY      ARM SKIN LESION BIOPSY / EXCISION      lymph node excision    BLADDER SURGERY      CHOLECYSTECTOMY      COLONOSCOPY  08/06/2020    FL RETROGRADE PYELOGRAM  1/5/2021    HERNIA REPAIR      HYSTERECTOMY  1977    JOINT REPLACEMENT Right     knee    KNEE SURGERY Right     replacement    LAMINECTOMY      L5-6-7    LAPAROSCOPY      LYMPH NODE DISSECTION Left     removed -no cancer-  limb restriction    LYMPHADENECTOMY  1974    UNDER LEFT ARM - DO NOT DRAW BLOOD/GIVE INJECTIONS IN LEFT ARM PER PATIENT     MOLE REMOVAL      OOPHORECTOMY Bilateral 1977    OTHER SURGICAL HISTORY      enlarged lymph node removed left arm   no cancer    OK CYSTO/URETERO W/LITHOTRIPSY &INDWELL STENT INSRT Right 1/5/2021    Procedure: CYSTO, LASER  URETEROSCOPY, RETRO PYELOGRAM, STENT REPLACMENT;  Surgeon: Sintia Marshall MD;  Location: [facility];  Service: Urology    OK CYSTOURETHROSCOPY,URETER CATHETER Right 12/2/2020    Procedure: CYSTOSCOPY AND INSERTION STENT URETERAL;  Surgeon: Reynaldo Bourne MD;  [facility];  Service: Urology    [facility] ESWL Right 3/25/2021    Procedure: LITHOTRIPSY EXTRACORPORAL SHOCKWAVE (ESWL);   Surgeon: Katherin Shone, MD;  Location: [address] [place] [address] MAIN OR;  Service: Urology    [facility] HERNIA,5+Y/O,REDUCIBL Right 6/25/2020    Procedure: REPAIR HERNIA INGUINAL  WITH MESH;  Surgeon: Paige Moreno MD;  Location: [facility];  Service: General    SPINAL FUSION      UPPER GASTROINTESTINAL ENDOSCOPY  08/06/2020    WISDOM TOOTH EXTRACTION         Social History     Tobacco Use    Smoking status: Current Every Day Smoker     Packs/day: 0 50     Years: 47 00     Pack years: 23 50     Types: Cigarettes    Smokeless tobacco: Never Used    Tobacco comment: pt states is at 1/2 pk day currently - was previously at one pack /day   Vaping Use    Vaping Use: Never used   Substance Use Topics    Alcohol use: Not Currently     Comment: seldom    Drug use: Not Currently     Types: Marijuana     Comment: medicinal marijuana; last dose was 3/16       Family History   Problem Relation Age of Onset    Hypertension Father     Cancer Father     Hypertension Mother     Heart attack Mother     Stroke Brother     Nephrolithiasis Brother  Diabetes Sister     No Known Problems Daughter     No Known Problems Maternal Grandmother     No Known Problems Maternal Grandfather     No Known Problems Paternal Grandmother     No Known Problems Paternal Grandfather     Diabetes Sister     No Known Problems Maternal Aunt     No Known Problems Maternal Aunt     No Known Problems Maternal Aunt     No Known Problems Paternal Aunt     No Known Problems Paternal Aunt     No Known Problems Paternal Aunt          The following portions of the patient's history were reviewed in this encounter and updated as appropriate: Past medical, surgical, family, and social history, as well as medications, allergies, and review of systems  EXAM    Vitals:Blood pressure 114/86, pulse 85, temperature 99 1 °F (37 3 °C), temperature source Temporal, resp  rate 16, height 5' 3" (1 6 m), weight 51 7 kg (114 lb), not currently breastfeeding  ,There is no height or weight on file to calculate BMI  Physical Exam  Constitutional:       Appearance: Normal appearance  HENT:      Head: Normocephalic and atraumatic  Eyes:      Extraocular Movements: Extraocular movements intact and EOM normal       Pupils: Pupils are equal, round, and reactive to light  Cardiovascular:      Rate and Rhythm: Normal rate and regular rhythm  Pulses: Normal pulses  Heart sounds: Normal heart sounds  Pulmonary:      Effort: Pulmonary effort is normal       Breath sounds: Normal breath sounds  Abdominal:      General: Abdomen is flat  Palpations: Abdomen is soft  Musculoskeletal:         General: Normal range of motion  Cervical back: Normal range of motion  Skin:     General: Skin is warm  Neurological:      General: No focal deficit present  Mental Status: She is alert and oriented to person, place, and time  Mental status is at baseline  Gait: Gait is intact        Deep Tendon Reflexes: Strength normal    Psychiatric:         Mood and Affect: Mood normal          Speech: Speech normal          Behavior: Behavior normal          Neurologic Exam     Mental Status   Oriented to person, place, and time  Attention: normal    Speech: speech is normal   Level of consciousness: alert    Cranial Nerves     CN II   Visual fields full to confrontation  Visual acuity: normal  Right visual field deficit: none  Left visual field deficit: none     CN III, IV, VI   Pupils are equal, round, and reactive to light  Extraocular motions are normal    CN III: no CN III palsy  CN VI: no CN VI palsy  Nystagmus: none   Diplopia: none  Ophthalmoparesis: none  Upgaze: normal  Downgaze: normal  Conjugate gaze: present    CN V   Facial sensation intact  Right facial sensation deficit: none  Left facial sensation deficit: none    CN VII   Facial expression full, symmetric  Right facial weakness: none  Left facial weakness: none    CN VIII   CN VIII normal      CN IX, X   CN IX normal    CN X normal    Palate: symmetric    CN XI   CN XI normal    Right sternocleidomastoid strength: normal  Left sternocleidomastoid strength: normal  Right trapezius strength: normal  Left trapezius strength: normal    CN XII   CN XII normal    Tongue deviation: none    Motor Exam   Muscle bulk: normal  Overall muscle tone: normal  Right arm pronator drift: absent  Left arm pronator drift: absent    Strength   Strength 5/5 throughout  Sensory Exam   Light touch normal      Gait, Coordination, and Reflexes     Gait  Gait: normal    Reflexes   Reflexes 2+ except as noted  MEDICAL DECISION MAKING    Imaging Studies:     No results found  I have personally reviewed pertinent films in [facility]    Neurosurgeon

## 2022-05-09 NOTE — TELEPHONE ENCOUNTER
I called to confirm the patients appt for 5/27/22 @ 11 am with Dr Francis Loss, I left a voicemail message to please return my call  I also mentioned that she needs her labs completed for her visit  I left my name and number

## 2022-05-27 ENCOUNTER — OFFICE VISIT (OUTPATIENT)
Dept: NEUROLOGY | Facility: CLINIC | Age: 62
End: 2022-05-27
Payer: COMMERCIAL

## 2022-05-27 VITALS
WEIGHT: 116 LBS | RESPIRATION RATE: 18 BRPM | HEART RATE: 82 BPM | DIASTOLIC BLOOD PRESSURE: 72 MMHG | HEIGHT: 63 IN | SYSTOLIC BLOOD PRESSURE: 126 MMHG | OXYGEN SATURATION: 98 % | BODY MASS INDEX: 20.55 KG/M2 | TEMPERATURE: 97.5 F

## 2022-05-27 DIAGNOSIS — G62.9 POLYNEUROPATHY: Primary | ICD-10-CM

## 2022-05-27 DIAGNOSIS — R74.8 ABNORMAL LEVELS OF OTHER SERUM ENZYMES: ICD-10-CM

## 2022-05-27 DIAGNOSIS — M54.16 LUMBAR RADICULOPATHY: ICD-10-CM

## 2022-05-27 DIAGNOSIS — R26.89 IMPAIRED GAIT AND MOBILITY: ICD-10-CM

## 2022-05-27 PROBLEM — R26.9 NEUROLOGIC GAIT DYSFUNCTION: Status: ACTIVE | Noted: 2022-05-27

## 2022-05-27 PROCEDURE — 3008F BODY MASS INDEX DOCD: CPT | Performed by: PSYCHIATRY & NEUROLOGY

## 2022-05-27 PROCEDURE — 99214 OFFICE O/P EST MOD 30 MIN: CPT | Performed by: PSYCHIATRY & NEUROLOGY

## 2022-05-27 PROCEDURE — 4004F PT TOBACCO SCREEN RCVD TLK: CPT | Performed by: PSYCHIATRY & NEUROLOGY

## 2022-05-27 RX ORDER — METAXALONE 800 MG/1
400 TABLET ORAL 3 TIMES DAILY
Qty: 45 TABLET | Refills: 2 | Status: SHIPPED | OUTPATIENT
Start: 2022-05-27 | End: 2022-11-23 | Stop reason: SDUPTHER

## 2022-05-27 RX ORDER — METAXALONE 400 MG/1
400 TABLET ORAL 3 TIMES DAILY
Qty: 90 TABLET | Refills: 3 | Status: SHIPPED | OUTPATIENT
Start: 2022-05-27 | End: 2022-05-27

## 2022-05-27 NOTE — PROGRESS NOTES
Patient ID: Vanesa Muñoz is a 59 y o  female  Assessment/Plan:    Migraine without aura and without status migrainosus, not intractable  Well controlled on current regimen     On Zomig p r n  Tremor  He has a mixed tremor with is physiologic as well as an action tremor  There is no evidence of dysmetria  A she has some mild increased tone in the left upper lower extremity  We did discuss medications and I am concerned about the potential side effects of drowsiness given her known history of hypersomnolence  We have elected to avoid any additional medications but I have suggested weighted utensils  Discussed that with time additional visit the further clarification can be seen  At this time will avoid medications    Neurologic gait dysfunction  sHe does have a gait dysfunction which is multifactorial including her ongoing back pain neck pain  She does complain of numbness and tingling in her feet  Prior emg did not demonstrate a large fiber polyneuropathy  We will check B1 B12 B6 and folate to determine other etiologies of neurological dysfunction in her feet       Diagnoses and all orders for this visit:    Polyneuropathy  -     Vitamin B12; Future  -     Folate; Future  -     Vitamin B6; Future  -     Vitamin B1, whole blood; Future    Lumbar radiculopathy  -     Discontinue: metaxalone (SKELAXIN) 400 MG tablet; Take 1 tablet (400 mg total) by mouth 3 (three) times a day         Subjective:    Patient is a 68-year-old female who has been following up with [facility] for last two years presents our offices with continued complaints of tremor  The tremor occurred unilaterally  now however now  It  can occur bilaterally at different times  She also describes a inside tremor  It does occur with activity and  with rest   The etiology of the tremor one on clear but she was given a prescription for rasagline   This was ineffective  Tremor can occur at any time without any stressors    She also describes a history of a facial tremor  She does have a history of dysesthesias and burning in her hands  She previously s/p C5-7 ACDF (preoperative symptoms of bilateral hand pain, numbness, weakness mildly improved postop) then C4-6 posterior decompression and foraminotomies (bilateral arm symptoms improved significantly postop) both at [facility] in 2014  She has a history of neck pain back pain  She was previously on Skelaxin but was placed on 10s on a DN  She reports the zanalfex was not as  Effective    Does have migraine headaches but the abortive of Zomig proves to be helpful          Currently taking diclofenac, duloxetine, gabapentin (did not tolerate Lyrica), tizanidine       Previously followed by Dr Kevin Borden in 2020 s/p bilateral L5/S1 TFESI on 7/13/20 and 9/22/20 as well as left C3-5 MBNB on 9/25/19 and left C7-T1 NOEMY on 1/29/20            MRI c-spine on 2/12/22 and CT c-spine on 3/20/22 showed prior ACDF construct at C5-6 and C6-7 with no canal or foraminal stenosis, mild bilateral foraminal stenosis at C7/T1, loss of normal lordosis (kyphotic deformity most prominent at C3/4), bony fusion at ACDF and adjacent levels, no significant cord compression or cord signal changes                     She continues Cymbalta 90 mg daily and gabapentin 600 mg q h s            EMG in October 2021suggested moderate right median neuropathy, and right ulnar neuropathy     Complaints include numbness and decreased sensation in the feet  She does also admit that the numbness and tingling can radiate from her hip and lower back down her leg specially the left greater than right                         The following portions of the patient's history were reviewed and updated as appropriate:   She  has a past medical history of Allergy to dust, Anemia, Calculus of ureter, Chronic pain disorder, Colitis, Colon polyp, Cracked skin, Diverticulosis, Dysphagia, GERD (gastroesophageal reflux disease), H/O: GI bleed, Hiatal hernia, History of DVT in adulthood (2019), Hydronephrosis, Hyperlipidemia, Inguinal hernia, Irregular heart beat, Irritable bowel, Kidney stone, Migraines, Narcolepsy, Narcolepsy (08/06/2020), OAB (overactive bladder), Osteoarthritis, Osteoporosis, Other chronic pain, PONV (postoperative nausea and vomiting), Pyelonephritis (1/13/2021), SBO (small bowel obstruction) ([facility] [address] ) (9/25/2021), Sepsis ([facility] [address] ) (1/13/2021), Smoking (8/6/2020), Tobacco abuse, Wears dentures, Wears glasses, and Wears glasses  She  has a past surgical history that includes Laminectomy; Knee surgery (Right); Cholecystectomy; Spinal fusion; Bladder surgery; Abdominal adhesion surgery; Hellier tooth extraction; Mole removal; Other surgical history; Hysterectomy (1977); Oophorectomy (Bilateral, 1977); LAPAROSCOPY; pr repair ing hernia,5+y/o,reducibl (Right, 6/25/2020); Colonoscopy (08/06/2020); Upper gastrointestinal endoscopy (08/06/2020); pr cystourethroscopy,ureter catheter (Right, 12/2/2020); Lymph node dissection (Left); Arm skin lesion biopsy / excision; pr cysto/uretero w/lithotripsy &indwell stent insrt (Right, 1/5/2021); [place] retrograde pyelogram (1/5/2021); Joint replacement (Right); Hernia repair; pr fragment kidney stone/ eswl (Right, 3/25/2021); and Lymphadenectomy (1974)  Her family history includes Cancer in her father; Diabetes in her sister and sister; Heart attack in her mother; Hypertension in her father and mother; Nephrolithiasis in her brother; No Known Problems in her daughter, maternal aunt, maternal aunt, maternal aunt, maternal grandfather, maternal grandmother, paternal aunt, paternal aunt, paternal aunt, paternal grandfather, and paternal grandmother; Stroke in her brother  She  reports that she has been smoking cigarettes  She has a 23 50 pack-year smoking history  She has never used smokeless tobacco  She reports previous alcohol use  She reports previous drug use  Drug: Marijuana    Current Outpatient Medications   Medication Sig Dispense Refill    Armodafinil 250 MG tablet Take 1 tablet (250 mg total) by mouth daily 30 tablet 5    BACILLUS COAGULANS-INULIN PO Take 1 capsule by mouth daily Takes in the am      calcium carbonate 1250 MG capsule Take 600 mg by mouth daily       Cholecalciferol 1000 units tablet Take 1,000 Units by mouth daily Takes in the afternoon      Citicoline Sodium 500 MG TABS Take 500 mg by mouth daily Takes in the am      diclofenac (VOLTAREN) 75 mg EC tablet       dicyclomine (BENTYL) 20 mg tablet Take 1 tablet (20 mg total) by mouth 4 (four) times a day as needed (abdominal pain) 240 tablet 3    DULoxetine (CYMBALTA) 30 mg delayed release capsule Take 3 capsules (90 mg total) by mouth daily 90 capsule 1    ferrous sulfate 325 (65 Fe) mg tablet Take 325 mg by mouth daily with breakfast       gabapentin (NEURONTIN) 300 mg capsule 300 mg at 1200 in addition to 600 mg at bedtime   Ginger, Zingiber officinalis, (GINGER ROOT PO) Take 700 mg by mouth 2 (two) times a day       Ginkgo Biloba 40 MG TABS Take 40 mg by mouth daily Take in the am      Multiple Vitamins-Minerals (CENTRUM SILVER PO) Take 1 tablet by mouth daily Takes in the am      omeprazole (PriLOSEC) 40 MG capsule       prochlorperazine (COMPAZINE) 10 mg tablet Take 1 tablet (10 mg total) by mouth every 6 (six) hours as needed for nausea or vomiting 12 tablet 1    solifenacin (VESICARE) 10 MG tablet Take 1 tablet (10 mg total) by mouth every evening 90 tablet 3    Turmeric 500 MG CAPS Take 700 mg by mouth 2 (two) times a day       ZOLMitriptan (ZOMIG) 2 5 MG tablet TAKE 1 TABLET BY MOUTH AT ONSET OF MIGRAINE, MAY REPEAT IN 2 HOURS IF NEEDED   LIMIT 2/24 HOURS, 4/WEEK, 8/MONTH 12 tablet 3    acetaminophen (TYLENOL) 500 mg tablet Take 1,000 mg by mouth every 6 (six) hours as needed for mild pain      budesonide (ENTOCORT EC) 3 MG capsule Take 3 capsules (9 mg total) by mouth daily 90 capsule 3    metaxalone (SKELAXIN) 800 mg tablet Take 0 5 tablets (400 mg total) by mouth 3 (three) times a day 45 tablet 2    rasagiline (AZILECT) 0 5 mg Take 1 tablet (0 5 mg total) by mouth daily 30 tablet 0    rosuvastatin (CRESTOR) 5 mg tablet Take 1 tablet (5 mg total) by mouth daily 90 tablet 0     No current facility-administered medications for this visit  She is allergic to celecoxib, sumatriptan, tramadol, and medical tape            Objective:    Blood pressure 126/72, pulse 82, temperature 97 5 °F (36 4 °C), temperature source Tympanic, resp  rate 18, height 5' 3" (1 6 m), weight 52 6 kg (116 lb), SpO2 98 %, not currently breastfeeding  Physical Exam  Eyes:      General: Lids are normal       Extraocular Movements: Extraocular movements intact  Pupils: Pupils are equal, round, and reactive to light  Neurological:      Deep Tendon Reflexes: Strength normal       Reflex Scores:       Tricep reflexes are 2+ on the right side and 2+ on the left side  Bicep reflexes are 2+ on the right side and 2+ on the left side  Patellar reflexes are 2+ on the right side and 2+ on the left side  Achilles reflexes are 2+ on the right side and 2+ on the left side  Neurological Exam  Mental Status  Awake, alert and oriented to person, place and time  Oriented to person, place and time  Language is fluent with no aphasia  Cranial Nerves  CN II: Visual acuity is normal  Visual fields full to confrontation  CN III, IV, VI: Extraocular movements intact bilaterally  Normal lids and orbits bilaterally  Pupils equal round and reactive to light bilaterally  CN V: Facial sensation is normal   CN VII: Full and symmetric facial movement  CN VIII: Hearing is normal   CN IX, X: Palate elevates symmetrically  Normal gag reflex  CN XI: Shoulder shrug strength is normal   CN XII: Tongue midline without atrophy or fasciculations  Motor  Normal muscle bulk throughout  No fasciculations present   Strength is 5/5 throughout all four extremities  She had no evidence of resting tremor  She did tremor with extension bilaterally but there is no tremor with finger-to-nose testing  She had a Archimedes spiral which was slightly abnormal on the right  There is no facial tremor today  Slight increase in tone in the left upper and lower extremity  Sensory  Vibration   sensation was 5 seconds at the toes, 10 sec at ankles   [place] Reflexes                                            Right                      Left  Biceps                                 2+                         2+  Triceps                                2+                         2+  Patellar                                2+                         2+  Achilles                                2+                         2+  Plantar                           Downgoing                Downgoing    Right pathological reflexes: Nathan's absent  Left pathological reflexes: Nathan's absent  No clonus   Coordination  Right: Finger-to-nose normal Left: Finger-to-nose normal     Gait   Able to rise from chair without using arms  She had difficulty with tandem gait       Review of systems obtained from the medical assistant as below was reviewed with the patient at today's visit    ROS:    Review of Systems   Constitutional: Negative  Negative for appetite change and fever  HENT: Negative  Negative for hearing loss, tinnitus, trouble swallowing and voice change  Eyes: Negative  Negative for photophobia and pain  Respiratory: Negative  Negative for shortness of breath  Cardiovascular: Negative  Negative for palpitations  Gastrointestinal: Negative  Negative for nausea and vomiting  Endocrine: Negative  Negative for cold intolerance  Genitourinary: Positive for frequency and urgency  Negative for dysuria  Musculoskeletal: Positive for back pain (low back pain ), gait problem, neck pain and neck stiffness  Negative for myalgias  Muscle pain , joint pain, some balance issues   Skin: Negative  Negative for rash  Allergic/Immunologic: Negative  Neurological: Positive for dizziness, tremors (whole body), light-headedness, numbness (hands and feet) and headaches  Negative for seizures, syncope, facial asymmetry, speech difficulty and weakness (hands)  Hematological: Bruises/bleeds easily  Psychiatric/Behavioral: Positive for confusion  Negative for hallucinations and sleep disturbance          Memory issues

## 2022-05-27 NOTE — ASSESSMENT & PLAN NOTE
sHe does have a gait dysfunction which is multifactorial including her ongoing back pain neck pain  She does complain of numbness and tingling in her feet  Prior emg did not demonstrate a large fiber polyneuropathy    We will check B1 B12 B6 and folate to determine other etiologies of neurological dysfunction in her feet

## 2022-05-27 NOTE — ASSESSMENT & PLAN NOTE
He has a mixed tremor with is physiologic as well as an action tremor  There is no evidence of dysmetria  A she has some mild increased tone in the left upper lower extremity  We did discuss medications and I am concerned about the potential side effects of drowsiness given her known history of hypersomnolence  We have elected to avoid any additional medications but I have suggested weighted utensils  Discussed that with time additional visit the further clarification can be seen    At this time will avoid medications

## 2022-06-09 ENCOUNTER — OCCMED (OUTPATIENT)
Dept: URGENT CARE | Facility: MEDICAL CENTER | Age: 62
End: 2022-06-09
Payer: OTHER MISCELLANEOUS
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
## 2022-06-09 DIAGNOSIS — S61.208A AVULSION OF SKIN OF MIDDLE FINGER, INITIAL ENCOUNTER: Primary | ICD-10-CM

## 2022-06-09 PROCEDURE — 99283 EMERGENCY DEPT VISIT LOW MDM: CPT | Performed by: PHYSICIAN ASSISTANT

## 2022-06-09 PROCEDURE — 90715 TDAP VACCINE 7 YRS/> IM: CPT

## 2022-06-09 PROCEDURE — G0382 LEV 3 HOSP TYPE B ED VISIT: HCPCS | Performed by: PHYSICIAN ASSISTANT

## 2022-06-09 NOTE — TELEPHONE ENCOUNTER
Left detail message.         ----- Message from Mohinder Cruz RN sent at 6/9/2022  7:57 AM CDT -----  Please let parent know famotidine was refilled but patient needs to schedule appointment to establish care with new MD in our practice (she was Dr. Odell's patient).  Thanks.       Left message for patient to call me back DIRECTLY to schedule

## 2022-06-18 DIAGNOSIS — E78.00 PURE HYPERCHOLESTEROLEMIA: ICD-10-CM

## 2022-06-20 RX ORDER — ROSUVASTATIN CALCIUM 5 MG/1
TABLET, COATED ORAL
Qty: 90 TABLET | Refills: 0 | Status: SHIPPED | OUTPATIENT
Start: 2022-06-20

## 2022-06-22 ENCOUNTER — APPOINTMENT (OUTPATIENT)
Dept: LAB | Facility: CLINIC | Age: 62
End: 2022-06-22
Payer: COMMERCIAL

## 2022-06-22 ENCOUNTER — RA CDI HCC (OUTPATIENT)
Dept: OTHER | Facility: HOSPITAL | Age: 62
End: 2022-06-22

## 2022-06-22 DIAGNOSIS — G62.9 POLYNEUROPATHY: ICD-10-CM

## 2022-06-22 LAB
FOLATE SERPL-MCNC: >20 NG/ML (ref 3.1–17.5)
VIT B12 SERPL-MCNC: 364 PG/ML (ref 100–900)

## 2022-06-22 PROCEDURE — 36415 COLL VENOUS BLD VENIPUNCTURE: CPT

## 2022-06-22 PROCEDURE — 82746 ASSAY OF FOLIC ACID SERUM: CPT

## 2022-06-22 PROCEDURE — 84207 ASSAY OF VITAMIN B-6: CPT

## 2022-06-22 PROCEDURE — 82607 VITAMIN B-12: CPT

## 2022-06-22 NOTE — PROGRESS NOTES
[facility]  coding opportunities       Chart reviewed, no opportunity found: CHART REVIEWED, NO OPPORTUNITY FOUND        Patients Insurance        Commercial Insurance: [employer]

## 2022-06-24 ENCOUNTER — HOSPITAL ENCOUNTER (OUTPATIENT)
Dept: MRI IMAGING | Facility: HOSPITAL | Age: 62
Discharge: HOME | End: 2022-06-24
Attending: INTERNAL MEDICINE
Payer: COMMERCIAL

## 2022-06-24 DIAGNOSIS — K52.9 CHRONIC DIARRHEA: ICD-10-CM

## 2022-06-24 DIAGNOSIS — K52.9 COLITIS: ICD-10-CM

## 2022-06-24 PROCEDURE — 74183 MRI ABD W/O CNTR FLWD CNTR: CPT

## 2022-06-24 PROCEDURE — 72197 MRI PELVIS W/O & W/DYE: CPT

## 2022-06-24 PROCEDURE — A9585 GADOBUTROL INJECTION: HCPCS | Performed by: INTERNAL MEDICINE

## 2022-06-24 PROCEDURE — G1004 CDSM NDSC: HCPCS

## 2022-06-24 RX ADMIN — GLUCAGON HYDROCHLORIDE 1 MG: KIT at 08:46

## 2022-06-24 RX ADMIN — GADOBUTROL 5 ML: 604.72 INJECTION INTRAVENOUS at 08:53

## 2022-06-27 ENCOUNTER — APPOINTMENT (OUTPATIENT)
Dept: LAB | Facility: MEDICAL CENTER | Age: 62
End: 2022-06-27
Payer: COMMERCIAL

## 2022-06-27 ENCOUNTER — APPOINTMENT (OUTPATIENT)
Dept: RADIOLOGY | Facility: MEDICAL CENTER | Age: 62
End: 2022-06-27
Payer: COMMERCIAL

## 2022-06-27 DIAGNOSIS — Z98.1 HISTORY OF FUSION OF CERVICAL SPINE: ICD-10-CM

## 2022-06-27 DIAGNOSIS — N28.9 ABNORMAL RENAL FUNCTION: ICD-10-CM

## 2022-06-27 DIAGNOSIS — E78.00 PURE HYPERCHOLESTEROLEMIA: ICD-10-CM

## 2022-06-27 DIAGNOSIS — G62.9 POLYNEUROPATHY: ICD-10-CM

## 2022-06-27 LAB
ANION GAP SERPL CALCULATED.3IONS-SCNC: 8 MMOL/L (ref 4–13)
AST SERPL W P-5'-P-CCNC: 24 U/L (ref 5–45)
BUN SERPL-MCNC: 23 MG/DL (ref 5–25)
CALCIUM SERPL-MCNC: 10 MG/DL (ref 8.3–10.1)
CHLORIDE SERPL-SCNC: 105 MMOL/L (ref 100–108)
CHOLEST SERPL-MCNC: 231 MG/DL
CO2 SERPL-SCNC: 24 MMOL/L (ref 21–32)
CREAT SERPL-MCNC: 0.8 MG/DL (ref 0.6–1.3)
GFR SERPL CREATININE-BSD FRML MDRD: 78 ML/MIN/1.73SQ M
GLUCOSE P FAST SERPL-MCNC: 95 MG/DL (ref 65–99)
HDLC SERPL-MCNC: 66 MG/DL
LDLC SERPL CALC-MCNC: 149 MG/DL (ref 0–100)
POTASSIUM SERPL-SCNC: 4 MMOL/L (ref 3.5–5.3)
SODIUM SERPL-SCNC: 137 MMOL/L (ref 136–145)
TRIGL SERPL-MCNC: 82 MG/DL

## 2022-06-27 PROCEDURE — 72082 X-RAY EXAM ENTIRE SPI 2/3 VW: CPT

## 2022-06-27 PROCEDURE — 84450 TRANSFERASE (AST) (SGOT): CPT

## 2022-06-27 PROCEDURE — 80061 LIPID PANEL: CPT

## 2022-06-27 PROCEDURE — 36415 COLL VENOUS BLD VENIPUNCTURE: CPT

## 2022-06-27 PROCEDURE — 84425 ASSAY OF VITAMIN B-1: CPT

## 2022-06-27 PROCEDURE — 80048 BASIC METABOLIC PNL TOTAL CA: CPT

## 2022-06-27 PROCEDURE — 72052 X-RAY EXAM NECK SPINE 6/>VWS: CPT

## 2022-06-29 ENCOUNTER — APPOINTMENT (OUTPATIENT)
Dept: LAB | Facility: CLINIC | Age: 62
End: 2022-06-29
Payer: COMMERCIAL

## 2022-06-29 ENCOUNTER — OFFICE VISIT (OUTPATIENT)
Dept: FAMILY MEDICINE CLINIC | Facility: CLINIC | Age: 62
End: 2022-06-29
Payer: COMMERCIAL

## 2022-06-29 VITALS
OXYGEN SATURATION: 97 % | HEART RATE: 80 BPM | HEIGHT: 63 IN | BODY MASS INDEX: 19.74 KG/M2 | DIASTOLIC BLOOD PRESSURE: 68 MMHG | TEMPERATURE: 97 F | RESPIRATION RATE: 16 BRPM | SYSTOLIC BLOOD PRESSURE: 110 MMHG | WEIGHT: 111.4 LBS
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
## 2022-06-29 DIAGNOSIS — Z12.31 ENCOUNTER FOR SCREENING MAMMOGRAM FOR BREAST CANCER: ICD-10-CM

## 2022-06-29 DIAGNOSIS — K52.9 CHRONIC DIARRHEA: ICD-10-CM

## 2022-06-29 DIAGNOSIS — S00.83XA CONTUSION OF FACE, INITIAL ENCOUNTER: Primary | ICD-10-CM

## 2022-06-29 DIAGNOSIS — R25.2 MUSCLE CRAMPS: ICD-10-CM

## 2022-06-29 DIAGNOSIS — S29.011A MUSCLE STRAIN OF CHEST WALL, INITIAL ENCOUNTER: ICD-10-CM

## 2022-06-29 DIAGNOSIS — R19.5 OCCULT BLOOD POSITIVE STOOL: ICD-10-CM

## 2022-06-29 DIAGNOSIS — R10.9 ABDOMINAL PAIN, UNSPECIFIED ABDOMINAL LOCATION: ICD-10-CM

## 2022-06-29 DIAGNOSIS — E78.00 PURE HYPERCHOLESTEROLEMIA: ICD-10-CM

## 2022-06-29 DIAGNOSIS — D50.8 OTHER IRON DEFICIENCY ANEMIA: ICD-10-CM

## 2022-06-29 DIAGNOSIS — K52.832 LYMPHOCYTIC COLITIS: ICD-10-CM

## 2022-06-29 DIAGNOSIS — N18.2 CHRONIC RENAL IMPAIRMENT, STAGE 2 (MILD): ICD-10-CM

## 2022-06-29 DIAGNOSIS — K52.9 COLITIS: ICD-10-CM

## 2022-06-29 DIAGNOSIS — R11.0 NAUSEA: ICD-10-CM

## 2022-06-29 LAB
C DIFF TOX GENS STL QL NAA+PROBE: NEGATIVE
HEMOCCULT STL QL IA: POSITIVE

## 2022-06-29 PROCEDURE — 87493 C DIFF AMPLIFIED PROBE: CPT

## 2022-06-29 PROCEDURE — 82705 FATS/LIPIDS FECES QUAL: CPT

## 2022-06-29 PROCEDURE — G0328 FECAL BLOOD SCRN IMMUNOASSAY: HCPCS

## 2022-06-29 PROCEDURE — 99214 OFFICE O/P EST MOD 30 MIN: CPT | Performed by: FAMILY MEDICINE

## 2022-06-29 PROCEDURE — 3008F BODY MASS INDEX DOCD: CPT | Performed by: PSYCHIATRY & NEUROLOGY

## 2022-06-29 PROCEDURE — 83993 ASSAY FOR CALPROTECTIN FECAL: CPT

## 2022-06-29 NOTE — PROGRESS NOTES
Or he more G a Assessment/Plan:       No problem-specific Assessment & Plan notes found for this encounter  Diagnoses and all orders for this visit:    Contusion of face, initial encounter  Comments:  advised patient to call if she developed sign of symptom of head contusion    Muscle strain of chest wall, initial encounter  Comments:  check x ray of ribs , pt declined, to call if symotpms persist     Fall, initial encounter  Comments:  fall prenvention discussed,    Pure hypercholesterolemia  Comments:   improving but not completely under control   since pt has cramp will not incrase crestor for now    Muscle cramps  Comments:  ? secondary to crestor , hold crestor  also pt has an odrder for cmp    Orders:  -     CK; Future  -     Magnesium; Future    Chronic renal impairment, stage 2 (mild)  Comments:  recent abd MRI  kidneys are ok, advised patient to avoid NSAID  Hydrate self well    Occult blood positive stool  Comments:  to follow with GI  Orders:  -     CBC and differential; Future    Encounter for screening mammogram for breast cancer  -     Mammo screening bilateral w 3d & cad; Future        Patient Instructions   To follow with test results      Orders Placed This Encounter   Procedures    Mammo screening bilateral w 3d & cad     Standing Status:   Future     Standing Expiration Date:   6/28/2023     Scheduling Instructions:      Do not wear any perfume, powder, lotion or deodorant on the breast or underarm area  If you have had any prior breast studies done at a different facility than [facility], please bring a copy of the study with you on the day of your test  Please allow at least 30 minutes for this appointment  Please bring your insurance cards, a form of photo ID and a list of your medications with you  To schedule this appointment, please contact Central Scheduling at [phone]       Order Specific Question:   Approval for Diagnostic Call Back/Ultrasound If Necessary Answer:   Yes     Order Specific Question:   Approval for Coordination for Additional Testing     Answer: Yes    CK     Standing Status:   Future     Standing Expiration Date:   6/29/2023    Magnesium     Standing Status:   Future     Standing Expiration Date:   6/29/2023    CBC and differential     This is a patient instruction: This test is non-fasting  Please drink two glasses of water morning of bloodwork  Standing Status:   Future     Standing Expiration Date:   6/29/2023         Subjective:     Patient ID: Sara Dorado is a 59 y o  female      HPI  Fall , pt stare 2 days   she tripped on folded mat at work  ,came down and she hit the floor with  Her chain , developed bruisis on right of her chin and also develop pain of left TMJ  Pain  is mild   Denied dizziness a had a, weakness or numbness  Also she jerked her upper left chest ,because when she fall on the floor she supported he body with both hands    Muscle cramps x 5-6m  arround the time started crestor, got more frequent after increase crestor  Symptoms intermittent  Mild   cramps of exts ,    improvingAbnormal renal function  Doesn't grink  Enough fluid     Hyperlipidemia  taking Crestor 2 tablet since the last office visit  Occult  GI bleed  Patient stated she is following with GI  Denied melena the or rectal bleeding   test results   Labs  done on 6-27-22   had MRI  Enterography   discussed result with patient  Vit B1 is pending    Review of Systems   Constitutional: Negative for activity change, appetite change, chills, fatigue, fever and unexpected weight change  HENT: Negative for congestion, ear discharge, ear pain, hearing loss, nosebleeds, rhinorrhea, sinus pressure, sore throat, tinnitus, trouble swallowing and voice change  Eyes: Negative for photophobia, pain and visual disturbance  Respiratory: Negative for cough, chest tightness, shortness of breath and wheezing      Cardiovascular: Negative for chest pain, palpitations and leg swelling  Gastrointestinal: Negative for abdominal pain, anal bleeding, blood in stool, constipation, diarrhea, nausea and vomiting  Endocrine: Negative for cold intolerance, heat intolerance, polydipsia and polyuria  Genitourinary: Negative for dysuria, frequency, hematuria and urgency  Musculoskeletal: Negative for arthralgias, back pain, gait problem, joint swelling, myalgias and neck pain  Skin: Negative for rash  Neurological: Negative for dizziness, tremors, seizures, syncope, weakness, light-headedness and headaches  Hematological: Negative for adenopathy  Does not bruise/bleed easily  Psychiatric/Behavioral: Negative for agitation, behavioral problems, confusion, dysphoric mood, hallucinations and sleep disturbance  The patient is not nervous/anxious  Objective:     Physical Exam  Constitutional:       General: She is not in acute distress  Appearance: Normal appearance  She is well-developed  She is not ill-appearing or diaphoretic  HENT:      Head: Normocephalic and atraumatic  Comments: bruisis x 2 at right side of chin , about 1 x 1/2 inch   one oe side and one inferiorly     Right Ear: Tympanic membrane, ear canal and external ear normal       Left Ear: Tympanic membrane, ear canal and external ear normal       Nose: Nose normal       Mouth/Throat:      Pharynx: No oropharyngeal exudate  Comments: Tiny red spot x 2 between gum and buccal mucosa of mouth anteriorly  Eyes:      General: No scleral icterus  Conjunctiva/sclera: Conjunctivae normal       Pupils: Pupils are equal, round, and reactive to light  Neck:      Thyroid: No thyromegaly  Vascular: No carotid bruit or JVD  Cardiovascular:      Rate and Rhythm: Normal rate and regular rhythm  Pulses:           Carotid pulses are 3+ on the right side and 3+ on the left side  Dorsalis pedis pulses are 3+ on the right side and 3+ on the left side          Posterior tibial pulses are 3+ on the right side and 3+ on the left side  Heart sounds: Normal heart sounds  No murmur heard  Pulmonary:      Effort: Pulmonary effort is normal  No respiratory distress  Breath sounds: Normal breath sounds  No stridor  No wheezing or rales  Comments: + tenderness at upper left chest  Chest:      Chest wall: Tenderness present  Abdominal:      General: Bowel sounds are normal  There is no distension  Palpations: Abdomen is soft  There is no mass  Tenderness: There is no abdominal tenderness  There is no guarding or rebound  Hernia: No hernia is present  Musculoskeletal:         General: No deformity  Normal range of motion  Cervical back: Neck supple  Right lower leg: No edema  Left lower leg: No edema  Comments: + slight tenderness of left TMJ  Pt was able to open her mouth widely  Lymphadenopathy:      Cervical: No cervical adenopathy  Skin:     Coloration: Skin is not jaundiced or pale  Findings: No rash  Neurological:      General: No focal deficit present  Mental Status: She is alert and oriented to person, place, and time  Cranial Nerves: No cranial nerve deficit  Sensory: No sensory deficit  Motor: No weakness or abnormal muscle tone  Coordination: Coordination normal       Gait: Gait normal       Deep Tendon Reflexes: Reflexes normal       Comments: babinski negative B/L   Psychiatric:         Mood and Affect: Mood normal          Behavior: Behavior normal          Thought Content:  Thought content normal          Judgment: Judgment normal

## 2022-07-01 LAB
CALPROTECTIN STL-MCNT: 41 UG/G (ref 0–120)
VIT B1 BLD-SCNC: 178.4 NMOL/L (ref 66.5–200)
VIT B6 SERPL-MCNC: 27.2 UG/L (ref 3.4–65.2)

## 2022-07-03 LAB
FAT STL QL: NORMAL
NEUTRAL FAT STL QL: NORMAL

## 2022-07-06 ENCOUNTER — TELEPHONE (OUTPATIENT)
Dept: LAB | Facility: HOSPITAL | Age: 62
End: 2022-07-06

## 2022-07-06 NOTE — TELEPHONE ENCOUNTER
[facility] had a pancreatic elastase testing ordered but she gave us a loose stool instead of a formed stool  Testing was cancelled

## 2022-08-04 ENCOUNTER — OFFICE VISIT (OUTPATIENT)
Dept: NEUROLOGY | Facility: CLINIC | Age: 62
End: 2022-08-04
Payer: COMMERCIAL

## 2022-08-04 VITALS
RESPIRATION RATE: 16 BRPM | WEIGHT: 113.06 LBS | HEIGHT: 63 IN | HEART RATE: 88 BPM | SYSTOLIC BLOOD PRESSURE: 118 MMHG | TEMPERATURE: 97.9 F | DIASTOLIC BLOOD PRESSURE: 68 MMHG | BODY MASS INDEX: 20.03 KG/M2 | OXYGEN SATURATION: 98 %

## 2022-08-04 DIAGNOSIS — M79.18 MYOFASCIAL PAIN SYNDROME, CERVICAL: Primary | ICD-10-CM

## 2022-08-04 DIAGNOSIS — G43.009 MIGRAINE WITHOUT AURA AND WITHOUT STATUS MIGRAINOSUS, NOT INTRACTABLE: ICD-10-CM

## 2022-08-04 DIAGNOSIS — Z98.1 HISTORY OF FUSION OF CERVICAL SPINE: ICD-10-CM

## 2022-08-04 PROCEDURE — 20553 NJX 1/MLT TRIGGER POINTS 3/>: CPT | Performed by: PHYSICIAN ASSISTANT

## 2022-08-04 PROCEDURE — 99215 OFFICE O/P EST HI 40 MIN: CPT | Performed by: PHYSICIAN ASSISTANT

## 2022-08-04 RX ORDER — BUPIVACAINE HYDROCHLORIDE 2.5 MG/ML
2.5 INJECTION, SOLUTION EPIDURAL; INFILTRATION; INTRACAUDAL ONCE
Status: COMPLETED | OUTPATIENT
Start: 2022-08-04 | End: 2022-08-04

## 2022-08-04 RX ORDER — CETIRIZINE HYDROCHLORIDE 10 MG/1
10 TABLET ORAL 2 TIMES DAILY
COMMUNITY

## 2022-08-04 RX ORDER — LIDOCAINE HYDROCHLORIDE 10 MG/ML
2.5 INJECTION, SOLUTION INFILTRATION; PERINEURAL ONCE
Status: COMPLETED | OUTPATIENT
Start: 2022-08-04 | End: 2022-08-04

## 2022-08-04 RX ADMIN — LIDOCAINE HYDROCHLORIDE 2.5 ML: 10 INJECTION, SOLUTION INFILTRATION; PERINEURAL at 15:50

## 2022-08-04 RX ADMIN — BUPIVACAINE HYDROCHLORIDE 2.5 ML: 2.5 INJECTION, SOLUTION EPIDURAL; INFILTRATION; INTRACAUDAL at 15:53

## 2022-08-04 NOTE — PROGRESS NOTES
Neurology    Kamla Formna is a 57 y o  female  [id number]      Assessment/Recommendations:     Diagnoses and all orders for this visit:    Myofascial pain syndrome, cervical  -     bupivacaine (PF) (MARCAINE) 0 25 % injection 2 5 mL  -     lidocaine (XYLOCAINE) 1 % injection 2 5 mL  -     Trigger Injection 1 or 2 muscles    Migraine without aura and without status migrainosus, not intractable    History of fusion of cervical spine    Other orders  -     cetirizine (ZyrTEC) 10 mg tablet; Take 10 mg by mouth 2 (two) times a day         For migraines she should continue zomig prn for now, she should contact me if she feels an additional therapy is needed  For now migraines are stable  Consider Aimovig if needed  F/u with pain management re: neck pain s/p prior fusion, worsening atraumatic neck pain recently  Nsx cleared, not a surgical candidate  TPIs performed on an emergent basis today    Hold baclofen- not helpful  Continue 90 mg of Cymbalta at bed  Continue 600 mg gabapentin q h s , and can add to 300 additional mg to bedtime due to sedation if used during the day  This may help with tremor as well as neck pain  PT for neck pain, consider  The patient should not hesitate to call me prior to her follow up with any questions or concerns  Chief Complaint:  Neck pain  Migraines      Subjective:  HPI    Ms Kamla Forman is here for f/u  In June (6/27/22) pt had a fall at work  States she clocked out, did not turn on the lights in the meat room, and went back in to grab her bag  There was a rolled up mat on the floor which she did not know about and tripped, she fell on her chin and then shoulders hit the floor  States she fell fwd  No LOC  States she now has bruises on her chin, her gums were bruised as well for a while, states he TMJ was worse for a while  She has "costochondritis" as well now  She saw her pcp s/p fall   Overall her sxs from the fall are improving, not worsening thankfully  She states her tremor is not so bad right now  She had a bad episode of tremors about a week ago and she thinks due to increased stress at that time in her house  Per last visit with Dr Panfilo Hernandez: "We did discuss medications and I am concerned about the potential side effects of drowsiness given her known history of hypersomnolence  We have elected to avoid any additional medications but I have suggested weighted utensils "    She has hypersomnolence, following with sleep medicine  She continues armodafonil and buying through [facility]- med is not covered by insurance bc she is not dx with narcolepsy officially  She states the med works well for her and denies s/e  She states the med is about 30$ thorugh [facility] and she is thankful she can afford it  Migraines  - imitrex allergy- s/e of throat closes, trouble breathing, drooling  - zomig works well, no s/e  - worse after the fall as above; migraines are letting up now, except for more triggers now of allergies (states corn pollen is a trigger)    XR cervical 6/27/22: "Postoperative changes as noted above  No evidence for hardware complication  Alignment abnormalities as noted above  C2-3 and C3-4 anterolistheses which reduce on extension and increase slightly on flexion  No acute osseous abnormalities "    She saw Nsx for the neck pain and imaging findings   She was referred back to Dr Vidal Garza in PM       Patient Active Problem List   Diagnosis   • Urge incontinence   • Frequency of urination   • History of hydronephrosis   • Migraine headache   • Fibromyalgia   • Pure hypercholesterolemia   • Allergy   • Low iron   • Idiopathic hypersomnia   • Osteoporosis   • Diverticulosis   • Chronic diarrhea   • Migraine without aura and without status migrainosus, not intractable   • Renal calculus   • Anemia   • Irritable bowel syndrome   • Edema of both legs   • Prolonged QT interval   • Rash   • Screening mammogram, encounter for   • Screening for colon cancer   • Spondylosis of cervical region without myelopathy or radiculopathy   • Neck pain   • Encounter for hepatitis C screening test for low risk patient   • Encounter for immunization   • Spondylosis of lumbar spine   • Senile osteoporosis   • Irritable bowel syndrome without diarrhea   • Hypercholesterolemia   • Cervical radiculopathy   • Microscopic hematuria   • Calculus of ureter   • Inguinal hernia of right side without obstruction or gangrene   • S/P right inguinal hernia repair   • Diarrhea   • Weight loss   • Elevated blood sugar   • Pap smear for cervical cancer screening   • Lumbar radiculopathy   • Smoking   • Colitis   • Urgency of urination   • Ureteral stone with hydronephrosis   • Flank pain   • Low magnesium level   • Hyponatremia   • Leukopenia   • Hypokalemia   • Wears dentures   • Recurrent UTI   • Jerking movements of extremities   • History of fusion of cervical spine   • Carpal tunnel syndrome of right wrist   • Other ascites   • Low serum calcium   • Tremor   • Increased MCV   • Abnormal renal function   • Arm paresthesia, right   • Right median nerve neuropathy   • Ulnar neuropathy of right upper extremity   • Abnormal EKG   • DDD (degenerative disc disease), cervical   • Neurologic gait dysfunction   • Myofascial pain syndrome, cervical     Past Medical History:   Diagnosis Date   • Allergy to dust    • Anemia    • Calculus of ureter    • Chronic pain disorder     neck and back- sees pain management   • Colitis    • Colon polyp    • Cracked skin     on fingers   • Diverticulosis    • Dysphagia    • GERD (gastroesophageal reflux disease)    • H/O: GI bleed    • Hiatal hernia    • History of DVT in adulthood 2019 2019   • Hydronephrosis    • Hyperlipidemia    • Inguinal hernia     right side   • Irregular heart beat     Pt states with prolonged QT interval - was seen by cardiologist( [facility]) pt told "to not worry about it"   • Irritable bowel    • Kidney stone    • Migraines    • Narcolepsy    • Narcolepsy 08/06/2020   • OAB (overactive bladder)    • Osteoarthritis    • Osteoporosis    • Other chronic pain     degenerative disc disease   • PONV (postoperative nausea and vomiting)     migraines   • Pyelonephritis 1/13/2021   • SBO (small bowel obstruction) ([facility] [address] ) 9/25/2021   • Sepsis ([facility] [address] ) 1/13/2021   • Smoking 8/6/2020   • Tobacco abuse    • Wears dentures     full upper   • Wears glasses    • Wears glasses      Past Surgical History:   Procedure Laterality Date   • ABDOMINAL ADHESION SURGERY     • ARM SKIN LESION BIOPSY / EXCISION      lymph node excision   • BLADDER SURGERY     • CHOLECYSTECTOMY     • COLONOSCOPY  08/06/2020   • FL RETROGRADE PYELOGRAM  1/5/2021   • HERNIA REPAIR     • HYSTERECTOMY  1977   • JOINT REPLACEMENT Right     knee   • KNEE SURGERY Right     replacement   • LAMINECTOMY      L5-6-7   • LAPAROSCOPY     • LYMPH NODE DISSECTION Left     removed -no cancer-  limb restriction   • LYMPHADENECTOMY  1974    UNDER LEFT ARM - DO NOT DRAW BLOOD/GIVE INJECTIONS IN LEFT ARM PER PATIENT    • MOLE REMOVAL     • OOPHORECTOMY Bilateral 1977   • OTHER SURGICAL HISTORY      enlarged lymph node removed left arm   no cancer   • CA CYSTO/URETERO W/LITHOTRIPSY &INDWELL STENT INSRT Right 1/5/2021    Procedure: CYSTO, LASER  URETEROSCOPY, RETRO PYELOGRAM, STENT REPLACMENT;  Surgeon: Livier Chu MD;  Location: [facility];  Service: Urology   • CA CYSTOURETHROSCOPY,URETER CATHETER Right 12/2/2020    Procedure: CYSTOSCOPY AND INSERTION STENT URETERAL;  Surgeon: Destiny Vernon MD;  Location: [facility];  Service: Urology   • CA FRAGMENT KIDNEY STONE/ ESWL Right 3/25/2021    Procedure: LITHOTRIPSY EXTRACORPORAL SHOCKWAVE (ESWL);   Surgeon: Steve Powers MD;  Location: [facility] MAIN OR;  Service: Urology   • CA REPAIR ING HERNIA,5+Y/O,REDUCIBL Right 6/25/2020    Procedure: REPAIR HERNIA INGUINAL  WITH MESH;  Surgeon: Refugio Levin MD;  Location: [facility];  Service: General   • SPINAL FUSION • UPPER GASTROINTESTINAL ENDOSCOPY  08/06/2020   • WISDOM TOOTH EXTRACTION       Current Outpatient Medications on File Prior to Visit   Medication Sig Dispense Refill   • BACILLUS COAGULANS-INULIN PO Take 1 capsule by mouth daily Takes in the am     • calcium carbonate 1250 MG capsule Take 600 mg by mouth daily      • cetirizine (ZyrTEC) 10 mg tablet Take 10 mg by mouth 2 (two) times a day     • Cholecalciferol 1000 units tablet Take 1,000 Units by mouth daily Takes in the afternoon     • Citicoline Sodium 500 MG TABS Take 500 mg by mouth daily Takes in the am     • dicyclomine (BENTYL) 20 mg tablet Take 1 tablet (20 mg total) by mouth 4 (four) times a day as needed (abdominal pain) (Patient taking differently: Take 20 mg by mouth 3 (three) times a day) 240 tablet 3   • DULoxetine (CYMBALTA) 30 mg delayed release capsule Take 3 capsules (90 mg total) by mouth daily 90 capsule 1   • ferrous sulfate 325 (65 Fe) mg tablet Take 325 mg by mouth daily with breakfast      • gabapentin (NEURONTIN) 300 mg capsule 300 mg at 1200 in addition to 600 mg at bedtime  • Ginger, Zingiber officinalis, (GINGER ROOT PO) Take 700 mg by mouth 2 (two) times a day      • metaxalone (SKELAXIN) 800 mg tablet Take 0 5 tablets (400 mg total) by mouth 3 (three) times a day 45 tablet 2   • Multiple Vitamins-Minerals (CENTRUM SILVER PO) Take 1 tablet by mouth daily Takes in the am     • omeprazole (PriLOSEC) 40 MG capsule      • prochlorperazine (COMPAZINE) 10 mg tablet Take 1 tablet (10 mg total) by mouth every 6 (six) hours as needed for nausea or vomiting 12 tablet 1   • Turmeric 500 MG CAPS Take 700 mg by mouth 2 (two) times a day      • ZOLMitriptan (ZOMIG) 2 5 MG tablet TAKE 1 TABLET BY MOUTH AT ONSET OF MIGRAINE, MAY REPEAT IN 2 HOURS IF NEEDED  LIMIT 2/24 HOURS, 4/WEEK, 8/MONTH 12 tablet 5     No current facility-administered medications on file prior to visit       Allergies   Allergen Reactions   • Celecoxib Other (See Comments) Increased Heart Rate, Palpitations   • Sumatriptan Anaphylaxis     Other reaction(s): SUMATRIPTAN (IMITREX) (Throat closure)  Pt analilia zolmitriptan  • Tramadol Other (See Comments)     GI Upset- severe vomiting and systemic body aches   • Medical Tape Dermatitis, Rash and Blisters     Adhesive from medication patches and bandaides- ok with paper tape           ROS:  Review of Systems   Constitutional: Negative  Negative for appetite change and fever  HENT: Negative  Negative for hearing loss, tinnitus, trouble swallowing and voice change  Eyes: Negative  Negative for photophobia and pain  Respiratory: Negative  Negative for shortness of breath  Cardiovascular: Negative  Negative for palpitations  Gastrointestinal: Positive for constipation and diarrhea  Negative for nausea and vomiting  Endocrine: Negative  Negative for cold intolerance  Genitourinary: Positive for frequency and urgency  Negative for dysuria  Musculoskeletal: Positive for back pain (low back pain), gait problem, neck pain and neck stiffness  Negative for myalgias  Skin: Negative  Negative for rash  Allergic/Immunologic: Negative  Neurological: Positive for tremors (whole body- getting better) and headaches (migraine's)  Negative for dizziness, seizures, syncope, facial asymmetry, speech difficulty, weakness, light-headedness and numbness  Hematological: Bruises/bleeds easily  Psychiatric/Behavioral: Negative for confusion, hallucinations and sleep disturbance  The patient is nervous/anxious  Depression- mild- memory issues          Objective:  /68 (BP Location: Right arm, Patient Position: Sitting, Cuff Size: Standard)   Pulse 88   Temp 97 9 °F (36 6 °C) (Tympanic)   Resp 16   Ht 5' 3" (1 6 m)   Wt 51 3 kg (113 lb 1 oz)   SpO2 98%   BMI 20 03 kg/m²     Physical Exam    Neurological Exam  Vital signs reviewed  Well developed, well nourished    Head: Normocephalic, atraumatic  Speech is fluent and articulate  CN 2-12: intact and symmetric, including EOMs which are normal b/l and PERRL  MSK: 5/5 t/o  Sensation: Inact to LT x4 extr  Romberg borderline positive  Reflexes: 2+ and symmetric in all 4 extr  Coordination:   Mild intention tremor at the endpoint of action in both upper extremities, equal bilaterally  The patient has adequate coordination with heel tapping bilaterally  Coordination is better with finger tapping and moving the hand back and forth in the right versus left upper extremity, mild decrement of movements in the left upper extremity  No rest tremor noted today  Gait: Steady normal gait  I have personally reviewed pertinent films in PACS  The following portions of the patient's history were reviewed and updated as appropriate: allergies, current medications, family history, past medical history, social history and active problem list   Review of systems was reviewed and otherwise unremarkable from a neurological perspective  I have spent 40 minutes with the patient today in which greater than 50% of this time was spent in counseling and/or coordination of care regarding diagnoses, test results, impression, plan and potential medication side effects  Universal Protocol:  Consent: The procedure was performed in an emergent situation  Verbal consent obtained  Risks and benefits: risks, benefits and alternatives were discussed  Consent given by: patient    Procedure Details  Location(s):  Patient tolerance: patient tolerated the procedure well with no immediate complications  Additional procedure details: Trigger point injections were performed on an emergent basis and are a part of ongoing therapy  Risks, benefits, alternatives, infection, bleeding and allergic reaction were discussed with the patient  Verbal consent was obtained prior to the procedure and is detailed in the patient's record      Trigger point injections were performed in the following locations using a mixture of 2 5 mL 0 25% bupivacaine + 2 5 mL of 1% lidocaine, without steroid, using a 30 gauge, 1/2 inch needle: b/l upper and lower traps

## 2022-08-08 ENCOUNTER — OFFICE VISIT (OUTPATIENT)
Dept: PAIN MEDICINE | Facility: MEDICAL CENTER | Age: 62
End: 2022-08-08
Payer: COMMERCIAL

## 2022-08-08 VITALS
HEIGHT: 63 IN | SYSTOLIC BLOOD PRESSURE: 132 MMHG | BODY MASS INDEX: 19.84 KG/M2 | TEMPERATURE: 97.5 F | HEART RATE: 95 BPM | OXYGEN SATURATION: 96 % | WEIGHT: 112 LBS | DIASTOLIC BLOOD PRESSURE: 84 MMHG

## 2022-08-08 DIAGNOSIS — M54.50 CHRONIC BILATERAL LOW BACK PAIN WITHOUT SCIATICA: ICD-10-CM

## 2022-08-08 DIAGNOSIS — Z98.1 HISTORY OF FUSION OF CERVICAL SPINE: ICD-10-CM

## 2022-08-08 DIAGNOSIS — M47.816 SPONDYLOSIS OF LUMBAR SPINE: Primary | ICD-10-CM

## 2022-08-08 DIAGNOSIS — G89.29 CHRONIC BILATERAL LOW BACK PAIN WITHOUT SCIATICA: ICD-10-CM

## 2022-08-08 PROCEDURE — 99214 OFFICE O/P EST MOD 30 MIN: CPT | Performed by: PHYSICAL MEDICINE & REHABILITATION

## 2022-08-08 NOTE — PROGRESS NOTES
Assessment:  1  Spondylosis of lumbar spine    2  Chronic bilateral low back pain without sciatica    3  History of fusion of cervical spine        Plan:  1  We will schedule the patient for bilateral L3-5 medial branch nerve blocks with intention of moving forward towards radiofrequency ablation if there is an appropriate diagnostic response  The initial blocks will be performed with 2% lidocaine and if an appropriate response is obtained upon review of the patient's pain diary, a confirmatory block will be scheduled with 0 5% bupivacaine  In the office today, we reviewed the nature of facet joint pathology in depth using a spine model  We discussed the approach we would use for the injections and provided literature for home review  The patient understands the risks associated with the procedure including bleeding, infection, tissue injury, and allergic reaction and provided verbal informed consent in the office today  2  Currently the patient is feeling better [facility]  If this pain becomes more problematic we will further evaluate and determine further treatment options  She does have history of ACDF from C5-7  My impressions and treatment recommendations were discussed in detail with the patient who verbalized understanding and had no further questions  Discharge instructions were provided  I personally saw and examined the patient and I agree with the above discussed plan of care  No orders of the defined types were placed in this encounter  New Medications Ordered This Visit   Medications    aspirin 500 MG buffered tablet     Sig: Take 500 mg by mouth every 6 (six) hours as needed for mild pain       History of Present Illness: Amirah Snider is a 59 y o  female who presents for a follow up office visit in regards to Neck Pain and Back Pain  Currently the patient is describing mainly lower back pain complaints    These are focal to the axial lumbar spine without radiation down the legs  She is currently describing pain that is worsening and rated as a 6 to 7/10  This is constant and worse in the evening characterized as dull, aching, shooting, pressure-like, numbness, pins and needle sensation  She is experiencing similar symptoms in the cervical spine but this is better since the trigger point injections in Neurology  She is experiencing severe functional deficits as well with difficulty walking decreased range of motion paralysis and multiple muscle weakness joint stiffness and pain in the hands shoulders feet and legs  I have personally reviewed and/or updated the patient's past medical history, past surgical history, family history, social history, current medications, allergies, and vital signs today  Review of Systems   Respiratory: Negative for shortness of breath  Cardiovascular: Negative for chest pain  Gastrointestinal: Negative for constipation, diarrhea, nausea and vomiting  Musculoskeletal: Positive for back pain, gait problem, joint swelling, myalgias and neck pain  Negative for arthralgias  Skin: Negative for rash  Neurological: Positive for tremors and weakness  Negative for dizziness and seizures  All other systems reviewed and are negative        Patient Active Problem List   Diagnosis    Urge incontinence    Frequency of urination    History of hydronephrosis    Migraine headache    Fibromyalgia    Pure hypercholesterolemia    Allergy    Low iron    Idiopathic hypersomnia    Osteoporosis    Diverticulosis    Chronic diarrhea    Migraine without aura and without status migrainosus, not intractable    Acute cystitis    Renal calculus    Complicated UTI (urinary tract infection)    Anemia    Irritable bowel syndrome    Edema of both legs    Prolonged QT interval    Rash    Screening mammogram, encounter for    Screening for colon cancer    Spondylosis of cervical region without myelopathy or radiculopathy    Neck pain    Encounter for hepatitis C screening test for low risk patient    Encounter for immunization    Spondylosis of lumbar spine    Senile osteoporosis    Irritable bowel syndrome without diarrhea    Hypercholesterolemia    Cervical radiculopathy    Microscopic hematuria    Calculus of ureter    Inguinal hernia of right side without obstruction or gangrene    S/P right inguinal hernia repair    Diarrhea    Weight loss    Elevated blood sugar    Pap smear for cervical cancer screening    Lumbar radiculopathy    Smoking    Colitis    Urgency of urination    Ureteral stone with hydronephrosis    Flank pain    Low magnesium level    Hyponatremia    Leukopenia    Hypokalemia    Wears dentures    Recurrent UTI    Jerking movements of extremities    History of fusion of cervical spine    Carpal tunnel syndrome of right wrist    Other ascites    Urinary tract infection without hematuria    Low serum calcium    Tremor    Increased MCV    Abnormal renal function    Arm paresthesia, right    Right median nerve neuropathy    Ulnar neuropathy of right upper extremity    Abnormal EKG    DDD (degenerative disc disease), cervical    Neurologic gait dysfunction    Myofascial pain syndrome, cervical       Past Medical History:   Diagnosis Date    Allergy to dust     Anemia     Calculus of ureter     Chronic pain disorder     neck and back- sees pain management    Colitis     Colon polyp     Cracked skin     on fingers    Diverticulosis     Dysphagia     GERD (gastroesophageal reflux disease)     H/O: GI bleed     Hiatal hernia     History of DVT in adulthood 2019    2019    Hydronephrosis     Hyperlipidemia     Inguinal hernia     right side    Irregular heart beat     Pt states with prolonged QT interval - was seen by cardiologist( [facility]) pt told "to not worry about it"    Irritable bowel     Kidney stone     Migraines     Narcolepsy  Narcolepsy 08/06/2020    OAB (overactive bladder)     Osteoarthritis     Osteoporosis     Other chronic pain     degenerative disc disease    PONV (postoperative nausea and vomiting)     migraines    Pyelonephritis 1/13/2021    SBO [facility]small bowel obstruction) ([facility] [address] ) 9/25/2021    Sepsis ([facility] [address] ) 1/13/2021    Smoking 8/6/2020    Tobacco abuse     Wears dentures     full upper    Wears glasses     Wears glasses        Past Surgical History:   Procedure Laterality Date    ABDOMINAL ADHESION SURGERY      ARM SKIN LESION BIOPSY / EXCISION      lymph node excision    BLADDER SURGERY      CHOLECYSTECTOMY      COLONOSCOPY  08/06/2020    FL RETROGRADE PYELOGRAM  1/5/2021    HERNIA REPAIR      HYSTERECTOMY  1977    JOINT REPLACEMENT Right     knee    KNEE SURGERY Right     replacement    LAMINECTOMY      L5-6-7    LAPAROSCOPY      LYMPH NODE DISSECTION Left     removed -no cancer-  limb restriction    LYMPHADENECTOMY  1974    UNDER LEFT ARM - DO NOT DRAW BLOOD/GIVE INJECTIONS IN LEFT ARM PER PATIENT     MOLE REMOVAL      OOPHORECTOMY Bilateral 1977    OTHER SURGICAL HISTORY      enlarged lymph node removed left arm   no cancer    SD CYSTO/URETERO W/LITHOTRIPSY &INDWELL STENT INSRT Right 1/5/2021    Procedure: CYSTO, LASER  URETEROSCOPY, RETRO PYELOGRAM, STENT REPLACMENT;  Surgeon: Martinez Gordon MD;  [facility];  Service: Urology    SD CYSTOURETHROSCOPY,URETER CATHETER Right 12/2/2020    Procedure: CYSTOSCOPY AND INSERTION STENT URETERAL;  Surgeon: Merly Dunn MD;  [facility];  Service: Urology    [facility] Right 3/25/2021    Procedure: LITHOTRIPSY EXTRACORPORAL SHOCKWAVE (ESWL);   Surgeon: Selene Campos MD;  Location: [address] [place] [address] MAIN OR;  Service: Urology    SD REPAIR [address] HERNIA,5+Y/O,REDUCIBL Right 6/25/2020    Procedure: REPAIR HERNIA INGUINAL  WITH MESH;  Surgeon: Tita Bobby MD;  [facility];  Service: General    SPINAL FUSION      UPPER GASTROINTESTINAL ENDOSCOPY  08/06/2020    [de-identified] TOOTH EXTRACTION         Family History   Problem Relation Age of Onset    Hypertension Father     Cancer Father     Hypertension Mother     Heart attack Mother     Stroke Brother     Nephrolithiasis Brother     Diabetes Sister     No Known Problems Daughter     No Known Problems Maternal Grandmother     No Known Problems Maternal Grandfather     No Known Problems Paternal Grandmother     No Known Problems Paternal Grandfather     Diabetes Sister     No Known Problems Maternal Aunt     No Known Problems Maternal Aunt     No Known Problems Maternal Aunt     No Known Problems Paternal Aunt     No Known Problems Paternal Aunt     No Known Problems Paternal Aunt        Social History     Occupational History    Not on file   Tobacco Use    Smoking status: Current Every Day Smoker     Packs/day: 0 50     Years: 47 00     Pack years: 23 50     Types: Cigarettes    Smokeless tobacco: Never Used    Tobacco comment: pt states is at 1/2 pk day currently - was previously at one pack /day   Vaping Use    Vaping Use: Never used   Substance and Sexual Activity    Alcohol use: Not Currently     Comment: seldom    Drug use: Not Currently     Types: Marijuana     Comment: medicinal marijuana; last dose was 3/16    Sexual activity: Not Currently       Current Outpatient Medications on File Prior to Visit   Medication Sig    Armodafinil 250 MG tablet Take 1 tablet (250 mg total) by mouth daily    aspirin 500 MG buffered tablet Take 500 mg by mouth every 6 (six) hours as needed for mild pain    BACILLUS COAGULANS-INULIN PO Take 1 capsule by mouth daily Takes in the am    calcium carbonate 1250 MG capsule Take 600 mg by mouth daily     cetirizine (ZyrTEC) 10 mg tablet Take 10 mg by mouth 2 (two) times a day    Cholecalciferol 1000 units tablet Take 1,000 Units by mouth daily Takes in the afternoon    Citicoline Sodium 500 MG TABS Take 500 mg by mouth daily Takes in the am    diclofenac (VOLTAREN) 75 mg EC tablet Take 75 mg by mouth in the morning    dicyclomine (BENTYL) 20 mg tablet Take 1 tablet (20 mg total) by mouth 4 (four) times a day as needed (abdominal pain) (Patient taking differently: Take 20 mg by mouth 3 (three) times a day)    DULoxetine (CYMBALTA) 30 mg delayed release capsule Take 3 capsules (90 mg total) by mouth daily    ferrous sulfate 325 (65 Fe) mg tablet Take 325 mg by mouth daily with breakfast     gabapentin (NEURONTIN) 300 mg capsule 300 mg at 1200 in addition to 600 mg at bedtime   Ginger, Zingiber officinalis, (GINGER ROOT PO) Take 700 mg by mouth 2 (two) times a day     Ginkgo Biloba 40 MG TABS Take 40 mg by mouth daily Take in the am    metaxalone (SKELAXIN) 800 mg tablet Take 0 5 tablets (400 mg total) by mouth 3 (three) times a day    Multiple Vitamins-Minerals (CENTRUM SILVER PO) Take 1 tablet by mouth daily Takes in the am    omeprazole (PriLOSEC) 40 MG capsule     prochlorperazine (COMPAZINE) 10 mg tablet Take 1 tablet (10 mg total) by mouth every 6 (six) hours as needed for nausea or vomiting    solifenacin (VESICARE) 10 MG tablet Take 1 tablet (10 mg total) by mouth every evening    Turmeric 500 MG CAPS Take 700 mg by mouth 2 (two) times a day     ZOLMitriptan (ZOMIG) 2 5 MG tablet TAKE 1 TABLET BY MOUTH AT ONSET OF MIGRAINE, MAY REPEAT IN 2 HOURS IF NEEDED  LIMIT 2/24 HOURS, 4/WEEK, 8/MONTH    rosuvastatin (CRESTOR) 5 mg tablet TAKE ONE TABLET BY MOUTH EVERY DAY (Patient not taking: Reported on 8/8/2022)     No current facility-administered medications on file prior to visit  Allergies   Allergen Reactions    Celecoxib Other (See Comments)     Increased Heart Rate, Palpitations    Sumatriptan Anaphylaxis     Other reaction(s): SUMATRIPTAN (IMITREX) (Throat closure)  Pt analilia zolmitriptan      Tramadol Other (See Comments)     GI Upset- severe vomiting and systemic body aches    Medical Tape Dermatitis, Rash and Blisters     Adhesive from medication patches and bandaides- ok with paper tape       Physical Exam:    /84   Pulse 95   Temp 97 5 °F (36 4 °C)   Ht 5' 3" (1 6 m)   Wt 50 8 kg (112 lb)   SpO2 96%   BMI 19 84 kg/m²     Constitutional:normal, well developed, well nourished, alert, in no distress and non-toxic and no overt pain behavior    Eyes:anicteric  HEENT:grossly intact  Neck: symmetric, trachea midline and no masses   Pulmonary:even and unlabored  Cardiovascular:No edema or pitting edema present  Skin:Normal without rashes or lesions and well hydrated  Psychiatric:Mood and affect appropriate  Neurologic:Cranial Nerves II-XII grossly intact  Musculoskeletal:normal, except for significant limitation in range of motion lumbar extension which reproduces her back pain complaint, she also has tenderness over the lumbar facet joints reproducing her complaint    Imaging

## 2022-08-15 ENCOUNTER — APPOINTMENT (OUTPATIENT)
Dept: LAB | Facility: MEDICAL CENTER | Age: 62
End: 2022-08-15
Payer: COMMERCIAL

## 2022-08-15 ENCOUNTER — OFFICE VISIT (OUTPATIENT)
Dept: UROLOGY | Facility: MEDICAL CENTER | Age: 62
End: 2022-08-15
Payer: COMMERCIAL

## 2022-08-15 VITALS
SYSTOLIC BLOOD PRESSURE: 120 MMHG | WEIGHT: 112 LBS | HEART RATE: 87 BPM | HEIGHT: 63 IN | DIASTOLIC BLOOD PRESSURE: 84 MMHG | BODY MASS INDEX: 19.84 KG/M2

## 2022-08-15 DIAGNOSIS — R25.1 TREMOR: ICD-10-CM

## 2022-08-15 DIAGNOSIS — N39.0 RECURRENT UTI: Primary | ICD-10-CM

## 2022-08-15 DIAGNOSIS — K52.9 COLITIS: ICD-10-CM

## 2022-08-15 DIAGNOSIS — R25.2 MUSCLE CRAMPS: ICD-10-CM

## 2022-08-15 DIAGNOSIS — N39.41 URGE INCONTINENCE: ICD-10-CM

## 2022-08-15 DIAGNOSIS — R10.9 ABDOMINAL PAIN, UNSPECIFIED ABDOMINAL LOCATION: ICD-10-CM

## 2022-08-15 DIAGNOSIS — R19.5 OCCULT BLOOD POSITIVE STOOL: ICD-10-CM

## 2022-08-15 DIAGNOSIS — R11.0 NAUSEA: ICD-10-CM

## 2022-08-15 DIAGNOSIS — K52.832 LYMPHOCYTIC COLITIS: ICD-10-CM

## 2022-08-15 DIAGNOSIS — R39.15 URGENCY OF URINATION: ICD-10-CM

## 2022-08-15 DIAGNOSIS — K52.9 CHRONIC DIARRHEA: ICD-10-CM

## 2022-08-15 LAB
ALBUMIN SERPL BCP-MCNC: 3.9 G/DL (ref 3.5–5)
ALP SERPL-CCNC: 101 U/L (ref 46–116)
ALT SERPL W P-5'-P-CCNC: 23 U/L (ref 12–78)
ANION GAP SERPL CALCULATED.3IONS-SCNC: 4 MMOL/L (ref 4–13)
AST SERPL W P-5'-P-CCNC: 24 U/L (ref 5–45)
BASOPHILS # BLD AUTO: 0.05 THOUSANDS/ΜL (ref 0–0.1)
BASOPHILS NFR BLD AUTO: 1 % (ref 0–1)
BILIRUB SERPL-MCNC: 0.51 MG/DL (ref 0.2–1)
BUN SERPL-MCNC: 21 MG/DL (ref 5–25)
CALCIUM SERPL-MCNC: 9.7 MG/DL (ref 8.3–10.1)
CHLORIDE SERPL-SCNC: 106 MMOL/L (ref 96–108)
CK SERPL-CCNC: 70 U/L (ref 26–192)
CO2 SERPL-SCNC: 26 MMOL/L (ref 21–32)
CREAT SERPL-MCNC: 0.99 MG/DL (ref 0.6–1.3)
CRP SERPL QL: <3 MG/L
EOSINOPHIL # BLD AUTO: 0.07 THOUSAND/ΜL (ref 0–0.61)
EOSINOPHIL NFR BLD AUTO: 1 % (ref 0–6)
ERYTHROCYTE [DISTWIDTH] IN BLOOD BY AUTOMATED COUNT: 12.7 % (ref 11.6–15.1)
GFR SERPL CREATININE-BSD FRML MDRD: 60 ML/MIN/1.73SQ M
GLUCOSE SERPL-MCNC: 92 MG/DL (ref 65–140)
HCT VFR BLD AUTO: 40.4 % (ref 34.8–46.1)
HGB BLD-MCNC: 13.4 G/DL (ref 11.5–15.4)
IMM GRANULOCYTES # BLD AUTO: 0.04 THOUSAND/UL (ref 0–0.2)
IMM GRANULOCYTES NFR BLD AUTO: 1 % (ref 0–2)
LYMPHOCYTES # BLD AUTO: 1.41 THOUSANDS/ΜL (ref 0.6–4.47)
LYMPHOCYTES NFR BLD AUTO: 17 % (ref 14–44)
MAGNESIUM SERPL-MCNC: 2 MG/DL (ref 1.6–2.6)
MCH RBC QN AUTO: 30.9 PG (ref 26.8–34.3)
MCHC RBC AUTO-ENTMCNC: 33.2 G/DL (ref 31.4–37.4)
MCV RBC AUTO: 93 FL (ref 82–98)
MONOCYTES # BLD AUTO: 0.29 THOUSAND/ΜL (ref 0.17–1.22)
MONOCYTES NFR BLD AUTO: 3 % (ref 4–12)
NEUTROPHILS # BLD AUTO: 6.56 THOUSANDS/ΜL (ref 1.85–7.62)
NEUTS SEG NFR BLD AUTO: 77 % (ref 43–75)
NRBC BLD AUTO-RTO: 0 /100 WBCS
PLATELET # BLD AUTO: 303 THOUSANDS/UL (ref 149–390)
PMV BLD AUTO: 10.1 FL (ref 8.9–12.7)
POTASSIUM SERPL-SCNC: 4 MMOL/L (ref 3.5–5.3)
PROT SERPL-MCNC: 7.5 G/DL (ref 6.4–8.4)
RBC # BLD AUTO: 4.33 MILLION/UL (ref 3.81–5.12)
SL AMB  POCT GLUCOSE, UA: ABNORMAL
SL AMB LEUKOCYTE ESTERASE,UA: ABNORMAL
SL AMB POCT BILIRUBIN,UA: ABNORMAL
SL AMB POCT BLOOD,UA: ABNORMAL
SL AMB POCT CLARITY,UA: CLEAR
SL AMB POCT COLOR,UA: ABNORMAL
SL AMB POCT KETONES,UA: ABNORMAL
SL AMB POCT NITRITE,UA: ABNORMAL
SL AMB POCT PH,UA: 5
SL AMB POCT SPECIFIC GRAVITY,UA: 1.03
SL AMB POCT URINE PROTEIN: ABNORMAL
SL AMB POCT UROBILINOGEN: 0.2
SODIUM SERPL-SCNC: 136 MMOL/L (ref 135–147)
TSH SERPL DL<=0.05 MIU/L-ACNC: 1.69 UIU/ML (ref 0.45–4.5)
WBC # BLD AUTO: 8.42 THOUSAND/UL (ref 4.31–10.16)

## 2022-08-15 PROCEDURE — 84443 ASSAY THYROID STIM HORMONE: CPT

## 2022-08-15 PROCEDURE — 86364 TISS TRNSGLTMNASE EA IG CLAS: CPT

## 2022-08-15 PROCEDURE — 86140 C-REACTIVE PROTEIN: CPT

## 2022-08-15 PROCEDURE — 82784 ASSAY IGA/IGD/IGG/IGM EACH: CPT

## 2022-08-15 PROCEDURE — 85025 COMPLETE CBC W/AUTO DIFF WBC: CPT

## 2022-08-15 PROCEDURE — 86231 EMA EACH IG CLASS: CPT

## 2022-08-15 PROCEDURE — 81003 URINALYSIS AUTO W/O SCOPE: CPT | Performed by: UROLOGY

## 2022-08-15 PROCEDURE — 83735 ASSAY OF MAGNESIUM: CPT

## 2022-08-15 PROCEDURE — 99214 OFFICE O/P EST MOD 30 MIN: CPT | Performed by: UROLOGY

## 2022-08-15 PROCEDURE — 86258 DGP ANTIBODY EACH IG CLASS: CPT

## 2022-08-15 PROCEDURE — 36415 COLL VENOUS BLD VENIPUNCTURE: CPT

## 2022-08-15 PROCEDURE — 82550 ASSAY OF CK (CPK): CPT

## 2022-08-15 PROCEDURE — 80053 COMPREHEN METABOLIC PANEL: CPT

## 2022-08-15 RX ORDER — TROSPIUM CHLORIDE 20 MG/1
20 TABLET, FILM COATED ORAL 2 TIMES DAILY
Qty: 60 TABLET | Refills: 3 | Status: SHIPPED | OUTPATIENT
Start: 2022-08-15

## 2022-08-15 NOTE — PROGRESS NOTES
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  Leaks between times, usually with urgency  Also leaks while she is sleeping night, nocturia x1  No leakage with cough or sneeze, although she still does smoke chronically  Did well after SWL for kidney stone in March 2021  ASSESSMENT / PLAN:    Stop VESIcare, start trospium 20 mg twice per day    After three months no help, we will reassess with PVR and discussion of Botox  I have told her Botox is mostly helpful with the urgency time, not so much for the passive  Will need CT scan around then to look for new stones  I do not recommend ultrasound and KUB        The following portions of the patient's history were reviewed and updated as appropriate: allergies, current medications, past family history, past medical history, past social history, past surgical history and problem list     Review of Systems   All other systems reviewed and are negative  Objective:     Physical Exam  Constitutional:       General: She is not in acute distress  Appearance: She is well-developed  She is not diaphoretic  HENT:      Head: Normocephalic and atraumatic  Eyes:      General: No scleral icterus  Pulmonary:      Effort: Pulmonary effort is normal    Skin:     Coloration: Skin is not pale  Neurological:      Mental Status: She is alert and oriented to person, place, and time  Psychiatric:         Behavior: Behavior normal          Thought Content:  Thought content normal          Judgment: Judgment normal            No results found for: PSA]  BUN   Date Value Ref Range Status   06/27/2022 23 5 - 25 mg/dL Final     Creatinine   Date Value Ref Range Status   06/27/2022 0 80 0 60 - 1 30 mg/dL Final     Comment:     Standardized to IDMS reference method     No components found for: CBC      Patient Active Problem List   Diagnosis    Urge incontinence    Frequency of urination    History of hydronephrosis    Migraine headache    Fibromyalgia    Pure hypercholesterolemia    Allergy    Low iron    Idiopathic hypersomnia    Osteoporosis    Diverticulosis    Chronic diarrhea    Migraine without aura and without status migrainosus, not intractable    Acute cystitis    Renal calculus    Complicated UTI (urinary tract infection)    Anemia    Irritable bowel syndrome    Edema of both legs    Prolonged QT interval    Rash    Screening mammogram, encounter for    Screening for colon cancer    Spondylosis of cervical region without myelopathy or radiculopathy    Neck pain    Encounter for hepatitis C screening test for low risk patient    Encounter for immunization    Spondylosis of lumbar spine    Senile osteoporosis    Irritable bowel syndrome without diarrhea    Hypercholesterolemia    Cervical radiculopathy    Microscopic hematuria    Calculus of ureter    Inguinal hernia of right side without obstruction or gangrene    S/P right inguinal hernia repair    Diarrhea    Weight loss    Elevated blood sugar    Pap smear for cervical cancer screening    Lumbar radiculopathy    Smoking    Colitis    Urgency of urination    Ureteral stone with hydronephrosis    Flank pain    Low magnesium level    Hyponatremia    Leukopenia    Hypokalemia    Wears dentures    Recurrent UTI    Jerking movements of extremities    History of fusion of cervical spine    Carpal tunnel syndrome of right wrist    Other ascites    Urinary tract infection without hematuria    Low serum calcium    Tremor    Increased MCV    Abnormal renal function    Arm paresthesia, right    Right median nerve neuropathy    Ulnar neuropathy of right upper extremity    Abnormal EKG    DDD (degenerative disc disease), cervical    Neurologic gait dysfunction    Myofascial pain syndrome, cervical        Diagnoses and all orders for this visit:    Recurrent UTI  -     POCT urine dip auto non-scope    Urgency of urination  -     POCT urine dip auto non-scope    Urge incontinence  -     POCT urine dip auto non-scope  -     trospium chloride (SANCTURA) 20 mg tablet; Take 1 tablet (20 mg total) by mouth 2 (two) times a day           Patient ID: Lorenza Hemphill is a 59 y o  female        Current Outpatient Medications:     Armodafinil 250 MG tablet, Take 1 tablet (250 mg total) by mouth daily, Disp: 30 tablet, Rfl: 5    aspirin 500 MG buffered tablet, Take 500 mg by mouth every 6 (six) hours as needed for mild pain, Disp: , Rfl:     BACILLUS COAGULANS-INULIN PO, Take 1 capsule by mouth daily Takes in the am, Disp: , Rfl:     calcium carbonate 1250 MG capsule, Take 600 mg by mouth daily , Disp: , Rfl:     cetirizine (ZyrTEC) 10 mg tablet, Take 10 mg by mouth 2 (two) times a day, Disp: , Rfl:     Cholecalciferol 1000 units tablet, Take 1,000 Units by mouth daily Takes in the afternoon, Disp: , Rfl:     Citicoline Sodium 500 MG TABS, Take 500 mg by mouth daily Takes in the am, Disp: , Rfl:     diclofenac (VOLTAREN) 75 mg EC tablet, Take 75 mg by mouth in the morning, Disp: , Rfl:     dicyclomine (BENTYL) 20 mg tablet, Take 1 tablet (20 mg total) by mouth 4 (four) times a day as needed (abdominal pain) (Patient taking differently: Take 20 mg by mouth 3 (three) times a day), Disp: 240 tablet, Rfl: 3    DULoxetine (CYMBALTA) 30 mg delayed release capsule, Take 3 capsules (90 mg total) by mouth daily, Disp: 90 capsule, Rfl: 1    ferrous sulfate 325 (65 Fe) mg tablet, Take 325 mg by mouth daily with breakfast , Disp: , Rfl:     gabapentin (NEURONTIN) 300 mg capsule, 300 mg at 1200 in addition to 600 mg at bedtime  , Disp: , Rfl:     Ginger, Zingiber officinalis, (GINGER ROOT PO), Take 700 mg by mouth 2 (two) times a day , Disp: , Rfl:     Ginkgo Biloba 40 MG TABS, Take 40 mg by mouth daily Take in the am, Disp: , Rfl:     metaxalone (SKELAXIN) 800 mg tablet, Take 0 5 tablets (400 mg total) by mouth 3 (three) times a day, Disp: 45 tablet, Rfl: 2    Multiple Vitamins-Minerals (CENTRUM SILVER PO), Take 1 tablet by mouth daily Takes in the am, Disp: , Rfl:     omeprazole (PriLOSEC) 40 MG capsule, , Disp: , Rfl:     prochlorperazine (COMPAZINE) 10 mg tablet, Take 1 tablet (10 mg total) by mouth every 6 (six) hours as needed for nausea or vomiting, Disp: 12 tablet, Rfl: 1    rosuvastatin (CRESTOR) 5 mg tablet, TAKE ONE TABLET BY MOUTH EVERY DAY, Disp: 90 tablet, Rfl: 0    trospium chloride (SANCTURA) 20 mg tablet, Take 1 tablet (20 mg total) by mouth 2 (two) times a day, Disp: 60 tablet, Rfl: 3    Turmeric 500 MG CAPS, Take 700 mg by mouth 2 (two) times a day , Disp: , Rfl:     ZOLMitriptan (ZOMIG) 2 5 MG tablet, TAKE 1 TABLET BY MOUTH AT ONSET OF MIGRAINE, MAY REPEAT IN 2 HOURS IF NEEDED   LIMIT 2/24 HOURS, 4/WEEK, 8/MONTH, Disp: 12 tablet, Rfl: 5    Past Medical History:   Diagnosis Date    Allergy to dust     Anemia     Calculus of ureter     Chronic pain disorder     neck and back- sees pain management    Colitis     Colon polyp     Cracked skin     on fingers    Diverticulosis     Dysphagia     GERD (gastroesophageal reflux disease)     H/O: GI bleed     Hiatal hernia     History of DVT in adulthood 2019    2019    Hydronephrosis     Hyperlipidemia     Inguinal hernia     right side    Irregular heart beat     Pt states with prolonged QT interval - was seen by cardiologist( [facility]) pt told "to not worry about it"    Irritable bowel     Kidney stone     Migraines     Narcolepsy     Narcolepsy 08/06/2020    OAB (overactive bladder)     Osteoarthritis     Osteoporosis     Other chronic pain     degenerative disc disease    PONV (postoperative nausea and vomiting)     migraines    Pyelonephritis 1/13/2021    SBO ([facility] bowel obstruction) ([facility] [address] ) 9/25/2021    Sepsis ([facility] [address] ) 1/13/2021    Smoking 8/6/2020    Tobacco abuse     Wears dentures     full upper    Wears glasses     Wears glasses        Past Surgical History:   Procedure Laterality Date    ABDOMINAL ADHESION SURGERY      ARM SKIN LESION BIOPSY / EXCISION      lymph node excision    BLADDER SURGERY      CHOLECYSTECTOMY      COLONOSCOPY  08/06/2020    FL RETROGRADE PYELOGRAM  1/5/2021    HERNIA REPAIR      HYSTERECTOMY  1977    JOINT REPLACEMENT Right     knee    KNEE SURGERY Right     replacement    LAMINECTOMY      L5-6-7    LAPAROSCOPY      LYMPH NODE DISSECTION Left     removed -no cancer-  limb restriction    LYMPHADENECTOMY  1974    UNDER LEFT ARM - DO NOT DRAW BLOOD/GIVE INJECTIONS IN LEFT ARM PER PATIENT     MOLE REMOVAL      OOPHORECTOMY Bilateral 1977    OTHER SURGICAL HISTORY      enlarged lymph node removed left arm   no cancer    PA CYSTO/URETERO W/LITHOTRIPSY &INDWELL STENT INSRT Right 1/5/2021    Procedure: CYSTO, LASER  URETEROSCOPY, RETRO PYELOGRAM, STENT REPLACMENT;  Surgeon: Elsa Perez MD;  [facility];  Service: Urology    PA CYSTOURETHROSCOPY,URETER CATHETER Right 12/2/2020    Procedure: CYSTOSCOPY AND INSERTION STENT URETERAL;  Surgeon: Dora Rodriguez MD;  [facility];  Service: Urology    [facility] ESWL Right 3/25/2021    Procedure: LITHOTRIPSY EXTRACORPORAL SHOCKWAVE (ESWL);   Surgeon: Tierney Mcpherson MD;  Location: [address] [place] [address] MAIN OR;  Service: Urology    PA REPAIR ING HERNIA,5+Y/O,REDUCIBL Right 6/25/2020    Procedure: REPAIR HERNIA INGUINAL  WITH MESH;  Surgeon: Renee Castano MD;  [facility];  Service: General    SPINAL FUSION      UPPER GASTROINTESTINAL ENDOSCOPY  08/06/2020    WISDOM TOOTH EXTRACTION         Social History

## 2022-08-16 DIAGNOSIS — K52.832 LYMPHOCYTIC COLITIS: ICD-10-CM

## 2022-08-17 LAB
ENDOMYSIUM IGA SER QL: NEGATIVE
GLIADIN PEPTIDE IGA SER-ACNC: 3 UNITS (ref 0–19)
GLIADIN PEPTIDE IGG SER-ACNC: 1 UNITS (ref 0–19)
IGA SERPL-MCNC: 231 MG/DL (ref 87–352)
TTG IGA SER-ACNC: <2 U/ML (ref 0–3)
TTG IGG SER-ACNC: 6 U/ML (ref 0–5)

## 2022-08-17 RX ORDER — DIPHENOXYLATE HYDROCHLORIDE AND ATROPINE SULFATE 2.5; .025 MG/1; MG/1
TABLET ORAL
Qty: 90 TABLET | Refills: 1 | Status: SHIPPED | OUTPATIENT
Start: 2022-08-17

## 2022-08-19 ENCOUNTER — TELEPHONE (OUTPATIENT)
Dept: RADIOLOGY | Facility: MEDICAL CENTER | Age: 62
End: 2022-08-19

## 2022-08-19 NOTE — TELEPHONE ENCOUNTER
Scheduled for NOEMY on 9/7  Submitted auth to [facility] under [employer] with clinicals attached  Received pending denial for no PT  Dr Abel Ramírez available for P2P    Cell # [phone]    ID # [id number]  [facility] Tracking # [id number]

## 2022-08-23 DIAGNOSIS — M54.50 CHRONIC BILATERAL LOW BACK PAIN WITHOUT SCIATICA: ICD-10-CM

## 2022-08-23 DIAGNOSIS — G89.29 CHRONIC BILATERAL LOW BACK PAIN WITHOUT SCIATICA: ICD-10-CM

## 2022-08-23 DIAGNOSIS — M47.816 SPONDYLOSIS OF LUMBAR SPINE: Primary | ICD-10-CM

## 2022-08-23 NOTE — TELEPHONE ENCOUNTER
Please let patient know that insurance is denying epidural until she completes PT  Please confirm she is ok with that and I will order PT for her

## 2022-08-23 NOTE — TELEPHONE ENCOUNTER
S/W pt  Advised pt of the same  Pt stated she will go to PT at [facility]  She stated a call back is not needed once the order is put in  Pt verbalized understanding  Please advise

## 2022-08-23 NOTE — PROGRESS NOTES
Assessment and Plan:   Patient is a 57-year-old female who presents for rheumatology consult regarding osteoporosis  She was seeing Dr Wil Gómez at [facility] but needs to transfer care here  She has been on Prolia since 2018 and had her last Prolia injection in December 2021  Her last DEXA scan was in May 2021  The exam was overall stable in terms of her bone density and we discussed for now we will continue with Prolia  She will be due for a DEXA again in May 2023 and so we will decide any changes in therapy after that point  She was in agreement with that plan  We need to do a prior authorization 1st with her insurance and so we will do that and then bring her back just for her Prolia injection, then she will be due to come back again 6 months after that for her next Prolia and office visit  She actually lives much closer to the [facility] and so in the future would like to have her follow-up there  I also did send in her diclofenac for her however I asked her to continue receiving metaxalone through Neurology  Plan:  Diagnoses and all orders for this visit:    Post-menopausal osteoporosis  -     PTH, intact  -     Vitamin D 25 hydroxy    Vitamin D deficiency  -     PTH, intact  -     Vitamin D 25 hydroxy    Osteoporosis, unspecified osteoporosis type, unspecified pathological fracture presence  -     Ambulatory Referral to Rheumatology    Primary osteoarthritis involving multiple joints  -     diclofenac (VOLTAREN) 75 mg EC tablet;  Take 1 tablet (75 mg total) by mouth 2 (two) times a day as needed (milm pain)        Follow-up plan: will schedule prolia once approved, then f/u 6 months after that shot with Dr Delmar Monge in [facility]  Josh Daugherty is a 59 y o   female with H/O cervical spine fusion, chronic diarrhea due to colitis/enteritis, tobacco abuse, lumbar spondylosis, GERD, migraines, hyperlipidemia, hypersomnia, iron deficiency, H/O DVT, H/O kidney stones, h/o small-bowel obstruction, osteoarthritis, who presents for rheumatology consult by request of Dr Marie Perez for osteoporosis  Previously followed with Dr Olimpai Washington at [facility] rheumatology, last seen 1 year ago  Previously had intolerance to oral bisphosphonates  She also previously did 2 Reclast infusions  Started on Prolia around 2017/2018, last Prolia injection was 12/2021 per records with Dr Olimpia Washington  Her last DEXA was May of 2021  Patient reports at the last visit with Dr Olimpia Washington he was suggesting going on Reclast again however she did not want to do this plan as she recalls Dr Freddie Vasquez prior to Dr Olimpia Washington did not like how her DEXA looked on the Reclast   Never had a fragility fracture  No known family members with osteoporosis  Not able to exercise due to chronic pain  Takes calcium 1200 mg daily and vitamin D3 1000 units daily  Patient also inquires if we can take over her diclofenac prescription for her arthritis since her previous rheumatologist was prescribing this  She was also receiving metaxalone but this was recently prescribed by Neurology and we discussed that I would defer to them  She also follows with pain management  Review of Systems  Review of Systems   Constitutional: Negative for fatigue  HENT: Negative for mouth sores  Eyes: Negative for pain  Respiratory: Negative for shortness of breath  Cardiovascular: Negative for leg swelling  Musculoskeletal: Positive for arthralgias, back pain, gait problem and myalgias  Negative for joint swelling  Skin: Negative for rash  Neurological: Negative for weakness  Hematological: Negative for adenopathy  Psychiatric/Behavioral: Negative for sleep disturbance  Allergies  Allergies   Allergen Reactions    Celecoxib Other (See Comments)     Increased Heart Rate, Palpitations    Sumatriptan Anaphylaxis     Other reaction(s): SUMATRIPTAN (IMITREX) (Throat closure)  Pt analilia zolmitriptan      Tramadol Other (See Comments) GI Upset- severe vomiting and systemic body aches    Medical Tape Dermatitis, Rash and Blisters     Adhesive from medication patches and bandaides- ok with paper tape       Home Medications    Current Outpatient Medications:     diclofenac (VOLTAREN) 75 mg EC tablet, Take 1 tablet (75 mg total) by mouth 2 (two) times a day as needed (milm pain), Disp: 60 tablet, Rfl: 5    Armodafinil 250 MG tablet, Take 1 tablet (250 mg total) by mouth daily, Disp: 30 tablet, Rfl: 5    aspirin 500 MG buffered tablet, Take 500 mg by mouth every 6 (six) hours as needed for mild pain, Disp: , Rfl:     BACILLUS COAGULANS-INULIN PO, Take 1 capsule by mouth daily Takes in the am, Disp: , Rfl:     calcium carbonate 1250 MG capsule, Take 600 mg by mouth daily , Disp: , Rfl:     cetirizine (ZyrTEC) 10 mg tablet, Take 10 mg by mouth 2 (two) times a day, Disp: , Rfl:     Cholecalciferol 1000 units tablet, Take 1,000 Units by mouth daily Takes in the afternoon, Disp: , Rfl:     Citicoline Sodium 500 MG TABS, Take 500 mg by mouth daily Takes in the am, Disp: , Rfl:     dicyclomine (BENTYL) 20 mg tablet, Take 1 tablet (20 mg total) by mouth 4 (four) times a day as needed (abdominal pain) (Patient taking differently: Take 20 mg by mouth 3 (three) times a day), Disp: 240 tablet, Rfl: 3    diphenoxylate-atropine (LOMOTIL) 2 5-0 025 mg per tablet, TAKE ONE TABLET BY MOUTH FOUR TIMES A DAY AS NEEDED FOR DIARRHEA, Disp: 90 tablet, Rfl: 1    DULoxetine (CYMBALTA) 30 mg delayed release capsule, Take 3 capsules (90 mg total) by mouth daily, Disp: 90 capsule, Rfl: 1    ferrous sulfate 325 (65 Fe) mg tablet, Take 325 mg by mouth daily with breakfast , Disp: , Rfl:     gabapentin (NEURONTIN) 300 mg capsule, 300 mg at 1200 in addition to 600 mg at bedtime  , Disp: , Rfl:     Ginger, Zingiber officinalis, (GINGER ROOT PO), Take 700 mg by mouth 2 (two) times a day , Disp: , Rfl:     metaxalone (SKELAXIN) 800 mg tablet, Take 0 5 tablets (400 mg total) by mouth 3 (three) times a day, Disp: 45 tablet, Rfl: 2    Multiple Vitamins-Minerals (CENTRUM SILVER PO), Take 1 tablet by mouth daily Takes in the am, Disp: , Rfl:     omeprazole (PriLOSEC) 40 MG capsule, , Disp: , Rfl:     prochlorperazine (COMPAZINE) 10 mg tablet, Take 1 tablet (10 mg total) by mouth every 6 (six) hours as needed for nausea or vomiting, Disp: 12 tablet, Rfl: 1    trospium chloride (SANCTURA) 20 mg tablet, Take 1 tablet (20 mg total) by mouth 2 (two) times a day, Disp: 60 tablet, Rfl: 3    Turmeric 500 MG CAPS, Take 700 mg by mouth 2 (two) times a day , Disp: , Rfl:     ZOLMitriptan (ZOMIG) 2 5 MG tablet, TAKE 1 TABLET BY MOUTH AT ONSET OF MIGRAINE, MAY REPEAT IN 2 HOURS IF NEEDED   LIMIT 2/24 HOURS, 4/WEEK, 8/MONTH, Disp: 12 tablet, Rfl: 5    Past Medical History  Past Medical History:   Diagnosis Date    Allergy to dust     Anemia     Calculus of ureter     Chronic pain disorder     neck and back- sees pain management    Colitis     Colon polyp     Cracked skin     on fingers    Diverticulosis     Dysphagia     GERD (gastroesophageal reflux disease)     H/O: GI bleed     Hiatal hernia     History of DVT in adulthood 2019 2019    Hydronephrosis     Hyperlipidemia     Inguinal hernia     right side    Irregular heart beat     Pt states with prolonged QT interval - was seen by cardiologist( [facility]) pt told "to not worry about it"    Irritable bowel     Kidney stone     Migraines     Narcolepsy     Narcolepsy 08/06/2020    OAB (overactive bladder)     Osteoarthritis     Osteoporosis     Other chronic pain     degenerative disc disease    PONV (postoperative nausea and vomiting)     migraines    Pyelonephritis 1/13/2021    SBO (small bowel obstruction) ([address] ) 9/25/2021    Sepsis ([address] ) 1/13/2021    Smoking 8/6/2020    Tobacco abuse     Wears dentures     full upper    Wears glasses     Wears glasses        Past Surgical History   Past Surgical History: Procedure Laterality Date    ABDOMINAL ADHESION SURGERY      ARM SKIN LESION BIOPSY / EXCISION      lymph node excision    BLADDER SURGERY      CHOLECYSTECTOMY      COLONOSCOPY  08/06/2020    FL RETROGRADE PYELOGRAM  1/5/2021    HERNIA REPAIR      HYSTERECTOMY  1977    JOINT REPLACEMENT Right     knee    KNEE SURGERY Right     replacement    LAMINECTOMY      L5-6-7    LAPAROSCOPY      LYMPH NODE DISSECTION Left     removed -no cancer-  limb restriction    LYMPHADENECTOMY  1974    UNDER LEFT ARM - DO NOT DRAW BLOOD/GIVE INJECTIONS IN LEFT ARM PER PATIENT     MOLE REMOVAL      OOPHORECTOMY Bilateral 1977    OTHER SURGICAL HISTORY      enlarged lymph node removed left arm   no cancer    WI CYSTO/URETERO W/LITHOTRIPSY &INDWELL STENT INSRT Right 1/5/2021    Procedure: CYSTO, LASER  URETEROSCOPY, RETRO PYELOGRAM, STENT REPLACMENT;  Surgeon: Adri Riley MD;  Location: [facility];  Service: Urology    WI CYSTOURETHROSCOPY,URETER CATHETER Right 12/2/2020    Procedure: CYSTOSCOPY AND INSERTION STENT URETERAL;  Surgeon: Anita Woodson MD;  [facility];  Service: Urology    [place] ESWL Right 3/25/2021    Procedure: LITHOTRIPSY EXTRACORPORAL SHOCKWAVE (ESWL); Surgeon: Ed Hollis MD;  Location: [facility] MAIN OR;  Service: Urology    WI REPAIR [address] HERNIA,5+Y/O,REDUCIBL Right 6/25/2020    Procedure: REPAIR HERNIA INGUINAL  WITH MESH;  Surgeon: Wesley Montero MD;  Location: [facility];  Service: General    SPINAL FUSION      UPPER GASTROINTESTINAL ENDOSCOPY  08/06/2020    WISDOM TOOTH EXTRACTION         Family History  No known family history of autoimmune or inflammatory diseases      Family History   Problem Relation Age of Onset    Hypertension Father     Cancer Father     Hypertension Mother     Heart attack Mother     Stroke Brother     Nephrolithiasis Brother     Diabetes Sister     No Known Problems Daughter     No Known Problems Maternal Grandmother     No Known Problems Maternal Grandfather     No Known Problems Paternal Grandmother     No Known Problems Paternal Grandfather     Diabetes Sister     No Known Problems Maternal Aunt     No Known Problems Maternal Aunt     No Known Problems Maternal Aunt     No Known Problems Paternal Aunt     No Known Problems Paternal Aunt     No Known Problems Paternal Aunt        Social History  Social History     Substance and Sexual Activity   Alcohol Use Not Currently    Comment: seldom     Social History     Substance and Sexual Activity   Drug Use Not Currently    Types: Marijuana    Comment: medicinal marijuana; last dose was 3/16     Social History     Tobacco Use   Smoking Status Current Every Day Smoker    Packs/day: 0 50    Years: 47 00    Pack years: 23 50    Types: Cigarettes   Smokeless Tobacco Never Used   Tobacco Comment    pt states is at 1/2 pk day currently - was previously at one pack /day       Objective:    Vitals:    08/25/22 1358   BP: 140/84   Pulse: 83   SpO2: 97%   Height: 5' 3" (1 6 m)       Physical Exam  Constitutional:       General: She is not in acute distress  HENT:      Head: Normocephalic and atraumatic  Eyes:      Conjunctiva/sclera: Conjunctivae normal    Pulmonary:      Effort: Pulmonary effort is normal  No respiratory distress  Musculoskeletal:      Cervical back: Neck supple  Skin:     Coloration: Skin is not pale  Neurological:      Mental Status: She is alert  Mental status is at baseline  Psychiatric:         Mood and Affect: Mood normal          Behavior: Behavior normal          Imaging:   DEXA 5/6/21:  Interface, Rad Results In - 05/06/2021  3:15 PM EDT   Formatting of this note might be different from the original    A DXA bone mineral density examination was performed with a Hologic scanner  The   patient is a 58-year-old postmenopausal female with a history of osteoporosis  Comparison is made to a previous study of 4/17/2019       The lumbar spine was examined from L1-L3  In total, the bone mineral density is   1 9 standard deviations below the predicted peak bone mass and is 79 % of   normal  The lumbar spine measurement is 0 807 g/cm2  This measurement has   increased 16 7% since the prior examination  The left hip was examined in several regions  In total, the bone mineral density   is 2 0 standard deviations below the predicted peak bone mass and is 74 % of   normal   The total hip measurement is 0 701 g/cm2  This measurement has   increased 4 5% since the prior examination        The femoral neck measurement is 2 6 standard deviations below the predicted peak   bone mass and is 66 % of normal  The femoral neck measurement is 0 561 g/cm2  This measurement has not significantly changed from the prior examination  IMPRESSION:   Impression:   The examination is reviewed using the World Health Organization criteria  1  Osteoporosis as measured by the bone mineral density of the femoral neck with   measurements that show high risk for fracture  This measurement has not   significantly changed since the prior examination, although the measurements of   the lumbar spine and hip have both significantly improved, as described above  2  A FRAX is not reported because some of the T-scores are at or below -2 5 and   the patient is being treated for osteoporosis         Labs:   Component      Latest Ref Rng & Units 8/15/2022   Sodium      135 - 147 mmol/L 136   Potassium      3 5 - 5 3 mmol/L 4 0   Chloride      96 - 108 mmol/L 106   CO2      21 - 32 mmol/L 26   Anion Gap      4 - 13 mmol/L 4   BUN      5 - 25 mg/dL 21   Creatinine      0 60 - 1 30 mg/dL 0 99   Glucose, Random      65 - 140 mg/dL 92   Calcium      8 3 - 10 1 mg/dL 9 7   AST      5 - 45 U/L 24   ALT      12 - 78 U/L 23   Alkaline Phosphatase      46 - 116 U/L 101   Total Protein      6 4 - 8 4 g/dL 7 5   Albumin      3 5 - 5 0 g/dL 3 9   TOTAL BILIRUBIN      0 20 - 1 00 mg/dL 0 51   eGFR ml/min/1 73sq m 60   TSH 3RD GENERATON      0 450 - 4 500 uIU/mL 1 690   Magnesium      1 6 - 2 6 mg/dL 2 0

## 2022-08-25 ENCOUNTER — OFFICE VISIT (OUTPATIENT)
Dept: RHEUMATOLOGY | Facility: CLINIC | Age: 62
End: 2022-08-25
Payer: COMMERCIAL

## 2022-08-25 VITALS
OXYGEN SATURATION: 97 % | SYSTOLIC BLOOD PRESSURE: 140 MMHG | BODY MASS INDEX: 19.84 KG/M2 | DIASTOLIC BLOOD PRESSURE: 84 MMHG | HEART RATE: 83 BPM | HEIGHT: 63 IN

## 2022-08-25 DIAGNOSIS — M81.0 POST-MENOPAUSAL OSTEOPOROSIS: Primary | ICD-10-CM

## 2022-08-25 DIAGNOSIS — M81.0 OSTEOPOROSIS, UNSPECIFIED OSTEOPOROSIS TYPE, UNSPECIFIED PATHOLOGICAL FRACTURE PRESENCE: ICD-10-CM

## 2022-08-25 DIAGNOSIS — M15.9 PRIMARY OSTEOARTHRITIS INVOLVING MULTIPLE JOINTS: ICD-10-CM

## 2022-08-25 DIAGNOSIS — E55.9 VITAMIN D DEFICIENCY: ICD-10-CM

## 2022-08-25 PROCEDURE — 99244 OFF/OP CNSLTJ NEW/EST MOD 40: CPT | Performed by: INTERNAL MEDICINE

## 2022-08-25 RX ORDER — DICLOFENAC SODIUM 75 MG/1
75 TABLET, DELAYED RELEASE ORAL 2 TIMES DAILY PRN
Qty: 60 TABLET | Refills: 5 | Status: SHIPPED | OUTPATIENT
Start: 2022-08-25

## 2022-08-26 ENCOUNTER — TELEPHONE (OUTPATIENT)
Dept: RHEUMATOLOGY | Facility: CLINIC | Age: 62
End: 2022-08-26

## 2022-08-26 NOTE — TELEPHONE ENCOUNTER
Please submit prior auth for prolia for osteoporosis, was receiving through another rheum but transitioning care here   Prior treatment with alendronate, reclast    Once approved, she needs to be scheduled (can be double booked) to come back just for her injection as she is overdue (last injection was 12/2021), and then from there needs 6 mmonths f/u after that at [facility] office

## 2022-08-30 ENCOUNTER — APPOINTMENT (OUTPATIENT)
Dept: LAB | Facility: MEDICAL CENTER | Age: 62
End: 2022-08-30
Payer: COMMERCIAL

## 2022-08-30 ENCOUNTER — HOSPITAL ENCOUNTER (OUTPATIENT)
Dept: MAMMOGRAPHY | Facility: MEDICAL CENTER | Age: 62
Discharge: HOME | End: 2022-08-30
Payer: COMMERCIAL

## 2022-08-30 ENCOUNTER — OFFICE VISIT (OUTPATIENT)
Dept: GASTROENTEROLOGY | Facility: CLINIC | Age: 62
End: 2022-08-30
Payer: COMMERCIAL

## 2022-08-30 VITALS
SYSTOLIC BLOOD PRESSURE: 118 MMHG | HEART RATE: 78 BPM | TEMPERATURE: 97.8 F | BODY MASS INDEX: 20.48 KG/M2 | OXYGEN SATURATION: 98 % | HEIGHT: 63 IN | DIASTOLIC BLOOD PRESSURE: 70 MMHG | WEIGHT: 115.6 LBS

## 2022-08-30 VITALS — HEIGHT: 63 IN | BODY MASS INDEX: 20.47 KG/M2 | WEIGHT: 115.52 LBS

## 2022-08-30 DIAGNOSIS — K52.9 CHRONIC DIARRHEA: Primary | ICD-10-CM

## 2022-08-30 DIAGNOSIS — K52.832 LYMPHOCYTIC COLITIS: ICD-10-CM

## 2022-08-30 DIAGNOSIS — E61.1 IRON DEFICIENCY: ICD-10-CM

## 2022-08-30 DIAGNOSIS — R11.0 NAUSEA: ICD-10-CM

## 2022-08-30 DIAGNOSIS — R10.9 ABDOMINAL PAIN, UNSPECIFIED ABDOMINAL LOCATION: ICD-10-CM

## 2022-08-30 DIAGNOSIS — Z12.31 ENCOUNTER FOR SCREENING MAMMOGRAM FOR BREAST CANCER: ICD-10-CM

## 2022-08-30 LAB
25(OH)D3 SERPL-MCNC: 44.2 NG/ML (ref 30–100)
PTH-INTACT SERPL-MCNC: 37.6 PG/ML (ref 18.4–80.1)

## 2022-08-30 PROCEDURE — 99214 OFFICE O/P EST MOD 30 MIN: CPT | Performed by: INTERNAL MEDICINE

## 2022-08-30 PROCEDURE — 82306 VITAMIN D 25 HYDROXY: CPT | Performed by: INTERNAL MEDICINE

## 2022-08-30 PROCEDURE — 83970 ASSAY OF PARATHORMONE: CPT | Performed by: INTERNAL MEDICINE

## 2022-08-30 PROCEDURE — 36415 COLL VENOUS BLD VENIPUNCTURE: CPT | Performed by: INTERNAL MEDICINE

## 2022-08-30 PROCEDURE — 77063 BREAST TOMOSYNTHESIS BI: CPT

## 2022-08-30 PROCEDURE — 77067 SCR MAMMO BI INCL CAD: CPT

## 2022-08-30 NOTE — PROGRESS NOTES
[facility] - Outpatient Follow-up Note  Rudy Lopez 59 y o  female MRN: [id number]  Encounter: [id number]          ASSESSMENT AND PLAN:    Rudy Lopez is a 59 y o  female with microscopic colitis and chronic diarrhea, prior small-bowel obstruction with transition point in the right pelvis thought to be from adhesions, who presents for follow-up of chronic diarrhea in the setting of microscopic colitis with some chronic enteritis, prior acute colitis  Overall she feels the same  Still with diarrhea  Colonoscopy in October 2021 with diverticulosis and colonic erythema as well as ileal erythema with biopsy showing microscopic colitis and chronic nonspecific inflammation in the ileum  Prior endoscopy from 2020 noted with some erythema in the stomach as well as erythema and ulcerated mucosa in the duodenum  Biopsies with erosive changes in the duodenum, reactive gastropathy, mild chronic inflammation in the esophagus  MR enterography from June with no findings of active or chronic inflammatory bowel disease  Most recent CMP normal, celiac serologies normal aside from tTG IgG of 6, normal vitamin B12 but on the low side, normal CBC, normal TSH, negative C diff  Calprotectin 41  Negative fecal fats  1  Chronic diarrhea    2  Lymphocytic colitis    3  Abdominal pain, unspecified abdominal location    4  Nausea    5  Iron deficiency        No orders of the defined types were placed in this encounter  Stay well hydrated  Half a dose of pepto-bismal daily  Low residue diet  Consider VCE but she wishes to defer for now  Repeat CBC and iron studies in the future  Can recheck celiac blood work at that time  Repeat colonosocpy 2026 or sooner  Consider stopping cymbalta, NSAIDs, omeprazole but she declines  ______________________________________________________________________    SUBJECTIVE:    Rudy Lopez is a 59 y o  female who presents with complaint of abnormal stools       Overall feels the same  Her biggest issue is her back and neck  Usually it is a firm stool and then liquid after  It is a small stool and a lot of liquid  She eats and then she has to have a BM  No accidents but close  No nocturnal BMs  Usually 1 BM per day  She is iron and the stool is dark  No BRBPR  She has cramping with the stool and sometimes nausea  The diarrhea is almost constant  She is afraid to take medications for the diarrhea because it can cause constipation  She takes lomotil when it is bad and it helps  No heartburn as long as she takes omeprazole  Some foods stick but it always goes down  No weight loss  Answers for HPI/ROS submitted by the patient on 8/30/2022  Chronicity: recurrent  Onset: more than 1 year ago  Frequency: every several days  Progression since onset: unchanged  Pain location: suprapubic region  Pain - numeric: 4/10  Pain quality: cramping  Radiates to: suprapubic region  anorexia: Yes  arthralgias: Yes  belching: No  constipation: Yes  diarrhea: Yes  dysuria: No  fever: No  flatus: No  frequency: Yes  headaches: Yes  hematochezia: Yes  hematuria: No  melena: No  myalgias: Yes  nausea: Yes  weight loss: Yes  vomiting: Yes  Aggravated by: nothing  Relieved by: bowel movements  Diagnostic workup: CT scan          REVIEW OF SYSTEMS IS OTHERWISE NEGATIVE    10 point ROS reviewed and negative, except as above      Historical Information   Past Medical History:   Diagnosis Date    Allergy to dust     Anemia     Calculus of ureter     Chronic pain disorder     neck and back- sees pain management    Colitis     Colon polyp     Cracked skin     on fingers    Diverticulosis     Dysphagia     GERD (gastroesophageal reflux disease)     H/O: GI bleed     Hiatal hernia     History of DVT in adulthood 2019 2019    Hydronephrosis     Hyperlipidemia     Inguinal hernia     right side    Irregular heart beat     Pt states with prolonged QT interval - was seen by cardiologist( [facility]) pt told "to not worry about it"    Irritable bowel     Kidney stone     Migraines     Narcolepsy     Narcolepsy 08/06/2020    OAB (overactive bladder)     Osteoarthritis     Osteoporosis     Other chronic pain     degenerative disc disease    PONV (postoperative nausea and vomiting)     migraines    Pyelonephritis 1/13/2021    SBO (small bowel obstruction) ([facility] [address] ) 9/25/2021    Sepsis ([facility] [address] ) 1/13/2021    Smoking 8/6/2020    Tobacco abuse     Wears dentures     full upper    Wears glasses     Wears glasses      Past Surgical History:   Procedure Laterality Date    ABDOMINAL ADHESION SURGERY      ARM SKIN LESION BIOPSY / EXCISION      lymph node excision    BLADDER SURGERY      CHOLECYSTECTOMY      COLONOSCOPY  08/06/2020    FL RETROGRADE PYELOGRAM  1/5/2021    HERNIA REPAIR      HYSTERECTOMY  1977    JOINT REPLACEMENT Right     knee    KNEE SURGERY Right     replacement    LAMINECTOMY      L5-6-7    LAPAROSCOPY      LYMPH NODE DISSECTION Left     removed -no cancer-  limb restriction    LYMPHADENECTOMY  1974    UNDER LEFT ARM - DO NOT DRAW BLOOD/GIVE INJECTIONS IN LEFT ARM PER PATIENT     MOLE REMOVAL      OOPHORECTOMY Bilateral 1977    OTHER SURGICAL HISTORY      enlarged lymph node removed left arm   no cancer    IN CYSTO/URETERO W/LITHOTRIPSY &INDWELL STENT INSRT Right 1/5/2021    Procedure: CYSTO, LASER  URETEROSCOPY, RETRO PYELOGRAM, STENT REPLACMENT;  Surgeon: Kanwal Boothe MD;  [facility];  Service: Urology    IN CYSTOURETHROSCOPY,URETER CATHETER Right 12/2/2020    Procedure: CYSTOSCOPY AND INSERTION STENT URETERAL;  Surgeon: Micheline Aguillon MD;  [facility];  Service: Urology    [facility] Right 3/25/2021    Procedure: LITHOTRIPSY EXTRACORPORAL SHOCKWAVE (ESWL);   Surgeon: Ruma Watson MD;  Location: [address] [place] [address] MAIN OR;  Service: Urology    IN REPAIR ING HERNIA,5+Y/O,REDUCIBL Right 6/25/2020    Procedure: REPAIR HERNIA INGUINAL  WITH MESH;  Surgeon: Jasvir Hennessy MD;  Location: [facility];  Service: General    SPINAL FUSION      UPPER GASTROINTESTINAL ENDOSCOPY  08/06/2020    WISDOM TOOTH EXTRACTION       Social History   Social History     Substance and Sexual Activity   Alcohol Use Not Currently    Comment: seldom     Social History     Substance and Sexual Activity   Drug Use Not Currently    Types: Marijuana    Comment: medicinal marijuana; last dose was 3/16     Social History     Tobacco Use   Smoking Status Current Every Day Smoker    Packs/day: 0 50    Years: 47 00    Pack years: 23 50    Types: Cigarettes   Smokeless Tobacco Never Used   Tobacco Comment    pt states is at 1/2 pk day currently - was previously at one pack /day     Family History   Problem Relation Age of Onset    Hypertension Father     Cancer Father     Hypertension Mother     Heart attack Mother     Stroke Brother     Nephrolithiasis Brother     Diabetes Sister     No Known Problems Daughter     No Known Problems Maternal Grandmother     No Known Problems Maternal Grandfather     No Known Problems Paternal Grandmother     No Known Problems Paternal Grandfather     Diabetes Sister     No Known Problems Maternal Aunt     No Known Problems Maternal Aunt     No Known Problems Maternal Aunt     No Known Problems Paternal Aunt     No Known Problems Paternal Aunt     No Known Problems Paternal Aunt        Meds/Allergies       Current Outpatient Medications:     Armodafinil 250 MG tablet    aspirin 500 MG buffered tablet    BACILLUS COAGULANS-INULIN PO    calcium carbonate 1250 MG capsule    cetirizine (ZyrTEC) 10 mg tablet    Cholecalciferol 1000 units tablet    Citicoline Sodium 500 MG TABS    diclofenac (VOLTAREN) 75 mg EC tablet    dicyclomine (BENTYL) 20 mg tablet    diphenoxylate-atropine (LOMOTIL) 2 5-0 025 mg per tablet    DULoxetine (CYMBALTA) 30 mg delayed release capsule    ferrous sulfate 325 (65 Fe) mg tablet   gabapentin (NEURONTIN) 300 mg capsule    Lori, Zingiber officinalis, (LORI ROOT PO)    metaxalone (SKELAXIN) 800 mg tablet    Multiple Vitamins-Minerals (CENTRUM SILVER PO)    omeprazole (PriLOSEC) 40 MG capsule    prochlorperazine (COMPAZINE) 10 mg tablet    trospium chloride (SANCTURA) 20 mg tablet    Turmeric 500 MG CAPS    ZOLMitriptan (ZOMIG) 2 5 MG tablet    Allergies   Allergen Reactions    Celecoxib Other (See Comments)     Increased Heart Rate, Palpitations    Sumatriptan Anaphylaxis     Other reaction(s): SUMATRIPTAN (IMITREX) (Throat closure)  Pt analilia zolmitriptan   Tramadol Other (See Comments)     GI Upset- severe vomiting and systemic body aches    Medical Tape Dermatitis, Rash and Blisters     Adhesive from medication patches and bandaides- ok with paper tape           Objective     Blood pressure 118/70, pulse 78, temperature 97 8 °F (36 6 °C), temperature source Tympanic, height 5' 3" (1 6 m), weight 52 4 kg (115 lb 9 6 oz), SpO2 98 %, not currently breastfeeding  Body mass index is 20 48 kg/m²  PHYSICAL EXAMINATION:    General Appearance:   Alert, cooperative, no distress   HEENT:  Normocephalic, atraumatic, anicteric  Neck supple, symmetrical, trachea midline  Lungs:   Equal chest rise and unlabored breathing, normal effort, no coughing  Cardiovascular:   No visualized JVD  Abdomen:   No abdominal distension  Skin:   No jaundice, rashes, or lesions  Musculoskeletal:   Normal range of motion visualized  Psych:  Normal affect and normal insight  Neuro:  Alert and appropriate  Lab Results:   No visits with results within 1 Day(s) from this visit     Latest known visit with results is:   Appointment on 08/15/2022   Component Date Value    TSH 3RD GENERATON 08/15/2022 1 690     CRP 08/15/2022 <3 0     Sodium 08/15/2022 136     Potassium 08/15/2022 4 0     Chloride 08/15/2022 106     CO2 08/15/2022 26     ANION GAP 08/15/2022 4     BUN 08/15/2022 21  Creatinine 08/15/2022 0 99     Glucose 08/15/2022 92     Calcium 08/15/2022 9 7     AST 08/15/2022 24     ALT 08/15/2022 23     Alkaline Phosphatase 08/15/2022 101     Total Protein 08/15/2022 7 5     Albumin 08/15/2022 3 9     Total Bilirubin 08/15/2022 0 51     eGFR 08/15/2022 60     Total CK 08/15/2022 70     Magnesium 08/15/2022 2 0     WBC 08/15/2022 8 42     RBC 08/15/2022 4 33     Hemoglobin 08/15/2022 13 4     Hematocrit 08/15/2022 40 4     MCV 08/15/2022 93     MCH 08/15/2022 30 9     MCHC 08/15/2022 33 2     RDW 08/15/2022 12 7     MPV 08/15/2022 10 1     Platelets 81/01/8677 303     nRBC 08/15/2022 0     Neutrophils Relative 08/15/2022 77 (A)    Immat GRANS % 08/15/2022 1     Lymphocytes Relative 08/15/2022 17     Monocytes Relative 08/15/2022 3 (A)    Eosinophils Relative 08/15/2022 1     Basophils Relative 08/15/2022 1     Neutrophils Absolute 08/15/2022 6 56     Immature Grans Absolute 08/15/2022 0 04     Lymphocytes Absolute 08/15/2022 1 41     Monocytes Absolute 08/15/2022 0 29     Eosinophils Absolute 08/15/2022 0 07     Basophils Absolute 08/15/2022 0 05        Lab Results   Component Value Date    WBC 8 42 08/15/2022    HGB 13 4 08/15/2022    HCT 40 4 08/15/2022    MCV 93 08/15/2022     08/15/2022       Lab Results   Component Value Date    SODIUM 136 08/15/2022    K 4 0 08/15/2022     08/15/2022    CO2 26 08/15/2022    AGAP 4 08/15/2022    BUN 21 08/15/2022    CREATININE 0 99 08/15/2022    GLUC 92 08/15/2022    GLUF 95 06/27/2022    CALCIUM 9 7 08/15/2022    AST 24 08/15/2022    ALT 23 08/15/2022    ALKPHOS 101 08/15/2022    TP 7 5 08/15/2022    TBILI 0 51 08/15/2022    EGFR 60 08/15/2022       Lab Results   Component Value Date    CRP <3 0 08/15/2022       Lab Results   Component Value Date    BYC5YIVLDFFC 1 690 08/15/2022       Lab Results   Component Value Date    IRON 35 (L) 03/21/2022    TIBC 303 03/21/2022    FERRITIN 202 03/21/2022 Radiology Results:   No results found

## 2022-08-30 NOTE — TELEPHONE ENCOUNTER
She is only approved for one visit at [facility] she will then need a new PA [facility]

## 2022-09-06 ENCOUNTER — EVALUATION (OUTPATIENT)
Dept: PHYSICAL THERAPY | Facility: CLINIC | Age: 62
End: 2022-09-06
Payer: COMMERCIAL

## 2022-09-06 DIAGNOSIS — M54.50 CHRONIC BILATERAL LOW BACK PAIN WITHOUT SCIATICA: ICD-10-CM

## 2022-09-06 DIAGNOSIS — M47.816 SPONDYLOSIS OF LUMBAR SPINE: ICD-10-CM

## 2022-09-06 DIAGNOSIS — G89.29 CHRONIC BILATERAL LOW BACK PAIN WITHOUT SCIATICA: ICD-10-CM

## 2022-09-06 PROCEDURE — 97162 PT EVAL MOD COMPLEX 30 MIN: CPT | Performed by: PHYSICAL THERAPIST

## 2022-09-06 PROCEDURE — 97112 NEUROMUSCULAR REEDUCATION: CPT | Performed by: PHYSICAL THERAPIST

## 2022-09-06 NOTE — PROGRESS NOTES
PT Evaluation     Today's date: 2022  Patient name: Joe Guillen  : 1960  MRN: [id number]  Referring provider: Ben Loving DO  Dx:   Encounter Diagnosis     ICD-10-CM    1  Spondylosis of lumbar spine  M47 816 Ambulatory referral to Physical Therapy   2  Chronic bilateral low back pain without sciatica  M54 50 Ambulatory referral to Physical Therapy    G89 29                   Assessment  Assessment details: 59year old female patient reports to PT with chronic neck and low back pain  LQS unremarkable  No red flags noted  Patient had "sciatica" previously but doesn't have it at the time of evaluation  Patient symptom irritability is high so testing is limited  Patient presents with decreased motor coordination of core musculature as patient has difficulty activating her deep core even in hooklying position  Patient will benefit from skilled PT services to address current impairments and functional limitations to help patient return to her PLOF  Impairments: abnormal coordination, abnormal gait, abnormal muscle firing, abnormal or restricted ROM, abnormal movement, activity intolerance, impaired physical strength, lacks appropriate home exercise program and pain with function    Symptom irritability: moderateUnderstanding of Dx/Px/POC: good   Prognosis: good    Goals  STG  1  Patient will be independent with completion of HEP throughout therapy  2  Patient will have at worst 5/10 pain so patient can sleep with less discomfort in 3 weeks  LTG  1  Patient will ambulate for prolonged periods without increase in symptoms so patient can navigate throughout the community with less difficulty in 6 weeks  2  Patient will sit for prolonged periods without increase in symptoms in 6 weeks  3  Patient will stand for prolonged periods without increase in symptoms so patient can complete more household tasks with less difficulty in 6 weeks    4  Patient will complete her job without increased difficulty in 7 weeks  Plan  Patient would benefit from: skilled physical therapy  Planned therapy interventions: abdominal trunk stabilization, joint mobilization, manual therapy, activity modification, motor coordination training, neuromuscular re-education, patient education, strengthening, stretching, therapeutic activities, therapeutic exercise, functional ROM exercises, flexibility, home exercise program, body mechanics training and balance  Frequency: 2x week  Duration in weeks: 6  Treatment plan discussed with: patient        Subjective Evaluation    History of Present Illness  Mechanism of injury: Patient reports with chronic low back and neck pain  For her back pain, patient notes if she lifts anything heavy she has pain in her back and "compressing" feeling  Patient notes when she stands for awhile she has trouble standing up straight and it feels like she continues to lean forward the longer she stands  Patient also has difficulty walking prolonged distances  She had difficulty walking 4-5 blocks by her house because at the end of it she was in a lot of pain  Patient also has difficulty sleeping  Patient sleeps on heating pad  Patient notes she has B low back pain and also gets "sciatica" down her L side  Patient notes usually sitting isn't an issue if she sits up straight  Patient still has issues with her neck but got pressure point shots due to her "horrible muscle knots "     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  Patient has had a lot of nerve blocks in her back which haven't helped much     Pain  Current pain ratin  At worst pain ratin  Quality: dull ache, sharp and throbbing  Relieving factors: heat, medications and change in position  Aggravating factors: standing, walking, stair climbing, sitting and lifting  Progression: no change    Treatments  Previous treatment: physical therapy  Current treatment: physical therapy  Patient Goals  Patient goals for therapy: decreased pain, increased motion, increased strength and return to sport/leisure activities          Objective     Concurrent Complaints  Positive for disturbed sleep  Negative for night pain, bladder dysfunction, bowel dysfunction and saddle (S4) numbness    Neurological Testing     Sensation     Lumbar   Left   Intact: light touch    Right   Intact: light touch    Reflexes   Left   Clonus sign: negative    Right   Clonus sign: negative    Active Range of Motion     Lumbar   Flexion: Active lumbar flexion: pain with returning from flexion  WFL  Extension:  with pain Restriction level: maximal    Strength/Myotome Testing     Left Hip   Planes of Motion   Flexion: 4  Extension: 3+  Abduction: 2+    Right Hip   Planes of Motion   Flexion: 4  Extension: 3+  Abduction: 2+    Left Knee   Flexion: 4  Extension: 4    Right Knee   Flexion: 4  Extension: 4    Left Ankle/Foot   Dorsiflexion: 4  Great toe extension: 4    Right Ankle/Foot   Dorsiflexion: 4  Great toe extension: 4    Tests     Lumbar     Left   Negative passive SLR  Right   Negative crossed SLR       General Comments:      Lumbar Comments  SLR positive for reproduction of back pain B  Seated L LAQ increased low back pain  Seated hip flexion increased low back pain    Increased pain with standing extension and supine SKTC     Poor activation of deep core musculature             Precautions:  L sided tremors, cervical fusion C5/C6 2014, "mild carpal tunnel B", patient doesn't like lying on her stomach,       Manuals 9/6            S/l low back STM                                                    Neuro Re-Ed             TA activation r            Supine ab marching r                         SLR             bridges             Standing band anti-rotations             Slow LTR w/ focus on bracing              bridges             Ther Ex             Treadmill or bike clamshells                                                                              Ther Activity             Mini squats or STS                          Gait Training                                       Modalities

## 2022-09-08 ENCOUNTER — APPOINTMENT (OUTPATIENT)
Dept: PHYSICAL THERAPY | Facility: CLINIC | Age: 62
End: 2022-09-08
Payer: COMMERCIAL

## 2022-09-12 ENCOUNTER — TELEPHONE (OUTPATIENT)
Dept: OBGYN CLINIC | Facility: HOSPITAL | Age: 62
End: 2022-09-12

## 2022-09-13 ENCOUNTER — OFFICE VISIT (OUTPATIENT)
Dept: RHEUMATOLOGY | Facility: CLINIC | Age: 62
End: 2022-09-13
Payer: COMMERCIAL

## 2022-09-13 DIAGNOSIS — M81.0 OSTEOPOROSIS WITHOUT CURRENT PATHOLOGICAL FRACTURE, UNSPECIFIED OSTEOPOROSIS TYPE: Primary | ICD-10-CM

## 2022-09-13 PROCEDURE — 96372 THER/PROPH/DIAG INJ SC/IM: CPT

## 2022-09-13 NOTE — PROGRESS NOTES
Patient presents today for Prolia injection  The injection was administered in the patient's right arm without any complications or complaints  Patient will follow up in 6 months to receive next Prolia injection

## 2022-09-15 ENCOUNTER — OFFICE VISIT (OUTPATIENT)
Dept: PHYSICAL THERAPY | Facility: CLINIC | Age: 62
End: 2022-09-15
Payer: COMMERCIAL

## 2022-09-15 DIAGNOSIS — M47.816 SPONDYLOSIS OF LUMBAR SPINE: Primary | ICD-10-CM

## 2022-09-15 DIAGNOSIS — M54.50 CHRONIC BILATERAL LOW BACK PAIN WITHOUT SCIATICA: ICD-10-CM

## 2022-09-15 DIAGNOSIS — G89.29 CHRONIC BILATERAL LOW BACK PAIN WITHOUT SCIATICA: ICD-10-CM

## 2022-09-15 PROCEDURE — 97112 NEUROMUSCULAR REEDUCATION: CPT

## 2022-09-15 PROCEDURE — 97110 THERAPEUTIC EXERCISES: CPT

## 2022-09-15 NOTE — PROGRESS NOTES
Daily Note     Today's date: 9/15/2022  Patient name: Mounika Preciado  : 1960  MRN: [id number]  Referring provider: Eloisa Walker DO  Dx:   Encounter Diagnosis     ICD-10-CM    1  Spondylosis of lumbar spine  M47 816    2  Chronic bilateral low back pain without sciatica  M54 50     G89 29        Start Time: 1700  Stop Time: 1745  Total time in clinic (min): 45 minutes    Subjective: Pt notes she is sore and tired, and she hasn't been drinking enough water so she is getting muscle cramps  Objective: See treatment diary below     Precautions:  L sided tremors, cervical fusion C5/C6 , "mild carpal tunnel B", patient doesn't like lying on her stomach,     Manuals 9/6 9/15           S/l low back [facility]                                                  Neuro Re-Ed  9/15           TA activation r 5"x20           Supine ab marching r 20x ea           90/90 HS stretch  20"x3 ea           SLR  2x10 ea           bridges             Standing band anti-rotations             Slow LTR w/ focus on bracing   10"x10 ea                        Ther Ex  9/15           Treadmill or bike  TM 5' @ 0 9 mph                        clamsphoebe  nv           Hip adduction  5"x20                                                               Ther Activity  9/15           Mini squats or STS                                                        Assessment: Tolerated treatment well  Patient demonstrated fatigue post treatment, exhibited good technique with therapeutic exercises and would benefit from continued PT    Plan: Continue per plan of care  Progress treatment as tolerated      Mohan Pacini, PTA

## 2022-09-20 ENCOUNTER — OFFICE VISIT (OUTPATIENT)
Dept: PHYSICAL THERAPY | Facility: CLINIC | Age: 62
End: 2022-09-20
Payer: COMMERCIAL

## 2022-09-20 DIAGNOSIS — M47.816 SPONDYLOSIS OF LUMBAR SPINE: Primary | ICD-10-CM

## 2022-09-20 DIAGNOSIS — G89.29 CHRONIC BILATERAL LOW BACK PAIN WITHOUT SCIATICA: ICD-10-CM

## 2022-09-20 DIAGNOSIS — M54.50 CHRONIC BILATERAL LOW BACK PAIN WITHOUT SCIATICA: ICD-10-CM

## 2022-09-20 PROCEDURE — 97112 NEUROMUSCULAR REEDUCATION: CPT | Performed by: PHYSICAL THERAPIST

## 2022-09-20 PROCEDURE — 97530 THERAPEUTIC ACTIVITIES: CPT | Performed by: PHYSICAL THERAPIST

## 2022-09-20 PROCEDURE — 97110 THERAPEUTIC EXERCISES: CPT | Performed by: PHYSICAL THERAPIST

## 2022-09-20 NOTE — PROGRESS NOTES
Daily Note     Today's date: 2022  Patient name: Ziyad Esqueda  : 1960  MRN: [id number]  Referring provider: Osorio Olsen DO  Dx:   Encounter Diagnosis     ICD-10-CM    1  Spondylosis of lumbar spine  M47 816    2  Chronic bilateral low back pain without sciatica  M54 50     G89 29                   Subjective: Pt notes had bad night of sleep last night and has most difficulty picking things off the floor with her back  Objective: See treatment diary below     Precautions:  L sided tremors, cervical fusion C5/C6 , "mild carpal tunnel B", patient doesn't like lying on her stomach,     Manuals 9/6 9/15 9/20          S/l low back STM  [address] [facility] -                                                 Neuro Re-Ed  9/15           TA activation r 5"x20 10x 10" holds           Supine ab marching r 20x ea 2x12          90/90 HS stretch  20"x3 ea 3x 20" holds          SLR  2x10 ea 2x10 B           bridges   2x10          Standing band anti-rotations             Slow LTR w/ focus on bracing   10"x10 ea 10x each way           Lifting mechanics   r          Ther Ex  9/15           Treadmill or bike  TM 5' @ 0 9 mph Treadmill 5 min                        clamshells  nv 2x10 B           Hip adduction  5"x20 10x 10" holds          Prone EOT hip flexor/hamsring stretch    15x B                                                  Ther Activity  9/15           Mini squats or STS   nv                                                     Assessment: Tolerated treatment well  Patient demonstrated fatigue post treatment, exhibited good technique with therapeutic exercises and would benefit from continued PT    Plan: Continue per plan of care  Progress treatment as tolerated      Ryder Dumont, PT

## 2022-09-22 ENCOUNTER — OFFICE VISIT (OUTPATIENT)
Dept: PHYSICAL THERAPY | Facility: CLINIC | Age: 62
End: 2022-09-22
Payer: COMMERCIAL

## 2022-09-22 DIAGNOSIS — M54.50 CHRONIC BILATERAL LOW BACK PAIN WITHOUT SCIATICA: Primary | ICD-10-CM

## 2022-09-22 DIAGNOSIS — M47.816 SPONDYLOSIS OF LUMBAR SPINE: ICD-10-CM

## 2022-09-22 DIAGNOSIS — G89.29 CHRONIC BILATERAL LOW BACK PAIN WITHOUT SCIATICA: Primary | ICD-10-CM

## 2022-09-22 PROCEDURE — 97112 NEUROMUSCULAR REEDUCATION: CPT

## 2022-09-22 PROCEDURE — 97110 THERAPEUTIC EXERCISES: CPT

## 2022-09-22 NOTE — PROGRESS NOTES
Daily Note     Today's date: 2022  Patient name: Chirag Quesada  : 1960  MRN: [id number]  Referring provider: Eva Howell DO  Dx:   Encounter Diagnosis     ICD-10-CM    1  Chronic bilateral low back pain without sciatica  M54 50     G89 29    2  Spondylosis of lumbar spine  M47 816                   Subjective: Pt notes pain is getting progressively worse, particularly on L, and is going higher into her back and lasts for hours  Notes that laying flat alleviates pain slightly  Objective: See treatment diary below     Precautions:  L sided tremors, cervical fusion C5/C6 , "mild carpal tunnel B", patient doesn't like lying on her stomach,     Manuals 9/6 9/15 9/20 9/22         S/l low back STM  [address] [facility] -                                                 Neuro Re-Ed  9/15  9/22         TA activation r 5"x20 10x 10" holds  5"x20           Supine ab marching r 20x ea 2x12 2x12         90/90 HS stretch  20"x3 ea 3x 20" holds 20"x3 ea         SLR  2x10 ea 2x10 B  2x10 ea         bridges   2x10 5"x2x10         Standing band anti-rotations             Slow LTR w/ focus on bracing   10"x10 ea 10x each way  10"x10 ea         Lifting mechanics   r          Ther Ex  9/15  9/22         Treadmill or bike  TM 5' @ 0 9 mph Treadmill 5 min  rec bike 5'                      clamshells  nv 2x10 B  Supine  YTB 5"x20         Hip adduction  5"x20 10x 10" holds 5"x20         Prone EOT hip flexor/hamstring stretch    15x B  15x ea                                                Ther Activity  9/15  9/22         Mini squats or STS   nv 15x                                                    Assessment: Tolerated treatment well  Patient demonstrated fatigue post treatment, exhibited good technique with therapeutic exercises and would benefit from continued PT    Plan: Continue per plan of care  Progress treatment as tolerated      Mario Bai, PTA

## 2022-09-27 ENCOUNTER — OFFICE VISIT (OUTPATIENT)
Dept: PHYSICAL THERAPY | Facility: CLINIC | Age: 62
End: 2022-09-27
Payer: COMMERCIAL

## 2022-09-27 DIAGNOSIS — M54.50 CHRONIC BILATERAL LOW BACK PAIN WITHOUT SCIATICA: Primary | ICD-10-CM

## 2022-09-27 DIAGNOSIS — G89.29 CHRONIC BILATERAL LOW BACK PAIN WITHOUT SCIATICA: Primary | ICD-10-CM

## 2022-09-27 DIAGNOSIS — M47.816 SPONDYLOSIS OF LUMBAR SPINE: ICD-10-CM

## 2022-09-27 PROCEDURE — 97110 THERAPEUTIC EXERCISES: CPT

## 2022-09-27 PROCEDURE — 97112 NEUROMUSCULAR REEDUCATION: CPT

## 2022-09-27 NOTE — PROGRESS NOTES
Daily Note     Today's date: 2022  Patient name: Ranjith Munguia  : 1960  MRN: [id number]  Referring provider: Kolton Bearden DO  Dx:   Encounter Diagnosis     ICD-10-CM    1  Chronic bilateral low back pain without sciatica  M54 50     G89 29    2  Spondylosis of lumbar spine  M47 816                   Subjective: Pt feeling OK today  Objective: See treatment diary below     Precautions:  L sided tremors, cervical fusion C5/C6 , "mild carpal tunnel B", patient doesn't like lying on her stomach,     Manuals 9/6 9/15 9/20 9/22 9/27        S/l low back STM  [address] [facility] -                                                 Neuro Re-Ed  9/15  9/22 9/27        TA activation r 5"x20 10x 10" holds  5"x20   5"x20        Supine ab marching r 20x ea 2x12 2x12 2x12        90/90 HS stretch  20"x3 ea 3x 20"  20"x3 ea 20"x3 ea        SLR  2x10 ea 2x10 B  2x10 ea 2x10 ea        bridges   2x10 5"x2x10 W/add 5"x20        Standing band anti-rotations             Slow LTR w/ focus on bracing   10"x10 ea 10x each way  10"x10 ea 10"x10 ea        Lifting mechanics   r          Ther Ex  9/15  9/22 9/27        Treadmill or bike  TM 5' @ 0 9 mph Treadmill 5 min  rec bike 5' rec bike 5'                     clamshells  nv 2x10 B  Supine  YTB 5"x20 Supine RTB 5"x20        Hip adduction  5"x20 10x 10" holds 5"x20 -        Prone EOT hip flexor/hamstring stretch    15x B  15x ea 20x ea                                               Ther Activity  9/15  9/22 9/27        Mini squats or STS   nv 15x 20x                                                   Assessment: Tolerated treatment well  Patient demonstrated fatigue post treatment, exhibited good technique with therapeutic exercises and would benefit from continued PT    Plan: Continue per plan of care  Progress treatment as tolerated      Charu Garcia PTA

## 2022-09-29 ENCOUNTER — OFFICE VISIT (OUTPATIENT)
Dept: FAMILY MEDICINE CLINIC | Facility: CLINIC | Age: 62
End: 2022-09-29
Payer: COMMERCIAL

## 2022-09-29 ENCOUNTER — OFFICE VISIT (OUTPATIENT)
Dept: PHYSICAL THERAPY | Facility: CLINIC | Age: 62
End: 2022-09-29
Payer: COMMERCIAL

## 2022-09-29 VITALS
SYSTOLIC BLOOD PRESSURE: 102 MMHG | OXYGEN SATURATION: 90 % | HEIGHT: 63 IN | HEART RATE: 80 BPM | WEIGHT: 115 LBS | DIASTOLIC BLOOD PRESSURE: 60 MMHG | TEMPERATURE: 97.9 F | BODY MASS INDEX: 20.38 KG/M2

## 2022-09-29 DIAGNOSIS — M79.7 FIBROMYALGIA: ICD-10-CM

## 2022-09-29 DIAGNOSIS — M54.50 CHRONIC BILATERAL LOW BACK PAIN WITHOUT SCIATICA: Primary | ICD-10-CM

## 2022-09-29 DIAGNOSIS — N18.2 CHRONIC RENAL IMPAIRMENT, STAGE 2 (MILD): ICD-10-CM

## 2022-09-29 DIAGNOSIS — G89.29 CHRONIC BILATERAL LOW BACK PAIN WITHOUT SCIATICA: Primary | ICD-10-CM

## 2022-09-29 DIAGNOSIS — M47.816 SPONDYLOSIS OF LUMBAR SPINE: ICD-10-CM

## 2022-09-29 DIAGNOSIS — Z71.85 IMMUNIZATION COUNSELING: ICD-10-CM

## 2022-09-29 DIAGNOSIS — R25.2 MUSCLE CRAMPS: ICD-10-CM

## 2022-09-29 DIAGNOSIS — E78.00 PURE HYPERCHOLESTEROLEMIA: Primary | ICD-10-CM

## 2022-09-29 DIAGNOSIS — D64.9 ANEMIA, UNSPECIFIED TYPE: ICD-10-CM

## 2022-09-29 DIAGNOSIS — R19.5 OCCULT BLOOD POSITIVE STOOL: ICD-10-CM

## 2022-09-29 PROCEDURE — 97112 NEUROMUSCULAR REEDUCATION: CPT

## 2022-09-29 PROCEDURE — 99214 OFFICE O/P EST MOD 30 MIN: CPT | Performed by: FAMILY MEDICINE

## 2022-09-29 PROCEDURE — 97110 THERAPEUTIC EXERCISES: CPT

## 2022-09-29 NOTE — PROGRESS NOTES
Daily Note     Today's date: 2022  Patient name: Lorenza Hemphill  : 1960  MRN: [id number]  Referring provider: Yvette Howard DO  Dx:   Encounter Diagnosis     ICD-10-CM    1  Chronic bilateral low back pain without sciatica  M54 50     G89 29    2  Spondylosis of lumbar spine  M47 816                   Subjective: Pt says that change in stance did not help at work, and back brace made things worse  Inquiring to boss about putting risers under work station to make it higher  Objective: See treatment diary below     Precautions:  L sided tremors, cervical fusion C5/C6 , "mild carpal tunnel B", patient doesn't like lying on her stomach,     Manuals 9/6 9/15 9/20 9/22 9/27 9/29       S/l low back STM  [address] -                                                 Neuro Re-Ed  9/15  9/22 9/27 9/29       TA activation r 5"x20 10x 10" holds  5"x20   5"x20 5"x20       Supine ab marching r 20x ea 2x12 2x12 2x12 2x12 ea       90/90 HS stretch  20"x3 ea 3x 20"  20"x3 ea 20"x3 ea 20"x3 ea       SLR  2x10 ea 2x10 B  2x10 ea 2x10 ea 2x10 ea       bridges   2x10 5"x2x10 W/add 5"x20 W/add 5"x20       Standing band anti-rotations             Slow LTR w/ focus on bracing   10"x10 ea 10x each way  10"x10 ea 10"x10 ea 10"x10 ea       Lifting mechanics   r          Ther Ex  9/15  9/22 9/27 9/29       Treadmill or bike  TM 5' @ 0 9 mph Treadmill 5 min  rec bike 5' rec bike 5' rec bike 5'                    clamshells  nv 2x10 B  Supine  YTB 5"x20 Supine RTB 5"x20 Supine RTB 5"x20       Hip adduction  5"x20 10x 10" holds 5"x20 - -       Prone EOT hip flexor/hamstring stretch    15x B  15x ea 20x ea 20x ea                                              Ther Activity  9/15  9/22 9/27 9/29       Mini squats or STS   nv 15x 20x 20x                                                  Assessment: Tolerated treatment well   Patient demonstrated fatigue post treatment, exhibited good technique with therapeutic exercises and would benefit from continued PT    Plan: Continue per plan of care  Progress treatment as tolerated       Jaylyn Wilhelm

## 2022-09-29 NOTE — PROGRESS NOTES
Assessment/Plan:       No problem-specific Assessment & Plan notes found for this encounter  Diagnoses and all orders for this visit:    Pure hypercholesterolemia  Comments: To follow with low-fat diet  Patient will stop Crestor now because of muscle cramps  To consider other medication patient to follow-up    Muscle cramps  Comments:  Stop Crestor  Advised patient to call if symptoms persist    Occult blood positive stool  Comments: To follow with GI  Chronic renal impairment, stage 2 (mild)  Comments:  avoid NSAID, hydrate self well    Anemia, unspecified type  Comments:  resolved    Fibromyalgia  Comments: Following with Rheumatology  Discussed with patient with she ran out of medication she can call us will refill it for her    Immunization counseling  Comments:  Recommend flu shot in the next couple weeks        There are no Patient Instructions on file for this visit  No orders of the defined types were placed in this encounter  Subjective:     Patient ID: Nubia Barahona is a 57 y o  female      HPI   Patient is complaining of muscle cramping   S she experienced muscle cramping of lower extremities maybe upper extremity sometimes experienced muscle spasm of her trying  Patient stop Crestor her symptoms resolved she restart Crestor her symptoms recur  Chronic renal insufficiency  Denied edema or fatigue  positive Hemoccult, denied melena or rectal bleeding she stated she did follow with GI and and was told , it is most likely secondary to her colitis  Fibromyalgia  Patient stated she has been following with Rheumatology  But her rheumatologist had left and the in order to see another rheumatologist is going to take a while in the meantime she ran out of her medication and she is asking if I can refill it for her  Has Rosita [facility] feel the monitor call  Test results      Lab August 15 22  Mammogram  Discussed result with patient    Had COVID booster 2 weeks ago    Review of Systems   Constitutional: Negative for appetite change and fatigue  HENT: Negative for ear pain, tinnitus, trouble swallowing and voice change  Eyes: Negative for photophobia, pain and visual disturbance  Respiratory: Negative for cough, chest tightness and wheezing  Cardiovascular: Negative for chest pain, palpitations and leg swelling  Gastrointestinal: Negative for abdominal distention, abdominal pain, anal bleeding, constipation, diarrhea, nausea and rectal pain  Endocrine: Negative for cold intolerance, heat intolerance, polydipsia and polyuria  Genitourinary: Negative for decreased urine volume, difficulty urinating, dysuria, flank pain, frequency, hematuria and urgency  Musculoskeletal: Negative for arthralgias, back pain, gait problem, myalgias and neck pain  Skin: Negative for pallor and rash  Allergic/Immunologic: Negative for immunocompromised state  Neurological: Negative for dizziness, tremors, seizures, syncope, facial asymmetry, speech difficulty, light-headedness, numbness and headaches  Hematological: Negative for adenopathy  Does not bruise/bleed easily  Psychiatric/Behavioral: Negative for agitation, confusion and hallucinations  The patient is not nervous/anxious  Objective:     Physical Exam  Nursing note reviewed  Constitutional:       General: She is not in acute distress  Appearance: Normal appearance  She is well-developed  She is not ill-appearing  HENT:      Head: Normocephalic  Eyes:      General: No scleral icterus  Conjunctiva/sclera: Conjunctivae normal       Pupils: Pupils are equal, round, and reactive to light  Neck:      Thyroid: No thyromegaly  Cardiovascular:      Rate and Rhythm: Normal rate and regular rhythm  Heart sounds: Normal heart sounds  Pulmonary:      Effort: Pulmonary effort is normal       Breath sounds: Normal breath sounds  No wheezing  Abdominal:      Palpations: Abdomen is soft  There is no mass  Tenderness: There is no abdominal tenderness  There is no guarding or rebound  Hernia: No hernia is present  Musculoskeletal:         General: No swelling  Cervical back: No tenderness  Right lower leg: No edema  Left lower leg: No edema  Lymphadenopathy:      Cervical: No cervical adenopathy  Skin:     Coloration: Skin is not jaundiced or pale  Findings: No rash  Neurological:      General: No focal deficit present  Mental Status: She is alert and oriented to person, place, and time  Cranial Nerves: No cranial nerve deficit  Motor: No weakness or abnormal muscle tone  Coordination: Coordination normal       Gait: Gait normal       Deep Tendon Reflexes: Reflexes normal    Psychiatric:         Mood and Affect: Mood normal          Behavior: Behavior normal          Thought Content:  Thought content normal          Judgment: Judgment normal

## 2022-10-04 ENCOUNTER — OFFICE VISIT (OUTPATIENT)
Dept: PHYSICAL THERAPY | Facility: CLINIC | Age: 62
End: 2022-10-04
Payer: COMMERCIAL

## 2022-10-04 DIAGNOSIS — M47.816 SPONDYLOSIS OF LUMBAR SPINE: ICD-10-CM

## 2022-10-04 DIAGNOSIS — G89.29 CHRONIC BILATERAL LOW BACK PAIN WITHOUT SCIATICA: Primary | ICD-10-CM

## 2022-10-04 DIAGNOSIS — M54.50 CHRONIC BILATERAL LOW BACK PAIN WITHOUT SCIATICA: Primary | ICD-10-CM

## 2022-10-04 PROCEDURE — 97110 THERAPEUTIC EXERCISES: CPT | Performed by: PHYSICAL THERAPIST

## 2022-10-04 PROCEDURE — 97112 NEUROMUSCULAR REEDUCATION: CPT | Performed by: PHYSICAL THERAPIST

## 2022-10-04 NOTE — PROGRESS NOTES
Daily Note     Today's date: 10/4/2022  Patient name: Ed Ohara  : 1960  MRN: [id number]  Referring provider: Vanessa Flores DO  Dx:   Encounter Diagnosis     ICD-10-CM    1  Chronic bilateral low back pain without sciatica  M54 50     G89 29    2  Spondylosis of lumbar spine  M47 816                   Subjective: Pt says notes was in a lot of pain this weekend so slept most of it  Objective: See treatment diary below     Precautions:  L sided tremors, cervical fusion C5/C6 , "mild carpal tunnel B", patient doesn't like lying on her stomach,     Manuals 9/15 9/20 9/22 9/27 9/29 10/4      S/l low back STM [address] -                                              Neuro Re-Ed 9/15  9/22 9/27 9/29       TA activation 5"x20 10x 10" holds  5"x20   5"x20 5"x20 10x 10" holds       Supine ab marching 20x ea 2x12 2x12 2x12 2x12 ea 3x10       90/90 HS stretch 20"x3 ea 3x 20"  20"x3 ea 20"x3 ea 20"x3 ea 3x20" each       SLR 2x10 ea 2x10 B  2x10 ea 2x10 ea 2x10 ea 2x12      bridges  2x10 5"x2x10 W/add 5"x20 W/add 5"x20 20x 5" holds w/ add      Standing band anti-rotations            Slow LTR w/ focus on bracing  10"x10 ea 10x each way  10"x10 ea 10"x10 ea 10"x10 ea 10x 10" holds      Lifting mechanics  r          Ther Ex 9/15  9/22 9/27 9/29       Treadmill or bike TM 5' @ 0 9 mph Treadmill 5 min  rec bike 5' rec bike 5' rec bike 5' 5 min                   clamshells nv 2x10 B  Supine  YTB 5"x20 Supine RTB 5"x20 Supine RTB 5"x20 Supine 20x 5" holds       Hip adduction 5"x20 10x 10" holds 5"x20 - -       Prone EOT hip flexor/hamstring stretch   15x B  15x ea 20x ea 20x ea 30x B                                          Ther Activity 9/15  9/22 9/27 9/29       Mini squats or STS  nv 15x 20x 20x 20x                                              Assessment: Tolerated treatment well   Patient demonstrated fatigue post treatment, exhibited good technique with therapeutic exercises and would benefit from continued PT    Plan: Continue per plan of care  Progress treatment as tolerated       Stephanie Calabrese

## 2022-10-06 ENCOUNTER — OFFICE VISIT (OUTPATIENT)
Dept: PHYSICAL THERAPY | Facility: CLINIC | Age: 62
End: 2022-10-06
Payer: COMMERCIAL

## 2022-10-06 DIAGNOSIS — M47.816 SPONDYLOSIS OF LUMBAR SPINE: ICD-10-CM

## 2022-10-06 DIAGNOSIS — M54.50 CHRONIC BILATERAL LOW BACK PAIN WITHOUT SCIATICA: Primary | ICD-10-CM

## 2022-10-06 DIAGNOSIS — G89.29 CHRONIC BILATERAL LOW BACK PAIN WITHOUT SCIATICA: Primary | ICD-10-CM

## 2022-10-06 PROCEDURE — 97110 THERAPEUTIC EXERCISES: CPT

## 2022-10-06 PROCEDURE — 97112 NEUROMUSCULAR REEDUCATION: CPT

## 2022-10-06 NOTE — PROGRESS NOTES
Daily Note     Today's date: 10/6/2022  Patient name: April Crum  : 1960  MRN: [id number]  Referring provider: Zeeshan Sandoval DO  Dx:   Encounter Diagnosis     ICD-10-CM    1  Chronic bilateral low back pain without sciatica  M54 50     G89 29    2  Spondylosis of lumbar spine  M47 816                   Subjective: Pt felt sore over the weekend due to weather changes  Objective: See treatment diary below     Precautions:  L sided tremors, cervical fusion C5/C6 , "mild carpal tunnel B", patient doesn't like lying on her stomach,     Manuals 9/15 9/20 9/22 9/27 9/29 10/4 10/     S/l low back [facility] -                                              Neuro Re-Ed 9/15  9/22 9/27 9/29  10/6     TA activation 5"x20 10x 10" holds  5"x20   5"x20 5"x20 10x 10" holds  10"x10     Supine ab marching 20x ea 2x12 2x12 2x12 2x12 ea 3x10  3x10     90/90 HS stretch 20"x3 ea 3x 20"  20"x3 ea 20"x3 ea 20"x3 ea 3x20" each  30"x3 ea     SLR 2x10 ea 2x10 B  2x10 ea 2x10 ea 2x10 ea 2x12 2x10 ea     bridges  2x10 5"x2x10 W/add 5"x20 W/add 5"x20 20x 5" holds w/ add W/add 5"x20     Standing band anti-rotations            Slow LTR w/ focus on bracing  10"x10 ea 10x each way  10"x10 ea 10"x10 ea 10"x10 ea 10x 10" holds 10"x10 ea     Lifting mechanics  r          Ther Ex 9/15  9/22 9/27 9/29  10/6     Treadmill or bike TM 5' @ 0 9 mph Treadmill 5 min  rec bike 5' rec bike 5' rec bike 5' 5 min  5' L1                 clamshells nv 2x10 B  Supine  YTB 5"x20 Supine RTB 5"x20 Supine RTB 5"x20 Supine 20x 5" GTB GTB 5"x20 supine     Hip adduction 5"x20 10x 10" holds 5"x20 - -       Prone EOT hip flexor/hamstring stretch   15x B  15x ea 20x ea 20x ea 30x B 30x ea                                         Ther Activity 9/15  9/22 9/27 9/29  10/6     Mini squats or STS  nv 15x 20x 20x 20x 20x                                             Assessment: Tolerated treatment well   Patient demonstrated fatigue post treatment, exhibited good technique with therapeutic exercises and would benefit from continued PT    Plan: Continue per plan of care  Progress treatment as tolerated      Berta Hernandez

## 2022-10-11 ENCOUNTER — OFFICE VISIT (OUTPATIENT)
Dept: PHYSICAL THERAPY | Facility: CLINIC | Age: 62
End: 2022-10-11
Payer: COMMERCIAL

## 2022-10-11 DIAGNOSIS — M47.816 SPONDYLOSIS OF LUMBAR SPINE: Primary | ICD-10-CM

## 2022-10-11 DIAGNOSIS — M54.50 CHRONIC BILATERAL LOW BACK PAIN WITHOUT SCIATICA: ICD-10-CM

## 2022-10-11 DIAGNOSIS — G89.29 CHRONIC BILATERAL LOW BACK PAIN WITHOUT SCIATICA: ICD-10-CM

## 2022-10-11 PROCEDURE — 97110 THERAPEUTIC EXERCISES: CPT

## 2022-10-11 PROCEDURE — 97112 NEUROMUSCULAR REEDUCATION: CPT

## 2022-10-11 NOTE — PROGRESS NOTES
Daily Note     Today's date: 10/11/2022  Patient name: Lisa Wood  : 1960  MRN: [id number]  Referring provider: Flakita Ramirez DO  Dx:   Encounter Diagnosis     ICD-10-CM    1  Spondylosis of lumbar spine  M47 816    2  Chronic bilateral low back pain without sciatica  M54 50     G89 29                   Subjective: Pt notes that she feels ok, not awful but not great  No major exacerbations over weekend other than soreness from vacuuming  Objective: See treatment diary below     Precautions:  L sided tremors, cervical fusion C5/C6 , "mild carpal tunnel B", patient doesn't like lying on her stomach,     Manuals 9/27 9/29 10/4 10/6 10/11      S/l low back STM                                            Neuro Re-Ed 9/27 9/29  10/6 10/11      TA activation 5"x20 5"x20 10x 10"  10"x10 HEP      Supine ab marching 2x12 2x12 ea 3x10  3x10 3x10      90/90 HS stretch 20"x3 ea 20"x3 ea 3x20" each  30"x3 ea D/C      SLR 2x10 ea 2x10 ea 2x12 2x10 ea 2x12 ea      bridges W/add 5"x20 W/add 5"x20 20x 5"   w/ add W/add 5"x20 5"x2x12 w/add      Standing band anti-rotations           Slow LTR w/ focus on bracing  10"x10 ea 10"x10 ea 10x 10" holds 10"x10 ea HEP      Lifting mechanics           Ther Ex 9/27 9/29  10/6 10/11      Treadmill or bike rec bike 5' rec bike 5' 5 min bike 5' L1 bike 5' L1 bike                 clamshells Supine RTB 5"x20 Supine RTB 5"x20 Supine 20x 5" GTB GTB 5"x20 supine GTB 5"x20 supine      Hip adduction - -         Prone EOT hip flexor/hamstring stretch  20x ea 20x ea 30x B 30x ea 30x ea                                       Ther Activity 9/27 9/29  10/6 10/11      Mini squats or STS 20x 20x 20x 20x 20x                                           Assessment: Tolerated treatment well  Patient demonstrated fatigue post treatment, exhibited good technique with therapeutic exercises and would benefit from continued PT    Plan: Continue per plan of care  Progress treatment as tolerated      Mera Lopez Malvin Tovar

## 2022-10-12 PROBLEM — N39.0 URINARY TRACT INFECTION WITHOUT HEMATURIA: Status: RESOLVED | Noted: 2021-12-24 | Resolved: 2022-10-12

## 2022-10-12 PROBLEM — N30.00 ACUTE CYSTITIS: Status: RESOLVED | Noted: 2019-08-29 | Resolved: 2022-10-12

## 2022-10-12 PROBLEM — N39.0 COMPLICATED UTI (URINARY TRACT INFECTION): Status: RESOLVED | Noted: 2019-09-01 | Resolved: 2022-10-12

## 2022-10-13 ENCOUNTER — OFFICE VISIT (OUTPATIENT)
Dept: PHYSICAL THERAPY | Facility: CLINIC | Age: 62
End: 2022-10-13
Payer: COMMERCIAL

## 2022-10-13 DIAGNOSIS — M54.50 CHRONIC BILATERAL LOW BACK PAIN WITHOUT SCIATICA: Primary | ICD-10-CM

## 2022-10-13 DIAGNOSIS — M47.816 SPONDYLOSIS OF LUMBAR SPINE: ICD-10-CM

## 2022-10-13 DIAGNOSIS — G89.29 CHRONIC BILATERAL LOW BACK PAIN WITHOUT SCIATICA: Primary | ICD-10-CM

## 2022-10-13 PROCEDURE — 97112 NEUROMUSCULAR REEDUCATION: CPT

## 2022-10-13 PROCEDURE — 97110 THERAPEUTIC EXERCISES: CPT

## 2022-10-13 NOTE — PROGRESS NOTES
Daily Note     Today's date: 10/13/2022  Patient name: Brittany Goldman  : 1960  MRN: [id number]  Referring provider: Lulu Ozuna DO  Dx:   Encounter Diagnosis     ICD-10-CM    1  Chronic bilateral low back pain without sciatica  M54 50     G89 29    2  Spondylosis of lumbar spine  M47 816                   Subjective: Pt notes that "today is not a good day" due to rainy weather  Objective: See treatment diary below     Precautions:  L sided tremors, cervical fusion C5/C6 , "mild carpal tunnel B", patient doesn't like lying on her stomach,     Neuro Re-Ed 9/27 9/29 10/4 10/6 10/11 10/13     TA activation 5"x20 5"x20 10x 10"  10"x10 HEP      Supine ab marching 2x12 2x12 ea 3x10  3x10 3x10 2# 2x12 ea     90/90 HS stretch 20"x3 ea 20"x3 ea 3x20" each  30"x3 ea D/C      SLR 2x10 ea 2x10 ea 2x12 2x10 ea 2x12 ea 2# 2x10 ea     bridges W/add 5"x20 W/add 5"x20 20x 5"   w/ add W/add 5"x20 5"x2x12 w/add 5"x2x12 w/add     Standing band anti-rotations           Slow LTR w/ focus on bracing  10"x10 ea 10"x10 ea 10x 10" holds 10"x10 ea HEP 10"x10 ea     Lifting mechanics           Ther Ex 9/27 9/29  10/6 10/11 10/13     Bike  5' 5' 5 min 5' L1 5' L1 5' L1                clamshells Supine RTB 5"x20 Supine RTB 5"x20 Supine 20x 5" GTB GTB 5"x20 supine GTB 5"x20 supine S/l GTB 5"x20 ea     Hip adduction - -         Prone EOT hip flexor/hamstring stretch  20x ea 20x ea 30x B 30x ea 30x ea 30x ea                                      Ther Activity 9/27 9/29  10/6 10/11 10/13     Mini squats or STS 20x 20x 20x 20x 20x 20x                                          Assessment: Tolerated treatment well  Patient demonstrated fatigue post treatment, exhibited good technique with therapeutic exercises and would benefit from continued PT    Plan: Continue per plan of care  Progress treatment as tolerated       Linda Baca

## 2022-10-15 DIAGNOSIS — G47.11 IDIOPATHIC HYPERSOMNIA: ICD-10-CM

## 2022-10-15 RX ORDER — ARMODAFINIL 250 MG/1
TABLET ORAL
Qty: 30 TABLET | Refills: 0 | Status: SHIPPED | OUTPATIENT
Start: 2022-10-15

## 2022-10-17 ENCOUNTER — TELEPHONE (OUTPATIENT)
Dept: SLEEP CENTER | Facility: CLINIC | Age: 62
End: 2022-10-17

## 2022-10-17 NOTE — TELEPHONE ENCOUNTER
Prior authorization for Armodafinil 250 MG tablet       Started in CoverMyMeds   Per ALIZA Bautista note patient planning to use good rx card as the medication has been denied in the past

## 2022-10-18 ENCOUNTER — APPOINTMENT (OUTPATIENT)
Dept: PHYSICAL THERAPY | Facility: CLINIC | Age: 62
End: 2022-10-18

## 2022-10-18 NOTE — PROGRESS NOTES
Follow-up Note - [address] [facility]  62 y o  female  HBA:[birth year]  GDX:[id number]    I saw Abelino Lundberg in sleep clinic today for her sleep complaints & comorbidities  She had maintenance of wakefulness testing and is here to review results and further options  She used Nuvigil on the morning of the study  Sleep was noted in 2/4 trials for an average sleep latency of 32 minutes that suggests adequate vigilance  She had a diagnostic sleep study at [facility] on 10/03/2018 total amount of sleep and sleep efficiency were not quantified however she was seen in all sleep stages  The study demonstrated no evidence for sleep disordered breathing:  AHI of 1 per hour and minimum oxygen saturation of 86%  This was followed by a MSLT that demonstrated average sleep latency of 1 4 minutes  No sleep onset REM was seen in any of the 5 nap trials  PFSH, Problem List, Medications & Allergies were reviewed in EMR  Interval changes: none reported  She  has a past medical history of Irritable bowel, Osteoarthritis, Osteoporosis, Other chronic pain, and Sleep disorder  She has a current medication list which includes the following prescription(s): armodafinil, ascorbic acid, bacillus coagulans-inulin, calcium carbonate, cetirizine, cholecalciferol, citicoline sodium, coenzyme q10, denosumab, diclofenac, duloxetine, ferrous sulfate, gabapentin, pierre root, ginkgo biloba, metaxalone, misc natural products, multiple vitamins-minerals, mupirocin, NON FORMULARY, ondansetron, oxybutynin, prednisone, vitamin e (tocopherol), and zolmitriptan  ROS: reviewed see attached  Significant for pain is reasonably controlled on current medications including medical marijuana  She is on Zyrtec for nasal symptoms  Migraines occur very infrequently  HPI:  She is working a regular shift and shon with work  She reports none of the ancillary symptoms of narcolepsy    Sleep Routine:  She is getting 7 to 7-1/2 hours sleep   She reports no difficulty initiating or maintaining sleep  She needs multiple alarms to awaken  She takes Nuvigil 250 mg upon awakening  She denied excessive drowsiness or napping would nap given the opportunity  She rated herself at 9 /24 on the [place] sleepiness scale  Habits:  reports that she has been smoking cigarettes  She has been smoking about 0 25 packs per day  She uses smokeless tobacco  She reports that she drinks alcohol  She reports that she has current or past drug history  Drug: Marijuana  She uses limited amount of caffeine  She is physically active in her job  EXAM: /60   Pulse 84   Ht 5' 4" (1 626 m)   Wt 56 2 kg (124 lb)   BMI 21 28 kg/m²     Patient is well groomed; well appearing  Skin/Extrem: warm & dry; col & hydration normal; no edema  Psych: cooperativeand in no distress  Mental state appears normal   CNS: Alert, orientated, clear & coherent speech  H&N: EOMI; NC/AT:no facial pressure marks, no rashes  ENMT Mucosa appearsnormal Nasal airway:patent  Oral airway:  crowded  Resp:effort is normal CVS: RRR ABD: truncal obesity MSK:Gait normal     IMPRESSION: Primary Sleep/Secondary(to Medical or Psych conditions) & comorbidities   1  Idiopathic hypersomnia     2  Insufficient sleep syndrome     3  Fibromyalgia     4  Tobacco abuse     5  Allergic rhinitis, unspecified seasonality, unspecified trigger     6  Migraine without aura and without status migrainosus, not intractable     7  Chronic pain disorder       PLAN:     1  I reviewed results of the Sleep studies with the patient  2  With respect to above conditions, I counseled on pathophysiology, diagnosis, treatment options, risks and benefits; inter-relationship and effects on symptoms and comorbidities; risks of no treatment; costs and insurance aspects  3  I advised on sleep hygiene specifically allowing sufficient opportunity for sleep  4  Continue Nuvigil 250 mg daily    5  Her attempts at smoking cessation has been unsuccessful and presently she is not contemplating  6  She is would like to have a 's license reinstated and findings of the MWT a justifies this  7  Follow-up to be scheduled in 6 months to monitor compliance, progress and to adjust therapy  Thank you for allowing me to participate in the care of this patient  I will keep you apprised of developments      Sincerely,    Authenticated electronically by Isabel Cabezas MD on 19/66/01   Board Certified Specialist Consent: The patient's consent was obtained including but not limited to risks of crusting, scabbing, blistering, scarring, darker or lighter pigmentary change, recurrence, incomplete removal and infection. Anesthesia Volume In Cc: 0.5 Detail Level: Detailed Price (Use Numbers Only, No Special Characters Or $): 0 Post-Care Instructions: I reviewed with the patient in detail post-care instructions. Patient is to wear sunprotection, and avoid picking at any of the treated lesions. Pt may apply Vaseline to crusted or scabbing areas.

## 2022-10-20 ENCOUNTER — OFFICE VISIT (OUTPATIENT)
Dept: PHYSICAL THERAPY | Facility: CLINIC | Age: 62
End: 2022-10-20
Payer: COMMERCIAL

## 2022-10-20 ENCOUNTER — TELEPHONE (OUTPATIENT)
Dept: PAIN MEDICINE | Facility: MEDICAL CENTER | Age: 62
End: 2022-10-20

## 2022-10-20 DIAGNOSIS — M47.816 SPONDYLOSIS OF LUMBAR SPINE: Primary | ICD-10-CM

## 2022-10-20 DIAGNOSIS — G89.29 CHRONIC BILATERAL LOW BACK PAIN WITHOUT SCIATICA: ICD-10-CM

## 2022-10-20 DIAGNOSIS — M54.50 CHRONIC BILATERAL LOW BACK PAIN WITHOUT SCIATICA: ICD-10-CM

## 2022-10-20 PROCEDURE — 97110 THERAPEUTIC EXERCISES: CPT

## 2022-10-20 PROCEDURE — 97112 NEUROMUSCULAR REEDUCATION: CPT

## 2022-10-20 NOTE — TELEPHONE ENCOUNTER
----- Message from [facility] TANVI  Nelson sent at 10/19/2022  6:41 PM EDT -----  Regarding: Lumbar pain  I have completed the 10 sessions of physical therapy as required by my insurance company for my lumbar issues  Even though Ravindra at [facility] in [place] feels that additional PT may be beneficial, I am far from convinced that it will  I now must wear my back brace on a DAILY basis just to make it through my shift at work  I have a very limited supply of Percicet that I had set aside for my really bad days  They are now sitting on my nightstand and I find myself reaching for them more often than I like  1500mg of aspirin no longer is effective in giving me any type of restorative sleep  In addition to that, my migraines are becoming more frequent  Pain is a trigger for my migraines  I need you imput in my current situation because I can not keep going like I am now

## 2022-10-20 NOTE — PROGRESS NOTES
Daily Note     Today's date: 10/20/2022  Patient name: Ruel Solares  : 1960  MRN: [id number]  Referring provider: Cinthia Rodgers DO  Dx:   Encounter Diagnosis     ICD-10-CM    1  Spondylosis of lumbar spine  M47 816    2  Chronic bilateral low back pain without sciatica  M54 50     G89 29                 Subjective: Pt notes that her back gets so sore after work that she is unable to straighten up and walks "all hunched over"  Objective: See treatment diary below     Precautions:  L sided tremors, cervical fusion C5/C6 , "mild carpal tunnel B", patient doesn't like lying on her stomach,     Neuro Re-Ed 9/27 9/29 10/4 10/6 10/11 10/13 10/20    TA activation 5"x20 5"x20 10x 10"  10"x10 HEP      Supine ab marching 2x12 2x12 ea 3x10  3x10 3x10 2# 2x12 ea 3x10 MHP    90/90 HS stretch 20"x3 ea 20"x3 ea 3x20" each  30"x3 ea D/C      SLR 2x10 ea 2x10 ea 2x12 2x10 ea 2x12 ea 2# 2x10 ea 3x10 ea MHP    bridges W/add 5"x20 W/add 5"x20 20x 5"   w/ add W/add 5"x20 5"x2x12 w/add 5"x2x12 w/add 5"x20 w/add MHP    Standing band anti-rotations           Slow LTR w/ focus on bracing  10"x10 ea 10"x10 ea 10x 10" holds 10"x10 ea HEP 10"x10 ea 10"x10 ea    Lifting mechanics           Ther Ex 9/27 9/29  10/6 10/11 10/13 10/20    Bike  5' 5' 5 min 5' L1 5' L1 5' L1 5' L0               clamshells Supine RTB 5"x20 Supine RTB 5"x20 Supine 20x 5" GTB GTB 5"x20 supine GTB 5"x20 supine S/l GTB 5"x20 ea supine  GTB 5"x20  MHP    Hip adduction - -         Prone EOT hip flexor/hamstring stretch  20x ea 20x ea 30x B 30x ea 30x ea 30x ea 30x ea                                     Ther Activity 9/27 9/29  10/6 10/11 10/13 10/20    Mini squats or STS 20x 20x 20x 20x 20x 20x 20x               Modalities       10/20    MHP       duration        Assessment: Tolerated treatment well  Patient demonstrated fatigue post treatment and exhibited good technique with therapeutic exercises    Plan: Continue per plan of care    Progress treatment as tolerated      Christo Patel

## 2022-10-21 NOTE — TELEPHONE ENCOUNTER
Called patient and scheduled:     Bilateral L3-5 MBB #1 on 11/30    Reviewed instructions: , NPO 1 hour prior, loose-fitting/comfortable clothes, if ill/fever/infx/abx to call and reschedule  Also pain level at leat 5/10 and refrain from PRN, as-needed pain meds 6h prior  Patient stated verbal understanding

## 2022-10-25 ENCOUNTER — APPOINTMENT (OUTPATIENT)
Dept: PHYSICAL THERAPY | Facility: CLINIC | Age: 62
End: 2022-10-25

## 2022-10-26 ENCOUNTER — OFFICE VISIT (OUTPATIENT)
Dept: URGENT CARE | Facility: CLINIC | Age: 62
End: 2022-10-26
Payer: COMMERCIAL

## 2022-10-26 VITALS
TEMPERATURE: 97.4 F | OXYGEN SATURATION: 97 % | DIASTOLIC BLOOD PRESSURE: 72 MMHG | HEART RATE: 93 BPM | SYSTOLIC BLOOD PRESSURE: 114 MMHG | RESPIRATION RATE: 16 BRPM

## 2022-10-26 DIAGNOSIS — U07.1 COVID: Primary | ICD-10-CM

## 2022-10-26 PROCEDURE — S9088 SERVICES PROVIDED IN URGENT: HCPCS | Performed by: PHYSICIAN ASSISTANT

## 2022-10-26 PROCEDURE — 99214 OFFICE O/P EST MOD 30 MIN: CPT | Performed by: PHYSICIAN ASSISTANT

## 2022-10-26 RX ORDER — NIRMATRELVIR AND RITONAVIR 300-100 MG
3 KIT ORAL 2 TIMES DAILY
Qty: 30 TABLET | Refills: 0 | Status: SHIPPED | OUTPATIENT
Start: 2022-10-26 | End: 2022-10-31

## 2022-10-26 NOTE — PATIENT INSTRUCTIONS
Take Paxlovid as instructed  Please note that your medications have been reviewed as well as your latest kidney function test to try and ensure the your safe use of this medication  Please note that Paxlovid effectiveness may be decreased with your amodafinil and may also increase liver irritation  If you can avoid taking   your Amodafinil while on Paxlovid, that may be best        Please note that Paxlovid may decrease effectiveness of your generic Cymbalta  Please note that Paxlovid may decrease effectiveness of your omeprazole  Please note that Paxlovid may decrease effectiveness of Zomig  Use inhaler twice a day  Rinse mouth out afterwards  Please notify your primary care provider as soon as possible of your COVID infection  Vitamin D3 2000 IU per day may be helpful  Current CDC guidelines recommend home quarantine for the 1st fold 5 days of illness  If symptomatic we improving and without fever for 24 hours, may break quarantine but recommend continuing to mask for an additional 5 days when around other people  Transmission precautions advised  The following symptomatic treatments may offered comfort:      Strongly encourage getting plenty of rest over the next few days  Increase your hydration  If you are having sinus pressure, nasal congestion, runny nose, and / or post nasal drip you may try the following to help ease your symptoms:            *Clearing your sinuses in a nice steamy shower may be helpful, especially first thing after waking  *Nasal saline rinses every 1-2 hours while awake may also help decrease nasal congestion, drainage  *Afrin nasal spray if significant nasal congestion at bedtime may use   (Do not use for over 3 days however )           Although bothersome, mucous is not necessarily a bad thing  Production of mucous is the body's way of trying to capture and flush irritants from mucosal surfaces    Yellow or green mucous does not necessarily mean you have a bacterial infection  Mucous will become more discolored over time, especially first thing in the morning, as your body's immune system  floods the mucosal surfaces with white bloods cells to try and help fight  infection  This white blood cell debride can also cause mucous to be discolored  Again, using nasal saline spray frequently may help soothe and keep mucous flowing out versus getting dried, thickened and / or stuck leading to more sinus pain and pressure  If you have a cough, please realize that a cough is not necessarily a bad thing but a way that your body may be trying to keep your airways clear  Phlegm may be more discolored in the morning  Please note that discolored phlegm does not necessarily mean a bacterial infection  The following things may help with your cough:         *Warm tea with honey or a teaspoon of honey periodically throughout day and / or before bed  *You may also use plain Mucinex (an expectorant to help keep mucus thin so you can clear it easier) or Mucinex DM (expectorant / cough suyppressant) to help decrease cough if it is bothering your sleep  An expectorant works best if you take with full glass of fluids  Other night time cough medication options include Delsym, Robitussin DM, NyQuil  *Propping with an extra pillow or two may be helpful  *Keep water by your bedside to sip on as needed  *Cough drops  If you are having any sore throat or hoarseness,  you may do warm salt water gargles every 1-2 hours while awake, throat lozenges, Tylenol, voice rest, warm tea with honey  COVID symptoms may linger from 1 to several weeks  Mucus may be more discolored first thing in the morning  Discolored mucous does not necessarily mean bacterial infection but can be dehydrated mucous or mucous filled with white blood cell debride that have been helping you to fight your illness             If significant weakness, chest pain, shortness of breath proceed to ER for immediate further evaluation

## 2022-10-26 NOTE — PROGRESS NOTES
[facility]    NAME: Juan Carlos Headley is a 57 y o  female  : 1960    MRN: [id number]  DATE: 2022  TIME: 1:31 PM    Assessment and Plan   COVID [U07 1]  1  COVID  nirmatrelvir & ritonavir (Paxlovid, 300/100,) tablet therapy pack    Fluticasone Propionate, Inhal, (Flovent Diskus) 100 MCG/BLIST AEPB     20 minutes spent with patient on history, physical, review of medications and review of recent lab work to ensure appropriate GFR   given on potential adverse side effects with Paxlovid and necessary adjustments with medications  Patient Instructions   Patient Instructions   Take Paxlovid as instructed  Please note that your medications have been reviewed as well as your latest kidney function test to try and ensure the your safe use of this medication  Please note that Paxlovid effectiveness may be decreased with your amodafinil and may also increase liver irritation  If you can avoid taking   your Amodafinil while on Paxlovid, that may be best        Please note that Paxlovid may decrease effectiveness of your generic Cymbalta  Please note that Paxlovid may decrease effectiveness of your omeprazole  Please note that Paxlovid may decrease effectiveness of Zomig  Use inhaler twice a day  Rinse mouth out afterwards  Please notify your primary care provider as soon as possible of your COVID infection  Vitamin D3 2000 IU per day may be helpful  Current CDC guidelines recommend home quarantine for the 1st fold 5 days of illness  If symptomatic we improving and without fever for 24 hours, may break quarantine but recommend continuing to mask for an additional 5 days when around other people  Transmission precautions advised  The following symptomatic treatments may offered comfort:      Strongly encourage getting plenty of rest over the next few days  Increase your hydration          If you are having sinus pressure, nasal congestion, runny nose, and / or post nasal drip you may try the following to help ease your symptoms:            *Clearing your sinuses in a nice steamy shower may be helpful, especially first thing after waking  *Nasal saline rinses every 1-2 hours while awake may also help decrease nasal congestion, drainage  *Afrin nasal spray if significant nasal congestion at bedtime may use   (Do not use for over 3 days however )           Although bothersome, mucous is not necessarily a bad thing  Production of mucous is the body's way of trying to capture and flush irritants from mucosal surfaces  Yellow or green mucous does not necessarily mean you have a bacterial infection  Mucous will become more discolored over time, especially first thing in the morning, as your body's immune system  floods the mucosal surfaces with white bloods cells to try and help fight  infection  This white blood cell debride can also cause mucous to be discolored  Again, using nasal saline spray frequently may help soothe and keep mucous flowing out versus getting dried, thickened and / or stuck leading to more sinus pain and pressure  If you have a cough, please realize that a cough is not necessarily a bad thing but a way that your body may be trying to keep your airways clear  Phlegm may be more discolored in the morning  Please note that discolored phlegm does not necessarily mean a bacterial infection  The following things may help with your cough:         *Warm tea with honey or a teaspoon of honey periodically throughout day and / or before bed  *You may also use plain Mucinex (an expectorant to help keep mucus thin so you can clear it easier) or Mucinex DM (expectorant / cough suyppressant) to help decrease cough if it is bothering your sleep  An expectorant works best if you take with full glass of fluids  Other night time cough medication options include Delsym, Robitussin DM, NyQuil           *Propping with an extra pillow or two may be helpful  *Keep water by your bedside to sip on as needed  *Cough drops  If you are having any sore throat or hoarseness,  you may do warm salt water gargles every 1-2 hours while awake, throat lozenges, Tylenol, voice rest, warm tea with honey  COVID symptoms may linger from 1 to several weeks  Mucus may be more discolored first thing in the morning  Discolored mucous does not necessarily mean bacterial infection but can be dehydrated mucous or mucous filled with white blood cell debride that have been helping you to fight your illness  If significant weakness, chest pain, shortness of breath proceed to ER for immediate further evaluation  Chief Complaint     Chief Complaint   Patient presents with   • COVID-19     Patient is COVID + and wants Paxlovid       History of Present Illness   Will Cleveland presents to the clinic c/o  55-year-old female started with fever sore throat Sunday night and worsened on Monday with cough postnasal drip and some chest tightness and throat tightness  She has felt cold and achy and just felt like crap  Runny nose  She did COVID testing yesterday and that was positive  She has had her COVID boosters   has cancer  Concerned about that  Wanting to be treated with Paxlovid  Does not always have to take her Nuvigil  Review of Systems   Review of Systems   Constitutional: Positive for activity change, appetite change, chills, fatigue and fever  HENT: Positive for congestion, postnasal drip, rhinorrhea and sore throat  Respiratory: Positive for cough and chest tightness  Negative for shortness of breath, wheezing and stridor  Cardiovascular: Negative  Musculoskeletal: Positive for arthralgias and myalgias         Current Medications     Long-Term Medications   Medication Sig Dispense Refill   • Armodafinil 250 MG tablet TAKE ONE TABLET BY MOUTH EVERY DAY 30 tablet 0   • aspirin 500 MG buffered tablet Take 500 mg by mouth every 6 (six) hours as needed for mild pain     • calcium carbonate 1250 MG capsule Take 600 mg by mouth daily      • Cholecalciferol 1000 units tablet Take 1,000 Units by mouth daily Takes in the afternoon     • diclofenac (VOLTAREN) 75 mg EC tablet Take 1 tablet (75 mg total) by mouth 2 (two) times a day as needed (milm pain) 60 tablet 5   • dicyclomine (BENTYL) 20 mg tablet Take 1 tablet (20 mg total) by mouth 4 (four) times a day as needed (abdominal pain) (Patient taking differently: Take 20 mg by mouth 3 (three) times a day) 240 tablet 3   • DULoxetine (CYMBALTA) 30 mg delayed release capsule Take 3 capsules (90 mg total) by mouth daily 90 capsule 1   • ferrous sulfate 325 (65 Fe) mg tablet Take 325 mg by mouth daily with breakfast      • Fluticasone Propionate, Inhal, (Flovent Diskus) 100 MCG/BLIST AEPB Inhale 1 puff 2 (two) times a day Rinse mouth after use  60 blister 0   • gabapentin (NEURONTIN) 300 mg capsule 300 mg at 1200 in addition to 600 mg at bedtime  • metaxalone (SKELAXIN) 800 mg tablet Take 0 5 tablets (400 mg total) by mouth 3 (three) times a day 45 tablet 2   • omeprazole (PriLOSEC) 40 MG capsule      • prochlorperazine (COMPAZINE) 10 mg tablet Take 1 tablet (10 mg total) by mouth every 6 (six) hours as needed for nausea or vomiting 12 tablet 1   • trospium chloride (SANCTURA) 20 mg tablet Take 1 tablet (20 mg total) by mouth 2 (two) times a day 60 tablet 3   • ZOLMitriptan (ZOMIG) 2 5 MG tablet TAKE 1 TABLET BY MOUTH AT ONSET OF MIGRAINE, MAY REPEAT IN 2 HOURS IF NEEDED   LIMIT 2/24 HOURS, 4/WEEK, 8/MONTH 12 tablet 5       Current Allergies     Allergies as of 10/26/2022 - Reviewed 10/26/2022   Allergen Reaction Noted   • Celecoxib Other (See Comments) 04/08/2014   • Sumatriptan Anaphylaxis 02/18/2015   • Tramadol Other (See Comments) 09/29/2010   • Medical tape Dermatitis, Rash, and Blisters           The following portions of the patient's history were reviewed and updated as appropriate: allergies, current medications, past family history, past medical history, past social history, past surgical history and problem list   Past Medical History:   Diagnosis Date   • Allergy to dust    • Anemia    • Calculus of ureter    • Chronic pain disorder     neck and back- sees pain management   • Colitis    • Colon polyp    • Cracked skin     on fingers   • Diverticulosis    • Dysphagia    • GERD (gastroesophageal reflux disease)    • H/O: GI bleed    • Hiatal hernia    • History of DVT in adulthood 2019 2019   • Hydronephrosis    • Hyperlipidemia    • Inguinal hernia     right side   • Irregular heart beat     Pt states with prolonged QT interval - was seen by cardiologist( [facility]) pt told "to not worry about it"   • Irritable bowel    • Kidney stone    • Migraines    • Narcolepsy    • Narcolepsy 08/06/2020   • OAB (overactive bladder)    • Osteoarthritis    • Osteoporosis    • Other chronic pain     degenerative disc disease   • PONV (postoperative nausea and vomiting)     migraines   • Pyelonephritis 1/13/2021   • SBO (small bowel obstruction) ([facility] [address] ) 9/25/2021   • Sepsis ([facility] [address] ) 1/13/2021   • Smoking 8/6/2020   • Tobacco abuse    • Wears dentures     full upper   • Wears glasses    • Wears glasses      Past Surgical History:   Procedure Laterality Date   • ABDOMINAL ADHESION SURGERY     • ARM SKIN LESION BIOPSY / EXCISION      lymph node excision   • BLADDER SURGERY     • CHOLECYSTECTOMY     • COLONOSCOPY  08/06/2020   • FL RETROGRADE PYELOGRAM  1/5/2021   • HERNIA REPAIR     • HYSTERECTOMY  1977   • JOINT REPLACEMENT Right     knee   • KNEE SURGERY Right     replacement   • LAMINECTOMY      L5-6-7   • LAPAROSCOPY     • LYMPH NODE DISSECTION Left     removed -no cancer-  limb restriction   • LYMPHADENECTOMY  1974    UNDER LEFT ARM - DO NOT DRAW BLOOD/GIVE INJECTIONS IN LEFT ARM PER PATIENT    • MOLE REMOVAL     • OOPHORECTOMY Bilateral 1977   • OTHER SURGICAL HISTORY      enlarged lymph node removed left arm   no cancer   • AK CYSTO/URETERO W/LITHOTRIPSY &INDWELL STENT INSRT Right 1/5/2021    Procedure: CYSTO, LASER  URETEROSCOPY, RETRO PYELOGRAM, STENT REPLACMENT;  Surgeon: Heather Sanchez MD;  Location: [facility];  Service: Urology   • AK CYSTOURETHROSCOPY,URETER CATHETER Right 12/2/2020    Procedure: CYSTOSCOPY AND INSERTION STENT URETERAL;  Surgeon: Jefferson Key MD;  Location: [facility];  Service: Urology   • AK FRAGMENT KIDNEY STONE/ ESWL Right 3/25/2021    Procedure: LITHOTRIPSY EXTRACORPORAL SHOCKWAVE (ESWL); Surgeon: Mary Grace Zambrano MD;  Location: [facility] OR;  Service: Urology   • AK REPAIR ING HERNIA,5+Y/O,REDUCIBL Right 6/25/2020    Procedure: REPAIR HERNIA INGUINAL  WITH MESH;  Surgeon: Leah Soto MD;  Location: [facility];  Service: General   • SPINAL FUSION     • UPPER GASTROINTESTINAL ENDOSCOPY  08/06/2020   • WISDOM TOOTH EXTRACTION       Family History   Problem Relation Age of Onset   • Hypertension Mother    • Heart attack Mother    • Hypertension Father    • Cancer Father 67        small intestine   • Diabetes Sister    • Diabetes Sister    • No Known Problems Daughter    • No Known Problems Maternal Grandmother    • No Known Problems Maternal Grandfather    • No Known Problems Paternal Grandmother    • No Known Problems Paternal Grandfather    • Stroke Brother    • Nephrolithiasis Brother    • Cancer Brother 70        soft tissue cancer   • No Known Problems Maternal Aunt    • No Known Problems Maternal Aunt    • No Known Problems Maternal Aunt    • No Known Problems Paternal Aunt    • No Known Problems Paternal Aunt    • No Known Problems Paternal Aunt    • Breast cancer Other 47       Objective   /72   Pulse 93   Temp (!) 97 4 °F (36 3 °C) (Tympanic)   Resp 16   SpO2 97%   No LMP recorded  Patient has had a hysterectomy  Physical Exam     Physical Exam  Vitals and nursing note reviewed     Constitutional:       General: She is not in acute distress  Appearance: She is ill-appearing  She is not toxic-appearing or diaphoretic  Comments: Appears mildly ill but in no acute distress  HENT:      Head: Normocephalic and atraumatic  Right Ear: Tympanic membrane, ear canal and external ear normal       Left Ear: Tympanic membrane, ear canal and external ear normal       Nose: Congestion and rhinorrhea present  Mouth/Throat:      Mouth: Mucous membranes are moist       Pharynx: No oropharyngeal exudate or posterior oropharyngeal erythema  Comments: Cobblestoning posterior pharynx  Eyes:      General: No scleral icterus  Right eye: No discharge  Left eye: No discharge  Extraocular Movements: Extraocular movements intact  Conjunctiva/sclera: Conjunctivae normal       Pupils: Pupils are equal, round, and reactive to light  Cardiovascular:      Rate and Rhythm: Normal rate and regular rhythm  Heart sounds: Normal heart sounds  No murmur heard  No friction rub  No gallop  Pulmonary:      Effort: Pulmonary effort is normal  No respiratory distress  Breath sounds: No stridor  Wheezing and rhonchi present  No rales  Musculoskeletal:      Cervical back: Normal range of motion and neck supple  No rigidity or tenderness  No muscular tenderness  Right lower leg: No edema  Left lower leg: No edema  Lymphadenopathy:      Cervical: No cervical adenopathy  Skin:     General: Skin is warm and dry  Coloration: Skin is not pale  Findings: No rash  Comments: No acute rashes   Neurological:      Mental Status: She is alert and oriented to person, place, and time     Psychiatric:         Mood and Affect: Mood normal          Behavior: Behavior normal

## 2022-10-27 ENCOUNTER — APPOINTMENT (OUTPATIENT)
Dept: PHYSICAL THERAPY | Facility: CLINIC | Age: 62
End: 2022-10-27

## 2022-10-29 ENCOUNTER — APPOINTMENT (EMERGENCY)
Dept: CT IMAGING | Facility: HOSPITAL | Age: 62
End: 2022-10-29

## 2022-10-29 ENCOUNTER — HOSPITAL ENCOUNTER (EMERGENCY)
Facility: HOSPITAL | Age: 62
Discharge: HOME | End: 2022-10-29
Attending: EMERGENCY MEDICINE

## 2022-10-29 VITALS
OXYGEN SATURATION: 98 % | HEART RATE: 79 BPM | BODY MASS INDEX: 20.23 KG/M2 | TEMPERATURE: 97.9 F | DIASTOLIC BLOOD PRESSURE: 60 MMHG | WEIGHT: 114.2 LBS | RESPIRATION RATE: 18 BRPM | SYSTOLIC BLOOD PRESSURE: 113 MMHG

## 2022-10-29 DIAGNOSIS — R10.9 LEFT FLANK PAIN: Primary | ICD-10-CM

## 2022-10-29 LAB
ANION GAP SERPL CALCULATED.3IONS-SCNC: 9 MMOL/L (ref 4–13)
BASOPHILS # BLD AUTO: 0.03 THOUSANDS/ÂΜL (ref 0–0.1)
BASOPHILS NFR BLD AUTO: 1 % (ref 0–1)
BILIRUB UR QL STRIP: NEGATIVE
BUN SERPL-MCNC: 18 MG/DL (ref 5–25)
CALCIUM SERPL-MCNC: 10.2 MG/DL (ref 8.3–10.1)
CHLORIDE SERPL-SCNC: 102 MMOL/L (ref 96–108)
CLARITY UR: CLEAR
CO2 SERPL-SCNC: 25 MMOL/L (ref 21–32)
COLOR UR: YELLOW
CREAT SERPL-MCNC: 1.02 MG/DL (ref 0.6–1.3)
EOSINOPHIL # BLD AUTO: 0.16 THOUSAND/ÂΜL (ref 0–0.61)
EOSINOPHIL NFR BLD AUTO: 3 % (ref 0–6)
ERYTHROCYTE [DISTWIDTH] IN BLOOD BY AUTOMATED COUNT: 12.7 % (ref 11.6–15.1)
GFR SERPL CREATININE-BSD FRML MDRD: 58 ML/MIN/1.73SQ M
GLUCOSE SERPL-MCNC: 112 MG/DL (ref 65–140)
GLUCOSE UR STRIP-MCNC: NEGATIVE MG/DL
HCT VFR BLD AUTO: 44 % (ref 34.8–46.1)
HGB BLD-MCNC: 15.1 G/DL (ref 11.5–15.4)
HGB UR QL STRIP.AUTO: NEGATIVE
IMM GRANULOCYTES # BLD AUTO: 0.02 THOUSAND/UL (ref 0–0.2)
IMM GRANULOCYTES NFR BLD AUTO: 0 % (ref 0–2)
KETONES UR STRIP-MCNC: NEGATIVE MG/DL
LEUKOCYTE ESTERASE UR QL STRIP: NEGATIVE
LYMPHOCYTES # BLD AUTO: 2.03 THOUSANDS/ÂΜL (ref 0.6–4.47)
LYMPHOCYTES NFR BLD AUTO: 36 % (ref 14–44)
MCH RBC QN AUTO: 31.9 PG (ref 26.8–34.3)
MCHC RBC AUTO-ENTMCNC: 34.3 G/DL (ref 31.4–37.4)
MCV RBC AUTO: 93 FL (ref 82–98)
MONOCYTES # BLD AUTO: 0.31 THOUSAND/ÂΜL (ref 0.17–1.22)
MONOCYTES NFR BLD AUTO: 6 % (ref 4–12)
NEUTROPHILS # BLD AUTO: 3.02 THOUSANDS/ÂΜL (ref 1.85–7.62)
NEUTS SEG NFR BLD AUTO: 54 % (ref 43–75)
NITRITE UR QL STRIP: NEGATIVE
NRBC BLD AUTO-RTO: 0 /100 WBCS
PH UR STRIP.AUTO: 5.5 [PH] (ref 4.5–8)
PLATELET # BLD AUTO: 290 THOUSANDS/UL (ref 149–390)
PMV BLD AUTO: 9.7 FL (ref 8.9–12.7)
POTASSIUM SERPL-SCNC: 4.9 MMOL/L (ref 3.5–5.3)
PROT UR STRIP-MCNC: NEGATIVE MG/DL
RBC # BLD AUTO: 4.74 MILLION/UL (ref 3.81–5.12)
SODIUM SERPL-SCNC: 136 MMOL/L (ref 135–147)
SP GR UR STRIP.AUTO: >=1.03 (ref 1–1.03)
UROBILINOGEN UR QL STRIP.AUTO: 0.2 E.U./DL
WBC # BLD AUTO: 5.57 THOUSAND/UL (ref 4.31–10.16)

## 2022-10-29 RX ORDER — ONDANSETRON 2 MG/ML
4 INJECTION INTRAMUSCULAR; INTRAVENOUS ONCE
Status: COMPLETED | OUTPATIENT
Start: 2022-10-29 | End: 2022-10-29

## 2022-10-29 RX ORDER — LIDOCAINE 50 MG/G
1 PATCH TOPICAL DAILY
Qty: 5 PATCH | Refills: 0 | Status: SHIPPED | OUTPATIENT
Start: 2022-10-29 | End: 2022-11-09 | Stop reason: ALTCHOICE

## 2022-10-29 RX ORDER — ONDANSETRON 4 MG/1
4 TABLET, ORALLY DISINTEGRATING ORAL EVERY 8 HOURS PRN
Qty: 10 TABLET | Refills: 0 | Status: SHIPPED | OUTPATIENT
Start: 2022-10-29

## 2022-10-29 RX ORDER — FENTANYL CITRATE 50 UG/ML
25 INJECTION, SOLUTION INTRAMUSCULAR; INTRAVENOUS ONCE
Status: COMPLETED | OUTPATIENT
Start: 2022-10-29 | End: 2022-10-29

## 2022-10-29 RX ORDER — ONDANSETRON 4 MG/1
4 TABLET, ORALLY DISINTEGRATING ORAL EVERY 8 HOURS PRN
Qty: 10 TABLET | Refills: 0 | Status: SHIPPED | OUTPATIENT
Start: 2022-10-29 | End: 2022-10-29 | Stop reason: SDUPTHER

## 2022-10-29 RX ORDER — OXYCODONE HYDROCHLORIDE AND ACETAMINOPHEN 5; 325 MG/1; MG/1
1 TABLET ORAL EVERY 4 HOURS PRN
Qty: 10 TABLET | Refills: 0 | Status: SHIPPED | OUTPATIENT
Start: 2022-10-29 | End: 2022-10-29 | Stop reason: SDUPTHER

## 2022-10-29 RX ORDER — KETOROLAC TROMETHAMINE 30 MG/ML
15 INJECTION, SOLUTION INTRAMUSCULAR; INTRAVENOUS ONCE
Status: COMPLETED | OUTPATIENT
Start: 2022-10-29 | End: 2022-10-29

## 2022-10-29 RX ORDER — OXYCODONE HYDROCHLORIDE AND ACETAMINOPHEN 5; 325 MG/1; MG/1
1 TABLET ORAL EVERY 4 HOURS PRN
Qty: 10 TABLET | Refills: 0 | Status: SHIPPED | OUTPATIENT
Start: 2022-10-29 | End: 2022-11-09 | Stop reason: ALTCHOICE

## 2022-10-29 RX ADMIN — KETOROLAC TROMETHAMINE 15 MG: 30 INJECTION, SOLUTION INTRAMUSCULAR at 18:45

## 2022-10-29 RX ADMIN — FENTANYL CITRATE 25 MCG: 50 INJECTION, SOLUTION INTRAMUSCULAR; INTRAVENOUS at 18:43

## 2022-10-29 RX ADMIN — FENTANYL CITRATE 25 MCG: 50 INJECTION, SOLUTION INTRAMUSCULAR; INTRAVENOUS at 20:35

## 2022-10-29 RX ADMIN — ONDANSETRON 4 MG: 2 INJECTION INTRAMUSCULAR; INTRAVENOUS at 19:28

## 2022-10-29 NOTE — ED PROVIDER NOTES
History  Chief Complaint   Patient presents with   • Flank Pain     Left sided flank pain radiating to back, hx kidney stones feels similar     69-year-old female with history of previous kidney stones requiring lithotripsy and ureteral stent presents to the ED with left flank pain that started today  The pain is nonradiating  She states it feels similar to previous kidney stones  She has also had urinary frequency  No nausea or vomiting  No fevers or chills  She took 1 Percocet without relief  History provided by:  Patient   used: No    Flank Pain  Pain location:  L flank  Pain quality: aching    Pain radiates to:  Does not radiate  Pain severity:  Severe  Onset quality:  Gradual  Duration:  12 hours  Timing:  Constant  Progression:  Worsening  Chronicity:  Recurrent  Context: not recent illness, not sick contacts, not suspicious food intake and not trauma    Relieved by:  Nothing  Worsened by:  Nothing  Ineffective treatments: Percocet  Associated symptoms: no anorexia, no belching, no chest pain, no chills, no constipation, no cough, no diarrhea, no dysuria, no fatigue, no fever, no hematuria, no nausea, no shortness of breath, no sore throat and no vomiting        Prior to Admission Medications   Prescriptions Last Dose Informant Patient Reported? Taking?    Armodafinil 250 MG tablet   No No   Sig: TAKE ONE TABLET BY MOUTH EVERY DAY   BACILLUS COAGULANS-INULIN PO  Self Yes No   Sig: Take 1 capsule by mouth daily Takes in the am   Cholecalciferol 1000 units tablet  Self Yes No   Sig: Take 1,000 Units by mouth daily Takes in the afternoon   Citicoline Sodium 500 MG TABS  Self Yes No   Sig: Take 500 mg by mouth daily Takes in the am   DULoxetine (CYMBALTA) 30 mg delayed release capsule  Self No No   Sig: Take 3 capsules (90 mg total) by mouth daily   Fluticasone Propionate, Inhal, (Flovent Diskus) 100 MCG/BLIST AEPB   No No   Sig: Inhale 1 puff 2 (two) times a day Rinse mouth after use    Ginger, Zingiber officinalis, (GINGER ROOT PO)  Self Yes No   Sig: Take 700 mg by mouth 2 (two) times a day    Multiple Vitamins-Minerals (CENTRUM SILVER PO)  Self Yes No   Sig: Take 1 tablet by mouth daily Takes in the am   Turmeric 500 MG CAPS  Self Yes No   Sig: Take 700 mg by mouth 2 (two) times a day    ZOLMitriptan (ZOMIG) 2 5 MG tablet  Self No No   Sig: TAKE 1 TABLET BY MOUTH AT ONSET OF MIGRAINE, MAY REPEAT IN 2 HOURS IF NEEDED  LIMIT 2/24 HOURS, 4/WEEK, 8/MONTH   aspirin 500 MG buffered tablet  Self Yes No   Sig: Take 500 mg by mouth every 6 (six) hours as needed for mild pain   calcium carbonate 1250 MG capsule  Self Yes No   Sig: Take 600 mg by mouth daily    cetirizine (ZyrTEC) 10 mg tablet  Self Yes No   Sig: Take 10 mg by mouth 2 (two) times a day   diclofenac (VOLTAREN) 75 mg EC tablet  Self No No   Sig: Take 1 tablet (75 mg total) by mouth 2 (two) times a day as needed (milm pain)   dicyclomine (BENTYL) 20 mg tablet   No No   Sig: Take 1 tablet (20 mg total) by mouth 4 (four) times a day as needed (abdominal pain)   Patient taking differently: Take 20 mg by mouth 3 (three) times a day   diphenoxylate-atropine (LOMOTIL) 2 5-0 025 mg per tablet  Self No No   Sig: TAKE ONE TABLET BY MOUTH FOUR TIMES A DAY AS NEEDED FOR DIARRHEA   ferrous sulfate 325 (65 Fe) mg tablet  Self Yes No   Sig: Take 325 mg by mouth daily with breakfast    gabapentin (NEURONTIN) 300 mg capsule  Self Yes No   Si mg at 1200 in addition to 600 mg at bedtime     metaxalone (SKELAXIN) 800 mg tablet  Self No No   Sig: Take 0 5 tablets (400 mg total) by mouth 3 (three) times a day   nirmatrelvir & ritonavir (Paxlovid, 300/100,) tablet therapy pack   No No   Sig: Take 3 tablets by mouth 2 (two) times a day for 5 days Take 2 nirmatrelvir tablets + 1 ritonavir tablet together per dose   omeprazole (PriLOSEC) 40 MG capsule  Self Yes No   prochlorperazine (COMPAZINE) 10 mg tablet  Self No No   Sig: Take 1 tablet (10 mg total) by mouth every 6 (six) hours as needed for nausea or vomiting   trospium chloride (SANCTURA) 20 mg tablet  Self No No   Sig: Take 1 tablet (20 mg total) by mouth 2 (two) times a day      Facility-Administered Medications: None       Past Medical History:   Diagnosis Date   • Allergy to dust    • Anemia    • Calculus of ureter    • Chronic pain disorder     neck and back- sees pain management   • Colitis    • Colon polyp    • Cracked skin     on fingers   • Diverticulosis    • Dysphagia    • GERD (gastroesophageal reflux disease)    • H/O: GI bleed    • Hiatal hernia    • History of DVT in adulthood 2019 2019   • Hydronephrosis    • Hyperlipidemia    • Inguinal hernia     right side   • Irregular heart beat     Pt states with prolonged QT interval - was seen by cardiologist( [facility]) pt told "to not worry about it"   • Irritable bowel    • Kidney stone    • Migraines    • Narcolepsy    • Narcolepsy 08/06/2020   • OAB (overactive bladder)    • Osteoarthritis    • Osteoporosis    • Other chronic pain     degenerative disc disease   • PONV (postoperative nausea and vomiting)     migraines   • Pyelonephritis 1/13/2021   • SBO (small bowel obstruction) ([facility] [address] ) 9/25/2021   • Sepsis ([facility] [address] ) 1/13/2021   • Smoking 8/6/2020   • Tobacco abuse    • Wears dentures     full upper   • Wears glasses    • Wears glasses        Past Surgical History:   Procedure Laterality Date   • ABDOMINAL ADHESION SURGERY     • ARM SKIN LESION BIOPSY / EXCISION      lymph node excision   • BLADDER SURGERY     • CHOLECYSTECTOMY     • COLONOSCOPY  08/06/2020   • FL RETROGRADE PYELOGRAM  1/5/2021   • HERNIA REPAIR     • HYSTERECTOMY  1977   • JOINT REPLACEMENT Right     knee   • KNEE SURGERY Right     replacement   • LAMINECTOMY      L5-6-7   • LAPAROSCOPY     • LYMPH NODE DISSECTION Left     removed -no cancer-  limb restriction   • LYMPHADENECTOMY  1974    UNDER LEFT ARM - DO NOT DRAW BLOOD/GIVE INJECTIONS IN LEFT ARM PER PATIENT    • MOLE REMOVAL     • OOPHORECTOMY Bilateral 1977   • OTHER SURGICAL HISTORY      enlarged lymph node removed left arm   no cancer   • NV CYSTO/URETERO W/LITHOTRIPSY &INDWELL STENT INSRT Right 1/5/2021    Procedure: CYSTO, LASER  URETEROSCOPY, RETRO PYELOGRAM, STENT REPLACMENT;  Surgeon: Alexandre Kntot MD;  Location: [facility];  Service: Urology   • NV CYSTOURETHROSCOPY,URETER CATHETER Right 12/2/2020    Procedure: CYSTOSCOPY AND INSERTION STENT URETERAL;  Surgeon: Amanda Wilson MD;  Location: [facility];  Service: Urology   • NV FRAGMENT KIDNEY STONE/ ESWL Right 3/25/2021    Procedure: LITHOTRIPSY EXTRACORPORAL SHOCKWAVE (ESWL); Surgeon: Shira Puente MD;  Location: [facility] MA[facility] OR;  Service: Urology   • NV REPAIR ING HERNIA,5+Y/O,REDUCIBL Right 6/25/2020    Procedure: REPAIR HERNIA INGUINAL  WITH MESH;  Surgeon: Iwona Moreno MD;  Location: [facility];  Service: General   • SPINAL FUSION     • UPPER GASTROINTESTINAL ENDOSCOPY  08/06/2020   • WISDOM TOOTH EXTRACTION         Family History   Problem Relation Age of Onset   • Hypertension Mother    • Heart attack Mother    • Hypertension Father    • Cancer Father 67        small intestine   • Diabetes Sister    • Diabetes Sister    • No Known Problems Daughter    • No Known Problems Maternal Grandmother    • No Known Problems Maternal Grandfather    • No Known Problems Paternal Grandmother    • No Known Problems Paternal Grandfather    • Stroke Brother    • Nephrolithiasis Brother    • Cancer Brother 70        soft tissue cancer   • No Known Problems Maternal Aunt    • No Known Problems Maternal Aunt    • No Known Problems Maternal Aunt    • No Known Problems Paternal Aunt    • No Known Problems Paternal Aunt    • No Known Problems Paternal Aunt    • Breast cancer Other 52     I have reviewed and agree with the history as documented      E-Cigarette/Vaping   • E-Cigarette Use Never User      E-Cigarette/Vaping Substances   • Nicotine No    • THC No    • CBD No    • Flavoring No    • Other No    • Unknown No      Social History     Tobacco Use   • Smoking status: Current Every Day Smoker     Packs/day: 0 50     Years: 47 00     Pack years: 23 50     Types: Cigarettes   • Smokeless tobacco: Never Used   • Tobacco comment: pt states is at 1/2 pk day currently - was previously at one pack /day   Vaping Use   • Vaping Use: Never used   Substance Use Topics   • Alcohol use: Not Currently     Comment: seldom   • Drug use: Not Currently     Types: Marijuana     Comment: medicinal marijuana; last dose was 3/16       Review of Systems   Constitutional: Negative  Negative for chills, diaphoresis, fatigue and fever  HENT: Negative  Negative for congestion, rhinorrhea and sore throat  Eyes: Negative  Negative for discharge, redness and itching  Respiratory: Negative  Negative for apnea, cough, chest tightness, shortness of breath and wheezing  Cardiovascular: Negative for chest pain, palpitations and leg swelling  Gastrointestinal: Negative  Negative for abdominal pain, anorexia, constipation, diarrhea, nausea and vomiting  Endocrine: Negative  Genitourinary: Positive for flank pain  Negative for decreased urine volume, difficulty urinating, dysuria, frequency, hematuria and urgency  Musculoskeletal: Negative for back pain  Skin: Negative  Allergic/Immunologic: Negative  Neurological: Negative  Negative for dizziness, syncope, weakness, light-headedness, numbness and headaches  Hematological: Negative  All other systems reviewed and are negative  Physical Exam  Physical Exam  Vitals and nursing note reviewed  Constitutional:       General: She is awake  She is not in acute distress  Appearance: Normal appearance  She is well-developed and normal weight  She is not ill-appearing, toxic-appearing or diaphoretic  HENT:      Head: Normocephalic and atraumatic        Right Ear: External ear normal       Left Ear: External ear normal       Nose: Nose normal  Mouth/Throat:      Mouth: Mucous membranes are moist       Pharynx: No oropharyngeal exudate  Eyes:      General: No scleral icterus  Right eye: No discharge  Left eye: No discharge  Conjunctiva/sclera: Conjunctivae normal    Neck:      Thyroid: No thyromegaly  Vascular: No JVD  Trachea: No tracheal deviation  Cardiovascular:      Rate and Rhythm: Normal rate and regular rhythm  Heart sounds: Normal heart sounds  No murmur heard  No friction rub  No gallop  Pulmonary:      Effort: Pulmonary effort is normal  No respiratory distress  Breath sounds: Normal breath sounds  No stridor  No wheezing, rhonchi or rales  Chest:      Chest wall: No tenderness  Abdominal:      General: Bowel sounds are normal  There is no distension  Palpations: Abdomen is soft  There is no mass  Tenderness: There is no abdominal tenderness  There is no right CVA tenderness or left CVA tenderness  Hernia: No hernia is present  Skin:     General: Skin is warm and dry  Coloration: Skin is not jaundiced or pale  Findings: No bruising, erythema, lesion or rash  Neurological:      General: No focal deficit present  Mental Status: She is alert and oriented to person, place, and time  Motor: No weakness or abnormal muscle tone  Deep Tendon Reflexes: Reflexes are normal and symmetric  Psychiatric:         Mood and Affect: Mood normal          Behavior: Behavior is cooperative           Vital Signs  ED Triage Vitals [10/29/22 1800]   Temperature Pulse Respirations Blood Pressure SpO2   97 9 °F (36 6 °C) 81 18 159/94 97 %      Temp Source Heart Rate Source Patient Position - Orthostatic VS BP Location FiO2 (%)   Oral Monitor Sitting Right arm --      Pain Score       10 - Worst Possible Pain           Vitals:    10/29/22 1800 10/29/22 2030   BP: 159/94 113/60   Pulse: 81 79   Patient Position - Orthostatic VS: Sitting Lying         Visual Acuity      ED Medications  Medications   ketorolac (TORADOL) injection 15 mg (15 mg Intravenous Given 10/29/22 1845)   fentanyl citrate (PF) 100 MCG/2ML 25 mcg (25 mcg Intravenous Given 10/29/22 1843)   ondansetron (ZOFRAN) injection 4 mg (4 mg Intravenous Given 10/29/22 1928)   fentanyl citrate (PF) 100 MCG/2ML 25 mcg (25 mcg Intravenous Given 10/29/22 2035)       Diagnostic Studies  Results Reviewed     Procedure Component Value Units Date/Time    Basic metabolic panel [[id number]]  (Abnormal) Collected: 10/29/22 1848    Lab Status: Final result Specimen: Blood from Arm, Right Updated: 10/29/22 1915     Sodium 136 mmol/L      Potassium 4 9 mmol/L      Chloride 102 mmol/L      CO2 25 mmol/L      ANION GAP 9 mmol/L      BUN 18 mg/dL      Creatinine 1 02 mg/dL      Glucose 112 mg/dL      Calcium 10 2 mg/dL      eGFR 58 ml/min/1 73sq m     Narrative:      [place] guidelines for Chronic Kidney Disease (CKD):   •  Stage 1 with normal or high GFR (GFR > 90 mL/min/1 73 square meters)  •  Stage 2 Mild CKD (GFR = 60-89 mL/min/1 73 square meters)  •  Stage 3A Moderate CKD (GFR = 45-59 mL/min/1 73 square meters)  •  Stage 3B Moderate CKD (GFR = 30-44 mL/min/1 73 square meters)  •  Stage 4 Severe CKD (GFR = 15-29 mL/min/1 73 square meters)  •  Stage 5 End Stage CKD (GFR <15 mL/min/1 73 square meters)  Note: GFR calculation is accurate only with a steady state creatinine    CBC and differential [[id number]] Collected: 10/29/22 1848    Lab Status: Final result Specimen: Blood from Arm, Right Updated: 10/29/22 1857     WBC 5 57 Thousand/uL      RBC 4 74 Million/uL      Hemoglobin 15 1 g/dL      Hematocrit 44 0 %      MCV 93 fL      MCH 31 9 pg      MCHC 34 3 g/dL      RDW 12 7 %      MPV 9 7 fL      Platelets 848 Thousands/uL      nRBC 0 /100 WBCs      Neutrophils Relative 54 %      Immat GRANS % 0 %      Lymphocytes Relative 36 %      Monocytes Relative 6 %      Eosinophils Relative 3 %      Basophils Relative 1 % Neutrophils Absolute 3 02 Thousands/µL      Immature Grans Absolute 0 02 Thousand/uL      Lymphocytes Absolute 2 03 Thousands/µL      Monocytes Absolute 0 31 Thousand/µL      Eosinophils Absolute 0 16 Thousand/µL      Basophils Absolute 0 03 Thousands/µL     Urine Macroscopic, POC [[id number]] Collected: 10/29/22 1842    Lab Status: Final result Specimen: Urine Updated: 10/29/22 1844     Color, UA Yellow     Clarity, UA Clear     pH, UA 5 5     Leukocytes, UA Negative     Nitrite, UA Negative     Protein, UA Negative mg/dl      Glucose, UA Negative mg/dl      Ketones, UA Negative mg/dl      Urobilinogen, UA 0 2 E U /dl      Bilirubin, UA Negative     Occult Blood, UA Negative     Specific Gravity, UA >=1 030    Narrative:      CLINITEK RESULT                 CT renal stone study abdomen pelvis without contrast   Final Result by Julito Navarro DO (10/29 2043)      2 mm nonobstructing left renal calculus  No ureteral calculi seen  No acute intra-abdominal abnormality  Colonic diverticulosis without evidence of diverticulitis  Mild right renal atrophy  Limited study without IV or oral contrast          Workstation performed: [id number]                    Procedures  Procedures         ED Course  ED Course as of 10/29/22 2211   Sat Oct 29, 2022   1919 Patient states pain has improved, however she did have 1 episode of vomiting  SBIRT 20yo+    Flowsheet Row Most Recent Value   SBIRT (25 yo +)    In order to provide better care to our patients, we are screening all of our patients for alcohol and drug use  Would it be okay to ask you these screening questions? No Filed at: 10/29/2022 1850                    [facility]  Number of Diagnoses or Management Options  Diagnosis management comments: 70-year-old female presents to the ED with left flank pain that started earlier today    Pain is similar to previous kidney stones which required lithotripsy and stenting in the past   On review of the records her last kidney stone was in March of 2021  She has had no fevers, nausea or vomiting  On exam she is alert and in no acute distress  She has no abdominal or flank tenderness on exam   Given her past history of stones and similar presentation will obtain CT stone study  She also has a history of hydronephrosis in the past so will also obtain BMP to rule out BRAYDON  Will obtain urine to rule out UTI  Will control pain  Amount and/or Complexity of Data Reviewed  Clinical lab tests: ordered and reviewed  Tests in the radiology section of CPT®: ordered and reviewed  Independent visualization of images, tracings, or specimens: yes        Disposition  Final diagnoses:   Left flank pain     Time reflects when diagnosis was documented in both MDM as applicable and the Disposition within this note     Time User Action Codes Description Comment    10/29/2022  8:53 PM Ana Luisa Rajan [R10 [id number]] Left flank pain       ED Disposition     ED Disposition   Discharge    Condition   Good    Date/Time   Sat Oct 29, 2022  8:53 PM    Comment   Ty Damon discharge to home/self care                 Follow-up Information     Follow up With Specialties Details Why Contact Info    Bella Tate MD Family Medicine Schedule an appointment as soon as possible for a visit in 2 days  [place] [phone]            Discharge Medication List as of 10/29/2022  8:55 PM      START taking these medications    Details   lidocaine (LIDODERM) 5 % Apply 1 patch topically daily Remove & Discard patch within 12 hours or as directed by MD, Starting Sat 10/29/2022, Normal      ondansetron (ZOFRAN-ODT) 4 mg disintegrating tablet Take 1 tablet (4 mg total) by mouth every 8 (eight) hours as needed for nausea or vomiting, Starting Sat 10/29/2022, Normal      oxyCODONE-acetaminophen (Percocet) 5-325 mg per tablet Take 1 tablet by mouth every 4 (four) hours as needed for moderate pain or severe pain for up to 10 days Max Daily Amount: 6 tablets, Starting Sat 10/29/2022, Until Tue 11/8/2022 at 2359, Normal         CONTINUE these medications which have NOT CHANGED    Details   Armodafinil 250 MG tablet TAKE ONE TABLET BY MOUTH EVERY DAY, Normal      aspirin 500 MG buffered tablet Take 500 mg by mouth every 6 (six) hours as needed for mild pain, Historical Med      BACILLUS COAGULANS-INULIN PO Take 1 capsule by mouth daily Takes in the am, Historical Med      calcium carbonate 1250 MG capsule Take 600 mg by mouth daily , Historical Med      cetirizine (ZyrTEC) 10 mg tablet Take 10 mg by mouth 2 (two) times a day, Historical Med      Cholecalciferol 1000 units tablet Take 1,000 Units by mouth daily Takes in the afternoon, Starting Fri 12/9/2016, Historical Med      Citicoline Sodium 500 MG TABS Take 500 mg by mouth daily Takes in the am, Historical Med      diclofenac (VOLTAREN) 75 mg EC tablet Take 1 tablet (75 mg total) by mouth 2 (two) times a day as needed (milm pain), Starting Thu 8/25/2022, Normal      dicyclomine (BENTYL) 20 mg tablet Take 1 tablet (20 mg total) by mouth 4 (four) times a day as needed (abdominal pain), Starting Thu 4/1/2021, Normal      diphenoxylate-atropine (LOMOTIL) 2 5-0 025 mg per tablet TAKE ONE TABLET BY MOUTH FOUR TIMES A DAY AS NEEDED FOR DIARRHEA, Normal      DULoxetine (CYMBALTA) 30 mg delayed release capsule Take 3 capsules (90 mg total) by mouth daily, Starting Tue 3/24/2020, Normal      ferrous sulfate 325 (65 Fe) mg tablet Take 325 mg by mouth daily with breakfast , Starting Thu 9/4/2014, Historical Med      Fluticasone Propionate, Inhal, (Flovent Diskus) 100 MCG/BLIST AEPB Inhale 1 puff 2 (two) times a day Rinse mouth after use , Starting Wed 10/26/2022, Until Fri 11/25/2022, Normal      gabapentin (NEURONTIN) 300 mg capsule 300 mg at 1200 in addition to 600 mg at bedtime  , Historical Med      Ginger, Zingiber officinalis, (GINGER ROOT PO) Take 700 mg by mouth 2 (two) times a day , Historical Med      metaxalone (SKELAXIN) 800 mg tablet Take 0 5 tablets (400 mg total) by mouth 3 (three) times a day, Starting Fri 5/27/2022, Normal      Multiple Vitamins-Minerals (CENTRUM SILVER PO) Take 1 tablet by mouth daily Takes in the am, Historical Med      nirmatrelvir & ritonavir (Paxlovid, 300/100,) tablet therapy pack Take 3 tablets by mouth 2 (two) times a day for 5 days Take 2 nirmatrelvir tablets + 1 ritonavir tablet together per dose, Starting Wed 10/26/2022, Until Mon 10/31/2022, Normal      omeprazole (PriLOSEC) 40 MG capsule Starting Fri 3/11/2022, Historical Med      prochlorperazine (COMPAZINE) 10 mg tablet Take 1 tablet (10 mg total) by mouth every 6 (six) hours as needed for nausea or vomiting, Starting Thu 1/7/2021, Normal      trospium chloride (SANCTURA) 20 mg tablet Take 1 tablet (20 mg total) by mouth 2 (two) times a day, Starting Mon 8/15/2022, Normal      Turmeric 500 MG CAPS Take 700 mg by mouth 2 (two) times a day , Historical Med      ZOLMitriptan (ZOMIG) 2 5 MG tablet TAKE 1 TABLET BY MOUTH AT ONSET OF MIGRAINE, MAY REPEAT IN 2 HOURS IF NEEDED  LIMIT 2/24 HOURS, 4/WEEK, 8/MONTH, Normal             No discharge procedures on file      PDMP Review       Value Time User    PDMP Reviewed  Yes 10/29/2022  8:53 PM Yovanny Byrd DO          ED Provider  Electronically Signed by           Yovanny Byrd DO  10/29/22 5610

## 2022-11-01 ENCOUNTER — APPOINTMENT (OUTPATIENT)
Dept: PHYSICAL THERAPY | Facility: CLINIC | Age: 62
End: 2022-11-01

## 2022-11-02 ENCOUNTER — HOSPITAL ENCOUNTER (EMERGENCY)
Facility: HOSPITAL | Age: 62
Discharge: HOME | End: 2022-11-02
Attending: EMERGENCY MEDICINE

## 2022-11-02 ENCOUNTER — TELEMEDICINE (OUTPATIENT)
Dept: FAMILY MEDICINE CLINIC | Facility: CLINIC | Age: 62
End: 2022-11-02

## 2022-11-02 ENCOUNTER — APPOINTMENT (EMERGENCY)
Dept: RADIOLOGY | Facility: HOSPITAL | Age: 62
End: 2022-11-02

## 2022-11-02 VITALS
TEMPERATURE: 98.2 F | OXYGEN SATURATION: 95 % | DIASTOLIC BLOOD PRESSURE: 59 MMHG | SYSTOLIC BLOOD PRESSURE: 103 MMHG | HEART RATE: 64 BPM | RESPIRATION RATE: 17 BRPM

## 2022-11-02 DIAGNOSIS — U07.1 COVID-19: Primary | ICD-10-CM

## 2022-11-02 DIAGNOSIS — R11.2 NAUSEA AND VOMITING: Primary | ICD-10-CM

## 2022-11-02 DIAGNOSIS — U07.1 COVID-19 VIRUS INFECTION: ICD-10-CM

## 2022-11-02 DIAGNOSIS — R63.0 LOSS OF APPETITE: ICD-10-CM

## 2022-11-02 DIAGNOSIS — R11.2 NAUSEA AND VOMITING, UNSPECIFIED VOMITING TYPE: ICD-10-CM

## 2022-11-02 LAB
ALBUMIN SERPL BCP-MCNC: 3.6 G/DL (ref 3.5–5)
ALP SERPL-CCNC: 82 U/L (ref 46–116)
ALT SERPL W P-5'-P-CCNC: 25 U/L (ref 12–78)
ANION GAP SERPL CALCULATED.3IONS-SCNC: 8 MMOL/L (ref 4–13)
BACTERIA UR QL AUTO: ABNORMAL /HPF
BASOPHILS # BLD AUTO: 0.03 THOUSANDS/ÂΜL (ref 0–0.1)
BASOPHILS NFR BLD AUTO: 1 % (ref 0–1)
BILIRUB SERPL-MCNC: 0.58 MG/DL (ref 0.2–1)
BILIRUB UR QL STRIP: NEGATIVE
BUN SERPL-MCNC: 16 MG/DL (ref 5–25)
CALCIUM SERPL-MCNC: 8.8 MG/DL (ref 8.3–10.1)
CARDIAC TROPONIN I PNL SERPL HS: 3 NG/L
CHLORIDE SERPL-SCNC: 103 MMOL/L (ref 96–108)
CLARITY UR: CLEAR
CO2 SERPL-SCNC: 27 MMOL/L (ref 21–32)
COLOR UR: YELLOW
CREAT SERPL-MCNC: 0.71 MG/DL (ref 0.6–1.3)
EOSINOPHIL # BLD AUTO: 0.11 THOUSAND/ÂΜL (ref 0–0.61)
EOSINOPHIL NFR BLD AUTO: 2 % (ref 0–6)
ERYTHROCYTE [DISTWIDTH] IN BLOOD BY AUTOMATED COUNT: 12.1 % (ref 11.6–15.1)
GFR SERPL CREATININE-BSD FRML MDRD: 90 ML/MIN/1.73SQ M
GLUCOSE SERPL-MCNC: 103 MG/DL (ref 65–140)
GLUCOSE UR STRIP-MCNC: NEGATIVE MG/DL
HCT VFR BLD AUTO: 41.5 % (ref 34.8–46.1)
HGB BLD-MCNC: 14 G/DL (ref 11.5–15.4)
HGB UR QL STRIP.AUTO: ABNORMAL
IMM GRANULOCYTES # BLD AUTO: 0.01 THOUSAND/UL (ref 0–0.2)
IMM GRANULOCYTES NFR BLD AUTO: 0 % (ref 0–2)
KETONES UR STRIP-MCNC: NEGATIVE MG/DL
LEUKOCYTE ESTERASE UR QL STRIP: ABNORMAL
LIPASE SERPL-CCNC: 85 U/L (ref 73–393)
LYMPHOCYTES # BLD AUTO: 1.33 THOUSANDS/ÂΜL (ref 0.6–4.47)
LYMPHOCYTES NFR BLD AUTO: 27 % (ref 14–44)
MCH RBC QN AUTO: 31.6 PG (ref 26.8–34.3)
MCHC RBC AUTO-ENTMCNC: 33.7 G/DL (ref 31.4–37.4)
MCV RBC AUTO: 94 FL (ref 82–98)
MONOCYTES # BLD AUTO: 0.26 THOUSAND/ÂΜL (ref 0.17–1.22)
MONOCYTES NFR BLD AUTO: 5 % (ref 4–12)
NEUTROPHILS # BLD AUTO: 3.14 THOUSANDS/ÂΜL (ref 1.85–7.62)
NEUTS SEG NFR BLD AUTO: 65 % (ref 43–75)
NITRITE UR QL STRIP: NEGATIVE
NON-SQ EPI CELLS URNS QL MICRO: ABNORMAL /HPF
NRBC BLD AUTO-RTO: 0 /100 WBCS
PH UR STRIP.AUTO: 7 [PH] (ref 4.5–8)
PLATELET # BLD AUTO: 253 THOUSANDS/UL (ref 149–390)
PMV BLD AUTO: 9.6 FL (ref 8.9–12.7)
POTASSIUM SERPL-SCNC: 4.1 MMOL/L (ref 3.5–5.3)
PROT SERPL-MCNC: 7.2 G/DL (ref 6.4–8.4)
PROT UR STRIP-MCNC: NEGATIVE MG/DL
RBC # BLD AUTO: 4.43 MILLION/UL (ref 3.81–5.12)
RBC #/AREA URNS AUTO: ABNORMAL /HPF
SODIUM SERPL-SCNC: 138 MMOL/L (ref 135–147)
SP GR UR STRIP.AUTO: 1.01 (ref 1–1.03)
UROBILINOGEN UR QL STRIP.AUTO: 1 E.U./DL
WBC # BLD AUTO: 4.88 THOUSAND/UL (ref 4.31–10.16)
WBC #/AREA URNS AUTO: ABNORMAL /HPF

## 2022-11-02 RX ORDER — MAGNESIUM HYDROXIDE/ALUMINUM HYDROXICE/SIMETHICONE 120; 1200; 1200 MG/30ML; MG/30ML; MG/30ML
30 SUSPENSION ORAL ONCE
Status: COMPLETED | OUTPATIENT
Start: 2022-11-02 | End: 2022-11-02

## 2022-11-02 RX ORDER — ONDANSETRON 4 MG/1
4 TABLET, FILM COATED ORAL EVERY 8 HOURS PRN
COMMUNITY
End: 2022-11-09 | Stop reason: SDUPTHER

## 2022-11-02 RX ORDER — PROCHLORPERAZINE 25 MG
25 SUPPOSITORY, RECTAL RECTAL EVERY 12 HOURS PRN
Qty: 10 SUPPOSITORY | Refills: 0 | Status: SHIPPED | OUTPATIENT
Start: 2022-11-02 | End: 2022-11-09 | Stop reason: ALTCHOICE

## 2022-11-02 RX ORDER — FAMOTIDINE 10 MG/ML
20 INJECTION, SOLUTION INTRAVENOUS ONCE
Status: COMPLETED | OUTPATIENT
Start: 2022-11-02 | End: 2022-11-02

## 2022-11-02 RX ORDER — PROCHLORPERAZINE MALEATE 5 MG/1
5 TABLET ORAL EVERY 6 HOURS PRN
Qty: 10 TABLET | Refills: 0 | Status: SHIPPED | OUTPATIENT
Start: 2022-11-02

## 2022-11-02 RX ORDER — PROCHLORPERAZINE MALEATE 5 MG/1
5 TABLET ORAL ONCE
Status: COMPLETED | OUTPATIENT
Start: 2022-11-02 | End: 2022-11-02

## 2022-11-02 RX ORDER — METOCLOPRAMIDE HYDROCHLORIDE 5 MG/ML
10 INJECTION INTRAMUSCULAR; INTRAVENOUS ONCE
Status: COMPLETED | OUTPATIENT
Start: 2022-11-02 | End: 2022-11-02

## 2022-11-02 RX ADMIN — FAMOTIDINE 20 MG: 10 INJECTION INTRAVENOUS at 21:35

## 2022-11-02 RX ADMIN — SODIUM CHLORIDE 1000 ML: 0.9 INJECTION, SOLUTION INTRAVENOUS at 19:31

## 2022-11-02 RX ADMIN — METOCLOPRAMIDE 10 MG: 5 INJECTION, SOLUTION INTRAMUSCULAR; INTRAVENOUS at 19:32

## 2022-11-02 RX ADMIN — PROCHLORPERAZINE MALEATE 5 MG: 5 TABLET ORAL at 20:22

## 2022-11-02 RX ADMIN — ALUMINUM HYDROXIDE, MAGNESIUM HYDROXIDE, AND DIMETHICONE 30 ML: 200; 20; 200 SUSPENSION ORAL at 21:34

## 2022-11-02 NOTE — ED PROVIDER NOTES
History  Chief Complaint   Patient presents with   • Vomiting     Pt reports she is covid + states she has been vomiting since Tuesday  56 yo F presenting for evaluation of N/V/poor po intake  Pt took home COVID test 8 days ago and states unable to tolerate po/vomiting since that time  Tried Zofran, but states this gives her migraines  Seen at Urgent care last Wed and prescribed Paxlovid which she completed  ED visit 4 days ago for L flank/mid back pain- CT unremarkable    Last BM last night, describes as small because of poor po intake  Denies diarrhea    MDM: 56 yo F with recently diagnosed COVID presenting with N/V- symptomatic management, IVF, EKG/trop, abdominal labs, UA to r/o urine infection             Prior to Admission Medications   Prescriptions Last Dose Informant Patient Reported? Taking? Armodafinil 250 MG tablet   No No   Sig: TAKE ONE TABLET BY MOUTH EVERY DAY   BACILLUS COAGULANS-INULIN PO  Self Yes No   Sig: Take 1 capsule by mouth daily Takes in the am   Cholecalciferol 1000 units tablet  Self Yes No   Sig: Take 1,000 Units by mouth daily Takes in the afternoon   Citicoline Sodium 500 MG TABS  Self Yes No   Sig: Take 500 mg by mouth daily Takes in the am   DULoxetine (CYMBALTA) 30 mg delayed release capsule  Self No No   Sig: Take 3 capsules (90 mg total) by mouth daily   Fluticasone Propionate, Inhal, (Flovent Diskus) 100 MCG/BLIST AEPB   No No   Sig: Inhale 1 puff 2 (two) times a day Rinse mouth after use  Lori, Zingiber officinalis, (LORI ROOT PO)  Self Yes No   Sig: Take 700 mg by mouth 2 (two) times a day    Multiple Vitamins-Minerals (CENTRUM SILVER PO)  Self Yes No   Sig: Take 1 tablet by mouth daily Takes in the am   Turmeric 500 MG CAPS  Self Yes No   Sig: Take 700 mg by mouth 2 (two) times a day    Patient not taking: Reported on 11/2/2022   ZOLMitriptan (ZOMIG) 2 5 MG tablet  Self No No   Sig: TAKE 1 TABLET BY MOUTH AT ONSET OF MIGRAINE, MAY REPEAT IN 2 HOURS IF NEEDED  LIMIT 2/24 HOURS, 4/WEEK, [facility]   Patient not taking: Reported on 2022   aspirin 500 MG buffered tablet  Self Yes No   Sig: Take 500 mg by mouth every 6 (six) hours as needed for mild pain   calcium carbonate 1250 MG capsule  Self Yes No   Sig: Take 600 mg by mouth daily    cetirizine (ZyrTEC) 10 mg tablet  Self Yes No   Sig: Take 10 mg by mouth 2 (two) times a day   diclofenac (VOLTAREN) 75 mg EC tablet  Self No No   Sig: Take 1 tablet (75 mg total) by mouth 2 (two) times a day as needed (milm pain)   Patient not taking: Reported on 2022   dicyclomine (BENTYL) 20 mg tablet   No No   Sig: Take 1 tablet (20 mg total) by mouth 4 (four) times a day as needed (abdominal pain)   Patient taking differently: Take 20 mg by mouth 3 (three) times a day   diphenoxylate-atropine (LOMOTIL) 2 5-0 025 mg per tablet  Self No No   Sig: TAKE ONE TABLET BY MOUTH FOUR TIMES A DAY AS NEEDED FOR DIARRHEA   Patient not taking: Reported on 2022   ferrous sulfate 325 (65 Fe) mg tablet  Self Yes No   Sig: Take 325 mg by mouth daily with breakfast    gabapentin (NEURONTIN) 300 mg capsule  Self Yes No   Si mg at 1200 in addition to 600 mg at bedtime     lidocaine (LIDODERM) 5 %   No No   Sig: Apply 1 patch topically daily Remove & Discard patch within 12 hours or as directed by MD   metaxalone (SKELAXIN) 800 mg tablet  Self No No   Sig: Take 0 5 tablets (400 mg total) by mouth 3 (three) times a day   omeprazole (PriLOSEC) 40 MG capsule  Self Yes No   ondansetron (ZOFRAN) 4 mg tablet   Yes No   Sig: Take 4 mg by mouth every 8 (eight) hours as needed for nausea or vomiting   ondansetron (ZOFRAN-ODT) 4 mg disintegrating tablet   No No   Sig: Take 1 tablet (4 mg total) by mouth every 8 (eight) hours as needed for nausea or vomiting   oxyCODONE-acetaminophen (Percocet) 5-325 mg per tablet   No No   Sig: Take 1 tablet by mouth every 4 (four) hours as needed for moderate pain or severe pain for up to 10 days Max Daily Amount: 6 tablets   prochlorperazine (COMPAZINE) 10 mg tablet  Self No No   Sig: Take 1 tablet (10 mg total) by mouth every 6 (six) hours as needed for nausea or vomiting   Patient not taking: Reported on 11/2/2022   trospium chloride (SANCTURA) 20 mg tablet  Self No No   Sig: Take 1 tablet (20 mg total) by mouth 2 (two) times a day      Facility-Administered Medications: None       Past Medical History:   Diagnosis Date   • Allergy to dust    • Anemia    • Calculus of ureter    • Chronic pain disorder     neck and back- sees pain management   • Colitis    • Colon polyp    • Cracked skin     on fingers   • Diverticulosis    • Dysphagia    • GERD (gastroesophageal reflux disease)    • H/O: GI bleed    • Hiatal hernia    • History of DVT in adulthood 2019 2019   • Hydronephrosis    • Hyperlipidemia    • Inguinal hernia     right side   • Irregular heart beat     Pt states with prolonged QT interval - was seen by cardiologist( [facility]) pt told "to not worry about it"   • Irritable bowel    • Kidney stone    • Migraines    • Narcolepsy    • Narcolepsy 08/06/2020   • OAB (overactive bladder)    • Osteoarthritis    • Osteoporosis    • Other chronic pain     degenerative disc disease   • PONV (postoperative nausea and vomiting)     migraines   • Pyelonephritis 1/13/2021   • SBO (small bowel obstruction) ([facility] [address] ) 9/25/2021   • Sepsis ([facility] [address] ) 1/13/2021   • Smoking 8/6/2020   • Tobacco abuse    • Wears dentures     full upper   • Wears glasses    • Wears glasses        Past Surgical History:   Procedure Laterality Date   • ABDOMINAL ADHESION SURGERY     • ARM SKIN LESION BIOPSY / EXCISION      lymph node excision   • BLADDER SURGERY     • CHOLECYSTECTOMY     • COLONOSCOPY  08/06/2020   • FL RETROGRADE PYELOGRAM  1/5/2021   • HERNIA REPAIR     • HYSTERECTOMY  1977   • JOINT REPLACEMENT Right     knee   • KNEE SURGERY Right     replacement   • LAMINECTOMY      L5-6-7   • LAPAROSCOPY     • LYMPH NODE DISSECTION Left     removed -no cancer- limb restriction   • LYMPHADENECTOMY  1974    UNDER LEFT ARM - DO NOT DRAW BLOOD/GIVE INJECTIONS IN LEFT ARM PER PATIENT    • MOLE REMOVAL     • OOPHORECTOMY Bilateral 1977   • OTHER SURGICAL HISTORY      enlarged lymph node removed left arm   no cancer   • SD CYSTO/URETERO W/LITHOTRIPSY &INDWELL STENT INSRT Right 1/5/2021    Procedure: CYSTO, LASER  URETEROSCOPY, RETRO PYELOGRAM, STENT REPLACMENT;  Surgeon: Dary Hillman MD;  Location: [facility];  Service: Urology   • SD CYSTOURETHROSCOPY,URETER CATHETER Right 12/2/2020    Procedure: CYSTOSCOPY AND INSERTION STENT URETERAL;  Surgeon: Isis Kyle MD;  Location: [facility];  Service: Urology   • SD FRAGMENT KIDNEY STONE/ ESWL Right 3/25/2021    Procedure: LITHOTRIPSY EXTRACORPORAL SHOCKWAVE (ESWL);   Surgeon: Yasmani Minor MD;  Location: [address] [place] [address] MAIN OR;  Service: Urology   • SD REPAIR ING HERNIA,5+Y/O,REDUCIBL Right 6/25/2020    Procedure: REPAIR HERNIA INGUINAL  WITH MESH;  Surgeon: Eber Muñoz MD;  Location: [facility];  Service: General   • SPINAL FUSION     • UPPER GASTROINTESTINAL ENDOSCOPY  08/06/2020   • WISDOM TOOTH EXTRACTION         Family History   Problem Relation Age of Onset   • Hypertension Mother    • Heart attack Mother    • Hypertension Father    • Cancer Father 67        small intestine   • Diabetes Sister    • Diabetes Sister    • No Known Problems Daughter    • No Known Problems Maternal Grandmother    • No Known Problems Maternal Grandfather    • No Known Problems Paternal Grandmother    • No Known Problems Paternal Grandfather    • Stroke Brother    • Nephrolithiasis Brother    • Cancer Brother 70        soft tissue cancer   • No Known Problems Maternal Aunt    • No Known Problems Maternal Aunt    • No Known Problems Maternal Aunt    • No Known Problems Paternal Aunt    • No Known Problems Paternal Aunt    • No Known Problems Paternal Aunt    • Breast cancer Other 52     I have reviewed and agree with the history as documented  E-Cigarette/Vaping   • E-Cigarette Use Never User      E-Cigarette/Vaping Substances   • Nicotine No    • THC No    • CBD No    • Flavoring No    • Other No    • Unknown No      Social History     Tobacco Use   • Smoking status: Current Every Day Smoker     Packs/day: 0 50     Years: 47 00     Pack years: 23 50     Types: Cigarettes   • Smokeless tobacco: Never Used   • Tobacco comment: pt states is at 1/2 pk day currently - was previously at one pack /day   Vaping Use   • Vaping Use: Never used   Substance Use Topics   • Alcohol use: Not Currently     Comment: seldom   • Drug use: Not Currently     Types: Marijuana     Comment: medicinal marijuana; last dose was 3/16       Review of Systems   Constitutional: Negative for chills, fever and unexpected weight change  HENT: Negative for ear pain, rhinorrhea and sore throat  Eyes: Negative for pain and visual disturbance  Respiratory: Negative for cough and shortness of breath  Cardiovascular: Negative for chest pain and leg swelling  Gastrointestinal: Positive for nausea and vomiting  Negative for abdominal pain, constipation and diarrhea  Endocrine: Negative for polydipsia, polyphagia and polyuria  Genitourinary: Positive for flank pain  Negative for dysuria, frequency, hematuria and urgency  Musculoskeletal: Positive for back pain  Negative for myalgias and neck pain  Skin: Negative for color change and rash  Allergic/Immunologic: Negative for environmental allergies and immunocompromised state  Neurological: Negative for dizziness, weakness, light-headedness, numbness and headaches  Hematological: Negative for adenopathy  Does not bruise/bleed easily  Psychiatric/Behavioral: Negative for agitation and confusion  All other systems reviewed and are negative  Physical Exam  Physical Exam  Vitals and nursing note reviewed  Constitutional:       General: She is not in acute distress       Appearance: She is well-developed  HENT:      Head: Normocephalic and atraumatic  Nose: Nose normal    Eyes:      Conjunctiva/sclera: Conjunctivae normal    Cardiovascular:      Rate and Rhythm: Normal rate and regular rhythm  Heart sounds: Normal heart sounds  Pulmonary:      Effort: Pulmonary effort is normal  No respiratory distress  Breath sounds: Normal breath sounds  No stridor  No wheezing or rales  Chest:      Chest wall: No tenderness  Abdominal:      General: There is no distension  Palpations: Abdomen is soft  Tenderness: There is no abdominal tenderness  There is no guarding or rebound  Comments: Abdomen soft/NT  Back: no skin changes/rash, mild ttp L mid back over paraspinal musculature   Musculoskeletal:         General: No swelling or deformity  Cervical back: Normal range of motion and neck supple  Skin:     General: Skin is warm and dry  Findings: No rash  Neurological:      General: No focal deficit present  Mental Status: She is alert and oriented to person, place, and time  Motor: No abnormal muscle tone  Coordination: Coordination normal    Psychiatric:         Thought Content:  Thought content normal          Judgment: Judgment normal          Vital Signs  ED Triage Vitals   Temperature Pulse Respirations Blood Pressure SpO2   11/02/22 1819 11/02/22 1819 11/02/22 1819 11/02/22 1820 11/02/22 1819   98 2 °F (36 8 °C) 82 16 108/56 96 %      Temp Source Heart Rate Source Patient Position - Orthostatic VS BP Location FiO2 (%)   11/02/22 1819 11/02/22 1819 11/02/22 1819 11/02/22 1819 --   Oral Monitor Sitting Right arm       Pain Score       11/02/22 2047       5           Vitals:    11/02/22 1819 11/02/22 1820 11/02/22 2047 11/02/22 2130   BP:  108/56 132/62 103/59   Pulse: 82  66 64   Patient Position - Orthostatic VS: Sitting  Lying Lying         Visual Acuity      ED Medications  Medications   metoclopramide (REGLAN) injection 10 mg (10 mg Intravenous Given 11/2/22 1932)   sodium chloride 0 9 % bolus 1,000 mL (0 mL Intravenous Stopped 11/2/22 2049)   prochlorperazine (COMPAZINE) tablet 5 mg (5 mg Oral Given 11/2/22 2022)   Famotidine (PF) (PEPCID) injection 20 mg (20 mg Intravenous Given 11/2/22 2135)   aluminum-magnesium hydroxide-simethicone (MYLANTA) oral suspension 30 mL (30 mL Oral Given 11/2/22 2134)       Diagnostic Studies  Results Reviewed     Procedure Component Value Units Date/Time    Urine Microscopic [[id number]]  (Abnormal) Collected: 11/02/22 1944    Lab Status: Final result Specimen: Urine, Clean Catch Updated: 11/02/22 2003     RBC, UA 1-2 /hpf      WBC, UA 2-4 /hpf      Epithelial Cells Occasional /hpf      Bacteria, UA Occasional /hpf     HS Troponin 0hr (reflex protocol) [[id number]]  (Normal) Collected: 11/02/22 1930    Lab Status: Final result Specimen: Blood from Arm, Right Updated: 11/02/22 2002     hs TnI 0hr 3 ng/L     Comprehensive metabolic panel [[id number]] Collected: 11/02/22 1930    Lab Status: Final result Specimen: Blood from Arm, Right Updated: 11/1958     Sodium 138 mmol/L      Potassium 4 1 mmol/L      Chloride 103 mmol/L      CO2 27 mmol/L      ANION GAP 8 mmol/L      BUN 16 mg/dL      Creatinine 0 71 mg/dL      Glucose 103 mg/dL      Calcium 8 8 mg/dL      AST --     ALT 25 U/L      Alkaline Phosphatase 82 U/L      Total Protein 7 2 g/dL      Albumin 3 6 g/dL      Total Bilirubin 0 58 mg/dL      eGFR 90 ml/min/1 73sq m     Narrative:      [place] guidelines for Chronic Kidney Disease (CKD):   •  Stage 1 with normal or high GFR (GFR > 90 mL/min/1 73 square meters)  •  Stage 2 Mild CKD (GFR = 60-89 mL/min/1 73 square meters)  •  Stage 3A Moderate CKD (GFR = 45-59 mL/min/1 73 square meters)  •  Stage 3B Moderate CKD (GFR = 30-44 mL/min/1 73 square meters)  •  Stage 4 Severe CKD (GFR = 15-29 mL/min/1 73 square meters)  •  Stage 5 End Stage CKD (GFR <15 mL/min/1 73 square meters)  Note: GFR calculation is accurate only with a steady state creatinine    Lipase [[id number]]  (Normal) Collected: 11/02/22 1930    Lab Status: Final result Specimen: Blood from Arm, Right Updated: 11/02/22 1955     Lipase 85 u/L     Urine Macroscopic, POC [[id number]]  (Abnormal) Collected: 11/02/22 1944    Lab Status: Final result Specimen: Urine Updated: 11/02/22 1945     Color, UA Yellow     Clarity, UA Clear     pH, UA 7 0     Leukocytes, UA Trace     Nitrite, UA Negative     Protein, UA Negative mg/dl      Glucose, UA Negative mg/dl      Ketones, UA Negative mg/dl      Urobilinogen, UA 1 0 E U /dl      Bilirubin, UA Negative     Occult Blood, UA Trace     Specific [place], UA 1 015    Narrative:      CLINITEK RESULT    CBC and differential [[id number]] Collected: 11/02/22 1930    Lab Status: Final result Specimen: Blood from Arm, Right Updated: 11/02/22 1938     WBC 4 88 Thousand/uL      RBC 4 43 Million/uL      Hemoglobin 14 0 g/dL      Hematocrit 41 5 %      MCV 94 fL      MCH 31 6 pg      MCHC 33 7 g/dL      RDW 12 1 %      MPV 9 6 fL      Platelets 479 Thousands/uL      nRBC 0 /100 WBCs      Neutrophils Relative 65 %      Immat GRANS % 0 %      Lymphocytes Relative 27 %      Monocytes Relative 5 %      Eosinophils Relative 2 %      Basophils Relative 1 %      Neutrophils Absolute 3 14 Thousands/µL      Immature Grans Absolute 0 01 Thousand/uL      Lymphocytes Absolute 1 33 Thousands/µL      Monocytes Absolute 0 26 Thousand/µL      Eosinophils Absolute 0 11 Thousand/µL      Basophils Absolute 0 03 Thousands/µL                  XR chest 1 view portable    (Results Pending)              Procedures  Procedures         ED Course  ED Course as of 11/02/22 2222 Wed Nov 02, 2022 1952 Ketones, UA: Negative   2007 EKG: sinus bradycardia @ 57 bpm, normal axis, normal intervals, qtc 407, nonspecific st changes   2014 Reviewed results with pt/daughter   Pt still c/o nausea, states she has tried Compazine in the past and is agreeable to trying this   2128 Pt reassessed, states she has not tried to take po because now she says her stomach hurts  She kept down her oral Compazine so far  Will give Pepcid/Mylanta and have pt try again   2218 Tolerated po/no vomiting             HEART Risk Score    Flowsheet Row Most Recent Value   Heart Score Risk Calculator    History 0 Filed at: 11/02/2022 2005   ECG 0 Filed at: 11/02/2022 2005   Age 1 Filed at: 11/02/2022 2005   Risk Factors 1 Filed at: 11/02/2022 2005   Troponin 0 Filed at: 11/02/2022 2005   HEART Score 2 Filed at: 11/02/2022 2005                        SBIRT 22yo+    Flowsheet Row Most Recent Value   SBIRT (25 yo +)    In order to provide better care to our patients, we are screening all of our patients for alcohol and drug use  Would it be okay to ask you these screening questions?  No Filed at: 11/02/2022 1856                    [facility]  Number of Diagnoses or Management Options  COVID-19 virus infection  Nausea and vomiting  Diagnosis management comments: 56 yo F presenting for evaluation of N/V, ED workup unrevealing, no vomiting while in ED  Discharged with Rx for Compazine    PCP f/u, return precautions       Amount and/or Complexity of Data Reviewed  Clinical lab tests: ordered and reviewed  Tests in the radiology section of CPT®: reviewed  Tests in the medicine section of CPT®: ordered and reviewed  Review and summarize past medical records: yes        Disposition  Final diagnoses:   Nausea and vomiting   COVID-19 virus infection     Time reflects when diagnosis was documented in both MDM as applicable and the Disposition within this note     Time User Action Codes Description Comment    11/2/2022  9:22 PM Tamica CORTES Add [R11 2] Nausea and vomiting     11/2/2022  9:22 PM Tamica CORTES Add [U07 1] COVID-19 virus infection       ED Disposition     ED Disposition   Discharge    Condition   Stable    Date/Time   Wed Nov 2, 2022  9:22 PM    Comment   Clari Damon discharge to home/self care                Follow-up Information     Follow up With Specialties Details Why Contact Info    Deborah Leon MD [address]  [phone]            Discharge Medication List as of 11/2/2022  9:58 PM      START taking these medications    Details   prochlorperazine (COMPAZINE) 25 mg suppository Insert 1 suppository (25 mg total) into the rectum every 12 (twelve) hours as needed for nausea or vomiting for up to 10 doses, Starting Wed 11/2/2022, Print      !! prochlorperazine (COMPAZINE) 5 mg tablet Take 1 tablet (5 mg total) by mouth every 6 (six) hours as needed for nausea or vomiting for up to 10 doses, Starting Wed 11/2/2022, Print       !! - Potential duplicate medications found  Please discuss with provider        CONTINUE these medications which have NOT CHANGED    Details   Armodafinil 250 MG tablet TAKE ONE TABLET BY MOUTH EVERY DAY, Normal      aspirin 500 MG buffered tablet Take 500 mg by mouth every 6 (six) hours as needed for mild pain, Historical Med      BACILLUS COAGULANS-INULIN PO Take 1 capsule by mouth daily Takes in the am, Historical Med      calcium carbonate 1250 MG capsule Take 600 mg by mouth daily , Historical Med      cetirizine (ZyrTEC) 10 mg tablet Take 10 mg by mouth 2 (two) times a day, Historical Med      Cholecalciferol 1000 units tablet Take 1,000 Units by mouth daily Takes in the afternoon, Starting Fri 12/9/2016, Historical Med      Citicoline Sodium 500 MG TABS Take 500 mg by mouth daily Takes in the am, Historical Med      diclofenac (VOLTAREN) 75 mg EC tablet Take 1 tablet (75 mg total) by mouth 2 (two) times a day as needed (milm pain), Starting Thu 8/25/2022, Normal      dicyclomine (BENTYL) 20 mg tablet Take 1 tablet (20 mg total) by mouth 4 (four) times a day as needed (abdominal pain), Starting Thu 4/1/2021, Normal      diphenoxylate-atropine (LOMOTIL) 2 5-0 025 mg per tablet TAKE ONE TABLET BY MOUTH FOUR TIMES A DAY AS NEEDED FOR DIARRHEA, Normal      DULoxetine (CYMBALTA) 30 mg delayed release capsule Take 3 capsules (90 mg total) by mouth daily, Starting Tue 3/24/2020, Normal      ferrous sulfate 325 (65 Fe) mg tablet Take 325 mg by mouth daily with breakfast , Starting Thu 9/4/2014, Historical Med      Fluticasone Propionate, Inhal, (Flovent Diskus) 100 MCG/BLIST AEPB Inhale 1 puff 2 (two) times a day Rinse mouth after use , Starting Wed 10/26/2022, Until Fri 11/25/2022, Normal      gabapentin (NEURONTIN) 300 mg capsule 300 mg at 1200 in addition to 600 mg at bedtime  , Historical Med      Ginger, Zingiber officinalis, (GINGER ROOT PO) Take 700 mg by mouth 2 (two) times a day , Historical Med      lidocaine (LIDODERM) 5 % Apply 1 patch topically daily Remove & Discard patch within 12 hours or as directed by MD, Starting Sat 10/29/2022, Normal      metaxalone (SKELAXIN) 800 mg tablet Take 0 5 tablets (400 mg total) by mouth 3 (three) times a day, Starting Fri 5/27/2022, Normal      Multiple Vitamins-Minerals (CENTRUM SILVER PO) Take 1 tablet by mouth daily Takes in the am, Historical Med      omeprazole (PriLOSEC) 40 MG capsule Starting Fri 3/11/2022, Historical Med      ondansetron (ZOFRAN) 4 mg tablet Take 4 mg by mouth every 8 (eight) hours as needed for nausea or vomiting, Historical Med      ondansetron (ZOFRAN-ODT) 4 mg disintegrating tablet Take 1 tablet (4 mg total) by mouth every 8 (eight) hours as needed for nausea or vomiting, Starting Sat 10/29/2022, Normal      oxyCODONE-acetaminophen (Percocet) 5-325 mg per tablet Take 1 tablet by mouth every 4 (four) hours as needed for moderate pain or severe pain for up to 10 days Max Daily Amount: 6 tablets, Starting Sat 10/29/2022, Until Tue 11/8/2022 at 2359, Normal      !! prochlorperazine (COMPAZINE) 10 mg tablet Take 1 tablet (10 mg total) by mouth every 6 (six) hours as needed for nausea or vomiting, Starting Thu 1/7/2021, Normal      trospium chloride (SANCTURA) 20 mg tablet Take 1 tablet (20 mg total) by mouth 2 (two) times a day, Starting Mon 8/15/2022, Normal      Turmeric 500 MG CAPS Take 700 mg by mouth 2 (two) times a day , Historical Med      ZOLMitriptan (ZOMIG) 2 5 MG tablet TAKE 1 TABLET BY MOUTH AT ONSET OF MIGRAINE, MAY REPEAT IN 2 HOURS IF NEEDED  LIMIT 2/24 HOURS, 4/WEEK, 8/MONTH, Normal       !! - Potential duplicate medications found  Please discuss with provider  No discharge procedures on file      PDMP Review       Value Time User    PDMP Reviewed  Yes 10/29/2022  8:53 PM Fabio Luong DO          ED Provider  Electronically Signed by           Aris Mason DO  11/02/22 3794

## 2022-11-02 NOTE — PROGRESS NOTES
COVID-19 Outpatient Progress Note    Assessment/Plan:    Problem List Items Addressed This Visit    None     Visit Diagnoses     COVID-19    -  Primary    Nausea and vomiting, unspecified vomiting type        Persist, also patient is taking Zofran  advised patient to go to the ER  Loss of appetite             Disposition:     I have spent 13 minutes directly with the patient  Encounter provider: Arturo Smith MD     Provider located at: [address] [place] [address]  [place] [address]  [address] [address] [address] [phone]     Recent Visits  Date Type Provider Dept   11/04/22 Telephone Tiff Ríos MA [facility]   11/02/22 Telemedicine Arturo Smith MD [facility]   Showing recent visits within past 7 days and meeting all other requirements  Future Appointments  No visits were found meeting these conditions  Showing future appointments within next 150 days and meeting all other requirements     This virtual check-in was done via telephone and she agrees to proceed  Patient agrees to participate in a virtual check in via telephone or video visit instead of presenting to the office to address urgent/immediate medical needs  Patient is aware this is a billable service  She acknowledged consent and understanding of privacy and security of the video platform  The patient has agreed to participate and understands they can discontinue the visit at any time  After connecting through Telephone, the patient was identified by name and date of birth  Mi Pineda was informed that this was a telemedicine visit and that the exam was being conducted confidentially over secure lines  My office door was closed  No one else was in the room  Mi Pineda acknowledged consent and understanding of privacy and security of the telemedicine visit   I informed the patient that I have reviewed her record in Epic and presented the opportunity for her to ask any questions regarding the visit today  The patient agreed to participate  It was my intent to perform this visit via video technology but the patient was not able to do a video connection so the visit was completed via audio telephone only  Verification of patient location:  Patient is located in the following state in which I hold an active license: [place]    Subjective: Meghna Nguyen is a 57 y o  female who is concerned about COVID-19  Patient's symptoms include fatigue, malaise, sore throat, nausea and vomiting  Patient denies chills, rhinorrhea, anosmia, loss of taste, cough, shortness of breath, chest tightness, abdominal pain, diarrhea, myalgias and headaches  - Date of symptom onset: 10/25/2022      COVID-19 vaccination status: Fully vaccinated with booster    Exposure:   Contact with a person who is under investigation (PUI) for or who is positive for COVID-19 within the last 14 days?: No    Hospitalized recently for fever and/or lower respiratory symptoms?: No      Currently a healthcare worker that is involved in direct patient care?: No      Works in a special setting where the risk of COVID-19 transmission may be high? (this may include long-term care, correctional and retirement facilities; homeless shelters; assisted-living facilities and group homes ): No      Resident in a special setting where the risk of COVID-19 transmission may be high? (this may include long-term care, correctional and retirement facilities; homeless shelters; assisted-living facilities and group homes ): No      She tested positive for [place] Tuesday last week  Seen at the urgent care on wensday last week  She took paxlovid  Patient stated she has not been eating since this  Monday, she feel extremely tired she has loss of appetite she has nausea and vomiting every time she tried to eat, patient she is taking Zofran    Patient stated she has not been eating for 3 days or drinking, she said she feel extremely weak    Lab Results   Component Value Date    SARSCOV2 Negative 01/13/2021    SARSCOV2 Not Detected 12/30/2020       Review of Systems   Constitutional: Positive for activity change, appetite change and fatigue  Negative for chills and diaphoresis  HENT: Positive for sore throat  Negative for ear pain, rhinorrhea, trouble swallowing and voice change  Eyes: Negative for visual disturbance  Respiratory: Negative for cough, chest tightness and shortness of breath  Cardiovascular: Negative for chest pain, palpitations and leg swelling  Gastrointestinal: Positive for nausea and vomiting  Negative for abdominal pain, blood in stool, constipation and diarrhea  Endocrine: Negative for polydipsia and polyuria  Genitourinary: Negative for dysuria, flank pain, frequency, hematuria, pelvic pain, urgency, vaginal bleeding and vaginal discharge  Musculoskeletal: Negative for arthralgias, back pain, gait problem, myalgias and neck pain  Skin: Negative for rash  Allergic/Immunologic: Negative for food allergies  Neurological: Negative for dizziness, tremors, seizures, weakness, light-headedness, numbness and headaches  Hematological: Negative for adenopathy  Does not bruise/bleed easily  Psychiatric/Behavioral: Negative for confusion and dysphoric mood  The patient is not nervous/anxious        Current Outpatient Medications on File Prior to Visit   Medication Sig   • Armodafinil 250 MG tablet TAKE ONE TABLET BY MOUTH EVERY DAY   • aspirin 500 MG buffered tablet Take 500 mg by mouth every 6 (six) hours as needed for mild pain   • BACILLUS COAGULANS-INULIN PO Take 1 capsule by mouth daily Takes in the am   • calcium carbonate 1250 MG capsule Take 600 mg by mouth daily    • cetirizine (ZyrTEC) 10 mg tablet Take 10 mg by mouth 2 (two) times a day   • Cholecalciferol 1000 units tablet Take 1,000 Units by mouth daily Takes in the afternoon   • dicyclomine (BENTYL) 20 mg tablet Take 1 tablet (20 mg total) by mouth 4 (four) times a day as needed (abdominal pain) (Patient taking differently: Take 20 mg by mouth 3 (three) times a day)   • DULoxetine (CYMBALTA) 30 mg delayed release capsule Take 3 capsules (90 mg total) by mouth daily   • ferrous sulfate 325 (65 Fe) mg tablet Take 325 mg by mouth daily with breakfast    • Fluticasone Propionate, Inhal, (Flovent Diskus) 100 MCG/BLIST AEPB Inhale 1 puff 2 (two) times a day Rinse mouth after use  • gabapentin (NEURONTIN) 300 mg capsule 300 mg at 1200 in addition to 600 mg at bedtime     • Ginger, Zingiber officinalis, (GINGER ROOT PO) Take 700 mg by mouth 2 (two) times a day    • metaxalone (SKELAXIN) 800 mg tablet Take 0 5 tablets (400 mg total) by mouth 3 (three) times a day   • Multiple Vitamins-Minerals (CENTRUM SILVER PO) Take 1 tablet by mouth daily Takes in the am   • omeprazole (PriLOSEC) 40 MG capsule    • ondansetron (ZOFRAN) 4 mg tablet Take 4 mg by mouth every 8 (eight) hours as needed for nausea or vomiting   • oxyCODONE-acetaminophen (Percocet) 5-325 mg per tablet Take 1 tablet by mouth every 4 (four) hours as needed for moderate pain or severe pain for up to 10 days Max Daily Amount: 6 tablets   • trospium chloride (SANCTURA) 20 mg tablet Take 1 tablet (20 mg total) by mouth 2 (two) times a day   • Citicoline Sodium 500 MG TABS Take 500 mg by mouth daily Takes in the am   • diclofenac (VOLTAREN) 75 mg EC tablet Take 1 tablet (75 mg total) by mouth 2 (two) times a day as needed (milm pain) (Patient not taking: Reported on 11/2/2022)   • diphenoxylate-atropine (LOMOTIL) 2 5-0 025 mg per tablet TAKE ONE TABLET BY MOUTH FOUR TIMES A DAY AS NEEDED FOR DIARRHEA (Patient not taking: Reported on 11/2/2022)   • lidocaine (LIDODERM) 5 % Apply 1 patch topically daily Remove & Discard patch within 12 hours or as directed by MD   • ondansetron (ZOFRAN-ODT) 4 mg disintegrating tablet Take 1 tablet (4 mg total) by mouth every 8 (eight) hours as needed for nausea or vomiting   • prochlorperazine (COMPAZINE) 10 mg tablet Take 1 tablet (10 mg total) by mouth every 6 (six) hours as needed for nausea or vomiting (Patient not taking: Reported on 11/2/2022)   • Turmeric 500 MG CAPS Take 700 mg by mouth 2 (two) times a day  (Patient not taking: Reported on 11/2/2022)   • ZOLMitriptan (ZOMIG) 2 5 MG tablet TAKE 1 TABLET BY MOUTH AT ONSET OF MIGRAINE, MAY REPEAT IN 2 HOURS IF NEEDED  LIMIT 2/24 HOURS, 4/WEEK, 8/MONTH (Patient not taking: Reported on 11/2/2022)       Objective: There were no vitals taken for this visit  Physical Exam  Constitutional:       Comments: Sounded tired  But not in distress   Pulmonary:      Comments: No abnormal audible breath sounds  Neurological:      Mental Status: She is alert  Psychiatric:         Mood and Affect: Mood normal          Thought Content:  Thought content normal        Stef Bartlett MD

## 2022-11-03 LAB
ATRIAL RATE: 57 BPM
P AXIS: 77 DEGREES
PR INTERVAL: 162 MS
QRS AXIS: 27 DEGREES
QRSD INTERVAL: 94 MS
QT INTERVAL: 418 MS
QTC INTERVAL: 406 MS
T WAVE AXIS: 62 DEGREES
VENTRICULAR RATE: 57 BPM

## 2022-11-03 NOTE — DISCHARGE INSTRUCTIONS
Stay well hydrated    Compazine as needed for nausea- if you can not tolerate/keep down the pills, can use the rectal suppositories    Follow up with family doctor    Return to the ED if any new or worsening symptoms including but not limited to intractable vomiting/dehydration, passing out, etc

## 2022-11-04 ENCOUNTER — TELEPHONE (OUTPATIENT)
Dept: FAMILY MEDICINE CLINIC | Facility: CLINIC | Age: 62
End: 2022-11-04

## 2022-11-04 NOTE — RESULT ENCOUNTER NOTE
Verified patient identity (Name and )  Spoke to patient regarding results  Educated patient regarding RTER precautions  Patient will follow up with PCP as needed  Patient verbalized understanding      Will make pcp appointment to get ct chest to rule out etiology

## 2022-11-04 NOTE — TELEPHONE ENCOUNTER
Patient called in to let us know that she went to the hospital yesterday-she is covid positive  The chest x ray had findings and recommend more testing from the PCP  Can you order what is needed? I'm sending this to you because Dr Joe Toro is not in the office now or next week

## 2022-11-08 ENCOUNTER — APPOINTMENT (OUTPATIENT)
Dept: PHYSICAL THERAPY | Facility: CLINIC | Age: 62
End: 2022-11-08

## 2022-11-09 ENCOUNTER — OFFICE VISIT (OUTPATIENT)
Dept: SLEEP CENTER | Facility: CLINIC | Age: 62
End: 2022-11-09

## 2022-11-09 VITALS
HEART RATE: 85 BPM | BODY MASS INDEX: 19.84 KG/M2 | DIASTOLIC BLOOD PRESSURE: 70 MMHG | SYSTOLIC BLOOD PRESSURE: 123 MMHG | WEIGHT: 112 LBS | HEIGHT: 63 IN

## 2022-11-09 DIAGNOSIS — G47.11 IDIOPATHIC HYPERSOMNIA: ICD-10-CM

## 2022-11-09 RX ORDER — ARMODAFINIL 250 MG/1
250 TABLET ORAL DAILY
Qty: 30 TABLET | Refills: 5 | Status: SHIPPED | OUTPATIENT
Start: 2022-11-18

## 2022-11-09 NOTE — PATIENT INSTRUCTIONS
Continue armodafinil 250mg daily in the am   Only use, when needed  If you will not need to be driving, taking a holiday when you can can help it work better  Monitor for symptoms consistent with cataplexy  I agree that the decreased dose of skelaxin is likely improving your sleepiness  You may need something different for your pain/muscle spasms  Recommend discussing with your PCP and other specialists  Schedule a follow up visit in 6 months  Nursing Support:  When: Monday through Friday 7A-5PM except holidays  Where: Our direct line is [phone]  If you are having a true emergency please call 911  In the event that the line is busy or it is after hours please leave a voice message and we will return your call  Please speak clearly, leaving your full name, birth date, best number to reach you and the reason for your call  Medication refills: We will need the name of the medication, the dosage, the ordering provider, whether you get a 30 or 90 day refill, and the pharmacy name and address  Medications will be ordered by the provider only  Nurses cannot call in prescriptions  Please allow 7 days for medication refills  Physician requested updates: If your provider requested that you call with an update after starting medication, please be ready to provide us the medication and dosage, what time you take your medication, the time you attempt to fall asleep, time you fall asleep, when you wake up, and what time you get out of bed  Sleep Study Results: We will contact you with sleep study results and/or next steps after the physician has reviewed your testing
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
## 2022-11-10 ENCOUNTER — OFFICE VISIT (OUTPATIENT)
Dept: PHYSICAL THERAPY | Facility: CLINIC | Age: 62
End: 2022-11-10

## 2022-11-10 ENCOUNTER — OFFICE VISIT (OUTPATIENT)
Dept: FAMILY MEDICINE CLINIC | Facility: CLINIC | Age: 62
End: 2022-11-10

## 2022-11-10 VITALS
OXYGEN SATURATION: 93 % | HEIGHT: 63 IN | DIASTOLIC BLOOD PRESSURE: 60 MMHG | WEIGHT: 114.4 LBS | BODY MASS INDEX: 20.27 KG/M2 | TEMPERATURE: 97.3 F | HEART RATE: 80 BPM | SYSTOLIC BLOOD PRESSURE: 90 MMHG

## 2022-11-10 DIAGNOSIS — G89.29 CHRONIC BILATERAL LOW BACK PAIN WITHOUT SCIATICA: ICD-10-CM

## 2022-11-10 DIAGNOSIS — M47.816 SPONDYLOSIS OF LUMBAR SPINE: Primary | ICD-10-CM

## 2022-11-10 DIAGNOSIS — R93.89 ABNORMAL CXR: Primary | ICD-10-CM

## 2022-11-10 DIAGNOSIS — M54.50 CHRONIC BILATERAL LOW BACK PAIN WITHOUT SCIATICA: ICD-10-CM

## 2022-11-10 NOTE — PROGRESS NOTES
Daily Note     Today's date: 11/10/2022  Patient name: Terrence Daugherty  : 1960  MRN: [id number]  Referring provider: Rosendo Contreras DO  Dx:   Encounter Diagnosis     ICD-10-CM    1  Spondylosis of lumbar spine  M47 816    2  Chronic bilateral low back pain without sciatica  M54 50     G89 29            1 on 1 with PTA       Subjective: Patient reports 5/10 LBP and states she will receive lumbar injections  She states shes been sick for several weeks which is also increasing her mid back pain  Objective: See treatment diary below       Precautions:  L sided tremors, cervical fusion C5/C6 , "mild carpal tunnel B", patient doesn't like lying on her stomach,     Neuro Re-Ed 9/27 9/29 10/4 10/6 10/11 10/13 10/20 11/10   TA activation 5"x20 5"x20 10x 10"  10"x10 HEP      Supine ab marching 2x12 2x12 ea 3x10  3x10 3x10 2# 2x12 ea 3x10 MHP 3 x 10 ea  90/90 HS stretch 20"x3 ea 20"x3 ea 3x20" each  30"x3 ea D/C      SLR 2x10 ea 2x10 ea 2x12 2x10 ea 2x12 ea 2# 2x10 ea 3x10 ea MHP 3 x 10 ea    bridges W/add 5"x20 W/add 5"x20 20x 5"   w/ add W/add 5"x20 5"x2x12 w/add 5"x2x12 w/add 5"x20 w/add MHP 5" x 20   Standing band anti-rotations           Slow LTR w/ focus on bracing  10"x10 ea 10"x10 ea 10x 10" holds 10"x10 ea HEP 10"x10 ea 10"x10 ea 10" x 10 ea     Lifting mechanics           Ther Ex 9/27 9/29  10/6 10/11 10/13 10/20    Bike  5' 5' 5 min 5' L1 5' L1 5' L1 5' L0 Deferred              clamshells Supine RTB 5"x20 Supine RTB 5"x20 Supine 20x 5" GTB GTB 5"x20 supine GTB 5"x20 supine S/l GTB 5"x20 ea supine  GTB 5"x20  MHP Supine GTB 5" x 20   Hip adduction - -         Prone EOT hip flexor/hamstring stretch  20x ea 20x ea 30x B 30x ea 30x ea 30x ea 30x ea 30x ea                                    Ther Activity 9/27 9/29  10/6 10/11 10/13 10/20    Mini squats or STS 20x 20x 20x 20x 20x 20x 20x 20x              Modalities       10/20    MHP       duration        Assessment: Tolerated treatment well; did not require MHP for pain control  Did not progress patient due to patient being out of PT for several weeks  She reported tolerable soreness and fatigue with current POC  Patient demonstrated fatigue post treatment and exhibited good technique with therapeutic exercises    Plan: Continue per plan of care  Progress treatment as tolerated      Eun Anne

## 2022-11-10 NOTE — PROGRESS NOTES
Assessment/Plan:    Abnormal CXR  CXR 11/2/2022: Density projecting over the anterior margin of the 5th rib which may represent calcified costal cartilage or healing rib fracture, less likely underlying pulmonary opacity  - Follow up with noncontrast chest CT recommended which was ordered today  Diagnoses and all orders for this visit:    Abnormal CXR  -     CT chest wo contrast; Future          Subjective:      Patient ID: Flower Meraz is a 57 y o  female  HPI    Flower Meraz is a pleasant 57year old female who presents today for a follow up after being seen in the Emergency department 11/2/2022 for covid-19  Patient did have a CXR whilst in the ER which revealed a density projecting over the anterior margin of the 5th rib  Radiology recommended a non contrast chest CT for further evaluation  Of note patient is a smoker and reports that she has been smoking 1/2 a pack (sometimes 1 pack) for 50 years  Patient reports that continues to have fatigue following her bought with covid-19 and has been having neck pain but otherwise is doing well  The following portions of the patient's history were reviewed and updated as appropriate: allergies, current medications, past family history, past medical history, past social history, past surgical history and problem list     Review of Systems   Constitutional: Positive for fatigue  HENT: Negative  Eyes: Negative  Respiratory: Negative  Cardiovascular: Negative  Gastrointestinal: Negative  Genitourinary: Negative  Musculoskeletal: Positive for neck pain  Skin: Negative  Neurological: Negative  Psychiatric/Behavioral: Negative            Objective:      BP 90/60 (BP Location: Left arm, Patient Position: Sitting, Cuff Size: Adult)   Pulse 80   Temp (!) 97 3 °F (36 3 °C) (Temporal)   Ht 5' 3" (1 6 m)   Wt 51 9 kg (114 lb 6 4 oz)   SpO2 93%   BMI 20 27 kg/m²          Physical Exam  Constitutional:       General: She is not in acute distress  Appearance: She is not ill-appearing  HENT:      Head: Normocephalic and atraumatic  Eyes:      General:         Right eye: No discharge  Left eye: No discharge  Extraocular Movements: Extraocular movements intact  Cardiovascular:      Rate and Rhythm: Normal rate  Pulses: Normal pulses  Pulmonary:      Effort: Pulmonary effort is normal  No respiratory distress  Breath sounds: No wheezing  Abdominal:      General: There is no distension  Palpations: Abdomen is soft  Tenderness: There is no abdominal tenderness  There is no guarding  Musculoskeletal:      Cervical back: Normal range of motion  Right lower leg: No edema  Left lower leg: No edema  Neurological:      General: No focal deficit present  Mental Status: She is alert     Psychiatric:         Mood and Affect: Mood normal          Behavior: Behavior normal

## 2022-11-10 NOTE — LETTER
November 10, 2022     Patient: Eugenie Dacosta  YOB: 1960  Date of Visit: 11/10/2022      To Whom it May Concern: Vicente Aguilar is under my professional care  Haily Marc was seen in my office on 11/10/2022  Haily Marc may return to work on 11/14/2022  If you have any questions or concerns, please don't hesitate to call           Sincerely,          Pritesh Sood MD

## 2022-11-10 NOTE — ASSESSMENT & PLAN NOTE
CXR 11/2/2022: Density projecting over the anterior margin of the 5th rib which may represent calcified costal cartilage or healing rib fracture, less likely underlying pulmonary opacity  - Follow up with noncontrast chest CT recommended which was ordered today

## 2022-11-15 ENCOUNTER — OFFICE VISIT (OUTPATIENT)
Dept: PHYSICAL THERAPY | Facility: CLINIC | Age: 62
End: 2022-11-15

## 2022-11-15 ENCOUNTER — TELEPHONE (OUTPATIENT)
Dept: UROLOGY | Facility: MEDICAL CENTER | Age: 62
End: 2022-11-15

## 2022-11-15 DIAGNOSIS — N39.0 RECURRENT UTI: Primary | ICD-10-CM

## 2022-11-15 DIAGNOSIS — G89.29 CHRONIC BILATERAL LOW BACK PAIN WITHOUT SCIATICA: Primary | ICD-10-CM

## 2022-11-15 DIAGNOSIS — M47.816 SPONDYLOSIS OF LUMBAR SPINE: ICD-10-CM

## 2022-11-15 DIAGNOSIS — M54.50 CHRONIC BILATERAL LOW BACK PAIN WITHOUT SCIATICA: Primary | ICD-10-CM

## 2022-11-15 NOTE — TELEPHONE ENCOUNTER
LVM letting her know a UC and UA was placed in her chart for [facility]  Also advised to call office back to reschedule her follow up that was canceled

## 2022-11-15 NOTE — PROGRESS NOTES
Daily Note     Today's date: 11/15/2022  Patient name: Phil Gale  : 1960  MRN: [id number]  Referring provider: Imtiaz Troy DO  Dx:   Encounter Diagnosis     ICD-10-CM    1  Chronic bilateral low back pain without sciatica  M54 50     G89 29    2  Spondylosis of lumbar spine  M47 816                   Subjective: Pt is extremely sore in L shoulder and upper back today  Feels looser post-session  Objective: See treatment diary below     Precautions:  L sided tremors, cervical fusion C5/C6 , "mild carpal tunnel B", patient doesn't like lying on her stomach,     Manuals     11/15     L sh [facility]     Neuro Re-Ed 10/11 10/13 10/20 11/10 11/15     TA activation HEP         Supine ab marching 3x10 2# 2x12 ea 3x10 MHP 3 x 10 ea  90/90 HS stretch D/C         SLR 2x12 ea 2# 2x10 ea 3x10 ea MHP 3 x 10 ea       bridges 5"x2x12 w/add 5"x2x12 w/add 5"x20 w/add MHP 5"x20      Seated 1/2 foam thoracic stretch     2'     Slow LTR w/ focus on bracing  HEP 10"x10 ea 10"x10 ea 10" x 10 ea  IR strap stretch     20"x5               Lifting mechanics          Ther Ex 10/11 10/13 10/20  11/15     Bike 5' L1 5' L1 5' L0 Deferred UBE 2' ea     B/l TB ER     RTB 5"x20     clamshells GTB 5"x20 supine S/l GTB 5"x20 ea supine  GTB 5"x20  MHP Supine GTB 5" x 20      Wall slides     5"x15     Prone EOT hip flexor/hamstring stretch  30x ea 30x ea 30x ea 30x ea      TB rows     GTB 5"x20     TB sh ext     RTB 5"x20               Ther Activity 10/11 10/13 10/20  11/15     Mini squats or STS 20x 20x 20x 20x                Modalities   10/20       MHP   duration           Assessment: Tolerated treatment well  Patient demonstrated fatigue post treatment, exhibited good technique with therapeutic exercises and would benefit from continued PT    Plan: Continue per plan of care  Progress treatment as tolerated  Resume lumbar program as able nv      Nuria Elders

## 2022-11-15 NOTE — TELEPHONE ENCOUNTER
PT under care of:sherie    Pt last seen:8/15/22 had to cancel her appointment for tomorrow due to not having a vehicle or transportation      PT calling today because: requesting urine testing     PT symptoms are- urgency burning frequency, Denies gross hematuria fever chills      PT can be reached at:[phone] leave a message per patient

## 2022-11-17 ENCOUNTER — OFFICE VISIT (OUTPATIENT)
Dept: PHYSICAL THERAPY | Facility: CLINIC | Age: 62
End: 2022-11-17

## 2022-11-17 DIAGNOSIS — G89.29 CHRONIC BILATERAL LOW BACK PAIN WITHOUT SCIATICA: ICD-10-CM

## 2022-11-17 DIAGNOSIS — M54.50 CHRONIC BILATERAL LOW BACK PAIN WITHOUT SCIATICA: ICD-10-CM

## 2022-11-17 DIAGNOSIS — M47.816 SPONDYLOSIS OF LUMBAR SPINE: Primary | ICD-10-CM

## 2022-11-17 NOTE — PROGRESS NOTES
Daily Note     Today's date: 2022  Patient name: Phoebe Suarez  : 1960  MRN: [id number]  Referring provider: Alpa Byrnes DO  Dx:   Encounter Diagnosis     ICD-10-CM    1  Spondylosis of lumbar spine  M47 816       2  Chronic bilateral low back pain without sciatica  M54 50     G89 29                      Subjective: Pt reports decrease in pain since Tuesday with reintroduction of Gabapentin  Objective: See treatment diary below     Precautions:  L sided tremors, cervical fusion C5/C6 , "mild carpal tunnel B", patient doesn't like lying on her stomach,     Manuals     11/15 11/17    [facility]     Neuro Re-Ed 10/11 10/13 10/20 11/10 11/15 11/17    TA activation HEP         Supine ab marching 3x10 2# 2x12 ea 3x10 MHP 3 x 10 ea  3x10    90/90 HS stretch D/C         SLR 2x12 ea 2# 2x10 ea 3x10 ea MHP 3 x 10 ea  3x10    bridges 5"x2x12 w/add 5"x2x12 w/add 5"x20 w/add MHP 5"x20  W/add 5"x20    Seated 1/2 foam thoracic stretch     2' 2'    Slow LTR w/ focus on bracing  HEP 10"x10 ea 10"x10 ea 10" x 10 ea  IR strap stretch     20"x5 20"x5              Lifting mechanics          Ther Ex 10/11 10/13 10/20  11/15 11/17    Bike 5' L1 5' L1 5' L0 Deferred UBE 2' ea UBE 2' ea    B/l TB ER     RTB 5"x20 RTB 5"x20    clamshells GTB 5"x20 supine S/l GTB 5"x20 ea supine  GTB 5"x20  MHP Supine GTB 5" x 20  Supine GTB 5"x20    Wall slides     5"x15     Prone EOT hip flexor/hamstring stretch  30x ea 30x ea 30x ea 30x ea  30x ea    TB rows     GTB 5"x20     TB sh ext     RTB 5"x20               Ther Activity 10/11 10/13 10/20  11/15 11/17    Mini squats or STS 20x 20x 20x 20x  20x              Modalities   10/20       MHP   duration           Assessment: Tolerated treatment well  Patient demonstrated fatigue post treatment, exhibited good technique with therapeutic exercises and would benefit from continued PT    Plan: Continue per plan of care  Progress treatment as tolerated      Colette Sullivan

## 2022-11-22 ENCOUNTER — OFFICE VISIT (OUTPATIENT)
Dept: PHYSICAL THERAPY | Facility: CLINIC | Age: 62
End: 2022-11-22

## 2022-11-22 DIAGNOSIS — M47.816 SPONDYLOSIS OF LUMBAR SPINE: Primary | ICD-10-CM

## 2022-11-22 DIAGNOSIS — G89.29 CHRONIC BILATERAL LOW BACK PAIN WITHOUT SCIATICA: ICD-10-CM

## 2022-11-22 DIAGNOSIS — M54.50 CHRONIC BILATERAL LOW BACK PAIN WITHOUT SCIATICA: ICD-10-CM

## 2022-11-22 NOTE — PROGRESS NOTES
Daily Note     Today's date: 2022  Patient name: Lizzette Rodriguez  : 1960  MRN: [id number]  Referring provider: Melanie Garcia DO  Dx:   Encounter Diagnosis     ICD-10-CM    1  Spondylosis of lumbar spine  M47 816       2  Chronic bilateral low back pain without sciatica  M54 50     G89 29                      Subjective: Pt reports low back feeling good today but neck and shoulders are hurting  Felt relief post manuals  Objective: See treatment diary below     Precautions:  L sided tremors, cervical fusion C5/C6 , "mild carpal tunnel B", patient doesn't like lying on her stomach,     Manuals    11/15 11/17 11/22   L sh [facility]    [facility]   Neuro Re-Ed 10/13 10/20 11/10 11/15 11/17    TA activation         Supine ab marching 2# 2x12 ea 3x10 MHP 3 x 10 ea  3x10    90/90 HS stretch         SLR 2# 2x10 ea 3x10 ea MHP 3 x 10 ea  3x10    bridges 5"x2x12 w/add 5"x20 w/add MHP 5"x20  W/add 5"x20    Seated 1/2 foam thoracic stretch    2' 2'    Slow LTR w/ focus on bracing  10"x10 ea 10"x10 ea 10" x 10 ea  IR strap stretch    20"x5 20"x5             Lifting mechanics         Ther Ex 10/13 10/20  11/15 11/17    Bike 5' L1 5' L0 Deferred UBE 2' ea UBE 2' ea UBE 4 min    B/l TB ER    RTB 5"x20 RTB 5"x20 3x10 red    clamshells S/l GTB 5"x20 ea supine  GTB 5"x20  MHP Supine GTB 5" x 20  Supine GTB 5"x20    Wall slides    5"x15     Prone EOT hip flexor/hamstring stretch  30x ea 30x ea 30x ea  30x ea    TB rows    GTB 5"x20  2x12 grn    TB sh ext    RTB 5"x20  2x12 red            Ther Activity 10/13 10/20  11/15 11/17    Mini squats or STS 20x 20x 20x  20x             Modalities  10/20       MHP  duration           Assessment: Tolerated treatment well  Patient demonstrated fatigue post treatment, exhibited good technique with therapeutic exercises and would benefit from continued PT    Plan: Continue per plan of care  Progress treatment as tolerated      Lesa Che

## 2022-11-29 ENCOUNTER — OFFICE VISIT (OUTPATIENT)
Dept: PHYSICAL THERAPY | Facility: CLINIC | Age: 62
End: 2022-11-29

## 2022-11-29 DIAGNOSIS — M54.50 CHRONIC BILATERAL LOW BACK PAIN WITHOUT SCIATICA: ICD-10-CM

## 2022-11-29 DIAGNOSIS — G89.29 CHRONIC BILATERAL LOW BACK PAIN WITHOUT SCIATICA: ICD-10-CM

## 2022-11-29 DIAGNOSIS — M47.816 SPONDYLOSIS OF LUMBAR SPINE: Primary | ICD-10-CM

## 2022-11-29 NOTE — PROGRESS NOTES
Daily Note     Today's date: 2022  Patient name: Vanesa Muñoz  : 1960  MRN: [id number]  Referring provider: Angel Wilkins DO  Dx:   Encounter Diagnosis     ICD-10-CM    1  Spondylosis of lumbar spine  M47 816       2  Chronic bilateral low back pain without sciatica  M54 50     G89 29                      Subjective: Pt reports neck is really sore  Felt relief post manuals  Has injection for low back tomorrow  Objective: See treatment diary below     Precautions:  L sided tremors, cervical fusion C5/C6 , "mild carpal tunnel B", patient doesn't like lying on her stomach,     Manuals   11/15 11/17 11/22 11/29   L sh [facility]   [address]    Neuro Re-Ed 10/20 11/10 11/15 11/17     TA activation         Supine ab marching 3x10 MHP 3 x 10 ea  3x10     90/90 HS stretch         SLR 3x10 ea MHP 3 x 10 ea  3x10     bridges 5"x20 w/add MHP 5"x20  W/add 5"x20     Seated 1/2 foam thoracic stretch   2' 2'  5x 10" holds    Slow LTR w/ focus on bracing  10"x10 ea 10" x 10 ea  IR strap stretch   20"x5 20"x5     Chin tucks         Lifting mechanics         Ther Ex 10/20  11/15 11/17     Bike 5' L0 Deferred UBE 2' ea UBE 2' ea UBE 4 min  4 min    B/l TB ER   RTB 5"x20 RTB 5"x20 3x10 red  3x12 red    clamshells supine  GTB 5"x20  MHP Supine GTB 5" x 20  Supine GTB 5"x20     Wall slides   5"x15      Prone EOT hip flexor/hamstring stretch  30x ea 30x ea  30x ea     TB rows   GTB 5"x20  2x12 grn  3x10 grn   TB sh ext   RTB 5"x20  2x12 red 3x10 red             Ther Activity 10/20  11/15 11/17     Mini squats or STS 20x 20x  20x              Modalities 10/20        MHP duration            Assessment: Tolerated treatment well  Patient demonstrated fatigue post treatment, exhibited good technique with therapeutic exercises and would benefit from continued PT    Plan: Continue per plan of care  Progress treatment as tolerated      Andres Howard

## 2022-11-30 ENCOUNTER — HOSPITAL ENCOUNTER (OUTPATIENT)
Dept: RADIOLOGY | Facility: MEDICAL CENTER | Age: 62
Discharge: HOME | End: 2022-11-30

## 2022-11-30 VITALS
HEART RATE: 69 BPM | RESPIRATION RATE: 18 BRPM | TEMPERATURE: 98.3 F | OXYGEN SATURATION: 96 % | DIASTOLIC BLOOD PRESSURE: 87 MMHG | SYSTOLIC BLOOD PRESSURE: 145 MMHG

## 2022-11-30 DIAGNOSIS — M79.2 NEUROPATHIC PAIN: Primary | ICD-10-CM

## 2022-11-30 DIAGNOSIS — M47.816 SPONDYLOSIS OF LUMBAR SPINE: ICD-10-CM

## 2022-11-30 RX ORDER — GABAPENTIN 300 MG/1
300 CAPSULE ORAL 3 TIMES DAILY
Qty: 90 CAPSULE | Refills: 0 | Status: SHIPPED | OUTPATIENT
Start: 2022-11-30 | End: 2022-12-06 | Stop reason: SDUPTHER

## 2022-11-30 RX ADMIN — Medication 3 ML: at 15:17

## 2022-11-30 NOTE — DISCHARGE INSTRUCTIONS

## 2022-11-30 NOTE — H&P
History of Present Illness: The patient is a 57 y o  female who presents with complaints of bilateral lower back pain    Past Medical History:   Diagnosis Date   • Allergy to dust    • Anemia    • Calculus of ureter    • Chronic pain disorder     neck and back- sees pain management   • Colitis    • Colon polyp    • Cracked skin     on fingers   • Diverticulosis    • Dysphagia    • GERD (gastroesophageal reflux disease)    • H/O: GI bleed    • Hiatal hernia    • History of DVT in adulthood 2019 2019   • Hydronephrosis    • Hyperlipidemia    • Inguinal hernia     right side   • Irregular heart beat     Pt states with prolonged QT interval - was seen by cardiologist( [facility]) pt told "to not worry about it"   • Irritable bowel    • Kidney stone    • Migraines    • Narcolepsy    • Narcolepsy 08/06/2020   • OAB (overactive bladder)    • Osteoarthritis    • Osteoporosis    • Other chronic pain     degenerative disc disease   • PONV (postoperative nausea and vomiting)     migraines   • Pyelonephritis 1/13/2021   • SBO (small bowel obstruction) ([facility] [address] ) 9/25/2021   • Sepsis ([facility] [address] ) 1/13/2021   • Smoking 8/6/2020   • Tobacco abuse    • Wears dentures     full upper   • Wears glasses    • Wears glasses        Past Surgical History:   Procedure Laterality Date   • ABDOMINAL ADHESION SURGERY     • ARM SKIN LESION BIOPSY / EXCISION      lymph node excision   • BLADDER SURGERY     • CHOLECYSTECTOMY     • COLONOSCOPY  08/06/2020   • FL RETROGRADE PYELOGRAM  1/5/2021   • HERNIA REPAIR     • HYSTERECTOMY  1977   • JOINT REPLACEMENT Right     knee   • KNEE SURGERY Right     replacement   • LAMINECTOMY      L5-6-7   • LAPAROSCOPY     • LYMPH NODE DISSECTION Left     removed -no cancer-  limb restriction   • LYMPHADENECTOMY  1974    UNDER LEFT ARM - DO NOT DRAW BLOOD/GIVE INJECTIONS IN LEFT ARM PER PATIENT    • MOLE REMOVAL     • OOPHORECTOMY Bilateral 1977   • OTHER SURGICAL HISTORY      enlarged lymph node removed left arm   no cancer   • OR CYSTO/URETERO W/LITHOTRIPSY &INDWELL STENT INSRT Right 1/5/2021    Procedure: CYSTO, LASER  URETEROSCOPY, RETRO PYELOGRAM, STENT REPLACMENT;  Surgeon: Monserrat Singh MD;  Location: [facility];  Service: Urology   • OR CYSTOURETHROSCOPY,URETER CATHETER Right 12/2/2020    Procedure: CYSTOSCOPY AND INSERTION STENT URETERAL;  Surgeon: Bharat Martínez MD;  Location: [facility];  Service: Urology   • OR FRAGMENT KIDNEY STONE/ ESWL Right 3/25/2021    Procedure: LITHOTRIPSY EXTRACORPORAL SHOCKWAVE (ESWL);   Surgeon: Carol Goodwin MD;  Location: [facility] MA[facility] OR;  Service: Urology   • OR REPAIR ING HERNIA,5+Y/O,REDUCIBL Right 6/25/2020    Procedure: REPAIR HERNIA INGUINAL  WITH MESH;  Surgeon: Charmayne Honey, MD;  Location: [facility];  Service: General   • SPINAL FUSION     • UPPER GASTROINTESTINAL ENDOSCOPY  08/06/2020   • WISDOM TOOTH EXTRACTION           Current Outpatient Medications:   •  Armodafinil 250 MG tablet, Take 1 tablet (250 mg total) by mouth daily Do not start before November 18, 2022 , Disp: 30 tablet, Rfl: 5  •  aspirin 500 MG buffered tablet, Take 500 mg by mouth every 6 (six) hours as needed for mild pain, Disp: , Rfl:   •  BACILLUS COAGULANS-INULIN PO, Take 1 capsule by mouth daily Takes in the am, Disp: , Rfl:   •  calcium carbonate 1250 MG capsule, Take 600 mg by mouth daily , Disp: , Rfl:   •  cetirizine (ZyrTEC) 10 mg tablet, Take 10 mg by mouth 2 (two) times a day, Disp: , Rfl:   •  Cholecalciferol 1000 units tablet, Take 1,000 Units by mouth daily Takes in the afternoon, Disp: , Rfl:   •  Citicoline Sodium 500 MG TABS, Take 500 mg by mouth daily Takes in the am, Disp: , Rfl:   •  diclofenac (VOLTAREN) 75 mg EC tablet, Take 1 tablet (75 mg total) by mouth 2 (two) times a day as needed (milm pain), Disp: 60 tablet, Rfl: 5  •  dicyclomine (BENTYL) 20 mg tablet, Take 1 tablet (20 mg total) by mouth 4 (four) times a day as needed (abdominal pain) (Patient taking differently: Take 20 mg by mouth 3 (three) times a day), Disp: 240 tablet, Rfl: 3  •  diphenoxylate-atropine (LOMOTIL) 2 5-0 025 mg per tablet, TAKE ONE TABLET BY MOUTH FOUR TIMES A DAY AS NEEDED FOR DIARRHEA, Disp: 90 tablet, Rfl: 1  •  DULoxetine (CYMBALTA) 30 mg delayed release capsule, Take 3 capsules (90 mg total) by mouth daily, Disp: 90 capsule, Rfl: 1  •  ferrous sulfate 325 (65 Fe) mg tablet, Take 325 mg by mouth daily with breakfast , Disp: , Rfl:   •  gabapentin (NEURONTIN) 300 mg capsule, 300 mg at 1200 in addition to 600 mg at bedtime  , Disp: , Rfl:   •  Ginger, Zingiber officinalis, (GINGER ROOT PO), Take 700 mg by mouth 2 (two) times a day , Disp: , Rfl:   •  metaxalone (SKELAXIN) 800 mg tablet, Take 0 5 tablets (400 mg total) by mouth 3 (three) times a day, Disp: 135 tablet, Rfl: 2  •  Multiple Vitamins-Minerals (CENTRUM SILVER PO), Take 1 tablet by mouth daily Takes in the am, Disp: , Rfl:   •  omeprazole (PriLOSEC) 40 MG capsule, , Disp: , Rfl:   •  ondansetron (ZOFRAN-ODT) 4 mg disintegrating tablet, Take 1 tablet (4 mg total) by mouth every 8 (eight) hours as needed for nausea or vomiting, Disp: 10 tablet, Rfl: 0  •  prochlorperazine (COMPAZINE) 5 mg tablet, Take 1 tablet (5 mg total) by mouth every 6 (six) hours as needed for nausea or vomiting for up to 10 doses, Disp: 10 tablet, Rfl: 0  •  trospium chloride (SANCTURA) 20 mg tablet, Take 1 tablet (20 mg total) by mouth 2 (two) times a day, Disp: 60 tablet, Rfl: 3  •  Turmeric 500 MG CAPS, Take 700 mg by mouth 2 (two) times a day, Disp: , Rfl:   •  ZOLMitriptan (ZOMIG) 2 5 MG tablet, TAKE 1 TABLET BY MOUTH AT ONSET OF MIGRAINE, MAY REPEAT IN 2 HOURS IF NEEDED   LIMIT 2/24 HOURS, 4/WEEK, 8/MONTH, Disp: 12 tablet, Rfl: 5    Current Facility-Administered Medications:   •  lidocaine (PF) (XYLOCAINE-MPF) 2 % injection 3 mL, 3 mL, Perineural, Once, Jose , DO    Allergies   Allergen Reactions   • Celecoxib Other (See Comments)     Increased Heart Rate, Palpitations   • Sumatriptan Anaphylaxis     Other reaction(s): SUMATRIPTAN (IMITREX) (Throat closure)  Pt analilia zolmitriptan  • Tramadol Other (See Comments)     GI Upset- severe vomiting and systemic body aches   • Medical Tape Dermatitis, Rash and Blisters     Adhesive from medication patches and bandaides- ok with paper tape       Physical Exam:   Vitals:    11/30/22 1506   BP: 136/85   Pulse: 81   Resp: 18   Temp: 98 3 °F (36 8 °C)   SpO2: 97%     General: Awake, Alert, Oriented x 3, Mood and affect appropriate  Respiratory: Respirations even and unlabored  Cardiovascular: Peripheral pulses intact; no edema  Musculoskeletal Exam: bilateral lower back pain    ASA Score: 2    Patient/Chart Verification  Patient ID Verified: Verbal  Consents Confirmed: Procedural, To be obtained in the Pre-Procedure area  H&P( within 30 days) Verified: To be obtained in the Pre-Procedure area  Allergies Reviewed:  Yes  Anticoag/NSAID held?: NA  Currently on antibiotics?: No  Pregnancy denied?: NA    Assessment: lumbar spondylosis    Plan: (B) L3-5 MBB#1

## 2022-12-01 ENCOUNTER — OFFICE VISIT (OUTPATIENT)
Dept: PHYSICAL THERAPY | Facility: CLINIC | Age: 62
End: 2022-12-01

## 2022-12-01 DIAGNOSIS — M47.816 SPONDYLOSIS OF LUMBAR SPINE: ICD-10-CM

## 2022-12-01 DIAGNOSIS — G89.29 CHRONIC BILATERAL LOW BACK PAIN WITHOUT SCIATICA: Primary | ICD-10-CM

## 2022-12-01 DIAGNOSIS — M54.50 CHRONIC BILATERAL LOW BACK PAIN WITHOUT SCIATICA: Primary | ICD-10-CM

## 2022-12-01 NOTE — PROGRESS NOTES
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  Objective: See treatment diary below     Precautions:  L sided tremors, cervical fusion C5/C6 , "mild carpal tunnel B", patient doesn't like lying on her stomach,     Manuals 11/15 11/17 11/22 11/29 12/1     L sh [facility] [place] [address]     Neuro Re-Ed 11/15 11/17   12/1     TA activation          Supine ab marching  3x10        90/90 HS stretch          SLR  3x10        bridges  W/add 5"x20        Seated 1/2 foam thoracic stretch 2' 2'  5x 10" holds  20"x5     Slow LTR w/ focus on bracing           IR strap stretch 20"x5 20"x5   20"x5     Chin tucks          Lifting mechanics          Ther Ex 11/15 11/17   12/1     Bike UBE 2' ea UBE 2' ea UBE 4 min  4 min       B/l TB ER RTB 5"x20 RTB 5"x20 3x10 red  3x12 red  5"x20 red     clamshells  Supine GTB 5"x20        Wall slides 5"x15         Prone EOT hip flexor/hamstring stretch   30x ea        TB rows GTB 5"x20  2x12 grn  3x10 grn Blue 5"x20     TB sh ext RTB 5"x20  2x12 red 3x10 red  Green 5"x20               Ther Activity 11/15 11/17   12/1     Mini squats or STS  20x                  Modalities          MHP     Neck L UT 10'         Assessment: Tolerated treatment well  Patient demonstrated fatigue post treatment, exhibited good technique with therapeutic exercises and would benefit from continued PT    Plan: Continue per plan of care  Progress treatment as tolerated      Gaudencio Lawrence

## 2022-12-06 ENCOUNTER — OFFICE VISIT (OUTPATIENT)
Dept: PHYSICAL THERAPY | Facility: CLINIC | Age: 62
End: 2022-12-06

## 2022-12-06 DIAGNOSIS — M47.816 SPONDYLOSIS OF LUMBAR SPINE: ICD-10-CM

## 2022-12-06 DIAGNOSIS — G89.29 CHRONIC BILATERAL LOW BACK PAIN WITHOUT SCIATICA: Primary | ICD-10-CM

## 2022-12-06 DIAGNOSIS — M79.2 NEUROPATHIC PAIN: ICD-10-CM

## 2022-12-06 DIAGNOSIS — M54.50 CHRONIC BILATERAL LOW BACK PAIN WITHOUT SCIATICA: Primary | ICD-10-CM

## 2022-12-06 RX ORDER — GABAPENTIN 300 MG/1
300 CAPSULE ORAL 3 TIMES DAILY
Qty: 270 CAPSULE | Refills: 0 | Status: SHIPPED | OUTPATIENT
Start: 2022-12-06 | End: 2023-02-28 | Stop reason: SDUPTHER

## 2022-12-06 NOTE — PROGRESS NOTES
Daily Note     Today's date: 2022  Patient name: Magaly Bains  : 1960  MRN: [id number]  Referring provider: Tierney Green DO  Dx:   Encounter Diagnosis     ICD-10-CM    1  Chronic bilateral low back pain without sciatica  M54 50     G89 29       2  Spondylosis of lumbar spine  M47 816                      Subjective: Pt notes continued pain in neck and low back  Objective: See treatment diary below     Precautions:  L sided tremors, cervical fusion C5/C6 , "mild carpal tunnel B", patient doesn't like lying on her stomach,     Manuals 11/15 11/17 11/22 11/29 12/1 12/6    L sh STM [place] [address]     Neuro Re-Ed 11/15 11/17   12/1     TA activation          Supine ab marching  3x10        90/90 HS stretch          SLR  3x10        bridges  W/add 5"x20        Seated 1/2 foam thoracic stretch 2' 2'  5x 10" holds  20"x5 20x 5" holds    Slow LTR w/ focus on bracing           IR strap stretch 20"x5 20"x5   20"x5 -    Chin tucks      12x 3" holds seated    Lifting mechanics          Ther Ex 11/15 11/17   12/1     Bike UBE 2' ea UBE 2' ea UBE 4 min  4 min       B/l TB ER RTB 5"x20 RTB 5"x20 3x10 red  3x12 red  5"x20 red 2x10 grn     clamshells  Supine GTB 5"x20        Wall slides 5"x15         Prone EOT hip flexor/hamstring stretch   30x ea        TB rows GTB 5"x20  2x12 grn  3x10 grn Blue 5"x20 20x 5" holds blue     TB sh ext RTB 5"x20  2x12 red 3x10 red  Green 5"x20 20x 5" holds grn    Standing shoulder ER/IR w/ band or abilio           Ther Activity 11/15 11/17   12/1     Mini squats or STS  20x                  Modalities          MHP     Neck L UT 10' Seated neck 10 min         Assessment: Tolerated treatment well  Patient demonstrated fatigue post treatment, exhibited good technique with therapeutic exercises and would benefit from continued PT    Plan: Continue per plan of care  Progress treatment as tolerated      Rafael Hughes

## 2022-12-08 ENCOUNTER — OFFICE VISIT (OUTPATIENT)
Dept: PHYSICAL THERAPY | Facility: CLINIC | Age: 62
End: 2022-12-08

## 2022-12-08 DIAGNOSIS — G89.29 CHRONIC BILATERAL LOW BACK PAIN WITHOUT SCIATICA: ICD-10-CM

## 2022-12-08 DIAGNOSIS — M54.50 CHRONIC BILATERAL LOW BACK PAIN WITHOUT SCIATICA: ICD-10-CM

## 2022-12-08 DIAGNOSIS — M25.512 CHRONIC LEFT SHOULDER PAIN: Primary | ICD-10-CM

## 2022-12-08 DIAGNOSIS — G89.29 CHRONIC LEFT SHOULDER PAIN: Primary | ICD-10-CM

## 2022-12-08 DIAGNOSIS — M47.816 SPONDYLOSIS OF LUMBAR SPINE: ICD-10-CM

## 2022-12-08 NOTE — PROGRESS NOTES
Daily Note     Today's date: 2022  Patient name: Charmayne Mylar  : 1960  MRN: [id number]  Referring provider: Matty Vu DO  Dx:   Encounter Diagnosis     ICD-10-CM    1  Chronic left shoulder pain  M25 512     G89 29       2  Spondylosis of lumbar spine  M47 816       3  Chronic bilateral low back pain without sciatica  M54 50     G89 29                      Subjective: Pt is very stiff and sore today in both neck and back  Objective: See treatment diary below     Precautions:  L sided tremors, cervical fusion C5/C6 , "mild carpal tunnel B", patient doesn't like lying on her stomach,     Manuals       [address]      Neuro Re-Ed         pball flexion     10"x10      Supine ab marching           /90 HS stretch           SLR           bridges           Seated  foam thoracic stretch  5x 10" holds  20"x5 20x 5" holds 20"x5      Slow LTR w/ focus on bracing            IR strap stretch   20"x5 -       Chin tucks    12x 3" holds seated 5"x20 MHP supine      Lifting mechanics           Ther Ex         Bike UBE 4 min  4 min          B/l TB ER 3x10 red  3x12 red  5"x20 red 2x10 grn  5"x20 GTB      clamshells           Wall slides           Prone EOT hip flexor/hamstring stretch      30x ea      TB rows 2x12 grn  3x10 grn Blue 5"x20 20x 5" holds blue  5"x20 blue      TB sh ext 2x12 red 3x10 red  Green 5"x20 20x 5" holds grn 5"x20 green      Standing shoulder ER/IR w/ band or abilio            Ther Activity           Mini squats or STS                      Modalities         MHP   Neck L UT 10' Seated neck 10 min  10' neck & back pre          Assessment: Tolerated treatment well  Patient demonstrated fatigue post treatment, exhibited good technique with therapeutic exercises and would benefit from continued PT    Plan: Continue per plan of care  Progress treatment as tolerated      Carlos Rush

## 2022-12-09 ENCOUNTER — TELEPHONE (OUTPATIENT)
Dept: RADIOLOGY | Facility: MEDICAL CENTER | Age: 62
End: 2022-12-09

## 2022-12-09 NOTE — TELEPHONE ENCOUNTER
Pain diary reviewed by Dr Boris Betancourt; poor response til bilateral L3-5 MBB #1        Schedule f/u with PA -- appt line: review lumbar MBB response

## 2022-12-12 NOTE — TELEPHONE ENCOUNTER
Spoke to the patient could only do late afternoon works until [employer]  Only wants to see Dr Georgina Olsen  Advise pt Dr Georgina Olsen does not have anything later than 3:30-4pm for ovs  Patient said she will have to call back to figure her schedule out she was at work and could not talk long

## 2022-12-13 ENCOUNTER — APPOINTMENT (OUTPATIENT)
Dept: PHYSICAL THERAPY | Facility: CLINIC | Age: 62
End: 2022-12-13

## 2022-12-14 ENCOUNTER — HOSPITAL ENCOUNTER (OUTPATIENT)
Dept: CT IMAGING | Facility: HOSPITAL | Age: 62
Discharge: HOME | End: 2022-12-14
Attending: FAMILY MEDICINE

## 2022-12-14 DIAGNOSIS — R93.89 ABNORMAL CXR: ICD-10-CM

## 2022-12-15 ENCOUNTER — APPOINTMENT (OUTPATIENT)
Dept: PHYSICAL THERAPY | Facility: CLINIC | Age: 62
End: 2022-12-15

## 2022-12-19 ENCOUNTER — APPOINTMENT (OUTPATIENT)
Dept: PHYSICAL THERAPY | Facility: CLINIC | Age: 62
End: 2022-12-19

## 2022-12-20 ENCOUNTER — OFFICE VISIT (OUTPATIENT)
Dept: PHYSICAL THERAPY | Facility: CLINIC | Age: 62
End: 2022-12-20

## 2022-12-20 ENCOUNTER — APPOINTMENT (OUTPATIENT)
Dept: PHYSICAL THERAPY | Facility: CLINIC | Age: 62
End: 2022-12-20

## 2022-12-20 DIAGNOSIS — M47.816 SPONDYLOSIS OF LUMBAR SPINE: ICD-10-CM

## 2022-12-20 DIAGNOSIS — M25.512 CHRONIC LEFT SHOULDER PAIN: ICD-10-CM

## 2022-12-20 DIAGNOSIS — G89.29 CHRONIC LEFT SHOULDER PAIN: ICD-10-CM

## 2022-12-20 DIAGNOSIS — M54.50 CHRONIC BILATERAL LOW BACK PAIN WITHOUT SCIATICA: Primary | ICD-10-CM

## 2022-12-20 DIAGNOSIS — G89.29 CHRONIC BILATERAL LOW BACK PAIN WITHOUT SCIATICA: Primary | ICD-10-CM

## 2022-12-20 NOTE — PROGRESS NOTES
Daily Note     Today's date: 2022  Patient name: Josef Kirby  : 1960  MRN: [id number]  Referring provider: Kasandra Crews DO  Dx:   Encounter Diagnosis     ICD-10-CM    1  Chronic bilateral low back pain without sciatica  M54 50     G89 29       2  Spondylosis of lumbar spine  M47 816       3  Chronic left shoulder pain  M25 512     G89 29                      Subjective: Pt is very stiff and sore today in both neck and back  Objective: See treatment diary below     Precautions:  L sided tremors, cervical fusion C5/C6 , "mild carpal tunnel B", patient doesn't like lying on her stomach,     Manuals      [address]     Neuro Re-Ed         pball flexion     10"x10      Supine ab marching           90/90 HS stretch           SLR           bridges           Seated / foam thoracic stretch  5x 10" holds  20"x5 20x 5" holds 20"x5 5x 20" holds      Slow LTR w/ focus on bracing            IR strap stretch   20"x5 -       Chin tucks    12x 3" holds seated 5"x20 MHP supine 20x 5" holds seated     Lifting mechanics           Ther Ex         Bike UBE 4 min  4 min          B/l TB ER 3x10 red  3x12 red  5"x20 red 2x10 grn  5"x20 GTB 20x 5" holds grn      clamshells           Wall slides           Prone EOT hip flexor/hamstring stretch      30x ea      TB rows 2x12 grn  3x10 grn Blue 5"x20 20x 5" holds blue  5"x20 blue 20x 5" holds blue      TB sh ext 2x12 red 3x10 red  Green 5"x20 20x 5" holds grn 5"x20 green 3x10 grn      Standing shoulder ER/IR w/ band or abilio            Ther Activity           Mini squats or STS                      Modalities         MHP   Neck L UT 10' Seated neck 10 min  10' neck & back pre 10 min neck pre          Assessment: Tolerated treatment well   Patient demonstrated fatigue post treatment, exhibited good technique with therapeutic exercises and would benefit from continued PT    Plan: Continue per plan of care  Progress treatment as tolerated      Perez Freeman

## 2022-12-21 ENCOUNTER — OFFICE VISIT (OUTPATIENT)
Dept: GASTROENTEROLOGY | Facility: CLINIC | Age: 62
End: 2022-12-21

## 2022-12-21 VITALS
BODY MASS INDEX: 20.62 KG/M2 | WEIGHT: 116.4 LBS | HEART RATE: 96 BPM | HEIGHT: 63 IN | SYSTOLIC BLOOD PRESSURE: 132 MMHG | OXYGEN SATURATION: 98 % | DIASTOLIC BLOOD PRESSURE: 82 MMHG | TEMPERATURE: 97.9 F

## 2022-12-21 DIAGNOSIS — K58.2 IRRITABLE BOWEL SYNDROME WITH BOTH CONSTIPATION AND DIARRHEA: ICD-10-CM

## 2022-12-21 DIAGNOSIS — K52.9 CHRONIC DIARRHEA: Primary | ICD-10-CM

## 2022-12-21 DIAGNOSIS — R12 HEARTBURN: ICD-10-CM

## 2022-12-21 DIAGNOSIS — R11.0 NAUSEA: ICD-10-CM

## 2022-12-21 DIAGNOSIS — K52.832 LYMPHOCYTIC COLITIS: ICD-10-CM

## 2022-12-21 NOTE — PROGRESS NOTES
[facility] - Outpatient Follow-up Note  Maura Jones 57 y o  female MRN: [id number]  Encounter: [id number]          ASSESSMENT AND PLAN:    Maura Jones is a 57 y o  female with microscopic colitis and chronic diarrhea, prior small bowel resection thought to be from adhesions, some component of chronic enteritis as well as prior acute colitis, who presents for follow-up  She stopped many of her medications and now things are much improved  She has not significant complaints  Prior colonoscopy from October 2021 revealed colonic erythema and ileal erythema and biopsies of microscopic colitis and nonspecific inflammation in the ileum, as well as evidence of diverticulosis  Prior endoscopy from 2020 with erythema in the stomach and ulcerated mucosa in the duodenum and biopsies revealed erosive changes in the duodenum, reactive gastropathy, mild chronic inflammation in the esophagus  Prior MR enterography from June 2021 with no active or chronic inflammatory bowel disease  Recent CT chest with sclerosis of the left fourth and fifth ribs and no suspicious pulmonary nodules  Renal CT from October with 2 mm nonobstructing left renal calculus, no ureteral calculi, no acute intra-abdominal abnormality, colonic diverticulosis without diverticulitis    Prior celiac serologies normal aside from a TTG IgG of 6  Vitamin B12 normal but on the low side  Prior TSH, C  difficile, fecal fats, calprotectin within normal   Recent CMP normal, CBC normal, vitamin D normal    1  Chronic diarrhea    2  Lymphocytic colitis    3  Nausea    4  Irritable bowel syndrome with both constipation and diarrhea    5  Heartburn        No orders of the defined types were placed in this encounter      Stay well-hydrated and try to drink at least 8 cups of water per day  Low residue diet and try to avoid leafy greens, raw fruits and vegetables with skin, nuts, seeds, corn  Defer video capsule endoscopy given prior small bowel obstruction  Consider repeat CBC, iron studies, celiac panel but she declines  Try to minimize medications that may flare of microscopic colitis including NSAIDs, omeprazole, Cymbalta  Next colonoscopy 2026  ______________________________________________________________________    SUBJECTIVE:    Joana Pratt is a 57 y o  female who presents with complaint of colitis here for follow-up  She had COVID in October and was not doing well  She received a treatment  Some of her medication was stopped and then she had the flu on top of it  She was down for 2 5 weeks  She gradually introduced back her medication  As she did she was having some strange side effects  When she stopped medications she began to feel much better  She has a cold and her cold medicine gave her diarrhea  Her hypersomnia was not narcolepsy but the same medication and now better  She has not taken her sleep medication and she feels fine  She has a BM every 1-1 5 days, formed  No blood or abdominal pain (unless she is constipated)  IT is better after she passes the hard stool  It is not big but hard  No heartburn, dysphagia, odynophagia, nausea, vomiting  She had some episodes of nausea while she had COVID  She gained some weight          Answers for HPI/ROS submitted by the patient on 12/14/2022  Chronicity: chronic  Onset: more than 1 year ago  Onset quality: gradual  Frequency: every several days  Progression since onset: gradually improving  Pain location: generalized abdominal region  Pain - numeric: 5/10  Pain quality: cramping  Radiates to: pelvis  anorexia: Yes  arthralgias: Yes  belching: No  constipation: Yes  diarrhea: Yes  dysuria: No  fever: No  flatus: No  frequency: No  headaches: No  hematochezia: No  hematuria: No  melena: No  myalgias: Yes  nausea: Yes  weight loss: No  vomiting: Yes  Aggravated by: certain positions, movement  Relieved by: being still, bowel movements, recumbency        REVIEW OF SYSTEMS IS OTHERWISE NEGATIVE  10 point ROS reviewed and negative, except as above      Historical Information   Past Medical History:   Diagnosis Date   • Allergy to dust    • Anemia    • Calculus of ureter    • Chronic kidney disease 11/18    Hydrophenesis   • Chronic pain disorder     neck and back- sees pain management   • Colitis    • Colon polyp    • Cracked skin     on fingers   • Diverticulitis of colon 2012    Seen by Dr Mauricio Rankin at [facility]  Inpatient at   • Diverticulosis    • Dysphagia    • GERD (gastroesophageal reflux disease)    • GI (gastrointestinal bleed)     As a result of diverticulitis and microscopic colitis  • H/O: GI bleed    • Hiatal hernia    • History of DVT in adulthood 2019 2019   • Hydronephrosis    • Hyperlipidemia    • Inguinal hernia     right side   • Irregular heart beat     Pt states with prolonged QT interval - was seen by cardiologist( [facility]) pt told "to not worry about it"   • Irritable bowel    • Kidney stone    • Lactose intolerance     Did not tolerate milk after being weaned from breast milk  • Migraines    • Narcolepsy    • Narcolepsy 08/06/2020   • OAB (overactive bladder)    • Osteoarthritis    • Osteoporosis    • Other chronic pain     degenerative disc disease   • PONV (postoperative nausea and vomiting)     migraines   • Pyelonephritis 01/13/2021   • SBO (small bowel obstruction) ([facility] [address] ) 09/25/2021   • Sepsis ([facility] [address] ) 01/13/2021   • Smoking 08/06/2020   • Tobacco abuse    • Wears dentures     full upper   • Wears glasses    • Wears glasses      Past Surgical History:   Procedure Laterality Date   • ABDOMINAL ADHESION SURGERY     • ARM SKIN LESION BIOPSY / EXCISION      lymph node excision   • BLADDER SURGERY     • CHOLECYSTECTOMY     • COLONOSCOPY  08/06/2020   • FL RETROGRADE PYELOGRAM  01/05/2021   • FLEXIBLE SIGMOIDOSCOPY      Last done at [address] in 2018 after being admitted for GI bleed     • HERNIA REPAIR     • HYSTERECTOMY  1977   • JOINT REPLACEMENT Right     knee   • KNEE SURGERY Right     replacement   • LAMINECTOMY      L5-6-7   • LAPAROSCOPY     • LYMPH NODE DISSECTION Left     removed -no cancer-  limb restriction   • LYMPHADENECTOMY      UNDER LEFT ARM - DO NOT DRAW BLOOD/GIVE INJECTIONS IN LEFT ARM PER PATIENT    • MOLE REMOVAL     • OOPHORECTOMY Bilateral    • OTHER SURGICAL HISTORY      enlarged lymph node removed left arm   no cancer   • NE CYSTO/URETERO W/LITHOTRIPSY &INDWELL STENT INSRT Right 2021    Procedure: CYSTO, LASER  URETEROSCOPY, RETRO PYELOGRAM, STENT REPLACMENT;  Surgeon: Ling Santillan MD;  Location: [facility];  Service: Urology   • NE CYSTOURETHROSCOPY,URETER CATHETER Right 2020    Procedure: CYSTOSCOPY AND INSERTION STENT URETERAL;  Surgeon: Clari Mora MD;  Location: [facility];  Service: Urology   • NE FRAGMENT KIDNEY STONE/ ESWL Right 2021    Procedure: LITHOTRIPSY EXTRACORPORAL SHOCKWAVE (ESWL);   Surgeon: Taylor Coburn MD;  Location: [facility] MA[facility] OR;  Service: Urology   • NE REPAIR [address] HERNIA,5+Y/O,REDUCIBL Right 2020    Procedure: REPAIR HERNIA INGUINAL  WITH MESH;  Surgeon: Mike Lu MD;  Location: [facility];  Service: General   • SPINAL FUSION     • UPPER GASTROINTESTINAL ENDOSCOPY  2020   • WISDOM TOOTH EXTRACTION       Social History   Social History     Substance and Sexual Activity   Alcohol Use Not Currently    Comment: occasional drink ( 12 oz  beer or beer based beverage 1-2x m     Social History     Substance and Sexual Activity   Drug Use Never   • Types: Marijuana    Comment: medicinal marijuana; last dose was 3/16     Social History     Tobacco Use   Smoking Status Heavy Smoker   • Packs/day: 0 50   • Years: 48 00   • Pack years: 24 00   • Types: Cigarettes   • Start date: 1973   Smokeless Tobacco Never   Tobacco Comments    Stopped smoking [id number] (first marriage),  stopped again during  due to multiple s     Family History   Problem Relation Age of Onset   • Hypertension Mother          from massive heart attack, never took any medication for this condition   • Heart attack Mother    • Hyperlipidemia Mother         Blood tests done just prior to heart attack  Results reported after her passing     • Hypertension Father         Had high blood pressure, unknown if he ever took medication   • Cancer Father         Tumor in bowels, spread to liver, skin cancer   • Colon cancer Father    • Colon polyps Father    • Diabetes Sister         uses insulin   • Arthritis Sister         Osteo arthritis   • Hypertension Sister         Triple bypass   • Diabetes Sister         pre-diabetic, oral medication   • Arthritis Sister         Osteo arthritis   • Hypertension Sister         Triple bypass   • Irritable bowel syndrome Sister         Constipation   • No Known Problems Daughter    • No Known Problems Maternal Grandmother    • No Known Problems Maternal Grandfather    • No Known Problems Paternal Grandmother    • No Known Problems Paternal Grandfather    • Stroke Brother    • Nephrolithiasis Brother    • Cancer Brother 70        soft tissue cancer   • No Known Problems Maternal Aunt    • No Known Problems Maternal Aunt    • No Known Problems Maternal Aunt    • No Known Problems Paternal Aunt    • No Known Problems Paternal Aunt    • No Known Problems Paternal Aunt    • Breast cancer Other 52   • Cancer Cousin         Pancreatic cancer   • Cancer Cousin         Breast cancer, total mastectomy   • Cancer Brother         Terminal soft tissue cancer 8/21   • Hypertension Brother         Triple bypass   • Learning disabilities Brother         Mental retardation       Meds/Allergies       Current Outpatient Medications:   •  aspirin 500 MG buffered tablet  •  calcium carbonate 1250 MG capsule  •  Cholecalciferol 1000 units tablet  •  gabapentin (NEURONTIN) 300 mg capsule  •  Lori, Zingiber officinalis, (LORI ROOT PO)  •  metaxalone (SKELAXIN) 800 mg tablet  •  Multiple Vitamins-Minerals (CENTRUM SILVER PO)  • prochlorperazine (COMPAZINE) 5 mg tablet  •  trospium chloride (SANCTURA) 20 mg tablet  •  Turmeric 500 MG CAPS  •  ZOLMitriptan (ZOMIG) 2 5 MG tablet    Allergies   Allergen Reactions   • Celecoxib Other (See Comments)     Increased Heart Rate, Palpitations   • Sumatriptan Anaphylaxis     Other reaction(s): SUMATRIPTAN (IMITREX) (Throat closure)  Pt analilia zolmitriptan  • Tramadol Other (See Comments)     GI Upset- severe vomiting and systemic body aches   • Medical Tape Dermatitis, Rash and Blisters     Adhesive from medication patches and bandaides- ok with paper tape           Objective     Blood pressure 132/82, pulse 96, temperature 97 9 °F (36 6 °C), temperature source Tympanic, height 5' 3" (1 6 m), weight 52 8 kg (116 lb 6 4 oz), SpO2 98 %, not currently breastfeeding  Body mass index is 20 62 kg/m²  PHYSICAL EXAMINATION:    General Appearance:   Alert, cooperative, no distress   HEENT:  Normocephalic, atraumatic, anicteric  Neck supple, symmetrical, trachea midline  Lungs:   Equal chest rise and unlabored breathing, normal effort, no coughing  Cardiovascular:   No visualized JVD  Abdomen:   No abdominal distension  Skin:   No jaundice, rashes, or lesions  Musculoskeletal:   Normal range of motion visualized  Psych:  Normal affect and normal insight  Neuro:  Alert and appropriate  Lab Results:   No visits with results within 1 Day(s) from this visit     Latest known visit with results is:   Admission on 11/02/2022, Discharged on 11/02/2022   Component Date Value   • WBC 11/02/2022 4 88    • RBC 11/02/2022 4 43    • Hemoglobin 11/02/2022 14 0    • Hematocrit 11/02/2022 41 5    • MCV 11/02/2022 94    • MCH 11/02/2022 31 6    • MCHC 11/02/2022 33 7    • RDW 11/02/2022 12 1    • MPV 11/02/2022 9 6    • Platelets 35/57/9382 253    • nRBC 11/02/2022 0    • Neutrophils Relative 11/02/2022 65    • Immat GRANS % 11/02/2022 0    • Lymphocytes Relative 11/02/2022 27    • Monocytes Relative 11/02/2022 5    • Eosinophils Relative 11/02/2022 2    • Basophils Relative 11/02/2022 1    • Neutrophils Absolute 11/02/2022 3 14    • Immature Grans Absolute 11/02/2022 0 01    • Lymphocytes Absolute 11/02/2022 1 33    • Monocytes Absolute 11/02/2022 0 26    • Eosinophils Absolute 11/02/2022 0 11    • Basophils Absolute 11/02/2022 0 03    • Sodium 11/02/2022 138    • Potassium 11/02/2022 4 1    • Chloride 11/02/2022 103    • CO2 11/02/2022 27    • ANION GAP 11/02/2022 8    • BUN 11/02/2022 16    • Creatinine 11/02/2022 0 71    • Glucose 11/02/2022 103    • Calcium 11/02/2022 8 8    • AST 11/02/2022     • ALT 11/02/2022 25    • Alkaline Phosphatase 11/02/2022 82    • Total Protein 11/02/2022 7 2    • Albumin 11/02/2022 3 6    • Total Bilirubin 11/02/2022 0 58    • eGFR 11/02/2022 90    • Lipase 11/02/2022 85    • hs TnI 0hr 11/02/2022 3    • Color, UA 11/02/2022 Yellow    • Clarity, UA 11/02/2022 Clear    • pH, UA 11/02/2022 7 0    • Leukocytes, UA 11/02/2022 Trace (A)    • Nitrite, UA 11/02/2022 Negative    • Protein, UA 11/02/2022 Negative    • Glucose, UA 11/02/2022 Negative    • Ketones, UA 11/02/2022 Negative    • Urobilinogen, UA 11/02/2022 1 0    • Bilirubin, UA 11/02/2022 Negative    • Occult Blood, UA 11/02/2022 Trace (A)    • Specific [place], UA 11/02/2022 1 015    • RBC, UA 11/02/2022 1-2 (A)    • WBC, UA 11/02/2022 2-4    • Epithelial Cells 11/02/2022 Occasional    • Bacteria, UA 11/02/2022 Occasional    • Ventricular Rate 11/02/2022 57    • Atrial Rate 11/02/2022 57    • MS Interval 11/02/2022 162    • QRSD Interval 11/02/2022 94    • QT Interval 11/02/2022 418    • QTC Interval 11/02/2022 406    • P Axis 11/02/2022 77    • QRS Axis 11/02/2022 27    • T Wave Axis 11/02/2022 62        Lab Results   Component Value Date    WBC 4 88 11/02/2022    HGB 14 0 11/02/2022    HCT 41 5 11/02/2022    MCV 94 11/02/2022     11/02/2022       Lab Results   Component Value Date    SODIUM 138 11/02/2022    K 4 1 11/02/2022     11/02/2022    CO2 27 11/02/2022    AGAP 8 11/02/2022    BUN 16 11/02/2022    CREATININE 0 71 11/02/2022    GLUC 103 11/02/2022    GLUF 95 06/27/2022    CALCIUM 8 8 11/02/2022    AST  11/02/2022      Comment:      No Result; if an AST is required for patient care, it must be ordered separately  Specimen collection should occur prior to Sulfasalazine administration due to the potential for falsely depressed results  ALT 25 11/02/2022    ALKPHOS 82 11/02/2022    TP 7 2 11/02/2022    TBILI 0 58 11/02/2022    EGFR 90 11/02/2022       Lab Results   Component Value Date    CRP <3 0 08/15/2022       Lab Results   Component Value Date    [id number] 1 690 08/15/2022       Lab Results   Component Value Date    IRON 35 (L) 03/21/2022    TIBC 303 03/21/2022    FERRITIN 202 03/21/2022       Radiology Results:   CT chest wo contrast    Result Date: 12/20/2022  Narrative: CT CHEST WITHOUT IV CONTRAST INDICATION:   R93 89: Abnormal findings on diagnostic imaging of other specified body structures  COMPARISON:  Chest CT November 2, 2018  Correlation chest radiograph November 20, 2022  TECHNIQUE: CT examination of the chest was performed without intravenous contrast  Axial, sagittal, and coronal 2D reformatted images were created from the source data and submitted for interpretation  3D reconstructions of the bony thorax were performed in order to improved sensitivity of evaluation for rib fractures  Radiation dose length product (DLP) for this visit:  155 mGy-cm   This examination, like all CT scans performed in the [facility], was performed utilizing techniques to minimize radiation dose exposure, including the use of iterative reconstruction and automated exposure control  FINDINGS: LUNGS:  No suspicious pulmonary nodules  Few punctate calcified granulomas  PLEURA:  Unremarkable  HEART/GREAT VESSELS: Heart is unremarkable for patient's age  No thoracic aortic aneurysm  MEDIASTINUM AND VON:  Unremarkable  CHEST WALL AND LOWER NECK:  Unremarkable  VISUALIZED STRUCTURES IN THE UPPER ABDOMEN:  Unremarkable  OSSEOUS STRUCTURES:  Sclerosis of the anterior left 4th and 5th ribs without displacement is new since November 2, 2018  Cervical spinal fixation hardware  Spinal degenerative changes are noted  No acute fracture  Impression: 1  Sclerosis of the anterior left 4th and 5th ribs are most compatible with chronic nondisplaced fractures, and are new since November 20, 2018  This likely corresponds to the finding on the recent chest radiograph  Correlation with trauma history is recommended  2   No suspicious pulmonary nodules  Review of patient's smoking history is advised to assess whether patient is a candidate for enrollment in a lung cancer screening program which includes annual low dose chest CT (which would next be performed in 1 year)  Workstation performed: [id number]     FL spine and pain procedure    Result Date: 11/30/2022  Narrative: Indication:  Mechanical low back pain Preoperative diagnosis:  1  Lumbar spondylosis                                                2   Low back pain Postoperative diagnosis: 1  Lumbar spondylosis                                                2   Low back pain Procedure: Fluoroscopically-guided  bilateral L3-5 Medial Branch Nerve/Dorsal Ramus Blocks using 2% lidocaine EBL:  none Specimens:  not applicable After discussing the risks, benefits, and alternatives to the procedure, the patient expressed understanding and wished to proceed  The patient was brought to the fluoroscopy suite and placed in the prone position  Procedural pause conducted to verify:  correct patient identity, procedure to be performed and as applicable, correct side and site, correct patient position, and availability of implants, special equipment and special requirements    Using fluoroscopy, the junction of the transverse process and superior articulating process of the bilateral L3-5 medial branch levels were identified  The skin was sterilely prepped and draped in the usual fashion using Chloraprep skin prep  Using fluoroscopic guidance, a 3 5 inch 25 gauge  spinal needle was advanced to each target  After negative aspiration, 0 5cc of 2% lidocaine was injected at each site and the needles were then removed  The patient tolerated the procedure well and there were no apparent complications  After appropriate observation, the patient was dismissed from the clinic in good condition under their own power  The patient was instructed to keep a pain diary and report the results to our office

## 2022-12-22 ENCOUNTER — APPOINTMENT (OUTPATIENT)
Dept: PHYSICAL THERAPY | Facility: CLINIC | Age: 62
End: 2022-12-22

## 2022-12-23 ENCOUNTER — OFFICE VISIT (OUTPATIENT)
Dept: NEUROLOGY | Facility: CLINIC | Age: 62
End: 2022-12-23

## 2022-12-23 VITALS
BODY MASS INDEX: 20.71 KG/M2 | SYSTOLIC BLOOD PRESSURE: 133 MMHG | HEART RATE: 46 BPM | TEMPERATURE: 97 F | HEIGHT: 63 IN | WEIGHT: 116.9 LBS | DIASTOLIC BLOOD PRESSURE: 69 MMHG

## 2022-12-23 DIAGNOSIS — G43.709 CHRONIC MIGRAINE WITHOUT AURA WITHOUT STATUS MIGRAINOSUS, NOT INTRACTABLE: Primary | ICD-10-CM

## 2022-12-23 DIAGNOSIS — Z98.1 HISTORY OF FUSION OF CERVICAL SPINE: ICD-10-CM

## 2022-12-23 DIAGNOSIS — G43.009 MIGRAINE WITHOUT AURA AND WITHOUT STATUS MIGRAINOSUS, NOT INTRACTABLE: ICD-10-CM

## 2022-12-23 DIAGNOSIS — M79.18 MYOFASCIAL PAIN SYNDROME, CERVICAL: ICD-10-CM

## 2022-12-23 DIAGNOSIS — R25.1 TREMOR: ICD-10-CM

## 2022-12-23 RX ORDER — ZOLMITRIPTAN 2.5 MG/1
TABLET, FILM COATED ORAL
Qty: 36 TABLET | Refills: 0 | Status: SHIPPED | OUTPATIENT
Start: 2022-12-23 | End: 2022-12-30 | Stop reason: SDUPTHER

## 2022-12-23 NOTE — PROGRESS NOTES
Patient ID: Addi French is a 57 y o  female  Assessment/Plan:       Diagnoses and all orders for this visit:    Chronic migraine without aura without status migrainosus, not intractable    Myofascial pain syndrome, cervical    History of fusion of cervical spine    Tremor    Migraine without aura and without status migrainosus, not intractable  -     Discontinue: ZOLMitriptan (ZOMIG) 2 5 MG tablet; TAKE 1 TABLET BY MOUTH AT ONSET OF MIGRAINE, MAY REPEAT IN 2 HOURS IF NEEDED  LIMIT 2/24 HOURS, 4/WEEK, 8/MONTH        Due to increase in frequency of migraine headaches, she is a good candidate for botox injections  She is also a great candidate for CGRP injectables  I explained these 2 treatments in detail, along with possible s/e  She will consider the 2 therapy options and get back to me with her decision  Continue zolmitriptan as needed migraine onset, add Compazine where needed  She feels that her tremor is improved to resolved off of the armodafinil  Continue to follow-up with sleep medicine as needed for hypersomnolence, not noted to be worse off of the armodafinil per her  She was told by sleep medicine that she can take it as needed, so she can consider that  The patient should not hesitate to call me prior to her follow up with any questions or concerns  Subjective:    HPI    Ms  Addi French is a very pleasant 58 yo female here for neurological follow up      Since I last saw the patient in August her migraine headaches seem to be getting worse  She has been doing physical therapy for her low back pain  She follows with pain management for low back pain  She has osteoporosis and follows with rheumatology, takes Prolia injections  Migraine headaches are behind the eyes 5% of the time, and 95% of the time are located in the left apex region of the head  She has associated neck and back pain which radiates up to the front of the head    She very seldom has nausea with a migraine unless severe  She prefers not to be around in noisy environment when she has a migraine  Migraine is characterized by a thumping and throbbing sensation  She also has difficulty with concentration and focus  She tries to give herself something to do to distract herself from the headache and other symptoms, and tries to keep her mind busy which sometimes helps  She has about 2 severe migraine headaches every 3 weeks, but at least 15 days out of the month with a normal migraine headache  Zomig does help and keeps the headache under control  She gets relief in about a half an hour after taking it  Recently she has been taking it more frequently, and more frequently she has been needing the second dose after 2 hours  She has left greater than right-sided neck pain radiating down the left greater than right arm at times  She feels muscle knots in her arm  She is working on these in physical therapy  She sees PT 2 times per week  Trigger point injections performed at the last visit were not greatly helpful at all  She did not have side effects  Her tremor is a little bit better after holding off on the armodafinil  When she took it she would have drooling, lip licking, her  called it a "fishmouth "  When she stopped taking it she felt better in terms of tremor  She felt like she was overmedicated on it  She also took Cymbalta off and some other medications which ended up making her feel sick/nauseous  Prior note: In June (6/27/22) pt had a fall at work  States she clocked out, did not turn on the lights in the meat room, and went back in to grab her bag  There was a rolled up mat on the floor which she did not know about and tripped, she fell on her chin and then shoulders hit the floor  States she fell fwd  No LOC  States she now has bruises on her chin, her gums were bruised as well for a while, states he TMJ was worse for a while  She has "costochondritis" as well now   She saw her pcp s/p fall  Overall her sxs from the fall are improving, not worsening thankfully      She states her tremor is not so bad right now  She had a bad episode of tremors about a week ago and she thinks due to increased stress at that time in her house  Per last visit with Dr Rasta Gaitan: "We did discuss medications and I am concerned about the potential side effects of drowsiness given her known history of hypersomnolence   We have elected to avoid any additional medications but I have suggested weighted utensils  "     She has hypersomnolence, following with sleep medicine  She continues armodafonil and buying through [facility]- med is not covered by insurance bc she is not dx with narcolepsy officially  She states the med works well for her and denies s/e  She states the med is about 30$ thorugh [facility] and she is thankful she can afford it      Migraines  - imitrex allergy- s/e of throat closes, trouble breathing, drooling  - zomig works well, no s/e  - worse after the fall as above; migraines are letting up now, except for more triggers now of allergies (states corn pollen is a trigger)     XR cervical 6/27/22: "Postoperative changes as noted above   No evidence for hardware complication  Alignment abnormalities as noted above  C2-3 and C3-4 anterolistheses which reduce on extension and increase slightly on flexion  No acute osseous abnormalities  "     She saw Nsx for the neck pain and imaging findings   She was referred back to Dr Narinder Puentes in PM     The following portions of the patient's history were reviewed and updated as appropriate:   She  has a past medical history of Allergy to dust, Anemia, Calculus of ureter, Chronic kidney disease (11/18), Chronic pain disorder, Colitis, Colon polyp, Cracked skin, Diverticulitis of colon (2012), Diverticulosis, Dysphagia, GERD (gastroesophageal reflux disease), GI (gastrointestinal bleed), H/O: GI bleed, Hiatal hernia, History of DVT in adulthood (2019), Hydronephrosis, Hyperlipidemia, Inguinal hernia, Irregular heart beat, Irritable bowel, Kidney stone, Lactose intolerance, Migraines, Narcolepsy, Narcolepsy (08/06/2020), OAB (overactive bladder), Osteoarthritis, Osteoporosis, Other chronic pain, PONV (postoperative nausea and vomiting), Pyelonephritis (01/13/2021), SBO (small bowel obstruction) ([facility] [address] ) (09/25/2021), Sepsis ([facility] ) (01/13/2021), Smoking (08/06/2020), Tobacco abuse, Wears dentures, Wears glasses, and Wears glasses  She   Patient Active Problem List    Diagnosis Date Noted   • Myofascial pain syndrome, cervical 08/04/2022   • Neurologic gait dysfunction 05/27/2022   • DDD (degenerative disc disease), cervical 03/27/2022   • Right median nerve neuropathy 01/16/2022   • Ulnar neuropathy of right upper extremity 01/16/2022   • Tremor 12/24/2021   • Carpal tunnel syndrome of right wrist    • History of fusion of cervical spine 09/12/2021   • Colitis 11/09/2020   • Smoking 08/06/2020   • Lumbar radiculopathy    • S/P right inguinal hernia repair 07/02/2020   • Annual physical exam 07/02/2020   • Cervical radiculopathy    • Spondylosis of cervical region without myelopathy or radiculopathy    • Anemia 09/01/2019   • Irritable bowel syndrome 09/01/2019   • Prolonged QT interval 09/01/2019   • Migraine without aura and without status migrainosus, not intractable 07/29/2019   • Fibromyalgia 06/25/2019   • Pure hypercholesterolemia 06/25/2019   • Diverticulosis 06/25/2019   • Chronic diarrhea 06/25/2019   • Hypercholesterolemia 04/04/2019   • Urge incontinence 01/11/2019   • History of hydronephrosis 01/11/2019   • Irritable bowel syndrome without diarrhea 12/03/2015   • Spondylosis of lumbar spine 02/19/2015   • Senile osteoporosis 02/18/2014     She  has a past surgical history that includes Laminectomy; Knee surgery (Right); Cholecystectomy; Spinal fusion; Bladder surgery; Abdominal adhesion surgery; [place] tooth extraction; Mole removal; Other surgical history;  Hysterectomy (1977); Oophorectomy (Bilateral, 1977); LAPAROSCOPY; pr rpr 1st ingun hrna age 9 yrs/> reducible (Right, 06/25/2020); Colonoscopy (08/06/2020); Upper gastrointestinal endoscopy (08/06/2020); pr cysto bladder w/ureteral catheterization (Right, 12/02/2020); Lymph node dissection (Left); Arm skin lesion biopsy / excision; pr cysto/uretero w/lithotripsy &indwell stent insrt (Right, 01/05/2021); [place] retrograde pyelogram (01/05/2021); Joint replacement (Right); Hernia repair; pr lithotripsy xtrcorp shock wave (Right, 03/25/2021); Lymphadenectomy (1974); and Flexible sigmoidoscopy  Her family history includes Arthritis in her sister and sister; Breast cancer (age of onset: 52) in her other; Cancer in her brother, cousin, cousin, and father; Cancer (age of onset: 70) in her brother; Colon cancer in her father; Colon polyps in her father; Diabetes in her sister and sister; Heart attack in her mother; Hyperlipidemia in her mother; Hypertension in her brother, father, mother, sister, and sister; Irritable bowel syndrome in her sister; Learning disabilities in her brother; Nephrolithiasis in her brother; No Known Problems in her daughter, maternal aunt, maternal aunt, maternal aunt, maternal grandfather, maternal grandmother, paternal aunt, paternal aunt, paternal aunt, paternal grandfather, and paternal grandmother; Stroke in her brother  She  reports that she has been smoking cigarettes  She started smoking about 49 years ago  She has a 24 00 pack-year smoking history  She has never used smokeless tobacco  She reports that she does not currently use alcohol  She reports that she does not use drugs    Current Outpatient Medications   Medication Sig Dispense Refill   • aspirin 500 MG buffered tablet Take 500 mg by mouth every 6 (six) hours as needed for mild pain     • calcium carbonate 1250 MG capsule Take 600 mg by mouth daily      • Cholecalciferol 1000 units tablet Take 1,000 Units by mouth daily Takes in the afternoon • gabapentin (NEURONTIN) 300 mg capsule Take 1 capsule (300 mg total) by mouth 3 (three) times a day 270 capsule 0   • Ginger, Zingiber officinalis, (GINGER ROOT PO) Take 700 mg by mouth 2 (two) times a day      • metaxalone (SKELAXIN) 800 mg tablet Take 0 5 tablets (400 mg total) by mouth 3 (three) times a day 135 tablet 2   • Multiple Vitamins-Minerals (CENTRUM SILVER PO) Take 1 tablet by mouth daily Takes in the am     • prochlorperazine (COMPAZINE) 5 mg tablet Take 1 tablet (5 mg total) by mouth every 6 (six) hours as needed for nausea or vomiting for up to 10 doses 10 tablet 0   • trospium chloride (SANCTURA) 20 mg tablet Take 1 tablet (20 mg total) by mouth 2 (two) times a day 60 tablet 3   • Turmeric 500 MG CAPS Take 700 mg by mouth 2 (two) times a day     • trospium chloride (SANCTURA) 20 mg tablet Take 1 tablet (20 mg total) by mouth 2 (two) times a day 60 tablet 11   • ZOLMitriptan (ZOMIG) 2 5 MG tablet TAKE 1 TABLET BY MOUTH AT ONSET OF MIGRAINE, MAY REPEAT IN 2 HOURS IF NEEDED  LIMIT 2/24 HOURS, 4/WEEK, 8/MONTH 8 tablet 6     No current facility-administered medications for this visit  She is allergic to celecoxib, sumatriptan, tramadol, and medical tape            Objective:    Blood pressure 133/69, pulse (!) 46, temperature (!) 97 °F (36 1 °C), temperature source Temporal, height 5' 3" (1 6 m), weight 53 kg (116 lb 14 4 oz), not currently breastfeeding  Body mass index is 20 71 kg/m²  Physical Exam    Neurological Exam  Vital signs reviewed  Well developed, well nourished  Head: Normocephalic, atraumatic  Speech is fluent and articulate  CN 2-12: intact and symmetric, including EOMs which are normal b/l and PERRL  MSK: 5/5 t/o  Sensation: Inact to LT x4 extr  Romberg borderline positive  Reflexes: 2+ and symmetric in all 4 extr    Coordination:   Mild intention tremor at the endpoint of action in both upper extremities, equal bilaterally   The patient has adequate coordination with heel tapping bilaterally   no rest tremor  Gait: Steady normal gait  ROS:    Review of Systems   Constitutional: Negative  Negative for appetite change and fever  HENT: Negative  Negative for hearing loss, tinnitus, trouble swallowing and voice change  Eyes: Negative  Negative for photophobia, pain and visual disturbance  Respiratory: Negative  Negative for shortness of breath  Cardiovascular: Negative  Negative for palpitations  Gastrointestinal: Negative  Negative for nausea and vomiting  Endocrine: Negative  Negative for cold intolerance  Genitourinary: Negative  Negative for dysuria, frequency and urgency  Musculoskeletal: Negative  Negative for gait problem, myalgias and neck pain  Skin: Negative  Negative for rash  Allergic/Immunologic: Negative  Neurological: Negative  Negative for dizziness, tremors, seizures, syncope, facial asymmetry, speech difficulty, weakness, light-headedness, numbness and headaches  Hematological: Negative  Does not bruise/bleed easily  Psychiatric/Behavioral: Negative  Negative for confusion, hallucinations and sleep disturbance  The following portions of the patient's history were reviewed and updated as appropriate: allergies, current medications/ medication history, past family history, past medical history, past social history, past surgical history and problem list     Review of systems was reviewed and otherwise unremarkable from a neurological perspective

## 2022-12-27 ENCOUNTER — APPOINTMENT (OUTPATIENT)
Dept: PHYSICAL THERAPY | Facility: CLINIC | Age: 62
End: 2022-12-27

## 2022-12-28 ENCOUNTER — TELEPHONE (OUTPATIENT)
Dept: NEUROLOGY | Facility: CLINIC | Age: 62
End: 2022-12-28

## 2022-12-28 DIAGNOSIS — G43.009 MIGRAINE WITHOUT AURA AND WITHOUT STATUS MIGRAINOSUS, NOT INTRACTABLE: ICD-10-CM

## 2022-12-28 NOTE — TELEPHONE ENCOUNTER
Received vm from [facility]. They are requesting a script for 84 day supply of zolmitriptan, they are stating that insurance covers 48 per 84 days     Cb#: [phone]

## 2022-12-29 NOTE — TELEPHONE ENCOUNTER
Prescription was sent on 12/23 to [facility] for zomig, qty 36 for 90 day supply, did you want to change the qty as requested on their vm?

## 2022-12-30 RX ORDER — ZOLMITRIPTAN 2.5 MG/1
TABLET, FILM COATED ORAL
Qty: 48 TABLET | Refills: 0 | Status: SHIPPED | OUTPATIENT
Start: 2022-12-30 | End: 2023-01-18 | Stop reason: SDUPTHER

## 2022-12-30 NOTE — TELEPHONE ENCOUNTER
I changed it to 48, however either amount would be acceptable to me. Hope she gets it before new year.  Thanks.

## 2023-01-12 ENCOUNTER — OFFICE VISIT (OUTPATIENT)
Dept: PAIN MEDICINE | Facility: MEDICAL CENTER | Age: 63
End: 2023-01-12

## 2023-01-12 VITALS
DIASTOLIC BLOOD PRESSURE: 84 MMHG | HEART RATE: 96 BPM | BODY MASS INDEX: 21.44 KG/M2 | HEIGHT: 63 IN | WEIGHT: 121 LBS | SYSTOLIC BLOOD PRESSURE: 122 MMHG | OXYGEN SATURATION: 98 %

## 2023-01-12 DIAGNOSIS — M48.062 SPINAL STENOSIS OF LUMBAR REGION WITH NEUROGENIC CLAUDICATION: ICD-10-CM

## 2023-01-12 DIAGNOSIS — M47.816 LUMBAR SPONDYLOSIS: ICD-10-CM

## 2023-01-12 DIAGNOSIS — M54.16 LUMBAR RADICULOPATHY: Primary | ICD-10-CM

## 2023-01-12 NOTE — PATIENT INSTRUCTIONS
Epidural Steroid Injection   WHAT YOU NEED TO KNOW:   What do I need to know about an epidural steroid injection (NOEMY)? An NOEMY is a procedure to inject steroid medicine into the epidural space  The epidural space is between your spinal cord and vertebrae  Steroids reduce inflammation and fluid buildup in your spine that may be causing pain  You may be given pain medicine along with the steroids  How do I prepare for an NOEMY? Your healthcare provider will talk to you about how to prepare for your procedure  He or she will tell you what medicines to take or not take on the day of your procedure  You may need to stop taking blood thinners or other medicines several days before your procedure  You may need to adjust any diabetes medicine you take on the day of your procedure  Steroid medicine can increase your blood sugar level  Arrange for someone to drive you home when you are discharged  What will happen during an NOEMY? You will be given medicine to numb the procedure area  You will be awake for the procedure, but you will not feel pain  You may also be given medicine to help you relax  Contrast liquid will be used to help your healthcare provider see the area better  Tell the healthcare provider if you have ever had an allergic reaction to contrast liquid  Your healthcare provider may place the needle into your neck area, middle of your back, or tailbone area  He may inject the medicine next to the nerves that are causing your pain  He may instead inject the medicine into a larger area of the epidural space  This helps the medicine spread to more nerves  Your healthcare provider will use a fluoroscope to help guide the needle to the right place  A fluoroscope is a type of x-ray  After the procedure, a bandage will be placed over the injection site to prevent infection  What will happen after an NOEMY? You will have a bandage over the injection site to prevent infection   Your healthcare provider will tell you when you can bathe and any activity guidelines  You will be able to go home  What are the risks of an NOEMY? You may have temporary or permanent nerve damage or paralysis  You may have bleeding or develop a serious infection, such as meningitis (swelling of the brain coverings)  An abscess may also develop  An abscess is a pus-filled area under the skin  You may need surgery to fix the abscess  You may have a seizure, anxiety, or trouble sleeping  If you are a man, you may have temporary erectile dysfunction (not able to have an erection)  CARE AGREEMENT:   You have the right to help plan your care  Learn about your health condition and how it may be treated  Discuss treatment options with your healthcare providers to decide what care you want to receive  You always have the right to refuse treatment  The above information is an  only  It is not intended as medical advice for individual conditions or treatments  Talk to your doctor, nurse or pharmacist before following any medical regimen to see if it is safe and effective for you  © Copyright [employer] [facility] 2022 Information is for End User's use only and may not be sold, redistributed or otherwise used for commercial purposes   All illustrations and images included in CareNotes® are the copyrighted property of [employer]  or [address] [place] [address] [employer]

## 2023-01-12 NOTE — PROGRESS NOTES
Assessment:  1  Lumbar radiculopathy    2  Spinal stenosis of lumbar region with neurogenic claudication    3  Lumbar spondylosis        Plan:  While the patient was in the office today, I did have a thorough conversation regarding their chronic pain syndrome, medication management, and treatment plan options  After discussing options I have recommended that the patient proceed with an L5-S1 interlaminar epidural steroid injection to address the increased lower extremity radicular component of her pain pattern  I have also ordered a new MRI of the lumbar spine as her last one was done in 2020 and her symptoms have significantly worsened  Depending upon those results, we may consider a surgical consultation to discuss options  Otherwise the patient will follow-up after the procedure  Complete risks and benefits including bleeding, infection, tissue reaction, nerve injury and allergic reaction were discussed  The approach was demonstrated using models and literature was provided  Verbal and written consent was obtained  My impressions and treatment recommendations were discussed in detail with the patient who verbalized understanding and had no further questions  Discharge instructions were provided  I personally saw and examined the patient and I agree with the above discussed plan of care  Orders Placed This Encounter   Procedures   • FL spine and pain procedure     Standing Status:   Future     Standing Expiration Date:   1/12/2027     Order Specific Question:   Reason for Exam:     Answer:   L5-S1 LESI     Order Specific Question:   Anticoagulant hold needed? Answer:   no   • MRI lumbar spine without contrast     Standing Status:   Future     Standing Expiration Date:   1/12/2027     Scheduling Instructions: There is no preparation for this test  Please leave your jewelry and valuables at home, wedding rings are the exception   All patients will be required to change into a hospital gown and pants  Street clothes are not permitted in the MRI  Magnetic nail polish must be removed prior to arrival for your test  Please bring your insurance cards, a form of photo ID and a list of your medications with you  Arrive 15 minutes prior to your appointment time in order to register  Please bring any prior CT or MRI studies of this area that were not performed at a [facility] facility  To schedule this appointment, please contact Central Scheduling at [phone]  Prior to your appointment, please make sure you complete the MRI Screening Form when you e-Check in for your appointment  This will be available starting 7 days before your appointment in [address]  You may receive an e-mail with an activation code if you do not have a [employer] account  If you do not have access to a device, we will complete your screening at your appointment  Order Specific Question:   What is the patient's sedation requirement? Answer:   No Sedation     Order Specific Question:   Release to patient through [employer]     Answer:   Immediate     Order Specific Question:   Is order priority selected as STAT? Answer:   No     Order Specific Question:   Reason for Exam (FREE TEXT)     Answer:   chronic low back pain, b/l lumbar radiculopathy     No orders of the defined types were placed in this encounter  History of Present Illness: Tiburcio Hager is a 57 y o  female who presents for a follow up office visit in regards to Back Pain  The patient’s current symptoms include chronic radicular low back pain that she presently rates 8 out of 10 on the pain scale describes it as a constant dull, aching, throbbing and pressure-like pain with pain that radiates down the posterior and lateral aspects of bilateral extremities  She admits to numbness, paresthesias and weakness of the legs  She most recently underwent diagnostic lumbar medial branch blocks and reported no improvement    She presents today to discuss other options  Patient denies any bowel or bladder symptoms, denies saddle anesthesia  I have personally reviewed and/or updated the patient's past medical history, past surgical history, family history, social history, current medications, allergies, and vital signs today  Review of Systems   Respiratory: Negative for shortness of breath  Cardiovascular: Negative for chest pain  Gastrointestinal: Negative for constipation, diarrhea, nausea and vomiting  Musculoskeletal: Positive for back pain, gait problem, myalgias, neck pain and neck stiffness  Negative for arthralgias and joint swelling  Skin: Negative for rash  Neurological: Positive for headaches  Negative for dizziness, seizures and weakness  Psychiatric/Behavioral: Positive for sleep disturbance  All other systems reviewed and are negative        Patient Active Problem List   Diagnosis   • Urge incontinence   • Frequency of urination   • History of hydronephrosis   • Migraine headache   • Fibromyalgia   • Pure hypercholesterolemia   • Allergy   • Low iron   • Idiopathic hypersomnia   • Osteoporosis   • Diverticulosis   • Chronic diarrhea   • Migraine without aura and without status migrainosus, not intractable   • Renal calculus   • Anemia   • Irritable bowel syndrome   • Edema of both legs   • Prolonged QT interval   • Rash   • Screening mammogram, encounter for   • Screening for colon cancer   • Spondylosis of cervical region without myelopathy or radiculopathy   • Neck pain   • Encounter for hepatitis C screening test for low risk patient   • Encounter for immunization   • Spondylosis of lumbar spine   • Senile osteoporosis   • Irritable bowel syndrome without diarrhea   • Hypercholesterolemia   • Cervical radiculopathy   • Microscopic hematuria   • Calculus of ureter   • Inguinal hernia of right side without obstruction or gangrene   • S/P right inguinal hernia repair   • Diarrhea   • Weight loss   • Elevated blood sugar   • Pap smear for cervical cancer screening   • Lumbar radiculopathy   • Smoking   • Colitis   • Urgency of urination   • Ureteral stone with hydronephrosis   • Flank pain   • Low magnesium level   • Hyponatremia   • Leukopenia   • Hypokalemia   • Wears dentures   • Recurrent UTI   • Jerking movements of extremities   • History of fusion of cervical spine   • Carpal tunnel syndrome of right wrist   • Other ascites   • Low serum calcium   • Tremor   • Increased MCV   • Abnormal renal function   • Arm paresthesia, right   • Right median nerve neuropathy   • Ulnar neuropathy of right upper extremity   • Abnormal EKG   • DDD (degenerative disc disease), cervical   • Neurologic gait dysfunction   • Myofascial pain syndrome, cervical   • Abnormal CXR       Past Medical History:   Diagnosis Date   • Allergy to dust    • Anemia    • Calculus of ureter    • Chronic kidney disease 11/18    Hydrophenesis   • Chronic pain disorder     neck and back- sees pain management   • Colitis    • Colon polyp    • Cracked skin     on fingers   • Diverticulitis of colon 2012    Seen by Dr Sarah King at [facility]  Inpatient at   • Diverticulosis    • Dysphagia    • GERD (gastroesophageal reflux disease)    • GI (gastrointestinal bleed)     As a result of diverticulitis and microscopic colitis  • H/O: GI bleed    • Hiatal hernia    • History of DVT in adulthood 2019 2019   • Hydronephrosis    • Hyperlipidemia    • Inguinal hernia     right side   • Irregular heart beat     Pt states with prolonged QT interval - was seen by cardiologist( [facility]) pt told "to not worry about it"   • Irritable bowel    • Kidney stone    • Lactose intolerance     Did not tolerate milk after being weaned from breast milk     • Migraines    • Narcolepsy    • Narcolepsy 08/06/2020   • OAB (overactive bladder)    • Osteoarthritis    • Osteoporosis    • Other chronic pain     degenerative disc disease   • PONV (postoperative nausea and vomiting)     migraines   • Pyelonephritis 01/13/2021   • SBO (small bowel obstruction) ([facility] [address] ) 09/25/2021   • Sepsis ([facility] [address] ) 01/13/2021   • Smoking 08/06/2020   • Tobacco abuse    • Wears dentures     full upper   • Wears glasses    • Wears glasses        Past Surgical History:   Procedure Laterality Date   • ABDOMINAL ADHESION SURGERY     • ARM SKIN LESION BIOPSY / EXCISION      lymph node excision   • BLADDER SURGERY     • CHOLECYSTECTOMY     • COLONOSCOPY  08/06/2020   • FL RETROGRADE PYELOGRAM  01/05/2021   • FLEXIBLE SIGMOIDOSCOPY      Last done at [address] in 2018 after being admitted for GI bleed  • HERNIA REPAIR     • HYSTERECTOMY  1977   • JOINT REPLACEMENT Right     knee   • KNEE SURGERY Right     replacement   • LAMINECTOMY      L5-6-7   • LAPAROSCOPY     • LYMPH NODE DISSECTION Left     removed -no cancer-  limb restriction   • LYMPHADENECTOMY  1974    UNDER LEFT ARM - DO NOT DRAW BLOOD/GIVE INJECTIONS IN LEFT ARM PER PATIENT    • MOLE REMOVAL     • OOPHORECTOMY Bilateral 1977   • OTHER SURGICAL HISTORY      enlarged lymph node removed left arm   no cancer   • AL CYSTO BLADDER W/URETERAL CATHETERIZATION Right 12/02/2020    Procedure: CYSTOSCOPY AND INSERTION STENT URETERAL;  Surgeon: Isamar Maria MD;  [facility];  Service: Urology   • AL CYSTO/URETERO W/LITHOTRIPSY &INDWELL STENT INSRT Right 01/05/2021    Procedure: CYSTO, LASER  URETEROSCOPY, RETRO PYELOGRAM, STENT REPLACMENT;  Surgeon: Dandre Rodriguez MD;  [facility];  Service: Urology   • AL LITHOTRIPSY [address] [facility] [address] Right 03/25/2021    Procedure: LITHOTRIPSY EXTRACORPORAL SHOCKWAVE (ESWL);   Surgeon: Climmie Lombard, MD;  Location: [facility] MAIN OR;  Service: Urology   • AL RPR 1ST INGUN HRNA AGE 3 YRS/> REDUCIBLE Right 06/25/2020    Procedure: REPAIR HERNIA INGUINAL  WITH MESH;  Surgeon: Ella Monroy MD;  [facility];  Service: General   • SPINAL FUSION     • UPPER GASTROINTESTINAL ENDOSCOPY  08/06/2020   • WISDOM TOOTH EXTRACTION         Family History Problem Relation Age of Onset   • Hypertension Mother          from massive heart attack, never took any medication for this condition   • Heart attack Mother    • Hyperlipidemia Mother         Blood tests done just prior to heart attack  Results reported after her passing     • Hypertension Father         Had high blood pressure, unknown if he ever took medication   • Cancer Father         Tumor in bowels, spread to liver, skin cancer   • Colon cancer Father    • Colon polyps Father    • Diabetes Sister         uses insulin   • Arthritis Sister         Osteo arthritis   • Hypertension Sister         Triple bypass   • Diabetes Sister         pre-diabetic, oral medication   • Arthritis Sister         Osteo arthritis   • Hypertension Sister         Triple bypass   • Irritable bowel syndrome Sister         Constipation   • No Known Problems Daughter    • No Known Problems Maternal Grandmother    • No Known Problems Maternal Grandfather    • No Known Problems Paternal Grandmother    • No Known Problems Paternal Grandfather    • Stroke Brother    • Nephrolithiasis Brother    • Cancer Brother 70        soft tissue cancer   • No Known Problems Maternal Aunt    • No Known Problems Maternal Aunt    • No Known Problems Maternal Aunt    • No Known Problems Paternal Aunt    • No Known Problems Paternal Aunt    • No Known Problems Paternal Aunt    • Breast cancer Other 52   • Cancer Cousin         Pancreatic cancer   • Cancer Cousin         Breast cancer, total mastectomy   • Cancer Brother         Terminal soft tissue cancer    • Hypertension Brother         Triple bypass   • Learning disabilities Brother         Mental retardation       Social History     Occupational History   • Not on file   Tobacco Use   • Smoking status: Heavy Smoker     Packs/day: 0 50     Years: 48 00     Pack years: 24 00     Types: Cigarettes     Start date: 1973   • Smokeless tobacco: Never   • Tobacco comments:     Stopped smoking [id number] (first marriage),  stopped again during 2014 due to multiple s   Vaping Use   • Vaping Use: Never used   Substance and Sexual Activity   • Alcohol use: Not Currently     Comment: occasional drink ( 12 oz  beer or beer based beverage 1-2x m   • Drug use: Never     Types: Marijuana     Comment: medicinal marijuana; last dose was 3/16   • Sexual activity: Not Currently     Partners: Male     Birth control/protection: Post-menopausal, Male Sterilization       Current Outpatient Medications on File Prior to Visit   Medication Sig   • aspirin 500 MG buffered tablet Take 500 mg by mouth every 6 (six) hours as needed for mild pain   • calcium carbonate 1250 MG capsule Take 600 mg by mouth daily    • Cholecalciferol 1000 units tablet Take 1,000 Units by mouth daily Takes in the afternoon   • gabapentin (NEURONTIN) 300 mg capsule Take 1 capsule (300 mg total) by mouth 3 (three) times a day   • Ginger, Zingiber officinalis, (GINGER ROOT PO) Take 700 mg by mouth 2 (two) times a day    • metaxalone (SKELAXIN) 800 mg tablet Take 0 5 tablets (400 mg total) by mouth 3 (three) times a day   • Multiple Vitamins-Minerals (CENTRUM SILVER PO) Take 1 tablet by mouth daily Takes in the am   • prochlorperazine (COMPAZINE) 5 mg tablet Take 1 tablet (5 mg total) by mouth every 6 (six) hours as needed for nausea or vomiting for up to 10 doses   • trospium chloride (SANCTURA) 20 mg tablet Take 1 tablet (20 mg total) by mouth 2 (two) times a day   • Turmeric 500 MG CAPS Take 700 mg by mouth 2 (two) times a day   • ZOLMitriptan (ZOMIG) 2 5 MG tablet TAKE 1 TABLET BY MOUTH AT ONSET OF MIGRAINE, MAY REPEAT IN 2 HOURS IF NEEDED  LIMIT 2/24 HOURS, 4/WEEK, 8/MONTH     No current facility-administered medications on file prior to visit  Allergies   Allergen Reactions   • Celecoxib Other (See Comments)     Increased Heart Rate, Palpitations   • Sumatriptan Anaphylaxis     Other reaction(s): SUMATRIPTAN (IMITREX) (Throat closure)   Pt analilia zolmitriptan  • Tramadol Other (See Comments)     GI Upset- severe vomiting and systemic body aches   • Medical Tape Dermatitis, Rash and Blisters     Adhesive from medication patches and bandaides- ok with paper tape       Physical Exam:    /84   Pulse 96   Ht 5' 3" (1 6 m)   Wt 54 9 kg (121 lb)   SpO2 98%   BMI 21 43 kg/m²     Constitutional:normal, well developed, well nourished, alert, in no distress and non-toxic and no overt pain behavior  Eyes:anicteric  HEENT:grossly intact  Neck:supple, symmetric, trachea midline and no masses   Pulmonary:even and unlabored  Cardiovascular:No edema or pitting edema present  Skin:Normal without rashes or lesions and well hydrated  Psychiatric:Mood and affect appropriate  Neurologic:Cranial Nerves II-XII grossly intact  Musculoskeletal: Mild tenderness to palpation over the bilateral lumbar facet joints, limited range of motion with extension, positive straight leg raise test bilaterally, DTRs equal and intact bilaterally, decreased strength bilateral lower extremities with dorsiflexion  Gait is slow but stable      Imaging

## 2023-01-18 ENCOUNTER — TELEPHONE (OUTPATIENT)
Dept: SLEEP CENTER | Facility: CLINIC | Age: 63
End: 2023-01-18

## 2023-01-18 DIAGNOSIS — G43.009 MIGRAINE WITHOUT AURA AND WITHOUT STATUS MIGRAINOSUS, NOT INTRACTABLE: ICD-10-CM

## 2023-01-18 NOTE — TELEPHONE ENCOUNTER
Call placed to patient  Left message to call the nursing staff back if she would like to restart armodafinil, and if so, which pharmacy she would like Rx sent

## 2023-01-18 NOTE — TELEPHONE ENCOUNTER
----- Message from Charlotte Mckeon, [address] sent at 1/18/2023 12:45 PM EST -----  I received a refill request for armodafinil for this patient  A GI doctor discontinued the armodafinil with notation of symptoms of diarrhea  Does she want to restart it and which pharmacy does she need it sent to if she does?   Thanks,  Melisa Shelby

## 2023-01-19 ENCOUNTER — OFFICE VISIT (OUTPATIENT)
Dept: FAMILY MEDICINE CLINIC | Facility: CLINIC | Age: 63
End: 2023-01-19

## 2023-01-19 VITALS
HEIGHT: 63 IN | OXYGEN SATURATION: 97 % | HEART RATE: 90 BPM | BODY MASS INDEX: 21.16 KG/M2 | DIASTOLIC BLOOD PRESSURE: 72 MMHG | WEIGHT: 119.4 LBS | SYSTOLIC BLOOD PRESSURE: 136 MMHG | TEMPERATURE: 97 F

## 2023-01-19 DIAGNOSIS — Z00.00 ANNUAL PHYSICAL EXAM: Primary | ICD-10-CM

## 2023-01-19 DIAGNOSIS — Z23 NEED FOR VACCINATION: ICD-10-CM

## 2023-01-19 RX ORDER — ZOLMITRIPTAN 2.5 MG/1
TABLET, FILM COATED ORAL
Qty: 8 TABLET | Refills: 6 | Status: SHIPPED | OUTPATIENT
Start: 2023-01-19

## 2023-01-19 NOTE — TELEPHONE ENCOUNTER
Per [employer] message from [address] [facility]:  Do NOT submit any refills for this medication  I have stopped taking this medication completely with no reverse reactions   --------------------------------------------------------    Message sent via [employer] to patient to acknowledge her declination of the requested medication      RADHA Servin notified

## 2023-01-20 ENCOUNTER — OFFICE VISIT (OUTPATIENT)
Dept: UROLOGY | Facility: MEDICAL CENTER | Age: 63
End: 2023-01-20

## 2023-01-20 VITALS
HEART RATE: 76 BPM | DIASTOLIC BLOOD PRESSURE: 70 MMHG | BODY MASS INDEX: 20.9 KG/M2 | WEIGHT: 118 LBS | SYSTOLIC BLOOD PRESSURE: 122 MMHG

## 2023-01-20 DIAGNOSIS — N39.0 RECURRENT UTI: Primary | ICD-10-CM

## 2023-01-20 DIAGNOSIS — N39.41 URGE INCONTINENCE: ICD-10-CM

## 2023-01-20 PROBLEM — R93.89 ABNORMAL CXR: Status: RESOLVED | Noted: 2022-11-10 | Resolved: 2023-01-20

## 2023-01-20 PROBLEM — M81.0 OSTEOPOROSIS: Status: RESOLVED | Noted: 2019-06-25 | Resolved: 2023-01-20

## 2023-01-20 PROBLEM — R21 RASH: Status: RESOLVED | Noted: 2019-09-01 | Resolved: 2023-01-20

## 2023-01-20 PROBLEM — R63.4 WEIGHT LOSS: Status: RESOLVED | Noted: 2020-07-02 | Resolved: 2023-01-20

## 2023-01-20 PROBLEM — R73.9 ELEVATED BLOOD SUGAR: Status: RESOLVED | Noted: 2020-07-02 | Resolved: 2023-01-20

## 2023-01-20 PROBLEM — R79.0 LOW MAGNESIUM LEVEL: Status: RESOLVED | Noted: 2021-01-10 | Resolved: 2023-01-20

## 2023-01-20 PROBLEM — G47.11 IDIOPATHIC HYPERSOMNIA: Status: RESOLVED | Noted: 2019-06-25 | Resolved: 2023-01-20

## 2023-01-20 PROBLEM — Z12.31 SCREENING MAMMOGRAM, ENCOUNTER FOR: Status: RESOLVED | Noted: 2019-09-01 | Resolved: 2023-01-20

## 2023-01-20 PROBLEM — R25.2 JERKING MOVEMENTS OF EXTREMITIES: Status: RESOLVED | Noted: 2021-09-12 | Resolved: 2023-01-20

## 2023-01-20 PROBLEM — Z12.11 SCREENING FOR COLON CANCER: Status: RESOLVED | Noted: 2019-09-01 | Resolved: 2023-01-20

## 2023-01-20 PROBLEM — N13.2 URETERAL STONE WITH HYDRONEPHROSIS: Status: RESOLVED | Noted: 2020-12-02 | Resolved: 2023-01-20

## 2023-01-20 PROBLEM — Z11.59 ENCOUNTER FOR HEPATITIS C SCREENING TEST FOR LOW RISK PATIENT: Status: RESOLVED | Noted: 2019-10-04 | Resolved: 2023-01-20

## 2023-01-20 PROBLEM — R35.0 FREQUENCY OF URINATION: Status: RESOLVED | Noted: 2019-01-11 | Resolved: 2023-01-20

## 2023-01-20 PROBLEM — R39.15 URGENCY OF URINATION: Status: RESOLVED | Noted: 2020-11-19 | Resolved: 2023-01-20

## 2023-01-20 PROBLEM — T78.40XA ALLERGY: Status: RESOLVED | Noted: 2019-06-25 | Resolved: 2023-01-20

## 2023-01-20 PROBLEM — N20.0 RENAL CALCULUS: Status: RESOLVED | Noted: 2019-08-29 | Resolved: 2023-01-20

## 2023-01-20 PROBLEM — K40.90 INGUINAL HERNIA OF RIGHT SIDE WITHOUT OBSTRUCTION OR GANGRENE: Status: RESOLVED | Noted: 2020-06-25 | Resolved: 2023-01-20

## 2023-01-20 PROBLEM — Z00.00 ANNUAL PHYSICAL EXAM: Status: ACTIVE | Noted: 2020-07-02

## 2023-01-20 PROBLEM — R19.7 DIARRHEA: Status: RESOLVED | Noted: 2020-07-02 | Resolved: 2023-01-20

## 2023-01-20 PROBLEM — E87.1 HYPONATREMIA: Status: RESOLVED | Noted: 2021-03-14 | Resolved: 2023-01-20

## 2023-01-20 PROBLEM — G43.909 MIGRAINE HEADACHE: Status: RESOLVED | Noted: 2019-06-25 | Resolved: 2023-01-20

## 2023-01-20 PROBLEM — N28.9 ABNORMAL RENAL FUNCTION: Status: RESOLVED | Noted: 2021-12-24 | Resolved: 2023-01-20

## 2023-01-20 PROBLEM — R60.0 EDEMA OF BOTH LEGS: Status: RESOLVED | Noted: 2019-09-01 | Resolved: 2023-01-20

## 2023-01-20 PROBLEM — R94.31 ABNORMAL EKG: Status: RESOLVED | Noted: 2022-03-27 | Resolved: 2023-01-20

## 2023-01-20 PROBLEM — Z12.4 PAP SMEAR FOR CERVICAL CANCER SCREENING: Status: RESOLVED | Noted: 2020-07-02 | Resolved: 2023-01-20

## 2023-01-20 PROBLEM — D72.819 LEUKOPENIA: Status: RESOLVED | Noted: 2021-03-14 | Resolved: 2023-01-20

## 2023-01-20 PROBLEM — R31.29 MICROSCOPIC HEMATURIA: Status: RESOLVED | Noted: 2020-05-27 | Resolved: 2023-01-20

## 2023-01-20 PROBLEM — R18.8 OTHER ASCITES: Status: RESOLVED | Noted: 2021-12-24 | Resolved: 2023-01-20

## 2023-01-20 PROBLEM — R20.2 ARM PARESTHESIA, RIGHT: Status: RESOLVED | Noted: 2022-01-16 | Resolved: 2023-01-20

## 2023-01-20 PROBLEM — Z23 ENCOUNTER FOR IMMUNIZATION: Status: RESOLVED | Noted: 2019-11-23 | Resolved: 2023-01-20

## 2023-01-20 PROBLEM — E61.1 LOW IRON: Status: RESOLVED | Noted: 2019-06-25 | Resolved: 2023-01-20

## 2023-01-20 PROBLEM — R79.89 LOW SERUM CALCIUM: Status: RESOLVED | Noted: 2021-12-24 | Resolved: 2023-01-20

## 2023-01-20 PROBLEM — R10.9 FLANK PAIN: Status: RESOLVED | Noted: 2020-12-25 | Resolved: 2023-01-20

## 2023-01-20 PROBLEM — E87.6 HYPOKALEMIA: Status: RESOLVED | Noted: 2021-03-14 | Resolved: 2023-01-20

## 2023-01-20 PROBLEM — D75.89 INCREASED MCV: Status: RESOLVED | Noted: 2021-12-24 | Resolved: 2023-01-20

## 2023-01-20 PROBLEM — N20.1 CALCULUS OF URETER: Status: RESOLVED | Noted: 2020-05-27 | Resolved: 2023-01-20

## 2023-01-20 LAB — POST-VOID RESIDUAL VOLUME, ML POC: 36 ML

## 2023-01-20 RX ORDER — TROSPIUM CHLORIDE 20 MG/1
20 TABLET, FILM COATED ORAL 2 TIMES DAILY
Qty: 60 TABLET | Refills: 11 | Status: SHIPPED | OUTPATIENT
Start: 2023-01-20

## 2023-01-20 NOTE — PROGRESS NOTES
1/20/2023      Chief Complaint   Patient presents with   • Urinary Incontinence   • Nephrolithiasis         Assessment and Plan    57 y o  female managed by Dr Moni Covarrubias     1  Mixed urinary incontinence  · Currently managed on Sanctura 20 mg po bid   · Refills sent  · PVR: 36 mL   · Notes some improvement in her urinary symptoms  Not currently interested in proceeding with Botox  If symptoms progress, she may consider  · Follow up in 6 months     2  Nephrolithiasis  · CT 10/29/22 with 2 mm nonobstructing left renal calculi  No bladder calculi  · Reviewed dietary modifications, improtance of daily water intake 40-60 oz  · Continue observation  · ED parameters reviewed  History of Present Illness  Ranjith Munguia is a 57 y o  female here for evaluation of urinary incontinence and nephrolithiasis  Patient has a history of mixed urinary incontinence mostly with urge urinary incontinence and has been managed on oxybutynin, Vesicare, and Detrol with minimal improvement  Plan was to try Sanctura 20 mg p o  twice daily  If minimal improvement, Dr Moni Covarrubias recommended Botox procedure  Also recommended CT to evaluate for any bladder stones  CT in October 2022 only revealed a 2 mm nonobstructing left renal calculi  Patient states that she has noted somewhat improved improvement in her urinary symptoms on the Sanctura  She continues to have frequency and urgency feels as if she strains to empty at times but otherwise is doing well  She denies any dysuria or gross hematuria  She denies any flank or abdominal pain  She denies any fevers or chills  She is agreeable to plan above  Review of Systems   Constitutional: Negative for chills and fever  HENT: Negative  Respiratory: Negative  Cardiovascular: Negative  Gastrointestinal: Negative for abdominal pain, nausea and vomiting  Genitourinary: Positive for frequency and urgency  Negative for difficulty urinating, dysuria, flank pain and hematuria  Strain to empty   Musculoskeletal: Negative  Skin: Negative  Neurological: Negative  Vitals  Vitals:    01/20/23 1554   BP: 122/70   Pulse: 76   Weight: 53 5 kg (118 lb)       Physical Exam  Vitals reviewed  Constitutional:       General: She is not in acute distress  Appearance: Normal appearance  She is normal weight  She is not ill-appearing, toxic-appearing or diaphoretic  HENT:      Head: Normocephalic and atraumatic  Eyes:      Conjunctiva/sclera: Conjunctivae normal    Pulmonary:      Effort: Pulmonary effort is normal  No respiratory distress  Abdominal:      General: There is no distension  Palpations: Abdomen is soft  Tenderness: There is no abdominal tenderness  There is no right CVA tenderness, left CVA tenderness, guarding or rebound  Musculoskeletal:         General: Normal range of motion  Cervical back: Normal range of motion  Skin:     General: Skin is warm and dry  Neurological:      General: No focal deficit present  Mental Status: She is alert and oriented to person, place, and time  Psychiatric:         Mood and Affect: Mood normal          Behavior: Behavior normal          Thought Content: Thought content normal          Judgment: Judgment normal        Past History  Past Medical History:   Diagnosis Date   • Allergy to dust    • Anemia    • Calculus of ureter    • Chronic kidney disease 11/18    Hydrophenesis   • Chronic pain disorder     neck and back- sees pain management   • Colitis    • Colon polyp    • Cracked skin     on fingers   • Diverticulitis of colon 2012    Seen by Dr Jared Blanco at [facility]  Inpatient at   • Diverticulosis    • Dysphagia    • GERD (gastroesophageal reflux disease)    • GI (gastrointestinal bleed)     As a result of diverticulitis and microscopic colitis     • H/O: GI bleed    • Hiatal hernia    • History of DVT in adulthood 2019 2019   • Hydronephrosis    • Hyperlipidemia    • Inguinal hernia     right side   • Irregular heart beat     Pt states with prolonged QT interval - was seen by cardiologist( [facility]) pt told "to not worry about it"   • Irritable bowel    • Kidney stone    • Lactose intolerance     Did not tolerate milk after being weaned from breast milk     • Migraines    • Narcolepsy    • Narcolepsy 08/06/2020   • OAB (overactive bladder)    • Osteoarthritis    • Osteoporosis    • Other chronic pain     degenerative disc disease   • PONV (postoperative nausea and vomiting)     migraines   • Pyelonephritis 01/13/2021   • [facility]BO [facility] obstruction[facility] ([facility] ) 09/25/2021   • Sepsis ([facility] ) 01/13/2021   • Smoking 08/06/2020   • Tobacco abuse    • Wears dentures     full upper   • Wears glasses    • Wears glasses      Social History     Socioeconomic History   • Marital status: /Civil Union     Spouse name: None   • Number of children: None   • Years of education: None   • Highest education level: None   Occupational History   • None   Tobacco Use   • Smoking status: Heavy Smoker     Packs/day: 0 50     Years: 48 00     Pack years: 24 00     Types: Cigarettes     Start date: 6/1/1973   • Smokeless tobacco: Never   • Tobacco comments:     Stopped smoking [id number] (first marriage),  stopped again during 2014 due to multiple s   Vaping Use   • Vaping Use: Never used   Substance and Sexual Activity   • Alcohol use: Not Currently     Comment: occasional drink ( 12 oz  beer or beer based beverage 1-2x m   • Drug use: Never     Types: Marijuana     Comment: medicinal marijuana; last dose was 3/16   • Sexual activity: Not Currently     Partners: Male     Birth control/protection: Post-menopausal, Male Sterilization   Other Topics Concern   • None   Social History Narrative   • None     Social Determinants of Health     Financial Resource Strain: Not on file   Food Insecurity: Not on file   Transportation Needs: Not on file   Physical Activity: Not on file   Stress: Not on file   Social Connections: Not on file   Intimate Partner Violence: Not on file   Housing Stability: Not on file     Social History     Tobacco Use   Smoking Status Heavy Smoker   • Packs/day: 0 50   • Years: 48 00   • Pack years: 24 00   • Types: Cigarettes   • Start date: 1973   Smokeless Tobacco Never   Tobacco Comments    Stopped smoking [id number] (first marriage),  stopped again during  due to multiple s     Family History   Problem Relation Age of Onset   • Hypertension Mother          from massive heart attack, never took any medication for this condition   • Heart attack Mother    • Hyperlipidemia Mother         Blood tests done just prior to heart attack  Results reported after her passing     • Hypertension Father         Had high blood pressure, unknown if he ever took medication   • Cancer Father         Tumor in bowels, spread to liver, skin cancer   • Colon cancer Father    • Colon polyps Father    • Diabetes Sister         uses insulin   • Arthritis Sister         Osteo arthritis   • Hypertension Sister         Triple bypass   • Diabetes Sister         pre-diabetic, oral medication   • Arthritis Sister         Osteo arthritis   • Hypertension Sister         Triple bypass   • Irritable bowel syndrome Sister         Constipation   • No Known Problems Daughter    • No Known Problems Maternal Grandmother    • No Known Problems Maternal Grandfather    • No Known Problems Paternal Grandmother    • No Known Problems Paternal Grandfather    • Stroke Brother    • Nephrolithiasis Brother    • Cancer Brother 70        soft tissue cancer   • No Known Problems Maternal Aunt    • No Known Problems Maternal Aunt    • No Known Problems Maternal Aunt    • No Known Problems Paternal Aunt    • No Known Problems Paternal Aunt    • No Known Problems Paternal Aunt    • Breast cancer Other 52   • Cancer Cousin         Pancreatic cancer   • Cancer Cousin         Breast cancer, total mastectomy   • Cancer Brother         Terminal soft tissue cancer    • Hypertension Brother         Triple bypass   • Learning disabilities Brother         Mental retardation       The following portions of the patient's history were reviewed and updated as appropriate: allergies, current medications, past medical history, past social history, past surgical history and problem list     Results  No results found for this or any previous visit (from the past 1 hour(s))  ]  No results found for: PSA  Lab Results   Component Value Date    CALCIUM 8 8 11/02/2022    K 4 1 11/02/2022    CO2 27 11/02/2022     11/02/2022    BUN 16 11/02/2022    CREATININE 0 71 11/02/2022     Lab Results   Component Value Date    WBC 4 88 11/02/2022    HGB 14 0 11/02/2022    HCT 41 5 11/02/2022    MCV 94 11/02/2022     11/02/2022     Abram Lyons PA-C

## 2023-01-20 NOTE — ASSESSMENT & PLAN NOTE
- Satisfactory physical examination  - Influenza vaccination administered at today's office visit  - Patient up to date with breast, colon and cervical cancer screening

## 2023-01-20 NOTE — PROGRESS NOTES
[facility]    NAME: Yimi   AGE: 57 y o  SEX: female  : 1960     DATE: 2023     Assessment and Plan:     Problem List Items Addressed This Visit        Other    Annual physical exam - Primary     - Satisfactory physical examination  - Influenza vaccination administered at today's office visit  - Patient up to date with breast, colon and cervical cancer screening         Other Visit Diagnoses     Need for vaccination        Relevant Orders    influenza vaccine, quadrivalent, recombinant, PF, 0 5 mL, for patients 18 yr+ (FLUBLOK) (Completed)          Immunizations and preventive care screenings were discussed with patient today  Appropriate education was printed on patient's after visit summary  Counseling:  Injury prevention: discussed safety/seat belts, safety helmets, smoke detectors, carbon dioxide detectors, and smoking near bedding or upholstery  · Exercise: the importance of regular exercise/physical activity was discussed  Recommend exercise 3-5 times per week for at least 30 minutes  Return in about 6 months (around 2023)  Chief Complaint:     Chief Complaint   Patient presents with   • Physical Exam      History of Present Illness:     Adult Annual Physical   Yimi  is a 57year old female with a past medical history of migraines, chronic back and neck pain and tobacco abuse who presents today for a comprehensive physical exam Overall patient feels well  Unfortunately her  was admitted to the hospital earlier today which has understandably caused a lot of stress  She continues to follow with Neurology, Pain management, Rheumatology, Urology and Sleep medicine periodically  She notes that occasionally develops a rash in her belly button although it is not present right now and resolved with application of hydrogen peroxide       Diet and Physical Activity  · Diet/Nutrition: well balanced diet  · Exercise: no formal exercise  Depression Screening  PHQ-2/9 Depression Screening    Little interest or pleasure in doing things: 1 - several days  Feeling down, depressed, or hopeless: 1 - several days       General Health  · Sleep: Idiopathic hypersomnia; follows with sleep medicine   · Hearing: No hearing issues  · Vision: goes for regular eye exams and wears glasses  · Dental: regular dental visits  /GYN Health  · Patient is: postmenopausal     Review of Systems:     Review of Systems   Constitutional: Negative  HENT: Negative  Eyes: Negative  Respiratory: Negative  Cardiovascular: Negative  Gastrointestinal: Negative  Genitourinary: Negative  Musculoskeletal: Positive for arthralgias, back pain and neck pain  Skin: Negative  Neurological: Negative  Psychiatric/Behavioral: Negative  Past Medical History:     Past Medical History:   Diagnosis Date   • Allergy to dust    • Anemia    • Calculus of ureter    • Chronic kidney disease 11/18    Hydrophenesis   • Chronic pain disorder     neck and back- sees pain management   • Colitis    • Colon polyp    • Cracked skin     on fingers   • Diverticulitis of colon 2012    Seen by Dr Anabella Carlos at [facility]  Inpatient at   • Diverticulosis    • Dysphagia    • GERD (gastroesophageal reflux disease)    • GI (gastrointestinal bleed)     As a result of diverticulitis and microscopic colitis  • H/O: GI bleed    • Hiatal hernia    • History of DVT in adulthood 2019 2019   • Hydronephrosis    • Hyperlipidemia    • Inguinal hernia     right side   • Irregular heart beat     Pt states with prolonged QT interval - was seen by cardiologist( [facility]) pt told "to not worry about it"   • Irritable bowel    • Kidney stone    • Lactose intolerance     Did not tolerate milk after being weaned from breast milk     • Migraines    • Narcolepsy    • Narcolepsy 08/06/2020   • OAB (overactive bladder)    • Osteoarthritis    • Osteoporosis    • Other chronic pain     degenerative disc disease   • PONV (postoperative nausea and vomiting)     migraines   • Pyelonephritis 01/13/2021   • SBO (small bowel obstruction) ([facility]) 09/25/2021   • Sepsis ([address] ) 01/13/2021   • Smoking 08/06/2020   • Tobacco abuse    • Wears dentures     full upper   • Wears glasses    • Wears glasses       Past Surgical History:     Past Surgical History:   Procedure Laterality Date   • ABDOMINAL ADHESION SURGERY     • ARM SKIN LESION BIOPSY / EXCISION      lymph node excision   • BLADDER SURGERY     • CHOLECYSTECTOMY     • COLONOSCOPY  08/06/2020   • FL RETROGRADE PYELOGRAM  01/05/2021   • FLEXIBLE SIGMOIDOSCOPY      Last done at [address] in 2018 after being admitted for GI bleed  • HERNIA REPAIR     • HYSTERECTOMY  1977   • JOINT REPLACEMENT Right     knee   • KNEE SURGERY Right     replacement   • LAMINECTOMY      L5-6-7   • LAPAROSCOPY     • LYMPH NODE DISSECTION Left     removed -no cancer-  limb restriction   • LYMPHADENECTOMY  1974    UNDER LEFT ARM - DO NOT DRAW BLOOD/GIVE INJECTIONS IN LEFT ARM PER PATIENT    • MOLE REMOVAL     • OOPHORECTOMY Bilateral 1977   • OTHER SURGICAL HISTORY      enlarged lymph node removed left arm   no cancer   • MI CYSTO BLADDER W/URETERAL CATHETERIZATION Right 12/02/2020    Procedure: CYSTOSCOPY AND INSERTION STENT URETERAL;  Surgeon: Jenni Velazquez MD;  Location[facility];  Service: Urology   • MI CYSTO/URETERO W/LITHOTRIPSY &INDWELL STENT INSRT Right 01/05/2021    Procedure: CYSTO, LASER  URETEROSCOPY, RETRO PYELOGRAM, STENT REPLACMENT;  Surgeon: Cuco Jack MD;  Location: [facility];  Service: Urology   • MI LITHOTRIPSY [address] Right 03/25/2021    Procedure: LITHOTRIPSY EXTRACORPORAL SHOCKWAVE (ESWL);   Surgeon: Nick Aguilar MD;  Location: [address] [place] [address] MAIN OR;  Service: Urology   • MI RPR 1ST INGUN HRNA AGE 5 YRS/> REDUCIBLE Right 06/25/2020    Procedure: REPAIR HERNIA INGUINAL  WITH MESH;  Surgeon: Sherry Clark MD; Location: [facility] OR;  Service: General   • SPINAL FUSION     • UPPER GASTROINTESTINAL ENDOSCOPY  2020   • WISDOM TOOTH EXTRACTION        Social History:     Social History     Socioeconomic History   • Marital status: /Civil Union     Spouse name: None   • Number of children: None   • Years of education: None   • Highest education level: None   Occupational History   • None   Tobacco Use   • Smoking status: Heavy Smoker     Packs/day: 0 50     Years: 48 00     Pack years: 24 00     Types: Cigarettes     Start date: 1973   • Smokeless tobacco: Never   • Tobacco comments:     Stopped smoking [id number] (first marriage),  stopped again during  due to multiple s   Vaping Use   • Vaping Use: Never used   Substance and Sexual Activity   • Alcohol use: Not Currently     Comment: occasional drink ( 12 oz  beer or beer based beverage 1-2x m   • Drug use: Never     Types: Marijuana     Comment: medicinal marijuana; last dose was 3/16   • Sexual activity: Not Currently     Partners: Male     Birth control/protection: Post-menopausal, Male Sterilization   Other Topics Concern   • None   Social History Narrative   • None     Social Determinants of Health     Financial Resource Strain: Not on file   Food Insecurity: Not on file   Transportation Needs: Not on file   Physical Activity: Not on file   Stress: Not on file   Social Connections: Not on file   Intimate Partner Violence: Not on file   Housing Stability: Not on file      Family History:     Family History   Problem Relation Age of Onset   • Hypertension Mother          from massive heart attack, never took any medication for this condition   • Heart attack Mother    • Hyperlipidemia Mother         Blood tests done just prior to heart attack  Results reported after her passing     • Hypertension Father         Had high blood pressure, unknown if he ever took medication   • Cancer Father         Tumor in bowels, spread to liver, skin cancer   • Colon cancer Father    • Colon polyps Father    • Diabetes Sister         uses insulin   • Arthritis Sister         Osteo arthritis   • Hypertension Sister         Triple bypass   • Diabetes Sister         pre-diabetic, oral medication   • Arthritis Sister         Osteo arthritis   • Hypertension Sister         Triple bypass   • Irritable bowel syndrome Sister         Constipation   • No Known Problems Daughter    • No Known Problems Maternal Grandmother    • No Known Problems Maternal Grandfather    • No Known Problems Paternal Grandmother    • No Known Problems Paternal Grandfather    • Stroke Brother    • Nephrolithiasis Brother    • Cancer Brother 70        soft tissue cancer   • No Known Problems Maternal Aunt    • No Known Problems Maternal Aunt    • No Known Problems Maternal Aunt    • No Known Problems Paternal Aunt    • No Known Problems Paternal Aunt    • No Known Problems Paternal Aunt    • Breast cancer Other 52   • Cancer Cousin         Pancreatic cancer   • Cancer Cousin         Breast cancer, total mastectomy   • Cancer Brother         Terminal soft tissue cancer 8/21   • Hypertension Brother         Triple bypass   • Learning disabilities Brother         Mental retardation      Current Medications:     Current Outpatient Medications   Medication Sig Dispense Refill   • aspirin 500 MG buffered tablet Take 500 mg by mouth every 6 (six) hours as needed for mild pain     • calcium carbonate 1250 MG capsule Take 600 mg by mouth daily      • Cholecalciferol 1000 units tablet Take 1,000 Units by mouth daily Takes in the afternoon     • gabapentin (NEURONTIN) 300 mg capsule Take 1 capsule (300 mg total) by mouth 3 (three) times a day 270 capsule 0   • Ginger, Zingiber officinalis, (GINGER ROOT PO) Take 700 mg by mouth 2 (two) times a day      • metaxalone (SKELAXIN) 800 mg tablet Take 0 5 tablets (400 mg total) by mouth 3 (three) times a day 135 tablet 2   • Multiple Vitamins-Minerals (CENTRUM SILVER PO) Take 1 tablet by mouth daily Takes in the am     • prochlorperazine (COMPAZINE) 5 mg tablet Take 1 tablet (5 mg total) by mouth every 6 (six) hours as needed for nausea or vomiting for up to 10 doses 10 tablet 0   • trospium chloride (SANCTURA) 20 mg tablet Take 1 tablet (20 mg total) by mouth 2 (two) times a day 60 tablet 3   • Turmeric 500 MG CAPS Take 700 mg by mouth 2 (two) times a day     • ZOLMitriptan (ZOMIG) 2 5 MG tablet TAKE 1 TABLET BY MOUTH AT ONSET OF MIGRAINE, MAY REPEAT IN 2 HOURS IF NEEDED  LIMIT 2/24 HOURS, 4/WEEK, 8/MONTH 8 tablet 6     No current facility-administered medications for this visit  Allergies: Allergies   Allergen Reactions   • Celecoxib Other (See Comments)     Increased Heart Rate, Palpitations   • Sumatriptan Anaphylaxis     Other reaction(s): SUMATRIPTAN (IMITREX) (Throat closure)  Pt analilia zolmitriptan  • Tramadol Other (See Comments)     GI Upset- severe vomiting and systemic body aches   • Medical Tape Dermatitis, Rash and Blisters     Adhesive from medication patches and bandaides- ok with paper tape      Physical Exam:     /72 (Patient Position: Sitting, Cuff Size: Standard)   Pulse 90   Temp (!) 97 °F (36 1 °C) (Temporal)   Ht 5' 3" (1 6 m)   Wt 54 2 kg (119 lb 6 4 oz)   SpO2 97%   BMI 21 15 kg/m²     Physical Exam  Constitutional:       General: She is not in acute distress  Appearance: She is not ill-appearing  HENT:      Head: Normocephalic and atraumatic  Mouth/Throat:      Mouth: Mucous membranes are moist       Pharynx: No oropharyngeal exudate or posterior oropharyngeal erythema  Eyes:      General:         Right eye: No discharge  Left eye: No discharge  Extraocular Movements: Extraocular movements intact  Pupils: Pupils are equal, round, and reactive to light  Cardiovascular:      Rate and Rhythm: Normal rate  Pulses: Normal pulses  Pulmonary:      Effort: Pulmonary effort is normal  No respiratory distress  Breath sounds: No wheezing  Abdominal:      General: Bowel sounds are normal  There is no distension  Palpations: Abdomen is soft  Tenderness: There is no abdominal tenderness  There is no guarding  Musculoskeletal:      Right lower leg: No edema  Left lower leg: No edema  Skin:     Findings: No rash  Neurological:      General: No focal deficit present  Mental Status: She is alert     Psychiatric:         Mood and Affect: Mood normal          Behavior: Behavior normal           Nate Davidson MD  [address] [place] [address]

## 2023-01-23 ENCOUNTER — HOSPITAL ENCOUNTER (OUTPATIENT)
Facility: MEDICAL CENTER | Age: 63
Discharge: HOME | End: 2023-01-23

## 2023-01-23 ENCOUNTER — TELEPHONE (OUTPATIENT)
Dept: NEUROLOGY | Facility: CLINIC | Age: 63
End: 2023-01-23

## 2023-01-23 DIAGNOSIS — M54.16 LUMBAR RADICULOPATHY: ICD-10-CM

## 2023-01-23 NOTE — TELEPHONE ENCOUNTER
Called and spoke to patient   Patient confirmed upcoming apt with Dr Daron Hernandez on 2/1/23 @ 2 pm

## 2023-01-27 ENCOUNTER — TELEPHONE (OUTPATIENT)
Dept: PAIN MEDICINE | Facility: MEDICAL CENTER | Age: 63
End: 2023-01-27

## 2023-01-27 NOTE — TELEPHONE ENCOUNTER
S/w pt and advised of same  Pt verbalized understanding and prefers to see if she gets relief from her injection first and will f/u with a surgeon if necessary at a later time

## 2023-01-27 NOTE — TELEPHONE ENCOUNTER
----- Message from Moo Urbina PA-C sent at 1/27/2023 12:12 PM EST -----  MRI reveals multilevel degenerative changes, varying degrees of stenosis, progressed from her previous study  Ok to proceed with injection as planned  Consider surgical consultation

## 2023-01-29 NOTE — PATIENT INSTRUCTIONS
Headache management instructions  - When patient has a moderate to severe headache, they should seek rest, initiate relaxation and apply cold compresses to the head  - Maintain regular sleep schedule  Adults need at least 7-8 hours of uninterrupted a night  - Limit over the counter medications such as Tylenol, Ibuprofen, Aleve, Excedrin  (No more than 2- 3 times a week or max 10 a month)  - Maintain headache diary  Free MYRTLE for a smart phone, which can be used is "Migraine corey"  - Limit caffeine to 1-2 cups 8 to 16 oz a day or less  - Avoid dietary trigger  (aged cheese, peanuts, MSG, aspartame and nitrates)  - Patient is to have regular frequent meals to prevent headache onset  - Please drink at least 64 ounces of water a day to help remain hydrated

## 2023-02-01 ENCOUNTER — OFFICE VISIT (OUTPATIENT)
Dept: NEUROLOGY | Facility: CLINIC | Age: 63
End: 2023-02-01

## 2023-02-01 VITALS
WEIGHT: 122 LBS | TEMPERATURE: 97 F | OXYGEN SATURATION: 99 % | DIASTOLIC BLOOD PRESSURE: 76 MMHG | RESPIRATION RATE: 18 BRPM | BODY MASS INDEX: 21.62 KG/M2 | HEART RATE: 76 BPM | SYSTOLIC BLOOD PRESSURE: 132 MMHG | HEIGHT: 63 IN

## 2023-02-01 DIAGNOSIS — G43.009 MIGRAINE WITHOUT AURA AND WITHOUT STATUS MIGRAINOSUS, NOT INTRACTABLE: Primary | ICD-10-CM

## 2023-02-01 DIAGNOSIS — G24.01 TARDIVE DYSKINESIA: ICD-10-CM

## 2023-02-01 RX ORDER — ONDANSETRON 4 MG/1
4 TABLET, FILM COATED ORAL EVERY 8 HOURS PRN
Qty: 20 TABLET | Refills: 0 | Status: SHIPPED | OUTPATIENT
Start: 2023-02-01

## 2023-02-01 RX ORDER — AMANTADINE HYDROCHLORIDE 100 MG/1
100 CAPSULE, GELATIN COATED ORAL DAILY
Qty: 30 CAPSULE | Refills: 1 | Status: SHIPPED | OUTPATIENT
Start: 2023-02-01

## 2023-02-01 NOTE — PROGRESS NOTES
Patient ID: Kaci Garcia is a 57 y o  female  Assessment/Plan:    Tardive dyskinesia  Complains of mouth movements  Scars of her tongue and her mouth  I feel this may be consistent with tardive dyskinesia stemming from the use of multiple other medications in the past   I have asked her to avoid the use of Compazine  New rice did cause exacerbation of the symptoms and this should be avoided as well  I did start her on amantadine once a day but informed her that the dose could be increased  There are other medications that help also with been approved but these may have more side effects we could consider these in the future  Migraine without aura and without status migrainosus, not intractable  Continues to have migraine headaches several times a month  I did discontinue the Compazine and ordered Zofran for her nausea  Tremor  sHe has a tremor and there is no evidence of a Parkinson type tremor  Previously her tremor may have been exacerbated by certain medications  Archimedes spiral was performed which is mildly abnormal   There was no Parkinson-like features or evidence asterixus    Can return to our offices in several months  Diagnoses and all orders for this visit:    Migraine without aura and without status migrainosus, not intractable  -     ondansetron (ZOFRAN) 4 mg tablet; Take 1 tablet (4 mg total) by mouth every 8 (eight) hours as needed for nausea or vomiting    Tardive dyskinesia  -     amantadine (SYMMETREL) 100 mg capsule; Take 1 capsule (100 mg total) by mouth daily         Subjective:    Ms Kaci Garcia is a very pleasant 58 yo female here for neurological follow up  Since her last visit she reports her migraine headaches occur in the back of the neck and the left apex region of her head  They are characterized by throbbing throbbing sensation  She has headaches at least 15 days out of the month  Zomig does help    She is undergoing work-up with physical therapy as well as pain management today  Since her last visit she was diagnosed with COVID  After that time of her medications were discontinued  She then slowly began to add them  She remained off of armodafinil  but then restarted it  She noticed no change and has continued to be off  She has noted some mouth movements which was aggravated with a rmodafinil  She describes tongue movements as well as increase in secretions and a sense of numbness around her lips  sHe has no longer utilizes it  She has been doing physical therapy for her low back pain  She follows with pain management for low back pain      She has osteoporosis and follows with rheumatology, takes Prolia injections      She complained of a tremor but this is actually improved since being off of so many medications  Tremor does increase with stress                 Migraines  - imitrex allergy- s/e of throat closes, trouble breathing, drooling  - zomig works well, no s/e  - worse after the fall as above; migraines are letting up now, except for more triggers now of allergies (states corn pollen is a trigger)     XR cervical 6/27/22: "Postoperative changes as noted above   No evidence for hardware complication  Alignment abnormalities as noted above  C2-3 and C3-4 anterolistheses which reduce on extension and increase slightly on flexion  No acute osseous abnormalities  "     She saw [facility] for the neck pain and imaging findings  She was referred back to Dr Lawanda Lombardi in [place]       Botox is being considered        MRI c-spine on 2/12/22 and CT c-spine on 3/20/22 showed prior ACDF construct at C5-6 and C6-7 with no canal or foraminal stenosis, mild bilateral foraminal stenosis at C7/T1, loss of normal lordosis (kyphotic deformity most prominent at C3/4), bony fusion at ACDF and adjacent levels, no significant cord compression or cord signal changes                          The following portions of the patient's history were reviewed and updated as appropriate: She  has a past medical history of Allergy to dust, Anemia, Calculus of ureter, Chronic kidney disease (11/18), Chronic pain disorder, Colitis, Colon polyp, Cracked skin, Diverticulitis of colon (2012), Diverticulosis, Dysphagia, GERD (gastroesophageal reflux disease), GI (gastrointestinal bleed), H/O: GI bleed, Hiatal hernia, History of DVT in adulthood (2019), Hydronephrosis, Hyperlipidemia, Inguinal hernia, Irregular heart beat, Irritable bowel, Kidney stone, Lactose intolerance, Migraines, Narcolepsy, Narcolepsy (08/06/2020), OAB (overactive bladder), Osteoarthritis, Osteoporosis, Other chronic pain, PONV (postoperative nausea and vomiting), Pyelonephritis (01/13/2021), [facility] obstruction) [facility] ) (09/25/2021), Sepsis ([facility] ) (01/13/2021), Smoking (08/06/2020), Tobacco abuse, Wears dentures, Wears glasses, and Wears glasses  She  has a past surgical history that includes Laminectomy; Knee surgery (Right); Cholecystectomy; Spinal fusion; Bladder surgery; Abdominal adhesion surgery; Vale tooth extraction; Mole removal; Other surgical history; Hysterectomy (1977); Oophorectomy (Bilateral, 1977); LAPAROSCOPY; pr rpr 1st ingun hrna age 9 yrs/> reducible (Right, 06/25/2020); Colonoscopy (08/06/2020); Upper gastrointestinal endoscopy (08/06/2020); pr cysto bladder w/ureteral catheterization (Right, 12/02/2020); Lymph node dissection (Left); Arm skin lesion biopsy / excision; pr cysto/uretero w/lithotripsy &indwell stent insrt (Right, 01/05/2021); FL retrograde pyelogram (01/05/2021); Joint replacement (Right); Hernia repair; pr lithotripsy xtrcorp shock wave (Right, 03/25/2021); Lymphadenectomy (1974); and Flexible sigmoidoscopy    Her family history includes Arthritis in her sister and sister; Breast cancer (age of onset: 52) in her other; Cancer in her brother, cousin, cousin, and father; Cancer (age of onset: 70) in her brother; Colon cancer in her father; Colon polyps in her father; Diabetes in her sister and sister; Heart attack in her mother; Hyperlipidemia in her mother; Hypertension in her brother, father, mother, sister, and sister; Irritable bowel syndrome in her sister; Learning disabilities in her brother; Nephrolithiasis in her brother; No Known Problems in her daughter, maternal aunt, maternal aunt, maternal aunt, maternal grandfather, maternal grandmother, paternal aunt, paternal aunt, paternal aunt, paternal grandfather, and paternal grandmother; Stroke in her brother  She  reports that she has been smoking cigarettes  She started smoking about 49 years ago  She has a 24 00 pack-year smoking history  She has never used smokeless tobacco  She reports that she does not currently use alcohol  She reports that she does not use drugs  Current Outpatient Medications   Medication Sig Dispense Refill   • amantadine (SYMMETREL) 100 mg capsule Take 1 capsule (100 mg total) by mouth daily 30 capsule 1   • calcium carbonate 1250 MG capsule Take 600 mg by mouth daily      • Cholecalciferol 1000 units tablet Take 1,000 Units by mouth daily Takes in the afternoon     • gabapentin (NEURONTIN) 300 mg capsule Take 1 capsule (300 mg total) by mouth 3 (three) times a day 270 capsule 0   • Ginger, Zingiber officinalis, (GINGER ROOT PO) Take 700 mg by mouth 2 (two) times a day      • metaxalone (SKELAXIN) 800 mg tablet Take 0 5 tablets (400 mg total) by mouth 3 (three) times a day 135 tablet 2   • Multiple Vitamins-Minerals (CENTRUM SILVER PO) Take 1 tablet by mouth daily Takes in the am     • ondansetron (ZOFRAN) 4 mg tablet Take 1 tablet (4 mg total) by mouth every 8 (eight) hours as needed for nausea or vomiting 20 tablet 0   • trospium chloride (SANCTURA) 20 mg tablet Take 1 tablet (20 mg total) by mouth 2 (two) times a day 60 tablet 11   • Turmeric 500 MG CAPS Take 700 mg by mouth 2 (two) times a day     • ZOLMitriptan (ZOMIG) 2 5 MG tablet TAKE 1 TABLET BY MOUTH AT ONSET OF MIGRAINE, MAY REPEAT IN 2 HOURS IF NEEDED  LIMIT 2/24 HOURS, 4/WEEK, 8/MONTH 8 tablet 6   • aspirin 500 MG buffered tablet Take 500 mg by mouth every 6 (six) hours as needed for mild pain (Patient not taking: Reported on 2/1/2023)     • trospium chloride (SANCTURA) 20 mg tablet Take 1 tablet (20 mg total) by mouth 2 (two) times a day 60 tablet 3     No current facility-administered medications for this visit  She is allergic to celecoxib, sumatriptan, tramadol, and medical tape        Objective:    Blood pressure 132/76, pulse 76, temperature (!) 97 °F (36 1 °C), temperature source Tympanic, resp  rate 18, height 5' 3" (1 6 m), weight 55 3 kg (122 lb), SpO2 99 %, not currently breastfeeding  Physical Exam  Eyes:      General: Lids are normal       Extraocular Movements: Extraocular movements intact  Pupils: Pupils are equal, round, and reactive to light  Neurological:      Motor: Motor strength is normal       Deep Tendon Reflexes:      Reflex Scores:       Tricep reflexes are 1+ on the right side and 1+ on the left side  Bicep reflexes are 2+ on the right side and 2+ on the left side  Patellar reflexes are 1+ on the right side and 1+ on the left side  Achilles reflexes are 1+ on the right side and 1+ on the left side  Neurological Exam  Mental Status  Awake, alert and oriented to person, place and time  Oriented to person, place and time  Language is fluent with no aphasia  Cranial Nerves  CN II: Visual acuity is normal  Visual fields full to confrontation  CN III, IV, VI: Extraocular movements intact bilaterally  Normal lids and orbits bilaterally  Pupils equal round and reactive to light bilaterally  CN V: Facial sensation is normal   CN VII: Full and symmetric facial movement  CN VIII: Hearing is normal   CN IX, X: Palate elevates symmetrically  Normal gag reflex  CN XI: Shoulder shrug strength is normal   CN XII: Tongue midline without atrophy or fasciculations    Had mouth movements on occasion as well as tongue movement  Motor   Strength is 5/5 throughout all four extremities  Can be    Was performed but that showed some mild changes when examination of her right arm   she had a tremor noted with extension  There is no resting tremor and no cogwheel rigidity       Sensory  Light touch is normal in upper and lower extremities  Temperature is normal in upper and lower extremities  Reflexes                                            Right                      Left  Biceps                                 2+                         2+  Triceps                                1+                         1+  Patellar                                1+                         1+  Achilles                                1+                         1+  Plantar                           Downgoing                Downgoing    Coordination  Right: Finger-to-nose normal Left: Finger-to-nose normal     Gait   Able to rise from chair without using arms  View of systems obtained from the medical assistant as below was reviewed with the patient at today's visit  ROS:    Review of Systems   Constitutional: Positive for appetite change (increase) and fatigue  Negative for fever  HENT: Negative  Negative for hearing loss, tinnitus (both ears from taking aspirin), trouble swallowing and voice change  Eyes: Negative  Negative for photophobia, pain and visual disturbance  Respiratory: Positive for shortness of breath (excersion)  Cardiovascular: Negative  Negative for palpitations  Gastrointestinal: Negative  Negative for nausea and vomiting  Endocrine: Negative  Negative for cold intolerance  Genitourinary: Positive for urgency  Negative for dysuria and frequency  Musculoskeletal: Positive for back pain (low back ) and neck pain  Negative for gait problem and myalgias  Skin: Negative  Negative for rash  Allergic/Immunologic: Negative      Neurological: Positive for tremors (when she is under alot of stress her feet and hands), numbness (legs, bilateral feet and left hand ) and headaches (migraine's from pain )  Negative for dizziness, seizures, syncope, facial asymmetry, speech difficulty, weakness and light-headedness  Uncontrolled movement with her lips   Hematological: Bruises/bleeds easily  Psychiatric/Behavioral: Negative for confusion, hallucinations and sleep disturbance  The patient is nervous/anxious           Anxiety

## 2023-02-01 NOTE — ASSESSMENT & PLAN NOTE
Continues to have migraine headaches several times a month  I did discontinue the Compazine and ordered Zofran for her nausea

## 2023-02-01 NOTE — PATIENT INSTRUCTIONS
Dyskinesia  Khloe Cushing  no compazine , try amatandine 100mg daily       Can use zofran for nausea / zomig

## 2023-02-01 NOTE — ASSESSMENT & PLAN NOTE
sHe has a tremor and there is no evidence of a Parkinson type tremor  Previously her tremor may have been exacerbated by certain medications    Archimedes spiral was performed which is mildly abnormal   There was no Parkinson-like features or evidence asterixus

## 2023-02-01 NOTE — ASSESSMENT & PLAN NOTE
Complains of mouth movements  Scars of her tongue and her mouth  I feel this may be consistent with tardive dyskinesia stemming from the use of multiple other medications in the past   I have asked her to avoid the use of Compazine  New rice did cause exacerbation of the symptoms and this should be avoided as well  I did start her on amantadine once a day but informed her that the dose could be increased  There are other medications that help also with been approved but these may have more side effects we could consider these in the future

## 2023-02-07 ENCOUNTER — TELEPHONE (OUTPATIENT)
Dept: RADIOLOGY | Facility: MEDICAL CENTER | Age: 63
End: 2023-02-07

## 2023-02-07 NOTE — TELEPHONE ENCOUNTER
FYI-    S/w pt  Pt is scheduled for an LESI tomorrow and is having cold symptoms  Per pt she has a "runny nose, sniffles" Denies cough, sneezing and fever  Discussed with Dr Luis Coffey and as long as pt is afebrile we can proceed with her procedure tomorrow  S/w pt and advised of same  Pt advised to call if her symptoms worsen or she develops a fever  Pt verbalized understanding

## 2023-02-07 NOTE — TELEPHONE ENCOUNTER
Good Morning,    While doing confirmation calls, I asked patient if she has been sick in any way  Patient states she has a minor cold, having the "sniffles" only  Patient states no fever  Please call patient and assess to confirm whether patient can still be seen for procedure or will need to reschedule  Thank you!

## 2023-02-08 ENCOUNTER — HOSPITAL ENCOUNTER (OUTPATIENT)
Dept: RADIOLOGY | Facility: MEDICAL CENTER | Age: 63
Discharge: HOME | End: 2023-02-08
Admitting: PHYSICAL MEDICINE & REHABILITATION

## 2023-02-08 VITALS
SYSTOLIC BLOOD PRESSURE: 117 MMHG | RESPIRATION RATE: 18 BRPM | OXYGEN SATURATION: 97 % | DIASTOLIC BLOOD PRESSURE: 77 MMHG | HEART RATE: 83 BPM | TEMPERATURE: 98 F

## 2023-02-08 DIAGNOSIS — M54.16 LUMBAR RADICULOPATHY: ICD-10-CM

## 2023-02-08 RX ORDER — METHYLPREDNISOLONE ACETATE 80 MG/ML
80 INJECTION, SUSPENSION INTRA-ARTICULAR; INTRALESIONAL; INTRAMUSCULAR; PARENTERAL; SOFT TISSUE ONCE
Status: COMPLETED | OUTPATIENT
Start: 2023-02-08 | End: 2023-02-08

## 2023-02-08 RX ADMIN — METHYLPREDNISOLONE ACETATE 80 MG: 80 INJECTION, SUSPENSION INTRA-ARTICULAR; INTRALESIONAL; INTRAMUSCULAR; PARENTERAL; SOFT TISSUE at 15:21

## 2023-02-08 RX ADMIN — IOHEXOL 1 ML: 300 INJECTION, SOLUTION INTRAVENOUS at 15:21

## 2023-02-08 NOTE — DISCHARGE INSTRUCTIONS
Epidural Steroid Injection   WHAT YOU NEED TO KNOW:   An epidural steroid injection (NOEMY) is a procedure to inject steroid medicine into the epidural space  The epidural space is between your spinal cord and vertebrae  Steroids reduce inflammation and fluid buildup in your spine that may be causing pain  You may be given pain medicine along with the steroids  ACTIVITY  Do not drive or operate machinery today  No strenuous activity today - bending, lifting, etc   You may resume normal activites starting tomorrow - start slowly and as tolerated  You may shower today, but no tub baths or hot tubs  You may have numbness for several hours from the local anesthetic  Please use caution and common sense, especially with weight-bearing activities  CARE OF THE INJECTION SITE  If you have soreness or pain, apply ice to the area today (20 minutes on/20 minutes off)  Starting tomorrow, you may use warm, moist heat or ice if needed  You may have an increase or change in your discomfort for 36-48 hours after your treatment  Apply ice and continue with any pain medication you have been prescribed  Notify the [facility] if you have any of the following: redness, drainage, swelling, headache, stiff neck or fever above 100°F     SPECIAL INSTRUCTIONS  Our office will contact you in approximately 7 days for a progress report  MEDICATIONS  Continue to take all routine medications  Our office may have instructed you to hold some medications  Resume aspirin 2/9 after 3:45 pm     As no general anesthesia was used in today's procedure, you should not experience any side effects related to anesthesia  If you are diabetic, the steroids used in today's injection may temporarily increase your blood sugar levels after the first few days after your injection   Please keep a close eye on your sugars and alert the doctor who manages your diabetes if your sugars are significantly high from your baseline or you are symptomatic  If you have a problem specifically related to your procedure, please call our office at [phone]  Problems not related to your procedure should be directed to your primary care physician

## 2023-02-08 NOTE — H&P
History of Present Illness: The patient is a 57 y o  female who presents with complaints of back and leg pain    Past Medical History:   Diagnosis Date   • Allergy to dust    • Anemia    • Calculus of ureter    • Chronic kidney disease 11/18    Hydrophenesis   • Chronic pain disorder     neck and back- sees pain management   • Colitis    • Colon polyp    • Cracked skin     on fingers   • Diverticulitis of colon 2012    Seen by Dr Chacha Montero at [facility]  Inpatient at   • Diverticulosis    • Dysphagia    • GERD (gastroesophageal reflux disease)    • GI (gastrointestinal bleed)     As a result of diverticulitis and microscopic colitis  • H/O: GI bleed    • Hiatal hernia    • History of DVT in adulthood 2019 2019   • Hydronephrosis    • Hyperlipidemia    • Inguinal hernia     right side   • Irregular heart beat     Pt states with prolonged QT interval - was seen by cardiologist( [facility]) pt told "to not worry about it"   • Irritable bowel    • Kidney stone    • Lactose intolerance     Did not tolerate milk after being weaned from breast milk  • Migraines    • Narcolepsy    • Narcolepsy 08/06/2020   • OAB (overactive bladder)    • Osteoarthritis    • Osteoporosis    • Other chronic pain     degenerative disc disease   • PONV (postoperative nausea and vomiting)     migraines   • Pyelonephritis 01/13/2021   • SBO (small bowel obstruction) ([facility] ) 09/25/2021   • Sepsis ([address] ) 01/13/2021   • Smoking 08/06/2020   • Tobacco abuse    • Wears dentures     full upper   • Wears glasses    • Wears glasses        Past Surgical History:   Procedure Laterality Date   • ABDOMINAL ADHESION SURGERY     • ARM SKIN LESION BIOPSY / EXCISION      lymph node excision   • BLADDER SURGERY     • CHOLECYSTECTOMY     • COLONOSCOPY  08/06/2020   • FL RETROGRADE PYELOGRAM  01/05/2021   • FLEXIBLE SIGMOIDOSCOPY      Last done at [facility] in 2018 after being admitted for GI bleed     • HERNIA REPAIR     • HYSTERECTOMY  1977   • JOINT REPLACEMENT Right     knee   • KNEE SURGERY Right     replacement   • LAMINECTOMY      L5-6-7   • LAPAROSCOPY     • LYMPH NODE DISSECTION Left     removed -no cancer-  limb restriction   • LYMPHADENECTOMY  1974    UNDER LEFT ARM - DO NOT DRAW BLOOD/GIVE INJECTIONS IN LEFT ARM PER PATIENT    • MOLE REMOVAL     • OOPHORECTOMY Bilateral 1977   • OTHER SURGICAL HISTORY      enlarged lymph node removed left arm   no cancer   • WY CYSTO BLADDER W/URETERAL CATHETERIZATION Right 12/02/2020    Procedure: CYSTOSCOPY AND INSERTION STENT URETERAL;  Surgeon: Yudith Santiago MD;  Location: [facility];  Service: Urology   • WY CYSTO/URETERO W/LITHOTRIPSY &INDWELL STENT INSRT Right 01/05/2021    Procedure: CYSTO, LASER  URETEROSCOPY, RETRO PYELOGRAM, STENT REPLACMENT;  Surgeon: Bear Mujica MD;  Location: [facility];  Service: Urology   • W[place] [address]ITHOTRIPSY [address] Right 03/25/2021    Procedure: LITHOTRIPSY EXTRACORPORAL SHOCKWAVE (ESWL);   Surgeon: Davin Jones MD;  Location: [address] [place] [address] MAIN OR;  Service: Urology   • WY RPR 1ST INGUN HRNA AGE 3 YRS/> REDUCIBLE Right 06/25/2020    Procedure: REPAIR HERNIA INGUINAL  WITH MESH;  Surgeon: Janie De Leon MD;  Location: [facility];  Service: General   • SPINAL FUSION     • UPPER GASTROINTESTINAL ENDOSCOPY  08/06/2020   • WISDOM TOOTH EXTRACTION           Current Outpatient Medications:   •  amantadine (SYMMETREL) 100 mg capsule, Take 1 capsule (100 mg total) by mouth daily, Disp: 30 capsule, Rfl: 1  •  aspirin 500 MG buffered tablet, Take 500 mg by mouth every 6 (six) hours as needed for mild pain (Patient not taking: Reported on 2/1/2023), Disp: , Rfl:   •  calcium carbonate 1250 MG capsule, Take 600 mg by mouth daily , Disp: , Rfl:   •  Cholecalciferol 1000 units tablet, Take 1,000 Units by mouth daily Takes in the afternoon, Disp: , Rfl:   •  gabapentin (NEURONTIN) 300 mg capsule, Take 1 capsule (300 mg total) by mouth 3 (three) times a day, Disp: 270 capsule, Rfl: 0  •  Ginger, Zingiber officinalis, (GINGER ROOT PO), Take 700 mg by mouth 2 (two) times a day , Disp: , Rfl:   •  metaxalone (SKELAXIN) 800 mg tablet, Take 0 5 tablets (400 mg total) by mouth 3 (three) times a day, Disp: 135 tablet, Rfl: 2  •  Multiple Vitamins-Minerals (CENTRUM SILVER PO), Take 1 tablet by mouth daily Takes in the am, Disp: , Rfl:   •  ondansetron (ZOFRAN) 4 mg tablet, Take 1 tablet (4 mg total) by mouth every 8 (eight) hours as needed for nausea or vomiting, Disp: 20 tablet, Rfl: 0  •  trospium chloride (SANCTURA) 20 mg tablet, Take 1 tablet (20 mg total) by mouth 2 (two) times a day, Disp: 60 tablet, Rfl: 3  •  trospium chloride (SANCTURA) 20 mg tablet, Take 1 tablet (20 mg total) by mouth 2 (two) times a day, Disp: 60 tablet, Rfl: 11  •  Turmeric 500 MG CAPS, Take 700 mg by mouth 2 (two) times a day, Disp: , Rfl:   •  ZOLMitriptan (ZOMIG) 2 5 MG tablet, TAKE 1 TABLET BY MOUTH AT ONSET OF MIGRAINE, MAY REPEAT IN 2 HOURS IF NEEDED  LIMIT 2/24 HOURS, 4/WEEK, 8/MONTH, Disp: 8 tablet, Rfl: 6    Current Facility-Administered Medications:   •  iohexol (OMNIPAQUE) 300 mg/mL injection 2 mL, 2 mL, Epidural, Once, Cassi Tobin DO  •  methylPREDNISolone acetate (DEPO-MEDROL) injection 80 mg, 80 mg, Epidural, Once, Cassi Tobin,     Allergies   Allergen Reactions   • Celecoxib Other (See Comments)     Increased Heart Rate, Palpitations   • Sumatriptan Anaphylaxis     Other reaction(s): SUMATRIPTAN (IMITREX) (Throat closure)  Pt analilia zolmitriptan     • Tramadol Other (See Comments)     GI Upset- severe vomiting and systemic body aches   • Medical Tape Dermatitis, Rash and Blisters     Adhesive from medication patches and bandaides- ok with paper tape       Physical Exam:   Vitals:    02/08/23 1510   BP: 110/72   Pulse: 88   Resp: 18   Temp: 98 °F (36 7 °C)   SpO2: 96%     General: Awake, Alert, Oriented x 3, Mood and affect appropriate  Respiratory: Respirations even and unlabored  Cardiovascular: Peripheral pulses intact; no edema  Musculoskeletal Exam: back and leg pain    ASA Score: 2    Patient/Chart Verification  Patient ID Verified: Verbal  Consents Confirmed: Procedural, To be obtained in the Pre-Procedure area  H&P( within 30 days) Verified: To be obtained in the Pre-Procedure area  Allergies Reviewed: Yes  Anticoag/NSAID held?: Yes (ASA last dose 1/31)  Currently on antibiotics?: No  Pregnancy denied?: NA    Assessment:   1   Lumbar radiculopathy        Plan: L5-S1 LESI

## 2023-02-15 ENCOUNTER — TELEPHONE (OUTPATIENT)
Dept: RADIOLOGY | Facility: MEDICAL CENTER | Age: 63
End: 2023-02-15

## 2023-02-15 NOTE — TELEPHONE ENCOUNTER
Caller:  Lori  Doctor/office: Deshaun Dorsey  CB#:     % of improvement: 0%  Pain Scale (1-10): 8/10

## 2023-02-24 ENCOUNTER — OFFICE VISIT (OUTPATIENT)
Dept: PAIN MEDICINE | Facility: MEDICAL CENTER | Age: 63
End: 2023-02-24

## 2023-02-24 VITALS
SYSTOLIC BLOOD PRESSURE: 106 MMHG | HEIGHT: 63 IN | WEIGHT: 122 LBS | DIASTOLIC BLOOD PRESSURE: 71 MMHG | HEART RATE: 94 BPM | BODY MASS INDEX: 21.62 KG/M2

## 2023-02-24 DIAGNOSIS — M48.062 SPINAL STENOSIS OF LUMBAR REGION WITH NEUROGENIC CLAUDICATION: ICD-10-CM

## 2023-02-24 DIAGNOSIS — M51.16 LUMBAR DISC DISEASE WITH RADICULOPATHY: Primary | ICD-10-CM

## 2023-02-24 NOTE — PROGRESS NOTES
Assessment:  1  Lumbar disc disease with radiculopathy    2  Spinal stenosis of lumbar region with neurogenic claudication        Plan:  While the patient was in the office today, I did have a thorough conversation regarding their chronic pain syndrome, medication management, and treatment plan options  Unfortunately the patient failed to respond to the most recent lumbar epidural steroid injection at L5-S1  She continues to describe severe low back and bilateral lower extremity pain and weakness  MRI reviewed multilevel degenerative changes with varying degrees of stenosis  At this point I have recommended that she proceed with a surgical consultation  The patient had previously seen Dr Favio Mehta in the past and would like to see her again  Follow-up here on a as needed basis  My impressions and treatment recommendations were discussed in detail with the patient who verbalized understanding and had no further questions  Discharge instructions were provided  I personally saw and examined the patient and I agree with the above discussed plan of care  Orders Placed This Encounter   Procedures   • Ambulatory referral to Neurosurgery     Standing Status:   Future     Standing Expiration Date:   2/24/2024     Referral Priority:   Routine     Referral Type:   Consult - AMB     Referral Reason:   Specialty Services Required     Referred to Provider:   Lisa Summers MD     Requested Specialty:   Neurosurgery     Number of Visits Requested:   1     Expiration Date:   2/24/2024     No orders of the defined types were placed in this encounter  History of Present Illness: Lisa Wood is a 57 y o  female who presents for a follow up office visit in regards to low back pain     The patient’s current symptoms include chronic radicular and mechanical low back pain that she presently rates an 8 out of 10 on the pain scale and describes it as a constant dull, aching, pressure-like, shooting pain that radiates down the bilateral lower extremities, left side greater than right  She reports weakness of the legs at times and feels as if she may be tripping over her left foot occasionally  She has numbness and paresthesias in the legs on occasion as well  The patient most recently underwent an L5-S1 interlaminar epidural steroid injection and unfortunately noticed no improvement  MRI has been done recently and the report is attached to today's note  I have personally reviewed and/or updated the patient's past medical history, past surgical history, family history, social history, current medications, allergies, and vital signs today  Review of Systems   Respiratory: Negative for shortness of breath  Cardiovascular: Negative for chest pain  Gastrointestinal: Negative for constipation, diarrhea, nausea and vomiting  Musculoskeletal: Positive for back pain, gait problem (left leg and foot pain), myalgias and neck pain  Negative for arthralgias and joint swelling  Skin: Negative for rash  Neurological: Positive for weakness  Negative for dizziness and seizures  All other systems reviewed and are negative        Patient Active Problem List   Diagnosis   • Urge incontinence   • History of hydronephrosis   • Fibromyalgia   • Pure hypercholesterolemia   • Diverticulosis   • Chronic diarrhea   • Migraine without aura and without status migrainosus, not intractable   • Anemia   • Irritable bowel syndrome   • Prolonged QT interval   • Spondylosis of cervical region without myelopathy or radiculopathy   • Spondylosis of lumbar spine   • Senile osteoporosis   • Irritable bowel syndrome without diarrhea   • Hypercholesterolemia   • Cervical radiculopathy   • S/P right inguinal hernia repair   • Annual physical exam   • Lumbar radiculopathy   • Smoking   • Colitis   • History of fusion of cervical spine   • Carpal tunnel syndrome of right wrist   • Tremor   • Right median nerve neuropathy   • Ulnar neuropathy of right upper extremity   • DDD (degenerative disc disease), cervical   • Neurologic gait dysfunction   • Myofascial pain syndrome, cervical   • Tardive dyskinesia       Past Medical History:   Diagnosis Date   • Allergy to dust    • Anemia    • Calculus of ureter    • Chronic kidney disease 11/18    Hydrophenesis   • Chronic pain disorder     neck and back- sees pain management   • Colitis    • Colon polyp    • Cracked skin     on fingers   • Diverticulitis of colon 2012    Se[facility] by Dr Kaye [facility] at [facility]  Inpatient at   • Diverticulosis    • Dysphagia    • GERD (gastroesophageal reflux disease)    • GI (gastrointestinal bleed)     As a result of diverticulitis and microscopic colitis  • H/O: GI bleed    • Hiatal hernia    • History of DVT in adulthood 2019 2019   • Hydronephrosis    • Hyperlipidemia    • Inguinal hernia     right side   • Irregular heart beat     Pt states with prolonged QT interval - was seen by cardiologist( [facility]) pt told "to not worry about it"   • Irritable bowel    • Kidney stone    • Lactose intolerance     Did not tolerate milk after being weaned from breast milk  • Migraines    • Narcolepsy    • Narcolepsy 08/06/2020   • OAB (overactive bladder)    • Osteoarthritis    • Osteoporosis    • Other chronic pain     degenerative disc disease   • PONV (postoperative nausea and vomiting)     migraines   • Pyelonephritis 01/13/2021   • SBO (small bowel obstruction) ([facility] [address] ) 09/25/2021   • Sepsis ([facility] [address] ) 01/13/2021   • Smoking 08/06/2020   • Tobacco abuse    • Wears dentures     full upper   • Wears glasses    • Wears glasses        Past Surgical History:   Procedure Laterality Date   • ABDOMINAL ADHESION SURGERY     • ARM SKIN LESION BIOPSY / EXCISION      lymph node excision   • BLADDER SURGERY     • CHOLECYSTECTOMY     • COLONOSCOPY  08/06/2020   • FL RETROGRADE PYELOGRAM  01/05/2021   • FLEXIBLE SIGMOIDOSCOPY      Last done at [address] in 2018 after being admitted for GI bleed     • HERNIA REPAIR     • HYSTERECTOMY     • JOINT REPLACEMENT Right     knee   • KNEE SURGERY Right     replacement   • LAMINECTOMY      L5-6-7   • LAPAROSCOPY     • LYMPH NODE DISSECTION Left     removed -no cancer-  limb restriction   • LYMPHADENECTOMY      UNDER LEFT ARM - DO NOT DRAW BLOOD/GIVE INJECTIONS IN LEFT ARM PER PATIENT    • MOLE REMOVAL     • OOPHORECTOMY Bilateral    • OTHER SURGICAL HISTORY      enlarged lymph node removed left arm   no cancer   • KS CYSTO BLADDER W/URETERAL CATHETERIZATION Right 2020    Procedure: CYSTOSCOPY AND INSERTION STENT URETERAL;  Surgeon: Maura Ryder MD;  Location: [facility];  Service: Urology   • KS CYSTO/URETERO W/LITHOTRIPSY &INDWELL STENT INSRT Right 2021    Procedure: CYSTO, LASER  URETEROSCOPY, RETRO PYELOGRAM, STENT REPLACMENT;  Surgeon: Memo Tian MD;  Location: [facility];  Service: Urology   • KS LITHOTRIPSY [address] Right 2021    Procedure: LITHOTRIPSY EXTRACORPORAL SHOCKWAVE (ESWL); Surgeon: Rosa Antony MD;  Location: [address] [place] [address] MAIN OR;  Service: Urology   • KS RPR 1ST INGUN HRNA AGE 5 YRS/> REDUCIBLE Right 2020    Procedure: REPAIR HERNIA INGUINAL  WITH MESH;  Surgeon: Gerald Miles MD;  Location: [facility];  Service: General   • SPINAL FUSION     • UPPER GASTROINTESTINAL ENDOSCOPY  2020   • WISDOM TOOTH EXTRACTION         Family History   Problem Relation Age of Onset   • Hypertension Mother          from massive heart attack, never took any medication for this condition   • Heart attack Mother    • Hyperlipidemia Mother         Blood tests done just prior to heart attack  Results reported after her passing     • Hypertension Father         Had high blood pressure, unknown if he ever took medication   • Cancer Father         Tumor in bowels, spread to liver, skin cancer   • Colon cancer Father    • Colon polyps Father    • Diabetes Sister         uses insulin   • Arthritis Sister         Osteo arthritis   • Hypertension Sister         Triple bypass   • Diabetes Sister         pre-diabetic, oral medication   • Arthritis Sister         Osteo arthritis   • Hypertension Sister         Triple bypass   • Irritable bowel syndrome Sister         Constipation   • No Known Problems Daughter    • No Known Problems Maternal Grandmother    • No Known Problems Maternal Grandfather    • No Known Problems Paternal Grandmother    • No Known Problems Paternal Grandfather    • Stroke Brother    • Nephrolithiasis Brother    • Cancer Brother 70        soft tissue cancer   • No Known Problems Maternal Aunt    • No Known Problems Maternal Aunt    • No Known Problems Maternal Aunt    • No Known Problems Paternal Aunt    • No Known Problems Paternal Aunt    • No Known Problems Paternal Aunt    • Breast cancer Other 52   • Cancer Cousin         Pancreatic cancer   • Cancer Cousin         Breast cancer, total mastectomy   • Cancer Brother         Terminal soft tissue cancer 8/21   • Hypertension Brother         Triple bypass   • Learning disabilities Brother         Mental retardation       Social History     Occupational History   • Not on file   Tobacco Use   • Smoking status: Heavy Smoker     Packs/day: 0 50     Years: 48 00     Pack years: 24 00     Types: Cigarettes     Start date: 6/1/1973   • Smokeless tobacco: Never   • Tobacco comments:     Stopped smoking [id number] (first marriage),  stopped again during 2014 due to multiple s   Vaping Use   • Vaping Use: Never used   Substance and Sexual Activity   • Alcohol use: Not Currently     Comment: occasional drink ( 12 oz  beer or beer based beverage 1-2x m   • Drug use: Never     Types: Marijuana     Comment: medicinal marijuana; last dose was 3/16   • Sexual activity: Not Currently     Partners: Male     Birth control/protection: Post-menopausal, Male Sterilization       Current Outpatient Medications on File Prior to Visit   Medication Sig   • amantadine (SYMMETREL) 100 mg capsule Take 1 capsule (100 mg total) by mouth daily   • aspirin 500 MG buffered tablet Take 500 mg by mouth every 6 (six) hours as needed for mild pain   • calcium carbonate 1250 MG capsule Take 600 mg by mouth daily    • Cholecalciferol 1000 units tablet Take 1,000 Units by mouth daily Takes in the afternoon   • gabapentin (NEURONTIN) 300 mg capsule Take 1 capsule (300 mg total) by mouth 3 (three) times a day   • Ginger, Zingiber officinalis, (GINGER ROOT PO) Take 700 mg by mouth 2 (two) times a day    • metaxalone (SKELAXIN) 800 mg tablet Take 0 5 tablets (400 mg total) by mouth 3 (three) times a day   • Multiple Vitamins-Minerals (CENTRUM SILVER PO) Take 1 tablet by mouth daily Takes in the am   • ondansetron (ZOFRAN) 4 mg tablet Take 1 tablet (4 mg total) by mouth every 8 (eight) hours as needed for nausea or vomiting   • trospium chloride (SANCTURA) 20 mg tablet Take 1 tablet (20 mg total) by mouth 2 (two) times a day   • Turmeric 500 MG CAPS Take 700 mg by mouth 2 (two) times a day   • ZOLMitriptan (ZOMIG) 2 5 MG tablet TAKE 1 TABLET BY MOUTH AT ONSET OF MIGRAINE, MAY REPEAT IN 2 HOURS IF NEEDED  LIMIT 2/24 HOURS, 4/WEEK, 8/MONTH   • trospium chloride (SANCTURA) 20 mg tablet Take 1 tablet (20 mg total) by mouth 2 (two) times a day     No current facility-administered medications on file prior to visit  Allergies   Allergen Reactions   • Celecoxib Other (See Comments)     Increased Heart Rate, Palpitations   • Sumatriptan Anaphylaxis     Other reaction(s): SUMATRIPTAN (IMITREX) (Throat closure)  Pt analilia zolmitriptan     • Tramadol Other (See Comments)     GI Upset- severe vomiting and systemic body aches   • Medical Tape Dermatitis, Rash and Blisters     Adhesive from medication patches and bandaides- ok with paper tape       Physical Exam:    /71   Pulse 94   Ht 5' 3" (1 6 m)   Wt 55 3 kg (122 lb)   BMI 21 61 kg/m²     Constitutional:normal, well developed, well nourished, alert, in no distress and non-toxic and no overt pain behavior  Eyes:anicteric  HEENT:grossly intact  Neck:supple, symmetric, trachea midline and no masses   Pulmonary:even and unlabored  Cardiovascular:No edema or pitting edema present  Skin:Normal without rashes or lesions and well hydrated  Psychiatric:Mood and affect appropriate  Neurologic:Cranial Nerves II-XII grossly intact  Musculoskeletal: Gait is slow but stable, tender to palpation over bilateral lumbar facet joints, positive straight leg raise test left side, DTRs are equal and intact bilaterally  Imaging  MRI LUMBAR SPINE WITHOUT CONTRAST     INDICATION: M54 16: Radiculopathy, lumbar region  Low back pain and bilateral leg pain  Numbness and tingling in both feet  Worsening symptoms over the past few years      COMPARISON:  5/14/2020     TECHNIQUE:  Multiplanar, multisequence imaging of the lumbar spine was performed             IMAGE QUALITY:  Diagnostic     FINDINGS:     VERTEBRAL BODIES:  There are 5 lumbar type vertebral bodies  Mild grade 1 anterolisthesis of L3 on L4  Mild grade 1 anterolisthesis of L4 on L5  Type I Modic endplate changes B3-0 with marrow edema at this level, new from the prior study  There is   also a trace grade 1 anterolisthesis of L2 on L3  No compression deformity or focally suspicious marrow lesions      SACRUM:  Mild degenerative endplate changes of the superior endplate of S1      DISTAL CORD AND CONUS:  Normal size and signal within the distal cord and conus  The conus medullaris terminates at the L2 level      PARASPINAL SOFT TISSUES:  Small cortical T2 hyperintensity in the left kidney measures 0 6 cm  This is stable from the prior MRI     LOWER THORACIC DISC SPACES:  Mild noncompressive lower thoracic degenerative change      LUMBAR DISC SPACES:     L1-L2:  There is a central disc herniation, protrusion type  Mild central canal narrowing  Mild left neural foraminal narrowing    Right neural foramen patent      L2-L3:  There is bilateral facet hypertrophy  There is a disc osteophyte complex with a superimposed right neural foraminal disc protrusion  Moderate to severe right neural foraminal narrowing is increased from the prior study  Central canal patent  Mild to moderate left neural foraminal narrowing      L3-L4:  There is mild uncovering the intervertebral disc  There is loss of disc height and signal   There is a right neural foraminal disc protrusion  There is bilateral facet hypertrophy  Severe right neural foraminal narrowing is slightly progressed   from the prior study  Central canal patent  Mild to moderate left neural foraminal narrowing      L4-L5:  There is uncovering the intervertebral disc space  There is a left neural foraminal disc protrusion  There is bilateral facet hypertrophy  Severe left neural foraminal narrowing  Mild central canal narrowing  Moderate right neural foraminal   narrowing  Transcribed prior     L5-S1:  There is bilateral facet hypertrophy  There is a left neural foraminal disc protrusion  There is severe left neural foraminal narrowing  Moderate right neural foraminal narrowing  Central canal patent  This level is similar to the prior   study      OTHER FINDINGS:  None      IMPRESSION:        1    Multilevel spondylosis, progressed in the right neural foramen at L2-3 and L3-4   2   Type I Modic endplate changes at C0-9 are new from the prior study

## 2023-02-28 DIAGNOSIS — M79.2 NEUROPATHIC PAIN: ICD-10-CM

## 2023-02-28 NOTE — TELEPHONE ENCOUNTER
Please see previous regarding refill request for gabapentin-    Pt was seen on 2/24/23 and med was last ordered on 12/6/22

## 2023-03-01 RX ORDER — GABAPENTIN 300 MG/1
300 CAPSULE ORAL 3 TIMES DAILY
Qty: 270 CAPSULE | Refills: 0 | Status: SHIPPED | OUTPATIENT
Start: 2023-03-01 | End: 2023-05-30

## 2023-03-10 ENCOUNTER — TELEPHONE (OUTPATIENT)
Dept: OBGYN CLINIC | Facility: HOSPITAL | Age: 63
End: 2023-03-10

## 2023-03-10 NOTE — TELEPHONE ENCOUNTER
Caller: Elliot Torres from [facility]    Doctor: Dr Kip Reyes    Reason for call: Elliot Torres calling stating that patient has authorization for Prolia but it  on 23  If you still want to go through the medical she can create a renewal   She needs verification if Dr Kip Reyes still wants to go through the medical or do they want to go through pharmacy      Call back#: [phone]

## 2023-03-13 NOTE — TELEPHONE ENCOUNTER
Patient was called, She stated that her Insurance was the same, and nothing has changed  I recalled the Insurance, the person I spoke with this time did verify her coverage, The questions they had were answered  Prior Auth  Is processing

## 2023-03-13 NOTE — TELEPHONE ENCOUNTER
Insurance was called, patient no longer has this insurance  Will call Patient and see what her new one is

## 2023-03-14 ENCOUNTER — TELEPHONE (OUTPATIENT)
Dept: OBGYN CLINIC | Facility: HOSPITAL | Age: 63
End: 2023-03-14

## 2023-03-14 NOTE — TELEPHONE ENCOUNTER
Caller: Cory Diaz    Doctor: leanne    Reason for call: New member id# [id number]  Ref# [id number]    Call back#: [phone]

## 2023-03-15 NOTE — TELEPHONE ENCOUNTER
Caller: Edwin    Doctor: Kashif Solano    Reason for call: Calling to let provider know Kateryna Perez was approved on 3/10/23 for 2 months ending 5/10/23   [facility] ID # [id number]    Call back#: [phone]

## 2023-03-22 ENCOUNTER — OFFICE VISIT (OUTPATIENT)
Dept: RHEUMATOLOGY | Facility: CLINIC | Age: 63
End: 2023-03-22

## 2023-03-22 VITALS
BODY MASS INDEX: 23.18 KG/M2 | HEIGHT: 63 IN | DIASTOLIC BLOOD PRESSURE: 72 MMHG | SYSTOLIC BLOOD PRESSURE: 120 MMHG | WEIGHT: 130.8 LBS

## 2023-03-22 DIAGNOSIS — M81.0 POST-MENOPAUSAL OSTEOPOROSIS: ICD-10-CM

## 2023-03-22 DIAGNOSIS — M81.0 OSTEOPOROSIS WITHOUT CURRENT PATHOLOGICAL FRACTURE, UNSPECIFIED OSTEOPOROSIS TYPE: Primary | ICD-10-CM

## 2023-03-22 DIAGNOSIS — E55.9 VITAMIN D DEFICIENCY: ICD-10-CM

## 2023-03-22 DIAGNOSIS — M54.42 CHRONIC BILATERAL LOW BACK PAIN WITH LEFT-SIDED SCIATICA: ICD-10-CM

## 2023-03-22 DIAGNOSIS — G89.29 CHRONIC BILATERAL LOW BACK PAIN WITH LEFT-SIDED SCIATICA: ICD-10-CM

## 2023-03-22 NOTE — PROGRESS NOTES
Patient received her Prolia in her right arm  No complaint of pain or discomfort  No bleeding, redness, or bruising  Patient scheduled for next Prolia Injectioin

## 2023-03-22 NOTE — PROGRESS NOTES
Assessment and Plan:   Osteoporosis--check CMP prior to each Prolia injection  Calcium/Vit  D supplementation  Resistance activity for bone health  Anti-inflammatory diet/exercise/weight control  Increase CV fitness/aerobic activity  Topical NSAIDs/analgesics PRN joint pain  Continue to monitor labs including CMP, 25OH Vit  D,Urine NTX  Repeat DEXA in May 2023--order placed  F/u in 6 mos  for repeat Prolia injection in [facility]  Smoking cessation  Continue to f/u with neurosurgery for chronic low back pain  Rheumatic Disease Summary  As above    Here for f/u visit and for her Prolia injection  No recent labs available for review  DEXA 2021: A DXA bone mineral density examination was performed with a [employer] scanner  The   patient is a 42-year-old postmenopausal female with a history of osteoporosis  Comparison is made to a previous study of 4/17/2019  The lumbar spine was examined from L1-L3  In total, the bone mineral density is   1 9 standard deviations below the predicted peak bone mass and is 79 % of   normal  The lumbar spine measurement is 0 807 g/cm2  This measurement has   increased 16 7% since the prior examination  The left hip was examined in several regions  In total, the bone mineral density   is 2 0 standard deviations below the predicted peak bone mass and is 74 % of   normal   The total hip measurement is 0 701 g/cm2  This measurement has   increased 4 5% since the prior examination        The femoral neck measurement is 2 6 standard deviations below the predicted peak   bone mass and is 66 % of normal  The femoral neck measurement is 0 561 g/cm2  This measurement has not significantly changed from the prior examination  Other Result Text    Interface, Rad Results In - 05/06/2021  3:15 PM EDT   Formatting of this note might be different from the original    A DXA bone mineral density examination was performed with a [employer] scanner   The   patient is a 42-year-old postmenopausal female with a history of osteoporosis  Comparison is made to a previous study of 4/17/2019  The lumbar spine was examined from L1-L3  In total, the bone mineral density is   1 9 standard deviations below the predicted peak bone mass and is 79 % of   normal  The lumbar spine measurement is 0 807 g/cm2  This measurement has   increased 16 7% since the prior examination  The left hip was examined in several regions  In total, the bone mineral density   is 2 0 standard deviations below the predicted peak bone mass and is 74 % of   normal   The total hip measurement is 0 701 g/cm2  This measurement has   increased 4 5% since the prior examination        The femoral neck measurement is 2 6 standard deviations below the predicted peak   bone mass and is 66 % of normal  The femoral neck measurement is 0 561 g/cm2  This measurement has not significantly changed from the prior examination  IMPRESSION:   Impression:   The examination is reviewed using the World Health Organization criteria  1  Osteoporosis as measured by the bone mineral density of the femoral neck with   measurements that show high risk for fracture  This measurement has not   significantly changed since the prior examination, although the measurements of   the lumbar spine and hip have both significantly improved, as described above  2  A FRAX is not reported because some of the T-scores are at or below -2 5 and   the patient is being treated for osteoporosis               The following portions of the patient's history were reviewed and updated as appropriate: allergies, current medications, past family history, past medical history, past social history, past surgical history and problem list     Review of Systems:   Review of Systems    Home Medications:    Current Outpatient Medications:   •  amantadine (SYMMETREL) 100 mg capsule, Take 1 capsule (100 mg total) by mouth daily, Disp: 30 capsule, Rfl: 1  •  aspirin 500 MG buffered tablet, Take 500 mg by mouth every 6 (six) hours as needed for mild pain, Disp: , Rfl:   •  calcium carbonate 1250 MG capsule, Take 600 mg by mouth daily , Disp: , Rfl:   •  Cholecalciferol 1000 units tablet, Take 1,000 Units by mouth daily Takes in the afternoon, Disp: , Rfl:   •  gabapentin (NEURONTIN) 300 mg capsule, Take 1 capsule (300 mg total) by mouth 3 (three) times a day, Disp: 270 capsule, Rfl: 0  •  Ginger, Zingiber officinalis, (GINGER ROOT PO), Take 700 mg by mouth 2 (two) times a day , Disp: , Rfl:   •  metaxalone (SKELAXIN) 800 mg tablet, Take 0 5 tablets (400 mg total) by mouth 3 (three) times a day, Disp: 135 tablet, Rfl: 2  •  Multiple Vitamins-Minerals (CENTRUM SILVER PO), Take 1 tablet by mouth daily Takes in the am, Disp: , Rfl:   •  ondansetron (ZOFRAN) 4 mg tablet, Take 1 tablet (4 mg total) by mouth every 8 (eight) hours as needed for nausea or vomiting, Disp: 20 tablet, Rfl: 0  •  trospium chloride (SANCTURA) 20 mg tablet, Take 1 tablet (20 mg total) by mouth 2 (two) times a day, Disp: 60 tablet, Rfl: 11  •  Turmeric 500 MG CAPS, Take 700 mg by mouth 2 (two) times a day, Disp: , Rfl:   •  ZOLMitriptan (ZOMIG) 2 5 MG tablet, TAKE 1 TABLET BY MOUTH AT ONSET OF MIGRAINE, MAY REPEAT IN 2 HOURS IF NEEDED  LIMIT 2/24 HOURS, 4/WEEK, 8/MONTH, Disp: 8 tablet, Rfl: 6  •  trospium chloride (SANCTURA) 20 mg tablet, Take 1 tablet (20 mg total) by mouth 2 (two) times a day, Disp: 60 tablet, Rfl: 3    Objective:    Vitals:    03/22/23 1424   BP: 120/72   Weight: 59 3 kg (130 lb 12 8 oz)   Height: 5' 3" (1 6 m)       Physical Exam    Reviewed labs and imaging  Imaging:   No results found      Labs:   Office Visit on 01/20/2023   Component Date Value Ref Range Status   • POST-VOID RESIDUAL VOLUME, ML POC 01/20/2023 36  mL Final   Admission on 11/02/2022, Discharged on 11/02/2022   Component Date Value Ref Range Status   • WBC 11/02/2022 4 88  4 31 - 10 16 Thousand/uL Final   • RBC 11/02/2022 4 43  3 81 - 5 12 Million/uL Final   • Hemoglobin 11/02/2022 14 0  11 5 - 15 4 g/dL Final   • Hematocrit 11/02/2022 41 5  34 8 - 46 1 % Final   • MCV 11/02/2022 94  82 - 98 fL Final   • MCH 11/02/2022 31 6  26 8 - 34 3 pg Final   • MCHC 11/02/2022 33 7  31 4 - 37 4 g/dL Final   • RDW 11/02/2022 12 1  11 6 - 15 1 % Final   • MPV 11/02/2022 9 6  8 9 - 12 7 fL Final   • Platelets 38/61/3455 253  149 - 390 Thousands/uL Final   • nRBC 11/02/2022 0  /100 WBCs Final   • Neutrophils Relative 11/02/2022 65  43 - 75 % Final   • Immat GRANS % 11/02/2022 0  0 - 2 % Final   • Lymphocytes Relative 11/02/2022 27  14 - 44 % Final   • Monocytes Relative 11/02/2022 5  4 - 12 % Final   • Eosinophils Relative 11/02/2022 2  0 - 6 % Final   • Basophils Relative 11/02/2022 1  0 - 1 % Final   • Neutrophils Absolute 11/02/2022 3 14  1 85 - 7 62 Thousands/µL Final   • Immature Grans Absolute 11/02/2022 0 01  0 00 - 0 20 Thousand/uL Final   • Lymphocytes Absolute 11/02/2022 1 33  0 60 - 4 47 Thousands/µL Final   • Monocytes Absolute 11/02/2022 0 26  0 17 - 1 22 Thousand/µL Final   • Eosinophils Absolute 11/02/2022 0 11  0 00 - 0 61 Thousand/µL Final   • Basophils Absolute 11/02/2022 0 03  0 00 - 0 10 Thousands/µL Final   • Sodium 11/02/2022 138  135 - 147 mmol/L Final   • Potassium 11/02/2022 4 1  3 5 - 5 3 mmol/L Final   • Chloride 11/02/2022 103  96 - 108 mmol/L Final   • CO2 11/02/2022 27  21 - 32 mmol/L Final   • ANION GAP 11/02/2022 8  4 - 13 mmol/L Final   • BUN 11/02/2022 16  5 - 25 mg/dL Final   • Creatinine 11/02/2022 0 71  0 60 - 1 30 mg/dL Final    Standardized to IDMS reference method   • Glucose 11/02/2022 103  65 - 140 mg/dL Final    If the patient is fasting, the ADA then defines impaired fasting glucose as > 100 mg/dL and diabetes as > or equal to 123 mg/dL  Specimen collection should occur prior to Sulfasalazine administration due to the potential for falsely depressed results   Specimen collection should occur prior to Sulfapyridine administration due to the potential for falsely elevated results  • Calcium 11/02/2022 8 8  8 3 - 10 1 mg/dL Final   • AST 11/02/2022    Final    No Result; if an AST is required for patient care, it must be ordered separately  Specimen collection should occur prior to Sulfasalazine administration due to the potential for falsely depressed results  • ALT 11/02/2022 25  12 - 78 U/L Final    Specimen collection should occur prior to Sulfasalazine administration due to the potential for falsely depressed results  • Alkaline Phosphatase 11/02/2022 82  46 - 116 U/L Final   • Total Protein 11/02/2022 7 2  6 4 - 8 4 g/dL Final   • Albumin 11/02/2022 3 6  3 5 - 5 0 g/dL Final   • Total Bilirubin 11/02/2022 0 58  0 20 - 1 00 mg/dL Final    Use of this assay is not recommended for patients undergoing treatment with eltrombopag due to the potential for falsely elevated results  • eGFR 11/02/2022 90  ml/min/1 73sq m Final   • Lipase 11/02/2022 85  73 - 393 u/L Final   • hs TnI 0hr 11/02/2022 3  "Refer to ACS Flowchart"- see link ng/L Final    Comment:                                              Initial (time 0) result  If >=50 ng/L, Myocardial injury suggested ;  Type of myocardial injury and treatment strategy  to be determined  If 5-49 ng/L, a delta result at 2 hours and or 4 hours will be needed to further evaluate  If <4 ng/L, and chest pain has been >3 hours since onset, patient may qualify for discharge based on the HEART score in the ED  If <5 ng/L and <3hours since onset of chest pain, a delta result at 2 hours will be needed to further evaluate  HS Troponin 99th Percentile URL of a Health Population=12 ng/L with a 95% Confidence Interval of 8-18 ng/L  Second Troponin (time 2 hours)  If calculated delta >= 20 ng/L,  Myocardial injury suggested ; Type of myocardial injury and treatment strategy to be determined    If 5-49 ng/L and the calculated delta is 5-19 ng/L, consult medical service for evaluation  Continue evaluation for ischemia on ecg and other possible etiology and repeat hs troponin at 4 hours  If delta                            is <5 ng/L at 2 hours, consider discharge based on risk stratification via the HEART score (if in ED), or NICOLE risk score in IP/Observation  HS Troponin 99th Percentile URL of a Health Population=12 ng/L with a 95% Confidence Interval of 8-18 ng/L     • Color, UA 11/02/2022 Yellow   Final   • Clarity, UA 11/02/2022 Clear   Final   • pH, UA 11/02/2022 7 0  4 5 - 8 0 Final   • Leukocytes, UA 11/02/2022 Trace (A)  Negative Final   • Nitrite, UA 11/02/2022 Negative  Negative Final   • Protein, UA 11/02/2022 Negative  Negative mg/dl Final   • Glucose, UA 11/02/2022 Negative  Negative mg/dl Final   • Ketones, UA 11/02/2022 Negative  Negative mg/dl Final   • Urobilinogen, UA 11/02/2022 1 0  0 2, 1 0 E U /dl E U /dl Final   • Bilirubin, UA 11/02/2022 Negative  Negative Final   • Occult Blood, UA 11/02/2022 Trace (A)  Negative Final   • Specific Gravity, UA 11/02/2022 1 015  1 003 - 1 030 Final   • RBC, UA 11/02/2022 1-2 (A)  None Seen, 2-4 /hpf Final   • WBC, UA 11/02/2022 2-4  None Seen, 2-4, 5-60 /hpf Final   • Epithelial Cells 11/02/2022 Occasional  None Seen, Occasional /hpf Final   • Bacteria, UA 11/02/2022 Occasional  None Seen, Occasional /hpf Final   • Ventricular Rate 11/02/2022 57  BPM Final   • Atrial Rate 11/02/2022 57  BPM Final   • MT Interval 11/02/2022 162  ms Final   • QRSD Interval 11/02/2022 94  ms Final   • QT Interval 11/02/2022 418  ms Final   • QTC Interval 11/02/2022 406  ms Final   • P Axis 11/02/2022 77  degrees Final   • QRS Axis 11/02/2022 27  degrees Final   • T Wave [place] 11/02/2022 62  degrees Final   Admission on 10/29/2022, Discharged on 10/29/2022   Component Date Value Ref Range Status   • WBC 10/29/2022 5 57  4 31 - 10 16 Thousand/uL Final   • RBC 10/29/2022 4 74  3 81 - 5 12 Million/uL Final   • Hemoglobin 10/29/2022 15 1  11 5 - 15 4 g/dL Final   • Hematocrit 10/29/2022 44 0  34 8 - 46 1 % Final   • MCV 10/29/2022 93  82 - 98 fL Final   • MCH 10/29/2022 31 9  26 8 - 34 3 pg Final   • MCHC 10/29/2022 34 3  31 4 - 37 4 g/dL Final   • RDW 10/29/2022 12 7  11 6 - 15 1 % Final   • MPV 10/29/2022 9 7  8 9 - 12 7 fL Final   • Platelets 19/56/6520 290  149 - 390 Thousands/uL Final   • nRBC 10/29/2022 0  /100 WBCs Final   • Neutrophils Relative 10/29/2022 54  43 - 75 % Final   • Immat GRANS % 10/29/2022 0  0 - 2 % Final   • Lymphocytes Relative 10/29/2022 36  14 - 44 % Final   • Monocytes Relative 10/29/2022 6  4 - 12 % Final   • Eosinophils Relative 10/29/2022 3  0 - 6 % Final   • Basophils Relative 10/29/2022 1  0 - 1 % Final   • Neutrophils Absolute 10/29/2022 3 02  1 85 - 7 62 Thousands/µL Final   • Immature Grans Absolute 10/29/2022 0 02  0 00 - 0 20 Thousand/uL Final   • Lymphocytes Absolute 10/29/2022 2 03  0 60 - 4 47 Thousands/µL Final   • Monocytes Absolute 10/29/2022 0 31  0 17 - 1 22 Thousand/µL Final   • Eosinophils Absolute 10/29/2022 0 16  0 00 - 0 61 Thousand/µL Final   • Basophils Absolute 10/29/2022 0 03  0 00 - 0 10 Thousands/µL Final   • Sodium 10/29/2022 136  135 - 147 mmol/L Final   • Potassium 10/29/2022 4 9  3 5 - 5 3 mmol/L Final   • Chloride 10/29/2022 102  96 - 108 mmol/L Final   • CO2 10/29/2022 25  21 - 32 mmol/L Final   • ANION GAP 10/29/2022 9  4 - 13 mmol/L Final   • BUN 10/29/2022 18  5 - 25 mg/dL Final   • Creatinine 10/29/2022 1 02  0 60 - 1 30 mg/dL Final    Standardized to IDMS reference method   • Glucose 10/29/2022 112  65 - 140 mg/dL Final    If the patient is fasting, the ADA then defines impaired fasting glucose as > 100 mg/dL and diabetes as > or equal to 123 mg/dL  Specimen collection should occur prior to Sulfasalazine administration due to the potential for falsely depressed results  Specimen collection should occur prior to Sulfapyridine administration due to the potential for falsely elevated results     • Calcium 10/29/2022 10 2 (H)  8 3 - 10 1 mg/dL Final   • eGFR 10/29/2022 58  ml/min/1 73sq m Final   • Color, UA 10/29/2022 Yellow   Final   • Clarity, UA 10/29/2022 Clear   Final   • pH, UA 10/29/2022 5 5  4 5 - 8 0 Final   • Leukocytes, UA 10/29/2022 Negative  Negative Final   • Nitrite, UA 10/29/2022 Negative  Negative Final   • Protein, UA 10/29/2022 Negative  Negative mg/dl Final   • Glucose, UA 10/29/2022 Negative  Negative mg/dl Final   • Ketones, UA 10/29/2022 Negative  Negative mg/dl Final   • Urobilinogen, UA 10/29/2022 0 2  0 2, 1 0 E U /dl E U /dl Final   • Bilirubin, UA 10/29/2022 Negative  Negative Final   • Occult Blood, UA 10/29/2022 Negative  Negative Final   • Specific Gravity, UA 10/29/2022 >=1 030  1 003 - 1 030 Final   Office Visit on 08/25/2022   Component Date Value Ref Range Status   • PTH 08/30/2022 37 6  18 4 - 80 1 pg/mL Final   • Vit D, 25-Hydroxy 08/30/2022 44 2  30 0 - 100 0 ng/mL Final   Appointment on 08/15/2022   Component Date Value Ref Range Status   • TSH 3RD GENERATON 08/15/2022 1 690  0 450 - 4 500 uIU/mL Final    The recommended reference ranges for TSH during pregnancy are as follows:   First trimester 0 1 to 2 5 uIU/mL   Second trimester  0 2 to 3 0 uIU/mL   Third trimester 0 3 to 3 0 uIU/m    Note: Normal ranges may not apply to patients who are transgender, non-binary, or whose legal sex, sex at birth, and gender identity differ  Adult TSH (3rd generation) reference range follows the recommended guidelines of the American Thyroid Association, January, 2020     • CRP 08/15/2022 <3 0  <3 0 mg/L Final   • Sodium 08/15/2022 136  135 - 147 mmol/L Final   • Potassium 08/15/2022 4 0  3 5 - 5 3 mmol/L Final   • Chloride 08/15/2022 106  96 - 108 mmol/L Final   • CO2 08/15/2022 26  21 - 32 mmol/L Final   • ANION GAP 08/15/2022 4  4 - 13 mmol/L Final   • BUN 08/15/2022 21  5 - 25 mg/dL Final   • Creatinine 08/15/2022 0 99  0 60 - 1 30 mg/dL Final    Standardized to IDMS reference method   • Glucose 08/15/2022 92  65 - 140 mg/dL Final    If the patient is fasting, the ADA then defines impaired fasting glucose as > 100 mg/dL and diabetes as > or equal to 123 mg/dL  Specimen collection should occur prior to Sulfasalazine administration due to the potential for falsely depressed results  Specimen collection should occur prior to Sulfapyridine administration due to the potential for falsely elevated results  • Calcium 08/15/2022 9 7  8 3 - 10 1 mg/dL Final   • AST 08/15/2022 24  5 - 45 U/L Final    Specimen collection should occur prior to Sulfasalazine administration due to the potential for falsely depressed results  • ALT 08/15/2022 23  12 - 78 U/L Final    Specimen collection should occur prior to Sulfasalazine and/or Sulfapyridine administration due to the potential for falsely depressed results  • Alkaline Phosphatase 08/15/2022 101  46 - 116 U/L Final   • Total Protein 08/15/2022 7 5  6 4 - 8 4 g/dL Final   • Albumin 08/15/2022 3 9  3 5 - 5 0 g/dL Final   • Total Bilirubin 08/15/2022 0 51  0 20 - 1 00 mg/dL Final    Use of this assay is not recommended for patients undergoing treatment with eltrombopag due to the potential for falsely elevated results     • eGFR 08/15/2022 60  ml/min/1 73sq m Final   • Total CK 08/15/2022 70  26 - 192 U/L Final   • Magnesium 08/15/2022 2 0  1 6 - 2 6 mg/dL Final   • WBC 08/15/2022 8 42  4 31 - 10 16 Thousand/uL Final   • RBC 08/15/2022 4 33  3 81 - 5 12 Million/uL Final   • Hemoglobin 08/15/2022 13 4  11 5 - 15 4 g/dL Final   • Hematocrit 08/15/2022 40 4  34 8 - 46 1 % Final   • MCV 08/15/2022 93  82 - 98 fL Final   • MCH 08/15/2022 30 9  26 8 - 34 3 pg Final   • MCHC 08/15/2022 33 2  31 4 - 37 4 g/dL Final   • RDW 08/15/2022 12 7  11 6 - 15 1 % Final   • MPV 08/15/2022 10 1  8 9 - 12 7 fL Final   • Platelets 15/82/7852 303  149 - 390 Thousands/uL Final   • nRBC 08/15/2022 0  /100 WBCs Final   • Neutrophils Relative 08/15/2022 77 (H)  43 - 75 % Final   • Immat GRANS % 08/15/2022 1  0 - 2 % Final   • Lymphocytes Relative 08/15/2022 17  14 - 44 % Final   • Monocytes Relative 08/15/2022 3 (L)  4 - 12 % Final   • Eosinophils Relative 08/15/2022 1  0 - 6 % Final   • Basophils Relative 08/15/2022 1  0 - 1 % Final   • Neutrophils Absolute 08/15/2022 6 56  1 85 - 7 62 Thousands/µL Final   • Immature Grans Absolute 08/15/2022 0 04  0 00 - 0 20 Thousand/uL Final   • Lymphocytes Absolute 08/15/2022 1 41  0 60 - 4 47 Thousands/µL Final   • Monocytes Absolute 08/15/2022 0 29  0 17 - 1 22 Thousand/µL Final   • Eosinophils Absolute 08/15/2022 0 07  0 00 - 0 61 Thousand/µL Final   • Basophils Absolute 08/15/2022 0 05  0 00 - 0 10 Thousands/µL Final   Office Visit on 08/15/2022   Component Date Value Ref Range Status   •  COLOR,UA 08/15/2022 evelio   Final   • CLARITY,UA 08/15/2022 clear   Final   • SPECIFIC GRAVITY,UA 08/15/2022 1 030   Final   •  PH,UA 08/15/2022 5 0   Final   • LEUKOCYTE ESTERASE,UA 08/15/2022 neg   Final   • Delayne Mealy 08/15/2022 neg   Final   • GLUCOSE, UA 08/15/2022 100mg   Final   • KETONES,UA 08/15/2022 15mg   Final   • BILIRUBIN,UA 08/15/2022 small   Final   • BLOOD,UA 08/15/2022 neg   Final   • POCT URINE PROTEIN 08/15/2022 30mg   Final   • SL AMB POCT UROBILINOGEN 08/15/2022 0 2   Final   Appointment on 06/29/2022   Component Date Value Ref Range Status   • Calprotectin 06/29/2022 41  0 - 120 ug/g Final    Concentration     Interpretation   Follow-Up  <16 - 50 ug/g     Normal           None  >50 -120 ug/g     Borderline       Re-evaluate in 4-6 weeks      >120 ug/g     Abnormal         Repeat as clinically                                     indicated   • IgA 08/15/2022 231  87 - 352 mg/dL Final   • Gliadin IgA 08/15/2022 3  0 - 19 units Final                       Negative                   0 - 19                     Weak Positive             20 - 30                     Moderate to Strong Positive   >30   • Gliadin IgG 08/15/2022 1  0 - 19 units Final                       Negative                   0 - 19                     Weak Positive             20 - 30                     Moderate to Strong Positive   >30   • Tissue Transglut Ab IGG 08/15/2022 6 (H)  0 - 5 U/mL Final                                  Negative        0 - 5                                Weak Positive   6 - 9                                Positive           >9   • TISSUE TRANSGLUTAMINASE IGA 08/15/2022 <2  0 - 3 U/mL Final                                  Negative        0 -  3                                Weak Positive   4 - 10                                Positive           >10   Tissue Transglutaminase (tTG) has been identified   as the endomysial antigen  Studies have demonstr-   ated that endomysial IgA antibodies have over 99%   specificity for gluten sensitive enteropathy  • Endomysial IgA 08/15/2022 Negative  Negative Final   • Fat Qual Neutral, Stl 06/29/2022 Normal   Final                                   Normal (<60 Droplets/HPF)   • Fat,Totall 06/29/2022 Normal   Final                                  Normal (<100 Droplets/HPF)   • C difficile toxin by PCR 06/29/2022 Negative  Negative Final    No evidence for C  difficile colonization or infection  No special contact precautions required     • OCCULT BLD, FECAL IMMUNOLOGICAL 06/29/2022 Positive (A)  Negative Final   Appointment on 06/27/2022   Component Date Value Ref Range Status   • Sodium 06/27/2022 137  136 - 145 mmol/L Final   • Potassium 06/27/2022 4 0  3 5 - 5 3 mmol/L Final   • Chloride 06/27/2022 105  100 - 108 mmol/L Final   • CO2 06/27/2022 24  21 - 32 mmol/L Final   • ANION GAP 06/27/2022 8  4 - 13 mmol/L Final   • BUN 06/27/2022 23  5 - 25 mg/dL Final   • Creatinine 06/27/2022 0 80  0 60 - 1 30 mg/dL Final    Standardized to IDMS reference method   • Glucose, Fasting 06/27/2022 95  65 - 99 mg/dL Final    Specimen collection should occur prior to Sulfasalazine administration due to the potential for falsely depressed results  Specimen collection should occur prior to Sulfapyridine administration due to the potential for falsely elevated results  • Calcium 06/27/2022 10 0  8 3 - 10 1 mg/dL Final   • eGFR 06/27/2022 78  ml/min/1 73sq m Final   • Cholesterol 06/27/2022 231 (H)  See Comment mg/dL Final    Cholesterol:         Pediatric <18 Years        Desirable          <170 mg/dL      Borderline High    170-199 mg/dL      High               >=200 mg/dL        Adult >=18 Years            Desirable         <200 mg/dL      Borderline High   200-239 mg/dL      High              >239 mg/dL     • Triglycerides 06/27/2022 82  See Comment mg/dL Final    Triglyceride:     0-9Y            <75mg/dL     10Y-17Y         <90 mg/dL       >=18Y     Normal          <150 mg/dL     Borderline High 150-199 mg/dL     High            200-499 mg/dL        Very High       >499 mg/dL    Specimen collection should occur prior to N-Acetylcysteine or Metamizole administration due to the potential for falsely depressed results  • HDL, Direct 06/27/2022 66  >=50 mg/dL Final    Specimen collection should occur prior to Metamizole administration due to the potential for falsley depressed results  • LDL Calculated 06/27/2022 149 (H)  0 - 100 mg/dL Final    LDL Cholesterol:     Optimal           <100 mg/dl     Near Optimal      100-129 mg/dl     Above Optimal       Borderline High 130-159 mg/dl       High            160-189 mg/dl       Very High       >189 mg/dl         This screening LDL is a calculated result  It does not have the accuracy of the Direct Measured LDL in the monitoring of patients with hyperlipidemia and/or statin therapy  Direct Measure LDL (OSH323) must be ordered separately in these patients  • AST 06/27/2022 24  5 - 45 U/L Final    Specimen collection should occur prior to Sulfasalazine and/or Sulfapyridine administration due to the potential for falsely depressed results      • Vitamin B1, Whole Blood 06/27/2022 178 4  66 5 - 200 0 nmol/L Final   Appointment on 06/22/2022   Component Date Value Ref Range Status   • Vitamin B-12 06/22/2022 364  100 - 900 pg/mL Final   • Folate 06/22/2022 >20 0 (H)  3 1 - 17 5 ng/mL Final    E521   • Vitamin B6 06/22/2022 27 2  3 4 - 65 2 ug/L Final                                 Deficiency:         <3 4                               Marginal:      3 4 - 5 1                               Adequate:           >5 1

## 2023-03-22 NOTE — PATIENT INSTRUCTIONS
Continue the calcium and Vitamin D  Please have your blood work done 2 weeks before the next Prolia injection

## 2023-04-04 ENCOUNTER — APPOINTMENT (OUTPATIENT)
Dept: RADIOLOGY | Facility: MEDICAL CENTER | Age: 63
End: 2023-04-04

## 2023-04-04 ENCOUNTER — OFFICE VISIT (OUTPATIENT)
Dept: NEUROSURGERY | Facility: CLINIC | Age: 63
End: 2023-04-04

## 2023-04-04 VITALS
RESPIRATION RATE: 18 BRPM | DIASTOLIC BLOOD PRESSURE: 70 MMHG | SYSTOLIC BLOOD PRESSURE: 108 MMHG | HEIGHT: 63 IN | BODY MASS INDEX: 23.04 KG/M2 | TEMPERATURE: 98.4 F | WEIGHT: 130 LBS | HEART RATE: 79 BPM

## 2023-04-04 DIAGNOSIS — M47.816 SPONDYLOSIS OF LUMBAR SPINE: Primary | ICD-10-CM

## 2023-04-04 DIAGNOSIS — M48.062 SPINAL STENOSIS OF LUMBAR REGION WITH NEUROGENIC CLAUDICATION: ICD-10-CM

## 2023-04-04 DIAGNOSIS — M47.816 SPONDYLOSIS OF LUMBAR SPINE: ICD-10-CM

## 2023-04-04 DIAGNOSIS — M51.16 LUMBAR DISC DISEASE WITH RADICULOPATHY: ICD-10-CM

## 2023-04-04 NOTE — ASSESSMENT & PLAN NOTE
Patient presents for evaluation of possible lumbar radiculopathy  · Known to NSX, last seen by Dr Turner December 5/2022 neck complaints; prior history of C5-7 ACDF and C4-6 PCD in 2014 at [address]  · Now here with worsening long standing back pain with associated left greater than right leg pain, numbness, weakness, difficulty walking  · Follows with pain management, recent L5-S1 left interlaminar NOEMY on 2/8/2023; recently uptitrated gabapentin  · Last completed physical therapy at the end of 2022    Imaging:  · MRI lumbar spine without contrast 1/23/2023: Multilevel spondylosis, progressed in the right neural foramen at L2-3 and L3-4  Type I Modic endplate changes at L1-6 are new from the prior study  Plan:  • Imaging reviewed with patient including spinal anatomy  She demonstrates multilevel degenerative disease, mostly with foraminal narrowing as centrally her canal is quite generous  Treatment options discussed regarding ongoing conservative management versus surgical intervention  I feel patient has exhausted conservative management at this time with pain management and physical therapy which both have made her symptoms worse  At this time she is interested in discussing surgical intervention  o I have ordered upright lumbar spine x-rays with bending views to assess for any possible dynamic instability  o I have also ordered upright scoliosis films as patient does have evidence of acquired scoliosis  o Briefly discussed possible surgical options including decompression alone versus need for fixation with hardware placement  Postoperative course risks and benefits discussed    • In the meantime patient is okay to do activity as tolerated  • She can continue over-the-counter and prescribed medication for pain control  o Of note patient takes very high quantities of aspirin (about 4500mg daily) for pain relief, this will need to be held before any surgical intervention is pursued  • Patient is to return to the office next available with spine MD upon completion of imaging to discuss possible surgical intervention if warranted or sooner should patient develop worsening symptoms or red flag signs  • Patient declined need to see MD today

## 2023-04-04 NOTE — PROGRESS NOTES
Neurosurgery Office Note  Christopher Aguilar 57 y o  female MRN: [id number]      Assessment/Plan     Spondylosis of lumbar spine  Patient presents for evaluation of possible lumbar radiculopathy  · Known to NSX, last seen by Dr Castañeda Began 5/2022 neck complaints; prior history of C5-7 ACDF and C4-6 PCD in 2014 at [address]  · Now here with worsening long standing back pain with associated left greater than right leg pain, numbness, weakness, difficulty walking  · Follows with pain management, recent L5-S1 left interlaminar NOEMY on 2/8/2023; recently uptitrated gabapentin  · Last completed physical therapy at the end of 2022    Imaging:  · MRI lumbar spine without contrast 1/23/2023: Multilevel spondylosis, progressed in the right neural foramen at L2-3 and L3-4  Type I Modic endplate changes at N3-4 are new from the prior study  Plan:  • Imaging reviewed with patient including spinal anatomy  She demonstrates multilevel degenerative disease, mostly with foraminal narrowing as centrally her canal is quite generous  Treatment options discussed regarding ongoing conservative management versus surgical intervention  I feel patient has exhausted conservative management at this time with pain management and physical therapy which both have made her symptoms worse  At this time she is interested in discussing surgical intervention  o I have ordered upright lumbar spine x-rays with bending views to assess for any possible dynamic instability  o I have also ordered upright scoliosis films as patient does have evidence of acquired scoliosis  o Briefly discussed possible surgical options including decompression alone versus need for fixation with hardware placement  Postoperative course risks and benefits discussed    • In the meantime patient is okay to do activity as tolerated  • She can continue over-the-counter and prescribed medication for pain control  o Of note patient takes very high quantities of aspirin (about 4500mg daily) for pain relief, this will need to be held before any surgical intervention is pursued  • Patient is to return to the office next available with spine MD upon completion of imaging to discuss possible surgical intervention if warranted or sooner should patient develop worsening symptoms or red flag signs  • Patient declined need to see MD today         Diagnoses and all orders for this visit:    Spondylosis of lumbar spine  -     XR spine lumbar minimum 4 views non injury; Future  -     XR entire spine (scoliosis) 2-3 vw; Future    Lumbar disc disease with radiculopathy  -     Ambulatory referral to Neurosurgery  -     XR spine lumbar minimum 4 views non injury; Future  -     XR entire spine (scoliosis) 2-3 vw; Future    Spinal stenosis of lumbar region with neurogenic claudication  -     Ambulatory referral to Neurosurgery  -     XR spine lumbar minimum 4 views non injury; Future  -     XR entire spine (scoliosis) 2-3 vw; Future          I have spent a total time of 30 minutes on 04/04/23 in caring for this patient including Diagnostic results, Risks and benefits of tx options, Instructions for management, Patient and family education, Impressions, Counseling / Coordination of care, Documenting in the medical record, Reviewing / ordering tests, medicine, procedures   and Obtaining or reviewing history    CHIEF COMPLAINT    Chief Complaint   Patient presents with   • Follow-up       HISTORY    History of Present Illness     57y o  year old female with past medical history significant for chronic kidney disease, chronic pain, GERD, hyperlipidemia, irritable bowel syndrome, overactive bladder, osteoarthritis, osteoporosis, prior C5 7 ACDF and C4-6 posterior cervical decompression both in 2014 at [facility] who presents for evaluation of possible lumbar radiculopathy  Patient is known to the neurosurgery office, seen by Dr Mariama Gonzalez in May 2022 with cervical complaints    At that time no surgical intervention is warranted and she was recommended to have ongoing conservative management  Briefly she does continue to have ongoing neck pain with bilateral upper extremity pain, numbness, tingling, fine motor function difficulties  She is here today to discuss worsening back pain  She states she has longstanding history of back pain but since the pandemic her pain is gotten much worse  She has back pain that radiates into the left leg, buttock posterior thigh down into the foot as well as into the anterior thigh  She has pain that also radiates into the right lower extremity mostly in the buttock and upper thigh region  Pain is constant in her back and left lower extremity, occasional in the right lower extremity  When her pain is severe she gets numbness and tingling in the left lower extremity but at baseline has numbness and tingling sensation in bilateral feet  Pain is made worse with standing and walking for prolonged periods of time  Pain is made better with sitting  She states leaning forward on the shopping cart helps alleviate her pain slightly but pushing the cart itself causes pain  She does report associated weakness in her legs as well as gait instability  At baseline she has mostly urinary incontinence, some bowel incontinence for which she is following with GI and urology  No new bowel or bladder dysfunction or saddle anesthesia  She completed physical therapy at the end of last year for both her neck and her back but states this made her pain worse  She follows with pain management status post medial branch block bilateral L3-5 in November 2022 as well as left L5-S1 interlaminar NOEMY in February 2023  Both of these were no help and actually worsened her pain  She is currently on gabapentin as well as high doses of aspirin, stating she will take about 1500 mg in the morning, another 1000 mg in the afternoon and then an additional 1500 mg before bedtime    She has never had surgery on her back but she is interested in discussing surgical intervention as she feels she has exhausted conservative management  She does note that both her neck and her back hurt equally  Patient is the primary caregiver to her  who has cancer although she does report she is not doing much in the way of heavy lifting  She also works at a [employer] and is on her feet most of the day  See Discussion    REVIEW OF SYSTEMS    Review of Systems   HENT: Positive for trouble swallowing (hx )  Gastrointestinal: Positive for constipation, diarrhea and nausea (hx of IBS)  Genitourinary: Positive for frequency and urgency  Pt aslo c/o Overactive bladder/ loose bowels (IBS)    Musculoskeletal: Positive for back pain, gait problem (at times-due to LBP ) and neck pain (b/l arm pain-dificulty with b/l finger motion)  Ref by spine and pain    seen Dr Susy Appiah on 5/5/2022    previous spine surgery C5-7 ACDF 2014 [facility]   New problem     no previous back surgeries     Mid back to LBP radiates L>R Back of legs & left leg back/front w constant pain down to pain w burning sensation b/l Feet w n/t from L>R  HIP down to b/l feet- w b/l leg weakness w difficult walking x years ( getting worse in the last 10 yrs)   and difficult walk x 20 yrs and getting worse     pt aslo c/o Overactive bladder/ loose bowels (IBS)     Meds Aspirin 400 mg & Gabapentin     Physical therapy last ov 12/20/2022 ( not helpful-makes   pain worse)    NOEMY w Dr Escobar Shauna 2/8/2023 ( % of improvement: 0%-Pain Scale (1-10): 8/10 ) ( not helpful-made pain worse)   Neurological: Positive for tremors, weakness (b/l), numbness (left hand x 1 day ) and headaches  Negative for dizziness  Last ov with neurology 1/13/22   Hematological: Bruises/bleeds easily  All other systems reviewed and are negative  ROS obtained by MA  Reviewed  See HPI       Meds/Allergies     Current Outpatient Medications   Medication Sig Dispense Refill   • amantadine (SYMMETREL) 100 mg capsule Take 1 capsule (100 mg total) by mouth daily 30 capsule 1   • aspirin 500 MG buffered tablet Take 500 mg by mouth every 6 (six) hours as needed for mild pain 4,000 mg daily for Pain     • calcium carbonate 1250 MG capsule Take 600 mg by mouth daily      • Cholecalciferol 1000 units tablet Take 1,000 Units by mouth daily Takes in the afternoon     • gabapentin (NEURONTIN) 300 mg capsule Take 1 capsule (300 mg total) by mouth 3 (three) times a day 270 capsule 0   • Ginger, Zingiber officinalis, (GINGER ROOT PO) Take 700 mg by mouth 2 (two) times a day      • metaxalone (SKELAXIN) 800 mg tablet Take 0 5 tablets (400 mg total) by mouth 3 (three) times a day 135 tablet 2   • Multiple Vitamins-Minerals (CENTRUM SILVER PO) Take 1 tablet by mouth daily Takes in the am     • ondansetron (ZOFRAN) 4 mg tablet Take 1 tablet (4 mg total) by mouth every 8 (eight) hours as needed for nausea or vomiting 20 tablet 0   • trospium chloride (SANCTURA) 20 mg tablet Take 1 tablet (20 mg total) by mouth 2 (two) times a day 60 tablet 11   • Turmeric 500 MG CAPS Take 700 mg by mouth 2 (two) times a day     • ZOLMitriptan (ZOMIG) 2 5 MG tablet TAKE 1 TABLET BY MOUTH AT ONSET OF MIGRAINE, MAY REPEAT IN 2 HOURS IF NEEDED  LIMIT 2/24 HOURS, 4/WEEK, 8/MONTH 8 tablet 6   • trospium chloride (SANCTURA) 20 mg tablet Take 1 tablet (20 mg total) by mouth 2 (two) times a day 60 tablet 3     No current facility-administered medications for this visit  Allergies   Allergen Reactions   • Celecoxib Other (See Comments)     Increased Heart Rate, Palpitations   • Sumatriptan Anaphylaxis     Other reaction(s): SUMATRIPTAN (IMITREX) (Throat closure)  Pt analilia zolmitriptan     • Tramadol Other (See Comments)     GI Upset- severe vomiting and systemic body aches   • Medical Tape Dermatitis, Rash and Blisters     Adhesive from medication patches and bandaides- ok with paper tape       PAST HISTORY    Past Medical History: "  Diagnosis Date   • Allergy to dust    • Anemia    • Calculus of ureter    • Chronic kidney disease 11/18    Hydrophenesis   • Chronic pain disorder     neck and back- sees pain management   • Colitis    • Colon polyp    • Cracked skin     on fingers   • Diverticulitis of colon 2012    Se[facility] by Dr Elias Avila at [facility]  Inpatient at   • Diverticulosis    • Dysphagia    • GERD (gastroesophageal reflux disease)    • GI (gastrointestinal bleed)     As a result of diverticulitis and microscopic colitis  • H/O: GI bleed    • Hiatal hernia    • History of DVT in adulthood 2019 2019   • Hydronephrosis    • Hyperlipidemia    • Inguinal hernia     right side   • Irregular heart beat     Pt states with prolonged QT interval - was seen by cardiologist( [facility]) pt told \"to not worry about it\"   • Irritable bowel    • Kidney stone    • Lactose intolerance     Did not tolerate milk after being weaned from breast milk  • Migraines    • Narcolepsy    • Narcolepsy 08/06/2020   • OAB (overactive bladder)    • Osteoarthritis    • Osteoporosis    • Other chronic pain     degenerative disc disease   • PONV (postoperative nausea and vomiting)     migraines   • Pyelonephritis 01/13/2021   • SBO (small bowel obstruction) ([facility] [address] ) 09/25/2021   • Sepsis ([facility] [address] ) 01/13/2021   • Smoking 08/06/2020   • Tobacco abuse    • Wears dentures     full upper   • Wears glasses    • Wears glasses        Past Surgical History:   Procedure Laterality Date   • ABDOMINAL ADHESION SURGERY     • ARM SKIN LESION BIOPSY / EXCISION      lymph node excision   • BLADDER SURGERY     • CHOLECYSTECTOMY     • COLONOSCOPY  08/06/2020   • FL RETROGRADE PYELOGRAM  01/05/2021   • FLEXIBLE SIGMOIDOSCOPY      Last done at [address] [place] [address] [address] in 2018 after being admitted for GI bleed     • HERNIA REPAIR     • HYSTERECTOMY  1977   • JOINT REPLACEMENT Right     knee   • KNEE SURGERY Right     replacement   • LAMINECTOMY      L5-6-7   • LAPAROSCOPY     • LYMPH NODE DISSECTION Left     removed -no cancer- " limb restriction   • LYMPHADENECTOMY      UNDER LEFT ARM - DO NOT DRAW BLOOD/GIVE INJECTIONS IN LEFT ARM PER PATIENT    • MOLE REMOVAL     • OOPHORECTOMY Bilateral    • OTHER SURGICAL HISTORY      enlarged lymph node removed left arm   no cancer   • NJ CYSTO BLADDER W/URETERAL CATHETERIZATION Right 2020    Procedure: CYSTOSCOPY AND INSERTION STENT URETERAL;  Surgeon: Ines Alberto MD;  Location: [facility];  Service: Urology   • NJ CYSTO/URETERO W/LITHOTRIPSY &INDWELL STENT INSRT Right 2021    Procedure: CYSTO, LASER  URETEROSCOPY, RETRO PYELOGRAM, STENT REPLACMENT;  Surgeon: Kathyanne Sacks, MD;  Location: [facility];  Service: Urology   • NJ LITHOTRIPSY [address] Right 2021    Procedure: LITHOTRIPSY EXTRACORPORAL SHOCKWAVE (ESWL); Surgeon: Ammon Negro MD;  Location: [address] [place] [address] MAIN OR;  Service: Urology   • NJ RPR 1ST INGUN HRNA AGE 5 YRS/> REDUCIBLE Right 2020    Procedure: REPAIR HERNIA INGUINAL  WITH MESH;  Surgeon: Akash Beal MD;  Location: [facility];  Service: General   • SPINAL FUSION     • UPPER GASTROINTESTINAL ENDOSCOPY  2020   • WISDOM TOOTH EXTRACTION         Social History     Tobacco Use   • Smoking status: Heavy Smoker     Packs/day: 0 50     Years: 48 00     Pack years: 24 00     Types: Cigarettes     Start date: 1973   • Smokeless tobacco: Never   • Tobacco comments:     Stopped smoking [id number] (first marriage),  stopped again during  due to multiple s   Vaping Use   • Vaping Use: Never used   Substance Use Topics   • Alcohol use: Not Currently     Comment: occasional drink ( 12 oz  beer or beer based beverage 1-2x m   • Drug use: Not Currently     Types: Marijuana       Family History   Problem Relation Age of Onset   • Hypertension Mother          from massive heart attack, never took any medication for this condition   • Heart attack Mother    • Hyperlipidemia Mother         Blood tests done just prior to heart attack    Results "reported after her passing  • Hypertension Father         Had high blood pressure, unknown if he ever took medication   • Cancer Father         Tumor in bowels, spread to liver, skin cancer   • Colon cancer Father    • Colon polyps Father    • Diabetes Sister         uses insulin   • Arthritis Sister         Osteo arthritis   • Hypertension Sister         Triple bypass   • Diabetes Sister         pre-diabetic, oral medication   • Arthritis Sister         Osteo arthritis   • Hypertension Sister         Triple bypass   • Irritable bowel syndrome Sister         Constipation   • No Known Problems Daughter    • No Known Problems Maternal Grandmother    • No Known Problems Maternal Grandfather    • No Known Problems Paternal Grandmother    • No Known Problems Paternal Grandfather    • Stroke Brother    • Nephrolithiasis Brother    • Cancer Brother 70        soft tissue cancer   • No Known Problems Maternal Aunt    • No Known Problems Maternal Aunt    • No Known Problems Maternal Aunt    • No Known Problems Paternal Aunt    • No Known Problems Paternal Aunt    • No Known Problems Paternal Aunt    • Breast cancer Other 52   • Cancer Cousin         Pancreatic cancer   • Cancer Cousin         Breast cancer, total mastectomy   • Cancer Brother         Terminal soft tissue cancer 8/21   • Hypertension Brother         Triple bypass   • Learning disabilities Brother         Mental retardation         Above history personally reviewed  EXAM    Vitals:Blood pressure 108/70, pulse 79, temperature 98 4 °F (36 9 °C), temperature source Temporal, resp  rate 18, height 5' 3\" (1 6 m), weight 59 kg (130 lb), not currently breastfeeding  ,Body mass index is 23 03 kg/m²  Physical Exam  Constitutional:       General: She is awake  Appearance: Normal appearance  HENT:      Head: Normocephalic and atraumatic  Eyes:      Conjunctiva/sclera: Conjunctivae normal    Cardiovascular:      Rate and Rhythm: Normal rate   " Pulmonary:      Effort: Pulmonary effort is normal  No respiratory distress  Musculoskeletal:      Cervical back: Tenderness present  Spinous process tenderness and muscular tenderness present  Thoracic back: Tenderness present  Lumbar back: Tenderness present  Skin:     General: Skin is warm and dry  Neurological:      Mental Status: She is alert and oriented to person, place, and time  Coordination: Finger-Nose-Finger Test normal    Psychiatric:         Attention and Perception: Attention and perception normal          Mood and Affect: Mood and affect normal          Speech: Speech normal          Behavior: Behavior normal  Behavior is cooperative  Thought Content: Thought content normal          Cognition and Memory: Cognition and memory normal          Judgment: Judgment normal          Neurologic Exam     Mental Status   Oriented to person, place, and time  Follows 1 step commands  Attention: normal  Concentration: normal    Speech: speech is normal   Level of consciousness: alert  Knowledge: good  Normal comprehension  Cranial Nerves     CN III, IV, VI   Conjugate gaze: present    CN VIII   Hearing: intact    Motor Exam   Muscle bulk: normal  Overall muscle tone: normal  Right arm pronator drift: absent  Left arm pronator drift: absent    Strength   Strength 5/5 except as noted  BUE:  4+/5 HF     Sensory Exam   Numbness bilateral feet  DST intact     Gait, Coordination, and Reflexes     Coordination   Finger to nose coordination: normal    Tremor   Resting tremor: absent  Intention tremor: absent  Action tremor: absent    Reflexes   Right : 2+  Left : 2+  Right Regalado: absent  Left Regalado: absent  Right ankle clonus: absent  Left ankle clonus: absent        MEDICAL DECISION MAKING    Imaging Studies:     No results found  I have personally reviewed pertinent reports     and I have personally reviewed pertinent films in PACS

## 2023-04-12 DIAGNOSIS — G24.01 TARDIVE DYSKINESIA: ICD-10-CM

## 2023-04-12 RX ORDER — AMANTADINE HYDROCHLORIDE 100 MG/1
100 CAPSULE, GELATIN COATED ORAL 2 TIMES DAILY
Qty: 60 CAPSULE | Refills: 3 | Status: SHIPPED | OUTPATIENT
Start: 2023-04-12 | End: 2023-08-21

## 2023-05-05 ENCOUNTER — VBI (OUTPATIENT)
Dept: ADMINISTRATIVE | Facility: OTHER | Age: 63
End: 2023-05-05

## 2023-05-05 NOTE — TELEPHONE ENCOUNTER
05/05/23 9:38 AM     VB [employer] SmartForm used to document caregap status      Ana Mendez MA
Continue Regimen: Onexton/Aklief
Detail Level: Generalized

## 2023-05-12 ENCOUNTER — TELEPHONE (OUTPATIENT)
Dept: NEUROLOGY | Facility: CLINIC | Age: 63
End: 2023-05-12

## 2023-05-12 NOTE — TELEPHONE ENCOUNTER
Patient is a new start to Botox, as per requested for Botox you would like to start this patient on: May we please have note specify this as primary DX  Botox 200 UNITS    Qty  1    DX  G43 009    Sig: Inject up to 200 UNITS I M into the various sites in the head and neck once every three months for one year with    Refills: 3     If you agree to this order transcribed above please respond directly if you agree or not, so I may proceed further with authorization and for use of verbal order  Thank you  Please note PA request has been faxed to [facility]      Will update with approval/denial when available, thank you

## 2023-05-18 ENCOUNTER — OFFICE VISIT (OUTPATIENT)
Dept: NEUROLOGY | Facility: CLINIC | Age: 63
End: 2023-05-18

## 2023-05-18 VITALS
RESPIRATION RATE: 17 BRPM | WEIGHT: 126.2 LBS | HEIGHT: 63 IN | DIASTOLIC BLOOD PRESSURE: 78 MMHG | BODY MASS INDEX: 22.36 KG/M2 | SYSTOLIC BLOOD PRESSURE: 104 MMHG | TEMPERATURE: 97.7 F | OXYGEN SATURATION: 96 % | HEART RATE: 105 BPM

## 2023-05-18 DIAGNOSIS — Z98.1 HISTORY OF FUSION OF CERVICAL SPINE: ICD-10-CM

## 2023-05-18 DIAGNOSIS — R25.1 TREMOR: ICD-10-CM

## 2023-05-18 DIAGNOSIS — M79.18 MYOFASCIAL PAIN SYNDROME, CERVICAL: ICD-10-CM

## 2023-05-18 DIAGNOSIS — G43.709 CHRONIC MIGRAINE WITHOUT AURA WITHOUT STATUS MIGRAINOSUS, NOT INTRACTABLE: Primary | ICD-10-CM

## 2023-05-18 DIAGNOSIS — M79.2 NEUROPATHIC PAIN: ICD-10-CM

## 2023-05-18 RX ORDER — PREDNISONE 5 MG/1
TABLET ORAL
Qty: 20 TABLET | Refills: 0 | Status: SHIPPED | OUTPATIENT
Start: 2023-05-18

## 2023-05-18 NOTE — PROGRESS NOTES
Patient ID: Eve Simmons is a 57 y o  female  Assessment/Plan:     Diagnoses and all orders for this visit:    Chronic migraine without aura without status migrainosus, not intractable  -     predniSONE 5 mg tablet; 1 tab in the AM prn migraine (if zomig fails)    Myofascial pain syndrome, cervical  -     predniSONE 5 mg tablet; 1 tab in the AM prn migraine (if zomig fails)    Neuropathic pain    Tremor    History of fusion of cervical spine           Migraines:  botox under approval process as noted last visit  Pt understands risks/ benefits  Discussed today  - prn zomig 2 5 mg tab, repeat after 2 hours if needed  - prednisone 5mg to break cycle if needed (pt tried in the past and helped)    Continue supplement turmeric + pierre  The patient should not hesitate to call me prior to her follow up with any questions or concerns  Subjective:    HPI    Ms  Eve Simmons is a very pleasant 58 yo female here for neurological follow up  She is having greater than 15 migraine days per month, each lasting several days or greater than 4 hours long  They are becoming more frequent, and typically worse or more severe when her back pain is worse  She is now following with pain management and saw neurosurgery for back pain  We will try more physical therapy soon  Recently she had a 4-day migraine and Zomig did work as it always does, but it wore off after a few hours  She took Percocet but it did not really help  This migraine was triggered by her back pain  Headaches start in the left frontotemporal region and can radiate to the right side  Her worst headaches are behind the eyes  She continues gabapentin 300 mg in the morning and 600 mg nightly  Increasing the dose higher than this will cause some side effects  Prior documentation:  Since I last saw the patient in August her migraine headaches seem to be getting worse      She has been doing physical therapy for her low back pain    She follows "with pain management for low back pain      She has osteoporosis and follows with rheumatology, takes Prolia injections      Migraine headaches are behind the eyes 5% of the time, and 95% of the time are located in the left apex region of the head  She has associated neck and back pain which radiates up to the front of the head  She very seldom has nausea with a migraine unless severe  She prefers not to be around in noisy environment when she has a migraine  Migraine is characterized by a thumping and throbbing sensation  She also has difficulty with concentration and focus  She tries to give herself something to do to distract herself from the headache and other symptoms, and tries to keep her mind busy which sometimes helps      She has about 2 severe migraine headaches every 3 weeks, but at least 15 days out of the month with a normal migraine headache  Zomig does help and keeps the headache under control  She gets relief in about a half an hour after taking it  Recently she has been taking it more frequently, and more frequently she has been needing the second dose after 2 hours      She has left greater than right-sided neck pain radiating down the left greater than right arm at times  She feels muscle knots in her arm  She is working on these in physical therapy  She sees PT 2 times per week      Trigger point injections performed at the last visit were not greatly helpful at all  She did not have side effects      Her tremor is a little bit better after holding off on the armodafinil  When she took it she would have drooling, lip licking, her  called it a \"fishmouth  \"  When she stopped taking it she felt better in terms of tremor  She felt like she was overmedicated on it  She also took Cymbalta off and some other medications which ended up making her feel sick/nauseous      Prior note: In June (6/27/22) pt had a fall at work   States she clocked out, did not turn on the lights in the " "meat room, and went back in to grab her bag  There was a rolled up mat on the floor which she did not know about and tripped, she fell on her chin and then shoulders hit the floor  States she fell fwd  No LOC  States she now has bruises on her chin, her gums were bruised as well for a while, states he TMJ was worse for a while  She has \"costochondritis\" as well now  She saw her pcp s/p fall  Overall her sxs from the fall are improving, not worsening thankfully      She states her tremor is not so bad right now  She had a bad episode of tremors about a week ago and she thinks due to increased stress at that time in her house  Per last visit with Dr Rosa Humphrey: \"We did discuss medications and I am concerned about the potential side effects of drowsiness given her known history of hypersomnolence   We have elected to avoid any additional medications but I have suggested weighted utensils  \"     She has hypersomnolence, following with sleep medicine  She continues armodafonil and buying through [facility]- med is not covered by insurance bc she is not dx with narcolepsy officially  She states the med works well for her and denies s/e  She states the med is about 30$ thorugh [facility] and she is thankful she can afford it      Migraines  - imitrex allergy- s/e of throat closes, trouble breathing, drooling  - zomig works well, no s/e  - worse after the fall as above; migraines are letting up now, except for more triggers now of allergies (states corn pollen is a trigger)     XR cervical 6/27/22: \"Postoperative changes as noted above   No evidence for hardware complication  Alignment abnormalities as noted above  C2-3 and C3-4 anterolistheses which reduce on extension and increase slightly on flexion  No acute osseous abnormalities  \"     She saw Nsx for the neck pain and imaging findings   She was referred back to Dr Fraga Patient in PM     The following portions of the patient's history were reviewed and updated as appropriate:   She  " has a past medical history of Allergy to dust, Anemia, Calculus of ureter, Chronic kidney disease (11/18), Chronic pain disorder, Colitis, Colon polyp, Cracked skin, Diverticulitis of colon (2012), Diverticulosis, Dysphagia, GERD (gastroesophageal reflux disease), GI (gastrointestinal bleed), H/O: GI bleed, Hiatal hernia, History of DVT in adulthood (2019), Hydronephrosis, Hyperlipidemia, Inguinal hernia, Irregular heart beat, Irritable bowel, Kidney stone, Lactose intolerance, Migraines, Narcolepsy, Narcolepsy (08/06/2020), OAB (overactive bladder), Osteoarthritis, Osteoporosis, Other chronic pain, PONV (postoperative nausea and vomiting), Pyelonephritis (01/13/2021), SBO (small bowel obstruction) ([facility] ) (09/25/2021), Sepsis ([facility] ) (01/13/2021), Smoking (08/06/2020), Tobacco abuse, Wears dentures, Wears glasses, and Wears glasses    She   Patient Active Problem List    Diagnosis Date Noted   • Chronic migraine without aura without status migrainosus, not intractable 05/18/2023   • Neuropathic pain 05/18/2023   • Tardive dyskinesia 02/01/2023   • Myofascial pain syndrome, cervical 08/04/2022   • Neurologic gait dysfunction 05/27/2022   • DDD (degenerative disc disease), cervical 03/27/2022   • Right median nerve neuropathy 01/16/2022   • Ulnar neuropathy of right upper extremity 01/16/2022   • Tremor 12/24/2021   • Carpal tunnel syndrome of right wrist    • History of fusion of cervical spine 09/12/2021   • Colitis 11/09/2020   • Smoking 08/06/2020   • Lumbar radiculopathy    • S/P right inguinal hernia repair 07/02/2020   • Annual physical exam 07/02/2020   • Cervical radiculopathy    • Spondylosis of cervical region without myelopathy or radiculopathy    • Anemia 09/01/2019   • Irritable bowel syndrome 09/01/2019   • Prolonged QT interval 09/01/2019   • Migraine without aura and without status migrainosus, not intractable 07/29/2019   • Fibromyalgia 06/25/2019   • Pure hypercholesterolemia 06/25/2019   • Diverticulosis 06/25/2019   • Chronic diarrhea 06/25/2019   • Hypercholesterolemia 04/04/2019   • Urge incontinence 01/11/2019   • History of hydronephrosis 01/11/2019   • Irritable bowel syndrome without diarrhea 12/03/2015   • Spondylosis of lumbar spine 02/19/2015   • Senile osteoporosis 02/18/2014     She  has a past surgical history that includes Laminectomy; Knee surgery (Right); Cholecystectomy; Spinal fusion; Bladder surgery; Abdominal adhesion surgery; [place] tooth extraction; Mole removal; Other surgical history; Hysterectomy (1977); Oophorectomy (Bilateral, 1977); LAPAROSCOPY; pr rpr 1st ingun hrna age 9 yrs/> reducible (Right, 06/25/2020); Colonoscopy (08/06/2020); Upper gastrointestinal endoscopy (08/06/2020); pr cysto bladder w/ureteral catheterization (Right, 12/02/2020); Lymph node dissection (Left); Arm skin lesion biopsy / excision; pr cysto/uretero w/lithotripsy &indwell stent insrt (Right, 01/05/2021); [place] retrograde pyelogram (01/05/2021); Joint replacement (Right); Hernia repair; pr lithotripsy xtrcorp shock wave (Right, 03/25/2021); Lymphadenectomy (1974); and Flexible sigmoidoscopy  Her family history includes Arthritis in her sister and sister; Breast cancer (age of onset: 52) in her other; Cancer in her brother, cousin, cousin, and father; Cancer (age of onset: 70) in her brother; Colon cancer in her father; Colon polyps in her father; Diabetes in her sister and sister; Heart attack in her mother; Hyperlipidemia in her mother; Hypertension in her brother, father, mother, sister, and sister; Irritable bowel syndrome in her sister; Learning disabilities in her brother; Nephrolithiasis in her brother; No Known Problems in her daughter, maternal aunt, maternal aunt, maternal aunt, maternal grandfather, maternal grandmother, paternal aunt, paternal aunt, paternal aunt, paternal grandfather, and paternal grandmother; Stroke in her brother  She  reports that she has been smoking cigarettes   She started smoking about 50 years ago  She has a 24 00 pack-year smoking history  She has never used smokeless tobacco  She reports that she does not currently use alcohol  She reports that she does not currently use drugs after having used the following drugs: Marijuana  Current Outpatient Medications   Medication Sig Dispense Refill   • amantadine (SYMMETREL) 100 mg capsule Take 1 capsule (100 mg total) by mouth 2 (two) times a day (Patient taking differently: Take 100 mg by mouth in the morning) 60 capsule 3   • aspirin 500 MG buffered tablet Take 1,000 mg by mouth every 6 (six) hours as needed for mild pain 4,000 mg daily for Pain     • calcium carbonate 1250 MG capsule Take 600 mg by mouth daily      • Cholecalciferol 1000 units tablet Take 1,000 Units by mouth daily Takes in the afternoon     • gabapentin (NEURONTIN) 300 mg capsule Take 1 capsule (300 mg total) by mouth 3 (three) times a day 270 capsule 0   • Ginger, Zingiber officinalis, (GINGER ROOT PO) Take 700 mg by mouth 2 (two) times a day      • metaxalone (SKELAXIN) 800 mg tablet Take 0 5 tablets (400 mg total) by mouth 3 (three) times a day 135 tablet 2   • Multiple Vitamins-Minerals (CENTRUM SILVER PO) Take 1 tablet by mouth daily Takes in the am     • ondansetron (ZOFRAN) 4 mg tablet Take 1 tablet (4 mg total) by mouth every 8 (eight) hours as needed for nausea or vomiting 20 tablet 0   • predniSONE 5 mg tablet 1 tab in the AM prn migraine (if zomig fails) 20 tablet 0   • trospium chloride (SANCTURA) 20 mg tablet Take 1 tablet (20 mg total) by mouth 2 (two) times a day 60 tablet 11   • Turmeric 500 MG CAPS Take 700 mg by mouth 2 (two) times a day     • ZOLMitriptan (ZOMIG) 2 5 MG tablet TAKE 1 TABLET BY MOUTH AT ONSET OF MIGRAINE, MAY REPEAT IN 2 HOURS IF NEEDED   LIMIT 2/24 HOURS, 4/WEEK, 8/MONTH 8 tablet 6   • trospium chloride (SANCTURA) 20 mg tablet Take 1 tablet (20 mg total) by mouth 2 (two) times a day 60 tablet 3     No current "facility-administered medications for this visit  She is allergic to celecoxib, sumatriptan, tramadol, and medical tape            Objective:    Blood pressure 104/78, pulse 105, temperature 97 7 °F (36 5 °C), temperature source Tympanic, resp  rate 17, height 5' 3\" (1 6 m), weight 57 2 kg (126 lb 3 2 oz), SpO2 96 %, not currently breastfeeding  Body mass index is 22 36 kg/m²  Physical Exam    Neurological Exam  Vital signs reviewed  Well developed, well nourished  Head: Normocephalic, atraumatic  Neck: Neck flexors 5/5, No TTP  CN 2-12: intact and symmetric, including EOMs which are normal b/l and PERRL  MSK: 5/5 t/o  ROM normal x all 4 extr  Reflexes: brisk in the bicep b/l  Non focal   Coordination: Nml x4 extr  Gait: stooped slightly; slightly antalgic due to low back pain  ROS:    Review of Systems   Constitutional: Negative  Negative for appetite change and fever  HENT: Negative  Negative for hearing loss, tinnitus, trouble swallowing and voice change  Eyes: Negative  Negative for photophobia, pain and visual disturbance  Respiratory: Negative  Negative for shortness of breath  Cardiovascular: Negative  Negative for palpitations  Gastrointestinal: Positive for constipation, diarrhea and nausea (due to migraine's)  Negative for vomiting  Endocrine: Negative  Negative for cold intolerance  Genitourinary: Positive for frequency and urgency  Negative for dysuria  Musculoskeletal: Positive for back pain (whole back pain), neck pain (fractured disc in her neck) and neck stiffness  Negative for gait problem (walks very slow for safety issues) and myalgias  Skin: Negative  Negative for rash  Allergic/Immunologic: Negative  Neurological: Positive for numbness (bilateral legs- feet) and headaches (migraine's for 4 days 2 wks ago- pain level at the time 8-10- no migraine or headache today)   Negative for dizziness, tremors, seizures, syncope, facial asymmetry, speech " difficulty and weakness  Hematological: Negative  Does not bruise/bleed easily  Psychiatric/Behavioral: Negative  Negative for confusion, hallucinations and sleep disturbance  The following portions of the patient's history were reviewed and updated as appropriate: allergies, current medications/ medication history, past family history, past medical history, past social history, past surgical history and problem list     Review of systems was reviewed and otherwise unremarkable from a neurological perspective  I have spent a total time of 40 minutes on 05/21/23 in caring for this patient including Risks and benefits of tx options, Instructions for management, Patient and family education, Risk factor reductions, Counseling / Coordination of care, Documenting in the medical record, Reviewing / ordering tests, medicine, procedures   and Obtaining or reviewing history

## 2023-06-14 ENCOUNTER — HOSPITAL ENCOUNTER (OUTPATIENT)
Dept: BONE DENSITY | Facility: MEDICAL CENTER | Age: 63
Discharge: HOME | End: 2023-06-14
Payer: COMMERCIAL

## 2023-06-14 DIAGNOSIS — Z98.1 HISTORY OF FUSION OF CERVICAL SPINE: ICD-10-CM

## 2023-06-14 DIAGNOSIS — G89.29 CHRONIC MIDLINE LOW BACK PAIN WITH LEFT-SIDED SCIATICA: ICD-10-CM

## 2023-06-14 DIAGNOSIS — M54.42 CHRONIC MIDLINE LOW BACK PAIN WITH LEFT-SIDED SCIATICA: ICD-10-CM

## 2023-06-14 DIAGNOSIS — M47.816 SPONDYLOSIS OF LUMBAR SPINE: ICD-10-CM

## 2023-06-14 DIAGNOSIS — M51.16 LUMBAR DISC DISEASE WITH RADICULOPATHY: ICD-10-CM

## 2023-06-14 PROCEDURE — 77080 DXA BONE DENSITY AXIAL: CPT

## 2023-07-20 ENCOUNTER — OFFICE VISIT (OUTPATIENT)
Dept: FAMILY MEDICINE CLINIC | Facility: CLINIC | Age: 63
End: 2023-07-20
Payer: COMMERCIAL

## 2023-07-20 VITALS
HEIGHT: 63 IN | HEART RATE: 74 BPM | TEMPERATURE: 98.1 F | DIASTOLIC BLOOD PRESSURE: 70 MMHG | WEIGHT: 127.2 LBS | BODY MASS INDEX: 22.54 KG/M2 | OXYGEN SATURATION: 98 % | SYSTOLIC BLOOD PRESSURE: 110 MMHG

## 2023-07-20 DIAGNOSIS — M79.2 NEUROPATHIC PAIN: ICD-10-CM

## 2023-07-20 DIAGNOSIS — F17.200 SMOKING: Primary | ICD-10-CM

## 2023-07-20 DIAGNOSIS — N39.41 URGE INCONTINENCE: ICD-10-CM

## 2023-07-20 DIAGNOSIS — G43.709 CHRONIC MIGRAINE WITHOUT AURA WITHOUT STATUS MIGRAINOSUS, NOT INTRACTABLE: ICD-10-CM

## 2023-07-20 DIAGNOSIS — M54.16 LUMBAR RADICULOPATHY: ICD-10-CM

## 2023-07-20 PROCEDURE — 99214 OFFICE O/P EST MOD 30 MIN: CPT | Performed by: FAMILY MEDICINE

## 2023-07-20 RX ORDER — METAXALONE 800 MG/1
400 TABLET ORAL 3 TIMES DAILY
Qty: 135 TABLET | Refills: 0 | Status: SHIPPED | OUTPATIENT
Start: 2023-07-20

## 2023-07-20 RX ORDER — VARENICLINE TARTRATE 25 MG
KIT ORAL
Qty: 53 EACH | Refills: 0 | Status: SHIPPED | OUTPATIENT
Start: 2023-07-20 | End: 2023-07-25 | Stop reason: RX

## 2023-07-20 RX ORDER — GABAPENTIN 300 MG/1
300 CAPSULE ORAL 3 TIMES DAILY
Qty: 270 CAPSULE | Refills: 0 | Status: SHIPPED | OUTPATIENT
Start: 2023-07-20 | End: 2023-10-18
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
## 2023-07-21 PROBLEM — Z00.00 ANNUAL PHYSICAL EXAM: Status: RESOLVED | Noted: 2020-07-02 | Resolved: 2023-07-21

## 2023-07-24 ENCOUNTER — TELEPHONE (OUTPATIENT)
Dept: FAMILY MEDICINE CLINIC | Facility: CLINIC | Age: 63
End: 2023-07-24

## 2023-07-24 NOTE — TELEPHONE ENCOUNTER
Brand Chantix has been taken off market, generic Varenicline therapy packs are on backorder for over 12 months. Rx will need to be reordered As follows:  Varenicline 0.5 mg tablets take 1 tablet by mouth daily for 3 days, then take 1 tablet by mouth twice daily for 4 days, then continue to 1 mg tablet twice per day. Please resend to [facility]. Therapy packs are on backorder, but tablets can be ordered per pharmacy.

## 2023-07-25 DIAGNOSIS — F17.200 SMOKING: Primary | ICD-10-CM

## 2023-07-25 RX ORDER — VARENICLINE TARTRATE 0.5 MG/1
TABLET, FILM COATED ORAL
Qty: 347 TABLET | Refills: 0 | Status: SHIPPED | OUTPATIENT
Start: 2023-07-25 | End: 2023-10-24

## 2023-07-28 ENCOUNTER — TELEPHONE (OUTPATIENT)
Dept: UROLOGY | Facility: MEDICAL CENTER | Age: 63
End: 2023-07-28

## 2023-07-28 ENCOUNTER — OFFICE VISIT (OUTPATIENT)
Dept: UROLOGY | Facility: MEDICAL CENTER | Age: 63
End: 2023-07-28

## 2023-07-28 VITALS
BODY MASS INDEX: 22.68 KG/M2 | HEIGHT: 63 IN | DIASTOLIC BLOOD PRESSURE: 72 MMHG | HEART RATE: 76 BPM | SYSTOLIC BLOOD PRESSURE: 130 MMHG | WEIGHT: 128 LBS

## 2023-07-28 DIAGNOSIS — N39.0 RECURRENT UTI: Primary | ICD-10-CM

## 2023-07-28 NOTE — PROGRESS NOTES
7/28/2023      Chief Complaint   Patient presents with   • Follow-up         Assessment and Plan    57 y.o. female    1. Mixed urinary incontinence  • Currently managed on Sanctura 20 mg po bid. Failed 3 other oral medications. • Significant back issues likely contributing to her symptoms  • Offered PFPT referral - patient declined. • Possibly interested in Botox depending on price/insurance coverage with upcoming transition to Medicare. • Message sent to surgery scheduler inquiring on code to present to her insurance to obtain an estimate. • If insurance coverage is confirmed, patient will need case request for Botox procedure.      2. Nephrolithiasis  • CT 10/29/22 with 2 mm nonobstructing left renal calculi. No bladder calculi. • Reviewed dietary modifications, improtance of daily water intake 40-60 oz. • Continue observation. • ED parameters reviewed. History of Present Illness  Shai Garcia is a 57 y.o. female here for evaluation of urinary incontinence and nephrolithiasis. Patient has a history of mixed urinary incontinence mostly with urge urinary incontinence and has been managed on oxybutynin, Vesicare, and Detrol with minimal improvement. She has noted some improvement on Sanctura. Botox procedure has been discussed previously but patient was not interested. She also has a history of nephrolithiasis which was addressed at her January 2023 visit. CT in October 2022 revealed 2 mm nonobstructing left renal calculi. Plan was to continue observation. She presents today for 6-month follow-up of her urinary symptoms. Patient has been evaluated by Neurosurgery who will not operate on her back until she undergoes PT and quits smoking for 6 months. Her back issues have been progressively worsening and in turn, so have her urinary symptoms. She no longer feels improvement with the [place].  She may be interested in Botox but she is concerned about insurance coverage given upcoming transition to Medicare with a brand new deductible. Please see plan above. Review of Systems   Constitutional: Negative for chills and fever. HENT: Negative. Respiratory: Negative. Cardiovascular: Negative. Gastrointestinal: Positive for abdominal pain (suprapubic discomfort). Negative for nausea and vomiting. Genitourinary: Positive for enuresis, frequency and urgency. Negative for dysuria. Mixed urinary incontinence, wears 3-4 pads during the day   Musculoskeletal: Negative. Skin: Negative. Neurological: Negative. Vitals  Vitals:    07/28/23 1546   Weight: 58.1 kg (128 lb)   Height: 5' 3" (1.6 m)       Physical Exam  Vitals reviewed. Constitutional:       General: She is not in acute distress. Appearance: Normal appearance. She is normal weight. She is not ill-appearing, toxic-appearing or diaphoretic. HENT:      Head: Normocephalic and atraumatic. Eyes:      Conjunctiva/sclera: Conjunctivae normal.   Pulmonary:      Effort: Pulmonary effort is normal. No respiratory distress. Abdominal:      General: There is no distension. Palpations: Abdomen is soft. Tenderness: There is no abdominal tenderness. There is no right CVA tenderness, left CVA tenderness, guarding or rebound. Musculoskeletal:         General: Normal range of motion. Cervical back: Normal range of motion. Skin:     General: Skin is warm and dry. Neurological:      General: No focal deficit present. Mental Status: She is alert and oriented to person, place, and time. Psychiatric:         Mood and Affect: Mood normal.         Behavior: Behavior normal.         Thought Content:  Thought content normal.         Judgment: Judgment normal.       Past History  Past Medical History:   Diagnosis Date   • Allergy to dust    • Anemia    • Calculus of ureter    • Chronic kidney disease 11/18    Hydrophenesis   • Chronic pain disorder     neck and back- sees pain management   • Colitis • Colon polyp    • Cracked skin     on fingers   • Diverticulitis of colon 2012    Seen by Dr. Aicha Arita at [facility]. Inpatient at   • Diverticulosis    • Dysphagia    • GERD (gastroesophageal reflux disease)    • GI (gastrointestinal bleed)     As a result of diverticulitis and microscopic colitis. • H/O: GI bleed    • Hiatal hernia    • History of DVT in adulthood 2019 2019   • Hydronephrosis    • Hyperlipidemia    • Inguinal hernia     right side   • Irregular heart beat     Pt states with prolonged QT interval - was seen by cardiologist( [facility]) pt told "to not worry about it"   • Irritable bowel    • Kidney stone    • Lactose intolerance     Did not tolerate milk after being weaned from breast milk.    • Migraines    • Narcolepsy    • Narcolepsy 08/06/2020   • OAB (overactive bladder)    • Osteoarthritis    • Osteoporosis    • Other chronic pain     degenerative disc disease   • PONV (postoperative nausea and vomiting)     migraines   • Pyelonephritis 01/13/2021   • SBO (small bowel obstruction) ([address]) 09/25/2021   • Sepsis ([address]) 01/13/2021   • Smoking 08/06/2020   • Tobacco abuse    • Wears dentures     full upper   • Wears glasses    • Wears glasses      Social History     Socioeconomic History   • Marital status: /Civil Union     Spouse name: None   • Number of children: None   • Years of education: None   • Highest education level: None   Occupational History   • None   Tobacco Use   • Smoking status: Heavy Smoker     Packs/day: 0.50     Years: 48.00     Total pack years: 24.00     Types: Cigarettes     Start date: 6/1/1973   • Smokeless tobacco: Never   • Tobacco comments:     Stopped smoking [id number] (first marriage),  stopped again during 2014 due to multiple s   Vaping Use   • Vaping Use: Never used   Substance and Sexual Activity   • Alcohol use: Yes     Comment: occasional drink ( 12 oz. beer or beer based beverage 1-2x m   • Drug use: Not Currently     Types: Marijuana   • Sexual activity: Not Currently     Partners: Male     Birth control/protection: Post-menopausal, Male Sterilization   Other Topics Concern   • None   Social History Narrative   • None     Social Determinants of Health     Financial Resource Strain: Not on file   Food Insecurity: Not on file   Transportation Needs: Not on file   Physical Activity: Not on file   Stress: Not on file   Social Connections: Not on file   Intimate Partner Violence: Not on file   Housing Stability: Not on file     Social History     Tobacco Use   Smoking Status Heavy Smoker   • Packs/day: 0.50   • Years: 48.00   • Total pack years: 24.00   • Types: Cigarettes   • Start date: 1973   Smokeless Tobacco Never   Tobacco Comments    Stopped smoking [id number] (first marriage),  stopped again during  due to multiple s     Family History   Problem Relation Age of Onset   • Hypertension Mother          from massive heart attack, never took any medication for this condition   • Heart attack Mother    • Hyperlipidemia Mother         Blood tests done just prior to heart attack. Results reported after her passing.    • Hypertension Father         Had high blood pressure, unknown if he ever took medication   • Cancer Father         Tumor in bowels, spread to liver, skin cancer   • Colon cancer Father    • Colon polyps Father    • Diabetes Sister         uses insulin   • Arthritis Sister         Osteo arthritis   • Hypertension Sister         Triple bypass   • Diabetes Sister         pre-diabetic, oral medication   • Arthritis Sister         Osteo arthritis   • Hypertension Sister         Triple bypass   • Irritable bowel syndrome Sister         Constipation   • No Known Problems Daughter    • No Known Problems Maternal Grandmother    • No Known Problems Maternal Grandfather    • No Known Problems Paternal Grandmother    • No Known Problems Paternal Grandfather    • Stroke Brother    • Nephrolithiasis Brother    • Cancer Brother 70        soft tissue cancer   • No Known Problems Maternal Aunt    • No Known Problems Maternal Aunt    • No Known Problems Maternal Aunt    • No Known Problems Paternal Aunt    • No Known Problems Paternal Aunt    • No Known Problems Paternal Aunt    • Breast cancer Other 52   • Cancer Cousin         Pancreatic cancer   • Cancer Cousin         Breast cancer, total mastectomy   • Cancer Brother         Terminal soft tissue cancer 8/21   • Hypertension Brother         Triple bypass   • Learning disabilities Brother         Mental retardation       The following portions of the patient's history were reviewed and updated as appropriate: allergies, current medications, past medical history, past social history, past surgical history and problem list.    Results  No results found for this or any previous visit (from the past 1 hour(s)). ]  No results found for: "PSA"  Lab Results   Component Value Date    CALCIUM 8.8 11/02/2022    K 4.1 11/02/2022    CO2 27 11/02/2022     11/02/2022    BUN 16 11/02/2022    CREATININE 0.71 11/02/2022     Lab Results   Component Value Date    WBC 4.88 11/02/2022    HGB 14.0 11/02/2022    HCT 41.5 11/02/2022    MCV 94 11/02/2022     11/02/2022     Tesha Elizabeth PA-C

## 2023-07-28 NOTE — TELEPHONE ENCOUNTER
Zully Richardson,     Patient is interested in Botox for urge urinary incontinence. Do you know what the code would be that she can ask her insurance about coverage? Or the calculator online? Today is my last day so if you could fwd the answer to the clinical pool, that would be great. Thanks!

## 2023-08-01 NOTE — TELEPHONE ENCOUNTER
Called pt and gave her CPT codes for Botox she requested.   Advised she contact Billing number which was also provided  for any further questions

## 2023-08-17 DIAGNOSIS — F17.200 SMOKING: ICD-10-CM

## 2023-08-17 NOTE — TELEPHONE ENCOUNTER
Pharmacy called stating insurance will not pay for the chantix 0.5 mg as it is written.  States it needs to be written for 1 mg with a sig of take 1 tablet by mouth 2 times daily

## 2023-08-18 RX ORDER — VARENICLINE TARTRATE 1 MG/1
1 TABLET, FILM COATED ORAL 2 TIMES DAILY
Qty: 90 TABLET | Refills: 3 | Status: SHIPPED | OUTPATIENT
Start: 2023-08-18

## 2023-08-21 DIAGNOSIS — G24.01 TARDIVE DYSKINESIA: ICD-10-CM

## 2023-08-21 RX ORDER — AMANTADINE HYDROCHLORIDE 100 MG/1
100 CAPSULE, GELATIN COATED ORAL 2 TIMES DAILY
Qty: 60 CAPSULE | Refills: 5 | Status: SHIPPED | OUTPATIENT
Start: 2023-08-21

## 2023-09-07 ENCOUNTER — OFFICE VISIT (OUTPATIENT)
Dept: SLEEP CENTER | Facility: CLINIC | Age: 63
End: 2023-09-07
Payer: MEDICARE

## 2023-09-07 VITALS
BODY MASS INDEX: 22.86 KG/M2 | HEART RATE: 89 BPM | HEIGHT: 63 IN | DIASTOLIC BLOOD PRESSURE: 81 MMHG | WEIGHT: 129 LBS | SYSTOLIC BLOOD PRESSURE: 122 MMHG

## 2023-09-07 DIAGNOSIS — T88.7XXA SIDE EFFECT OF MEDICATION: Primary | ICD-10-CM

## 2023-09-07 DIAGNOSIS — G24.01 TARDIVE DYSKINESIA: ICD-10-CM

## 2023-09-07 PROCEDURE — 99213 OFFICE O/P EST LOW 20 MIN: CPT | Performed by: NURSE PRACTITIONER

## 2023-09-07 NOTE — PATIENT INSTRUCTIONS
Follow up on an as needed basis  Try to keep sleep/wake schedule as consistent as possible, especially the wake up time  Try to get 7-9 hours of sleep  Tips for Healthy Sleep   WHAT YOU NEED TO KNOW:   What do I need to know about healthy sleep? Healthy sleep, or sleep hygiene, is important to your physical, mental, and emotional health. Sleep affects almost every part of your body, including your brain, heart, metabolism, immune system, and mood. Good sleep habits can help you fall asleep and stay asleep during the night. Poor quality sleep or a long-term lack of sleep increases your risk for certain disorders. These include high blood pressure, cardiovascular disease, obesity, depression, and diabetes. What are sleep stages? The 2 main kinds of sleep are rapid eye movement (REM) and non-REM. You cycle through REM and non-REM many times during the night. Stage 1 non-REM sleep  is when you first fall asleep. This stage is short. Your brain waves slow and your body relaxes. Stage 2 non-REM sleep  is a period of light sleep before you move into deeper sleep. You spend the most time in this stage during the night. Your body temperature drops and your body relaxes even more. Stage 3 non-REM sleep  is a period of deep sleep that starts after stage 2. This stage is needed to feel refreshed in the morning. REM sleep  starts about 90 minutes after you fall asleep. Your eyes move from side to side. Your heart rate, blood pressure, and breathing become faster. Most of your dreams occur during REM sleep. As you age, you spend less time in REM sleep. How much sleep do I need? Each person needs a different amount of sleep. Your sleep patterns and needs change as you age. In general, school-aged children and teenagers need about 9 to 10 hours of sleep a night. Most adults need about 7 to 9 hours of sleep a night. After age 61 years, sleep may be lighter and for less time, with more periods of wakefulness. Older adults may fall asleep and wake up earlier than they did at a younger age. Medicines or conditions, such as chronic pain, may keep older adults awake. How do I know I am getting healthy sleep? Keep a 2-week log of your sleep. Write down what time you got up, what you did that day, and anything else that could affect your sleep. Keep a record of your sleep patterns, and any sleeping problems you have. Bring the record to your follow-up visits. Ask someone who lives with you if they notice anything about your sleep. For example, maybe you snore loudly or stop breathing for short periods. Tell your healthcare provider if your legs twitch or you feel like you can't keep them still. Your healthcare provider may refer to you a sleep specialist or cognitive behavioral therapy. What can I do to improve my sleep? Your daily routines influence your sleep. Nutrition, medicines, your schedule, and what you do in the evenings can affect your sleep. Do the following to help improve your sleep:  Create a sleep schedule. Go to sleep and wake up at the same time every day. Be consistent, even on weekends or when you travel. Do not go to bed unless you are sleepy. Set up a calming routine before bed. Soak in a warm bath, listen to relaxing music, or read a book. Turn off all electronics at least 30 minutes before bedtime. Try not to watch television or use any electronics in the bedroom. Limit naps to 20 or 30 minutes, earlier in the day. Naps could make it hard for you to fall asleep at bedtime. Keep your bedroom cool, quiet, and dark. Turn on white noise, such as a fan, to help you relax. Get up if you do not fall asleep within 20 minutes. Move to another room and do something relaxing until you become sleepy. Limit caffeine, alcohol, and food to earlier in the day. Only drink caffeine in the morning. Do not drink alcohol within 6 hours of bedtime.  Do not eat a heavy meal right before you go to bed. Limit how much liquid you drink in the evenings and right before bed. If you are prescribed diuretics, or water pills, take them early in the day. Exercise regularly. Daily exercise may help you sleep better. Do not exercise within 3 hours of bedtime. When should I call my doctor? Someone has told you that you stop breathing when you sleep. Your legs twitch and it keeps you from sleeping. You begin to use drugs or alcohol to fall asleep. You have questions or concerns about your sleep habits. CARE AGREEMENT:   You have the right to help plan your care. Learn about your health condition and how it may be treated. Discuss treatment options with your healthcare providers to decide what care you want to receive. You always have the right to refuse treatment. The above information is an  only. It is not intended as medical advice for individual conditions or treatments. Talk to your doctor, nurse or pharmacist before following any medical regimen to see if it is safe and effective for you. © Copyright [employer] 2022 Information is for End User's use only and may not be sold, redistributed or otherwise used for commercial purposes. Nursing Support:  When: Monday through Friday 7A-5PM except holidays  Where: Our direct line is [phone]. If you are having a true emergency please call 911. In the event that the line is busy or it is after hours please leave a voice message and we will return your call. Please speak clearly, leaving your full name, birth date, best number to reach you and the reason for your call. Medication refills: We will need the name of the medication, the dosage, the ordering provider, whether you get a 30 or 90 day refill, and the pharmacy name and address. Medications will be ordered by the provider only. Nurses cannot call in prescriptions. Please allow 7 days for medication refills. Physician requested updates:  If your provider requested that you call with an update after starting medication, please be ready to provide us the medication and dosage, what time you take your medication, the time you attempt to fall asleep, time you fall asleep, when you wake up, and what time you get out of bed. Sleep Study Results: We will contact you with sleep study results and/or next steps after the physician has reviewed your testing.
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
## 2023-09-08 PROBLEM — T88.7XXA SIDE EFFECT OF MEDICATION: Status: ACTIVE | Noted: 2023-09-08

## 2023-09-13 ENCOUNTER — OFFICE VISIT (OUTPATIENT)
Dept: FAMILY MEDICINE CLINIC | Facility: CLINIC | Age: 63
End: 2023-09-13
Payer: COMMERCIAL

## 2023-09-13 VITALS
HEIGHT: 63 IN | HEART RATE: 80 BPM | WEIGHT: 128.4 LBS | DIASTOLIC BLOOD PRESSURE: 80 MMHG | BODY MASS INDEX: 22.75 KG/M2 | OXYGEN SATURATION: 98 % | TEMPERATURE: 97.8 F | SYSTOLIC BLOOD PRESSURE: 132 MMHG

## 2023-09-13 DIAGNOSIS — F43.21 GRIEVING: Primary | ICD-10-CM

## 2023-09-13 DIAGNOSIS — Z72.0 TOBACCO ABUSE: ICD-10-CM

## 2023-09-13 DIAGNOSIS — M25.562 CHRONIC PAIN OF LEFT KNEE: ICD-10-CM

## 2023-09-13 DIAGNOSIS — G89.29 CHRONIC PAIN OF LEFT KNEE: ICD-10-CM

## 2023-09-13 PROCEDURE — 99214 OFFICE O/P EST MOD 30 MIN: CPT | Performed by: FAMILY MEDICINE

## 2023-09-13 RX ORDER — BUPROPION HYDROCHLORIDE 150 MG/1
150 TABLET ORAL EVERY MORNING
Qty: 90 TABLET | Refills: 1 | Status: SHIPPED | OUTPATIENT
Start: 2023-09-13

## 2023-09-14 RX ORDER — HYDROXYZINE HYDROCHLORIDE 10 MG/1
10 TABLET, FILM COATED ORAL
Qty: 30 TABLET | Refills: 0 | Status: SHIPPED | OUTPATIENT
Start: 2023-09-14

## 2023-09-16 NOTE — PROGRESS NOTES
Assessment/Plan:    Grieving  - Case discussed with sleep medicine who recommends a trial of low dose hydroxyzine to help with sleep and anxiety. Patient will call with a status update  -     hydrOXYzine HCL (ATARAX) 10 mg tablet; Take 1 tablet (10 mg total) by mouth daily at bedtime as needed for itching or anxiety    Tobacco abuse  - Patient was initially prescribed Chantix however this is no longer being covered by insurance. Discussed starting a trial of Wellbutrin 150 mg daily which she is agreeable to. Patient to call with a status update  - Continue annual CT lung cancer screening   -     buPROPion (WELLBUTRIN XL) 150 mg 24 hr tablet; Take 1 tablet (150 mg total) by mouth every morning    Chronic pain of left knee  - Repeat XR of the left knee ordered for further evaluation   -     XR knee 3 vw left non injury; Future          Subjective:      Patient ID: Gallito Michelle is a 57 y.o. female. HPI  Gallito Michelle is a very pleasant 57year old female who presents today for a follow up. Sadly her  passed away last week after a [place] with cancer. She is understandably very upset and has been having a hard time sleeping. In addition to dealing with the aftermath of her husbands death, her daughter is also having health difficulties. She does have a history of hypersomnia secondary to her medications and follows with sleep medicine whom she saw last week. In addition to this she would also like to discuss medications for smoking cessation. She was started on Chantix during our previous office visit and was doing well on the medication however her insurance stopped covering it. She is also have an acute exacerbation of chronic left knee pain. She did have imaging several years ago which showed degenerative changes.      The following portions of the patient's history were reviewed and updated as appropriate: allergies, current medications, past family history, past medical history, past social history, past surgical history and problem list.    Review of Systems   Constitutional: Negative. HENT: Negative. Eyes: Negative. Respiratory: Negative. Cardiovascular: Negative. Gastrointestinal: Negative. Genitourinary: Negative. Musculoskeletal: Positive for arthralgias. Skin: Negative. Neurological: Negative. Psychiatric/Behavioral: Positive for dysphoric mood and sleep disturbance. Objective:      /80 (BP Location: Left arm, Patient Position: Sitting, Cuff Size: Adult)   Pulse 80   Temp 97.8 °F (36.6 °C) (Temporal)   Ht 5' 3" (1.6 m)   Wt 58.2 kg (128 lb 6.4 oz)   SpO2 98%   BMI 22.75 kg/m²          Physical Exam  Constitutional:       General: She is not in acute distress. Appearance: She is not ill-appearing. HENT:      Head: Normocephalic and atraumatic. Pulmonary:      Effort: No respiratory distress. Neurological:      General: No focal deficit present. Mental Status: She is alert. Psychiatric:         Mood and Affect: Mood is depressed. Affect is tearful.

## 2023-09-21 ENCOUNTER — TELEPHONE (OUTPATIENT)
Age: 63
End: 2023-09-21

## 2023-09-21 NOTE — TELEPHONE ENCOUNTER
Caller: Patient    Doctor: Carlos Corona    Reason for call: Patient is calling stating when she was in to see Dr Kevin Whyte, they wrote on a card that her follow up with Dr Carlos Corona is 9/28 @ 4pm, but never scheduled it. I offered her the morning slot on 9/28 but she stated she needs a 4pm. Please advise.     Call back#: [phone] - ok to leave message

## 2023-09-21 NOTE — TELEPHONE ENCOUNTER
Is she willing to come to [facility] tomorrow? I have a 930 opening. Otherwise, please keep on wait list for whichever campuses patient prefers.

## 2023-09-29 ENCOUNTER — TELEPHONE (OUTPATIENT)
Dept: NEUROLOGY | Facility: CLINIC | Age: 63
End: 2023-09-29

## 2023-10-04 ENCOUNTER — APPOINTMENT (OUTPATIENT)
Dept: RADIOLOGY | Facility: MEDICAL CENTER | Age: 63
End: 2023-10-04
Payer: COMMERCIAL

## 2023-10-04 ENCOUNTER — APPOINTMENT (OUTPATIENT)
Dept: LAB | Facility: MEDICAL CENTER | Age: 63
End: 2023-10-04
Payer: COMMERCIAL

## 2023-10-04 ENCOUNTER — OFFICE VISIT (OUTPATIENT)
Dept: NEUROLOGY | Facility: CLINIC | Age: 63
End: 2023-10-04
Payer: COMMERCIAL

## 2023-10-04 VITALS
RESPIRATION RATE: 16 BRPM | WEIGHT: 132 LBS | TEMPERATURE: 97.8 F | HEART RATE: 89 BPM | SYSTOLIC BLOOD PRESSURE: 128 MMHG | HEIGHT: 63 IN | OXYGEN SATURATION: 97 % | BODY MASS INDEX: 23.39 KG/M2 | DIASTOLIC BLOOD PRESSURE: 65 MMHG

## 2023-10-04 DIAGNOSIS — M81.0 POST-MENOPAUSAL OSTEOPOROSIS: ICD-10-CM

## 2023-10-04 DIAGNOSIS — E55.9 VITAMIN D DEFICIENCY: ICD-10-CM

## 2023-10-04 DIAGNOSIS — G43.009 MIGRAINE WITHOUT AURA AND WITHOUT STATUS MIGRAINOSUS, NOT INTRACTABLE: ICD-10-CM

## 2023-10-04 DIAGNOSIS — G89.29 CHRONIC PAIN OF LEFT KNEE: ICD-10-CM

## 2023-10-04 DIAGNOSIS — M25.562 CHRONIC PAIN OF LEFT KNEE: ICD-10-CM

## 2023-10-04 DIAGNOSIS — M81.0 OSTEOPOROSIS WITHOUT CURRENT PATHOLOGICAL FRACTURE, UNSPECIFIED OSTEOPOROSIS TYPE: ICD-10-CM

## 2023-10-04 DIAGNOSIS — G24.01 TARDIVE DYSKINESIA: ICD-10-CM

## 2023-10-04 LAB
25(OH)D3 SERPL-MCNC: 37.2 NG/ML (ref 30–100)
ALBUMIN SERPL BCP-MCNC: 4.3 G/DL (ref 3.5–5)
ALP SERPL-CCNC: 67 U/L (ref 34–104)
ALT SERPL W P-5'-P-CCNC: 18 U/L (ref 7–52)
ANION GAP SERPL CALCULATED.3IONS-SCNC: 9 MMOL/L
AST SERPL W P-5'-P-CCNC: 24 U/L (ref 13–39)
BILIRUB SERPL-MCNC: 0.74 MG/DL (ref 0.2–1)
BUN SERPL-MCNC: 18 MG/DL (ref 5–25)
CALCIUM SERPL-MCNC: 9.7 MG/DL (ref 8.4–10.2)
CHLORIDE SERPL-SCNC: 103 MMOL/L (ref 96–108)
CO2 SERPL-SCNC: 27 MMOL/L (ref 21–32)
CREAT SERPL-MCNC: 0.95 MG/DL (ref 0.6–1.3)
GFR SERPL CREATININE-BSD FRML MDRD: 63 ML/MIN/1.73SQ M
GLUCOSE SERPL-MCNC: 77 MG/DL (ref 65–140)
POTASSIUM SERPL-SCNC: 4.4 MMOL/L (ref 3.5–5.3)
PROT SERPL-MCNC: 6.7 G/DL (ref 6.4–8.4)
SODIUM SERPL-SCNC: 139 MMOL/L (ref 135–147)

## 2023-10-04 PROCEDURE — 73562 X-RAY EXAM OF KNEE 3: CPT

## 2023-10-04 PROCEDURE — 82306 VITAMIN D 25 HYDROXY: CPT

## 2023-10-04 PROCEDURE — 99214 OFFICE O/P EST MOD 30 MIN: CPT | Performed by: PSYCHIATRY & NEUROLOGY

## 2023-10-04 PROCEDURE — 36415 COLL VENOUS BLD VENIPUNCTURE: CPT

## 2023-10-04 PROCEDURE — 80053 COMPREHEN METABOLIC PANEL: CPT

## 2023-10-04 RX ORDER — ONDANSETRON 4 MG/1
4 TABLET, FILM COATED ORAL EVERY 8 HOURS PRN
Qty: 20 TABLET | Refills: 1 | Status: SHIPPED | OUTPATIENT
Start: 2023-10-04

## 2023-10-04 RX ORDER — ZOLMITRIPTAN 5 MG/1
TABLET, FILM COATED ORAL
Qty: 8 TABLET | Refills: 3 | Status: SHIPPED | OUTPATIENT
Start: 2023-10-04

## 2023-10-04 RX ORDER — PROPRANOLOL HYDROCHLORIDE 40 MG/1
40 TABLET ORAL
Qty: 30 TABLET | Refills: 2 | Status: SHIPPED | OUTPATIENT
Start: 2023-10-04

## 2023-10-04 RX ORDER — AMANTADINE HYDROCHLORIDE 100 MG/1
100 CAPSULE, GELATIN COATED ORAL DAILY
Qty: 90 CAPSULE | Refills: 1 | Status: SHIPPED | OUTPATIENT
Start: 2023-10-04

## 2023-10-04 NOTE — ASSESSMENT & PLAN NOTE
Mouth movements have improved . She is no longer on Compazine or Reglan. She does have access to Zofran which does not have this potential side effect tardive dyskinesia overall the mouth movements have improved and therefore she only utilizes amantadine 1 tablet a day, 100 mg. Therefore this prescription has been continued. If she notices improvement of the mouth movements then she can slowly reduce the dose.

## 2023-10-04 NOTE — PATIENT INSTRUCTIONS
Zomig . Increase the dose     Inderal 40 mg at bedtime     Can use zofran    Amantadine for mouth movements .  Can slowly decrease amount

## 2023-10-04 NOTE — ASSESSMENT & PLAN NOTE
Lisa Shah is a 40-year-old patient with migraine headaches. Since the last visit the Botox was not approved by her insurance company. She now since June is complaining of greater than 15 migraines a month all lasting up to 72 hours. She does utilize Zomig however the migraines do return. She has been now utilizing aspirin would still make for her headaches. She does complain of tremor today. In the past she did utilize nortriptyline which was ineffective she does have a dry mouth. We just checked her blood pressure and her heart rate. This is normal and therefore we will start her on Inderal 40 mg at bedtime for 1 month. If her headaches do persist the Inderal can be increased to 60 mg XL and eventually 80 if she is able to tolerate the dose. We discussed the most major side effects of lethargy. She does have access to Zomig and I have increased the dose to 5 mg. She also has access to Zofran for her severe nausea.
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
## 2023-10-16 DIAGNOSIS — Z12.31 ENCOUNTER FOR SCREENING MAMMOGRAM FOR MALIGNANT NEOPLASM OF BREAST: Primary | ICD-10-CM

## 2023-11-10 ENCOUNTER — TELEPHONE (OUTPATIENT)
Dept: NEUROLOGY | Facility: CLINIC | Age: 63
End: 2023-11-10

## 2023-11-11 DIAGNOSIS — M54.16 LUMBAR RADICULOPATHY: ICD-10-CM

## 2023-11-13 RX ORDER — METAXALONE 800 MG/1
400 TABLET ORAL 3 TIMES DAILY
Qty: 135 TABLET | Refills: 0 | Status: SHIPPED | OUTPATIENT
Start: 2023-11-13

## 2023-11-27 ENCOUNTER — OFFICE VISIT (OUTPATIENT)
Dept: NEUROLOGY | Facility: CLINIC | Age: 63
End: 2023-11-27
Payer: COMMERCIAL

## 2023-11-27 VITALS
WEIGHT: 131 LBS | HEIGHT: 63 IN | TEMPERATURE: 98.6 F | HEART RATE: 98 BPM | DIASTOLIC BLOOD PRESSURE: 59 MMHG | OXYGEN SATURATION: 98 % | RESPIRATION RATE: 16 BRPM | SYSTOLIC BLOOD PRESSURE: 127 MMHG | BODY MASS INDEX: 23.21 KG/M2

## 2023-11-27 DIAGNOSIS — G43.009 MIGRAINE WITHOUT AURA AND WITHOUT STATUS MIGRAINOSUS, NOT INTRACTABLE: Primary | ICD-10-CM

## 2023-11-27 DIAGNOSIS — Z98.1 HISTORY OF FUSION OF CERVICAL SPINE: ICD-10-CM

## 2023-11-27 DIAGNOSIS — R25.1 TREMOR: ICD-10-CM

## 2023-11-27 DIAGNOSIS — M79.18 MYOFASCIAL PAIN SYNDROME, CERVICAL: ICD-10-CM

## 2023-11-27 PROCEDURE — 99214 OFFICE O/P EST MOD 30 MIN: CPT | Performed by: PHYSICIAN ASSISTANT

## 2023-11-27 RX ORDER — ZOLMITRIPTAN 2.5 MG/1
TABLET, FILM COATED ORAL
Qty: 16 TABLET | Refills: 2 | Status: SHIPPED | OUTPATIENT
Start: 2023-11-27

## 2023-11-27 NOTE — PROGRESS NOTES
Patient ID: Alex Quispe is a 70 y.o. female. Assessment/Plan:       Diagnoses and all orders for this visit:    Migraine without aura and without status migrainosus, not intractable  -     ZOLMitriptan (ZOMIG) 2.5 MG tablet; TAKE 1 TABLET BY MOUTH AT ONSET OF MIGRAINE, MAY REPEAT IN 2 HOURS IF NEEDED. LIMIT 2/24 HOURS, 4/WEEK, 8/MONTH    Myofascial pain syndrome, cervical    History of fusion of cervical spine    Tremor       Ms. Alex Quispe is doing well neurologically. She continues to have a lot of neck pain, with a remote history of neck fusion. She has restricted range of motion when looking up and also when looking to the right, looking to the left is fine. She reports facial pain symptoms in the V2 and V3 roughly, when she looks to the right and strains the neck. She does not want to treat this at this time, but I did suggest possibly baclofen or even Botox again. She also has a hemifacial spasm above the right lip which can be treated with Botox. Since starting propranolol she has noticed a reduction in migraine headaches thankfully. She denies side effects except sedation but she takes it at bedtime. She does not feel dizzy, lightheaded or sedated when she wakes up in the morning. She is happy with the reduction of migraines since starting this medication. She prefers to take 2.5 mg of Zomig when she gets a migraine headache, this works well. She was given the 5 mg tablet but when cutting it in half the tablet disintegrates due to a hard coating on the outside, therefore she is requesting the 2.5 mg tab again so I sent it. She did not have side effects to the 5 mg but it worked just as well as the 2.5. Continue amantadine daily as prescribed for hand tremor and facial tremor, TD. I reminded her that she is a good candidate for Botox not only for migraine and possible cervical dystonia, but definitely for right hemifacial spasm.   If she would like to restart the authorization process she should contact me. She is satisfied with the current treatment plan as noted above, and defers Botox at this time. The patient should not hesitate to call me prior to her follow up with any questions or concerns. Subjective:    HPI    Ms. Bradley Harrison is here for neurological follow up. Since I last saw her she saw Dr. Marisela Masters on 10/4/2023. She reports a fairly drastic reduction of migraine headaches since starting propranolol at that time. Migraine headaches are reduced to 2 to 3/month at the very most, prior to propranolol she had headaches daily and a severe migraine at least once per week. Prior to propranolol the migraines would last 2 to 3 days in a row. Now she is able to abort the migraine with Zomig. She was prescribed a 5 mg Zomig tab but she prefers to take 2.5 and she could not cut it in half well, so she is asking for refill of 2.5. She continues to have some neck pain which triggers some of her headaches she thinks. She has reduced range of motion more so to the right and when looking up. When she looks to the right she has instant pain of the right side of the face in the cheek and it goes to the nasolabial fold. Migraine headaches are typically in the left parietal or left hemisphere. More rarely she has migraine headaches behind her eyes which causes nausea and emesis, but she does not remember the last time this happened. Current migraine headaches are not associated with light or sound sensitivity or nausea. Tremor:  Resolved except if she is under a lot of stress. She states both hands with tremor if she is stressed. She does not have difficulty with coordination. She has an upper lip tremor more so on the right side but this is significantly improved with amantadine. She denies side effects to amantadine. She continues to work at [employer] and she is very physically active. She avoids lifting heavy items and has other people do this.   She is not having difficulty with her job. Prior note:  Headaches start in the left frontotemporal region and can radiate to the right side. Her worst headaches are behind the eyes. She continues gabapentin 300 mg in the morning and 600 mg nightly. Increasing the dose higher than this will cause some side effects. She has been doing physical therapy for her low back pain. She follows with pain management for low back pain. She has osteoporosis and follows with rheumatology, takes Prolia injections. Migraine headaches are behind the eyes 5% of the time, and 95% of the time are located in the left apex region of the head. She has associated neck and back pain which radiates up to the front of the head. She very seldom has nausea with a migraine unless severe. She prefers not to be around in noisy environment when she has a migraine. Migraine is characterized by a thumping and throbbing sensation. She also has difficulty with concentration and focus. She tries to give herself something to do to distract herself from the headache and other symptoms, and tries to keep her mind busy which sometimes helps. Trigger point injections performed at the last visit were not greatly helpful at all. She did not have side effects. Her tremor is a little bit better after holding off on the armodafinil. When she took it she would have drooling, lip licking, her  called it a "fishmouth."  When she stopped taking it she felt better in terms of tremor. She felt like she was overmedicated on it. She also took Cymbalta off and some other medications which ended up making her feel sick/nauseous. XR cervical 6/27/22: "Postoperative changes as noted above. No evidence for hardware complication. Alignment abnormalities as noted above. C2-3 and C3-4 anterolistheses which reduce on extension and increase slightly on flexion.  No acute osseous abnormalities."       The following portions of the patient's history were reviewed and updated as appropriate: She  has a past medical history of Allergy to dust, Anemia, Calculus of ureter, Chronic kidney disease (11/18), Chronic pain disorder, Colitis, Colon polyp, Cracked skin, Diverticulitis of colon (2012), Diverticulosis, Dysphagia, GERD (gastroesophageal reflux disease), GI (gastrointestinal bleed), H/O: GI bleed, Hiatal hernia, History of DVT in adulthood (2019), Hydronephrosis, Hyperlipidemia, Inguinal hernia, Irregular heart beat, Irritable bowel, Kidney stone, Lactose intolerance, Migraines, Narcolepsy, OAB (overactive bladder), Osteoarthritis, Osteoporosis, Other chronic pain, PONV (postoperative nausea and vomiting), Pyelonephritis (01/13/2021), SBO (small bowel obstruction) ([address]) (09/25/2021), Sepsis ([address]) (01/13/2021), Smoking (08/06/2020), Tobacco abuse, Wears dentures, Wears glasses, and Wears glasses.   She   Patient Active Problem List    Diagnosis Date Noted    Side effect of medication 09/08/2023    Chronic migraine without aura without status migrainosus, not intractable 05/18/2023    Neuropathic pain 05/18/2023    Tardive dyskinesia 02/01/2023    Myofascial pain syndrome, cervical 08/04/2022    Neurologic gait dysfunction 05/27/2022    DDD (degenerative disc disease), cervical 03/27/2022    Right median nerve neuropathy 01/16/2022    Ulnar neuropathy of right upper extremity 01/16/2022    Tremor 12/24/2021    Carpal tunnel syndrome of right wrist     History of fusion of cervical spine 09/12/2021    Colitis 11/09/2020    Smoking 08/06/2020    Lumbar radiculopathy     S/P right inguinal hernia repair 07/02/2020    Cervical radiculopathy     Spondylosis of cervical region without myelopathy or radiculopathy     Anemia 09/01/2019    Irritable bowel syndrome 09/01/2019    Prolonged QT interval 09/01/2019    Migraine without aura and without status migrainosus, not intractable 07/29/2019    Fibromyalgia 06/25/2019    Pure hypercholesterolemia 06/25/2019 Diverticulosis 06/25/2019    Chronic diarrhea 06/25/2019    Hypercholesterolemia 04/04/2019    Urge incontinence 01/11/2019    History of hydronephrosis 01/11/2019    Irritable bowel syndrome without diarrhea 12/03/2015    Spondylosis of lumbar spine 02/19/2015    Senile osteoporosis 02/18/2014     She  has a past surgical history that includes Laminectomy; Knee surgery (Right); Cholecystectomy; Spinal fusion; Bladder surgery; Abdominal adhesion surgery; [place] tooth extraction; Mole removal; Other surgical history; Hysterectomy (1977); Oophorectomy (Bilateral, 1977); LAPAROSCOPY; pr rpr 1st ingun hrna age 9 yrs/> reducible (Right, 06/25/2020); Colonoscopy (08/06/2020); Upper gastrointestinal endoscopy (08/06/2020); pr cysto bladder w/ureteral catheterization (Right, 12/02/2020); Lymph node dissection (Left); Arm skin lesion biopsy / excision; pr cysto/uretero w/lithotripsy &indwell stent insrt (Right, 01/05/2021); [place] retrograde pyelogram (01/05/2021); Joint replacement (Right); Hernia repair; pr lithotripsy xtrcorp shock wave (Right, 03/25/2021); Lymphadenectomy (1974); and Flexible sigmoidoscopy. Her family history includes Arthritis in her sister and sister; Breast cancer (age of onset: 52) in her other; Cancer in her brother, cousin, cousin, and father; Cancer (age of onset: 70) in her brother; Colon cancer in her father; Colon polyps in her father; Diabetes in her sister and sister; Heart attack in her mother; Hyperlipidemia in her mother; Hypertension in her brother, father, mother, sister, and sister; Irritable bowel syndrome in her sister; Learning disabilities in her brother; Nephrolithiasis in her brother; No Known Problems in her daughter, maternal aunt, maternal aunt, maternal aunt, maternal grandfather, maternal grandmother, paternal aunt, paternal aunt, paternal aunt, paternal grandfather, and paternal grandmother; Stroke in her brother. She  reports that she has been smoking cigarettes.  She started smoking about 50 years ago. She has a 24.00 pack-year smoking history. She has never used smokeless tobacco. She reports current alcohol use. She reports that she does not currently use drugs after having used the following drugs: Marijuana. Current Outpatient Medications   Medication Sig Dispense Refill    amantadine (SYMMETREL) 100 mg capsule Take 1 capsule (100 mg total) by mouth in the morning 90 capsule 1    aspirin 500 MG buffered tablet Take 1,000 mg by mouth every 6 (six) hours as needed for mild pain 4,000 mg daily for Pain      buPROPion (WELLBUTRIN XL) 150 mg 24 hr tablet Take 1 tablet (150 mg total) by mouth every morning 90 tablet 1    calcium carbonate 1250 MG capsule Take 600 mg by mouth daily       Cholecalciferol 1000 units tablet Take 1,000 Units by mouth daily Takes in the afternoon      gabapentin (NEURONTIN) 300 mg capsule Take 1 capsule (300 mg total) by mouth 3 (three) times a day 270 capsule 0    Ginger, Zingiber officinalis, (GINGER ROOT PO) Take 700 mg by mouth 2 (two) times a day       metaxalone (SKELAXIN) 800 mg tablet Take 0.5 tablets (400 mg total) by mouth 3 (three) times a day 135 tablet 0    Multiple Vitamins-Minerals (CENTRUM SILVER PO) Take 1 tablet by mouth daily Takes in the am      ondansetron (ZOFRAN) 4 mg tablet Take 1 tablet (4 mg total) by mouth every 8 (eight) hours as needed for nausea or vomiting 20 tablet 1    predniSONE 5 mg tablet 1 tab in the AM prn migraine (if zomig fails) 20 tablet 0    propranolol (INDERAL) 40 mg tablet Take 1 tablet (40 mg total) by mouth daily at bedtime 30 tablet 2    trospium chloride (SANCTURA) 20 mg tablet Take 1 tablet (20 mg total) by mouth 2 (two) times a day 60 tablet 11    Turmeric 500 MG CAPS Take 700 mg by mouth 2 (two) times a day      ZOLMitriptan (ZOMIG) 2.5 MG tablet TAKE 1 TABLET BY MOUTH AT ONSET OF MIGRAINE, MAY REPEAT IN 2 HOURS IF NEEDED.  LIMIT 2/24 HOURS, 4/WEEK, 8/MONTH 16 tablet 2     No current facility-administered medications for this visit. She is allergic to armodafinil, celecoxib, sumatriptan, tramadol, and medical tape. .         Objective:    Blood pressure 127/59, pulse 98, temperature 98.6 °F (37 °C), temperature source Temporal, resp. rate 16, height 5' 3" (1.6 m), weight 59.4 kg (131 lb), SpO2 98 %, not currently breastfeeding. Body mass index is 23.21 kg/m². Physical Exam    Neurological Exam  Vital signs reviewed. Well developed, well nourished. Mood and affect are pleasant. Speech is fluent and articulate. Head: Normocephalic, atraumatic  Neck: Neck flexors 5/5  ROM restricted when looking to the R.  CN 2-12: intact and symmetric, including EOMs which are normal b/l and PERRL  MSK: 5/5 t/o, except slightly weak 5-/5 in both shoulders with abduction, otherwise full. Non focal t/o. Hands are arthritic. Reflexes: 2+ and symmetric in all 4 extr. - brisk in all 4 extr but non focal.  Coordination: Nml x4 extr. Gait: Steady normal gait. ROS:    Review of Systems   Constitutional:  Negative for chills and fever. HENT:  Negative for ear pain and sore throat. Eyes:  Negative for pain and visual disturbance. Respiratory:  Negative for cough and shortness of breath. Cardiovascular:  Negative for chest pain and palpitations. Gastrointestinal:  Negative for abdominal pain and vomiting. Genitourinary:  Negative for dysuria and hematuria. Musculoskeletal:  Negative for arthralgias and back pain. Skin:  Negative for color change and rash. Neurological:  Positive for headaches. Negative for seizures and syncope. All other systems reviewed and are negative. ROS reviewed.

## 2023-12-20 ENCOUNTER — HOSPITAL ENCOUNTER (OUTPATIENT)
Dept: MAMMOGRAPHY | Facility: MEDICAL CENTER | Age: 63
Discharge: HOME | End: 2023-12-20
Payer: COMMERCIAL

## 2023-12-20 VITALS — WEIGHT: 130.95 LBS | HEIGHT: 63 IN | BODY MASS INDEX: 23.2 KG/M2

## 2023-12-20 DIAGNOSIS — Z12.31 ENCOUNTER FOR SCREENING MAMMOGRAM FOR MALIGNANT NEOPLASM OF BREAST: ICD-10-CM

## 2023-12-20 PROCEDURE — 77067 SCR MAMMO BI INCL CAD: CPT

## 2023-12-20 PROCEDURE — 77063 BREAST TOMOSYNTHESIS BI: CPT

## 2024-01-09 DIAGNOSIS — Z72.0 TOBACCO ABUSE: ICD-10-CM

## 2024-01-09 DIAGNOSIS — M79.2 NEUROPATHIC PAIN: ICD-10-CM

## 2024-01-09 RX ORDER — GABAPENTIN 300 MG/1
300 CAPSULE ORAL 3 TIMES DAILY
Qty: 270 CAPSULE | Refills: 0 | Status: SHIPPED | OUTPATIENT
Start: 2024-01-09 | End: 2024-04-08

## 2024-01-09 RX ORDER — BUPROPION HYDROCHLORIDE 150 MG/1
150 TABLET ORAL EVERY MORNING
Qty: 90 TABLET | Refills: 1 | Status: SHIPPED | OUTPATIENT
Start: 2024-01-09

## 2024-01-15 DIAGNOSIS — G43.009 MIGRAINE WITHOUT AURA AND WITHOUT STATUS MIGRAINOSUS, NOT INTRACTABLE: ICD-10-CM

## 2024-01-15 RX ORDER — PROPRANOLOL HYDROCHLORIDE 40 MG/1
40 TABLET ORAL
Qty: 90 TABLET | Refills: 1 | Status: SHIPPED | OUTPATIENT
Start: 2024-01-15

## 2024-01-16 ENCOUNTER — RA CDI HCC (OUTPATIENT)
Dept: OTHER | Facility: HOSPITAL | Age: 64
End: 2024-01-16

## 2024-01-26 ENCOUNTER — RA CDI HCC (OUTPATIENT)
Dept: OTHER | Facility: HOSPITAL | Age: 64
End: 2024-01-26

## 2024-01-26 ENCOUNTER — OFFICE VISIT (OUTPATIENT)
Dept: FAMILY MEDICINE CLINIC | Facility: CLINIC | Age: 64
End: 2024-01-26
Payer: COMMERCIAL

## 2024-01-26 VITALS
SYSTOLIC BLOOD PRESSURE: 102 MMHG | DIASTOLIC BLOOD PRESSURE: 78 MMHG | HEIGHT: 63 IN | TEMPERATURE: 97.6 F | BODY MASS INDEX: 23 KG/M2 | WEIGHT: 129.8 LBS | OXYGEN SATURATION: 94 % | HEART RATE: 107 BPM

## 2024-01-26 DIAGNOSIS — N39.41 URGE INCONTINENCE OF URINE: ICD-10-CM

## 2024-01-26 DIAGNOSIS — F17.200 SMOKING: ICD-10-CM

## 2024-01-26 DIAGNOSIS — Z23 ENCOUNTER FOR IMMUNIZATION: ICD-10-CM

## 2024-01-26 DIAGNOSIS — Z00.00 MEDICARE ANNUAL WELLNESS VISIT, SUBSEQUENT: Primary | ICD-10-CM

## 2024-01-26 DIAGNOSIS — D64.9 ANEMIA, UNSPECIFIED TYPE: ICD-10-CM

## 2024-01-26 DIAGNOSIS — M81.0 SENILE OSTEOPOROSIS: ICD-10-CM

## 2024-01-26 DIAGNOSIS — E78.00 HYPERCHOLESTEROLEMIA: ICD-10-CM

## 2024-01-26 PROCEDURE — 90662 IIV NO PRSV INCREASED AG IM: CPT | Performed by: FAMILY MEDICINE

## 2024-01-26 PROCEDURE — 90677 PCV20 VACCINE IM: CPT | Performed by: FAMILY MEDICINE

## 2024-01-26 PROCEDURE — 90471 IMMUNIZATION ADMIN: CPT | Performed by: FAMILY MEDICINE

## 2024-01-26 PROCEDURE — G0439 PPPS, SUBSEQ VISIT: HCPCS | Performed by: FAMILY MEDICINE

## 2024-01-26 PROCEDURE — 99214 OFFICE O/P EST MOD 30 MIN: CPT | Performed by: FAMILY MEDICINE

## 2024-01-26 PROCEDURE — 90472 IMMUNIZATION ADMIN EACH ADD: CPT | Performed by: FAMILY MEDICINE

## 2024-01-26 NOTE — PROGRESS NOTES
Assessment and Plan:     Problem List Items Addressed This Visit       Anemia    Relevant Orders    CBC and Platelet    Senile osteoporosis    Relevant Orders    Comprehensive metabolic panel    Vitamin D 25 hydroxy    Hypercholesterolemia    Relevant Orders    Lipid Panel with Direct LDL reflex    Smoking     Other Visit Diagnoses       Medicare annual wellness visit, subsequent    -  Primary    Encounter for immunization        Relevant Orders    influenza vaccine, high-dose, PF 0.7 mL (FLUZONE HIGH-DOSE) (Completed)    Pneumococcal Conjugate Vaccine 20-valent (Pcv20) (Completed)    Other urinary incontinence            - Continue management per Rheumatology for Osteoporosis with Prolia and calcium/vitamin D supplementations   - Continue to follow up with pain management for chronic low back and neck pain with Gabapentin 300 mg TID  - Continue management per Neurology for migraines with PRN Zomig and prednisone is Zomig fails  - Continue Sanctura for urinary incontinence and follow up with Urology as scheduled  - Patient current on Wellbutrin for smoking cessation and reports that it has helped her cut down a lot. She also reports that it has helped with her mood. Patient advised to schedule CT lung cancer screening at her earliest convenience.   - Influenza and PCV 20 vaccines administered today; patient advised to go to pharmacy for RSV vaccine     Preventive health issues were discussed with patient, and age appropriate screening tests were ordered as noted in patient's After Visit Summary.  Personalized health advice and appropriate referrals for health education or preventive services given if needed, as noted in patient's After Visit Summary.    Urinary Incontinence Plan of Care: counseling topics discussed: limit alcohol, caffeine, spicy foods, and acidic foods, keeping a bladder diary, limiting fluid intake 3-4 hours before bed and preventing constipation.        History of Present Illness:       HPI      Lori Damon is a very pleasant 62 year old female with a past medical history of migraines, cervical and lumbar radiculopathy, osteoporosis and tobacco abuse who presents today for a Medicare Wellness Visit. She is still grieving the loss of her  but is coping as best as she can. She still works full time doing 40 hours a week but thinks it may be time to start cutting back on her hours. She does have left knee pain secondary to arthritis but reports that it is manageable at this time and she declines physical therapy or injections. She continues to follow with specialists including Neurology, sleep medicine, pain management, rheumatology and urology.     Patient Care Team:  Naty Kenney MD as PCP - General (Family Medicine)  Naty Kenney MD (Family Medicine)     Review of Systems:     Review of Systems   Constitutional: Negative.    HENT: Negative.     Eyes: Negative.    Respiratory: Negative.     Cardiovascular: Negative.    Gastrointestinal: Negative.    Genitourinary: Negative.    Musculoskeletal:  Positive for arthralgias.   Skin: Negative.    Neurological: Negative.    Psychiatric/Behavioral: Negative.          Problem List:     Patient Active Problem List   Diagnosis    Urge incontinence    History of hydronephrosis    Fibromyalgia    Pure hypercholesterolemia    Diverticulosis    Chronic diarrhea    Migraine without aura and without status migrainosus, not intractable    Anemia    Irritable bowel syndrome    Prolonged QT interval    Spondylosis of cervical region without myelopathy or radiculopathy    Spondylosis of lumbar spine    Senile osteoporosis    Irritable bowel syndrome without diarrhea    Hypercholesterolemia    Cervical radiculopathy    S/P right inguinal hernia repair    Lumbar radiculopathy    Smoking    Colitis    History of fusion of cervical spine    Carpal tunnel syndrome of right wrist    Tremor    Right median nerve neuropathy    Ulnar neuropathy of right upper extremity     "DDD (degenerative disc disease), cervical    Neurologic gait dysfunction    Myofascial pain syndrome, cervical    Tardive dyskinesia    Chronic migraine without aura without status migrainosus, not intractable    Neuropathic pain    Side effect of medication      Past Medical and Surgical History:     Past Medical History:   Diagnosis Date    Allergy to dust     Anemia     Calculus of ureter     Chronic kidney disease 11/18    Hydrophenesis    Chronic pain disorder     neck and back- sees pain management    Colitis     Colon polyp     Cracked skin     on fingers    Diverticulitis of colon 2012    Seen by Dr. Wilburn at [facility]. Inpatient at    Diverticulosis     Dysphagia     GERD (gastroesophageal reflux disease)     GI (gastrointestinal bleed)     As a result of diverticulitis and microscopic colitis.    H/O: GI bleed     Hiatal hernia     History of DVT in adulthood 2019 2019    Hydronephrosis     Hyperlipidemia     Inguinal hernia     right side    Irregular heart beat     Pt states with prolonged QT interval - was seen by cardiologist( [facility]) pt told \"to not worry about it\"    Irritable bowel     Kidney stone     Lactose intolerance     Did not tolerate milk after being weaned from breast milk.    Migraines     Narcolepsy     OAB (overactive bladder)     Osteoarthritis     Osteoporosis     Other chronic pain     degenerative disc disease    PONV (postoperative nausea and vomiting)     migraines    Pyelonephritis 01/13/2021    SBO (small bowel obstruction) ([facility]) 09/25/2021    Sepsis ([facility]) 01/13/2021    Smoking 08/06/2020    Tobacco abuse     Wears dentures     full upper    Wears glasses     Wears glasses      Past Surgical History:   Procedure Laterality Date    ABDOMINAL ADHESION SURGERY      ARM SKIN LESION BIOPSY / EXCISION      lymph node excision    BLADDER SURGERY      CHOLECYSTECTOMY      COLONOSCOPY  08/06/2020    FL RETROGRADE PYELOGRAM  01/05/2021    FLEXIBLE SIGMOIDOSCOPY      Last done at [facility] in 2018 " after being admitted for GI bleed.    HERNIA REPAIR      HYSTERECTOMY      JOINT REPLACEMENT Right     knee    KNEE SURGERY Right     replacement    LAMINECTOMY      L5-6-7    LAPAROSCOPY      LYMPH NODE DISSECTION Left     removed -no cancer-  limb restriction    LYMPHADENECTOMY      UNDER LEFT ARM - DO NOT DRAW BLOOD/GIVE INJECTIONS IN LEFT ARM PER PATIENT     MOLE REMOVAL      OOPHORECTOMY Bilateral     OTHER SURGICAL HISTORY      enlarged lymph node removed left arm..no cancer    OK CYSTO BLADDER W/URETERAL CATHETERIZATION Right 2020    Procedure: CYSTOSCOPY AND INSERTION STENT URETERAL;  Surgeon: Rory Fall MD;  Location: [facility];  Service: Urology    OK CYSTO/URETERO W/LITHOTRIPSY &INDWELL STENT INSRT Right 2021    Procedure: CYSTO, LASER  URETEROSCOPY, RETRO PYELOGRAM, STENT REPLACMENT;  Surgeon: Adriel Subramanian MD;  Location: [facility];  Service: Urology    OK LITHOTRIPSY XTRCORP SHOCK WAVE Right 2021    Procedure: LITHOTRIPSY EXTRACORPORAL SHOCKWAVE (ESWL);  Surgeon: Darwin Montero MD;  Location:  MAIN OR;  Service: Urology    OK RPR 1ST INGUN HRNA AGE 5 YRS/> REDUCIBLE Right 2020    Procedure: REPAIR HERNIA INGUINAL  WITH MESH;  Surgeon: Mercedes Justin MD;  Location: [facility];  Service: General    SPINAL FUSION      UPPER GASTROINTESTINAL ENDOSCOPY  2020    WISDOM TOOTH EXTRACTION        Family History:     Family History   Problem Relation Age of Onset    Hypertension Mother          from massive heart attack, never took any medication for this condition    Heart attack Mother     Hyperlipidemia Mother         Blood tests done just prior to heart attack.  Results reported after her passing.    Hypertension Father         Had high blood pressure, unknown if he ever took medication    Cancer Father 72        Tumor in bowels, spread to liver, skin cancer    Colon polyps Father     Diabetes Sister         uses insulin    Arthritis Sister          Osteo arthritis    Hypertension Sister         Triple bypass    Diabetes Sister         pre-diabetic, oral medication    Arthritis Sister         Osteo arthritis    Hypertension Sister         Triple bypass    Irritable bowel syndrome Sister         Constipation    No Known Problems Daughter     No Known Problems Maternal Grandmother     No Known Problems Maternal Grandfather     No Known Problems Paternal Grandmother     No Known Problems Paternal Grandfather     Stroke Brother     Nephrolithiasis Brother     Cancer Brother 71        soft tissue cancer    Cancer Brother         Terminal soft tissue cancer 8/21    Hypertension Brother         Triple bypass    Learning disabilities Brother         Mental retardation    No Known Problems Maternal Aunt     No Known Problems Maternal Aunt     No Known Problems Maternal Aunt     No Known Problems Paternal Aunt     No Known Problems Paternal Aunt     No Known Problems Paternal Aunt     Cancer Cousin         Pancreatic cancer    Cancer Cousin         Breast cancer, total mastectomy    Breast cancer Other 47      Social History:     Social History     Socioeconomic History    Marital status:      Spouse name: None    Number of children: None    Years of education: None    Highest education level: None   Occupational History    None   Tobacco Use    Smoking status: Heavy Smoker     Current packs/day: 0.50     Average packs/day: 0.5 packs/day for 50.7 years (25.3 ttl pk-yrs)     Types: Cigarettes     Start date: 6/1/1973    Smokeless tobacco: Never    Tobacco comments:     Stopped smoking [id number] (first marriage),  stopped again during 2014 due to multiple s   Vaping Use    Vaping status: Never Used   Substance and Sexual Activity    Alcohol use: Yes     Comment: occasional drink ( 12 oz. beer or beer based beverage 1-2x m    Drug use: Not Currently     Types: Marijuana    Sexual activity: Not Currently     Partners: Male     Birth control/protection:  Post-menopausal, Male Sterilization   Other Topics Concern    None   Social History Narrative    None     Social Determinants of Health     Financial Resource Strain: Low Risk  (1/26/2024)    Overall Financial Resource Strain (CARDIA)     Difficulty of Paying Living Expenses: Not hard at all   Food Insecurity: Not on file   Transportation Needs: No Transportation Needs (1/26/2024)    PRAPARE - Transportation     Lack of Transportation (Medical): No     Lack of Transportation (Non-Medical): No   Physical Activity: Not on file   Stress: Not on file   Social Connections: Not on file   Intimate Partner Violence: Not on file   Housing Stability: Not on file      Medications and Allergies:     Current Outpatient Medications   Medication Sig Dispense Refill    amantadine (SYMMETREL) 100 mg capsule Take 1 capsule (100 mg total) by mouth in the morning 90 capsule 1    aspirin 500 MG buffered tablet Take 1,000 mg by mouth every 6 (six) hours as needed for mild pain 4,000 mg daily for Pain      buPROPion (WELLBUTRIN XL) 150 mg 24 hr tablet Take 1 tablet (150 mg total) by mouth every morning 90 tablet 1    calcium carbonate 1250 MG capsule Take 600 mg by mouth daily       Cholecalciferol 1000 units tablet Take 1,000 Units by mouth daily Takes in the afternoon      gabapentin (NEURONTIN) 300 mg capsule Take 1 capsule (300 mg total) by mouth 3 (three) times a day 270 capsule 0    Ginger, Zingiber officinalis, (GINGER ROOT PO) Take 700 mg by mouth 2 (two) times a day       metaxalone (SKELAXIN) 800 mg tablet Take 0.5 tablets (400 mg total) by mouth 3 (three) times a day 135 tablet 0    Multiple Vitamins-Minerals (CENTRUM SILVER PO) Take 1 tablet by mouth daily Takes in the am      ondansetron (ZOFRAN) 4 mg tablet Take 1 tablet (4 mg total) by mouth every 8 (eight) hours as needed for nausea or vomiting 20 tablet 1    predniSONE 5 mg tablet 1 tab in the AM prn migraine (if zomig fails) 20 tablet 0    propranolol (INDERAL) 40 mg  tablet Take 1 tablet (40 mg total) by mouth daily at bedtime 90 tablet 1    trospium chloride (SANCTURA) 20 mg tablet Take 1 tablet (20 mg total) by mouth 2 (two) times a day 60 tablet 11    Turmeric 500 MG CAPS Take 700 mg by mouth 2 (two) times a day      ZOLMitriptan (ZOMIG) 2.5 MG tablet TAKE 1 TABLET BY MOUTH AT ONSET OF MIGRAINE, MAY REPEAT IN 2 HOURS IF NEEDED. LIMIT 2/24 HOURS, 4/WEEK, 8/MONTH 16 tablet 2     No current facility-administered medications for this visit.     Allergies   Allergen Reactions    Armodafinil Other (See Comments)     Tardive dyskinesia - mouth movements    Celecoxib Other (See Comments)     Increased Heart Rate, Palpitations    Sumatriptan Anaphylaxis     Other reaction(s): SUMATRIPTAN (IMITREX) (Throat closure). Pt analilia zolmitriptan.    Tramadol Other (See Comments)     GI Upset- severe vomiting and systemic body aches    Medical Tape Dermatitis, Rash and Blisters     Adhesive from medication patches and bandaides- ok with paper tape      Immunizations:     Immunization History   Administered Date(s) Administered    COVID-19 MODERNA VACC 0.5 ML IM 01/26/2021, 02/23/2021, 12/20/2021    COVID-19 Moderna Vac BIVALENT 12 Yr+ IM 0.5 ML 09/14/2022    INFLUENZA 02/14/2012, 11/15/2012, 10/02/2014, 12/09/2016, 12/09/2016, 09/25/2017, 09/25/2017, 10/25/2018, 01/19/2023    Influenza, high dose seasonal 0.7 mL 01/26/2024    Influenza, recombinant, quadrivalent,injectable, preservative free 10/03/2019, 12/03/2020, 09/30/2021, 01/19/2023    Pneumococcal Conjugate 13-Valent 11/21/2019    Pneumococcal Conjugate Vaccine 20-valent (Pcv20), Polysace 01/26/2024    Pneumococcal Polysaccharide PPV23 10/21/2010    Tdap 05/05/2014    Zoster Vaccine Recombinant 03/05/2020      Health Maintenance:         Topic Date Due    Lung Cancer Screening  12/14/2023    Breast Cancer Screening: Mammogram  12/20/2024    Colorectal Cancer Screening  10/06/2026    HIV Screening  Completed    Hepatitis C Screening   Completed         Topic Date Due    COVID-19 Vaccine (5 - 2023-24 season) 09/01/2023      Medicare Screening Tests and Risk Assessments:     Lori is here for her Subsequent Wellness visit.     Health Risk Assessment:   Patient rates overall health as fair. Patient feels that their physical health rating is slightly worse. Patient is satisfied with their life. Eyesight was rated as same. Hearing was rated as same. Patient feels that their emotional and mental health rating is slightly worse. Patients states they are never, rarely angry. Patient states they are sometimes unusually tired/fatigued. Pain experienced in the last 7 days has been a lot. Patient's pain rating has been 7/10. Patient states that she has experienced no weight loss or gain in last 6 months.     Fall Risk Screening:   In the past year, patient has experienced: no history of falling in past year      Urinary Incontinence Screening:   Patient has leaked urine accidently in the last six months.     Home Safety:  Patient has trouble with stairs inside or outside of their home. Patient has working smoke alarms and has working carbon monoxide detector. Home safety hazards include: none.     Nutrition:   Current diet is Regular.     Medications:   Patient is currently taking over-the-counter supplements. OTC medications include: see medication list. Patient is able to manage medications.     Activities of Daily Living (ADLs)/Instrumental Activities of Daily Living (IADLs):   Walk and transfer into and out of bed and chair?: Yes  Dress and groom yourself?: Yes    Bathe or shower yourself?: Yes    Feed yourself? Yes  Do your laundry/housekeeping?: Yes  Manage your money, pay your bills and track your expenses?: Yes  Make your own meals?: Yes    Do your own shopping?: Yes    Advance Care Planning:   Living will: Yes    Advanced directive: Yes      PREVENTIVE SCREENINGS      Cardiovascular Screening:    General: Screening Not Indicated and History Lipid  "Disorder      Diabetes Screening:     General: Screening Current      Colorectal Cancer Screening:     General: Screening Current      Breast Cancer Screening:     General: Screening Current      Cervical Cancer Screening:    General: Screening Not Indicated      Osteoporosis Screening:    General: Screening Not Indicated and History Osteoporosis      Hepatitis C Screening:    General: Screening Current    Screening, Brief Intervention, and Referral to Treatment (SBIRT)    Screening  Typical number of drinks in a day: 0  Typical number of drinks in a week: 1  Interpretation: Low risk drinking behavior.    Single Item Drug Screening:  How often have you used an illegal drug (including marijuana) or a prescription medication for non-medical reasons in the past year? never    Single Item Drug Screen Score: 0  Interpretation: Negative screen for possible drug use disorder    No results found.     Physical Exam:     /78 (BP Location: Left arm, Patient Position: Sitting, Cuff Size: Adult)   Pulse (!) 107   Temp 97.6 °F (36.4 °C) (Temporal)   Ht 5' 3\" (1.6 m)   Wt 58.9 kg (129 lb 12.8 oz)   SpO2 94%   BMI 22.99 kg/m²     Physical Exam  Constitutional:       General: She is not in acute distress.     Appearance: She is not ill-appearing.   HENT:      Head: Normocephalic and atraumatic.   Eyes:      General:         Right eye: No discharge.         Left eye: No discharge.      Extraocular Movements: Extraocular movements intact.   Cardiovascular:      Rate and Rhythm: Normal rate.   Pulmonary:      Effort: Pulmonary effort is normal. No respiratory distress.      Breath sounds: No wheezing.   Abdominal:      General: Bowel sounds are normal. There is no distension.      Palpations: Abdomen is soft.      Tenderness: There is no abdominal tenderness.   Musculoskeletal:      Right lower leg: No edema.      Left lower leg: No edema.   Neurological:      General: No focal deficit present.      Mental Status: She is " alert.   Psychiatric:         Mood and Affect: Mood normal.         Behavior: Behavior normal.          Naty Kenney MD

## 2024-01-26 NOTE — PATIENT INSTRUCTIONS
Medicare Preventive Visit Patient Instructions  Thank you for completing your Welcome to Medicare Visit or Medicare Annual Wellness Visit today. Your next wellness visit will be due in one year (1/26/2025).  The screening/preventive services that you may require over the next 5-10 years are detailed below. Some tests may not apply to you based off risk factors and/or age. Screening tests ordered at today's visit but not completed yet may show as past due. Also, please note that scanned in results may not display below.  Preventive Screenings:  Service Recommendations Previous Testing/Comments   Colorectal Cancer Screening  * Colonoscopy    * Fecal Occult Blood Test (FOBT)/Fecal Immunochemical Test (FIT)  * Fecal DNA/Cologuard Test  * Flexible Sigmoidoscopy Age: 45-75 years old   Colonoscopy: every 10 years (may be performed more frequently if at higher risk)  OR  FOBT/FIT: every 1 year  OR  Cologuard: every 3 years  OR  Sigmoidoscopy: every 5 years  Screening may be recommended earlier than age 45 if at higher risk for colorectal cancer. Also, an individualized decision between you and your healthcare provider will decide whether screening between the ages of 76-85 would be appropriate. Colonoscopy: 10/07/2021  FOBT/FIT: 06/29/2022  Cologuard: 10/07/2021  Sigmoidoscopy: 10/07/2021    Screening Current     Breast Cancer Screening Age: 40+ years old  Frequency: every 1-2 years  Not required if history of left and right mastectomy Mammogram: 12/20/2023    Screening Current   Cervical Cancer Screening Between the ages of 21-29, pap smear recommended once every 3 years.   Between the ages of 30-65, can perform pap smear with HPV co-testing every 5 years.   Recommendations may differ for women with a history of total hysterectomy, cervical cancer, or abnormal pap smears in past. Pap Smear: 04/08/2021    Screening Not Indicated   Hepatitis C Screening Once for adults born between 1945 and 1965  More frequently in patients  at high risk for Hepatitis C Hep C Antibody: 11/21/2019    Screening Current   Diabetes Screening 1-2 times per year if you're at risk for diabetes or have pre-diabetes Fasting glucose: 95 mg/dL (6/27/2022)  A1C: 5.5 % (5/27/2021)  Screening Current   Cholesterol Screening Once every 5 years if you don't have a lipid disorder. May order more often based on risk factors. Lipid panel: 06/27/2022    Screening Not Indicated  History Lipid Disorder     Other Preventive Screenings Covered by Medicare:  Abdominal Aortic Aneurysm (AAA) Screening: covered once if your at risk. You're considered to be at risk if you have a family history of AAA.  Lung Cancer Screening: covers low dose CT scan once per year if you meet all of the following conditions: (1) Age 55-77; (2) No signs or symptoms of lung cancer; (3) Current smoker or have quit smoking within the last 15 years; (4) You have a tobacco smoking history of at least 20 pack years (packs per day multiplied by number of years you smoked); (5) You get a written order from a healthcare provider.  Glaucoma Screening: covered annually if you're considered high risk: (1) You have diabetes OR (2) Family history of glaucoma OR (3)  aged 50 and older OR (4)  American aged 65 and older  Osteoporosis Screening: covered every 2 years if you meet one of the following conditions: (1) You're estrogen deficient and at risk for osteoporosis based off medical history and other findings; (2) Have a vertebral abnormality; (3) On glucocorticoid therapy for more than 3 months; (4) Have primary hyperparathyroidism; (5) On osteoporosis medications and need to assess response to drug therapy.   Last bone density test (DXA Scan): 06/16/2023.  HIV Screening: covered annually if you're between the age of 15-65. Also covered annually if you are younger than 15 and older than 65 with risk factors for HIV infection. For pregnant patients, it is covered up to 3 times per  pregnancy.    Immunizations:  Immunization Recommendations   Influenza Vaccine Annual influenza vaccination during flu season is recommended for all persons aged >= 6 months who do not have contraindications   Pneumococcal Vaccine   * Pneumococcal conjugate vaccine = PCV13 (Prevnar 13), PCV15 (Vaxneuvance), PCV20 (Prevnar 20)  * Pneumococcal polysaccharide vaccine = PPSV23 (Pneumovax) Adults 19-65 yo with certain risk factors or if 65+ yo  If never received any pneumonia vaccine: recommend Prevnar 20 (PCV20)  Give PCV20 if previously received 1 dose of PCV13 or PPSV23   Hepatitis B Vaccine 3 dose series if at intermediate or high risk (ex: diabetes, end stage renal disease, liver disease)   Respiratory syncytial virus (RSV) Vaccine - COVERED BY MEDICARE PART D  * RSVPreF3 (Arexvy) CDC recommends that adults 60 years of age and older may receive a single dose of RSV vaccine using shared clinical decision-making (SCDM)   Tetanus (Td) Vaccine - COST NOT COVERED BY MEDICARE PART B Following completion of primary series, a booster dose should be given every 10 years to maintain immunity against tetanus. Td may also be given as tetanus wound prophylaxis.   Tdap Vaccine - COST NOT COVERED BY MEDICARE PART B Recommended at least once for all adults. For pregnant patients, recommended with each pregnancy.   Shingles Vaccine (Shingrix) - COST NOT COVERED BY MEDICARE PART B  2 shot series recommended in those 19 years and older who have or will have weakened immune systems or those 50 years and older     Health Maintenance Due:      Topic Date Due   • Lung Cancer Screening  12/14/2023   • Breast Cancer Screening: Mammogram  12/20/2024   • Colorectal Cancer Screening  10/06/2026   • HIV Screening  Completed   • Hepatitis C Screening  Completed     Immunizations Due:      Topic Date Due   • COVID-19 Vaccine (5 - 2023-24 season) 09/01/2023     Advance Directives   What are advance directives?  Advance directives are legal  documents that state your wishes and plans for medical care. These plans are made ahead of time in case you lose your ability to make decisions for yourself. Advance directives can apply to any medical decision, such as the treatments you want, and if you want to donate organs.   What are the types of advance directives?  There are many types of advance directives, and each state has rules about how to use them. You may choose a combination of any of the following:  Living will:  This is a written record of the treatment you want. You can also choose which treatments you do not want, which to limit, and which to stop at a certain time. This includes surgery, medicine, IV fluid, and tube feedings.   Durable power of  for healthcare (DPAHC):  This is a written record that states who you want to make healthcare choices for you when you are unable to make them for yourself. This person, called a proxy, is usually a family member or a friend. You may choose more than 1 proxy.  Do not resuscitate (DNR) order:  A DNR order is used in case your heart stops beating or you stop breathing. It is a request not to have certain forms of treatment, such as CPR. A DNR order may be included in other types of advance directives.  Medical directive:  This covers the care that you want if you are in a coma, near death, or unable to make decisions for yourself. You can list the treatments you want for each condition. Treatment may include pain medicine, surgery, blood transfusions, dialysis, IV or tube feedings, and a ventilator (breathing machine).  Values history:  This document has questions about your views, beliefs, and how you feel and think about life. This information can help others choose the care that you would choose.  Why are advance directives important?  An advance directive helps you control your care. Although spoken wishes may be used, it is better to have your wishes written down. Spoken wishes can be  misunderstood, or not followed. Treatments may be given even if you do not want them. An advance directive may make it easier for your family to make difficult choices about your care.   Urinary Incontinence   Urinary incontinence (UI)  is when you lose control of your bladder. UI develops because your bladder cannot store or empty urine properly. The 3 most common types of UI are stress incontinence, urge incontinence, or both.  Medicines:   May be given to help strengthen your bladder control. Report any side effects of medication to your healthcare provider.  Do pelvic muscle exercises often:  Your pelvic muscles help you stop urinating. Squeeze these muscles tight for 5 seconds, then relax for 5 seconds. Gradually work up to squeezing for 10 seconds. Do 3 sets of 15 repetitions a day, or as directed. This will help strengthen your pelvic muscles and improve bladder control.  Train your bladder:  Go to the bathroom at set times, such as every 2 hours, even if you do not feel the urge to go. You can also try to hold your urine when you feel the urge to go. For example, hold your urine for 5 minutes when you feel the urge to go. As that becomes easier, hold your urine for 10 minutes.   Self-care:   Keep a UI record.  Write down how often you leak urine and how much you leak. Make a note of what you were doing when you leaked urine.  Drink liquids as directed. You may need to limit the amount of liquid you drink to help control your urine leakage. Do not drink any liquid right before you go to bed. Limit or do not have drinks that contain caffeine or alcohol.   Prevent constipation.  Eat a variety of high-fiber foods. Good examples are high-fiber cereals, beans, vegetables, and whole-grain breads. Walking is the best way to trigger your intestines to have a bowel movement.  Exercise regularly and maintain a healthy weight.  Weight loss and exercise will decrease pressure on your bladder and help you control your  leakage.   Use a catheter as directed  to help empty your bladder. A catheter is a tiny, plastic tube that is put into your bladder to drain your urine.   Go to behavior therapy as directed.  Behavior therapy may be used to help you learn to control your urge to urinate.    Cigarette Smoking and Your Health   Risks to your health if you smoke:  Nicotine and other chemicals found in tobacco damage every cell in your body. Even if you are a light smoker, you have an increased risk for cancer, heart disease, and lung disease. If you are pregnant or have diabetes, smoking increases your risk for complications.   Benefits to your health if you stop smoking:   You decrease respiratory symptoms such as coughing, wheezing, and shortness of breath.   You reduce your risk for cancers of the lung, mouth, throat, kidney, bladder, pancreas, stomach, and cervix. If you already have cancer, you increase the benefits of chemotherapy. You also reduce your risk for cancer returning or a second cancer from developing.   You reduce your risk for heart disease, blood clots, heart attack, and stroke.   You reduce your risk for lung infections, and diseases such as pneumonia, asthma, chronic bronchitis, and emphysema.  Your circulation improves. More oxygen can be delivered to your body. If you have diabetes, you lower your risk for complications, such as kidney, artery, and eye diseases. You also lower your risk for nerve damage. Nerve damage can lead to amputations, poor vision, and blindness.  You improve your body's ability to heal and to fight infections.  For more information and support to stop smoking:   Smokefree.gov  Phone: 8- 491 - 029-2524  Web Address: www.[employer]OT[employer].COM     © Copyright [employer] 2018 Information is for End User's use only and may not be sold, redistributed or otherwise used for commercial purposes. All illustrations and images included in CareNotes® are the copyrighted property of [employer]M., Inc. or [employer]  [facility]      Urinary Incontinence   AMBULATORY CARE:   Urinary incontinence (UI)  is loss of bladder control. UI develops because your bladder cannot store or empty urine properly. The 3 most common types of UI are stress incontinence, urge incontinence, or both.       Common symptoms include the following:   You feel like your bladder does not empty completely when you urinate.    You urinate often and need to urinate immediately.    You leak urine when you sleep, or you wake up with the urge to urinate.    You leak urine when you cough, sneeze, exercise, or laugh.    Seek care immediately if:   You have severe pain.    You are confused or cannot think clearly.    You see blood in your urine.    You have pain when you urinate.    You have new or worse pain, even after treatment.    Call your doctor if:   You have a fever.    Your mouth feels dry or you have vision changes.    Your urine is cloudy or smells bad.    You have questions or concerns about your condition or care.    Treatment  may include any of the following:  Medicines  may be given to help strengthen your bladder control.    Electrical stimulation  is used to send a small amount of electrical energy to your pelvic floor muscles. This helps control your bladder function. Electrodes may be placed outside your body or in your rectum. For women, the electrodes may be placed in the vagina.    A bulking agent  may be injected into the wall of your urethra to make it thicker. This helps keep your urethra closed and decreases urine leakage.         Devices  such as a clamp, pessary, or tampon may help stop urine leaks. Ask your healthcare provider for more information about these and other devices.    Surgery  may be needed if other treatments do not work. Several types of surgery can help improve your bladder control. Ask your provider for more information about the surgery you may need.    Self-care:   Keep a UI record.  Write down how often you leak  urine and how much you leak. Make a note of what you were doing when you leaked urine.    Drink liquids as directed.  Ask your healthcare provider how much liquid to drink each day and which liquids are best for you. You may need to limit the amount of liquid you drink to help control your urine leakage. Do not drink any liquid right before you go to bed. Limit or do not have drinks that contain caffeine or alcohol.    Prevent constipation.  Eat a variety of high-fiber foods. Good examples are high-fiber cereals, beans, vegetables, and whole-grain breads. Prune juice may help make your bowel movement softer. Walking is the best way to trigger your intestines to have a bowel movement.         Exercise regularly and maintain a healthy weight.  Ask your provider how much you should weigh and about the best exercise plan for you. Weight loss and exercise will decrease pressure on your bladder and help you control your leakage. Ask your provider to help you create a weight loss plan, if needed.         Use a catheter as directed  to help empty your bladder. A catheter is a tiny, plastic tube that is put into your bladder to drain your urine. Your provider may tell you to use a catheter to prevent your bladder from getting too full and leaking urine.    Go to behavior therapy as directed.  Behavior therapy may be used to help you learn to control your urge to urinate.    Follow up with your doctor as directed:  Write down your questions so you remember to ask them during your visits.  © Copyright [employer] 2023 Information is for End User's use only and may not be sold, redistributed or otherwise used for commercial purposes.  The above information is an  only. It is not intended as medical advice for individual conditions or treatments. Talk to your doctor, nurse or pharmacist before following any medical regimen to see if it is safe and effective for you.

## 2024-01-31 ENCOUNTER — OFFICE VISIT (OUTPATIENT)
Dept: RHEUMATOLOGY | Facility: CLINIC | Age: 64
End: 2024-01-31
Payer: COMMERCIAL

## 2024-01-31 ENCOUNTER — TELEPHONE (OUTPATIENT)
Dept: RHEUMATOLOGY | Facility: CLINIC | Age: 64
End: 2024-01-31

## 2024-01-31 VITALS
BODY MASS INDEX: 22.86 KG/M2 | HEIGHT: 63 IN | WEIGHT: 129 LBS | DIASTOLIC BLOOD PRESSURE: 72 MMHG | SYSTOLIC BLOOD PRESSURE: 116 MMHG | OXYGEN SATURATION: 99 % | HEART RATE: 82 BPM

## 2024-01-31 DIAGNOSIS — M81.0 OSTEOPOROSIS WITHOUT CURRENT PATHOLOGICAL FRACTURE, UNSPECIFIED OSTEOPOROSIS TYPE: Primary | ICD-10-CM

## 2024-01-31 DIAGNOSIS — M19.049 HAND ARTHRITIS: ICD-10-CM

## 2024-01-31 DIAGNOSIS — Z79.899 HIGH RISK MEDICATION USE: ICD-10-CM

## 2024-01-31 PROBLEM — I82.491 DEEP VEIN THROMBOSIS (DVT) OF OTHER VEIN OF RIGHT LOWER EXTREMITY, UNSPECIFIED CHRONICITY (HCC): Status: ACTIVE | Noted: 2024-01-31

## 2024-01-31 PROCEDURE — 99214 OFFICE O/P EST MOD 30 MIN: CPT | Performed by: INTERNAL MEDICINE

## 2024-01-31 PROCEDURE — 96372 THER/PROPH/DIAG INJ SC/IM: CPT

## 2024-01-31 NOTE — PATIENT INSTRUCTIONS
Do labs  Do hand x-rays  Prolia injection given in clinic today; continue every 6 months    Return to clinic in 6 months

## 2024-01-31 NOTE — TELEPHONE ENCOUNTER
Prior Auth needed for Prolia injections that has been scheduled for today, PA has been submitted through cover my meds with key code: [id number]

## 2024-01-31 NOTE — TELEPHONE ENCOUNTER
Was transferred a call from Maria M in the phone room, spoke to someone from [employer] who had specifically requested to speak to me, in regards to the PA that had been submitted today. She had asked for the DXA scan to be faxed over. The reference number is [id number].

## 2024-01-31 NOTE — PROGRESS NOTES
Assessment and Plan:  Lori Damon is a 63 y.o.  female who presents for follow-up of osteoporosis, on Prolia. Last Prolia injection was in 3/2023; was hard for patient to get a timely follow-up appt. Latest DEXA scan still shows significant osteopenia, especially at left total hip, t-score of -2.4; would benefit from continued Prolia injections. Is past due for next Prolia injection. Of note, patient has bilateral ulnar deviation of bilateral hands, which can be seen in RA; will further work-up.    Osteoporosis.  She received her Prolia injection today as part of her osteoporosis management, and will continue every 6 months    Vitamin D deficiency.  Advised to double the dose of her vitamin D intake.    Do labs  Do hand x-rays  Prolia injection given in clinic today; continue every 6 months    Return to clinic in 6 months    Plan:  1. Osteoporosis without current pathological fracture, unspecified osteoporosis type  -     denosumab (PROLIA) subcutaneous injection 60 mg    2. Hand arthritis  -     XR hand 3+ vw left; Future  -     XR hand 3+ vw right; Future  -     Sedimentation rate, automated  -     C-reactive protein  -     RF Screen w/ Reflex to Titer  -     Cyclic citrul peptide antibody, IgG    3. High risk medication use    High risk medication use - Prolia (denosumab) is a monoclonal antibody biologic medication that can rapidly utilize the body's calcium and make one susceptible to hypocalcemia; the medication also has a risk of osteonecrosis of the jaw in patients with exposed jaw bone. Patient does not plan for any invasive dental procedures in the near future.    RTC in 6 months      Chief Complaint  Follow-up visit for her osteoporosis.    Rheumatic Disease Summary  Last visit 3/22/23 with Dr. Birch: Osteoporosis--check CMP prior to each Prolia injection.  Calcium/Vit.D supplementation.  Resistance activity for bone health  Anti-inflammatory diet/exercise/weight control  Increase CV fitness/aerobic  activity  Topical NSAIDs/analgesics PRN joint pain  Continue to monitor labs including CMP, 25OH Vit.D,Urine NTX  Repeat DEXA in May 2023--order placed.  F/u in 6 mos.for repeat Prolia injection in [place]  Smoking cessation  Continue to f/u with neurosurgery for chronic low back pain.    HPI  Lori Damon is a 63-year-old female who is here for a follow-up visit for her osteoporosis; was a patient of Dr. Birch's. The patient consents to the use of [facility] services today.    The following portions of the patient's history were reviewed and updated as appropriate: allergies, current medications, past family history, past medical history, past social history, past surgical history and problem list.    Interval History  She has been receiving Prolia injections since 09/2022 and recently had a Prolia injection in 03/ 2023 by Dr. Birch. She was due for another Prolia injection in September 2023. She has been tolerating Prolia well. 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 She experiences joint pain in her hands and has been diagnosed with osteoarthritis without undergoing x-rays. To manage her overall pain, especially in her back with a fractured disc in her neck collapsing above the fusion, she takes over-the-counter aspirin, 4000 mg a day. Her neurosurgeon, during a previous visit, advised prioritizing her neck over her back. She describes the pain as steady all the time. She had been on opioids previously but was discontinued. She switched providers and ended up with pain management. Steroid injections in her back and pain blocks did not alleviate her pain much, and she experiences dry mouth from her medications. Although she does not experience hot or swollen joints, she had a knee replacement, and her left knee is currently problematic due to arthritis seen on an x-ray. A DEXA scan in 06/2023  was conducted. She also has an old left ankle fracture from childhood that has not healed properly, as confirmed by x-ray, though she could not recall the cause of the fracture. She currently takes 1000 mg of calcium and 1000 units of vitamin D daily.    Naproxen and ibuprofen do not provide relief. She was previously on diclofenac twice a day, which was ineffective. She is diagnosed with fibromyalgia, and on some days, the pain is so severe that she struggles to move. Skelaxin is prescribed for her fibromyalgia, and the dosage was reduced. She has been on losartan 600 mg tablet 3 times a day since transferring her care to [facility], but it has been reduced to half a tablet. She received an exercise routine to follow at home, but attempting the exercises leaves her unable to walk the next day, and she remains seated for 3 days.    She experienced a deep vein thrombosis in her left leg during a hospital stay, and unfortunately, compression socks were not applied during the 5 days she spent in the hospital. She was prescribed a blood thinner when she had a bowel blockage.    She is trying to quit smoking and has tried medications such as Chantix and Wellbutrin. However, she ran out of Wellbutrin, and she observed an increase in smoking afterward. She currently smokes 4 to 5 cigarettes a day.    Review of Systems:   REVIEW OF SYSTEMS:  Pertinent ROS positive for headaches, sore throat, dry mouth, diarrhea, difficulty swallowing, urine frequency, joint pain, joint swelling, back pain, neck pain, muscle pain, weakness, numbness. The rest of 14-point ROS reviewed and were negative.    Answers submitted by the patient for this visit:  Neurological Problem Questionnaire (Submitted on 1/28/2024)  Chief Complaint: Neurologic complaint  altered mental status: No  clumsiness: Yes  focal sensory loss: Yes  focal weakness: Yes  loss of balance: Yes  memory loss: Yes  near-syncope: No  slurred speech: No  syncope: No  visual  change: No  weakness: Yes  Chronicity: chronic  Onset: more than 1 year ago  Onset quality: gradually  Progression since onset: waxing and waning  Focality: facial, lower extremity, upper extremity  abdominal pain: No  auditory change: No  aura: Yes  back pain: Yes  bladder incontinence: Yes  bowel incontinence: Yes  chest pain: No  confusion: No  diaphoresis: No  dizziness: No  fatigue: Yes  fever: No  headaches: Yes  light-headedness: No  nausea: No  neck pain: Yes  palpitations: No  shortness of breath: No  vertigo: No  vomiting: No  Treatments tried: aspirin, bed rest, medication, neck support, position change, sleep, walking  Improvement on treatment: mild    Home Medications:    Current Outpatient Medications:     amantadine (SYMMETREL) 100 mg capsule, Take 1 capsule (100 mg total) by mouth in the morning, Disp: 90 capsule, Rfl: 1    aspirin 500 MG buffered tablet, Take 1,000 mg by mouth every 6 (six) hours as needed for mild pain 4,000 mg daily for Pain, Disp: , Rfl:     buPROPion (WELLBUTRIN XL) 150 mg 24 hr tablet, Take 1 tablet (150 mg total) by mouth every morning, Disp: 90 tablet, Rfl: 1    calcium carbonate 1250 MG capsule, Take 600 mg by mouth daily , Disp: , Rfl:     Cholecalciferol 1000 units tablet, Take 1,000 Units by mouth daily Takes in the afternoon, Disp: , Rfl:     gabapentin (NEURONTIN) 300 mg capsule, Take 1 capsule (300 mg total) by mouth 3 (three) times a day, Disp: 270 capsule, Rfl: 0    Ginger, Zingiber officinalis, (GINGER ROOT PO), Take 700 mg by mouth 2 (two) times a day , Disp: , Rfl:     metaxalone (SKELAXIN) 800 mg tablet, Take 0.5 tablets (400 mg total) by mouth 3 (three) times a day, Disp: 135 tablet, Rfl: 0    Multiple Vitamins-Minerals (CENTRUM SILVER PO), Take 1 tablet by mouth daily Takes in the am, Disp: , Rfl:     ondansetron (ZOFRAN) 4 mg tablet, Take 1 tablet (4 mg total) by mouth every 8 (eight) hours as needed for nausea or vomiting, Disp: 20 tablet, Rfl: 1     "predniSONE 5 mg tablet, 1 tab in the AM prn migraine (if zomig fails), Disp: 20 tablet, Rfl: 0    propranolol (INDERAL) 40 mg tablet, Take 1 tablet (40 mg total) by mouth daily at bedtime, Disp: 90 tablet, Rfl: 1    trospium chloride (SANCTURA) 20 mg tablet, Take 1 tablet (20 mg total) by mouth 2 (two) times a day, Disp: 60 tablet, Rfl: 11    Turmeric 500 MG CAPS, Take 700 mg by mouth 2 (two) times a day, Disp: , Rfl:     ZOLMitriptan (ZOMIG) 2.5 MG tablet, TAKE 1 TABLET BY MOUTH AT ONSET OF MIGRAINE, MAY REPEAT IN 2 HOURS IF NEEDED. LIMIT 2/24 HOURS, 4/WEEK, 8/MONTH, Disp: 16 tablet, Rfl: 2    Objective:    Vitals:    01/31/24 1531   BP: 116/72   Pulse: 82   SpO2: 99%   Weight: 58.5 kg (129 lb)   Height: 5' 3\" (1.6 m)       Physical Exam:  PHYSICAL EXAM:  Constitutional:    General: She is not in acute distress.  HENT:   Head: Normocephalic and atraumatic.   Eyes:   Conjunctiva/sclera: Conjunctivae normal.   Cardiovascular:   Rate and Rhythm: Normal rate and regular rhythm.   Heart sounds: S1 normal and S2 normal.   No friction rubs.   Pulmonary:   Effort: Pulmonary effort is normal. No respiratory distress.   Breath sounds: Normal breath sounds. No wheezing, rhonchi or rales.   Musculoskeletal:   Cervical back: Neck supple.   Upper extremities: Slight tenderness on the MCPs, ulnar deviation. PIP tenderness bilaterally.  Skin:  Coloration: Skin is not pale.   Neurological:   Mental Status: She is alert. Mental status is at baseline.   Psychiatric:      Mood and Affect: Mood normal.      Behavior: Behavior normal.      I have personally reviewed results with the patient.    Laboratory Studies  Her last vitamin D was normal.  Her calcium level was normal.    Imaging  The DEXA scan from June 2023 was reviewed with the patient. Her T-scores at the lumbar spine are within an acceptable range. Comparing it to the DEXA scan in 2021 at [facility], her T-scores were -1.9, -2, -2.6, and the current scores are -2.4 and " -1.1.    Imaging:   DEXA Scan 6/14/23    RESULTS:  LUMBAR SPINE: L3-L4 with elimination of L1 and L2 but it would appear from the spine images at L3 and L4 should've been eliminated from evaluation.:  BMD 1.359 gm/cm2  T-score 2.3  Z-score 4.2     LEFT TOTAL HIP:  BMD 0.653 gm/cm2  T-score -2.4  Z-score -1.2     LEFT FEMORAL NECK:  BMD 0.723 gm/cm2  T-score -1.1  Z-score 0.4     The left forearm BMD is 0.623 and the T score is -1.2. The Z score is 0.5.     IMPRESSION:  1. Based on the WHO classification, this study identifies a diagnosis of low bone mass, notable at the total hip, femoral neck, and forearm areas and the patient is considered at low risk for fracture.    Labs:   Appointment on 10/04/2023   Component Date Value Ref Range Status    Sodium 10/04/2023 139  135 - 147 mmol/L Final    Potassium 10/04/2023 4.4  3.5 - 5.3 mmol/L Final    Chloride 10/04/2023 103  96 - 108 mmol/L Final    CO2 10/04/2023 27  21 - 32 mmol/L Final    ANION GAP 10/04/2023 9  mmol/L Final    BUN 10/04/2023 18  5 - 25 mg/dL Final    Creatinine 10/04/2023 0.95  0.60 - 1.30 mg/dL Final    Standardized to IDMS reference method    Glucose 10/04/2023 77  65 - 140 mg/dL Final    If the patient is fasting, the ADA then defines impaired fasting glucose as > 100 mg/dL and diabetes as > or equal to 123 mg/dL.    Calcium 10/04/2023 9.7  8.4 - 10.2 mg/dL Final    AST 10/04/2023 24  13 - 39 U/L Final    ALT 10/04/2023 18  7 - 52 U/L Final    Specimen collection should occur prior to Sulfasalazine administration due to the potential for falsely depressed results.     Alkaline Phosphatase 10/04/2023 67  34 - 104 U/L Final    Total Protein 10/04/2023 6.7  6.4 - 8.4 g/dL Final    Albumin 10/04/2023 4.3  3.5 - 5.0 g/dL Final    Total Bilirubin 10/04/2023 0.74  0.20 - 1.00 mg/dL Final    Use of this assay is not recommended for patients undergoing treatment with eltrombopag due to the potential for falsely elevated results.  N-acetyl-p-benzoquinone  imine (metabolite of Acetaminophen) will generate erroneously low results in samples for patients that have taken an overdose of Acetaminophen.    eGFR 10/04/2023 63  ml/min/1.73sq m Final    Vit D, 25-Hydroxy 10/04/2023 37.2  30.0 - 100.0 ng/mL Final    Vitamin D guidelines established by Clinical Guidelines Subcommittee  of the Endocrine Society Task Force, 2011    Deficiency <20ng/ml   Insufficiency 20-30ng/ml   Sufficient  ng/ml        Transcribed for Leora Carmen MD, by Brianna Pagan on 02/05/24 at 4:37 PM. Powered by Dragon [employer].

## 2024-01-31 NOTE — TELEPHONE ENCOUNTER
[employer] calling back, she still hasn't received the Dexa Scan.  Please fax again to either [phone] or [phone]

## 2024-01-31 NOTE — TELEPHONE ENCOUNTER
[employer] is calling to see if the DXA report was faxed I let negrita aguilar know from the [facility]

## 2024-02-02 DIAGNOSIS — N39.41 URGE INCONTINENCE: ICD-10-CM

## 2024-02-02 NOTE — TELEPHONE ENCOUNTER
Patient is calling to request a prescription refill    Last seen by: Mariely    Medication: trospium chloride (SANCTURA) 20 mg tablet     Pharmacy: [facility] - [place] - [address]  [address] [facility] [address], [place] [address]  Phone: [phone]  Fax: [phone]       30 / 90 day supply:  90    Pt can be reached at: [phone]    Pt stated she had insurance change to [employer] and now med refills need to be 90 days and mail order pharmacy

## 2024-02-06 RX ORDER — TROSPIUM CHLORIDE 20 MG/1
20 TABLET, FILM COATED ORAL 2 TIMES DAILY
Qty: 90 TABLET | Refills: 3 | Status: SHIPPED | OUTPATIENT
Start: 2024-02-06 | End: 2024-02-06 | Stop reason: SDUPTHER

## 2024-02-06 RX ORDER — TROSPIUM CHLORIDE 20 MG/1
20 TABLET, FILM COATED ORAL 2 TIMES DAILY
Qty: 180 TABLET | Refills: 1 | Status: SHIPPED | OUTPATIENT
Start: 2024-02-06

## 2024-02-06 NOTE — TELEPHONE ENCOUNTER
Call from Lorena at [facility] mail order, received a script for the trospium but the quantity is incorrect. Patient takes the medication twice a day so the quantity should be 180 not 90. Will resend the script with the correct quantity.

## 2024-02-14 ENCOUNTER — OFFICE VISIT (OUTPATIENT)
Dept: NEUROLOGY | Facility: CLINIC | Age: 64
End: 2024-02-14
Payer: COMMERCIAL

## 2024-02-14 VITALS
BODY MASS INDEX: 23.39 KG/M2 | HEIGHT: 63 IN | SYSTOLIC BLOOD PRESSURE: 102 MMHG | TEMPERATURE: 97.2 F | WEIGHT: 132 LBS | OXYGEN SATURATION: 98 % | HEART RATE: 78 BPM | DIASTOLIC BLOOD PRESSURE: 62 MMHG | RESPIRATION RATE: 14 BRPM

## 2024-02-14 DIAGNOSIS — G24.01 TARDIVE DYSKINESIA: ICD-10-CM

## 2024-02-14 DIAGNOSIS — G43.009 MIGRAINE WITHOUT AURA AND WITHOUT STATUS MIGRAINOSUS, NOT INTRACTABLE: Primary | ICD-10-CM

## 2024-02-14 PROCEDURE — 99213 OFFICE O/P EST LOW 20 MIN: CPT | Performed by: PSYCHIATRY & NEUROLOGY

## 2024-02-14 RX ORDER — AMANTADINE HYDROCHLORIDE 100 MG/1
100 CAPSULE, GELATIN COATED ORAL DAILY
Qty: 90 CAPSULE | Refills: 1 | Status: SHIPPED | OUTPATIENT
Start: 2024-02-14

## 2024-02-14 RX ORDER — PROPRANOLOL HCL 60 MG
60 CAPSULE, EXTENDED RELEASE 24HR ORAL DAILY
Qty: 30 CAPSULE | Refills: 3 | Status: SHIPPED | OUTPATIENT
Start: 2024-02-14

## 2024-02-14 NOTE — ASSESSMENT & PLAN NOTE
Lori is a woman with migraine headaches.  Her headaches did increase in frequency and subsequently Inderal was initiated.  She does utilize 40 mg a day and has had no side effects.  She has headache with persistent cluster of migraine headaches despite the use of Zomig.  I have increased her dose to 60 mg a day XL.  We discussed that it can result in low blood pressure as well as decrease in heart rate.  She should for monitor for any syncopal symptoms
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
## 2024-02-14 NOTE — ASSESSMENT & PLAN NOTE
Tardive dyskinesia symptoms have improved with the use of Symmetrel.  Therefore this dose will be continued.

## 2024-02-28 DIAGNOSIS — M54.16 LUMBAR RADICULOPATHY: ICD-10-CM

## 2024-03-01 RX ORDER — METAXALONE 800 MG/1
400 TABLET ORAL 3 TIMES DAILY
Qty: 135 TABLET | Refills: 0 | Status: SHIPPED | OUTPATIENT
Start: 2024-03-01

## 2024-03-06 DIAGNOSIS — G43.009 MIGRAINE WITHOUT AURA AND WITHOUT STATUS MIGRAINOSUS, NOT INTRACTABLE: ICD-10-CM

## 2024-03-07 ENCOUNTER — TELEPHONE (OUTPATIENT)
Dept: FAMILY MEDICINE CLINIC | Facility: CLINIC | Age: 64
End: 2024-03-07

## 2024-03-07 DIAGNOSIS — M54.16 LUMBAR RADICULOPATHY: ICD-10-CM

## 2024-03-07 RX ORDER — PROPRANOLOL HCL 60 MG
60 CAPSULE, EXTENDED RELEASE 24HR ORAL DAILY
Qty: 90 CAPSULE | Refills: 1 | Status: SHIPPED | OUTPATIENT
Start: 2024-03-07

## 2024-03-07 NOTE — TELEPHONE ENCOUNTER
Patient comment: Since this is my mail order pharmacy, it should be changed to a 90 day supply, not a 30 day supply. I am tolerating this new prescription very well.  No light headedness or loss of balance.     Dr. Alvarez - Rx entered for 90 day supply. Please review and sign if in agreement.

## 2024-03-08 ENCOUNTER — APPOINTMENT (OUTPATIENT)
Age: 64
End: 2024-03-08
Payer: COMMERCIAL

## 2024-03-08 DIAGNOSIS — D64.9 ANEMIA, UNSPECIFIED TYPE: ICD-10-CM

## 2024-03-08 DIAGNOSIS — E78.00 HYPERCHOLESTEROLEMIA: ICD-10-CM

## 2024-03-08 DIAGNOSIS — M81.0 SENILE OSTEOPOROSIS: ICD-10-CM

## 2024-03-08 DIAGNOSIS — M19.049 HAND ARTHRITIS: ICD-10-CM

## 2024-03-08 LAB
25(OH)D3 SERPL-MCNC: 51.4 NG/ML (ref 30–100)
ALBUMIN SERPL BCP-MCNC: 4 G/DL (ref 3.5–5)
ALP SERPL-CCNC: 78 U/L (ref 34–104)
ALT SERPL W P-5'-P-CCNC: 15 U/L (ref 7–52)
ANION GAP SERPL CALCULATED.3IONS-SCNC: 9 MMOL/L
AST SERPL W P-5'-P-CCNC: 18 U/L (ref 13–39)
BILIRUB SERPL-MCNC: 0.46 MG/DL (ref 0.2–1)
BUN SERPL-MCNC: 32 MG/DL (ref 5–25)
CALCIUM SERPL-MCNC: 9.6 MG/DL (ref 8.4–10.2)
CHLORIDE SERPL-SCNC: 105 MMOL/L (ref 96–108)
CHOLEST SERPL-MCNC: 230 MG/DL
CO2 SERPL-SCNC: 26 MMOL/L (ref 21–32)
CREAT SERPL-MCNC: 1.09 MG/DL (ref 0.6–1.3)
CRP SERPL QL: 1.5 MG/L
ERYTHROCYTE [DISTWIDTH] IN BLOOD BY AUTOMATED COUNT: 13.4 % (ref 11.6–15.1)
ERYTHROCYTE [SEDIMENTATION RATE] IN BLOOD: 9 MM/HOUR (ref 0–29)
GFR SERPL CREATININE-BSD FRML MDRD: 53 ML/MIN/1.73SQ M
GLUCOSE P FAST SERPL-MCNC: 84 MG/DL (ref 65–99)
HCT VFR BLD AUTO: 38.1 % (ref 34.8–46.1)
HDLC SERPL-MCNC: 71 MG/DL
HGB BLD-MCNC: 12.5 G/DL (ref 11.5–15.4)
LDLC SERPL CALC-MCNC: 143 MG/DL (ref 0–100)
MCH RBC QN AUTO: 31.6 PG (ref 26.8–34.3)
MCHC RBC AUTO-ENTMCNC: 32.8 G/DL (ref 31.4–37.4)
MCV RBC AUTO: 96 FL (ref 82–98)
PLATELET # BLD AUTO: 270 THOUSANDS/UL (ref 149–390)
PMV BLD AUTO: 10.7 FL (ref 8.9–12.7)
POTASSIUM SERPL-SCNC: 4.5 MMOL/L (ref 3.5–5.3)
PROT SERPL-MCNC: 6.5 G/DL (ref 6.4–8.4)
RBC # BLD AUTO: 3.96 MILLION/UL (ref 3.81–5.12)
SODIUM SERPL-SCNC: 140 MMOL/L (ref 135–147)
TRIGL SERPL-MCNC: 79 MG/DL
WBC # BLD AUTO: 6.3 THOUSAND/UL (ref 4.31–10.16)

## 2024-03-08 PROCEDURE — 80061 LIPID PANEL: CPT

## 2024-03-08 PROCEDURE — 80053 COMPREHEN METABOLIC PANEL: CPT

## 2024-03-08 PROCEDURE — 82306 VITAMIN D 25 HYDROXY: CPT

## 2024-03-08 PROCEDURE — 85027 COMPLETE CBC AUTOMATED: CPT

## 2024-03-08 PROCEDURE — 73130 X-RAY EXAM OF HAND: CPT

## 2024-03-08 RX ORDER — METAXALONE 800 MG/1
400 TABLET ORAL 3 TIMES DAILY
Qty: 135 TABLET | Refills: 0 | Status: SHIPPED | OUTPATIENT
Start: 2024-03-08

## 2024-03-09 LAB — RHEUMATOID FACT SER QL LA: NEGATIVE

## 2024-03-12 LAB — CCP AB SER IA-ACNC: 0.8

## 2024-03-13 ENCOUNTER — OFFICE VISIT (OUTPATIENT)
Age: 64
End: 2024-03-13
Payer: COMMERCIAL

## 2024-03-13 VITALS
HEART RATE: 80 BPM | BODY MASS INDEX: 22.66 KG/M2 | DIASTOLIC BLOOD PRESSURE: 80 MMHG | WEIGHT: 132.72 LBS | HEIGHT: 64 IN | RESPIRATION RATE: 18 BRPM | SYSTOLIC BLOOD PRESSURE: 138 MMHG | TEMPERATURE: 97.4 F | OXYGEN SATURATION: 97 %

## 2024-03-13 DIAGNOSIS — M79.605 LEFT LEG PAIN: Primary | ICD-10-CM

## 2024-03-13 DIAGNOSIS — M25.562 PAIN AND SWELLING OF LEFT KNEE: ICD-10-CM

## 2024-03-13 DIAGNOSIS — M54.32 SCIATICA OF LEFT SIDE: ICD-10-CM

## 2024-03-13 DIAGNOSIS — M25.462 PAIN AND SWELLING OF LEFT KNEE: ICD-10-CM

## 2024-03-13 PROCEDURE — S9088 SERVICES PROVIDED IN URGENT: HCPCS | Performed by: PHYSICIAN ASSISTANT

## 2024-03-13 PROCEDURE — 99213 OFFICE O/P EST LOW 20 MIN: CPT | Performed by: PHYSICIAN ASSISTANT

## 2024-03-13 RX ORDER — PREDNISONE 10 MG/1
TABLET ORAL
Qty: 21 TABLET | Refills: 0 | Status: SHIPPED | OUTPATIENT
Start: 2024-03-13

## 2024-03-13 NOTE — PATIENT INSTRUCTIONS
Knee Pain   WHAT YOU NEED TO KNOW:   Knee pain may start suddenly, or it may be a long-term problem. You may have pain on the side, front, or back of your knee. You may have knee stiffness and swelling. You may hear popping sounds or feel like your knee is giving way or locking up as you walk. You may feel pain when you sit, stand, walk, or climb up and down stairs. Knee pain can be caused by conditions such as obesity, inflammation, or strains or tears in ligaments or tendons.   DISCHARGE INSTRUCTIONS:   Return to the emergency department if:   Your pain is worse, even after treatment.     You cannot bend or straighten your leg completely.     The swelling around your knee does not go down even with treatment.    Your knee is painful and hot to the touch.    Contact your healthcare provider if:   You have questions or concerns about your condition or care.       Medicines:  You may need any of the following:  NSAIDs  help decrease swelling and pain or fever. This medicine is available with or without a doctor's order. NSAIDs can cause stomach bleeding or kidney problems in certain people. If you take blood thinner medicine, always ask your healthcare provider if NSAIDs are safe for you. Always read the medicine label and follow directions.    Acetaminophen  decreases pain and fever. It is available without a doctor's order. Ask how much to take and how often to take it. Follow directions. Read the labels of all other medicines you are using to see if they also contain acetaminophen, or ask your doctor or pharmacist. Acetaminophen can cause liver damage if not taken correctly.    Prescription pain medicine  may be given. Ask your healthcare provider how to take this medicine safely. Some prescription pain medicines contain acetaminophen. Do not take other medicines that contain acetaminophen without talking to your healthcare provider. Too much acetaminophen may cause liver damage. Prescription pain medicine may  cause constipation. Ask your healthcare provider how to prevent or treat constipation.     Take your medicine as directed.  Contact your healthcare provider if you think your medicine is not helping or if you have side effects. Tell your provider if you are allergic to any medicine. Keep a list of the medicines, vitamins, and herbs you take. Include the amounts, and when and why you take them. Bring the list or the pill bottles to follow-up visits. Carry your medicine list with you in case of an emergency.    What you can do to manage your symptoms:   Rest your knee so it can heal.  Limit activities that increase your pain. Do low-impact exercises, such as walking or swimming.     Apply ice to help reduce swelling and pain.  Use an ice pack, or put crushed ice in a plastic bag. Cover it with a towel before you apply it to your knee. Apply ice for 15 to 20 minutes every hour, or as directed.    Apply compression to help reduce swelling.  Use a brace or bandage only as directed.    Elevate your knee to help decrease pain and swelling.  Elevate your knee while you are sitting or lying down. Prop your leg on pillows to keep your knee above the level of your heart.    Prevent your knee from moving as directed.  Your healthcare provider may put on a cast or splint. You may need to wear a leg brace to stabilize your knee. A leg brace can be adjusted to increase your range of motion as your knee heals.       What you can do to prevent knee pain:   Maintain a healthy weight.  Extra weight increases your risk for knee pain. Ask your healthcare provider how much you should weigh. He or she can help you create a safe weight loss plan if you need to lose weight.    Exercise or train properly.  Use the correct equipment for sports. Wear shoes that provide good support. Check your posture often as you exercise, play sports, or train for an event. This can help prevent stress and strain on your knees. Rest between sessions so you  do not overwork your knees.    Follow up with your healthcare provider within 24 hours or as directed:  You may need follow-up treatments, such as steroid injections to decrease pain. Write down your questions so you remember to ask them during your visits.   © Copyright [employer] 2023 Information is for End User's use only and may not be sold, redistributed or otherwise used for commercial purposes.  The above information is an  only. It is not intended as medical advice for individual conditions or treatments. Talk to your doctor, nurse or pharmacist before following any medical regimen to see if it is safe and effective for you.      Sciatica   WHAT YOU NEED TO KNOW:   Sciatica is a condition that causes pain along your sciatic nerve. The sciatic nerve runs from your spine through both sides of your buttocks. It then runs down the back of your thigh, into your lower leg and foot. Any place along your sciatic nerve may be compressed, inflamed, irritated, or stretched.       DISCHARGE INSTRUCTIONS:   Return to the emergency department if:   You have trouble controlling your urine or bowel movements.    You have weakness in both legs.    You have numbness in your groin or buttocks.    Call your doctor if:   You have pain in your lower back at night or when resting.    You have pain in your lower back with numbness below the knee.    You have weakness in one leg only.    You have questions or concerns about your condition or care.    Medicines:  You may need any of the following:  NSAIDs , such as ibuprofen, help decrease swelling, pain, and fever. This medicine is available with or without a doctor's order. NSAIDs can cause stomach bleeding or kidney problems in certain people. If you take blood thinner medicine, always ask your healthcare provider if NSAIDs are safe for you. Always read the medicine label and follow directions.    Acetaminophen  decreases pain and fever. It is available without a  doctor's order. Ask how much to take and how often to take it. Follow directions. Read the labels of all other medicines you are using to see if they also contain acetaminophen, or ask your doctor or pharmacist. Acetaminophen can cause liver damage if not taken correctly.    Muscle relaxers  help decrease pain and muscle spasms.    Take your medicine as directed.  Contact your healthcare provider if you think your medicine is not helping or if you have side effects. Tell your provider if you are allergic to any medicine. Keep a list of the medicines, vitamins, and herbs you take. Include the amounts, and when and why you take them. Bring the list or the pill bottles to follow-up visits. Carry your medicine list with you in case of an emergency.    Manage your symptoms:   Decrease your activity as directed.  Do not lift heavy objects or twist your back for at least 6 weeks. Slowly return to your usual activity.    Apply ice to help decrease swelling and pain.  Use an ice pack, or put crushed ice in a plastic bag. Cover it with a towel before you place it on your back or leg. Apply ice for 15 to 20 minutes every hour, or as directed.    Apply heat to help decrease pain and muscle spasms.  Use a heat pack or a heating pad set on low heat. Apply heat on your back or leg for 20 to 30 minutes every 2 hours for as many days as directed.    Go to physical or occupational therapy as directed.  A physical therapist teaches you exercises to help improve movement and strength, and to decrease pain. An occupational therapist teaches you skills to help with your daily activities.    Use assistive devices, if directed.  You may need to wear back support, such as a back brace. You may need crutches, a cane, or a walker to decrease stress on your lower back and leg muscles. Ask your healthcare provider for more information about assistive devices and how to use them correctly.    Prevent sciatica:   Avoid pressure on your back and  legs.  Do not  lift heavy objects, or stand or sit for long periods of time.    Lift objects safely.  Keep your back straight and bend your knees when you  an object. Do not bend or twist your back when you lift.         Maintain a healthy weight.  Ask your healthcare provider what a healthy weight is for you. Your provider can help you create a weight loss plan, if needed.    Exercise as directed.  Ask your healthcare provider about the best stretching, warmup, and exercise plan for you.    Follow up with your doctor as directed:  Write down your questions so you remember to ask them during your visits.  © Copyright [employer] 2023 Information is for End User's use only and may not be sold, redistributed or otherwise used for commercial purposes.  The above information is an  only. It is not intended as medical advice for individual conditions or treatments. Talk to your doctor, nurse or pharmacist before following any medical regimen to see if it is safe and effective for you.

## 2024-03-14 NOTE — PROGRESS NOTES
[facility]        NAME: Lori Damon is a 63 y.o. female  : 1960    MRN: [id number]  DATE: 2024  TIME: 10:59 AM    Assessment and Plan   Left leg pain [M79.605]  1. Left leg pain        2. Pain and swelling of left knee        3. Sciatica of left side  predniSONE 10 mg tablet            Patient Instructions     Patient has ongoing issues with her left knee with recent onset of swelling, increased pain shooting all the way down her leg.  On exam, there is no significant concern or suspicion for DVT or cellulitis.  I prescribed her an oral prednisone taper as this may represent sciatica but if her condition changes or worsens, she was instructed to proceed to the ER for prompt further eval and intervention and for this she voiced understanding and agreed with plan.  She should also follow-up with Ortho regarding her left knee.  Follow up with PCP in 3-5 days.  Proceed to  ER if symptoms worsen.    If tests have been performed at [facility], our office will contact you with results if changes need to be made to the care plan discussed with you at the visit.  [facility]    Chief Complaint     Chief Complaint   Patient presents with    Knee Pain     LEFT knee pain intermittent for over 1 year. Had x-rays ordered by PCP for knee diagnosed with osteoarthritis and spurs. In last two weeks - pain has increased, having leg swelling, not able to sleep due to pain in leg. OTC - ASA         History of Present Illness       Patient presents with intermittent left knee pain going back over the past year but she has had recent increase in pain severity as well as swelling.  She reports pain that courses all the way through her left leg and she does have previous history of sciatica.  She denies any pitting edema, skin color changes, numbness or paresthesias, fever, chills, or any other symptoms.    Knee Pain         Review of Systems   Review of Systems    Constitutional: Negative.    Respiratory: Negative.     Cardiovascular: Negative.    Gastrointestinal: Negative.    Genitourinary: Negative.    Musculoskeletal:         Left knee/leg pain and swelling         Current Medications       Current Outpatient Medications:     amantadine (SYMMETREL) 100 mg capsule, Take 1 capsule (100 mg total) by mouth in the morning, Disp: 90 capsule, Rfl: 1    aspirin 500 MG buffered tablet, Take 1,000 mg by mouth every 6 (six) hours as needed for mild pain 4,000 mg daily for Pain, Disp: , Rfl:     buPROPion (WELLBUTRIN XL) 150 mg 24 hr tablet, Take 1 tablet (150 mg total) by mouth every morning, Disp: 90 tablet, Rfl: 1    calcium carbonate 1250 MG capsule, Take 600 mg by mouth daily , Disp: , Rfl:     Cholecalciferol 1000 units tablet, Take 1,000 Units by mouth daily Takes in the afternoon, Disp: , Rfl:     gabapentin (NEURONTIN) 300 mg capsule, Take 1 capsule (300 mg total) by mouth 3 (three) times a day, Disp: 270 capsule, Rfl: 0    Ginger, Zingiber officinalis, (GINGER ROOT PO), Take 700 mg by mouth 2 (two) times a day , Disp: , Rfl:     metaxalone (SKELAXIN) 800 mg tablet, Take 0.5 tablets (400 mg total) by mouth 3 (three) times a day, Disp: 135 tablet, Rfl: 0    Multiple Vitamins-Minerals (CENTRUM SILVER PO), Take 1 tablet by mouth daily Takes in the am, Disp: , Rfl:     ondansetron (ZOFRAN) 4 mg tablet, Take 1 tablet (4 mg total) by mouth every 8 (eight) hours as needed for nausea or vomiting, Disp: 20 tablet, Rfl: 1    predniSONE 10 mg tablet, 6-5-4-3-2-1 taper with food., Disp: 21 tablet, Rfl: 0    predniSONE 5 mg tablet, 1 tab in the AM prn migraine (if zomig fails), Disp: 20 tablet, Rfl: 0    propranolol (INDERAL LA) 60 mg 24 hr capsule, Take 1 capsule (60 mg total) by mouth daily, Disp: 90 capsule, Rfl: 1    trospium chloride (SANCTURA) 20 mg tablet, Take 1 tablet (20 mg total) by mouth 2 (two) times a day, Disp: 180 tablet, Rfl: 1    Turmeric 500 MG CAPS, Take 700 mg by  "mouth 2 (two) times a day, Disp: , Rfl:     ZOLMitriptan (ZOMIG) 2.5 MG tablet, TAKE 1 TABLET BY MOUTH AT ONSET OF MIGRAINE, MAY REPEAT IN 2 HOURS IF NEEDED. LIMIT 2/24 HOURS, 4/WEEK, 8/MONTH, Disp: 16 tablet, Rfl: 2    Current Allergies     Allergies as of 03/13/2024 - Reviewed 03/13/2024   Allergen Reaction Noted    Armodafinil Other (See Comments) 09/07/2023    Celecoxib Other (See Comments) 04/08/2014    Sumatriptan Anaphylaxis 02/18/2015    Tramadol Other (See Comments) 09/29/2010    Medical tape Dermatitis, Rash, and Blisters             The following portions of the patient's history were reviewed and updated as appropriate: allergies, current medications, past family history, past medical history, past social history, past surgical history and problem list.     Past Medical History:   Diagnosis Date    Allergy to dust     Anemia     Calculus of ureter     Chronic kidney disease 11/2018    Hydrophenesis    Chronic pain disorder     neck and back- sees pain management    Cluster headache Teenager    May occur 2-3 times per year. Lasts several days    Colitis     Colon polyp     Cracked skin     on fingers    CTS (carpal tunnel syndrome) 01/01/2014    Diverticulitis of colon 2012    Seen by Dr. Wilburn at [facility]. Inpatient at    Diverticulosis     Dysphagia     Fibromyalgia, primary 04/16/1996    GERD (gastroesophageal reflux disease)     GI (gastrointestinal bleed)     As a result of diverticulitis and microscopic colitis.    H/O: GI bleed     Head injury     Had numerious concussions    Headache, tension-type     Hiatal hernia     History of DVT in adulthood 2019 2019    HL (hearing loss) unknown    Hydronephrosis     Hyperlipidemia     Inguinal hernia     right side    Irregular heart beat     Pt states with prolonged QT interval - was seen by cardiologist( [facility]) pt told \"to not worry about it\"    Irritable bowel     Kidney stone     Lactose intolerance     Did not tolerate milk after being weaned from breast " milk.    Memory loss 2016/2017    Medication/also related to medical conditions    Migraines     Narcolepsy     OAB (overactive bladder)     Osteoarthritis     Osteoporosis     Other chronic pain     degenerative disc disease    PONV (postoperative nausea and vomiting)     migraines    Pyelonephritis 01/13/2021    SBO (small bowel obstruction) ([facility]) 09/25/2021    Sepsis ([facility]) 01/13/2021    Shingles Unknown    Had mild cases of shingles several times.    Smoking 08/06/2020    Tobacco abuse     Vision loss Unknown    Occurs when headaches start behind my left eye.    Wears dentures     full upper    Wears glasses     Wears glasses        Past Surgical History:   Procedure Laterality Date    ABDOMINAL ADHESION SURGERY      ARM SKIN LESION BIOPSY / EXCISION      lymph node excision    BLADDER SURGERY      CHOLECYSTECTOMY      COLONOSCOPY  08/06/2020    FL RETROGRADE PYELOGRAM  01/05/2021    FLEXIBLE SIGMOIDOSCOPY      Last done at [facility] in 2018 after being admitted for GI bleed.    HERNIA REPAIR      HYSTERECTOMY  1977    JOINT REPLACEMENT Right     knee    KNEE SURGERY Right     replacement    LAMINECTOMY      L5-6-7    LAPAROSCOPY      LYMPH NODE DISSECTION Left     removed -no cancer-  limb restriction    LYMPHADENECTOMY  1974    UNDER LEFT ARM - DO NOT DRAW BLOOD/GIVE INJECTIONS IN LEFT ARM PER PATIENT     MOLE REMOVAL      OOPHORECTOMY Bilateral 1977    OTHER SURGICAL HISTORY      enlarged lymph node removed left arm..no cancer    VA CYSTO BLADDER W/URETERAL CATHETERIZATION Right 12/02/2020    Procedure: CYSTOSCOPY AND INSERTION STENT URETERAL;  Surgeon: Rory Fall MD;  Location: [facility];  Service: Urology    VA CYSTO/URETERO W/LITHOTRIPSY &INDWELL STENT INSRT Right 01/05/2021    Procedure: CYSTO, LASER  URETEROSCOPY, RETRO PYELOGRAM, STENT REPLACMENT;  Surgeon: Adriel Subramanian MD;  Location: [facility];  Service: Urology    VA LITHOTRIPSY XTRCORP SHOCK WAVE Right 03/25/2021    Procedure: LITHOTRIPSY  EXTRACORPORAL SHOCKWAVE (ESWL);  Surgeon: Darwin Montero MD;  Location:  MAIN OR;  Service: Urology    NV RPR 1ST INGUN HRNA AGE 3 YRS/> REDUCIBLE Right 2020    Procedure: REPAIR HERNIA INGUINAL  WITH MESH;  Surgeon: Mercedes Justin MD;  Location: [facility];  Service: General    SPINAL FUSION      UPPER GASTROINTESTINAL ENDOSCOPY  2020    WISDOM TOOTH EXTRACTION         Family History   Problem Relation Age of Onset    Hypertension Mother          from massive heart attack, never took any medication for this condition    Heart attack Mother     Hyperlipidemia Mother         Blood tests done just prior to heart attack.  Results reported after her passing.    Migraines Mother          from massive cardiac arrest. Had abdominal aneurysm.    Hypertension Father         Had high blood pressure, unknown if he ever took medication    Cancer Father 72        Tumor in bowels, spread to liver, skin cancer    Colon polyps Father     Diabetes Sister         uses insulin    Arthritis Sister         Osteo arthritis    Hypertension Sister         Triple bypass    Diabetes Sister         pre-diabetic, oral medication    Arthritis Sister         Osteo arthritis    Hypertension Sister         Triple bypass    Irritable bowel syndrome Sister         Constipation    No Known Problems Daughter     No Known Problems Maternal Grandmother     No Known Problems Maternal Grandfather     No Known Problems Paternal Grandmother     No Known Problems Paternal Grandfather     Stroke Brother     Nephrolithiasis Brother     Cancer Brother 71        soft tissue cancer    Cancer Brother         Terminal soft tissue cancer     Hypertension Brother         Triple bypass    Migraines Brother         Severe. Standard meds do not help. [facility]es to [facility] for [facility]    Learning disabilities Brother         Mental retardation    Mental retardation Brother     No Known Problems Maternal Aunt     No Known Problems Maternal Aunt   "   No Known Problems Maternal Aunt     No Known Problems Paternal Aunt     No Known Problems Paternal Aunt     No Known Problems Paternal Aunt     Cancer Cousin         Pancreatic cancer    Cancer Cousin         Breast cancer, total mastectomy    Breast cancer Other 47    Migraines Maternal Aunt         Had 2.  from cerebral aneurysm    Migraines Maternal Aunt          from cerebral aneurysm    Migraines Maternal Aunt          from cerebral aneurysm    Migraines Maternal Uncle          from cerebral aneurysm         Medications have been verified.        Objective   /80 (BP Location: Right arm, Patient Position: Sitting, Cuff Size: Standard)   Pulse 80   Temp (!) 97.4 °F (36.3 °C) (Tympanic)   Resp 18   Ht 5' 3.5\" (1.613 m)   Wt 60.2 kg (132 lb 11.5 oz)   SpO2 97%   BMI 23.14 kg/m²   No LMP recorded. Patient has had a hysterectomy.       Physical Exam     Physical Exam  Vitals reviewed.   Constitutional:       General: She is not in acute distress.     Appearance: She is well-developed.   Musculoskeletal:      Left lower leg: No edema.      Comments: There is tenderness to palpation and diffuse left knee joint swelling.  Range of motion is overall intact.  Patient has calf tenderness but there is no pitting edema, erythema, increased warmth to the touch, pallor, cyanosis, ecchymosis.   Neurological:      Mental Status: She is alert and oriented to person, place, and time.      Sensory: No sensory deficit.                   "

## 2024-03-21 ENCOUNTER — APPOINTMENT (EMERGENCY)
Dept: RADIOLOGY | Facility: HOSPITAL | Age: 64
End: 2024-03-21
Payer: COMMERCIAL

## 2024-03-21 ENCOUNTER — HOSPITAL ENCOUNTER (EMERGENCY)
Facility: HOSPITAL | Age: 64
Discharge: HOME | End: 2024-03-21
Attending: EMERGENCY MEDICINE
Payer: COMMERCIAL

## 2024-03-21 ENCOUNTER — APPOINTMENT (EMERGENCY)
Dept: NON INVASIVE DIAGNOSTICS | Facility: HOSPITAL | Age: 64
End: 2024-03-21
Payer: COMMERCIAL

## 2024-03-21 VITALS
SYSTOLIC BLOOD PRESSURE: 114 MMHG | HEART RATE: 74 BPM | TEMPERATURE: 97.4 F | DIASTOLIC BLOOD PRESSURE: 61 MMHG | OXYGEN SATURATION: 95 % | RESPIRATION RATE: 18 BRPM

## 2024-03-21 DIAGNOSIS — N39.0 UTI (URINARY TRACT INFECTION): ICD-10-CM

## 2024-03-21 DIAGNOSIS — I82.409 DVT OF LEG (DEEP VENOUS THROMBOSIS) (HCC): Primary | ICD-10-CM

## 2024-03-21 DIAGNOSIS — R60.0 LOWER EXTREMITY EDEMA: ICD-10-CM

## 2024-03-21 LAB
ALBUMIN SERPL BCP-MCNC: 3.9 G/DL (ref 3.5–5)
ALP SERPL-CCNC: 75 U/L (ref 34–104)
ALT SERPL W P-5'-P-CCNC: 17 U/L (ref 7–52)
ANION GAP SERPL CALCULATED.3IONS-SCNC: 10 MMOL/L (ref 4–13)
AST SERPL W P-5'-P-CCNC: 17 U/L (ref 13–39)
BACTERIA UR QL AUTO: ABNORMAL /HPF
BASOPHILS # BLD AUTO: 0.07 THOUSANDS/ÂΜL (ref 0–0.1)
BASOPHILS NFR BLD AUTO: 1 % (ref 0–1)
BILIRUB SERPL-MCNC: 0.33 MG/DL (ref 0.2–1)
BILIRUB UR QL STRIP: NEGATIVE
BNP SERPL-MCNC: 38 PG/ML (ref 0–100)
BUN SERPL-MCNC: 24 MG/DL (ref 5–25)
CALCIUM SERPL-MCNC: 9.1 MG/DL (ref 8.4–10.2)
CHLORIDE SERPL-SCNC: 106 MMOL/L (ref 96–108)
CLARITY UR: CLEAR
CO2 SERPL-SCNC: 23 MMOL/L (ref 21–32)
COLOR UR: YELLOW
CREAT SERPL-MCNC: 1.11 MG/DL (ref 0.6–1.3)
EOSINOPHIL # BLD AUTO: 0.28 THOUSAND/ÂΜL (ref 0–0.61)
EOSINOPHIL NFR BLD AUTO: 4 % (ref 0–6)
ERYTHROCYTE [DISTWIDTH] IN BLOOD BY AUTOMATED COUNT: 13.6 % (ref 11.6–15.1)
GFR SERPL CREATININE-BSD FRML MDRD: 52 ML/MIN/1.73SQ M
GLUCOSE SERPL-MCNC: 152 MG/DL (ref 65–140)
GLUCOSE UR STRIP-MCNC: NEGATIVE MG/DL
HCT VFR BLD AUTO: 37.8 % (ref 34.8–46.1)
HGB BLD-MCNC: 13 G/DL (ref 11.5–15.4)
HGB UR QL STRIP.AUTO: NEGATIVE
HYALINE CASTS #/AREA URNS LPF: ABNORMAL /LPF
IMM GRANULOCYTES # BLD AUTO: 0.05 THOUSAND/UL (ref 0–0.2)
IMM GRANULOCYTES NFR BLD AUTO: 1 % (ref 0–2)
KETONES UR STRIP-MCNC: NEGATIVE MG/DL
LEUKOCYTE ESTERASE UR QL STRIP: ABNORMAL
LYMPHOCYTES # BLD AUTO: 2.17 THOUSANDS/ÂΜL (ref 0.6–4.47)
LYMPHOCYTES NFR BLD AUTO: 27 % (ref 14–44)
MAGNESIUM SERPL-MCNC: 2 MG/DL (ref 1.9–2.7)
MCH RBC QN AUTO: 32.2 PG (ref 26.8–34.3)
MCHC RBC AUTO-ENTMCNC: 34.4 G/DL (ref 31.4–37.4)
MCV RBC AUTO: 94 FL (ref 82–98)
MONOCYTES # BLD AUTO: 0.42 THOUSAND/ÂΜL (ref 0.17–1.22)
MONOCYTES NFR BLD AUTO: 5 % (ref 4–12)
NEUTROPHILS # BLD AUTO: 5.1 THOUSANDS/ÂΜL (ref 1.85–7.62)
NEUTS SEG NFR BLD AUTO: 62 % (ref 43–75)
NITRITE UR QL STRIP: NEGATIVE
NON-SQ EPI CELLS URNS QL MICRO: ABNORMAL /HPF
NRBC BLD AUTO-RTO: 0 /100 WBCS
PH UR STRIP.AUTO: 6.5 [PH] (ref 4.5–8)
PLATELET # BLD AUTO: 256 THOUSANDS/UL (ref 149–390)
PMV BLD AUTO: 10.2 FL (ref 8.9–12.7)
POTASSIUM SERPL-SCNC: 3.7 MMOL/L (ref 3.5–5.3)
PROT SERPL-MCNC: 6.3 G/DL (ref 6.4–8.4)
PROT UR STRIP-MCNC: NEGATIVE MG/DL
RBC # BLD AUTO: 4.04 MILLION/UL (ref 3.81–5.12)
RBC #/AREA URNS AUTO: ABNORMAL /HPF
SODIUM SERPL-SCNC: 139 MMOL/L (ref 135–147)
SP GR UR STRIP.AUTO: 1.02 (ref 1–1.03)
UROBILINOGEN UR QL STRIP.AUTO: 0.2 E.U./DL
WBC # BLD AUTO: 8.09 THOUSAND/UL (ref 4.31–10.16)
WBC #/AREA URNS AUTO: ABNORMAL /HPF

## 2024-03-21 PROCEDURE — 87086 URINE CULTURE/COLONY COUNT: CPT

## 2024-03-21 PROCEDURE — 73564 X-RAY EXAM KNEE 4 OR MORE: CPT

## 2024-03-21 PROCEDURE — 93971 EXTREMITY STUDY: CPT

## 2024-03-21 PROCEDURE — 93971 EXTREMITY STUDY: CPT | Performed by: SURGERY

## 2024-03-21 PROCEDURE — 81001 URINALYSIS AUTO W/SCOPE: CPT

## 2024-03-21 PROCEDURE — 87186 SC STD MICRODIL/AGAR DIL: CPT

## 2024-03-21 PROCEDURE — 99284 EMERGENCY DEPT VISIT MOD MDM: CPT

## 2024-03-21 PROCEDURE — 36415 COLL VENOUS BLD VENIPUNCTURE: CPT | Performed by: EMERGENCY MEDICINE

## 2024-03-21 PROCEDURE — 83880 ASSAY OF NATRIURETIC PEPTIDE: CPT | Performed by: EMERGENCY MEDICINE

## 2024-03-21 PROCEDURE — 87077 CULTURE AEROBIC IDENTIFY: CPT

## 2024-03-21 PROCEDURE — 96374 THER/PROPH/DIAG INJ IV PUSH: CPT

## 2024-03-21 PROCEDURE — 99285 EMERGENCY DEPT VISIT HI MDM: CPT | Performed by: EMERGENCY MEDICINE

## 2024-03-21 PROCEDURE — 85025 COMPLETE CBC W/AUTO DIFF WBC: CPT | Performed by: EMERGENCY MEDICINE

## 2024-03-21 PROCEDURE — 80053 COMPREHEN METABOLIC PANEL: CPT | Performed by: EMERGENCY MEDICINE

## 2024-03-21 PROCEDURE — 83735 ASSAY OF MAGNESIUM: CPT | Performed by: EMERGENCY MEDICINE

## 2024-03-21 RX ORDER — DEXAMETHASONE SODIUM PHOSPHATE 10 MG/ML
10 INJECTION, SOLUTION INTRAMUSCULAR; INTRAVENOUS ONCE
Status: COMPLETED | OUTPATIENT
Start: 2024-03-21 | End: 2024-03-21

## 2024-03-21 RX ORDER — ACETAMINOPHEN 325 MG/1
650 TABLET ORAL ONCE
Status: COMPLETED | OUTPATIENT
Start: 2024-03-21 | End: 2024-03-21

## 2024-03-21 RX ORDER — FAMOTIDINE 20 MG/1
20 TABLET, FILM COATED ORAL DAILY
Qty: 14 TABLET | Refills: 0 | Status: SHIPPED | OUTPATIENT
Start: 2024-03-21 | End: 2024-04-04

## 2024-03-21 RX ADMIN — ACETAMINOPHEN 325MG 650 MG: 325 TABLET ORAL at 17:19

## 2024-03-21 RX ADMIN — APIXABAN 10 MG: 5 TABLET, FILM COATED ORAL at 18:57

## 2024-03-21 RX ADMIN — DEXAMETHASONE SODIUM PHOSPHATE 10 MG: 10 INJECTION INTRAMUSCULAR; INTRAVENOUS at 17:26

## 2024-03-21 NOTE — DISCHARGE INSTRUCTIONS
As discussed, the vascular report reveals DVT  Please call your family doctor for close follow-up.  While on Eliquis, if you fall, injure yourself or cut yourself you will have high risk of bleeding.  If you hit your head please return turn to the ER immediately  While on Eliquis please be careful as how much aspirin you take.  Aspirin and Eliquis together can further increase your risk of bleeding  While on Eliquis please take over-the-counter Pepcid

## 2024-03-21 NOTE — ED PROVIDER NOTES
History  Chief Complaint   Patient presents with    Leg Swelling     C/o left leg swelling. Seen at urgent care and given prednisone. Once stopped swelling went right back up. Also concern for dvt.      Patient is a 63-year-old female with a history of hypertension, hyperlipidemia, fibromyalgia, osteoporosis, chronic kidney disease coming in today with left knee pain.  Patient states that she has been having left knee pain and swelling that is progressively worsening.  She reports that she was to her PCP and eventually went to the urgent center this past week.  She states they put her on steroids and everything was doing well until she finished and the swelling came back.  She has no falls or injuries.  She denies any new back pain or radicular symptoms.  She has no focal weakness throughout the bilateral lower extremities.  She denies any focal paresthesias throughout the bilateral lower extremities.  She noticed that her left knee and leg left leg are more swollen.  She has no recent travel, recent surgeries.  She does have a history of DVT in the past that was status post surgery.  No unprompted DVT or PE.  She denies any chest pain, palpitations or shortness of breath.      History provided by:  Patient, medical records and relative   used: No    Knee Pain  Location:  Leg  Time since incident:  3 weeks  Injury: no    Leg location:  L lower leg and L leg  Pain details:     Quality:  Pressure, aching and dull    Radiates to:  Does not radiate    Severity:  Moderate    Onset quality:  Gradual    Duration:  3 weeks    Timing:  Intermittent    Progression:  Waxing and waning  Chronicity:  Recurrent  Dislocation: no    Foreign body present:  No foreign bodies  Tetanus status:  Up to date  Prior injury to area:  No  Relieved by:  Nothing  Worsened by:  Nothing  Ineffective treatments:  Acetaminophen (steroids)  Associated symptoms: decreased ROM    Associated symptoms: no back pain, no fatigue, no  fever, no itching, no muscle weakness, no neck pain, no numbness, no stiffness, no swelling and no tingling    Risk factors: no concern for non-accidental trauma, no frequent fractures, no known bone disorder, no obesity and no recent illness        Prior to Admission Medications   Prescriptions Last Dose Informant Patient Reported? Taking?   Cholecalciferol 1000 units tablet  Self Yes No   Sig: Take 1,000 Units by mouth daily Takes in the afternoon   Ginger, Zingiber officinalis, (GINGER ROOT PO)  Self Yes No   Sig: Take 700 mg by mouth 2 (two) times a day    Multiple Vitamins-Minerals (CENTRUM SILVER PO)  Self Yes No   Sig: Take 1 tablet by mouth daily Takes in the am   Turmeric 500 MG CAPS  Self Yes No   Sig: Take 700 mg by mouth 2 (two) times a day   ZOLMitriptan (ZOMIG) 2.5 MG tablet  Self No No   Sig: TAKE 1 TABLET BY MOUTH AT ONSET OF MIGRAINE, MAY REPEAT IN 2 HOURS IF NEEDED. LIMIT 2/24 HOURS, 4/WEEK, 8/MONTH   amantadine (SYMMETREL) 100 mg capsule   No No   Sig: Take 1 capsule (100 mg total) by mouth in the morning   aspirin 500 MG buffered tablet  Self Yes No   Sig: Take 1,000 mg by mouth every 6 (six) hours as needed for mild pain 4,000 mg daily for Pain   buPROPion (WELLBUTRIN XL) 150 mg 24 hr tablet  Self No No   Sig: Take 1 tablet (150 mg total) by mouth every morning   calcium carbonate 1250 MG capsule  Self Yes No   Sig: Take 600 mg by mouth daily    gabapentin (NEURONTIN) 300 mg capsule  Self No No   Sig: Take 1 capsule (300 mg total) by mouth 3 (three) times a day   metaxalone (SKELAXIN) 800 mg tablet   No No   Sig: Take 0.5 tablets (400 mg total) by mouth 3 (three) times a day   ondansetron (ZOFRAN) 4 mg tablet  Self No No   Sig: Take 1 tablet (4 mg total) by mouth every 8 (eight) hours as needed for nausea or vomiting   predniSONE 10 mg tablet   No No   Si-5-4-3-2-1 taper with food.   predniSONE 5 mg tablet  Self No No   Si tab in the AM prn migraine (if zomig fails)   propranolol  "(INDERAL LA) 60 mg 24 hr capsule   No No   Sig: Take 1 capsule (60 mg total) by mouth daily   trospium chloride (SANCTURA) 20 mg tablet  Self No No   Sig: Take 1 tablet (20 mg total) by mouth 2 (two) times a day      Facility-Administered Medications: None       Past Medical History:   Diagnosis Date    Allergy to dust     Anemia     Calculus of ureter     Chronic kidney disease 11/2018    Hydrophenesis    Chronic pain disorder     neck and back- sees pain management    Cluster headache Teenager    May occur 2-3 times per year. Lasts several days    Colitis     Colon polyp     Cracked skin     on fingers    CTS (carpal tunnel syndrome) 01/01/2014    Diverticulitis of colon 2012    Seen by Dr. Wilburn at [facility]. Inpatient at    Diverticulosis     Dysphagia     Fibromyalgia, primary 04/16/1996    GERD (gastroesophageal reflux disease)     GI (gastrointestinal bleed)     As a result of diverticulitis and microscopic colitis.    H/O: GI bleed     Head injury     Had numerious concussions    Headache, tension-type     Hiatal hernia     History of DVT in adulthood 2019 2019    HL (hearing loss) unknown    Hydronephrosis     Hyperlipidemia     Inguinal hernia     right side    Irregular heart beat     Pt states with prolonged QT interval - was seen by cardiologist( [facility]) pt told \"to not worry about it\"    Irritable bowel     Kidney stone     Lactose intolerance     Did not tolerate milk after being weaned from breast milk.    Memory loss 2016/2017    Medication/also related to medical conditions    Migraines     Narcolepsy     OAB (overactive bladder)     Osteoarthritis     Osteoporosis     Other chronic pain     degenerative disc disease    PONV (postoperative nausea and vomiting)     migraines    Pyelonephritis 01/13/2021    SBO (small bowel obstruction) ([facility]) 09/25/2021    Sepsis ([facility]) 01/13/2021    Shingles Unknown    Had mild cases of shingles several times.    Smoking 08/06/2020    Tobacco abuse     Vision loss Unknown "    Occurs when headaches start behind my left eye.    Wears dentures     full upper    Wears glasses     Wears glasses        Past Surgical History:   Procedure Laterality Date    ABDOMINAL ADHESION SURGERY      ARM SKIN LESION BIOPSY / EXCISION      lymph node excision    BLADDER SURGERY      CHOLECYSTECTOMY      COLONOSCOPY  2020    FL RETROGRADE PYELOGRAM  2021    FLEXIBLE SIGMOIDOSCOPY      Last done at [facility] in 2018 after being admitted for GI bleed.    HERNIA REPAIR      HYSTERECTOMY      JOINT REPLACEMENT Right     knee    KNEE SURGERY Right     replacement    LAMINECTOMY      L5-6-7    LAPAROSCOPY      LYMPH NODE DISSECTION Left     removed -no cancer-  limb restriction    LYMPHADENECTOMY      UNDER LEFT ARM - DO NOT DRAW BLOOD/GIVE INJECTIONS IN LEFT ARM PER PATIENT     MOLE REMOVAL      OOPHORECTOMY Bilateral     OTHER SURGICAL HISTORY      enlarged lymph node removed left arm..no cancer    MD CYSTO BLADDER W/URETERAL CATHETERIZATION Right 2020    Procedure: CYSTOSCOPY AND INSERTION STENT URETERAL;  Surgeon: Rory Fall MD;  Location: [facility];  Service: Urology    MD CYSTO/URETERO W/LITHOTRIPSY &INDWELL STENT INSRT Right 2021    Procedure: CYSTO, LASER  URETEROSCOPY, RETRO PYELOGRAM, STENT REPLACMENT;  Surgeon: Adriel Subramanian MD;  Location: [facility];  Service: Urology    MD LITHOTRIPSY XTRCORP SHOCK WAVE Right 2021    Procedure: LITHOTRIPSY EXTRACORPORAL SHOCKWAVE (ESWL);  Surgeon: Darwin Montero MD;  Location:  MAIN OR;  Service: Urology    MD RPR 1ST INGUN HRNA AGE 5 YRS/> REDUCIBLE Right 2020    Procedure: REPAIR HERNIA INGUINAL  WITH MESH;  Surgeon: Mercedes Justin MD;  Location: [facility];  Service: General    SPINAL FUSION      UPPER GASTROINTESTINAL ENDOSCOPY  2020    WISDOM TOOTH EXTRACTION         Family History   Problem Relation Age of Onset    Hypertension Mother          from massive heart attack, never took any medication  for this condition    Heart attack Mother     Hyperlipidemia Mother         Blood tests done just prior to heart attack.  Results reported after her passing.    Migraines Mother          from massive cardiac arrest. Had abdominal aneurysm.    Hypertension Father         Had high blood pressure, unknown if he ever took medication    Cancer Father 72        Tumor in bowels, spread to liver, skin cancer    Colon polyps Father     Diabetes Sister         uses insulin    Arthritis Sister         Osteo arthritis    Hypertension Sister         Triple bypass    Diabetes Sister         pre-diabetic, oral medication    Arthritis Sister         Osteo arthritis    Hypertension Sister         Triple bypass    Irritable bowel syndrome Sister         Constipation    No Known Problems Daughter     No Known Problems Maternal Grandmother     No Known Problems Maternal Grandfather     No Known Problems Paternal Grandmother     No Known Problems Paternal Grandfather     Stroke Brother     Nephrolithiasis Brother     Cancer Brother 71        soft tissue cancer    Cancer Brother         Terminal soft tissue cancer     Hypertension Brother         Triple bypass    Migraines Brother         Severe. Standard meds do not help. [facility] to [facility] for I    Learning disabilities Brother         Mental retardation    Mental retardation Brother     No Known Problems Maternal Aunt     No Known Problems Maternal Aunt     No Known Problems Maternal Aunt     No Known Problems Paternal Aunt     No Known Problems Paternal Aunt     No Known Problems Paternal Aunt     Cancer Cousin         Pancreatic cancer    Cancer Cousin         Breast cancer, total mastectomy    Breast cancer Other 47    Migraines Maternal Aunt         Had 2.  from cerebral aneurysm    Migraines Maternal Aunt          from cerebral aneurysm    Migraines Maternal Aunt          from cerebral aneurysm    Migraines Maternal Uncle          from cerebral  aneurysm     I have reviewed and agree with the history as documented.    E-Cigarette/Vaping    E-Cigarette Use Never User      E-Cigarette/Vaping Substances    Nicotine No     THC No     CBD No     Flavoring No     Other No     Unknown No      Social History     Tobacco Use    Smoking status: Every Day     Current packs/day: 0.50     Average packs/day: 0.5 packs/day for 50.8 years (25.4 ttl pk-yrs)     Types: Cigarettes     Start date: 6/1/1973    Smokeless tobacco: Never    Tobacco comments:     Stopped smoking in 2014 because of cervical surgeries. Started smoking again as soon as my body was   Vaping Use    Vaping status: Never Used   Substance Use Topics    Alcohol use: Not Currently     Comment: May have an occasional drink when di    Drug use: Never     Types: Marijuana       Review of Systems   Constitutional: Negative.  Negative for chills, fatigue and fever.   HENT: Negative.  Negative for ear pain and sore throat.    Eyes:  Negative for pain and visual disturbance.   Respiratory: Negative.  Negative for cough and shortness of breath.    Cardiovascular: Negative.  Negative for chest pain and palpitations.   Gastrointestinal: Negative.  Negative for abdominal pain and vomiting.   Genitourinary: Negative.  Negative for dysuria and hematuria.   Musculoskeletal: Negative.  Negative for arthralgias, back pain, neck pain and stiffness.   Skin: Negative.  Negative for color change, itching and rash.   Neurological: Negative.  Negative for seizures and syncope.   Hematological: Negative.    Psychiatric/Behavioral: Negative.     All other systems reviewed and are negative.      Physical Exam  Physical Exam  Vitals and nursing note reviewed.   Constitutional:       General: She is not in acute distress.     Appearance: She is well-developed.   HENT:      Head: Normocephalic and atraumatic.      Nose: Nose normal.      Mouth/Throat:      Mouth: Mucous membranes are moist.   Eyes:      Extraocular Movements:  Extraocular movements intact.      Conjunctiva/sclera: Conjunctivae normal.      Pupils: Pupils are equal, round, and reactive to light.   Cardiovascular:      Rate and Rhythm: Normal rate and regular rhythm.      Heart sounds: No murmur heard.  Pulmonary:      Effort: Pulmonary effort is normal. No respiratory distress.      Breath sounds: Normal breath sounds.   Abdominal:      Palpations: Abdomen is soft.      Tenderness: There is no abdominal tenderness.   Musculoskeletal:         General: No swelling.      Cervical back: Neck supple.      Lumbar back: Normal.        Back:       Right hip: Normal.      Left hip: Normal.      Right upper leg: Normal.      Left upper leg: Normal.      Right knee: Normal.      Left knee: Swelling present. Decreased range of motion (Due to pain). No LCL laxity, MCL laxity, ACL laxity or PCL laxity.Normal patellar mobility. Normal pulse.      Right lower leg: Normal.      Left lower leg: Swelling present. 1+ Edema present.      Right ankle: Normal.      Left ankle: Normal.      Right foot: Normal.      Left foot: Normal.        Legs:    Skin:     General: Skin is warm and dry.      Capillary Refill: Capillary refill takes less than 2 seconds.   Neurological:      General: No focal deficit present.      Mental Status: She is alert and oriented to person, place, and time.      GCS: GCS eye subscore is 4. GCS verbal subscore is 5. GCS motor subscore is 6.      Cranial Nerves: Cranial nerves 2-12 are intact.      Sensory: Sensation is intact.      Motor: Motor function is intact.      Coordination: Coordination is intact.      Comments: No slurred speech.  No facial asymmetry.  No tongue deviation   Psychiatric:         Mood and Affect: Mood normal.         Behavior: Behavior normal.         Thought Content: Thought content normal.         Judgment: Judgment normal.         Vital Signs  ED Triage Vitals [03/21/24 1653]   Temperature Pulse Respirations Blood Pressure SpO2   (!) 97.4 °F  (36.3 °C) 85 18 137/83 97 %      Temp src Heart Rate Source Patient Position - Orthostatic VS BP Location FiO2 (%)   -- Monitor Sitting Right arm --      Pain Score       7           Vitals:    03/21/24 1653 03/21/24 1859 03/21/24 1900   BP: 137/83 114/61 114/61   Pulse: 85 74 74   Patient Position - Orthostatic VS: Sitting Sitting Sitting         Visual Acuity      ED Medications  Medications   acetaminophen (TYLENOL) tablet 650 mg (650 mg Oral Given 3/21/24 1719)   dexamethasone (PF) (DECADRON) injection 10 mg (10 mg Intravenous Given 3/21/24 1726)   apixaban (ELIQUIS) tablet 10 mg (10 mg Oral Given 3/21/24 1857)       Diagnostic Studies  Results Reviewed       Procedure Component Value Units Date/Time    B-Type Natriuretic Peptide(BNP) [[id number]]  (Normal) Collected: 03/21/24 1724    Lab Status: Final result Specimen: Blood from Arm, Right Updated: 03/21/24 1815     BNP 38 pg/mL     Comprehensive metabolic panel [[id number]]  (Abnormal) Collected: 03/21/24 1724    Lab Status: Final result Specimen: Blood from Arm, Right Updated: 03/21/24 1809     Sodium 139 mmol/L      Potassium 3.7 mmol/L      Chloride 106 mmol/L      CO2 23 mmol/L      ANION GAP 10 mmol/L      BUN 24 mg/dL      Creatinine 1.11 mg/dL      Glucose 152 mg/dL      Calcium 9.1 mg/dL      AST 17 U/L      ALT 17 U/L      Alkaline Phosphatase 75 U/L      Total Protein 6.3 g/dL      Albumin 3.9 g/dL      Total Bilirubin 0.33 mg/dL      eGFR 52 ml/min/1.73sq m     Narrative:      National Kidney Disease Foundation guidelines for Chronic Kidney Disease (CKD):     Stage 1 with normal or high GFR (GFR > 90 mL/min/1.73 square meters)    Stage 2 Mild CKD (GFR = 60-89 mL/min/1.73 square meters)    Stage 3A Moderate CKD (GFR = 45-59 mL/min/1.73 square meters)    Stage 3B Moderate CKD (GFR = 30-44 mL/min/1.73 square meters)    Stage 4 Severe CKD (GFR = 15-29 mL/min/1.73 square meters)    Stage 5 End Stage CKD (GFR <15 mL/min/1.73 square meters)  Note: GFR  calculation is accurate only with a steady state creatinine    Magnesium [[id number]]  (Normal) Collected: 03/21/24 1724    Lab Status: Final result Specimen: Blood from Arm, Right Updated: 03/21/24 1809     Magnesium 2.0 mg/dL     Urine Microscopic [[id number]]  (Abnormal) Collected: 03/21/24 1742    Lab Status: Final result Specimen: Urine, Clean Catch Updated: 03/21/24 1801     RBC, UA 4-10 /hpf      WBC, UA 10-20 /hpf      Epithelial Cells Occasional /hpf      Bacteria, UA Occasional /hpf      Hyaline Casts, UA 0-3 /lpf     Urine culture [[id number]] Collected: 03/21/24 1742    Lab Status: In process Specimen: Urine, Clean Catch Updated: 03/21/24 1801    CBC and differential [[id number]] Collected: 03/21/24 1724    Lab Status: Final result Specimen: Blood from Arm, Right Updated: 03/21/24 1753     WBC 8.09 Thousand/uL      RBC 4.04 Million/uL      Hemoglobin 13.0 g/dL      Hematocrit 37.8 %      MCV 94 fL      MCH 32.2 pg      MCHC 34.4 g/dL      RDW 13.6 %      MPV 10.2 fL      Platelets 256 Thousands/uL      nRBC 0 /100 WBCs      Neutrophils Relative 62 %      Immature Grans % 1 %      Lymphocytes Relative 27 %      Monocytes Relative 5 %      Eosinophils Relative 4 %      Basophils Relative 1 %      Neutrophils Absolute 5.10 Thousands/µL      Absolute Immature Grans 0.05 Thousand/uL      Absolute Lymphocytes 2.17 Thousands/µL      Absolute Monocytes 0.42 Thousand/µL      Eosinophils Absolute 0.28 Thousand/µL      Basophils Absolute 0.07 Thousands/µL     Urine Macroscopic, POC [[id number]]  (Abnormal) Collected: 03/21/24 1742    Lab Status: Final result Specimen: Urine Updated: 03/21/24 1743     Color, UA Yellow     Clarity, UA Clear     pH, UA 6.5     Leukocytes, UA Small     Nitrite, UA Negative     Protein, UA Negative mg/dl      Glucose, UA Negative mg/dl      Ketones, UA Negative mg/dl      Urobilinogen, UA 0.2 E.U./dl      Bilirubin, UA Negative     Occult Blood, UA Negative     Specific Gravity, UA 1.025     "Narrative:      CLINITEK RESULT                   XR knee 4+ vw left injury   ED Interpretation by Olivia Segura DO (03/21 1828)   Degenerative changes.  No acute fracture or dislocation      VAS lower limb venous duplex study, unilateral/limited    (Results Pending)              Procedures  Procedures         ED Course  ED Course as of 03/21/24 1954   Thu Mar 21, 2024   1721 Patient is a 63-year-old female coming in today with return of left knee and leg swelling.  On exam she does have mild to moderate swelling about the left knee down into the left leg.  There is no surrounding erythema or warmth.  Will check basic labs as well as proBNP and vascular study.  Will give acetaminophen and Decadron for symptomatic control.  Patient agreeable      Disclosure: Voice to text software was used in the preparation of this document and could have resulted in translational errors.      Occasional wrong word or \"sound a like\" substitutions may have occurred due to the inherent limitations of voice recognition software.  Read the chart carefully and recognize, using context, where substitutions have occurred.       I have independently reviewed external records are available to me to the level of detail possible within the time constraints of my patient care responsibilities in the ED.       1807 Per vascular tech, positive tibial DVT    1826 Labs reviewed and without actionable derangement     1858 Patient resting in bed.  Patient and family updated on labs as well as x-ray.  I do feel there is a component of swelling around the knee that can be arthritic changes however patient does have tibial DVT.    Discussed with patient to be cautious about taking her aspirin as well as Eliquis.  Will give first dose here.  Will refer back to her PCP for further dosing.  Answered questions at bedside.      Counseling: I had a detailed discussion with the patient and/or guardian regarding: the historical points, exam findings, and " any diagnostic results supporting the discharge diagnosis, lab results, radiology results, discharge instructions reviewed with patient and/or family/caregiver and understanding was verbalized. Instructions given to return to the emergency department if symptoms worsen or persist, or if there are any questions or concerns that arise at home.     All imaging and/or lab testing discussed with patient, strict return to ED precautions discussed. Patient recommended to follow up promptly with appropriate outpatient provider. Patient and/or family members verbalizes understanding and agrees with plan. Patient and/or family members were given opportunity to ask questions, all questions were answered at this time. Patient is stable for discharge                                   SBIRT 22yo+      Flowsheet Row Most Recent Value   Initial Alcohol Screen: US AUDIT-C     1. How often do you have a drink containing alcohol? 1 Filed at: 03/21/2024 1724   2. How many drinks containing alcohol do you have on a typical day you are drinking?  1 Filed at: 03/21/2024 1724   3a. Male UNDER 65: How often do you have five or more drinks on one occasion? 0 Filed at: 03/21/2024 1724   3b. FEMALE Any Age, or MALE 65+: How often do you have 4 or more drinks on one occassion? 1 Filed at: 03/21/2024 1724   Audit-C Score 3 Filed at: 03/21/2024 1724   JOSEPH: How many times in the past year have you...    Used an illegal drug or used a prescription medication for non-medical reasons? Never Filed at: 03/21/2024 1724                      Medical Decision Making      DDX including but not limited to: DVT, Baker's cyst, cellulitis, strain, rhabdomyolysis, metabolic abnormality, fracture; doubt arterial occlusion.   stress fracture, superficial thrombophlebitis, cellulitis, renal failure, ruptured Baker cyst, venous insufficiency, lymphadenitis, lymphedema, trauma        Amount and/or Complexity of Data Reviewed  Independent Historian: caregiver      Details: Daughter at bedside    External Data Reviewed: notes.  Labs: ordered. Decision-making details documented in ED Course.     Details: No anemia, thrombocytopenia or leukocytosis  No acute kidney injury or electrolyte dysfunction  proBNP within normal limits  Radiology: ordered and independent interpretation performed. Decision-making details documented in ED Course.     Details: Left knee x-ray interpreted by myself at degenerative changes without any fracture or dislocation    Vascular study interpreted by vascular tech at positive tibial DVT    Risk  OTC drugs.  Prescription drug management.             Disposition  Final diagnoses:   DVT of leg (deep venous thrombosis) (HCC)   Lower extremity edema     Time reflects when diagnosis was documented in both MDM as applicable and the Disposition within this note       Time User Action Codes Description Comment    3/21/2024  6:53 PM Olivia Segura Add [I82.409] DVT of leg (deep venous thrombosis) (HCC)     3/21/2024  7:06 PM Olivia Segura Add [R60.0] Lower extremity edema           ED Disposition       ED Disposition   Discharge    Condition   Stable    Date/Time   Thu Mar 21, 2024 1859    Comment   Lori Damon discharge to home/self care.                   Follow-up Information       Follow up With Specialties Details Why Contact Info    Naty Kenney MD Family Medicine Schedule an appointment as soon as possible for a visit in 1 week  [address] [facility]  [address] [place] [address]  [phone]              Discharge Medication List as of 3/21/2024  7:07 PM        START taking these medications    Details   apixaban (ELIQUIS) 5 mg Take 2 tablets twice a day for a week, then once tablet twice a day., Print      famotidine (PEPCID) 20 mg tablet Take 1 tablet (20 mg total) by mouth daily for 14 days, Starting Thu 3/21/2024, Until Thu 4/4/2024, Normal           CONTINUE these medications which have NOT CHANGED    Details   amantadine (SYMMETREL) 100  mg capsule Take 1 capsule (100 mg total) by mouth in the morning, Starting Wed 2/14/2024, Normal      aspirin 500 MG buffered tablet Take 1,000 mg by mouth every 6 (six) hours as needed for mild pain 4,000 mg daily for Pain, Historical Med      buPROPion (WELLBUTRIN XL) 150 mg 24 hr tablet Take 1 tablet (150 mg total) by mouth every morning, Starting Tue 1/9/2024, Normal      calcium carbonate 1250 MG capsule Take 600 mg by mouth daily , Historical Med      Cholecalciferol 1000 units tablet Take 1,000 Units by mouth daily Takes in the afternoon, Starting Fri 12/9/2016, Historical Med      gabapentin (NEURONTIN) 300 mg capsule Take 1 capsule (300 mg total) by mouth 3 (three) times a day, Starting Tue 1/9/2024, Until Mon 4/8/2024, Normal      Ginger, Zingiber officinalis, (GINGER ROOT PO) Take 700 mg by mouth 2 (two) times a day , Historical Med      metaxalone (SKELAXIN) 800 mg tablet Take 0.5 tablets (400 mg total) by mouth 3 (three) times a day, Starting Fri 3/8/2024, Normal      Multiple Vitamins-Minerals (CENTRUM SILVER PO) Take 1 tablet by mouth daily Takes in the am, Historical Med      ondansetron (ZOFRAN) 4 mg tablet Take 1 tablet (4 mg total) by mouth every 8 (eight) hours as needed for nausea or vomiting, Starting Wed 10/4/2023, Normal      !! predniSONE 10 mg tablet 6-5-4-3-2-1 taper with food., Normal      !! predniSONE 5 mg tablet 1 tab in the AM prn migraine (if zomig fails), Normal      propranolol (INDERAL LA) 60 mg 24 hr capsule Take 1 capsule (60 mg total) by mouth daily, Starting Thu 3/7/2024, Normal      trospium chloride (SANCTURA) 20 mg tablet Take 1 tablet (20 mg total) by mouth 2 (two) times a day, Starting Tue 2/6/2024, Normal      Turmeric 500 MG CAPS Take 700 mg by mouth 2 (two) times a day, Historical Med      ZOLMitriptan (ZOMIG) 2.5 MG tablet TAKE 1 TABLET BY MOUTH AT ONSET OF MIGRAINE, MAY REPEAT IN 2 HOURS IF NEEDED. LIMIT 2/24 HOURS, 4/WEEK, 8/MONTH, Normal       !! - Potential  duplicate medications found. Please discuss with provider.          No discharge procedures on file.    PDMP Review         Value Time User    PDMP Reviewed  Yes 11/9/2022  2:46 PM RADHA Reynoso            ED Provider  Electronically Signed by             Olivia Segura DO  03/21/24 1954

## 2024-03-21 NOTE — Clinical Note
Lori Damon was seen and treated in our emergency department on 3/21/2024.                Diagnosis:     Lori  may return to work on return date.    She may return on this date: 03/25/2024         If you have any questions or concerns, please don't hesitate to call.      Olivia Segura, DO    ______________________________           _______________          _______________  Hospital Representative                              Date                                Time

## 2024-03-23 LAB
BACTERIA UR CULT: ABNORMAL
BACTERIA UR CULT: ABNORMAL

## 2024-03-25 ENCOUNTER — TELEPHONE (OUTPATIENT)
Age: 64
End: 2024-03-25

## 2024-03-25 RX ORDER — CEPHALEXIN 500 MG/1
500 CAPSULE ORAL EVERY 12 HOURS SCHEDULED
Qty: 14 CAPSULE | Refills: 0 | Status: SHIPPED | OUTPATIENT
Start: 2024-03-25 | End: 2024-04-01

## 2024-03-25 NOTE — TELEPHONE ENCOUNTER
Appointment scheduled with provider.    Reason: Office Visit    Symptoms: Sharp jabbing pain in left knee      Provider: Dr. Alfonso Allotejanis    Date/Time: Tuesday 3/26/2024 at 10:20 am

## 2024-03-26 ENCOUNTER — OFFICE VISIT (OUTPATIENT)
Dept: FAMILY MEDICINE CLINIC | Facility: CLINIC | Age: 64
End: 2024-03-26
Payer: COMMERCIAL

## 2024-03-26 VITALS
HEART RATE: 78 BPM | OXYGEN SATURATION: 99 % | SYSTOLIC BLOOD PRESSURE: 126 MMHG | HEIGHT: 64 IN | BODY MASS INDEX: 22.61 KG/M2 | TEMPERATURE: 98.1 F | DIASTOLIC BLOOD PRESSURE: 82 MMHG | WEIGHT: 132.4 LBS

## 2024-03-26 DIAGNOSIS — N18.31 STAGE 3A CHRONIC KIDNEY DISEASE (HCC): ICD-10-CM

## 2024-03-26 DIAGNOSIS — I82.442 ACUTE DEEP VEIN THROMBOSIS (DVT) OF TIBIAL VEIN OF LEFT LOWER EXTREMITY (HCC): Primary | ICD-10-CM

## 2024-03-26 DIAGNOSIS — M25.562 CHRONIC PAIN OF LEFT KNEE: ICD-10-CM

## 2024-03-26 DIAGNOSIS — G89.29 CHRONIC PAIN OF LEFT KNEE: ICD-10-CM

## 2024-03-26 PROCEDURE — G2211 COMPLEX E/M VISIT ADD ON: HCPCS | Performed by: FAMILY MEDICINE

## 2024-03-26 PROCEDURE — 99214 OFFICE O/P EST MOD 30 MIN: CPT | Performed by: FAMILY MEDICINE

## 2024-03-26 NOTE — PROGRESS NOTES
Assessment/Plan:    No problem-specific Assessment & Plan notes found for this encounter.       Diagnoses and all orders for this visit:    Acute deep vein thrombosis (DVT) of tibial vein of left lower extremity (HCC)  -     apixaban (ELIQUIS) 5 mg; Take 1 tablet (5 mg total) by mouth 2 (two) times a day    Chronic pain of left knee  -     Ambulatory Referral to Orthopedic Surgery; Future    Stage 3a chronic kidney disease (HCC)        - Patient advised to take Eliquis 10mg twice a day for 7 days followed by 5mg twice a day thereafter (Kidney function reviewed and is stable) We discussed indefinite anticoagulation given that this is patient's 3rd episode of a DVT however she is hesitant at this time. Will continue Eliquis for 3 months and then follow up again at that time.   - Referral to Orthopedic surgery placed due to chronic knee pain    Subjective:      Patient ID: Lori Damon is a 63 y.o. female.    HPI  Lori Damon is a very pleasant 63 year old female who presents today for a follow up after being seen in the Emergency department 3/21 for a DVT. Patient has been having knee pain for a few months but went to urgent care 3/13 after developing increasing pain and swelling. At that time she was placed on a course of prednisone which helped with the swelling however once she completed the prednisone the swelling returned. She then went to the ED where an ultrasound was performed which showed acute occlusive deep vein thrombosis noted in 1 of 2 posterior tibial veins. Patient was subsequently started on Eliquis. There was some confusion as to the dosage of Eliquis and patient is taking 10mg daily instead of twice a day. Patient denies any injury or trauma as well as any long distance travel or immobility. Of note patient has a history of two provoked DVTs, one following a surgery and another following a hospitalization for a bowel obstruction where patient was immobile.     The following portions of the  "patient's history were reviewed and updated as appropriate: allergies, current medications, past family history, past medical history, past social history, past surgical history, and problem list.    Review of Systems   Constitutional: Negative.    HENT: Negative.     Eyes: Negative.    Respiratory: Negative.     Cardiovascular:  Positive for leg swelling.   Gastrointestinal: Negative.    Genitourinary: Negative.    Musculoskeletal:  Positive for arthralgias.   Skin: Negative.    Neurological: Negative.    Psychiatric/Behavioral: Negative.           Objective:      /82 (BP Location: Right arm, Patient Position: Sitting, Cuff Size: Standard)   Pulse 78   Temp 98.1 °F (36.7 °C) (Temporal)   Ht 5' 3.5\" (1.613 m)   Wt 60.1 kg (132 lb 6.4 oz)   SpO2 99%   BMI 23.09 kg/m²          Physical Exam  Constitutional:       General: She is not in acute distress.     Appearance: She is not ill-appearing.   HENT:      Head: Normocephalic and atraumatic.   Eyes:      General:         Right eye: No discharge.         Left eye: No discharge.      Extraocular Movements: Extraocular movements intact.   Cardiovascular:      Rate and Rhythm: Normal rate.   Pulmonary:      Effort: Pulmonary effort is normal. No respiratory distress.      Breath sounds: No wheezing.   Musculoskeletal:      Left knee: Swelling present. Decreased range of motion. Tenderness present over the medial joint line.   Neurological:      Mental Status: She is alert.         "

## 2024-03-26 NOTE — LETTER
March 26, 2024     Patient: Lori Damon  YOB: 1960  Date of Visit: 3/26/2024      To Whom it May Concern:    Lori Damon is under my professional care. Lori was seen in my office on 3/26/2024. Lori may return to work on 03/31/2026 . Please excuse for 03/25/2024, 03/26/2024, 03/27/2024, 03/28/2024, 03/29/2024, 03/30/2024, 03/31/2024.    If you have any questions or concerns, please don't hesitate to call.         Sincerely,          Naty Kenney MD

## 2024-03-27 ENCOUNTER — TELEPHONE (OUTPATIENT)
Dept: FAMILY MEDICINE CLINIC | Facility: CLINIC | Age: 64
End: 2024-03-27

## 2024-03-27 NOTE — TELEPHONE ENCOUNTER
I called radiology for the venus duplex that the patient had done on 03/21, Dr Kenney needs clarification to confirm that the dvt was on the right or left. Because the swelling is on the left leg.

## 2024-04-01 ENCOUNTER — OFFICE VISIT (OUTPATIENT)
Age: 64
End: 2024-04-01
Payer: COMMERCIAL

## 2024-04-01 VITALS
WEIGHT: 131 LBS | BODY MASS INDEX: 22.36 KG/M2 | SYSTOLIC BLOOD PRESSURE: 143 MMHG | DIASTOLIC BLOOD PRESSURE: 79 MMHG | HEART RATE: 71 BPM | HEIGHT: 64 IN

## 2024-04-01 DIAGNOSIS — G89.29 CHRONIC PAIN OF LEFT KNEE: Primary | ICD-10-CM

## 2024-04-01 DIAGNOSIS — M17.12 PRIMARY OSTEOARTHRITIS OF LEFT KNEE: ICD-10-CM

## 2024-04-01 DIAGNOSIS — M54.10 RADICULAR PAIN OF LEFT LOWER EXTREMITY: ICD-10-CM

## 2024-04-01 DIAGNOSIS — M25.562 CHRONIC PAIN OF LEFT KNEE: Primary | ICD-10-CM

## 2024-04-01 PROCEDURE — 20610 DRAIN/INJ JOINT/BURSA W/O US: CPT | Performed by: STUDENT IN AN ORGANIZED HEALTH CARE EDUCATION/TRAINING PROGRAM

## 2024-04-01 PROCEDURE — 99203 OFFICE O/P NEW LOW 30 MIN: CPT | Performed by: STUDENT IN AN ORGANIZED HEALTH CARE EDUCATION/TRAINING PROGRAM

## 2024-04-01 RX ORDER — TRIAMCINOLONE ACETONIDE 40 MG/ML
40 INJECTION, SUSPENSION INTRA-ARTICULAR; INTRAMUSCULAR
Status: COMPLETED | OUTPATIENT
Start: 2024-04-01 | End: 2024-04-01

## 2024-04-01 RX ORDER — BUPIVACAINE HYDROCHLORIDE 2.5 MG/ML
2 INJECTION, SOLUTION INFILTRATION; PERINEURAL
Status: COMPLETED | OUTPATIENT
Start: 2024-04-01 | End: 2024-04-01

## 2024-04-01 RX ADMIN — BUPIVACAINE HYDROCHLORIDE 2 ML: 2.5 INJECTION, SOLUTION INFILTRATION; PERINEURAL at 10:00

## 2024-04-01 RX ADMIN — TRIAMCINOLONE ACETONIDE 40 MG: 40 INJECTION, SUSPENSION INTRA-ARTICULAR; INTRAMUSCULAR at 10:00

## 2024-04-01 NOTE — PROGRESS NOTES
1. Chronic pain of left knee  Ambulatory Referral to Orthopedic Surgery    Large joint arthrocentesis: L knee      2. Primary osteoarthritis of left knee  Large joint arthrocentesis: L knee      3. Radicular pain of left lower extremity          Orders Placed This Encounter   Procedures    Large joint arthrocentesis: L knee        Imaging Studies (I personally reviewed images in PACS and report):    X-ray left knee 3/21/2024  .  Mild tricompartmental osteoarthritic changes.  Meniscal chondrocalcinosis.  Atherosclerotic disease.  Duplex venous ultrasound lower extremity 3/21/2024: Evidence suggesting LEFT leg DVT as there was an error in the report previously.    IMPRESSION:  Chronic atraumatic left knee pain/left leg pain  Mild osteoarthritic changes, meniscal chondrocalcinosis of the left knee; lumbar radicular pain (as patient has previously seen pain management/neurosurgery for chronic LBP with LLE radicular pain in the past)  Briefly improved with oral steroids before pain recurred again  Incidentally found DVT left lower extremity    Other factors:  CKD3  Fibromylagia    PLAN:    Clinical exam and radiographic imaging reviewed with patient today, with impression as per above. I have discussed with the patient the pathophysiology of this diagnosis and reviewed how the examination correlates with this diagnosis.    Imaging obtained/reviewed as per above. I will follow up official radiology interpretation.  Treatment options were discussed at length, including risks and benefits; after discussing these treatment options, the patient elected for palpation guided left knee intra-articular cortisone injection as per procedure below.  Counseled that the injection may provide some degree of relief, I did  that concurrently she may have pain secondary to radicular pain from her lumbar spine she was previously considered for surgical intervention but this had to be deferred due to her smoking status.  Counseled we  "can repeat the injection every 3 to 4 months as needed regards to pain control and alternatively try viscosupplementation injection does not provide at least 3 months of relief.  Most other conservative treatment at this patient is only allowed to take acetaminophen and apply ice/heat therapy 20 minutes on/off.  Was consideration for compression knee sleeve for her to purchase OTC but patient states she cannot tolerate the compression sensation of her knee due to her fibromyalgia.  Advised against oral NSAIDs given her history of CKD 3 as well as Eliquis for DVT left lower extremity.    Return in about 2 weeks (around 4/15/2024), or if symptoms worsen or fail to improve.    Portions of the record may have been created with voice recognition software. Occasional wrong word or \"sound a like\" substitutions may have occurred due to the inherent limitations of voice recognition software. Read the chart carefully and recognize, using context, where substitutions have occurred.     CHIEF COMPLAINT:  Chief Complaint   Patient presents with    Left Knee - Pain, Swelling     Prior to the selling the left knee did click. Does no stairs she kust can't she tries to over use the rt knee for balance. She puts ice, heat biofreeze 9on it. Not doing any PT at all She has blood clots. It's been 10 days to the ED. Before that the pcp thought it was selling because of getting off Predisone pills. She has not worked in two wks. She can't stand on it. The left knee gets very sore. She elevates it when she can. The pain has not been addressed She has XRAYS a week and a half ago          HPI:  Lori Damon is a 63 y.o. female  who presents in regards to a referral from Dr. Kenney for       Visit 4/1/2024 :  Initial evaluation of left knee pain:  Of note, patient has a history of chronic low back pain/lumbar spondylosis with lumbar radiculopathy left worse than right, fibromyalgia, history of cervical spine fusion.  She has a history of DVT " "of her left lower extremity recently and is currently on Eliquis.  She also has a history of CKD 3.  Ongoing issue for the past year but worsening over the past few months.  Denies precipitating injury of left knee pain. She states originally pain was of her thigh going down her leg into her foot.  She had seen urgent care recently and had imaging done as noted above.  She was placed on a steroid pack which did alleviate the pain while on the steroid pack but her pain slowly recurred again after completion.  She then saw the ER and had imaging of her lower extremities revealing a DVT of the left lower extremity in which she is currently on Eliquis.  However she is still frustrated due to the continuation of pain of her left knee, mainly over the medial aspect that she describes as a sharp pain that is constantly aggravated with any range of motion movements or activities even while resting. Reports swelling/stiffness of her knee. Denies discoloration of left knee. Reports +crepitus of her knee with ROM  She states she still does have pain from her low back radiating down her leg but feels her left medial knee pain is different.  She reports no relief with acetaminophen, icing/heat.  She states she is unable to tolerate wearing a compression sleeve for her knee due to her fibromyalgia feels that the tighter she applies the brace works with pain gets.  She denies prior surgeries of her left knee.  She states she does have history of a right knee replacement due to a \"birth deformity\".    In regards to her left knee, she has not received an injection of her knee cortisone previously.              Medical, Surgical, Family, and Social History    Past Medical History:   Diagnosis Date    Allergy to dust     Anemia     Calculus of ureter     Chronic kidney disease 11/2018    Hydrophenesis    Chronic pain disorder     neck and back- sees pain management    Cluster headache Teenager    May occur 2-3 times per year. Lasts " "several days    Colitis     Colon polyp     Cracked skin     on fingers    CTS (carpal tunnel syndrome) 01/01/2014    Diverticulitis of colon 2012    Seen by Dr. Wilburn at [facility]. Inpatient at    Diverticulosis     Dysphagia     Fibromyalgia, primary 04/16/1996    GERD (gastroesophageal reflux disease)     GI (gastrointestinal bleed)     As a result of diverticulitis and microscopic colitis.    H/O: GI bleed     Head injury     Had numerious concussions    Headache, tension-type     Hiatal hernia     History of DVT in adulthood 2019 2019    HL (hearing loss) unknown    Hydronephrosis     Hyperlipidemia     Inguinal hernia     right side    Irregular heart beat     Pt states with prolonged QT interval - was seen by cardiologist( [facility]) pt told \"to not worry about it\"    Irritable bowel     Kidney stone     Lactose intolerance     Did not tolerate milk after being weaned from breast milk.    Memory loss 2016/2017    Medication/also related to medical conditions    Migraines     Narcolepsy     OAB (overactive bladder)     Osteoarthritis     Osteoporosis     Other chronic pain     degenerative disc disease    PONV (postoperative nausea and vomiting)     migraines    Pyelonephritis 01/13/2021    SBO (small bowel obstruction) ([facility]) 09/25/2021    Sepsis ([facility]) 01/13/2021    Shingles Unknown    Had mild cases of shingles several times.    Smoking 08/06/2020    Tobacco abuse     Vision loss Unknown    Occurs when headaches start behind my left eye.    Wears dentures     full upper    Wears glasses     Wears glasses      Past Surgical History:   Procedure Laterality Date    ABDOMINAL ADHESION SURGERY      ARM SKIN LESION BIOPSY / EXCISION      lymph node excision    BLADDER SURGERY      CHOLECYSTECTOMY      COLONOSCOPY  08/06/2020    FL RETROGRADE PYELOGRAM  01/05/2021    FLEXIBLE SIGMOIDOSCOPY      Last done at [facility] in 2018 after being admitted for GI bleed.    HERNIA REPAIR      HYSTERECTOMY  1977    JOINT REPLACEMENT Right     " knee    KNEE SURGERY Right     replacement    LAMINECTOMY      L5-6-7    LAPAROSCOPY      LYMPH NODE DISSECTION Left     removed -no cancer-  limb restriction    LYMPHADENECTOMY      UNDER LEFT ARM - DO NOT DRAW BLOOD/GIVE INJECTIONS IN LEFT ARM PER PATIENT     MOLE REMOVAL      OOPHORECTOMY Bilateral     OTHER SURGICAL HISTORY      enlarged lymph node removed left arm..no cancer    NY CYSTO BLADDER W/URETERAL CATHETERIZATION Right 2020    Procedure: CYSTOSCOPY AND INSERTION STENT URETERAL;  Surgeon: Rory Fall MD;  Location: [facility];  Service: Urology    NY CYSTO/URETERO W/LITHOTRIPSY &INDWELL STENT INSRT Right 2021    Procedure: CYSTO, LASER  URETEROSCOPY, RETRO PYELOGRAM, STENT REPLACMENT;  Surgeon: Adriel Subramanian MD;  Location: [facility];  Service: Urology    NY LITHOTRIPSY XTRCORP SHOCK WAVE Right 2021    Procedure: LITHOTRIPSY EXTRACORPORAL SHOCKWAVE (ESWL);  Surgeon: Darwin Montero MD;  Location:  MAIN OR;  Service: Urology    NY RPR 1ST INGUN HRNA AGE 5 YRS/> REDUCIBLE Right 2020    Procedure: REPAIR HERNIA INGUINAL  WITH MESH;  Surgeon: Mercedes Justin MD;  Location: [facility];  Service: General    SPINAL FUSION      UPPER GASTROINTESTINAL ENDOSCOPY  2020    WISDOM TOOTH EXTRACTION       Social History   Social History     Substance and Sexual Activity   Alcohol Use Not Currently    Comment: May have an occasional drink when di     Social History     Substance and Sexual Activity   Drug Use Never    Types: Marijuana     Social History     Tobacco Use   Smoking Status Every Day    Current packs/day: 0.50    Average packs/day: 0.5 packs/day for 50.8 years (25.4 ttl pk-yrs)    Types: Cigarettes    Start date: 1973   Smokeless Tobacco Never   Tobacco Comments    Stopped smoking in  because of cervical surgeries. Started smoking again as soon as my body was     Family History   Problem Relation Age of Onset    Hypertension Mother          from  massive heart attack, never took any medication for this condition    Heart attack Mother     Hyperlipidemia Mother         Blood tests done just prior to heart attack.  Results reported after her passing.    Migraines Mother          from massive cardiac arrest. Had abdominal aneurysm.    Hypertension Father         Had high blood pressure, unknown if he ever took medication    Cancer Father 72        Tumor in bowels, spread to liver, skin cancer    Colon polyps Father     Diabetes Sister         uses insulin    Arthritis Sister         Osteo arthritis    Hypertension Sister         Triple bypass    Diabetes Sister         pre-diabetic, oral medication    Arthritis Sister         Osteo arthritis    Hypertension Sister         Triple bypass    Irritable bowel syndrome Sister         Constipation    No Known Problems Daughter     No Known Problems Maternal Grandmother     No Known Problems Maternal Grandfather     No Known Problems Paternal Grandmother     No Known Problems Paternal Grandfather     Stroke Brother     Nephrolithiasis Brother     Cancer Brother 71        soft tissue cancer    Cancer Brother         Terminal soft tissue cancer     Hypertension Brother         Triple bypass    Migraines Brother         Severe. Standard meds do not help. Goes to [facility] for I    Learning disabilities Brother         Mental retardation    Mental retardation Brother     No Known Problems Maternal Aunt     No Known Problems Maternal Aunt     No Known Problems Maternal Aunt     No Known Problems Paternal Aunt     No Known Problems Paternal Aunt     No Known Problems Paternal Aunt     Cancer Cousin         Pancreatic cancer    Cancer Cousin         Breast cancer, total mastectomy    Breast cancer Other 47    Migraines Maternal Aunt         Had 2.  from cerebral aneurysm    Migraines Maternal Aunt          from cerebral aneurysm    Migraines Maternal Aunt          from cerebral aneurysm    Migraines  "Maternal Uncle          from cerebral aneurysm     Allergies   Allergen Reactions    Armodafinil Other (See Comments)     Tardive dyskinesia - mouth movements    Celecoxib Other (See Comments)     Increased Heart Rate, Palpitations    Sumatriptan Anaphylaxis     Other reaction(s): SUMATRIPTAN (IMITREX) (Throat closure). Pt analilia zolmitriptan.    Tramadol Other (See Comments)     GI Upset- severe vomiting and systemic body aches    Medical Tape Dermatitis, Rash and Blisters     Adhesive from medication patches and bandaides- ok with paper tape          Physical Exam  /79   Pulse 71   Ht 5' 3.5\" (1.613 m)   Wt 59.4 kg (131 lb)   BMI 22.84 kg/m²     Constitutional:  see vital signs  Gen: well-developed, normocephalic/atraumatic, well-groomed  Pulmonary/Chest: Effort normal. No respiratory distress.     Ortho Exam  Left Knee Exam:  Erythema: no  Swelling: no  Increased Warmth: no  Tenderness: +MJL  ROM: 0-130  Knee flexion strength: 5/5  Knee extension strength: 5/5  Patellar Grind: +  Lachman's: negative  Varus laxity: negative  Valgus laxity: negative  Hank: + pain w/o clicking of knee          Large joint arthrocentesis: L knee  Universal Protocol:  Consent: Verbal consent obtained.  Risks and benefits: risks, benefits and alternatives were discussed  Consent given by: patient  Patient understanding: patient states understanding of the procedure being performed  Site marked: the operative site was marked  Radiology Images displayed and confirmed. If images not available, report reviewed: imaging studies available  Patient identity confirmed: verbally with patient  Supporting Documentation  Indications: pain and diagnostic evaluation   Procedure Details  Location: knee - L knee  Preparation: Patient was prepped and draped in the usual sterile fashion  Needle size: 22 G  Ultrasound guidance: no  Approach: anterolateral  Medications administered: 40 mg triamcinolone acetonide 40 mg/mL; 2 mL bupivacaine " 0.25 %    Patient tolerance: patient tolerated the procedure well with no immediate complications  Dressing:  Sterile dressing applied

## 2024-04-01 NOTE — LETTER
April 1, 2024     Patient: Lori Damon  YOB: 1960  Date of Visit: 4/1/2024      To Whom it May Concern:    Lori Damon is under my professional care. Lori was seen in my office on 4/1/2024. Restricted to sedentary duties for the week. Allowed to return to work without restrictions on 4/8/2024.     If you have any questions or concerns, please don't hesitate to call.         Sincerely,          Dhaval Huston MD        CC: No Recipients

## 2024-04-04 DIAGNOSIS — M79.2 NEUROPATHIC PAIN: ICD-10-CM

## 2024-04-04 RX ORDER — GABAPENTIN 300 MG/1
300 CAPSULE ORAL 3 TIMES DAILY
Qty: 270 CAPSULE | Refills: 0 | Status: SHIPPED | OUTPATIENT
Start: 2024-04-04 | End: 2024-07-03

## 2024-04-18 ENCOUNTER — OFFICE VISIT (OUTPATIENT)
Age: 64
End: 2024-04-18
Payer: COMMERCIAL

## 2024-04-18 VITALS
WEIGHT: 131.4 LBS | BODY MASS INDEX: 22.43 KG/M2 | SYSTOLIC BLOOD PRESSURE: 110 MMHG | DIASTOLIC BLOOD PRESSURE: 70 MMHG | HEART RATE: 60 BPM | HEIGHT: 64 IN

## 2024-04-18 DIAGNOSIS — G89.29 CHRONIC PAIN OF LEFT KNEE: ICD-10-CM

## 2024-04-18 DIAGNOSIS — M25.562 CHRONIC PAIN OF LEFT KNEE: ICD-10-CM

## 2024-04-18 DIAGNOSIS — M17.12 PRIMARY OSTEOARTHRITIS OF LEFT KNEE: ICD-10-CM

## 2024-04-18 DIAGNOSIS — M70.50 PES ANSERINE BURSITIS: Primary | ICD-10-CM

## 2024-04-18 PROCEDURE — 99213 OFFICE O/P EST LOW 20 MIN: CPT | Performed by: STUDENT IN AN ORGANIZED HEALTH CARE EDUCATION/TRAINING PROGRAM

## 2024-04-18 PROCEDURE — 20610 DRAIN/INJ JOINT/BURSA W/O US: CPT | Performed by: STUDENT IN AN ORGANIZED HEALTH CARE EDUCATION/TRAINING PROGRAM

## 2024-04-18 RX ORDER — TRIAMCINOLONE ACETONIDE 40 MG/ML
20 INJECTION, SUSPENSION INTRA-ARTICULAR; INTRAMUSCULAR
Status: COMPLETED | OUTPATIENT
Start: 2024-04-18 | End: 2024-04-18

## 2024-04-18 RX ORDER — LIDOCAINE HYDROCHLORIDE 10 MG/ML
0.5 INJECTION, SOLUTION INFILTRATION; PERINEURAL
Status: COMPLETED | OUTPATIENT
Start: 2024-04-18 | End: 2024-04-18

## 2024-04-18 RX ADMIN — TRIAMCINOLONE ACETONIDE 20 MG: 40 INJECTION, SUSPENSION INTRA-ARTICULAR; INTRAMUSCULAR at 09:15

## 2024-04-18 RX ADMIN — LIDOCAINE HYDROCHLORIDE 0.5 ML: 10 INJECTION, SOLUTION INFILTRATION; PERINEURAL at 09:15

## 2024-04-18 NOTE — PROGRESS NOTES
1. Pes anserine bursitis  Large joint arthrocentesis: L pes anserine bursa      2. Chronic pain of left knee  Large joint arthrocentesis: L pes anserine bursa      3. Primary osteoarthritis of left knee            Orders Placed This Encounter   Procedures    Large joint arthrocentesis: L pes anserine bursa        Imaging Studies (I personally reviewed images in PACS and report):    X-ray left knee 3/21/2024  .  Mild tricompartmental osteoarthritic changes.  Meniscal chondrocalcinosis.  Atherosclerotic disease.  Duplex venous ultrasound lower extremity 3/21/2024: Evidence suggesting LEFT leg DVT as there was an error in the report previously.    IMPRESSION:  Chronic atraumatic left knee pain/left leg pain  Radiographs note mild degenerative changes of her knee as well as meniscal chondrocalcinosis and atherosclerotic disease  Differential includes pes anserine bursitis/syndrome/S2 30 pain, radicular pain from her lumbar spine (as patient has previously seen pain management/neurosurgery for chronic LBP with LLE radicular pain in the past)  Briefly improved with oral steroids before pain recurred again; no relief from intra-articular CSI from last visit    Other factors:  CKD3  Currently on Eliquis due to incidental DVT found of her left lower extremity  Fibromylagia  Patient notes today that for her lumbar spine/severe spondylosis, she would be unable to pursue a surgical intervention as she has not undergone formal physical therapy for this issue nor has she been able to quit smoking/tobacco use.    PLAN:    Clinical exam and radiographic imaging reviewed with patient today, with impression as per above. I have discussed with the patient the pathophysiology of this diagnosis and reviewed how the examination correlates with this diagnosis.    Counseled patient at this point the only alternative injection I could potentially provide today is a pes anserine bursa cortisone injection as she does have pinpoint aggravated  "pain over this aspect of her knee as well.  Wrist/benefits were discussed and patient was agreeable to this procedure as per below.  Counseled we can repeat the injection every 3 to 4 months as needed.  Furthermore, I encouraged her to start formal physical therapy as some degree of her pain may not be solely from her knee as she does have a history of lumbar radicular pain down her left lower extremity.  Patient prefers to defer this option for now and see how the injection does today before considering starting formal PT again.  Most other conservative treatment at this patient is only allowed to take acetaminophen and apply ice/heat therapy 20 minutes on/off.  Was consideration for compression knee sleeve for her to purchase OTC but patient states she cannot tolerate the compression sensation of her knee due to her fibromyalgia.  Advised against oral NSAIDs given her history of CKD 3 as well as Eliquis for DVT left lower extremity.    Return if symptoms worsen or fail to improve. Could consider MRI of left knee if no improvement but I would recommend she undergo formal PT initially    Portions of the record may have been created with voice recognition software. Occasional wrong word or \"sound a like\" substitutions may have occurred due to the inherent limitations of voice recognition software. Read the chart carefully and recognize, using context, where substitutions have occurred.     CHIEF COMPLAINT:  Chief Complaint   Patient presents with    Left Knee - Follow-up     Pt states that she had cortisone injections in knee, provided no relief and is actually worse than before the injection. Pt states that the pain is affecting her ability to work.          HPI:  Lori Damon is a 63 y.o. female  who presents in regards to     Visit 4/18/2024:  Follow up left knee/leg pain  Of note, patient has a history of chronic low back pain/lumbar spondylosis with lumbar radiculopathy left worse than right, fibromyalgia, " "history of cervical spine fusion.  She has a history of DVT of her left lower extremity recently and is currently on Eliquis.  She also has a history of CKD 3.    No improvement from intra-articular CSI of the left knee  Continued worsening pain of medial knee concurrently with LLE shooting pain intermittently  Medial knee pain worsening with prolonged walking, sitting.  Patient is resistant to start formal PT as she is worried PT working on her left knee/left leg/low back would end up \"aggravating another issue.\"    Prior visit 4/1/2024 :  Initial evaluation of left knee pain:  Of note, patient has a history of chronic low back pain/lumbar spondylosis with lumbar radiculopathy left worse than right, fibromyalgia, history of cervical spine fusion.  She has a history of DVT of her left lower extremity recently and is currently on Eliquis.  She also has a history of CKD 3.  Ongoing issue for the past year but worsening over the past few months.  Denies precipitating injury of left knee pain. She states originally pain was of her thigh going down her leg into her foot.  She had seen urgent care recently and had imaging done as noted above.  She was placed on a steroid pack which did alleviate the pain while on the steroid pack but her pain slowly recurred again after completion.  She then saw the ER and had imaging of her lower extremities revealing a DVT of the left lower extremity in which she is currently on Eliquis.  However she is still frustrated due to the continuation of pain of her left knee, mainly over the medial aspect that she describes as a sharp pain that is constantly aggravated with any range of motion movements or activities even while resting. Reports swelling/stiffness of her knee. Denies discoloration of left knee. Reports +crepitus of her knee with ROM  She states she still does have pain from her low back radiating down her leg but feels her left medial knee pain is different.  She reports no " "relief with acetaminophen, icing/heat.  She states she is unable to tolerate wearing a compression sleeve for her knee due to her fibromyalgia feels that the tighter she applies the brace works with pain gets.  She denies prior surgeries of her left knee.  She states she does have history of a right knee replacement due to a \"birth deformity\".    In regards to her left knee, she has not received an injection of her knee cortisone previously.              Medical, Surgical, Family, and Social History    Past Medical History:   Diagnosis Date    Allergy to dust     Anemia     Calculus of ureter     Chronic kidney disease 11/2018    Hydrophenesis    Chronic pain disorder     neck and back- sees pain management    Cluster headache Teenager    May occur 2-3 times per year. Lasts several days    Colitis     Colon polyp     Cracked skin     on fingers    CTS (carpal tunnel syndrome) 01/01/2014    Diverticulitis of colon 2012    Seen by Dr. Wilburn at [facility]. Inpatient at    Diverticulosis     Dysphagia     Fibromyalgia, primary 04/16/1996    GERD (gastroesophageal reflux disease)     GI (gastrointestinal bleed)     As a result of diverticulitis and microscopic colitis.    H/O: GI bleed     Head injury     Had numerious concussions    Headache, tension-type     Hiatal hernia     History of DVT in adulthood 2019 2019    HL (hearing loss) unknown    Hydronephrosis     Hyperlipidemia     Inguinal hernia     right side    Irregular heart beat     Pt states with prolonged QT interval - was seen by cardiologist( [facility]) pt told \"to not worry about it\"    Irritable bowel     Kidney stone     Lactose intolerance     Did not tolerate milk after being weaned from breast milk.    Memory loss 2016/2017    Medication/also related to medical conditions    Migraines     Narcolepsy     OAB (overactive bladder)     Osteoarthritis     Osteoporosis     Other chronic pain     degenerative disc disease    PONV (postoperative nausea and vomiting)  "    migraines    Pyelonephritis 01/13/2021    SBO (small bowel obstruction) ([facility]) 09/25/2021    Sepsis ([facility]) 01/13/2021    Shingles Unknown    Had mild cases of shingles several times.    Smoking 08/06/2020    Tobacco abuse     Vision loss Unknown    Occurs when headaches start behind my left eye.    Wears dentures     full upper    Wears glasses     Wears glasses      Past Surgical History:   Procedure Laterality Date    ABDOMINAL ADHESION SURGERY      ARM SKIN LESION BIOPSY / EXCISION      lymph node excision    BLADDER SURGERY      CHOLECYSTECTOMY      COLONOSCOPY  08/06/2020    FL RETROGRADE PYELOGRAM  01/05/2021    FLEXIBLE SIGMOIDOSCOPY      Last done at [facility] in 2018 after being admitted for GI bleed.    HERNIA REPAIR      HYSTERECTOMY  1977    JOINT REPLACEMENT Right     knee    KNEE SURGERY Right     replacement    LAMINECTOMY      L5-6-7    LAPAROSCOPY      LYMPH NODE DISSECTION Left     removed -no cancer-  limb restriction    LYMPHADENECTOMY  1974    UNDER LEFT ARM - DO NOT DRAW BLOOD/GIVE INJECTIONS IN LEFT ARM PER PATIENT     MOLE REMOVAL      OOPHORECTOMY Bilateral 1977    OTHER SURGICAL HISTORY      enlarged lymph node removed left arm..no cancer    TX CYSTO BLADDER W/URETERAL CATHETERIZATION Right 12/02/2020    Procedure: CYSTOSCOPY AND INSERTION STENT URETERAL;  Surgeon: Rory Fall MD;  Location: [facility];  Service: Urology    TX CYSTO/URETERO W/LITHOTRIPSY &INDWELL STENT INSRT Right 01/05/2021    Procedure: CYSTO, LASER  URETEROSCOPY, RETRO PYELOGRAM, STENT REPLACMENT;  Surgeon: Adriel Subramanian MD;  Location: [facility];  Service: Urology    TX LITHOTRIPSY XTRCORP SHOCK WAVE Right 03/25/2021    Procedure: LITHOTRIPSY EXTRACORPORAL SHOCKWAVE (ESWL);  Surgeon: Darwin Montero MD;  Location:  MAIN OR;  Service: Urology    TX RPR 1ST INGUN HRNA AGE 3 YRS/> REDUCIBLE Right 06/25/2020    Procedure: REPAIR HERNIA INGUINAL  WITH MESH;  Surgeon: Mercedes Justin MD;  Location: [facility];  Service:  General    SPINAL FUSION      UPPER GASTROINTESTINAL ENDOSCOPY  2020    WISDOM TOOTH EXTRACTION       Social History   Social History     Substance and Sexual Activity   Alcohol Use Not Currently    Comment: May have an occasional drink when di     Social History     Substance and Sexual Activity   Drug Use Never    Types: Marijuana     Social History     Tobacco Use   Smoking Status Every Day    Current packs/day: 0.50    Average packs/day: 0.5 packs/day for 50.9 years (25.4 ttl pk-yrs)    Types: Cigarettes    Start date: 1973   Smokeless Tobacco Never   Tobacco Comments    Stopped smoking in  because of cervical surgeries. Started smoking again as soon as my body was     Family History   Problem Relation Age of Onset    Hypertension Mother          from massive heart attack, never took any medication for this condition    Heart attack Mother     Hyperlipidemia Mother         Blood tests done just prior to heart attack.  Results reported after her passing.    Migraines Mother          from massive cardiac arrest. Had abdominal aneurysm.    Hypertension Father         Had high blood pressure, unknown if he ever took medication    Cancer Father 72        Tumor in bowels, spread to liver, skin cancer    Colon polyps Father     Diabetes Sister         uses insulin    Arthritis Sister         Osteo arthritis    Hypertension Sister         Triple bypass    Diabetes Sister         pre-diabetic, oral medication    Arthritis Sister         Osteo arthritis    Hypertension Sister         Triple bypass    Irritable bowel syndrome Sister         Constipation    No Known Problems Daughter     No Known Problems Maternal Grandmother     No Known Problems Maternal Grandfather     No Known Problems Paternal Grandmother     No Known Problems Paternal Grandfather     Stroke Brother     Nephrolithiasis Brother     Cancer Brother 71        soft tissue cancer    Cancer Brother         Terminal soft tissue cancer  "    Hypertension Brother         Triple bypass    Migraines Brother         Severe. Standard meds do not help. Goes to [facility] for I    Learning disabilities Brother         Mental retardation    Mental retardation Brother     No Known Problems Maternal Aunt     No Known Problems Maternal Aunt     No Known Problems Maternal Aunt     No Known Problems Paternal Aunt     No Known Problems Paternal Aunt     No Known Problems Paternal Aunt     Cancer Cousin         Pancreatic cancer    Cancer Cousin         Breast cancer, total mastectomy    Breast cancer Other 47    Migraines Maternal Aunt         Had 2.  from cerebral aneurysm    Migraines Maternal Aunt          from cerebral aneurysm    Migraines Maternal Aunt          from cerebral aneurysm    Migraines Maternal Uncle          from cerebral aneurysm     Allergies   Allergen Reactions    Armodafinil Other (See Comments)     Tardive dyskinesia - mouth movements    Celecoxib Other (See Comments)     Increased Heart Rate, Palpitations    Sumatriptan Anaphylaxis     Other reaction(s): SUMATRIPTAN (IMITREX) (Throat closure). Pt analilia zolmitriptan.    Tramadol Other (See Comments)     GI Upset- severe vomiting and systemic body aches    Medical Tape Dermatitis, Rash and Blisters     Adhesive from medication patches and bandaides- ok with paper tape          Physical Exam  /70   Pulse 60   Ht 5' 3.5\" (1.613 m)   Wt 59.6 kg (131 lb 6.4 oz)   BMI 22.91 kg/m²     Constitutional:  see vital signs  Gen: well-developed, normocephalic/atraumatic, well-groomed  Pulmonary/Chest: Effort normal. No respiratory distress.     Ortho Exam  Left Knee Exam:  Erythema: no  Swelling: no  Increased Warmth: no  Tenderness: +MJL/pes anserine  ROM: 0-130  Knee flexion strength: 5/5  Knee extension strength: 5/5  Patellar Grind: +  Lachman's: negative  Varus laxity: negative  Valgus laxity: negative            Large joint arthrocentesis: L pes anserine " bursa  Universal Protocol:  Consent: Verbal consent obtained.  Risks and benefits: risks, benefits and alternatives were discussed  Consent given by: patient  Patient understanding: patient states understanding of the procedure being performed  Site marked: the operative site was marked  Radiology Images displayed and confirmed. If images not available, report reviewed: imaging studies available  Patient identity confirmed: verbally with patient  Supporting Documentation  Indications: pain   Procedure Details  Location: knee - L pes anserine bursa  Preparation: Patient was prepped and draped in the usual sterile fashion  Needle size: 25 G  Ultrasound guidance: no  Approach: anteromedial  Medications administered: 0.5 mL lidocaine 1 %; 20 mg triamcinolone acetonide 40 mg/mL    Patient tolerance: patient tolerated the procedure well with no immediate complications  Dressing:  Sterile dressing applied

## 2024-04-18 NOTE — PATIENT INSTRUCTIONS
CORTICOSTEROID INJECTION  What is a corticosteroid?  Injuries or disease such as arthritis, bursitis or tendonitis result in inflammation.  In turn, this inflammation can cause swelling and pain.  A local injection of a corticosteroid is provided to diminish inflammation.  By doing so, it will also decrease pain and swelling which is making you uncomfortable.     Is this the same thing as a Cortisone Injection?  Cortisone® is a brand name of a corticosteroid used commonly in the past.  Today I commonly use a more water-soluble corticosteroid named Celestone®.    Will the injection hurt?  As with any injection, you may feel pain at the time of the injection. Typically, I use a local anesthetic (Marcaine®) in addition to the corticosteroid to determine if the injection has been placed in the appropriate location.  Hence it is important to monitor your symptoms 4-6 hours after the injection, as the area will be anesthetized (numb) while the local anesthetic is working.  Once the local anesthetic wears off, the intensity of pain can be the same as it was prior to the injection, or even worse.  This does not mean that the injection is not working.  The corticosteroid may take 24-72 hours to begin having a positive effect.    If you do experience an increase in pain, the use of an ice pack on the area for 20 minutes at a time should help.  It is also helpful to take an oral anti-inflammatory such as Tylenol® or Motrin® if you are able to medically do so.  For this reason it is best to avoid activities that put stress on the area the first 24 hours after the injection.    How long will pain relief last?  This will vary according to the type and severity of the symptoms being treated and the severity of the condition.  Symptom relief may last weeks to months.  I typically couple injections with physical therapy so that the underlying problem causing the inflammation may be treated as the pain diminishes.  If the combination  is not successful, you may be a surgical candidate.    I have read bad things about steroids.  Will these things happen to me?  Corticosteroids, when utilized properly, are safe and effective drugs.  When used in a low dose, potential adverse reactions are very rare.  Some patients may experience a sensation of flushing for several days. Some can experience feelings of agitation.  Others may have depigmentation and dimpling of the overlying skin at the site of the injection. Very rarely, there can be a local reaction which may include increased discomfort for a period of time in the areas that has been injected.  A steroid should not be used over and over again.  Multiple injections in the same area can produce adverse effects such as tissue atrophy and degeneration of tendon or cartilage.  A small percentage of patients (< 0.1%) may develop an infection in the joint after injection.  This is a treatable problem, but if neglected, may result in permanent disability.  Signs of infection include redness, swelling, discharge, fevers/chills, increasing pain and drainage from the injection site.  This represents an emergency and you should contact our office immediately or seek treatment in the ER if after hours.    If I have diabetes, will this injection affect me?  If you are diabetic, an injection of a corticosteroid can raise your blood sugar level, requiring more insulin for a brief period of time.  This may necessitate careful blood sugar maintenance.  If the elevated sugars are not able to be controlled, contact your diabetic doctor for guidance.

## 2024-04-22 DIAGNOSIS — Z72.0 TOBACCO ABUSE: ICD-10-CM

## 2024-04-23 DIAGNOSIS — G43.009 MIGRAINE WITHOUT AURA AND WITHOUT STATUS MIGRAINOSUS, NOT INTRACTABLE: ICD-10-CM

## 2024-04-23 RX ORDER — ZOLMITRIPTAN 2.5 MG/1
TABLET, FILM COATED ORAL
Qty: 16 TABLET | Refills: 6 | Status: SHIPPED | OUTPATIENT
Start: 2024-04-23

## 2024-04-23 RX ORDER — BUPROPION HYDROCHLORIDE 150 MG/1
150 TABLET ORAL EVERY MORNING
Qty: 90 TABLET | Refills: 0 | Status: SHIPPED | OUTPATIENT
Start: 2024-04-23

## 2024-06-07 ENCOUNTER — RA CDI HCC (OUTPATIENT)
Dept: OTHER | Facility: HOSPITAL | Age: 64
End: 2024-06-07

## 2024-06-13 ENCOUNTER — RA CDI HCC (OUTPATIENT)
Dept: OTHER | Facility: HOSPITAL | Age: 64
End: 2024-06-13

## 2024-06-17 DIAGNOSIS — M54.16 LUMBAR RADICULOPATHY: ICD-10-CM

## 2024-06-18 ENCOUNTER — OFFICE VISIT (OUTPATIENT)
Dept: NEUROLOGY | Facility: CLINIC | Age: 64
End: 2024-06-18
Payer: COMMERCIAL

## 2024-06-18 VITALS
BODY MASS INDEX: 22.86 KG/M2 | WEIGHT: 129 LBS | DIASTOLIC BLOOD PRESSURE: 69 MMHG | SYSTOLIC BLOOD PRESSURE: 117 MMHG | HEIGHT: 63 IN | HEART RATE: 78 BPM

## 2024-06-18 DIAGNOSIS — G43.709 CHRONIC MIGRAINE WITHOUT AURA WITHOUT STATUS MIGRAINOSUS, NOT INTRACTABLE: Primary | ICD-10-CM

## 2024-06-18 DIAGNOSIS — Z98.1 HISTORY OF FUSION OF CERVICAL SPINE: ICD-10-CM

## 2024-06-18 DIAGNOSIS — M79.18 MYOFASCIAL PAIN SYNDROME, CERVICAL: ICD-10-CM

## 2024-06-18 DIAGNOSIS — G43.009 MIGRAINE WITHOUT AURA AND WITHOUT STATUS MIGRAINOSUS, NOT INTRACTABLE: ICD-10-CM

## 2024-06-18 PROCEDURE — 99214 OFFICE O/P EST MOD 30 MIN: CPT | Performed by: PHYSICIAN ASSISTANT

## 2024-06-18 RX ORDER — METAXALONE 800 MG/1
400 TABLET ORAL 3 TIMES DAILY
Qty: 135 TABLET | Refills: 0 | Status: SHIPPED | OUTPATIENT
Start: 2024-06-18

## 2024-06-18 NOTE — PROGRESS NOTES
Patient ID: Lori Damon is a 63 y.o. female.    Assessment/Plan:     Diagnoses and all orders for this visit:    Chronic migraine without aura without status migrainosus, not intractable  -     Chemodenervation; Future    Migraine without aura and without status migrainosus, not intractable    Myofascial pain syndrome, cervical    History of fusion of cervical spine         The patient continues to be a good candidate for Botox injections for migraine headaches.  She has tried and failed several oral preventative medications and continues to have frequent migraine headaches.  She also has significant neck pain with possible cervical dystonia.  Will order Botox at this time.  I sent a message to her Botox team to help authorize the Botox.    Tremor is better on amantadine, thought to be triggered by armodafinil (?  Tardive dyskinesias as a side effect).    Continue gabapentin as dosed 300 mg in the morning, 600 mg nightly.  She finds that the afternoon dose makes her too groggy, so she takes it at bedtime.    The patient should not hesitate to call me prior to her follow up with any questions or concerns.      Subjective:    HPI    Ms. Lori Damon is here for neurological follow up.    She continues to have significant neck pain especially when turning her head to the right.  It feels like something is pinching in her neck especially on the right side.  It only lasts a second but it is pretty severe in pain level when she turns her head.  She saw pain management and interventional procedures did not really help in her opinion.    Had a real bad migraines for two weeks after she had bumped her head. She takes blood thinners and was taking migraine medications twice a day for those two weeks, says she is better now.  On review, about 1 month ago she hit the top of the left side of her head against a table at work while she was standing up.  She states it is difficult to not hit that table but it cannot be moved.  She  states she hit the head softly as opposed to in the past but she had a lot of migraines after.  She was not sure she could should go to the ED because she is on blood thinners but she no longer has headaches from this.  No focal deficits per her history.    She has knee pain and she is getting injections in the knee.  She did take a p.o. steroid for this recently.    Migraine headaches are greater than 15 days out of the month, each lasting greater than 4 hours.  At the time of the head injury she took Zomig almost every day for a while.  It does help temporarily.    Prior: Since I last saw her she saw Dr. Alvarez on 10/4/2023.  She reports a fairly drastic reduction of migraine headaches since starting propranolol at that time.  Migraine headaches are reduced to 2 to 3/month at the very most, prior to propranolol she had headaches daily and a severe migraine at least once per week.  Prior to propranolol the migraines would last 2 to 3 days in a row.  Now she is able to abort the migraine with Zomig.  She was prescribed a 5 mg Zomig tab but she prefers to take 2.5 and she could not cut it in half well, so she is asking for refill of 2.5.     She continues to have some neck pain which triggers some of her headaches she thinks.  She has reduced range of motion more so to the right and when looking up.  When she looks to the right she has instant pain of the right side of the face in the cheek and it goes to the nasolabial fold.     Migraine headaches are typically in the left parietal or left hemisphere.     More rarely she has migraine headaches behind her eyes which causes nausea and emesis, but she does not remember the last time this happened.  Current migraine headaches are not associated with light or sound sensitivity or nausea.     Tremor:  Resolved except if she is under a lot of stress.  She states both hands with tremor if she is stressed.  She does not have difficulty with coordination.  She has an upper  "lip tremor more so on the right side but this is significantly improved with amantadine.  She denies side effects to amantadine.     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  She avoids lifting heavy items and has other people do this.  She is not having difficulty with her job.     Prior note:  Headaches start in the left frontotemporal region and can radiate to the right side.  Her worst headaches are behind the eyes.     She continues gabapentin 300 mg in the morning and 600 mg nightly.  Increasing the dose higher than this will cause some side effects.     She has been doing physical therapy for her low back pain.  She follows with pain management for low back pain.     She has osteoporosis and follows with rheumatology, takes Prolia injections.     Migraine headaches are behind the eyes 5% of the time, and 95% of the time are located in the left apex region of the head.  She has associated neck and back pain which radiates up to the front of the head.  She very seldom has nausea with a migraine unless severe.  She prefers not to be around in noisy environment when she has a migraine.  Migraine is characterized by a thumping and throbbing sensation.  She also has difficulty with concentration and focus.  She tries to give herself something to do to distract herself from the headache and other symptoms, and tries to keep her mind busy which sometimes helps.     Trigger point injections performed at the last visit were not greatly helpful at all.  She did not have side effects.     Her tremor is a little bit better after holding off on the armodafinil.  When she took it she would have drooling, lip licking, her  called it a \"fishmouth.\"  When she stopped taking it she felt better in terms of tremor.  She felt like she was overmedicated on it.  She also took Cymbalta off and some other medications which ended up making her feel sick/nauseous.      XR cervical 6/27/22: " "\"Postoperative changes as noted above.  No evidence for hardware complication. Alignment abnormalities as noted above. C2-3 and C3-4 anterolistheses which reduce on extension and increase slightly on flexion. No acute osseous abnormalities.\"            The following portions of the patient's history were reviewed and updated as appropriate: She  has a past medical history of Allergy to dust, Anemia, Calculus of ureter, Chronic kidney disease (11/2018), Chronic pain disorder, Cluster headache (Teenager), Colitis, Colon polyp, Cracked skin, CTS (carpal tunnel syndrome) (01/01/2014), Diverticulitis of colon (2012), Diverticulosis, Dysphagia, Fibromyalgia, primary (04/16/1996), GERD (gastroesophageal reflux disease), GI (gastrointestinal bleed), H/O: GI bleed, Head injury, Headache, tension-type, Hiatal hernia, History of DVT in adulthood (2019), HL (hearing loss) (unknown), Hydronephrosis, Hyperlipidemia, Inguinal hernia, Irregular heart beat, Irritable bowel, Kidney stone, Lactose intolerance, Memory loss (2016/2017), Migraines, Narcolepsy, OAB (overactive bladder), Osteoarthritis, Osteoporosis, Other chronic pain, PONV (postoperative nausea and vomiting), Pyelonephritis (01/13/2021), SBO (small bowel obstruction) ([facility]) (09/25/2021), Sepsis ([facility]) (01/13/2021), Shingles (Unknown), Smoking (08/06/2020), Tobacco abuse, Vision loss (Unknown), Wears dentures, Wears glasses, and Wears glasses.  She   Patient Active Problem List    Diagnosis Date Noted    Stage 3a chronic kidney disease ([facility]) 03/26/2024    Deep vein thrombosis (DVT) of other vein of right lower extremity, unspecified chronicity ([facility]) 01/31/2024    Side effect of medication 09/08/2023    Chronic migraine without aura without status migrainosus, not intractable 05/18/2023    Neuropathic pain 05/18/2023    Tardive dyskinesia 02/01/2023    Myofascial pain syndrome, cervical 08/04/2022    Neurologic gait dysfunction 05/27/2022    DDD (degenerative disc disease), " cervical 03/27/2022    Right median nerve neuropathy 01/16/2022    Ulnar neuropathy of right upper extremity 01/16/2022    Tremor 12/24/2021    Carpal tunnel syndrome of right wrist     History of fusion of cervical spine 09/12/2021    Smoking 08/06/2020    Lumbar radiculopathy     S/P right inguinal hernia repair 07/02/2020    Cervical radiculopathy     Spondylosis of cervical region without myelopathy or radiculopathy     Anemia 09/01/2019    Irritable bowel syndrome 09/01/2019    Prolonged QT interval 09/01/2019    Migraine without aura and without status migrainosus, not intractable 07/29/2019    Fibromyalgia 06/25/2019    Pure hypercholesterolemia 06/25/2019    Hypercholesterolemia 04/04/2019    Urge incontinence 01/11/2019    History of hydronephrosis 01/11/2019    Spondylosis of lumbar spine 02/19/2015    Senile osteoporosis 02/18/2014     She  has a past surgical history that includes Laminectomy; Knee surgery (Right); Cholecystectomy; Spinal fusion; Bladder surgery; Abdominal adhesion surgery; [place] tooth extraction; Mole removal; Other surgical history; Hysterectomy (1977); Oophorectomy (Bilateral, 1977); LAPAROSCOPY; pr rpr 1st ingun hrna age 3 yrs/> reducible (Right, 06/25/2020); Colonoscopy (08/06/2020); Upper gastrointestinal endoscopy (08/06/2020); pr cysto bladder w/ureteral catheterization (Right, 12/02/2020); Lymph node dissection (Left); Arm skin lesion biopsy / excision; pr cysto/uretero w/lithotripsy &indwell stent insrt (Right, 01/05/2021); [place] retrograde pyelogram (01/05/2021); Joint replacement (Right); Hernia repair; pr lithotripsy xtrcorp shock wave (Right, 03/25/2021); Lymphadenectomy (1974); and Flexible sigmoidoscopy.  Her family history includes Arthritis in her sister and sister; Breast cancer (age of onset: 47) in her other; Cancer in her brother, cousin, and cousin; Cancer (age of onset: 71) in her brother; Cancer (age of onset: 72) in her father; Colon polyps in her father; Diabetes  in her sister and sister; Heart attack in her mother; Hyperlipidemia in her mother; Hypertension in her brother, father, mother, sister, and sister; Irritable bowel syndrome in her sister; Learning disabilities in her brother; Mental retardation in her brother; Migraines in her brother, maternal aunt, maternal aunt, maternal aunt, maternal uncle, and mother; Nephrolithiasis in her brother; No Known Problems in her daughter, maternal aunt, maternal aunt, maternal aunt, maternal grandfather, maternal grandmother, paternal aunt, paternal aunt, paternal aunt, paternal grandfather, and paternal grandmother; Stroke in her brother.  She  reports that she has been smoking cigarettes. She started smoking about 51 years ago. She has a 25.5 pack-year smoking history. She has never used smokeless tobacco. She reports that she does not currently use alcohol. She reports that she does not use drugs.  Current Outpatient Medications   Medication Sig Dispense Refill    amantadine (SYMMETREL) 100 mg capsule Take 1 capsule (100 mg total) by mouth in the morning 90 capsule 1    apixaban (ELIQUIS) 5 mg Take 1 tablet (5 mg total) by mouth 2 (two) times a day 180 tablet 0    aspirin 500 MG buffered tablet Take 1,000 mg by mouth every 6 (six) hours as needed for mild pain 4,000 mg daily for Pain      buPROPion (WELLBUTRIN XL) 150 mg 24 hr tablet Take 1 tablet (150 mg total) by mouth every morning 90 tablet 0    calcium carbonate 1250 MG capsule Take 600 mg by mouth daily       Cholecalciferol 1000 units tablet Take 1,000 Units by mouth daily Takes in the afternoon      gabapentin (NEURONTIN) 300 mg capsule Take 1 capsule (300 mg total) by mouth 3 (three) times a day 270 capsule 0    Ginger, Zingiber officinalis, (GINGER ROOT PO) Take 700 mg by mouth 2 (two) times a day       metaxalone (SKELAXIN) 800 mg tablet Take 0.5 tablets (400 mg total) by mouth 3 (three) times a day 135 tablet 0    Multiple Vitamins-Minerals (CENTRUM SILVER PO) Take  "1 tablet by mouth daily Takes in the am      ondansetron (ZOFRAN) 4 mg tablet Take 1 tablet (4 mg total) by mouth every 8 (eight) hours as needed for nausea or vomiting 20 tablet 1    predniSONE 5 mg tablet 1 tab in the AM prn migraine (if zomig fails) 20 tablet 0    propranolol (INDERAL LA) 60 mg 24 hr capsule Take 1 capsule (60 mg total) by mouth daily 90 capsule 1    trospium chloride (SANCTURA) 20 mg tablet Take 1 tablet (20 mg total) by mouth 2 (two) times a day 180 tablet 1    Turmeric 500 MG CAPS Take 700 mg by mouth 2 (two) times a day      ZOLMitriptan (ZOMIG) 2.5 MG tablet TAKE 1 TABLET BY MOUTH AT ONSET OF MIGRAINE, MAY REPEAT IN 2 HOURS IF NEEDED. LIMIT 2/24 HOURS, 4/WEEK, 8/MONTH 16 tablet 6     No current facility-administered medications for this visit.     She is allergic to armodafinil, celecoxib, sumatriptan, tramadol, and medical tape..         Objective:    Blood pressure 117/69, pulse 78, height 5' 3\" (1.6 m), weight 58.5 kg (129 lb), not currently breastfeeding.    Physical Exam    Neurological Exam  Vital signs reviewed.  Well developed, well nourished.  Mood and affect are pleasant.  Speech is fluent and articulate.  Head: Normocephalic, atraumatic  Neck: Neck flexors 5/5  ROM restricted when looking to the R.  CN 2-12: intact and symmetric, including EOMs which are normal b/l and PERRL  MSK: 5/5 t/o, except slightly weak 5-/5 in both shoulders with abduction, otherwise full. Non focal t/o.  Hands are arthritic.  Reflexes: 2+ and symmetric in all 4 extr.- brisk in all 4 extr but non focal.  Coordination: Nml x4 extr.  Gait: Steady normal gait.      ROS:    Review of Systems   Constitutional:  Negative for appetite change, fatigue and fever.   HENT: Negative.  Negative for hearing loss, tinnitus, trouble swallowing and voice change.    Eyes: Negative.  Negative for photophobia, pain and visual disturbance.   Respiratory: Negative.  Negative for shortness of breath.    Cardiovascular: Negative. "  Negative for palpitations.   Gastrointestinal:  Positive for nausea. Negative for vomiting.   Endocrine: Negative.  Negative for cold intolerance.   Genitourinary: Negative.  Negative for dysuria, frequency and urgency.   Musculoskeletal:  Negative for back pain, gait problem, myalgias, neck pain and neck stiffness.   Skin: Negative.  Negative for rash.   Allergic/Immunologic: Negative.    Neurological:  Positive for headaches. Negative for dizziness, tremors, seizures, syncope, facial asymmetry, speech difficulty, weakness, light-headedness and numbness.   Hematological: Negative.  Does not bruise/bleed easily.   Psychiatric/Behavioral: Negative.  Negative for confusion, hallucinations and sleep disturbance.      ROS reviewed.

## 2024-06-18 NOTE — PATIENT INSTRUCTIONS
Acupuncture:  Qian Doll- @[facility]- [address] [place]- [phone]  [facility] [address] [facility] [phone]  [facility]

## 2024-06-20 ENCOUNTER — OFFICE VISIT (OUTPATIENT)
Dept: FAMILY MEDICINE CLINIC | Facility: CLINIC | Age: 64
End: 2024-06-20
Payer: COMMERCIAL

## 2024-06-20 VITALS
OXYGEN SATURATION: 96 % | HEART RATE: 80 BPM | TEMPERATURE: 98.4 F | BODY MASS INDEX: 22.22 KG/M2 | SYSTOLIC BLOOD PRESSURE: 107 MMHG | DIASTOLIC BLOOD PRESSURE: 57 MMHG | WEIGHT: 125.4 LBS | HEIGHT: 63 IN

## 2024-06-20 DIAGNOSIS — I82.432 ACUTE DEEP VEIN THROMBOSIS (DVT) OF POPLITEAL VEIN OF LEFT LOWER EXTREMITY (HCC): Primary | ICD-10-CM

## 2024-06-20 DIAGNOSIS — M25.512 ACUTE PAIN OF LEFT SHOULDER: ICD-10-CM

## 2024-06-20 DIAGNOSIS — G89.29 CHRONIC PAIN OF LEFT KNEE: ICD-10-CM

## 2024-06-20 DIAGNOSIS — M25.562 CHRONIC PAIN OF LEFT KNEE: ICD-10-CM

## 2024-06-20 DIAGNOSIS — M70.50 PES ANSERINE BURSITIS: ICD-10-CM

## 2024-06-20 PROCEDURE — G2211 COMPLEX E/M VISIT ADD ON: HCPCS | Performed by: FAMILY MEDICINE

## 2024-06-20 PROCEDURE — 99214 OFFICE O/P EST MOD 30 MIN: CPT | Performed by: FAMILY MEDICINE

## 2024-06-20 NOTE — PATIENT INSTRUCTIONS
Early Postoperative or Post Injury Shoulder Exercises   WHAT YOU NEED TO KNOW:   You may need to wait until your swelling and pain have gone down before you start to exercise. Do not start an exercise program before you talk to your healthcare provider.  DISCHARGE INSTRUCTIONS:   Call your doctor or physical therapist if:   You have sharp or worsening pain during exercise or at rest.    You have questions or concerns about your shoulder exercises.    Before you exercise:  Warm up and stretch before you exercise. Walk or ride a stationary bike for 5 to 10 minutes to help you warm up. Stretching helps increase range of motion. It may also decrease muscle soreness and help prevent another injury. Your healthcare provider or physical therapist will tell you which of the following stretches to do:  Crossover arm stretch:  Relax your shoulders. Hold your upper arm with the opposite hand. Pull your arm across your chest until you feel a stretch. Hold the stretch for 30 seconds. Return to the starting position.         Shoulder flexion stretch:  Stand facing a wall. Slowly walk your fingers up the wall until you feel a stretch. Hold the stretch for 30 seconds. Return to the starting position.         Sleeper stretch:  Lie on your injured side on a firm, flat surface. Bend the elbow of your injured arm 90° with your hand facing up. Use your arm that is not injured to slowly push your injured arm down. Stop when you feel a stretch at the back of your injured shoulder. Hold the stretch for 30 seconds. Slowly return to the starting position.       How to perform exercises without a weight or an exercise band:   Pendulum swings:  Lean forward and rest the arm that is not injured on a table. Do not round your back or lock your knees during the exercise. Let your other arm hang freely by your side. Gently swing your injured arm forward and backward, side to side, and in circles.         Shrugs:  Stand with your arms by your side.  Gently lift your shoulders up to your ears and hold for 5 seconds. With your shoulders lifted, pinch your shoulder blades together. Hold for 5 seconds. Slowly return to the starting position.       How to exercise with a weight:  Your healthcare provider or physical therapist will tell you how much weight to use. You may need to start with a light weight and work up to heavier weights. Hold a weight with your arm slightly in front of your body. Slowly raise your arm to the side with your thumb pointing up or down as directed. Stop when you reach the level of your shoulder. Hold for as many seconds as directed. Slowly return to the starting position.  How to exercise with an exercise band:  Your healthcare provider or physical therapist will tell you how much resistance to use.  Hold the exercise band with both hands in front of your body. Slowly raise your injured arm up and to the side with your thumb pointing up or down as directed. Stop when you reach the level of your shoulder. Hold for as many seconds as directed. Slowly return to the starting position.    Tie one end of the exercise band to a heavy object. Stand and hold the band in your hand. Bend your elbow. Keep your arm close to your side and pull the band straight back. Squeeze your shoulder blades together as you pull. Slowly return to the starting position.    Follow up with your doctor or physical therapist as directed:  Write down your questions so you remember to ask them at your visits.  © Copyright [employer] 2023 Information is for End User's use only and may not be sold, redistributed or otherwise used for commercial purposes.  The above information is an  only. It is not intended as medical advice for individual conditions or treatments. Talk to your doctor, nurse or pharmacist before following any medical regimen to see if it is safe and effective for you.

## 2024-06-22 PROBLEM — K52.9 COLITIS: Status: RESOLVED | Noted: 2020-11-09 | Resolved: 2024-06-22

## 2024-06-22 PROBLEM — K52.9 CHRONIC DIARRHEA: Status: RESOLVED | Noted: 2019-06-25 | Resolved: 2024-06-22

## 2024-06-22 PROBLEM — K57.90 DIVERTICULOSIS: Status: RESOLVED | Noted: 2019-06-25 | Resolved: 2024-06-22

## 2024-06-22 NOTE — PROGRESS NOTES
Ambulatory Visit  Name: Lori Damon      : 1960      MRN: [id number]  Encounter Provider: Naty Kenney MD  Encounter Date: 2024   Encounter department: [facility]    Assessment & Plan   1. Acute deep vein thrombosis (DVT) of popliteal vein of left lower extremity (HCC)  2. Acute pain of left shoulder  -     XR shoulder 2+ vw left; Future; Expected date: 2024  3. Pes anserine bursitis  4. Chronic pain of left knee    - Patient will complete 3 months of anticoagulation, we discussed indefinite anticoagulation given that this is patient's 3rd episode of a DVT however she declines  - Patient advised to schedule follow up with Orthopedics for chronic knee pain  - Will provide patient with shoulder exercises to perform at home; XR ordered if no improvement.   - Follow up in 4 months or as needed     History of Present Illness     Lori Damon is a very pleasant 63 year old female who presents today for a follow up. At previous office visit patient was seen after being diagnosed with a DVT and started on Eliquis. This is patient's 3rd episode of DVT however she declines to stay on anticoagulation indefinitely. She was seen by Orthopedics and underwent a steroid injection for pes anserine bursitis in April which helped immensely however the pain is starting to come back. She is now experiencing pain in her left shoulder which started a few weeks ago. She was gardening when she fell directly on her shoulder. She has been having trouble moving the arm due to the pain.     Review of Systems   Constitutional: Negative.    HENT: Negative.     Eyes: Negative.    Respiratory: Negative.     Cardiovascular: Negative.    Gastrointestinal: Negative.    Genitourinary: Negative.    Musculoskeletal:  Positive for arthralgias.   Skin: Negative.    Neurological: Negative.    Psychiatric/Behavioral: Negative.       Current Outpatient Medications on File Prior to Visit   Medication Sig Dispense Refill     amantadine (SYMMETREL) 100 mg capsule Take 1 capsule (100 mg total) by mouth in the morning 90 capsule 1    apixaban (ELIQUIS) 5 mg Take 1 tablet (5 mg total) by mouth 2 (two) times a day 180 tablet 0    aspirin 500 MG buffered tablet Take 1,000 mg by mouth every 6 (six) hours as needed for mild pain 4,000 mg daily for Pain      buPROPion (WELLBUTRIN XL) 150 mg 24 hr tablet Take 1 tablet (150 mg total) by mouth every morning 90 tablet 0    calcium carbonate 1250 MG capsule Take 600 mg by mouth daily       Cholecalciferol 1000 units tablet Take 1,000 Units by mouth daily Takes in the afternoon      gabapentin (NEURONTIN) 300 mg capsule Take 1 capsule (300 mg total) by mouth 3 (three) times a day 270 capsule 0    Ginger, Zingiber officinalis, (GINGER ROOT PO) Take 700 mg by mouth 2 (two) times a day       metaxalone (SKELAXIN) 800 mg tablet Take 0.5 tablets (400 mg total) by mouth 3 (three) times a day 135 tablet 0    Multiple Vitamins-Minerals (CENTRUM SILVER PO) Take 1 tablet by mouth daily Takes in the am      ondansetron (ZOFRAN) 4 mg tablet Take 1 tablet (4 mg total) by mouth every 8 (eight) hours as needed for nausea or vomiting 20 tablet 1    predniSONE 5 mg tablet 1 tab in the AM prn migraine (if zomig fails) 20 tablet 0    propranolol (INDERAL LA) 60 mg 24 hr capsule Take 1 capsule (60 mg total) by mouth daily 90 capsule 1    trospium chloride (SANCTURA) 20 mg tablet Take 1 tablet (20 mg total) by mouth 2 (two) times a day 180 tablet 1    Turmeric 500 MG CAPS Take 700 mg by mouth 2 (two) times a day      ZOLMitriptan (ZOMIG) 2.5 MG tablet TAKE 1 TABLET BY MOUTH AT ONSET OF MIGRAINE, MAY REPEAT IN 2 HOURS IF NEEDED. LIMIT 2/24 HOURS, 4/WEEK, 8/MONTH 16 tablet 6     No current facility-administered medications on file prior to visit.      Social History     Tobacco Use    Smoking status: Every Day     Current packs/day: 0.50     Average packs/day: 0.5 packs/day for 51.1 years (25.5 ttl pk-yrs)     Types:  "Cigarettes     Start date: 6/1/1973    Smokeless tobacco: Never    Tobacco comments:     Stopped smoking in 2014 because of cervical surgeries. Started smoking again as soon as my body was   Vaping Use    Vaping status: Never Used   Substance and Sexual Activity    Alcohol use: Not Currently     Comment: May have an occasional drink when di    Drug use: Never     Types: Marijuana    Sexual activity: Not Currently     Partners: Male     Birth control/protection: Post-menopausal     Objective     /57 (BP Location: Left arm, Patient Position: Sitting, Cuff Size: Adult)   Pulse 80   Temp 98.4 °F (36.9 °C) (Temporal)   Ht 5' 3\" (1.6 m)   Wt 56.9 kg (125 lb 6.4 oz)   SpO2 96%   BMI 22.21 kg/m²     Physical Exam  Constitutional:       General: She is not in acute distress.     Appearance: She is not ill-appearing.   HENT:      Head: Normocephalic and atraumatic.   Eyes:      General:         Right eye: No discharge.         Left eye: No discharge.      Extraocular Movements: Extraocular movements intact.   Pulmonary:      Effort: No respiratory distress.   Musculoskeletal:      Left shoulder: Crepitus present.      Comments: Pain with passive and active range of motion    Neurological:      General: No focal deficit present.      Mental Status: She is alert.   Psychiatric:         Mood and Affect: Mood normal.         Behavior: Behavior normal.     Administrative Statements           "

## 2024-06-28 ENCOUNTER — APPOINTMENT (OUTPATIENT)
Age: 64
End: 2024-06-28
Payer: COMMERCIAL

## 2024-06-28 DIAGNOSIS — M25.512 ACUTE PAIN OF LEFT SHOULDER: ICD-10-CM

## 2024-06-28 PROCEDURE — 73030 X-RAY EXAM OF SHOULDER: CPT

## 2024-07-01 ENCOUNTER — OFFICE VISIT (OUTPATIENT)
Dept: UROLOGY | Facility: MEDICAL CENTER | Age: 64
End: 2024-07-01
Payer: COMMERCIAL

## 2024-07-01 VITALS
HEART RATE: 75 BPM | HEIGHT: 63 IN | OXYGEN SATURATION: 98 % | BODY MASS INDEX: 22.15 KG/M2 | DIASTOLIC BLOOD PRESSURE: 74 MMHG | SYSTOLIC BLOOD PRESSURE: 118 MMHG | WEIGHT: 125 LBS

## 2024-07-01 DIAGNOSIS — N39.0 RECURRENT UTI: Primary | ICD-10-CM

## 2024-07-01 DIAGNOSIS — N30.00 ACUTE CYSTITIS WITHOUT HEMATURIA: ICD-10-CM

## 2024-07-01 LAB
SL AMB  POCT GLUCOSE, UA: NORMAL
SL AMB LEUKOCYTE ESTERASE,UA: NORMAL
SL AMB POCT BILIRUBIN,UA: NORMAL
SL AMB POCT BLOOD,UA: NORMAL
SL AMB POCT CLARITY,UA: CLEAR
SL AMB POCT COLOR,UA: YELLOW
SL AMB POCT KETONES,UA: NORMAL
SL AMB POCT NITRITE,UA: NORMAL
SL AMB POCT PH,UA: 6
SL AMB POCT SPECIFIC GRAVITY,UA: 1
SL AMB POCT URINE PROTEIN: NORMAL
SL AMB POCT UROBILINOGEN: 0.2

## 2024-07-01 PROCEDURE — 87086 URINE CULTURE/COLONY COUNT: CPT | Performed by: UROLOGY

## 2024-07-01 PROCEDURE — 99213 OFFICE O/P EST LOW 20 MIN: CPT | Performed by: UROLOGY

## 2024-07-01 PROCEDURE — 81003 URINALYSIS AUTO W/O SCOPE: CPT | Performed by: UROLOGY

## 2024-07-01 RX ORDER — LEVOFLOXACIN 500 MG/1
500 TABLET, FILM COATED ORAL EVERY 24 HOURS
Qty: 7 TABLET | Refills: 0 | Status: SHIPPED | OUTPATIENT
Start: 2024-07-01 | End: 2024-07-08

## 2024-07-01 NOTE — PROGRESS NOTES
"   HISTORY:    Follow-up urgency incontinence, currently on trospium twice per day.  It was helping quite well until about 1 to 2 weeks ago.  Then had sudden onset of tremendous frequency leakage, almost no warning at all.    She says she perhaps has an infection, last time she had a UTI in March 2024, felt the same way, never had any burning         ASSESSMENT / PLAN:    Urine is clear, but will culture urine    Start Levaquin 500 mg once per day for 7 days    If the incontinence continues, she will notify us and we will consider another overactive bladder medicine    Otherwise follow-up 6 months    The following portions of the patient's history were reviewed and updated as appropriate: allergies, current medications, past family history, past medical history, past social history, past surgical history, and problem list.    Review of Systems      Objective:     Physical Exam  Constitutional:       Appearance: Normal appearance.   Neurological:      Mental Status: She is alert.           No results found for: \"PSA\"]  BUN   Date Value Ref Range Status   03/21/2024 24 5 - 25 mg/dL Final   04/18/2019 16 7 - 25 mg/dL Final     Creatinine   Date Value Ref Range Status   03/21/2024 1.11 0.60 - 1.30 mg/dL Final     Comment:     Standardized to IDMS reference method   04/18/2019 0.70 0.40 - 1.10 mg/dL Final     No components found for: \"CBC\"      Patient Active Problem List   Diagnosis    Urge incontinence    History of hydronephrosis    Fibromyalgia    Pure hypercholesterolemia    Migraine without aura and without status migrainosus, not intractable    Anemia    Irritable bowel syndrome    Prolonged QT interval    Spondylosis of cervical region without myelopathy or radiculopathy    Spondylosis of lumbar spine    Senile osteoporosis    Hypercholesterolemia    Cervical radiculopathy    S/P right inguinal hernia repair    Lumbar radiculopathy    Smoking    History of fusion of cervical spine    Carpal tunnel syndrome of " right wrist    Tremor    Right median nerve neuropathy    Ulnar neuropathy of right upper extremity    DDD (degenerative disc disease), cervical    Neurologic gait dysfunction    Myofascial pain syndrome, cervical    Tardive dyskinesia    Chronic migraine without aura without status migrainosus, not intractable    Neuropathic pain    Side effect of medication    Deep vein thrombosis (DVT) of other vein of right lower extremity, unspecified chronicity (HCC)    Stage 3a chronic kidney disease (HCC)        Diagnoses and all orders for this visit:    Recurrent UTI  -     POCT urine dip auto non-scope    Acute cystitis without hematuria  -     Urine culture  -     levofloxacin (LEVAQUIN) 500 mg tablet; Take 1 tablet (500 mg total) by mouth every 24 hours for 7 days           Patient ID: Lori Damon is a 63 y.o. female.      Current Outpatient Medications:     amantadine (SYMMETREL) 100 mg capsule, Take 1 capsule (100 mg total) by mouth in the morning, Disp: 90 capsule, Rfl: 1    apixaban (ELIQUIS) 5 mg, Take 1 tablet (5 mg total) by mouth 2 (two) times a day, Disp: 180 tablet, Rfl: 0    aspirin 500 MG buffered tablet, Take 1,000 mg by mouth every 6 (six) hours as needed for mild pain 4,000 mg daily for Pain, Disp: , Rfl:     buPROPion (WELLBUTRIN XL) 150 mg 24 hr tablet, Take 1 tablet (150 mg total) by mouth every morning, Disp: 90 tablet, Rfl: 0    calcium carbonate 1250 MG capsule, Take 600 mg by mouth daily , Disp: , Rfl:     Cholecalciferol 1000 units tablet, Take 1,000 Units by mouth daily Takes in the afternoon, Disp: , Rfl:     gabapentin (NEURONTIN) 300 mg capsule, Take 1 capsule (300 mg total) by mouth 3 (three) times a day, Disp: 270 capsule, Rfl: 0    Ginger, Zingiber officinalis, (GINGER ROOT PO), Take 700 mg by mouth 2 (two) times a day , Disp: , Rfl:     metaxalone (SKELAXIN) 800 mg tablet, Take 0.5 tablets (400 mg total) by mouth 3 (three) times a day, Disp: 135 tablet, Rfl: 0    Multiple  "Vitamins-Minerals (CENTRUM SILVER PO), Take 1 tablet by mouth daily Takes in the am, Disp: , Rfl:     ondansetron (ZOFRAN) 4 mg tablet, Take 1 tablet (4 mg total) by mouth every 8 (eight) hours as needed for nausea or vomiting, Disp: 20 tablet, Rfl: 1    predniSONE 5 mg tablet, 1 tab in the AM prn migraine (if zomig fails), Disp: 20 tablet, Rfl: 0    propranolol (INDERAL LA) 60 mg 24 hr capsule, Take 1 capsule (60 mg total) by mouth daily, Disp: 90 capsule, Rfl: 1    trospium chloride (SANCTURA) 20 mg tablet, Take 1 tablet (20 mg total) by mouth 2 (two) times a day, Disp: 180 tablet, Rfl: 1    Turmeric 500 MG CAPS, Take 700 mg by mouth 2 (two) times a day, Disp: , Rfl:     ZOLMitriptan (ZOMIG) 2.5 MG tablet, TAKE 1 TABLET BY MOUTH AT ONSET OF MIGRAINE, MAY REPEAT IN 2 HOURS IF NEEDED. LIMIT 2/24 HOURS, 4/WEEK, 8/MONTH, Disp: 16 tablet, Rfl: 6    Past Medical History:   Diagnosis Date    Allergy to dust     Anemia     Calculus of ureter     Chronic kidney disease 11/2018    Hydrophenesis    Chronic pain disorder     neck and back- sees pain management    Cluster headache Teenager    May occur 2-3 times per year. Lasts several days    Colitis     Colon polyp     Cracked skin     on fingers    CTS (carpal tunnel syndrome) 01/01/2014    Diverticulitis of colon 2012    Seen by Dr[facility] Wilburn at [facility]. Inpatient at    Diverticulosis     Dysphagia     Fibromyalgia, primary 04/16/1996    GERD (gastroesophageal reflux disease)     GI (gastrointestinal bleed)     As a result of diverticulitis and microscopic colitis.    H/O: GI bleed     Head injury     Had numerious concussions    Headache, tension-type     Hiatal hernia     History of DVT in adulthood 2019 2019    HL (hearing loss) unknown    Hydronephrosis     Hyperlipidemia     Inguinal hernia     right side    Irregular heart beat     Pt states with prolonged QT interval - was seen by cardiologist( [facility]) pt told \"to not worry about it\"    Irritable bowel     Kidney stone     " Lactose intolerance     Did not tolerate milk after being weaned from breast milk.    Memory loss 2016/2017    Medication/also related to medical conditions    Migraines     Narcolepsy     OAB (overactive bladder)     Osteoarthritis     Osteoporosis     Other chronic pain     degenerative disc disease    PONV (postoperative nausea and vomiting)     migraines    Pyelonephritis 01/13/2021    SBO (small bowel obstruction) ([facility]) 09/25/2021    Sepsis ([facility]) 01/13/2021    Shingles Unknown    Had mild cases of shingles several times.    Smoking 08/06/2020    Tobacco abuse     Vision loss Unknown    Occurs when headaches start behind my left eye.    Wears dentures     full upper    Wears glasses     Wears glasses        Past Surgical History:   Procedure Laterality Date    ABDOMINAL ADHESION SURGERY      ARM SKIN LESION BIOPSY / EXCISION      lymph node excision    BLADDER SURGERY      CHOLECYSTECTOMY      COLONOSCOPY  08/06/2020    FL RETROGRADE PYELOGRAM  01/05/2021    FLEXIBLE SIGMOIDOSCOPY      Last done at [facility] in 2018 after being admitted for GI bleed.    HERNIA REPAIR      HYSTERECTOMY  1977    JOINT REPLACEMENT Right     knee    KNEE SURGERY Right     replacement    LAMINECTOMY      L5-6-7    LAPAROSCOPY      LYMPH NODE DISSECTION Left     removed -no cancer-  limb restriction    LYMPHADENECTOMY  1974    UNDER LEFT ARM - DO NOT DRAW BLOOD/GIVE INJECTIONS IN LEFT ARM PER PATIENT     MOLE REMOVAL      OOPHORECTOMY Bilateral 1977    OTHER SURGICAL HISTORY      enlarged lymph node removed left arm..no cancer    SD CYSTO BLADDER W/URETERAL CATHETERIZATION Right 12/02/2020    Procedure: CYSTOSCOPY AND INSERTION STENT URETERAL;  Surgeon: Rory Fall MD;  Location: [facility];  Service: Urology    SD CYSTO/URETERO W/LITHOTRIPSY &INDWELL STENT INSRT Right 01/05/2021    Procedure: CYSTO, LASER  URETEROSCOPY, RETRO PYELOGRAM, STENT REPLACMENT;  Surgeon: Adriel Subramanian MD;  Location: [facility];  Service: Urology    SD  LITHOTRIPSY XTRCORP SHOCK WAVE Right 03/25/2021    Procedure: LITHOTRIPSY EXTRACORPORAL SHOCKWAVE (ESWL);  Surgeon: Darwin Montero MD;  Location:  MAIN OR;  Service: Urology    ME RPR 1ST INGUN HRNA AGE 3 YRS/> REDUCIBLE Right 06/25/2020    Procedure: REPAIR HERNIA INGUINAL  WITH MESH;  Surgeon: Mercedes Justin MD;  Location: [facility];  Service: General    SPINAL FUSION      UPPER GASTROINTESTINAL ENDOSCOPY  08/06/2020    WISDOM TOOTH EXTRACTION         Social History

## 2024-07-03 LAB — BACTERIA UR CULT: NORMAL

## 2024-07-10 ENCOUNTER — TELEPHONE (OUTPATIENT)
Dept: RHEUMATOLOGY | Facility: CLINIC | Age: 64
End: 2024-07-10

## 2024-07-10 NOTE — TELEPHONE ENCOUNTER
Left voicemail for patient explaining the provider is going through a schedule change and their appointment will need to be rescheduled, apologized for the short notice and advised the patient top call the office back as soon as possible to reschedule appointment.

## 2024-07-12 ENCOUNTER — TELEPHONE (OUTPATIENT)
Dept: RHEUMATOLOGY | Facility: CLINIC | Age: 64
End: 2024-07-12

## 2024-07-12 NOTE — TELEPHONE ENCOUNTER
"Was transferred call from patient who returned a reschedule call , was talking to the patient was was upset about being rescheduled and can only do appointments on Friday, patient them went on to state they \"are 63 and can't afford to take off from work\". Patient then asked if there where any doctors in on Friday, patient was told yes but that would depend on which location she would prefer going to. Patient express that \"[place]\" for the patient to be going to [place], patient then went on to relay that the lat provider she had seen at [place] \"was a quack\" and \"was no impressed by him at all\". Patient ws put on Dr. Miranda's schedule and will be following with him form now on and requested they be put on a cancel list should anything sooner one up on a Friday.   "

## 2024-07-14 NOTE — TELEPHONE ENCOUNTER
Also she needs a Prolia nurse visit for end of July/beginning of August. She cannot delay the injection until October.

## 2024-07-16 NOTE — TELEPHONE ENCOUNTER
JAIMIE for pt to call us back to schedule her prolia injection. She needs it done at end of July or early August

## 2024-07-19 ENCOUNTER — TELEPHONE (OUTPATIENT)
Age: 64
End: 2024-07-19

## 2024-07-19 NOTE — TELEPHONE ENCOUNTER
Pt calling regarding her prolia shot. She wanted to ensure that it was covered. Informed her that she has lifetime approval on it and that just would need to get scheduled toward end of July/August.       Pt is waiting for CB to be scheduled

## 2024-07-25 ENCOUNTER — OFFICE VISIT (OUTPATIENT)
Age: 64
End: 2024-07-25
Payer: COMMERCIAL

## 2024-07-25 VITALS
HEIGHT: 63 IN | WEIGHT: 127 LBS | BODY MASS INDEX: 22.5 KG/M2 | DIASTOLIC BLOOD PRESSURE: 78 MMHG | HEART RATE: 81 BPM | SYSTOLIC BLOOD PRESSURE: 118 MMHG

## 2024-07-25 DIAGNOSIS — M11.262 CHONDROCALCINOSIS OF LEFT KNEE: ICD-10-CM

## 2024-07-25 DIAGNOSIS — G89.29 CHRONIC PAIN OF LEFT KNEE: Primary | ICD-10-CM

## 2024-07-25 DIAGNOSIS — M17.12 PRIMARY OSTEOARTHRITIS OF LEFT KNEE: ICD-10-CM

## 2024-07-25 DIAGNOSIS — M54.10 RADICULAR PAIN OF LEFT LOWER EXTREMITY: ICD-10-CM

## 2024-07-25 DIAGNOSIS — M25.562 CHRONIC PAIN OF LEFT KNEE: Primary | ICD-10-CM

## 2024-07-25 PROCEDURE — 20610 DRAIN/INJ JOINT/BURSA W/O US: CPT | Performed by: STUDENT IN AN ORGANIZED HEALTH CARE EDUCATION/TRAINING PROGRAM

## 2024-07-25 PROCEDURE — 99213 OFFICE O/P EST LOW 20 MIN: CPT | Performed by: STUDENT IN AN ORGANIZED HEALTH CARE EDUCATION/TRAINING PROGRAM

## 2024-07-25 RX ORDER — TRIAMCINOLONE ACETONIDE 40 MG/ML
40 INJECTION, SUSPENSION INTRA-ARTICULAR; INTRAMUSCULAR
Status: COMPLETED | OUTPATIENT
Start: 2024-07-25 | End: 2024-07-25

## 2024-07-25 RX ORDER — BUPIVACAINE HYDROCHLORIDE 2.5 MG/ML
2 INJECTION, SOLUTION INFILTRATION; PERINEURAL
Status: COMPLETED | OUTPATIENT
Start: 2024-07-25 | End: 2024-07-25

## 2024-07-25 RX ADMIN — TRIAMCINOLONE ACETONIDE 40 MG: 40 INJECTION, SUSPENSION INTRA-ARTICULAR; INTRAMUSCULAR at 15:15

## 2024-07-25 RX ADMIN — BUPIVACAINE HYDROCHLORIDE 2 ML: 2.5 INJECTION, SOLUTION INFILTRATION; PERINEURAL at 15:15

## 2024-07-25 NOTE — PATIENT INSTRUCTIONS
"Patient Education     Steroid injection   The Basics   Written by the doctors and editors at [facility]   What is a steroid injection? -- Steroids, also known as \"glucocorticoids,\" are medicines that help reduce swelling and pain. Doctors sometimes inject a steroid medicine into a joint or other part of the body to relieve pain. This is also sometimes called a \"cortisone shot.\"  After the injection, the steroid starts to work within a few days.  How long does a steroid injection work? -- It depends on the person and where the injection is given. For some people, the effects of a steroid injection can last for a few weeks or longer.  Sometimes, the doctor also injects a medicine called a \"local anesthetic\" with the steroid. This might help relieve pain until the steroid starts to work.  A steroid injection can help with pain, but it won't cure the problem that is causing the pain.  How do I prepare for a steroid injection? -- The doctor or nurse will tell you if you need to do anything special to prepare. Before your procedure, your doctor will do an exam.  Your doctor will also ask you about your \"health history.\" This involves asking you questions about any health problems you have or had in the past, past surgeries, and any medicines you take. Tell them about:   Any medicines you are taking - This includes any prescription or \"over-the-counter\" medicines you use, plus any herbal supplements you take. It helps to write down and bring a list of any medicines you take, or bring a bag with all of your medicines with you.   Any allergies you have   Any bleeding problems you have   Any other steroid injections you have had  Ask the doctor or nurse if you have questions or if there is anything you do not understand.  How is a steroid injection given? -- When it is time for the injection:   The doctor will clean the skin over the area where they plan to give the shot. (This is called the \"injection site.\")   They might use " ultrasound or a special kind of X-ray during the procedure. This is to check where to give the steroid.   Sometimes, they might give a shot of medicine to numb the skin.   They will inject the steroid.   Then, they will take out the needle and cover the injection site with a bandage.  What happens after a steroid injection? -- You can go home after the injection.  For the next few days, you might want to:   Apply a cold gel pack, bag of ice, or bag of frozen vegetables to the injection site every 1 to 2 hours, for 15 minutes each time. Put a thin towel between the ice (or other cold object) and your skin.   Rest the treated body part for a few days.   Take medicines to relieve pain. Examples include acetaminophen (sample brand name: Tylenol), ibuprofen (sample brand names: Advil, Motrin), or naproxen (sample brand name: Aleve).  If you have diabetes, the doctor might want you to check your blood sugar levels more often for a few days. The steroid medicine might temporarily raise your blood sugar.  What are the risks of a steroid injection? -- Your doctor will talk to you about all of the possible risks, and answer your questions. Possible risks include:   Bleeding, bruising, or soreness at the injection site   Damage to parts near the injection site - This might include cartilage damage, injury to nerves, tendon rupture, or thinning of skin and bones.   Skin around the injection site becoming lighter or whiter in color   Infection   Health conditions like diabetes or high blood pressure getting worse for a few days   Allergic reaction to the medicine  What else should I know? -- Most of the time, doctors limit the total number of steroid injections to a certain area.  A steroid injection might be only 1 part of your treatment plan. Take your other medicines as instructed. Also, follow your doctor's recommendations about other treatments. These might include things like physical therapy or devices like a brace or  cane.  All topics are updated as new evidence becomes available and our peer review process is complete.  This topic retrieved from [employer] on: Apr 25, 2024.  Topic 381726 Version 2.0  Release: 32.4.2 - C32.114  © 2024 [employer] and/or its affiliates. All rights reserved.  Consumer Information Use and Disclaimer   Disclaimer: This generalized information is a limited summary of diagnosis, treatment, and/or medication information. It is not meant to be comprehensive and should be used as a tool to help the user understand and/or assess potential diagnostic and treatment options. It does NOT include all information about conditions, treatments, medications, side effects, or risks that may apply to a specific patient. It is not intended to be medical advice or a substitute for the medical advice, diagnosis, or treatment of a health care provider based on the health care provider's examination and assessment of a patient's specific and unique circumstances. Patients must speak with a health care provider for complete information about their health, medical questions, and treatment options, including any risks or benefits regarding use of medications. This information does not endorse any treatments or medications as safe, effective, or approved for treating a specific patient. [employer] and its affiliates disclaim any warranty or liability relating to this information or the use thereof.The use of this information is governed by the Terms of Use, available at https://www.[employer].com/en/know/clinical-effectiveness-terms. 2024© [employer], Inc. and its affiliates and/or licensors. All rights reserved.  Copyright   © 2024 [employer] and/or its affiliates. All rights reserved.

## 2024-07-25 NOTE — PROGRESS NOTES
1. Chronic pain of left knee  MRI knee left  wo contrast    Large joint arthrocentesis: L knee      2. Primary osteoarthritis of left knee  MRI knee left  wo contrast    Large joint arthrocentesis: L knee      3. Chondrocalcinosis of left knee  MRI knee left  wo contrast    Large joint arthrocentesis: L knee      4. Radicular pain of left lower extremity            Orders Placed This Encounter   Procedures    Large joint arthrocentesis: L knee    MRI knee left  wo contrast        Imaging Studies (I personally reviewed images in PACS and report):    X-ray left knee 3/21/2024  .  Mild tricompartmental osteoarthritic changes.  Meniscal chondrocalcinosis.  Atherosclerotic disease.  Duplex venous ultrasound lower extremity 3/21/2024: Evidence suggesting LEFT leg DVT as there was an error in the report previously.    IMPRESSION:  Chronic atraumatic left knee pain/left leg pain, left lower extremity edema  Radiographs note mild degenerative changes of her knee as well as meniscal chondrocalcinosis and atherosclerotic disease  Differential includes pes anserine syndrome, osteoarthritic pain of the knee, meniscal tearing of the knee, radicular pain from the lumbar spine, fibromyalgia pain  Edema of the left lower extremity could be from the knee but also patient has history of DVT of left lower extremity which she is on Eliquis.  Patient notes she does not wear compression stockings for her extremity at baseline.  Limited/brief relief from pes anserine cortisone injection; previously only had limited/brief relief from an intra-articular cortisone injection as well.    Other factors:  CKD3  Currently on Eliquis due to incidental DVT found of her left lower extremity  Fibromylagia  Patient notes today that for her lumbar spine/severe spondylosis, she would be unable to pursue a surgical intervention as she has not undergone formal physical therapy for this issue nor has she been able to quit smoking/tobacco  "use.    PLAN:    Clinical exam and radiographic imaging reviewed with patient today, with impression as per above. I have discussed with the patient the pathophysiology of this diagnosis and reviewed how the examination correlates with this diagnosis.    Given persistence of pain and limited improvement from conservative treatments ordered an MRI of the left knee without contrast for further evaluation.  In the interim, I did offer a repeat intra-articular cortisone injection to provide some degree of relief while obtaining the MRI and patient was agreeable to this procedure after reviewing risk/benefits as per below.  In the interim, continue as needed use of acetaminophen, ice/heat therapy 20 minutes on/off.  Oral NSAIDs are contraindicated given her history of CKD 3 as well as being on Eliquis.  Counseled for her to reach out to her PCP in regards to whether she can wear compression stockings for her left lower extremity as this may help alleviate the edema and heaviness of her leg which could be aggravating her knee pain as well.  Offered formal PT but patient deferred this option    Return for Follow up after MRI of left knee.  If there are no findings or need to suggest surgical intervention I may need to consider other sources of pain such as radicular pain from her lumbar spine at the pain management.    Portions of the record may have been created with voice recognition software. Occasional wrong word or \"sound a like\" substitutions may have occurred due to the inherent limitations of voice recognition software. Read the chart carefully and recognize, using context, where substitutions have occurred.     CHIEF COMPLAINT:  Chief Complaint   Patient presents with    Left Knee - Follow-up     Pain all day long and thru the whole night The pain wakes her up. Elevating it no ice applied bc of Fiber Myalgia           HPI:  Lori Damon is a 63 y.o. female  who presents in regards to       Visit " "7/25/2024:  Follow-up evaluation of left knee/leg pain:  Of note, patient has a history of chronic low back pain/lumbar spondylosis with lumbar radiculopathy left worse than right, fibromyalgia, history of cervical spine fusion.  She has a history of DVT of her left lower extremity recently and is currently on Eliquis.  She also has a history of CKD 3.    Patient last seen on 4/18/2020 for which she was given a pes anserine cortisone injection.  Prior to that she was given an intra-articular cortisone injection.  Patient states she did sustain some relief of her knee pain for about 1 to 2 months but the pain progressively recurred again.  She states she also denies any much of a difference in regards to the edema of the left lower extremity.    She reports the pain has been much worse for the past 2 weeks despite not sustaining injury.  She describes it as a sharp/aching/shooting pain of her knee and leg.  She is pain is constantly present but further aggravated by weightbearing or walking.  Does not use any compression knee sleeve and does not feel it provides much relief she did wear it in the past.    Visit 4/18/2024:  Follow up left knee/leg pain  Of note, patient has a history of chronic low back pain/lumbar spondylosis with lumbar radiculopathy left worse than right, fibromyalgia, history of cervical spine fusion.  She has a history of DVT of her left lower extremity recently and is currently on Eliquis.  She also has a history of CKD 3.    No improvement from intra-articular CSI of the left knee  Continued worsening pain of medial knee concurrently with LLE shooting pain intermittently  Medial knee pain worsening with prolonged walking, sitting.  Patient is resistant to start formal PT as she is worried PT working on her left knee/left leg/low back would end up \"aggravating another issue.\"    Prior visit 4/1/2024 :  Initial evaluation of left knee pain:  Of note, patient has a history of chronic low back " "pain/lumbar spondylosis with lumbar radiculopathy left worse than right, fibromyalgia, history of cervical spine fusion.  She has a history of DVT of her left lower extremity recently and is currently on Eliquis.  She also has a history of CKD 3.  Ongoing issue for the past year but worsening over the past few months.  Denies precipitating injury of left knee pain. She states originally pain was of her thigh going down her leg into her foot.  She had seen urgent care recently and had imaging done as noted above.  She was placed on a steroid pack which did alleviate the pain while on the steroid pack but her pain slowly recurred again after completion.  She then saw the ER and had imaging of her lower extremities revealing a DVT of the left lower extremity in which she is currently on Eliquis.  However she is still frustrated due to the continuation of pain of her left knee, mainly over the medial aspect that she describes as a sharp pain that is constantly aggravated with any range of motion movements or activities even while resting. Reports swelling/stiffness of her knee. Denies discoloration of left knee. Reports +crepitus of her knee with ROM  She states she still does have pain from her low back radiating down her leg but feels her left medial knee pain is different.  She reports no relief with acetaminophen, icing/heat.  She states she is unable to tolerate wearing a compression sleeve for her knee due to her fibromyalgia feels that the tighter she applies the brace works with pain gets.  She denies prior surgeries of her left knee.  She states she does have history of a right knee replacement due to a \"birth deformity\".    In regards to her left knee, she has not received an injection of her knee cortisone previously.              Medical, Surgical, Family, and Social History    Past Medical History:   Diagnosis Date    Allergy to dust     Anemia     Calculus of ureter     Chronic kidney disease 11/2018    " "Hydrophenesis    Chronic pain disorder     neck and back- sees pain management    Cluster headache Teenager    May occur 2-3 times per year. Lasts several days    Colitis     Colon polyp     Cracked skin     on fingers    CTS (carpal tunnel syndrome) 01/01/2014    Diverticulitis of colon 2012    Seen by Dr. Wilburn at [facility]. Inpatient at    Diverticulosis     Dysphagia     Fibromyalgia, primary 04/16/1996    GERD (gastroesophageal reflux disease)     GI (gastrointestinal bleed)     As a result of diverticulitis and microscopic colitis.    H/O: GI bleed     Head injury     Had numerious concussions    Headache, tension-type     Hiatal hernia     History of DVT in adulthood 2019 2019    HL (hearing loss) unknown    Hydronephrosis     Hyperlipidemia     Inguinal hernia     right side    Irregular heart beat     Pt states with prolonged QT interval - was seen by cardiologist( [facility]) pt told \"to not worry about it\"    Irritable bowel     Kidney stone     Lactose intolerance     Did not tolerate milk after being weaned from breast milk.    Memory loss 2016/2017    Medication/also related to medical conditions    Migraines     Narcolepsy     OAB (overactive bladder)     Osteoarthritis     Osteoporosis     Other chronic pain     degenerative disc disease    PONV (postoperative nausea and vomiting)     migraines    Pyelonephritis 01/13/2021    SBO (small bowel obstruction) ([facility]) 09/25/2021    Sepsis ([facility]) 01/13/2021    Shingles Unknown    Had mild cases of shingles several times.    Smoking 08/06/2020    Tobacco abuse     Vision loss Unknown    Occurs when headaches start behind my left eye.    Wears dentures     full upper    Wears glasses     Wears glasses      Past Surgical History:   Procedure Laterality Date    ABDOMINAL ADHESION SURGERY      ARM SKIN LESION BIOPSY / EXCISION      lymph node excision    BLADDER SURGERY      CHOLECYSTECTOMY      COLONOSCOPY  08/06/2020    FL RETROGRADE PYELOGRAM  01/05/2021    FLEXIBLE " SIGMOIDOSCOPY      Last done at [facility] in 2018 after being admitted for GI bleed.    HERNIA REPAIR      HYSTERECTOMY  1977    JOINT REPLACEMENT Right     knee    KNEE SURGERY Right     replacement    LAMINECTOMY      L5-6-7    LAPAROSCOPY      LYMPH NODE DISSECTION Left     removed -no cancer-  limb restriction    LYMPHADENECTOMY  1974    UNDER LEFT ARM - DO NOT DRAW BLOOD/GIVE INJECTIONS IN LEFT ARM PER PATIENT     MOLE REMOVAL      OOPHORECTOMY Bilateral 1977    OTHER SURGICAL HISTORY      enlarged lymph node removed left arm..no cancer    CA CYSTO BLADDER W/URETERAL CATHETERIZATION Right 12/02/2020    Procedure: CYSTOSCOPY AND INSERTION STENT URETERAL;  Surgeon: Rory Fall MD;  Location: [facility];  Service: Urology    CA CYSTO/URETERO W/LITHOTRIPSY &INDWELL STENT INSRT Right 01/05/2021    Procedure: CYSTO, LASER  URETEROSCOPY, RETRO PYELOGRAM, STENT REPLACMENT;  Surgeon: Adriel Subramanian MD;  Location: [facility];  Service: Urology    CA LITHOTRIPSY XTRCORP SHOCK WAVE Right 03/25/2021    Procedure: LITHOTRIPSY EXTRACORPORAL SHOCKWAVE (ESWL);  Surgeon: Darwin Montero MD;  Location:  MAIN OR;  Service: Urology    CA RPR 1ST INGUN HRNA AGE 3 YRS/> REDUCIBLE Right 06/25/2020    Procedure: REPAIR HERNIA INGUINAL  WITH MESH;  Surgeon: Mercedes Justin MD;  Location: [facility];  Service: General    SPINAL FUSION      UPPER GASTROINTESTINAL ENDOSCOPY  08/06/2020    WISDOM TOOTH EXTRACTION       Social History   Social History     Substance and Sexual Activity   Alcohol Use Not Currently    Comment: May have an occasional drink when di     Social History     Substance and Sexual Activity   Drug Use Never    Types: Marijuana     Social History     Tobacco Use   Smoking Status Every Day    Current packs/day: 0.50    Average packs/day: 0.5 packs/day for 51.1 years (25.6 ttl pk-yrs)    Types: Cigarettes    Start date: 6/1/1973   Smokeless Tobacco Never   Tobacco Comments    Stopped smoking in 2014 because of cervical  surgeries. Started smoking again as soon as my body was     Family History   Problem Relation Age of Onset    Hypertension Mother          from massive heart attack, never took any medication for this condition    Heart attack Mother     Hyperlipidemia Mother         Blood tests done just prior to heart attack.  Results reported after her passing.    Migraines Mother          from massive cardiac arrest. Had abdominal aneurysm.    Hypertension Father         Had high blood pressure, unknown if he ever took medication    Cancer Father 72        Tumor in bowels, spread to liver, skin cancer    Colon polyps Father     Diabetes Sister         uses insulin    Arthritis Sister         Osteo arthritis    Hypertension Sister         Triple bypass    Diabetes Sister         pre-diabetic, oral medication    Arthritis Sister         Osteo arthritis    Hypertension Sister         Triple bypass    Irritable bowel syndrome Sister         Constipation    No Known Problems Daughter     No Known Problems Maternal Grandmother     No Known Problems Maternal Grandfather     No Known Problems Paternal Grandmother     No Known Problems Paternal Grandfather     Stroke Brother     Nephrolithiasis Brother     Cancer Brother 71        soft tissue cancer    Cancer Brother         Terminal soft tissue cancer     Hypertension Brother         Triple bypass    Migraines Brother         Severe. Standard meds do not help. Goes to [facility] for [facility]    Learning disabilities Brother         Mental retardation    Mental retardation Brother     No Known Problems Maternal Aunt     No Known Problems Maternal Aunt     No Known Problems Maternal Aunt     No Known Problems Paternal Aunt     No Known Problems Paternal Aunt     No Known Problems Paternal Aunt     Cancer Cousin         Pancreatic cancer    Cancer Cousin         Breast cancer, total mastectomy    Breast cancer Other 47    Migraines Maternal Aunt         Had 2.  from cerebral  "aneurysm    Migraines Maternal Aunt          from cerebral aneurysm    Migraines Maternal Aunt          from cerebral aneurysm    Migraines Maternal Uncle          from cerebral aneurysm     Allergies   Allergen Reactions    Armodafinil Other (See Comments)     Tardive dyskinesia - mouth movements    Celecoxib Other (See Comments)     Increased Heart Rate, Palpitations    Sumatriptan Anaphylaxis     Other reaction(s): SUMATRIPTAN (IMITREX) (Throat closure). Pt analilia zolmitriptan.    Tramadol Other (See Comments)     GI Upset- severe vomiting and systemic body aches    Medical Tape Dermatitis, Rash and Blisters     Adhesive from medication patches and bandaides- ok with paper tape          Physical Exam  /78   Pulse 81   Ht 5' 3\" (1.6 m)   Wt 57.6 kg (127 lb)   BMI 22.50 kg/m²     Constitutional:  see vital signs  Gen: well-developed, normocephalic/atraumatic, well-groomed  Pulmonary/Chest: Effort normal. No respiratory distress.     Ortho Exam  Left Knee Exam:  Erythema: no  Swellin+ nonpitting edema on the left lower extremity  Increased Warmth: no  Tenderness: +MJL  ROM: 0-130  Knee flexion strength: 5/5  Knee extension strength: 5/5  Patellar Grind: +  Lachman's: negative  Varus laxity: negative  Valgus laxity: negative  Hank: Positive pain when testing medial meniscus but without clicking sensation of her knee  Anterior drawer: Negative  Gait: Antalgic              Large joint arthrocentesis: L knee  Universal Protocol:  Consent: Verbal consent obtained.  Risks and benefits: risks, benefits and alternatives were discussed  Consent given by: patient  Patient understanding: patient states understanding of the procedure being performed  Site marked: the operative site was marked  Radiology Images displayed and confirmed. If images not available, report reviewed: imaging studies available  Patient identity confirmed: verbally with patient  Supporting Documentation  Indications: pain "   Procedure Details  Location: knee - L knee  Preparation: Patient was prepped and draped in the usual sterile fashion  Needle size: 22 G  Ultrasound guidance: no  Approach: anterolateral  Medications administered: 40 mg triamcinolone acetonide 40 mg/mL; 2 mL bupivacaine 0.25 %    Patient tolerance: patient tolerated the procedure well with no immediate complications  Dressing:  Sterile dressing applied

## 2024-07-29 DIAGNOSIS — I82.409 RECURRENT DEEP VEIN THROMBOSIS (DVT) (HCC): Primary | ICD-10-CM

## 2024-07-29 DIAGNOSIS — I82.442 ACUTE DEEP VEIN THROMBOSIS (DVT) OF TIBIAL VEIN OF LEFT LOWER EXTREMITY (HCC): ICD-10-CM

## 2024-08-02 ENCOUNTER — CLINICAL SUPPORT (OUTPATIENT)
Dept: RHEUMATOLOGY | Facility: CLINIC | Age: 64
End: 2024-08-02

## 2024-08-02 DIAGNOSIS — M05.79 RHEUMATOID ARTHRITIS INVOLVING MULTIPLE SITES WITH POSITIVE RHEUMATOID FACTOR (HCC): Primary | ICD-10-CM

## 2024-08-02 NOTE — PROGRESS NOTES
Assessment/Plan:    Lori Damon came into the [facility] Rheumatology Office today 08/02/24 to receive Prolia injection.      Verbal consent obtained.  Consent given by: patient    patient states patient has been medically healthy with no underlining concerns/complications.      Lori Damon presents with no symptoms today.       All insturctions were reviewed with the patient.    If the patient should have any questions/concerns, advised patient to contacted [facility] Rhe[facility] Office.       Subjective:     History provided by: patient    Patient ID: Lori Damon is a 63 y.o. female      Objective:    There were no vitals filed for this visit.    Patient tolerated the injection well without any complications.  Injection site/s right arm.  Medication was provided by provider stock.    Patient signed consent form no   Patient signed ABN form no (If no patient is not a medicare patient).   Patient waited 15 minutes after injection no (This only applies to patient's receiving first time injection).       Last Visit: 7/10/2024  Next visit:10/25/2024

## 2024-08-08 ENCOUNTER — HOSPITAL ENCOUNTER (OUTPATIENT)
Facility: MEDICAL CENTER | Age: 64
Discharge: HOME | End: 2024-08-08
Payer: COMMERCIAL

## 2024-08-08 DIAGNOSIS — G89.29 CHRONIC PAIN OF LEFT KNEE: ICD-10-CM

## 2024-08-08 DIAGNOSIS — M11.262 CHONDROCALCINOSIS OF LEFT KNEE: ICD-10-CM

## 2024-08-08 DIAGNOSIS — M25.562 CHRONIC PAIN OF LEFT KNEE: ICD-10-CM

## 2024-08-08 DIAGNOSIS — M17.12 PRIMARY OSTEOARTHRITIS OF LEFT KNEE: ICD-10-CM

## 2024-08-08 PROCEDURE — 73721 MRI JNT OF LWR EXTRE W/O DYE: CPT

## 2024-08-14 DIAGNOSIS — M79.2 NEUROPATHIC PAIN: ICD-10-CM

## 2024-08-15 ENCOUNTER — OFFICE VISIT (OUTPATIENT)
Age: 64
End: 2024-08-15
Payer: COMMERCIAL

## 2024-08-15 VITALS
HEART RATE: 73 BPM | WEIGHT: 129 LBS | DIASTOLIC BLOOD PRESSURE: 76 MMHG | BODY MASS INDEX: 22.86 KG/M2 | HEIGHT: 63 IN | SYSTOLIC BLOOD PRESSURE: 106 MMHG

## 2024-08-15 DIAGNOSIS — S83.242D ACUTE MENISCAL TEAR, MEDIAL, LEFT, SUBSEQUENT ENCOUNTER: ICD-10-CM

## 2024-08-15 DIAGNOSIS — G89.29 CHRONIC PAIN OF LEFT KNEE: Primary | ICD-10-CM

## 2024-08-15 DIAGNOSIS — M25.562 CHRONIC PAIN OF LEFT KNEE: Primary | ICD-10-CM

## 2024-08-15 DIAGNOSIS — M17.12 PRIMARY OSTEOARTHRITIS OF LEFT KNEE: ICD-10-CM

## 2024-08-15 PROCEDURE — 99213 OFFICE O/P EST LOW 20 MIN: CPT | Performed by: STUDENT IN AN ORGANIZED HEALTH CARE EDUCATION/TRAINING PROGRAM

## 2024-08-15 RX ORDER — GABAPENTIN 300 MG/1
300 CAPSULE ORAL 3 TIMES DAILY
Qty: 270 CAPSULE | Refills: 1 | Status: SHIPPED | OUTPATIENT
Start: 2024-08-15 | End: 2024-11-13

## 2024-08-15 NOTE — PROGRESS NOTES
1. Chronic pain of left knee  Ambulatory Referral to Orthopedic Surgery      2. Primary osteoarthritis of left knee  Ambulatory Referral to Orthopedic Surgery      3. Acute meniscal tear, medial, left, subsequent encounter  Ambulatory Referral to Orthopedic Surgery            Orders Placed This Encounter   Procedures    Ambulatory Referral to Orthopedic Surgery        Imaging Studies (I personally reviewed images in PACS and report):    MRI left knee without contrast 8/8/2024: Severe medial tibiofemoral joint space narrowing and patellofemoral compartments with only mild lateral tibiofemoral compartment narrowing.  Large complex medial meniscus tear.  Small Baker's cyst  X-ray left knee 3/21/2024  .  Mild tricompartmental osteoarthritic changes.  Meniscal chondrocalcinosis.  Atherosclerotic disease.  Duplex venous ultrasound lower extremity 3/21/2024: Evidence suggesting LEFT leg DVT as there was an error in the report previously.    IMPRESSION:  Chronic atraumatic left knee pain/left leg pain, left lower extremity edema  Radiographs note mild degenerative changes of her knee as well as meniscal chondrocalcinosis and atherosclerotic disease  Limited/brief relief from pes anserine/intra-articualar CSI in the past  Despite mild degenerative changes on radiographs, MRI obtained from last visit revealing severe degenerative changes of her knee along with complex medial meniscus tear likely degenerative given her arthritis    Other factors:  CKD3  Currently on Eliquis due to incidental DVT found of her left lower extremity  Fibromylagia  Patient notes today that for her lumbar spine/severe spondylosis, she would be unable to pursue a surgical intervention as she has not undergone formal physical therapy for this issue nor has she been able to quit smoking/tobacco use.    PLAN:    Clinical exam and radiographic imaging reviewed with patient today, with impression as per above. I have discussed with the patient the  "pathophysiology of this diagnosis and reviewed how the examination correlates with this diagnosis.    MRI reviewed as per above  Recommended her to be referred to orthopedic surgery going forward and patient agreeable as well - referral placed to Dr. Khan.    Return for Follow up with Dr. Khan from orthopedic surgery in [place].     Portions of the record may have been created with voice recognition software. Occasional wrong word or \"sound a like\" substitutions may have occurred due to the inherent limitations of voice recognition software. Read the chart carefully and recognize, using context, where substitutions have occurred.     CHIEF COMPLAINT:  Chief Complaint   Patient presents with    Left Knee - Follow-up     It's getting worse as soon as she wakes up after 30 mins the pain is to the point where she can't stand on it. Pain that grows all day and also she gets these shooting pains when walking right down the leg. Leg leg is very itchy also. Only that spot from knee down to ankle.         HPI:  Lori Damon is a 63 y.o. female  who presents in regards to     Visit 8/15/2024:  Follow up left knee pain:  MRI obtained since last visit and reviewed as per above  Reports worsening medial knee pain and swelling.  Aggravated while weightbearing and interferes with her sleep at night.  Reports knee swelling, stiffness.  Reports limping while walking.  Pain can sometimes radiate down her whole leg and her foot and feels itchiness/tingling sensations.  Denies new injury since last visit  Patient is open to surgical intervention if warranted at this point given her lack of improvement from prior intervention such as intra-articular cortisone injections and pes anserine injections in the past.    Visit 7/25/2024:  Follow-up evaluation of left knee/leg pain:  Of note, patient has a history of chronic low back pain/lumbar spondylosis with lumbar radiculopathy left worse than right, fibromyalgia, history of cervical " "spine fusion.  She has a history of DVT of her left lower extremity recently and is currently on Eliquis.  She also has a history of CKD 3.    Patient last seen on 4/18/2020 for which she was given a pes anserine cortisone injection.  Prior to that she was given an intra-articular cortisone injection.  Patient states she did sustain some relief of her knee pain for about 1 to 2 months but the pain progressively recurred again.  She states she also denies any much of a difference in regards to the edema of the left lower extremity.    She reports the pain has been much worse for the past 2 weeks despite not sustaining injury.  She describes it as a sharp/aching/shooting pain of her knee and leg.  She is pain is constantly present but further aggravated by weightbearing or walking.  Does not use any compression knee sleeve and does not feel it provides much relief she did wear it in the past.    Visit 4/18/2024:  Follow up left knee/leg pain  Of note, patient has a history of chronic low back pain/lumbar spondylosis with lumbar radiculopathy left worse than right, fibromyalgia, history of cervical spine fusion.  She has a history of DVT of her left lower extremity recently and is currently on Eliquis.  She also has a history of CKD 3.    No improvement from intra-articular CSI of the left knee  Continued worsening pain of medial knee concurrently with LLE shooting pain intermittently  Medial knee pain worsening with prolonged walking, sitting.  Patient is resistant to start formal PT as she is worried PT working on her left knee/left leg/low back would end up \"aggravating another issue.\"    Prior visit 4/1/2024 :  Initial evaluation of left knee pain:  Of note, patient has a history of chronic low back pain/lumbar spondylosis with lumbar radiculopathy left worse than right, fibromyalgia, history of cervical spine fusion.  She has a history of DVT of her left lower extremity recently and is currently on Eliquis.  She " "also has a history of CKD 3.  Ongoing issue for the past year but worsening over the past few months.  Denies precipitating injury of left knee pain. She states originally pain was of her thigh going down her leg into her foot.  She had seen urgent care recently and had imaging done as noted above.  She was placed on a steroid pack which did alleviate the pain while on the steroid pack but her pain slowly recurred again after completion.  She then saw the ER and had imaging of her lower extremities revealing a DVT of the left lower extremity in which she is currently on Eliquis.  However she is still frustrated due to the continuation of pain of her left knee, mainly over the medial aspect that she describes as a sharp pain that is constantly aggravated with any range of motion movements or activities even while resting. Reports swelling/stiffness of her knee. Denies discoloration of left knee. Reports +crepitus of her knee with ROM  She states she still does have pain from her low back radiating down her leg but feels her left medial knee pain is different.  She reports no relief with acetaminophen, icing/heat.  She states she is unable to tolerate wearing a compression sleeve for her knee due to her fibromyalgia feels that the tighter she applies the brace works with pain gets.  She denies prior surgeries of her left knee.  She states she does have history of a right knee replacement due to a \"birth deformity\".    In regards to her left knee, she has not received an injection of her knee cortisone previously.              Medical, Surgical, Family, and Social History    Past Medical History:   Diagnosis Date    Allergy to dust     Anemia     Calculus of ureter     Chronic kidney disease 11/2018    Hydrophenesis    Chronic pain disorder     neck and back- sees pain management    Cluster headache Teenager    May occur 2-3 times per year. Lasts several days    Colitis     Colon polyp     Cracked skin     on fingers " "   CTS (carpal tunnel syndrome) 01/01/2014    Diverticulitis of colon 2012    [facility] by [facility] Wilburn [facility] Inpatient at    Diverticulosis     Dysphagia     Fibromyalgia, primary 04/16/1996    GERD (gastroesophageal reflux disease)     GI (gastrointestinal bleed)     As a result of diverticulitis and microscopic colitis.    H/O: GI bleed     Head injury     Had numerious concussions    Headache, tension-type     Hiatal hernia     History of DVT in adulthood 2019 2019    HL (hearing loss) unknown    Hydronephrosis     Hyperlipidemia     Inguinal hernia     right side    Irregular heart beat     Pt states with prolonged QT interval - was seen by cardiologist( [facility]) pt told \"to not worry about it\"    Irritable bowel     Kidney stone     Lactose intolerance     Did not tolerate milk after being weaned from breast milk.    Memory loss 2016/2017    Medication/also related to medical conditions    Migraines     Narcolepsy     OAB (overactive bladder)     Osteoarthritis     Osteoporosis     Other chronic pain     degenerative disc disease    PONV (postoperative nausea and vomiting)     migraines    Pyelonephritis 01/13/2021    SBO (small bowel obstruction) ([facility]) 09/25/2021    Sepsis ([facility]) 01/13/2021    Shingles Unknown    Had mild cases of shingles several times.    Smoking 08/06/2020    Tobacco abuse     Vision loss Unknown    Occurs when headaches start behind my left eye.    Wears dentures     full upper    Wears glasses     Wears glasses      Past Surgical History:   Procedure Laterality Date    ABDOMINAL ADHESION SURGERY      ARM SKIN LESION BIOPSY / EXCISION      lymph node excision    BLADDER SURGERY      CHOLECYSTECTOMY      COLONOSCOPY  08/06/2020    FL RETROGRADE PYELOGRAM  01/05/2021    FLEXIBLE SIGMOIDOSCOPY      Last done at [facility] in 2018 after being admitted for GI bleed.    HERNIA REPAIR      HYSTERECTOMY  1977    JOINT REPLACEMENT Right     knee    KNEE SURGERY Right     replacement    LAMINECTOMY      L5-6-7 "    LAPAROSCOPY      LYMPH NODE DISSECTION Left     removed -no cancer-  limb restriction    LYMPHADENECTOMY      UNDER LEFT ARM - DO NOT DRAW BLOOD/GIVE INJECTIONS IN LEFT ARM PER PATIENT     MOLE REMOVAL      OOPHORECTOMY Bilateral     OTHER SURGICAL HISTORY      enlarged lymph node removed left arm..no cancer    CA CYSTO BLADDER W/URETERAL CATHETERIZATION Right 2020    Procedure: CYSTOSCOPY AND INSERTION STENT URETERAL;  Surgeon: Rory Fall MD;  Location: [facility];  Service: Urology    CA CYSTO/URETERO W/LITHOTRIPSY &INDWELL STENT INSRT Right 2021    Procedure: CYSTO, LASER  URETEROSCOPY, RETRO PYELOGRAM, STENT REPLACMENT;  Surgeon: Adriel Subramanian MD;  Location: [facility];  Service: Urology    CA LITHOTRIPSY XTRCORP SHOCK WAVE Right 2021    Procedure: LITHOTRIPSY EXTRACORPORAL SHOCKWAVE (ESWL);  Surgeon: Darwin Montero MD;  Location:  MAIN OR;  Service: Urology    CA RPR 1ST INGUN HRNA AGE 3 YRS/> REDUCIBLE Right 2020    Procedure: REPAIR HERNIA INGUINAL  WITH MESH;  Surgeon: Mercedes Justin MD;  Location: [facility];  Service: General    SPINAL FUSION      UPPER GASTROINTESTINAL ENDOSCOPY  2020    WISDOM TOOTH EXTRACTION       Social History   Social History     Substance and Sexual Activity   Alcohol Use Not Currently    Comment: May have an occasional drink when di     Social History     Substance and Sexual Activity   Drug Use Never    Types: Marijuana     Social History     Tobacco Use   Smoking Status Every Day    Current packs/day: 0.50    Average packs/day: 0.5 packs/day for 51.2 years (25.6 ttl pk-yrs)    Types: Cigarettes    Start date: 1973    Passive exposure: Current   Smokeless Tobacco Never   Tobacco Comments    Stopped smoking in  because of cervical surgeries. Started smoking again as soon as my body was     Family History   Problem Relation Age of Onset    Hypertension Mother          from massive heart attack, never took any medication  for this condition    Heart attack Mother     Hyperlipidemia Mother         Blood tests done just prior to heart attack.  Results reported after her passing.    Migraines Mother          from massive cardiac arrest. Had abdominal aneurysm.    Hypertension Father         Had high blood pressure, unknown if he ever took medication    Cancer Father 72        Tumor in bowels, spread to liver, skin cancer    Colon polyps Father     Diabetes Sister         uses insulin    Arthritis Sister         Osteo arthritis    Hypertension Sister         Triple bypass    Diabetes Sister         pre-diabetic, oral medication    Arthritis Sister         Osteo arthritis    Hypertension Sister         Triple bypass    Irritable bowel syndrome Sister         Constipation    No Known Problems Daughter     No Known Problems Maternal Grandmother     No Known Problems Maternal Grandfather     No Known Problems Paternal Grandmother     No Known Problems Paternal Grandfather     Stroke Brother     Nephrolithiasis Brother     Cancer Brother 71        soft tissue cancer    Cancer Brother         Terminal soft tissue cancer     Hypertension Brother         Triple bypass    Migraines Brother         Severe. Standard meds do not help. Goes to [facility] for I    Learning disabilities Brother         Mental retardation    Mental retardation Brother     No Known Problems Maternal Aunt     No Known Problems Maternal Aunt     No Known Problems Maternal Aunt     No Known Problems Paternal Aunt     No Known Problems Paternal Aunt     No Known Problems Paternal Aunt     Cancer Cousin         Pancreatic cancer    Cancer Cousin         Breast cancer, total mastectomy    Breast cancer Other 47    Migraines Maternal Aunt         Had 2.  from cerebral aneurysm    Migraines Maternal Aunt          from cerebral aneurysm    Migraines Maternal Aunt          from cerebral aneurysm    Migraines Maternal Uncle          from cerebral  "aneurysm     Allergies   Allergen Reactions    Armodafinil Other (See Comments)     Tardive dyskinesia - mouth movements    Celecoxib Other (See Comments)     Increased Heart Rate, Palpitations    Sumatriptan Anaphylaxis     Other reaction(s): SUMATRIPTAN (IMITREX) (Throat closure). Pt analilia zolmitriptan.    Tramadol Other (See Comments)     GI Upset- severe vomiting and systemic body aches    Medical Tape Dermatitis, Rash and Blisters     Adhesive from medication patches and bandaides- ok with paper tape          Physical Exam  /76   Pulse 73   Ht 5' 3\" (1.6 m)   Wt 58.5 kg (129 lb)   BMI 22.85 kg/m²     Constitutional:  see vital signs  Gen: well-developed, normocephalic/atraumatic, well-groomed  Pulmonary/Chest: Effort normal. No respiratory distress.     Ortho Exam  Left Knee Exam:  Erythema: no  Swellin+ nonpitting edema on the left lower extremity  Increased Warmth: no  Tenderness: +MJL  ROM: 0-110  Knee flexion strength: 4/5  Knee extension strength: 5/5    Gait: antalgic          Procedures        "

## 2024-09-17 ENCOUNTER — OFFICE VISIT (OUTPATIENT)
Age: 64
End: 2024-09-17
Payer: COMMERCIAL

## 2024-09-17 VITALS
SYSTOLIC BLOOD PRESSURE: 122 MMHG | DIASTOLIC BLOOD PRESSURE: 78 MMHG | WEIGHT: 129 LBS | HEIGHT: 63 IN | BODY MASS INDEX: 22.86 KG/M2

## 2024-09-17 DIAGNOSIS — Z72.0 TOBACCO ABUSE: ICD-10-CM

## 2024-09-17 DIAGNOSIS — M17.12 PRIMARY OSTEOARTHRITIS OF LEFT KNEE: Primary | ICD-10-CM

## 2024-09-17 DIAGNOSIS — S83.242D ACUTE MENISCAL TEAR, MEDIAL, LEFT, SUBSEQUENT ENCOUNTER: ICD-10-CM

## 2024-09-17 DIAGNOSIS — M54.16 LUMBAR RADICULOPATHY: ICD-10-CM

## 2024-09-17 DIAGNOSIS — M25.562 CHRONIC PAIN OF LEFT KNEE: ICD-10-CM

## 2024-09-17 DIAGNOSIS — G89.29 CHRONIC PAIN OF LEFT KNEE: ICD-10-CM

## 2024-09-17 PROCEDURE — 99214 OFFICE O/P EST MOD 30 MIN: CPT | Performed by: STUDENT IN AN ORGANIZED HEALTH CARE EDUCATION/TRAINING PROGRAM

## 2024-09-17 NOTE — TELEPHONE ENCOUNTER
Metaxalone refill request forwarded to the doctor to review. Patient last had it filled at [facility] in [place].

## 2024-09-17 NOTE — PROGRESS NOTES
Knee New Office Note    Assessment:     1. Primary osteoarthritis of left knee    2. Chronic pain of left knee    3. Acute meniscal tear, medial, left, subsequent encounter        Plan:   Diagnoses and all orders for this visit:    Primary osteoarthritis of left knee  -     Ambulatory Referral to Orthopedic Surgery  -     Ambulatory Referral to Hematology / Oncology; Future    Chronic pain of left knee  -     Ambulatory Referral to Orthopedic Surgery  -     Ambulatory Referral to Hematology / Oncology; Future    Acute meniscal tear, medial, left, subsequent encounter  -     Ambulatory Referral to Orthopedic Surgery  -     Ambulatory Referral to Hematology / Oncology; Future     We discussed with the patient that due to history of recent DVT, we would like her to obtain preoperative clearance from her hematologist prior to scheduling anything such as surgical intervention.  Patient has previously tried and failed corticosteroid injections as well as hyaluronic acid injections.  Patient at this point is unable to go to work or do most of the things she likes to do.  She would be a surgical candidate for surgical intervention in the form of a left total knee arthroplasty if she is able to obtain clearance.  We have referred her to a hematologist so that she may get further worked up and discuss if pre-op AC bridge is indicated. Will see the patient back after her hematology appointment to discuss possibility of surgical intervention.  Patient understood and agreed with the plan.    Subjective:     Patient ID: Lori Damon is a 63 y.o. female.  Chief Complaint:  HPI:  63 y.o. female here for initial evaluation for left knee pain.  Patient has previously seen Dr. Huston.  She has been having left knee pain for the past year, which has gotten especially worse over the past 6 months.  She denies any traumatic events.  Patient works at a [employer], however she has not been able to work over the last month and a half due to  issues with bearing weight on her left knee.  She has resorted to using a cane for long distance walking.  She alleviates her pain with aspirin.  She has history of multiple DVTs and is now on lifetime Eliquis.  She has tried and failed corticosteroid injection as well as hyaluronic acid injections.  She has never tried physical therapy.  Patient states that the left knee pain is excruciating and she has had an MRI that demonstrates complex tear of medial meniscus on the left side as well as tricompartmental degenerative changes.  She was referred to here for further evaluation.      Allergy:  Allergies   Allergen Reactions    Armodafinil Other (See Comments)     Tardive dyskinesia - mouth movements    Celecoxib Other (See Comments)     Increased Heart Rate, Palpitations    Sumatriptan Anaphylaxis     Other reaction(s): SUMATRIPTAN (IMITREX) (Throat closure). Pt analilia zolmitriptan.    Tramadol Other (See Comments)     GI Upset- severe vomiting and systemic body aches    Medical Tape Dermatitis, Rash and Blisters     Adhesive from medication patches and bandaides- ok with paper tape     Medications:  all current active meds have been reviewed  Past Medical History:  Past Medical History:   Diagnosis Date    Allergy to dust     Anemia     Calculus of ureter     Chronic kidney disease 11/2018    Hydrophenesis    Chronic pain disorder     neck and back- sees pain management    Cluster headache Teenager    May occur 2-3 times per year. Lasts several days    Colitis     Colon polyp     Cracked skin     on fingers    CTS (carpal tunnel syndrome) 01/01/2014    Diverticulitis of colon 2012    Seen by Dr. Wilburn at [facility]. Inpatient at    Diverticulosis     Dysphagia     Fibromyalgia, primary 04/16/1996    GERD (gastroesophageal reflux disease)     GI (gastrointestinal bleed)     As a result of diverticulitis and microscopic colitis.    H/O: GI bleed     Head injury     Had numerious concussions    Headache, tension-type      "Hiatal hernia     History of DVT in adulthood 2019 2019    HL (hearing loss) unknown    Hydronephrosis     Hyperlipidemia     Inguinal hernia     right side    Irregular heart beat     Pt states with prolonged QT interval - was seen by cardiologist( [facility]) pt told \"to not worry about it\"    Irritable bowel     Kidney stone     Lactose intolerance     Did not tolerate milk after being weaned from breast milk.    Memory loss 2016/2017    Medication/also related to medical conditions    Migraines     Narcolepsy     OAB (overactive bladder)     Osteoarthritis     Osteoporosis     Other chronic pain     degenerative disc disease    PONV (postoperative nausea and vomiting)     migraines    Pyelonephritis 01/13/2021    SBO (small bowel obstruction) ([facility]) 09/25/2021    Sepsis ([facility]) 01/13/2021    Shingles Unknown    Had mild cases of shingles several times.    Smoking 08/06/2020    Tobacco abuse     Vision loss Unknown    Occurs when headaches start behind my left eye.    Wears dentures     full upper    Wears glasses     Wears glasses      Past Surgical History:  Past Surgical History:   Procedure Laterality Date    ABDOMINAL ADHESION SURGERY      ARM SKIN LESION BIOPSY / EXCISION      lymph node excision    BLADDER SURGERY      CHOLECYSTECTOMY      COLONOSCOPY  08/06/2020    FL RETROGRADE PYELOGRAM  01/05/2021    FLEXIBLE SIGMOIDOSCOPY      Last done at [facility] in 2018 after being admitted for GI bleed.    HERNIA REPAIR      HYSTERECTOMY  1977    JOINT REPLACEMENT Right     knee    KNEE SURGERY Right     replacement    LAMINECTOMY      L5-6-7    LAPAROSCOPY      LYMPH NODE DISSECTION Left     removed -no cancer-  limb restriction    LYMPHADENECTOMY  1974    UNDER LEFT ARM - DO NOT DRAW BLOOD/GIVE INJECTIONS IN LEFT ARM PER PATIENT     MOLE REMOVAL      OOPHORECTOMY Bilateral 1977    OTHER SURGICAL HISTORY      enlarged lymph node removed left arm..no cancer    TX CYSTO BLADDER W/URETERAL CATHETERIZATION Right 12/02/2020    " Procedure: CYSTOSCOPY AND INSERTION STENT URETERAL;  Surgeon: Rory Fall MD;  Location: [facility];  Service: Urology    ME CYSTO/URETERO W/LITHOTRIPSY &INDWELL STENT INSRT Right 2021    Procedure: CYSTO, LASER  URETEROSCOPY, RETRO PYELOGRAM, STENT REPLACMENT;  Surgeon: Adriel Subramanian MD;  Location: [facility];  Service: Urology    ME LITHOTRIPSY XTRCORP SHOCK WAVE Right 2021    Procedure: LITHOTRIPSY EXTRACORPORAL SHOCKWAVE (ESWL);  Surgeon: Darwin Montero MD;  Location:  MAIN OR;  Service: Urology    ME RPR 1ST INGUN HRNA AGE 5 YRS/> REDUCIBLE Right 2020    Procedure: REPAIR HERNIA INGUINAL  WITH MESH;  Surgeon: Mercedes Justin MD;  Location: [facility];  Service: General    SPINAL FUSION      UPPER GASTROINTESTINAL ENDOSCOPY  2020    WISDOM TOOTH EXTRACTION       Family History:  Family History   Problem Relation Age of Onset    Hypertension Mother          from massive heart attack, never took any medication for this condition    Heart attack Mother     Hyperlipidemia Mother         Blood tests done just prior to heart attack.  Results reported after her passing.    Migraines Mother          from massive cardiac arrest. Had abdominal aneurysm.    Hypertension Father         Had high blood pressure, unknown if he ever took medication    Cancer Father 72        Tumor in bowels, spread to liver, skin cancer    Colon polyps Father     Diabetes Sister         uses insulin    Arthritis Sister         Osteo arthritis    Hypertension Sister         Triple bypass    Diabetes Sister         pre-diabetic, oral medication    Arthritis Sister         Osteo arthritis    Hypertension Sister         Triple bypass    Irritable bowel syndrome Sister         Constipation    No Known Problems Daughter     No Known Problems Maternal Grandmother     No Known Problems Maternal Grandfather     No Known Problems Paternal Grandmother     No Known Problems Paternal Grandfather     Stroke Brother      Nephrolithiasis Brother     Cancer Brother 71        soft tissue cancer    Cancer Brother         Terminal soft tissue cancer     Hypertension Brother         Triple bypass    Migraines Brother         Severe. Standard meds do not help. Goes to [facility] for I    Learning disabilities Brother         Mental retardation    Mental retardation Brother     No Known Problems Maternal Aunt     No Known Problems Maternal Aunt     No Known Problems Maternal Aunt     No Known Problems Paternal Aunt     No Known Problems Paternal Aunt     No Known Problems Paternal Aunt     Cancer Cousin         Pancreatic cancer    Cancer Cousin         Breast cancer, total mastectomy    Breast cancer Other 47    Migraines Maternal Aunt         Had 2.  from cerebral aneurysm    Migraines Maternal Aunt          from cerebral aneurysm    Migraines Maternal Aunt          from cerebral aneurysm    Migraines Maternal Uncle          from cerebral aneurysm     Social History:  Social History     Substance and Sexual Activity   Alcohol Use Not Currently    Comment: May have an occasional drink when di     Social History     Substance and Sexual Activity   Drug Use Never    Types: Marijuana     Social History     Tobacco Use   Smoking Status Every Day    Current packs/day: 0.50    Average packs/day: 0.5 packs/day for 51.3 years (25.6 ttl pk-yrs)    Types: Cigarettes    Start date: 1973    Passive exposure: Current   Smokeless Tobacco Never   Tobacco Comments    Stopped smoking in  because of cervical surgeries. Started smoking again as soon as my body was           ROS:  General: Per HPI  Skin: Negative, except if noted below  HEENT: Negative  Respiratory: Negative  Cardiovascular: Negative  Gastrointestinal: Negative  Urinary: Negative  Vascular: Negative  Musculoskeletal: Positive per HPI   Neurologic: Positive per HPI  Endocrine: Negative    Objective:  BP Readings from Last 1 Encounters:   24 122/78     "  Wt Readings from Last 1 Encounters:   09/17/24 58.5 kg (129 lb)        Respiratory:   non-labored respirations    Lymphatics:  no palpable lymph nodes    Gait:   Antalgic    Neurologic:   Alert and oriented times 3  Patient with normal sensation except as noted below  Deep tendon reflexes 2+ except as noted in MSK exam    Lower Extremity:  Left Knee:      Inspection:  skin intact    Overall limb alignment varus    Effusion: mild    ROM full without pain    Extensor Lag: none    Palpation: medial Joint line tenderness to palpation    AP Stability at 90 deg stable    M/L stability in full extension stable    M/L stability in midflexion stable    Motor: 5/5 Q/HS/TA/GS/P    Pulses: 2+ DP / 2+ PT    SILT DP/SP/S/S/TN    Imaging:  My interpretation XR AP scanogram/AP bilateral knee/lateral/forrester/sunrise left knee: mild joint space narrowing, subchondral sclerosis, subchondral cysts, osteophyte formation. No fracture or dislocation. (Non weight-bearing films)    MRI left knee demonstrates severe medial tibiofemoral compartment and patellofemoral compartment osteoarthritis and mild lateral tibiofemoral compartment osteoarthritis as well as large complex tear of the medial meniscus posterior horn near the posterior meniscal root.    BMI:   Estimated body mass index is 22.85 kg/m² as calculated from the following:    Height as of this encounter: 5' 3\" (1.6 m).    Weight as of this encounter: 58.5 kg (129 lb).  BSA:   Estimated body surface area is 1.6 meters squared as calculated from the following:    Height as of this encounter: 5' 3\" (1.6 m).    Weight as of this encounter: 58.5 kg (129 lb).           Scribe Attestation      I,:   am acting as a scribe while in the presence of the attending physician.:       I,:   personally performed the services described in this documentation    as scribed in my presence.:              "

## 2024-09-18 RX ORDER — METAXALONE 800 MG/1
400 TABLET ORAL 3 TIMES DAILY
Qty: 135 TABLET | Refills: 0 | Status: SHIPPED | OUTPATIENT
Start: 2024-09-18

## 2024-09-18 RX ORDER — BUPROPION HYDROCHLORIDE 150 MG/1
150 TABLET ORAL EVERY MORNING
Qty: 90 TABLET | Refills: 0 | Status: SHIPPED | OUTPATIENT
Start: 2024-09-18

## 2024-09-18 RX ORDER — METAXALONE 800 MG/1
400 TABLET ORAL 3 TIMES DAILY
Qty: 135 TABLET | Refills: 0 | OUTPATIENT
Start: 2024-09-18

## 2024-09-20 ENCOUNTER — OFFICE VISIT (OUTPATIENT)
Dept: FAMILY MEDICINE CLINIC | Facility: CLINIC | Age: 64
End: 2024-09-20
Payer: COMMERCIAL

## 2024-09-20 VITALS
BODY MASS INDEX: 23.5 KG/M2 | HEIGHT: 63 IN | SYSTOLIC BLOOD PRESSURE: 126 MMHG | TEMPERATURE: 97.5 F | DIASTOLIC BLOOD PRESSURE: 78 MMHG | OXYGEN SATURATION: 97 % | WEIGHT: 132.6 LBS | HEART RATE: 79 BPM

## 2024-09-20 DIAGNOSIS — R21 RASH: Primary | ICD-10-CM

## 2024-09-20 PROCEDURE — 87070 CULTURE OTHR SPECIMN AEROBIC: CPT | Performed by: FAMILY MEDICINE

## 2024-09-20 PROCEDURE — 99213 OFFICE O/P EST LOW 20 MIN: CPT | Performed by: FAMILY MEDICINE

## 2024-09-20 PROCEDURE — G2211 COMPLEX E/M VISIT ADD ON: HCPCS | Performed by: FAMILY MEDICINE

## 2024-09-20 PROCEDURE — 87205 SMEAR GRAM STAIN: CPT | Performed by: FAMILY MEDICINE

## 2024-09-20 RX ORDER — MUPIROCIN 20 MG/G
OINTMENT TOPICAL 3 TIMES DAILY
Qty: 30 G | Refills: 0 | Status: SHIPPED | OUTPATIENT
Start: 2024-09-20

## 2024-09-20 RX ORDER — PREDNISONE 10 MG/1
TABLET ORAL
Qty: 20 TABLET | Refills: 0 | Status: SHIPPED | OUTPATIENT
Start: 2024-09-20 | End: 2024-09-28

## 2024-09-20 NOTE — PROGRESS NOTES
"Ambulatory Visit  Name: Lori Damon      : 1960      MRN: [id number]  Encounter Provider: Naty Kenney MD  Encounter Date: 2024   Encounter department: [facility]    Assessment & Plan  Rash  Wound culture collected. Start prednisone taper and mupirocin ointment TID.   Orders:    predniSONE 10 mg tablet; Take 4 tablets (40 mg total) by mouth daily for 2 days, THEN 3 tablets (30 mg total) daily for 2 days, THEN 2 tablets (20 mg total) daily for 2 days, THEN 1 tablet (10 mg total) daily for 2 days.    mupirocin (BACTROBAN) 2 % ointment; Apply topically 3 (three) times a day    Wound culture and Gram stain       History of Present Illness   Lori Damon is a very pleasant 63 year old female who presents today with a chief complaint of a rash on her right forearm which started a weeks ago. Patient was doing some gardening and pulled a tree which was next to poison ivy. She states that the rash started as small vesicles before spreading. She reports that the rash is very itchy and has been oozing as well. She tried putting hydrocortisone cream and benadryl with minimal relief.       Review of Systems   Constitutional: Negative.    HENT: Negative.     Eyes: Negative.    Respiratory: Negative.     Cardiovascular: Negative.    Gastrointestinal: Negative.    Genitourinary: Negative.    Musculoskeletal: Negative.    Skin:  Positive for rash.   Neurological: Negative.    Psychiatric/Behavioral: Negative.             Objective     /78 (BP Location: Left arm, Patient Position: Sitting, Cuff Size: Standard)   Pulse 79   Temp 97.5 °F (36.4 °C) (Temporal)   Ht 5' 3\" (1.6 m)   Wt 60.1 kg (132 lb 9.6 oz)   SpO2 97%   BMI 23.49 kg/m²     Physical Exam  Constitutional:       General: She is not in acute distress.  HENT:      Head: Normocephalic and atraumatic.   Eyes:      General:         Right eye: No discharge.         Left eye: No discharge.      Extraocular Movements: Extraocular " movements intact.   Cardiovascular:      Rate and Rhythm: Normal rate.   Pulmonary:      Effort: Pulmonary effort is normal. No respiratory distress.   Skin:     Findings: Rash present.   Neurological:      General: No focal deficit present.      Mental Status: She is alert.   Psychiatric:         Mood and Affect: Mood normal.         Behavior: Behavior normal.

## 2024-09-22 LAB
BACTERIA WND AEROBE CULT: NORMAL
GRAM STN SPEC: NORMAL

## 2024-10-07 ENCOUNTER — RA CDI HCC (OUTPATIENT)
Dept: OTHER | Facility: HOSPITAL | Age: 64
End: 2024-10-07

## 2024-10-16 ENCOUNTER — OFFICE VISIT (OUTPATIENT)
Dept: NEUROLOGY | Facility: CLINIC | Age: 64
End: 2024-10-16
Payer: COMMERCIAL

## 2024-10-16 VITALS
BODY MASS INDEX: 23.52 KG/M2 | HEART RATE: 64 BPM | TEMPERATURE: 98.1 F | WEIGHT: 132.8 LBS | SYSTOLIC BLOOD PRESSURE: 116 MMHG | DIASTOLIC BLOOD PRESSURE: 76 MMHG | OXYGEN SATURATION: 97 %

## 2024-10-16 DIAGNOSIS — G24.01 TARDIVE DYSKINESIA: ICD-10-CM

## 2024-10-16 DIAGNOSIS — G43.009 MIGRAINE WITHOUT AURA AND WITHOUT STATUS MIGRAINOSUS, NOT INTRACTABLE: ICD-10-CM

## 2024-10-16 PROCEDURE — 99213 OFFICE O/P EST LOW 20 MIN: CPT | Performed by: PSYCHIATRY & NEUROLOGY

## 2024-10-16 RX ORDER — ZOLMITRIPTAN 2.5 MG/1
TABLET, FILM COATED ORAL
Qty: 16 TABLET | Refills: 6 | Status: SHIPPED | OUTPATIENT
Start: 2024-10-16

## 2024-10-16 NOTE — ASSESSMENT & PLAN NOTE
She does utilize amantadine 100 mg a day with benefit.  The tardive dyskinesia symptoms have since improved.

## 2024-10-16 NOTE — PROGRESS NOTES
Ambulatory Visit  Name: Lori Damon      : 1960      MRN: [id number]  Encounter Provider: Juana Alvarez DO  Encounter Date: 10/16/2024   Encounter department: [facility] [place]    Assessment & Plan  Tardive dyskinesia  She does utilize amantadine 100 mg a day with benefit.  The tardive dyskinesia symptoms have since improved.         Migraine without aura and without status migrainosus, not intractable    Orders:    ZOLMitriptan (ZOMIG) 2.5 MG tablet; TAKE 1 TABLET BY MOUTH AT ONSET OF MIGRAINE, MAY REPEAT IN 2 HOURS IF NEEDED. LIMIT 2/24 HOURS, 4/WEEK, 8/MONTH    Lori is a 64-year-old patient with migraine headaches.  She currently utilizes well LA 60 mg a day.  Recently after hitting her head she had a headache lasting 5 days requiring the use of Zomig.  The headache frequency did creased.  We had a discussion regarding use of Inderal.  We have decided to increase her Inderal to 80 a day.  She can use half of the 40 she has available at home with a 60 LA.  This is ineffective she can also try alternating the 40 mg tablets every other day with 60 LA daily.  If this is beneficial and reduces her migraines frequency we can provide her a prescription for LA 80mg she has  not required to use a prednisone.        Lori reports her migraines have improved in frequency since initiating Inderal LA.  She has them several times a month which usually are resolved with the use of Zomig recently after hitting her head her migraines lasted for 5 days requiring the use of Zomig.  We did discuss the issue with Zomig overuse.  Since initiating the Inderal 60 LA her migraines frequency has improved from once or twice a week to 4-5 times a month.  She is able to tolerate Inderal.     She has knee pain and she is getting injections in the knee.  This is affecting her ability to ambulate and to work.  Is unable to stand for more than 15 minutes at a time.      Her neck pain has improved.  Migraine headaches are behind the eyes 5% of the time, and 95% of the time are located in the left apex region of the head.  She has associated neck and back pain which radiates up to the front of the head.  She very seldom has nausea with a migraine unless severe.  She prefers not to be around in noisy environment when she has a migraine.  Migraine is characterized by a thumping and throbbing sensation.  She also has difficulty with concentration and focus.  She tries to give herself something to do to distract herself from the headache and other symptoms, and tries to keep her mind busy which sometimes helps.     Trigger point injections performed at the last visit were not  helpful         She does have tremors in her hands when she is stressed.  There is no resting tremor.      She did have mouth movements associated tardive dyskinesia this is improved with the use of amantadine.  She had previously been exposed to Compazine and Reglan.  She has an upper lip tremor more so on the right side but this is significantly improved with amantadine.  She denies side effects to amantadine.             Review of Systems   Constitutional:  Negative for appetite change, fatigue and fever.   HENT: Negative.  Negative for hearing loss, tinnitus, trouble swallowing and voice change.    Eyes: Negative.  Negative for photophobia, pain and visual disturbance.   Respiratory: Negative.  Negative for shortness of breath.    Cardiovascular: Negative.  Negative for palpitations.   Gastrointestinal:  Positive for nausea (with migraines). Negative for vomiting.   Endocrine: Negative.  Negative for cold intolerance.   Genitourinary: Negative.  Negative for dysuria, frequency and urgency.   Musculoskeletal:  Positive for neck pain. Negative for back pain, gait problem, myalgias and neck stiffness.   Skin: Negative.  Negative for rash.   Allergic/Immunologic: Negative.    Neurological:  Positive for headaches (1-2 per week). Negative for  dizziness, tremors, seizures, syncope, facial asymmetry, speech difficulty, weakness, light-headedness and numbness.   Hematological: Negative.  Does not bruise/bleed easily.   Psychiatric/Behavioral: Negative.  Negative for confusion, hallucinations and sleep disturbance.      I have personally reviewed the MA's review of systems and made changes as necessary.        Past medical history social history and family history was reviewed.    Current Outpatient Medications on File Prior to Visit   Medication Sig Dispense Refill    amantadine (SYMMETREL) 100 mg capsule Take 1 capsule (100 mg total) by mouth in the morning 90 capsule 1    apixaban (ELIQUIS) 5 mg Take 1 tablet (5 mg total) by mouth 2 (two) times a day 180 tablet 3    aspirin 500 MG buffered tablet Take 1,000 mg by mouth every 6 (six) hours as needed for mild pain 4,000 mg daily for Pain      buPROPion (WELLBUTRIN XL) 150 mg 24 hr tablet Take 1 tablet (150 mg total) by mouth every morning 90 tablet 0    calcium carbonate 1250 MG capsule Take 600 mg by mouth daily       Cholecalciferol 1000 units tablet Take 1,000 Units by mouth daily Takes in the afternoon      gabapentin (NEURONTIN) 300 mg capsule Take 1 capsule (300 mg total) by mouth 3 (three) times a day 270 capsule 1    Ginger, Zingiber officinalis, (GINGER ROOT PO) Take 700 mg by mouth 2 (two) times a day       metaxalone (SKELAXIN) 800 mg tablet Take 0.5 tablets (400 mg total) by mouth 3 (three) times a day 135 tablet 0    Multiple Vitamins-Minerals (CENTRUM SILVER PO) Take 1 tablet by mouth daily Takes in the am      ondansetron (ZOFRAN) 4 mg tablet Take 1 tablet (4 mg total) by mouth every 8 (eight) hours as needed for nausea or vomiting 20 tablet 1    predniSONE 5 mg tablet 1 tab in the AM prn migraine (if zomig fails) 20 tablet 0    propranolol (INDERAL LA) 60 mg 24 hr capsule Take 1 capsule (60 mg total) by mouth daily 90 capsule 1    trospium chloride (SANCTURA) 20 mg tablet Take 1 tablet (20  mg total) by mouth 2 (two) times a day 180 tablet 1    Turmeric 500 MG CAPS Take 700 mg by mouth 2 (two) times a day      [DISCONTINUED] ZOLMitriptan (ZOMIG) 2.5 MG tablet TAKE 1 TABLET BY MOUTH AT ONSET OF MIGRAINE, MAY REPEAT IN 2 HOURS IF NEEDED. LIMIT 2/24 HOURS, 4/WEEK, 8/MONTH 16 tablet 6    mupirocin (BACTROBAN) 2 % ointment Apply topically 3 (three) times a day (Patient not taking: Reported on 10/16/2024) 30 g 0     No current facility-administered medications on file prior to visit.      Social History     Tobacco Use    Smoking status: Every Day     Current packs/day: 0.50     Average packs/day: 0.5 packs/day for 51.4 years (25.7 ttl pk-yrs)     Types: Cigarettes     Start date: 6/1/1973     Passive exposure: Current    Smokeless tobacco: Never    Tobacco comments:     Stopped smoking in 2014 because of cervical surgeries. Started smoking again as soon as my body was   Vaping Use    Vaping status: Never Used   Substance and Sexual Activity    Alcohol use: Not Currently     Comment: May have an occasional drink when di    Drug use: Never     Types: Marijuana    Sexual activity: Not Currently     Partners: Male     Birth control/protection: Post-menopausal       /76 (BP Location: Right arm, Patient Position: Sitting, Cuff Size: Adult)   Pulse 64   Temp 98.1 °F (36.7 °C) (Temporal)   Wt 60.2 kg (132 lb 12.8 oz)   SpO2 97%   BMI 23.52 kg/m²     Physical Exam  Neurological:      Mental Status: She is oriented to person, place, and time.      Motor: Motor strength is normal.     Deep Tendon Reflexes:      Reflex Scores:       Patellar reflexes are 2+ on the right side and 2+ on the left side.       Achilles reflexes are 0 on the right side and 0 on the left side.      Neurologic Exam     Mental Status   Oriented to person, place, and time.     Cranial Nerves     CN II   Visual fields full to confrontation.     Motor Exam   Muscle bulk: normal  Overall muscle tone: normal    Strength   Strength 5/5  throughout.     Sensory Exam   Light touch normal.     Gait, Coordination, and Reflexes     Reflexes   Right patellar: 2+  Left patellar: 2+  Right achilles: 0  Left achilles: 0  Right Regalado: absent  Left Regalado: absentAntalgic gait because of her knees.       .I have spent a total time of 24 minutes in caring for this patient on the day of the visit/encounter including Counseling / Coordination of care, Documenting in the medical record, Reviewing / ordering tests, medicine, procedures  , and Obtaining or reviewing history  .       She can follow-up in our offices in 6 months

## 2024-10-16 NOTE — ASSESSMENT & PLAN NOTE
Orders:    ZOLMitriptan (ZOMIG) 2.5 MG tablet; TAKE 1 TABLET BY MOUTH AT ONSET OF MIGRAINE, MAY REPEAT IN 2 HOURS IF NEEDED. LIMIT 2/24 HOURS, 4/WEEK, 8/MONTH    Lori is a 64-year-old patient with migraine headaches.  She currently utilizes well LA 60 mg a day.  Recently after hitting her head she had a headache lasting 5 days requiring the use of Zomig.  The headache frequency did creased.  We had a discussion regarding use of Inderal.  We have decided to increase her Inderal to 80 a day.  She can use half of the 40 she has available at home with a 60 LA.  This is ineffective she can also try alternating the 40 mg tablets every other day with 60 LA daily.  If this is beneficial and reduces her migraines frequency we can provide her a prescription for LA 80mg she has  not required to use a prednisone.

## 2024-10-17 ENCOUNTER — OFFICE VISIT (OUTPATIENT)
Dept: FAMILY MEDICINE CLINIC | Facility: CLINIC | Age: 64
End: 2024-10-17
Payer: COMMERCIAL

## 2024-10-17 VITALS
WEIGHT: 134 LBS | HEIGHT: 63 IN | SYSTOLIC BLOOD PRESSURE: 112 MMHG | OXYGEN SATURATION: 98 % | TEMPERATURE: 98.3 F | DIASTOLIC BLOOD PRESSURE: 66 MMHG | BODY MASS INDEX: 23.74 KG/M2 | HEART RATE: 77 BPM

## 2024-10-17 DIAGNOSIS — Z13.1 SCREENING FOR DIABETES MELLITUS: ICD-10-CM

## 2024-10-17 DIAGNOSIS — E78.00 PURE HYPERCHOLESTEROLEMIA: ICD-10-CM

## 2024-10-17 DIAGNOSIS — M79.2 NEUROPATHIC PAIN: ICD-10-CM

## 2024-10-17 DIAGNOSIS — Z23 NEED FOR VACCINATION: ICD-10-CM

## 2024-10-17 DIAGNOSIS — G43.709 CHRONIC MIGRAINE WITHOUT AURA WITHOUT STATUS MIGRAINOSUS, NOT INTRACTABLE: Primary | ICD-10-CM

## 2024-10-17 DIAGNOSIS — Z72.0 TOBACCO ABUSE: ICD-10-CM

## 2024-10-17 DIAGNOSIS — F17.210 NICOTINE DEPENDENCE, CIGARETTES, UNCOMPLICATED: ICD-10-CM

## 2024-10-17 DIAGNOSIS — Z12.31 ENCOUNTER FOR SCREENING MAMMOGRAM FOR MALIGNANT NEOPLASM OF BREAST: ICD-10-CM

## 2024-10-17 DIAGNOSIS — R53.83 OTHER FATIGUE: ICD-10-CM

## 2024-10-17 PROBLEM — N28.0 KIDNEY INFARCTION (HCC): Status: ACTIVE | Noted: 2024-10-17

## 2024-10-17 PROBLEM — G56.11 RIGHT MEDIAN NERVE NEUROPATHY: Status: RESOLVED | Noted: 2022-01-16 | Resolved: 2024-10-17

## 2024-10-17 PROBLEM — G56.21 ULNAR NEUROPATHY OF RIGHT UPPER EXTREMITY: Status: RESOLVED | Noted: 2022-01-16 | Resolved: 2024-10-17

## 2024-10-17 PROBLEM — Z87.19 S/P RIGHT INGUINAL HERNIA REPAIR: Status: RESOLVED | Noted: 2020-07-02 | Resolved: 2024-10-17

## 2024-10-17 PROBLEM — M50.30 DDD (DEGENERATIVE DISC DISEASE), CERVICAL: Status: RESOLVED | Noted: 2022-03-27 | Resolved: 2024-10-17

## 2024-10-17 PROBLEM — Z98.890 S/P RIGHT INGUINAL HERNIA REPAIR: Status: RESOLVED | Noted: 2020-07-02 | Resolved: 2024-10-17

## 2024-10-17 PROBLEM — R94.31 PROLONGED QT INTERVAL: Status: RESOLVED | Noted: 2019-09-01 | Resolved: 2024-10-17

## 2024-10-17 PROBLEM — N28.0 KIDNEY INFARCTION (HCC): Status: RESOLVED | Noted: 2024-10-17 | Resolved: 2024-10-17

## 2024-10-17 PROBLEM — T88.7XXA SIDE EFFECT OF MEDICATION: Status: RESOLVED | Noted: 2023-09-08 | Resolved: 2024-10-17

## 2024-10-17 PROCEDURE — 90662 IIV NO PRSV INCREASED AG IM: CPT | Performed by: FAMILY MEDICINE

## 2024-10-17 PROCEDURE — 99214 OFFICE O/P EST MOD 30 MIN: CPT | Performed by: FAMILY MEDICINE

## 2024-10-17 PROCEDURE — G0008 ADMIN INFLUENZA VIRUS VAC: HCPCS | Performed by: FAMILY MEDICINE

## 2024-10-17 RX ORDER — BUPROPION HYDROCHLORIDE 150 MG/1
150 TABLET ORAL EVERY MORNING
Qty: 90 TABLET | Refills: 3 | Status: SHIPPED | OUTPATIENT
Start: 2024-10-17

## 2024-10-17 RX ORDER — GABAPENTIN 400 MG/1
400 CAPSULE ORAL 3 TIMES DAILY
Qty: 270 CAPSULE | Refills: 1 | Status: SHIPPED | OUTPATIENT
Start: 2024-10-17 | End: 2025-01-15

## 2024-10-17 NOTE — ASSESSMENT & PLAN NOTE
Continue management per Neurology with Inderal which was recently increased to 80mg and Zomig as an abortive agent

## 2024-10-17 NOTE — ASSESSMENT & PLAN NOTE
Increase Gabapentin to 400mg TID  Orders:    gabapentin (NEURONTIN) 400 mg capsule; Take 1 capsule (400 mg total) by mouth 3 (three) times a day

## 2024-10-17 NOTE — ASSESSMENT & PLAN NOTE
Continue diet and lifestyle modifications   Orders:    Lipid Panel with Direct LDL reflex; Future

## 2024-10-17 NOTE — PROGRESS NOTES
Ambulatory Visit  Name: Lori Damon      : 1960      MRN: [id number]  Encounter Provider: Naty Kenney MD  Encounter Date: 10/17/2024   Encounter department: [facility]    Assessment & Plan  Chronic migraine without aura without status migrainosus, not intractable  Continue management per Neurology with Inderal which was recently increased to 80mg and Zomig as an abortive agent       Pure hypercholesterolemia  Continue diet and lifestyle modifications   Orders:    Lipid Panel with Direct LDL reflex; Future    Screening for diabetes mellitus    Orders:    Hemoglobin A1C; Future    Nicotine dependence, cigarettes, uncomplicated  - The USPSTF recommends annual screening for lung cancer with low-dose computed tomography (LDCT) in adults aged 50 to 80 years who have a 20 pack-year smoking history and currently smoke or have quit within the past 15 years. Patient agrees to undergo testing at this time.     Orders:    CT lung screening program; Future    Tobacco abuse    Orders:    buPROPion (WELLBUTRIN XL) 150 mg 24 hr tablet; Take 1 tablet (150 mg total) by mouth every morning    CT lung screening program; Future    Other fatigue  May be secondary to underlying fibromyalgia however will order lab work to assess for anemia, liver or kidney dysfunction and thyroid disorder  Orders:    CBC and differential; Future    Comprehensive metabolic panel; Future    TSH, 3rd generation with Free T4 reflex; Future    Neuropathic pain  Increase Gabapentin to 400mg TID  Orders:    gabapentin (NEURONTIN) 400 mg capsule; Take 1 capsule (400 mg total) by mouth 3 (three) times a day    Encounter for screening mammogram for malignant neoplasm of breast    Orders:    Mammo screening bilateral w 3d and cad; Future    Need for vaccination    Orders:    influenza vaccine, high-dose, PF 0.5 mL (Fluzone High Dose)       History of Present Illness   Lori Damon is a very pleasant 64 year old female who presents today  "for a follow up. Since previous office visit she reports that her rash has resolved. She continues to follow with Neurology for management of her migraines. Unfortunately she did hit her head on a cabinet recently and had a headaches which lasted 5 days. She continues to follow with Rheumatology as well and has an upcoming appointment next week. She is scheduled to see hematology/oncology for surgical clearance for total knee replacement. She does report increasing fatigue and is also wondering about increasing her Gabapentin for her neuropathic pain.       Review of Systems   Constitutional:  Positive for fatigue.   HENT: Negative.     Eyes: Negative.    Respiratory: Negative.     Cardiovascular: Negative.    Gastrointestinal: Negative.    Genitourinary: Negative.    Musculoskeletal:  Positive for arthralgias.   Skin: Negative.    Neurological: Negative.    Psychiatric/Behavioral: Negative.             Objective     /66 (BP Location: Left arm, Patient Position: Sitting, Cuff Size: Adult)   Pulse 77   Temp 98.3 °F (36.8 °C) (Temporal)   Ht 5' 3\" (1.6 m)   Wt 60.8 kg (134 lb)   SpO2 98%   BMI 23.74 kg/m²     Physical Exam  Constitutional:       General: She is not in acute distress.     Appearance: She is not ill-appearing.   HENT:      Head: Normocephalic and atraumatic.   Eyes:      General:         Right eye: No discharge.         Left eye: No discharge.      Extraocular Movements: Extraocular movements intact.      Pupils: Pupils are equal, round, and reactive to light.   Cardiovascular:      Rate and Rhythm: Normal rate.   Pulmonary:      Effort: Pulmonary effort is normal. No respiratory distress.      Breath sounds: No wheezing.   Abdominal:      General: Bowel sounds are normal. There is no distension.      Palpations: Abdomen is soft.      Tenderness: There is no abdominal tenderness.   Musculoskeletal:      Right lower leg: No edema.      Left lower leg: No edema.   Neurological:      General: " No focal deficit present.      Mental Status: She is alert.   Psychiatric:         Mood and Affect: Mood normal.         Behavior: Behavior normal.

## 2024-10-25 ENCOUNTER — OFFICE VISIT (OUTPATIENT)
Dept: RHEUMATOLOGY | Facility: CLINIC | Age: 64
End: 2024-10-25
Payer: COMMERCIAL

## 2024-10-25 VITALS
DIASTOLIC BLOOD PRESSURE: 86 MMHG | WEIGHT: 130 LBS | SYSTOLIC BLOOD PRESSURE: 118 MMHG | HEIGHT: 63 IN | HEART RATE: 92 BPM | OXYGEN SATURATION: 98 % | BODY MASS INDEX: 23.04 KG/M2

## 2024-10-25 DIAGNOSIS — M81.0 OSTEOPOROSIS WITHOUT CURRENT PATHOLOGICAL FRACTURE, UNSPECIFIED OSTEOPOROSIS TYPE: Primary | ICD-10-CM

## 2024-10-25 PROCEDURE — G2211 COMPLEX E/M VISIT ADD ON: HCPCS | Performed by: INTERNAL MEDICINE

## 2024-10-25 PROCEDURE — 99215 OFFICE O/P EST HI 40 MIN: CPT | Performed by: INTERNAL MEDICINE

## 2024-10-25 NOTE — PROGRESS NOTES
HPI: Lori Damon is a 63 y/o female who presents for further evaluation osteoarthritis, osteoporosis. She has past medical history DVT, chronic neck and back pain, carpal tunnel syndrome, migraine headaches    Has been on SC Prolia since 2012    Per Dr Carmen ulnar deviation, check RF, CCP, ESR and CRP normal. Hands films c/w osteoarthritis    Bone health: DXA 6/23. Has tried oral fosamax severe GERD. Has been on SC Prolia since 2012. No prior h/o fragility fractures. H/o rib fracture. No malignancies, + calcium/D supplements. Last Prolia 8/2/24     Chronic pain, chronic neck and back pain, DDD, radiculopathy. Follows with pain management here at [facility]. 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 She is scheduled to see hematology soon.       Per Dr Carmen    Latest DEXA scan still shows significant osteopenia, especially at left total hip, t-score of -2.4; would benefit from continued Prolia injections. Is past due for next Prolia injection. Of note, patient has bilateral ulnar deviation of bilateral hands, which can be seen in RA; will further work-up.             --------------------------------------------------------------------------------------------------------        ROS:      - for: Fevers, Chills or sweats.  No HAs or scalp tenderness.  No jaw claudication.  No acute visual or eye changes.  No dry eyes.  No auditory complaints.  No oral lesions or ulcers.  No dry mouth.  No sore throat or cough.  No chest pains or palpitations.  No shortness of breath, dyspnea on exertion or wheezing.  No hemotpysis.  No abdominal pain, GERD symptoms, diarrhea or constipation.  No urinary complaints.  No numbness, tingling or weakness.  No rashes.    All other ROS was reviewed and negative except as above         --------------------------------------------------------------------------------------------------------    Past Medical History    Past Medical History:  "  Diagnosis Date    Allergy to dust     Anemia     Calculus of ureter     Chronic kidney disease 11/2018    Hydrophenesis    Chronic pain disorder     neck and back- sees pain management    Cluster headache Teenager    May occur 2-3 times per year. Lasts several days    Colitis     Colon polyp     Cracked skin     on fingers    CTS (carpal tunnel syndrome) 01/01/2014    Diverticulitis of colon 2012    Se[facility] by [facility]. Wil[facility]. Inpatient at    Diverticulosis     Dysphagia     Fibromyalgia, primary 04/16/1996    GERD (gastroesophageal reflux disease)     GI (gastrointestinal bleed)     As a result of diverticulitis and microscopic colitis.    H/O: GI bleed     Head injury     Had numerious concussions    Headache, tension-type     Hiatal hernia     History of DVT in adulthood 2019 2019    HL (hearing loss) unknown    Hydronephrosis     Hyperlipidemia     Inguinal hernia     right side    Irregular heart beat     Pt states with prolonged QT interval - was seen by cardiologist( [facility]) pt told \"to not worry about it\"    Irritable bowel     Kidney stone     Lactose intolerance     Did not tolerate milk after being weaned from breast milk.    Memory loss 2016/2017    Medication/also related to medical conditions    Migraines     Narcolepsy     OAB (overactive bladder)     Osteoarthritis     Osteoporosis     Other chronic pain     degenerative disc disease    PONV (postoperative nausea and vomiting)     migraines    Pyelonephritis 01/13/2021    SBO (small bowel obstruction) ([facility]) 09/25/2021    Sepsis ([facility]) 01/13/2021    Shingles Unknown    Had mild cases of shingles several times.    Smoking 08/06/2020    Tobacco abuse     Vision loss Unknown    Occurs when headaches start behind my left eye.    Wears dentures     full upper    Wears glasses     Wears glasses            Past Surgical History    Past Surgical History:   Procedure Laterality Date    ABDOMINAL ADHESION SURGERY      ARM SKIN LESION BIOPSY / EXCISION      " lymph node excision    BLADDER SURGERY      CHOLECYSTECTOMY      COLONOSCOPY  2020    FL RETROGRADE PYELOGRAM  2021    FLEXIBLE SIGMOIDOSCOPY      Last done at [facility] in 2018 after being admitted for GI bleed.    HERNIA REPAIR      HYSTERECTOMY  1977    JOINT REPLACEMENT Right     knee    KNEE SURGERY Right     replacement    LAMINECTOMY      L5-6-7    LAPAROSCOPY      LYMPH NODE DISSECTION Left     removed -no cancer-  limb restriction    LYMPHADENECTOMY      UNDER LEFT ARM - DO NOT DRAW BLOOD/GIVE INJECTIONS IN LEFT ARM PER PATIENT     MOLE REMOVAL      OOPHORECTOMY Bilateral     OTHER SURGICAL HISTORY      enlarged lymph node removed left arm..no cancer    WI CYSTO BLADDER W/URETERAL CATHETERIZATION Right 2020    Procedure: CYSTOSCOPY AND INSERTION STENT URETERAL;  Surgeon: Rory Fall MD;  Location: [facility];  Service: Urology    WI CYSTO/URETERO W/LITHOTRIPSY &INDWELL STENT INSRT Right 2021    Procedure: CYSTO, LASER  URETEROSCOPY, RETRO PYELOGRAM, STENT REPLACMENT;  Surgeon: Adriel Subramanian MD;  Location: [facility];  Service: Urology    WI LITHOTRIPSY XTRCORP SHOCK WAVE Right 2021    Procedure: LITHOTRIPSY EXTRACORPORAL SHOCKWAVE (ESWL);  Surgeon: Darwin Montero MD;  Location:  MAIN OR;  Service: Urology    WI RPR 1ST INGUN HRNA AGE 5 YRS/> REDUCIBLE Right 2020    Procedure: REPAIR HERNIA INGUINAL  WITH MESH;  Surgeon: Mercedes Justin MD;  Location: [facility];  Service: General    SPINAL FUSION      UPPER GASTROINTESTINAL ENDOSCOPY  2020    WISDOM TOOTH EXTRACTION             Family History    Family History   Problem Relation Age of Onset    Hypertension Mother          from massive heart attack, never took any medication for this condition    Heart attack Mother     Hyperlipidemia Mother         Blood tests done just prior to heart attack.  Results reported after her passing.    Migraines Mother          from massive cardiac arrest. Had  abdominal aneurysm.    Hypertension Father         Had high blood pressure, unknown if he ever took medication    Cancer Father 72        Tumor in bowels, spread to liver, skin cancer    Colon polyps Father     Diabetes Sister         uses insulin    Arthritis Sister         Osteo arthritis    Hypertension Sister         Triple bypass    Diabetes Sister         pre-diabetic, oral medication    Arthritis Sister         Osteo arthritis    Hypertension Sister         Triple bypass    Irritable bowel syndrome Sister         Constipation    No Known Problems Daughter     No Known Problems Maternal Grandmother     No Known Problems Maternal Grandfather     No Known Problems Paternal Grandmother     No Known Problems Paternal Grandfather     Stroke Brother     Nephrolithiasis Brother     Cancer Brother 71        soft tissue cancer    Cancer Brother         Terminal soft tissue cancer     Hypertension Brother         Triple bypass    Migraines Brother         Severe. Standard meds do not help. Goes to [facility] for [facility]    Learning disabilities Brother         Mental retardation    Mental retardation Brother     No Known Problems Maternal Aunt     No Known Problems Maternal Aunt     No Known Problems Maternal Aunt     No Known Problems Paternal Aunt     No Known Problems Paternal Aunt     No Known Problems Paternal Aunt     Cancer Cousin         Pancreatic cancer    Cancer Cousin         Breast cancer, total mastectomy    Breast cancer Other 47    Migraines Maternal Aunt         Had 2.  from cerebral aneurysm    Migraines Maternal Aunt          from cerebral aneurysm    Migraines Maternal Aunt          from cerebral aneurysm    Migraines Maternal Uncle          from cerebral aneurysm            Social History    Social History     Tobacco Use    Smoking status: Every Day     Current packs/day: 0.50     Average packs/day: 0.5 packs/day for 51.4 years (25.7 ttl pk-yrs)     Types: Cigarettes     Start  date: 6/1/1973     Passive exposure: Current    Smokeless tobacco: Never    Tobacco comments:     Stopped smoking in 2014 because of cervical surgeries. Started smoking again as soon as my body was   Vaping Use    Vaping status: Never Used   Substance Use Topics    Alcohol use: Not Currently     Comment: May have an occasional drink when di    Drug use: Not Currently     Types: Marijuana     Used to work at [employer]        Allergies    Allergies   Allergen Reactions    Armodafinil Other (See Comments)     Tardive dyskinesia - mouth movements    Celecoxib Other (See Comments)     Increased Heart Rate, Palpitations    Sumatriptan Anaphylaxis     Other reaction(s): SUMATRIPTAN (IMITREX) (Throat closure). Pt analilia zolmitriptan.    Tramadol Other (See Comments)     GI Upset- severe vomiting and systemic body aches    Medical Tape Dermatitis, Rash and Blisters     Adhesive from medication patches and bandaides- ok with paper tape         Medications    Current Outpatient Medications   Medication Instructions    amantadine (SYMMETREL) 100 mg, Oral, Daily    apixaban (ELIQUIS) 5 mg, Oral, 2 times daily    aspirin 500 MG buffered tablet 1,000 mg, Oral, Every 6 hours PRN, 4,000 mg daily for Pain    buPROPion (WELLBUTRIN XL) 150 mg, Oral, Every morning    calcium carbonate 600 mg, Oral, Daily    Cholecalciferol (VITAMIN D3) 1,000 Units, Oral, Daily, Takes in the afternoon    gabapentin (NEURONTIN) 400 mg, Oral, 3 times daily    Colby, Zingiber officinalis, (COLBY ROOT PO) 700 mg, Oral, 2 times daily    metaxalone (SKELAXIN) 400 mg, Oral, 3 times daily    Multiple Vitamins-Minerals (CENTRUM SILVER PO) 1 tablet, Oral, Daily, Takes in the am    ondansetron (ZOFRAN) 4 mg, Oral, Every 8 hours PRN    predniSONE 5 mg tablet 1 tab in the AM prn migraine (if zomig fails)    propranolol (INDERAL LA) 60 mg, Oral, Daily    trospium chloride (SANCTURA) 20 mg, Oral, 2 times daily    Turmeric 700 mg, Oral, 2 times daily    ZOLMitriptan (ZOMIG)  "2.5 MG tablet TAKE 1 TABLET BY MOUTH AT ONSET OF MIGRAINE, MAY REPEAT IN 2 HOURS IF NEEDED. LIMIT 2/24 HOURS, 4/WEEK, 8/MONTH          Physical Exam    /86   Pulse 92   Ht 5' 3\" (1.6 m)   Wt 59 kg (130 lb)   SpO2 98%   BMI 23.03 kg/m²     GEN: AAO, No apparent distress.  Patient is well developed.  HEENT:  Pupils are equal, round and reactive.  Sclera are clear.  Fundoscopic exam is normal.  External ears are without lesions.  Oral pharynx is clear of ulcers or other lesions.  MMM.   NECK:  Supple.  There is no adenopathy appreciable in anterior or posterior cervical chains or supraclavicularly.  JVP is normal.    HEART: Regular rate and rhythm.  There is no appreciable murmur, gallop or rub.  LUNGS: Clear to auscultation.  ABD:  Soft, without tenderness, rebound or guarding.  No appreciable organomegally.  NEURO: Speech and cognition are normal.  Strength is 5/5 throughout.  Tone is normal.  DTRs are 2/4 at the knees, ankles and elbows.  Gait is normal.  SKIN: There are no rashes or lesions    MUSCULOSKELETAL:   No synovitis noted      ________________________________________________________________________          Results Review      Component      Latest Ref Rng 3/8/2024   Sed Rate      0 - 29 mm/hour 9    C-REACTIVE PROTEIN      <3.0 mg/L 1.5    RHEUMATOID FACTOR      Negative  Negative    CYCLIC CITRULLINATED PEPTIDE ANTIBODY      See comment  0.8          LEFT HAND     INDICATION:   Primary osteoarthritis, unspecified hand.      COMPARISON:  None.     VIEWS:  XR HAND 3+ VW LEFT      For the purposes of institution wide universal language the following terms will apply: (thumb=1st digit/finger, index finger=2nd digit/finger, long finger=3rd digit/finger, ring=4th digit/finger and small finger=5th digit/finger)     FINDINGS:     There is no acute fracture or dislocation.     Mild osteoarthritis of the distal interphalangeal joints and first carpometacarpal joint     No lytic or blastic osseous " lesion.     Soft tissues are unremarkable.     IMPRESSION:        No acute osseous abnormality.     Degenerative changes as described.      RIGHT HAND     INDICATION:   Primary osteoarthritis, unspecified hand.      COMPARISON:  None.     VIEWS:  XR HAND 3+ VW RIGHT      For the purposes of institution wide universal language the following terms will apply: (thumb=1st digit/finger, index finger=2nd digit/finger, long finger=3rd digit/finger, ring=4th digit/finger and small finger=5th digit/finger)     FINDINGS:     There is no acute fracture or dislocation.     Moderate osteoarthritis of the second and third distal interphalangeal joints and mild osteoarthritis of the first carpometacarpal joint and remaining distal interphalangeal joints     No lytic or blastic osseous lesion.     Soft tissues are unremarkable.     IMPRESSION:        No acute osseous abnormality.     Degenerative changes as described.        DXA 6/23    DXA bone density spine hip and pelvis  Status: Final result     PACS Images     Show images for DXA bone density spine hip and pelvis  Study Result    Narrative & Impression   CENTRAL  DXA SCAN     CLINICAL HISTORY:   62year old post-menopausal  female risk factors include osteoporosis screening.     TECHNIQUE: Bone densitometry was performed using a Hologic Horizon W bone densitometer.  Regions of interest appear properly placed. There are   other confounding variables which could limit the study.     Degenerative or osteoarthritic changes are noted on the spine image involving L2-3 and 4 but only L3 and L4 are eliminated from evaluation.     COMPARISON: Baseline     RESULTS:  LUMBAR SPINE: L3-L4 with elimination of L1 and L2 but it would appear from the spine images at L3 and L4 should've been eliminated from evaluation.:  BMD 1.359 gm/cm2  T-score 2.3  Z-score 4.2     LEFT TOTAL HIP:  BMD 0.653 gm/cm2  T-score -2.4  Z-score -1.2     LEFT FEMORAL NECK:  BMD 0.723 gm/cm2  T-score  -1.1  Z-score 0.4     The left forearm BMD is 0.623 and the T score is -1.2. The Z score is 0.5.     IMPRESSION:  1. Based on the WHO classification, this study identifies a diagnosis of low bone mass, notable at the total hip, femoral neck, and forearm areas and the patient is considered at low risk for fracture.     2. A daily intake of calcium of at least 1200 mg and vitamin D, 800-1000 IU, as well as weight bearing and muscle strengthening exercise, fall prevention and avoidance of tobacco and excessive alcohol intake as basic preventive measures are recommended.     3. Repeat DXA scan on the same equipment in 18-24 months as clinically indicated.        The 10 year risk of hip fracture is 1.0%, with the 10 year risk of major osteoporotic fracture being 7.5%, as calculated by the WHO fracture risk assessment tool (FRAX).  The current NOF guidelines recommend treating patients with FRAX 10 year risk score   of >3% for hip fracture and >20% for major osteoporotic fracture.           Impressions     Osteoporosis  DDD C Spine  DDD L Spine  Osteoarthritis  DVT  H/o fibromyalgia (former Dr Carmen patient)       Plans:    Reviewed prior records per Dr Carmen    Chronic pain syndrome/DDD/osteoarthritis: Recommend Tylenol as needed    Bone health: DXA 6/23. Has tried oral fosamax severe GERD.     Has been on SC Prolia since 2012. No prior h/o fragility fractures.     H/o rib fracture. No malignancies, + calcium/D supplements.     Last Prolia 8/2/24     Schedule f/u Prolia AFTER 2/3/2025    Reviewed labs     RTC 8-9 months     Thank you for involving me in this patient's care.        Jack Miranda MD      I have spent a total time of 48 minutes in caring for this patient on the day of the visit/encounter including Diagnostic results, Prognosis, Risks and benefits of tx options, Instructions for management, Patient and family education, Importance of tx compliance, Risk factor reductions, Impressions, Counseling /  Coordination of care, Documenting in the medical record, Reviewing / ordering tests, medicine, procedures  , and Obtaining or reviewing history  .    [facility]

## 2024-10-29 ENCOUNTER — APPOINTMENT (OUTPATIENT)
Age: 64
End: 2024-10-29
Payer: COMMERCIAL

## 2024-10-29 DIAGNOSIS — R53.83 OTHER FATIGUE: ICD-10-CM

## 2024-10-29 DIAGNOSIS — Z13.1 SCREENING FOR DIABETES MELLITUS: ICD-10-CM

## 2024-10-29 DIAGNOSIS — E78.00 PURE HYPERCHOLESTEROLEMIA: ICD-10-CM

## 2024-10-29 LAB
ALBUMIN SERPL BCG-MCNC: 4.1 G/DL (ref 3.5–5)
ALP SERPL-CCNC: 67 U/L (ref 34–104)
ALT SERPL W P-5'-P-CCNC: 16 U/L (ref 7–52)
ANION GAP SERPL CALCULATED.3IONS-SCNC: 10 MMOL/L (ref 4–13)
AST SERPL W P-5'-P-CCNC: 17 U/L (ref 13–39)
BASOPHILS # BLD AUTO: 0.09 THOUSANDS/ΜL (ref 0–0.1)
BASOPHILS NFR BLD AUTO: 1 % (ref 0–1)
BILIRUB SERPL-MCNC: 0.55 MG/DL (ref 0.2–1)
BUN SERPL-MCNC: 17 MG/DL (ref 5–25)
CALCIUM SERPL-MCNC: 9.1 MG/DL (ref 8.4–10.2)
CHLORIDE SERPL-SCNC: 105 MMOL/L (ref 96–108)
CHOLEST SERPL-MCNC: 245 MG/DL
CO2 SERPL-SCNC: 23 MMOL/L (ref 21–32)
CREAT SERPL-MCNC: 0.99 MG/DL (ref 0.6–1.3)
EOSINOPHIL # BLD AUTO: 0.21 THOUSAND/ΜL (ref 0–0.61)
EOSINOPHIL NFR BLD AUTO: 3 % (ref 0–6)
ERYTHROCYTE [DISTWIDTH] IN BLOOD BY AUTOMATED COUNT: 12.7 % (ref 11.6–15.1)
EST. AVERAGE GLUCOSE BLD GHB EST-MCNC: 131 MG/DL
GFR SERPL CREATININE-BSD FRML MDRD: 59 ML/MIN/1.73SQ M
GLUCOSE P FAST SERPL-MCNC: 86 MG/DL (ref 65–99)
HBA1C MFR BLD: 6.2 %
HCT VFR BLD AUTO: 43.7 % (ref 34.8–46.1)
HDLC SERPL-MCNC: 53 MG/DL
HGB BLD-MCNC: 14.4 G/DL (ref 11.5–15.4)
IMM GRANULOCYTES # BLD AUTO: 0.03 THOUSAND/UL (ref 0–0.2)
IMM GRANULOCYTES NFR BLD AUTO: 0 % (ref 0–2)
LDLC SERPL CALC-MCNC: 158 MG/DL (ref 0–100)
LYMPHOCYTES # BLD AUTO: 1.77 THOUSANDS/ΜL (ref 0.6–4.47)
LYMPHOCYTES NFR BLD AUTO: 24 % (ref 14–44)
MCH RBC QN AUTO: 31.9 PG (ref 26.8–34.3)
MCHC RBC AUTO-ENTMCNC: 33 G/DL (ref 31.4–37.4)
MCV RBC AUTO: 97 FL (ref 82–98)
MONOCYTES # BLD AUTO: 0.39 THOUSAND/ΜL (ref 0.17–1.22)
MONOCYTES NFR BLD AUTO: 5 % (ref 4–12)
NEUTROPHILS # BLD AUTO: 4.79 THOUSANDS/ΜL (ref 1.85–7.62)
NEUTS SEG NFR BLD AUTO: 67 % (ref 43–75)
NRBC BLD AUTO-RTO: 0 /100 WBCS
PLATELET # BLD AUTO: 269 THOUSANDS/UL (ref 149–390)
PMV BLD AUTO: 10.6 FL (ref 8.9–12.7)
POTASSIUM SERPL-SCNC: 4.7 MMOL/L (ref 3.5–5.3)
PROT SERPL-MCNC: 6.7 G/DL (ref 6.4–8.4)
RBC # BLD AUTO: 4.52 MILLION/UL (ref 3.81–5.12)
SODIUM SERPL-SCNC: 138 MMOL/L (ref 135–147)
TRIGL SERPL-MCNC: 170 MG/DL
TSH SERPL DL<=0.05 MIU/L-ACNC: 2.74 UIU/ML (ref 0.45–4.5)
WBC # BLD AUTO: 7.28 THOUSAND/UL (ref 4.31–10.16)

## 2024-10-29 PROCEDURE — 84443 ASSAY THYROID STIM HORMONE: CPT

## 2024-10-29 PROCEDURE — 36415 COLL VENOUS BLD VENIPUNCTURE: CPT

## 2024-10-29 PROCEDURE — 80061 LIPID PANEL: CPT

## 2024-10-29 PROCEDURE — 85025 COMPLETE CBC W/AUTO DIFF WBC: CPT

## 2024-10-29 PROCEDURE — 83036 HEMOGLOBIN GLYCOSYLATED A1C: CPT

## 2024-10-29 PROCEDURE — 80053 COMPREHEN METABOLIC PANEL: CPT

## 2024-11-07 DIAGNOSIS — G43.009 MIGRAINE WITHOUT AURA AND WITHOUT STATUS MIGRAINOSUS, NOT INTRACTABLE: ICD-10-CM

## 2024-11-07 DIAGNOSIS — N39.41 URGE INCONTINENCE: ICD-10-CM

## 2024-11-07 RX ORDER — TROSPIUM CHLORIDE 20 MG/1
20 TABLET, FILM COATED ORAL 2 TIMES DAILY
Qty: 180 TABLET | Refills: 1 | Status: SHIPPED | OUTPATIENT
Start: 2024-11-07

## 2024-11-08 RX ORDER — ONDANSETRON 4 MG/1
4 TABLET, FILM COATED ORAL EVERY 8 HOURS PRN
Qty: 20 TABLET | Refills: 0 | Status: SHIPPED | OUTPATIENT
Start: 2024-11-08

## 2024-11-08 RX ORDER — PROPRANOLOL HYDROCHLORIDE 60 MG/1
60 CAPSULE, EXTENDED RELEASE ORAL DAILY
Qty: 90 CAPSULE | Refills: 1 | Status: SHIPPED | OUTPATIENT
Start: 2024-11-08

## 2024-11-15 NOTE — PROGRESS NOTES
Hematology Outpatient Office Note    Date of Service: 2024    [facility]AT[facility] JOSE MARTINWRENÉE  [address]  [facility] [address]  [phone]    Reason for Consultation:   Chief Complaint   Patient presents with    Consult       Referral Physician: Nick Nogueira MD    Primary Care Physician:  Naty Kenney MD     Nickname: MARY Petty (Daughter)    Original ECO    Today's ECO    Goals and Barriers:  Current Goal: Minimize effects of disease burden, extend life.   Barriers to accomplishing this: None    Patient's Capacity to Self Care:  Patient is able to self care    Code Status: not addressed    Advanced directives: not addressed    ASSESSMENT & PLAN      Diagnosis ICD-10-CM Associated Orders   1. Deep vein thrombosis (DVT) of other vein of right lower extremity, unspecified chronicity (HCC)  I82.491       2. Chronic pain of left knee  M25.562 Ambulatory Referral to Hematology / Oncology    G89.29       3. Primary osteoarthritis of left knee  M17.12 Ambulatory Referral to Hematology / Oncology      4. Acute meniscal tear, medial, left, subsequent encounter  S83.242D Ambulatory Referral to Hematology / Oncology            This is a 64 y.o. c PMHx notable for irritable bowel syndrome, CKD3, tardive dyskinesia (on amantadine), chronic migraines (on zolmitriptan), smoking, osteoarthritis, osteoporosis, LEFT lower extremity DVT as of 3/21/2024 [on Eliquis for life 2/2 recurrent episodes] being seen in consultation for surgical clearance for total knee replacement and whether pre-op bridge is indicated.       Discussion of decision making    I personally reviewed the following lab results, the image studies, pathology, other specialty/physicians consult notes and recommendations, and outside medical records. I had a lengthy discussion with the patient and shared the work-up findings. We discussed the diagnosis and management plan as below. I spent 46  minutes reviewing the records (labs, clinician notes, outside records, medical history, monitoring of anti-neoplastic toxicities, ordering medicine/tests/procedures, interpreting the imaging/labs previously done) and coordination of care as well as direct time with the patient today, of which greater than 50% of the time was spent in counseling and coordination of care with the patient/family.      Plan/Labs  Patient with CKDII-IIIa (recent eGFR 59), recurrent history of lower extremity DVT who is on lifelong Eliquis requiring perioperative anticoagulation plan.   Hold Eliquis 72 hours prior to surgery for high bleeding risk procedures.    Hold ASA 5 days before the surgery and restart at a max dose of 325 mg after the surgery  Bridging with heparin is not recommended for patients on DOAC including apixaban.    Restart Eliquis 24 to 48 hours after surgery once hemostasis is assured.          Follow Up: prn    All questions were answered to the patient's satisfaction during this encounter. The patient knows the contact information for our office and knows to reach out for any relevant concerns related to this encounter. They are to call for any temperature 100.4 or higher, new symptoms including but not restricted to shaking chills, decreased appetite, nausea, vomiting, diarrhea, increased fatigue, shortness of breath or chest pain, confusion, and not feeling the strength to come to the clinic. For all other listed problems and medical diagnosis in their chart - they are managed by PCP and/or other specialists, which the patient acknowledges. Thank you very much for your consultation and making us a part of this patient's care. We are continuing to follow closely with you. Please do not hesitate to reach out to me with any additional questions or concerns.    Travis Leavitt MD  Hematology & Medical Oncology Staff Physician             Disclaimer: This document was prepared using [employer] Direct  technology. If a word or phrase is confusing, or does not make sense, this is likely due to recognition error which was not discovered during this clinician's review. If you believe an error has occurred, please contact me through [employer] service line for kym?cation.      HEMATOLOGICAL HISTORY OF PRESENT ILLNESS      Clotting History  Extensive clotting history   Bleeding History  none   Cancer History  none   Family Cancer History  Father pagets disease, colon cancer, mother aneurysms, brain aneurysms in family (very prevalent)   H/O Blood/Plt Transfusion  none   Tobacco/etoh/drug abuse  Smoker almost 2 packs a day (since she was 13 yr old)   Cancer Screening history Up to date with colonoscopy and mammograms, CT low dose ordered   Occupation Retired - worked in a [employer]   New medications in the last month: none  Pain: knee pain (takes 1000 mg Aspirin every 6 hrs)      SUBJECTIVE  (INTERVAL HISTORY)        I have reviewed the relevant past medical, surgical, social and family history. I have also reviewed allergies and medications for this patient.    Review of Systems  ROS      A 10-point review of system was performed, pertinent positive and negative were detailed as above. Otherwise, the 10-point review of system was negative.      Past Medical History:   Diagnosis Date    Allergy to dust     Anemia     Calculus of ureter     Chronic kidney disease 11/2018    Hydrophenesis    Chronic pain disorder     neck and back- sees pain management    Cluster headache Teenager    May occur 2-3 times per year. Lasts several days    Colitis     Colon polyp     Cracked skin     on fingers    CTS (carpal tunnel syndrome) 01/01/2014    Diverticulitis of colon 2012    Seen by Dr. Wilburn at \[facility]". Inpatient at    Diverticulosis     Dysphagia     Fibromyalgia, primary 04/16/1996    GERD (gastroesophageal reflux disease)     GI (gastrointestinal bleed)     As a result of diverticulitis and microscopic colitis.    H/O: GI bleed     Head  "injury     Had numerious concussions    Headache, tension-type     Hiatal hernia     History of DVT in adulthood 2019 2019    HL (hearing loss) unknown    Hydronephrosis     Hyperlipidemia     Inguinal hernia     right side    Irregular heart beat     Pt states with prolonged QT interval - was seen by cardiologist( [facility]) pt told \"to not worry about it\"    Irritable bowel     Kidney stone     Lactose intolerance     Did not tolerate milk after being weaned from breast milk.    Memory loss 2016/2017    Medication/also related to medical conditions    Migraines     Narcolepsy     OAB (overactive bladder)     Osteoarthritis     Osteoporosis     Other chronic pain     degenerative disc disease    PONV (postoperative nausea and vomiting)     migraines    Pyelonephritis 01/13/2021    SBO (small bowel obstruction) ([facility]) 09/25/2021    Sepsis ([facility]) 01/13/2021    Shingles Unknown    Had mild cases of shingles several times.    Smoking 08/06/2020    Tobacco abuse     Vision loss Unknown    Occurs when headaches start behind my left eye.    Wears dentures     full upper    Wears glasses     Wears glasses        Past Surgical History:   Procedure Laterality Date    ABDOMINAL ADHESION SURGERY      ARM SKIN LESION BIOPSY / EXCISION      lymph node excision    BACK SURGERY  11/21/14    Laminectomy, forminalectomy    BLADDER SURGERY      CHOLECYSTECTOMY      COLON SURGERY  1987    Extensive adhesions in abdomen, adhesions surgically removed    COLONOSCOPY  08/06/2020    FL RETROGRADE PYELOGRAM  01/05/2021    FLEXIBLE SIGMOIDOSCOPY      Last done at [facility] in 2018 after being admitted for GI bleed.    HERNIA REPAIR      HYSTERECTOMY  1977    JOINT REPLACEMENT Right     knee    KNEE SURGERY Right     replacement    LAMINECTOMY      L5-6-7    LAPAROSCOPY      LYMPH NODE DISSECTION Left     removed -no cancer-  limb restriction    LYMPHADENECTOMY  1974    UNDER LEFT ARM - DO NOT DRAW BLOOD/GIVE INJECTIONS IN LEFT ARM PER PATIENT     MOLE " REMOVAL      NECK SURGERY  14    Fusion C 5/6 and 6    OOPHORECTOMY Bilateral     OTHER SURGICAL HISTORY      enlarged lymph node removed left arm..no cancer    IN CYSTO BLADDER W/URETERAL CATHETERIZATION Right 2020    Procedure: CYSTOSCOPY AND INSERTION STENT URETERAL;  Surgeon: Rory Fall MD;  Location: [facility];  Service: Urology    IN CYSTO/URETERO W/LITHOTRIPSY &INDWELL STENT INSRT Right 2021    Procedure: CYSTO, LASER  URETEROSCOPY, RETRO PYELOGRAM, STENT REPLACMENT;  Surgeon: Adriel Subramanian MD;  Location: [facility];  Service: Urology    IN LITHOTRIPSY XTRCORP SHOCK WAVE Right 2021    Procedure: LITHOTRIPSY EXTRACORPORAL SHOCKWAVE (ESWL);  Surgeon: Darwin Montero MD;  Location:  MAIN OR;  Service: Urology    IN RPR 1ST INGUN HRNA AGE 3 YRS/> REDUCIBLE Right 2020    Procedure: REPAIR HERNIA INGUINAL  WITH MESH;  Surgeon: Mercedes Justin MD;  Location: [facility];  Service: General    SPINAL FUSION      UPPER GASTROINTESTINAL ENDOSCOPY  2020    WISDOM TOOTH EXTRACTION         Family History   Problem Relation Age of Onset    Hypertension Mother          from massive heart attack, never took any medication for this condition    Heart attack Mother     Hyperlipidemia Mother         Blood tests done just prior to heart attack.  Results reported after her passing.    Migraines Mother          from massive cardiac arrest. Had abdominal aneurysm.    Hypertension Father         Had high blood pressure, unknown if he ever took medication    Cancer Father 72        Started in bowels, spread to liver, refused treatment    Colon polyps Father     Diabetes Sister         uses insulin    Arthritis Sister         Osteo arthritis    Hypertension Sister         Triple bypass    Diabetes Sister         pre-diabetic, oral medication    Arthritis Sister         Osteo arthritis    Hypertension Sister         Triple bypass    Irritable bowel syndrome Sister          Constipation    No Known Problems Daughter     No Known Problems Maternal Grandmother     No Known Problems Maternal Grandfather     No Known Problems Paternal Grandmother     No Known Problems Paternal Grandfather     Stroke Brother     Nephrolithiasis Brother     Cancer Brother 71        soft tissue cancer    Cancer Brother         Terminal soft tissue cancer     Hypertension Brother         Triple bypass    Migraines Brother         Severe. Standard meds do not help. Goes to [facility] for I    Learning disabilities Brother         Mental retardation    Mental retardation Brother     No Known Problems Maternal Aunt     No Known Problems Maternal Aunt     No Known Problems Maternal Aunt     No Known Problems Paternal Aunt     No Known Problems Paternal Aunt     No Known Problems Paternal Aunt     Cancer Cousin         Pancreatic cancer    Cancer Cousin         Breast cancer, total mastectomy    Breast cancer Other 47    Migraines Maternal Aunt         Had 2.  from cerebral aneurysm    Migraines Maternal Aunt          from cerebral aneurysm    Migraines Maternal Aunt          from cerebral aneurysm    Migraines Maternal Uncle          from cerebral aneurysm       Social History     Socioeconomic History    Marital status:      Spouse name: Not on file    Number of children: Not on file    Years of education: Not on file    Highest education level: Not on file   Occupational History    Not on file   Tobacco Use    Smoking status: Every Day     Current packs/day: 0.50     Average packs/day: 0.5 packs/day for 51.5 years (25.7 ttl pk-yrs)     Types: Cigarettes     Start date: 1973     Passive exposure: Current    Smokeless tobacco: Never    Tobacco comments:     Stopped smoking in  because of cervical surgeries. Started smoking again as soon as my body was   Vaping Use    Vaping status: Never Used   Substance and Sexual Activity    Alcohol use: Not Currently     Comment: May  have an occasional drink when di    Drug use: Never     Types: Marijuana    Sexual activity: Not Currently     Partners: Male     Birth control/protection: Abstinence   Other Topics Concern    Not on file   Social History Narrative    Not on file     Social Drivers of Health     Financial Resource Strain: Low Risk  (1/26/2024)    Overall Financial Resource Strain (CARDIA)     Difficulty of Paying Living Expenses: Not hard at all   Food Insecurity: Not on file   Transportation Needs: No Transportation Needs (1/26/2024)    PRAPARE - Transportation     Lack of Transportation (Medical): No     Lack of Transportation (Non-Medical): No   Physical Activity: Not on file   Stress: Not on file   Social Connections: Not on file   Intimate Partner Violence: Not on file   Housing Stability: Not on file       Allergies   Allergen Reactions    Armodafinil Other (See Comments)     Tardive dyskinesia - mouth movements    Celecoxib Other (See Comments)     Increased Heart Rate, Palpitations    Sumatriptan Anaphylaxis     Other reaction(s): SUMATRIPTAN (IMITREX) (Throat closure). Pt analilia zolmitriptan.    Tramadol Other (See Comments)     GI Upset- severe vomiting and systemic body aches    Medical Tape Dermatitis, Rash and Blisters     Adhesive from medication patches and bandaides- ok with paper tape       Current Outpatient Medications   Medication Sig Dispense Refill    amantadine (SYMMETREL) 100 mg capsule Take 1 capsule (100 mg total) by mouth in the morning 90 capsule 1    apixaban (ELIQUIS) 5 mg Take 1 tablet (5 mg total) by mouth 2 (two) times a day 180 tablet 3    aspirin 500 MG buffered tablet Take 1,000 mg by mouth every 6 (six) hours as needed for mild pain 4,000 mg daily for Pain      buPROPion (WELLBUTRIN XL) 150 mg 24 hr tablet Take 1 tablet (150 mg total) by mouth every morning 90 tablet 3    calcium carbonate 1250 MG capsule Take 1,250 mg by mouth daily      Cholecalciferol 1000 units tablet Take 1,000 Units by mouth  daily Takes in the afternoon      gabapentin (NEURONTIN) 400 mg capsule Take 1 capsule (400 mg total) by mouth 3 (three) times a day 270 capsule 1    Ginger, Zingiber officinalis, (GINGER ROOT PO) Take 700 mg by mouth 2 (two) times a day       metaxalone (SKELAXIN) 800 mg tablet Take 0.5 tablets (400 mg total) by mouth 3 (three) times a day 135 tablet 0    Multiple Vitamins-Minerals (CENTRUM SILVER PO) Take 1 tablet by mouth daily Takes in the am      ondansetron (ZOFRAN) 4 mg tablet Take 1 tablet (4 mg total) by mouth every 8 (eight) hours as needed for nausea or vomiting 20 tablet 0    predniSONE 5 mg tablet 1 tab in the AM prn migraine (if zomig fails) 20 tablet 0    propranolol (INDERAL LA) 60 mg 24 hr capsule Take 1 capsule (60 mg total) by mouth daily 90 capsule 1    trospium chloride (SANCTURA) 20 mg tablet Take 1 tablet (20 mg total) by mouth 2 (two) times a day 180 tablet 1    Turmeric 500 MG CAPS Take 700 mg by mouth 2 (two) times a day      ZOLMitriptan (ZOMIG) 2.5 MG tablet TAKE 1 TABLET BY MOUTH AT ONSET OF MIGRAINE, MAY REPEAT IN 2 HOURS IF NEEDED. LIMIT 2/24 HOURS, 4/WEEK, 8/MONTH 16 tablet 6     No current facility-administered medications for this visit.       (Not in a hospital admission)        Objective:     24 Hour Vitals Assessment:     Vitals:    11/19/24 0817   BP: 114/60   Pulse: 67   Resp: 14   Temp: (!) 97.3 °F (36.3 °C)   SpO2: 96%       PHYSICIAN EXAM:    General: Appearance: alert, cooperative, no distress.  HEENT: Normocephalic, atraumatic. No scleral icterus. conjunctivae clear. EOMI.  Chest: No tenderness to palpation. No open wound noted.  Lungs: Clear to auscultation bilaterally, Respirations unlabored.  Cardiac: Regular rate and rhythm, +S1and S2, -r/m/g  Abdomen: Soft, non-tender, non-distended. Bowel sounds are normal  Extremities:  No edema, cyanosis, clubbing.  Skin: Skin color, turgor are normal. No rashes.  Lymphatics: no palpable supra-cervical, axillary  Neurologic: Awake,  Alert, and oriented, no gross focal deficits noted b/l.       DATA REVIEW:    Pathology Result:    Final Diagnosis   Date Value Ref Range Status   10/07/2021   Final    A.  Terminal ileum (biopsy):      - Small bowel mucosa with mild chronic, non-specific inflammation and partial villous expansion.       - No granulomas, dysplasia or neoplasia identified.    B.  Right colon (biopsy):      - Findings suggestive of microscopic (collagenous) colitis.       - No granulomas, dysplasia or neoplasia identified.    C.  Transverse colon (biopsy):     - Findings suggestive of microscopic (collagenous) colitis.       - No granulomas, dysplasia or neoplasia identified.    D.  Descending colon (biopsy):       - Findings suggestive of microscopic (collagenous) colitis.       - No granulomas, dysplasia or neoplasia identified.    E.  Sigmoid colon (biopsy):     - Findings suggestive of microscopic (collagenous) colitis.       - No granulomas, dysplasia or neoplasia identified.    F.  Rectum (biopsy):    - Colonic mucosa with minimal, non-specific inflammation.    - No granulomas, dysplasia or neoplasia identified.       Comment:     This is an appended report. These results have been appended to a previously preliminary verified report.   08/06/2020   Final    A. Duodenum, second portion duodenal bx, r/o celiac:  - Small bowel mucosa with no significant histopathologic abnormality.  - Negative for malabsorption pattern.  - Negative for malignancy.     B. Duodenum, duodenal bulb and ulcer bx:  - Small bowel mucosa with erosive and reactive changes.  - Negative for malabsorption pattern.  - Negative for malignancy.     C. Stomach, gastric bx, r/o hpylori:  - Reactive gastropathy.  - Negative for H. pylori by routine H&E.  - Negative for malignancy.      D. Esophagus, lower esophageal bx:  - Squamocolumnar mucosa with mild chronic inflammation.  - Negative for intestinal metaplasia, dysplasia, and malignancy.     E. Terminal Ileum,  bx:  - Small bowel mucosa with no significant histopathologic abnormality.  - Negative for malabsorption pattern.  - Negative for malignancy.     F. Colon, right colon bx:  - Erosive mild acute colitis without significant architectural changes.  See comment.  - Negative for dysplasia and malignancy.     G. Large Intestine, ascending colon stricture bx:  - Mild acute colitis without significant architectural changes.  See comment.  - Negative for dysplasia and malignancy.     H. Large Intestine, transverse colon bx:  - Moderate acute colitis without significant architectural changes.  See comment.  - Negative for dysplasia and malignancy.      I. Large Intestine, left colon bx:  - Mild acute colitis without significant architectural changes.  See comment.  - Negative for dysplasia and malignancy.      J. Rectum, bx:  - Rectal mucosa with no significant histopathologic abnormality.  - No evidence of microscopic colitis or acute inflammation.  - Negative for dysplasia and malignancy.    Comment: The colonoscopic report of numerous diverticula is noted.  The findings may represent area effect of diverticulitis.            Image Results:   Image result are reviewed and documented in Hematology/Oncology history. I personally reviewed these images.    MRI knee left  wo contrast  Narrative: MRI LEFT KNEE    INDICATION:   M25.562: Pain in left knee  G89.29: Other chronic pain  M17.12: Unilateral primary osteoarthritis, left knee  M11.262: Other chondrocalcinosis, left knee. Chronic, worsening left knee pain for greater than 3 months.    COMPARISON: Left knee plain films on 3/21/2024    TECHNIQUE:    Multiplanar/multisequence MR of the left knee was performed.    FINDINGS:    SUBCUTANEOUS TISSUES: Normal    JOINT EFFUSION: There is a small joint effusion.    BAKER'S CYST: There is a small Baker's cyst.    MENISCI: Large complex tear of the medial meniscus posterior horn near the posterior meniscal root (series 4 images  "25-29.) Lateral meniscus is intact.    CRUCIATE LIGAMENTS: Intact.    EXTENSOR APPARATUS: Intact.    COLLATERAL LIGAMENTS: Intact.    ARTICULAR SURFACES:  Medial compartment: Severe osteoarthritis. Large areas of full-thickness cartilage loss in the medial femoral condyle and medial tibial plateau. Prominent subchondral marrow edema in the medial femoral condyle. Marginal osteophytes.  Lateral compartment: Mild osteoarthritis. Marginal osteophytes.  Patellofemoral compartment: Severe osteoarthritis. Large areas of full-thickness cartilage loss over the patella. Marginal osteophytes.    BONES: Normal.    MUSCULATURE:  Intact.  Impression: Severe medial tibiofemoral compartment and patellofemoral compartment osteoarthritis and mild lateral tibiofemoral compartment osteoarthritis.    Large complex tear of the medial meniscus posterior horn near the posterior meniscal root (series 4 images 25-29.)    Small Baker's cyst.    Workstation performed: [id number]      LABS:  [facility]       Lab Results   Component Value Date    HGB 14.4 10/29/2024    HCT 43.7 10/29/2024    MCV 97 10/29/2024     10/29/2024    WBC 7.28 10/29/2024    NRBC 0 10/29/2024     Lab Results   Component Value Date    K 4.7 10/29/2024     10/29/2024    CO2 23 10/29/2024    BUN 17 10/29/2024    CREATININE 0.99 10/29/2024    GLUF 86 10/29/2024    CALCIUM 9.1 10/29/2024    CORRECTEDCA 9.7 03/14/2021    AST 17 10/29/2024    ALT 16 10/29/2024    ALKPHOS 67 10/29/2024    EGFR 59 10/29/2024       Lab Results   Component Value Date    IRON 35 (L) 03/21/2022    TIBC 303 03/21/2022    FERRITIN 202 03/21/2022    FERRITIN 213 12/27/2020    FERRITIN 184 05/07/2020    FERRITIN 195 06/27/2019       Lab Results   Component Value Date    IOQSKPQF30 364 06/22/2022    CFMOAWCO04 590 12/27/2020       No results for input(s): \"WBC\", \"CREAT\", \"PLT\" in the last 72 hours.     By:  Cher Barnett MD, 11/19/2024, 8:27 AM     "

## 2024-11-19 ENCOUNTER — OFFICE VISIT (OUTPATIENT)
Dept: HEMATOLOGY ONCOLOGY | Facility: CLINIC | Age: 64
End: 2024-11-19
Payer: COMMERCIAL

## 2024-11-19 ENCOUNTER — TELEPHONE (OUTPATIENT)
Age: 64
End: 2024-11-19

## 2024-11-19 VITALS
WEIGHT: 136.5 LBS | BODY MASS INDEX: 24.19 KG/M2 | SYSTOLIC BLOOD PRESSURE: 114 MMHG | DIASTOLIC BLOOD PRESSURE: 60 MMHG | TEMPERATURE: 97.3 F | HEART RATE: 67 BPM | OXYGEN SATURATION: 96 % | RESPIRATION RATE: 14 BRPM | HEIGHT: 63 IN

## 2024-11-19 DIAGNOSIS — I82.491 DEEP VEIN THROMBOSIS (DVT) OF OTHER VEIN OF RIGHT LOWER EXTREMITY, UNSPECIFIED CHRONICITY (HCC): Primary | ICD-10-CM

## 2024-11-19 DIAGNOSIS — S83.242D ACUTE MENISCAL TEAR, MEDIAL, LEFT, SUBSEQUENT ENCOUNTER: ICD-10-CM

## 2024-11-19 DIAGNOSIS — M25.562 CHRONIC PAIN OF LEFT KNEE: ICD-10-CM

## 2024-11-19 DIAGNOSIS — G89.29 CHRONIC PAIN OF LEFT KNEE: ICD-10-CM

## 2024-11-19 DIAGNOSIS — M17.12 PRIMARY OSTEOARTHRITIS OF LEFT KNEE: ICD-10-CM

## 2024-11-19 PROCEDURE — 99204 OFFICE O/P NEW MOD 45 MIN: CPT | Performed by: INTERNAL MEDICINE

## 2024-11-19 NOTE — PATIENT INSTRUCTIONS
Please hold your Eliquis 3 days before the upcoming surgery and Aspirin 5 days before. Please do not resume 4000mg after the surgery and try to limit at 325 mg

## 2024-11-19 NOTE — TELEPHONE ENCOUNTER
Hello,    Please advise if a forced appointment can be accommodated for the patient:    Call back #: [phone]    Insurance: [employer]    Reason for appointment: Patient was seen by [facility] for surgical clearance on 11/19 and needs to be scheduled a follow up for Bill so she is able to have surgery for the left knee. I was able to schedule the soonest available apt on 1/13/25. Please advise patient with a sooner apt if possible - TY.    Requested doctor and/or location: [facility] / [facility]      Thank you.

## 2024-12-20 ENCOUNTER — HOSPITAL ENCOUNTER (OUTPATIENT)
Dept: MAMMOGRAPHY | Facility: MEDICAL CENTER | Age: 64
Discharge: HOME | End: 2024-12-20
Payer: COMMERCIAL

## 2024-12-20 VITALS — BODY MASS INDEX: 24.1 KG/M2 | WEIGHT: 136 LBS | HEIGHT: 63 IN

## 2024-12-20 DIAGNOSIS — Z12.31 ENCOUNTER FOR SCREENING MAMMOGRAM FOR MALIGNANT NEOPLASM OF BREAST: ICD-10-CM

## 2024-12-20 PROCEDURE — 77067 SCR MAMMO BI INCL CAD: CPT

## 2024-12-20 PROCEDURE — 77063 BREAST TOMOSYNTHESIS BI: CPT

## 2025-01-13 ENCOUNTER — OFFICE VISIT (OUTPATIENT)
Age: 65
End: 2025-01-13
Payer: COMMERCIAL

## 2025-01-13 ENCOUNTER — PREP FOR PROCEDURE (OUTPATIENT)
Age: 65
End: 2025-01-13

## 2025-01-13 VITALS — BODY MASS INDEX: 24.1 KG/M2 | WEIGHT: 136 LBS | HEIGHT: 63 IN

## 2025-01-13 DIAGNOSIS — M17.12 PRIMARY OSTEOARTHRITIS OF LEFT KNEE: Primary | ICD-10-CM

## 2025-01-13 DIAGNOSIS — M25.59 PAIN IN OTHER JOINT: ICD-10-CM

## 2025-01-13 DIAGNOSIS — G89.29 CHRONIC PAIN OF LEFT KNEE: ICD-10-CM

## 2025-01-13 DIAGNOSIS — M25.562 CHRONIC PAIN OF LEFT KNEE: ICD-10-CM

## 2025-01-13 PROCEDURE — 99214 OFFICE O/P EST MOD 30 MIN: CPT | Performed by: STUDENT IN AN ORGANIZED HEALTH CARE EDUCATION/TRAINING PROGRAM

## 2025-01-13 RX ORDER — SODIUM CHLORIDE, SODIUM LACTATE, POTASSIUM CHLORIDE, CALCIUM CHLORIDE 600; 310; 30; 20 MG/100ML; MG/100ML; MG/100ML; MG/100ML
125 INJECTION, SOLUTION INTRAVENOUS CONTINUOUS
OUTPATIENT
Start: 2025-01-13

## 2025-01-13 RX ORDER — CHLORHEXIDINE GLUCONATE ORAL RINSE 1.2 MG/ML
15 SOLUTION DENTAL ONCE
OUTPATIENT
Start: 2025-01-13 | End: 2025-01-13

## 2025-01-13 RX ORDER — CHLORHEXIDINE GLUCONATE 40 MG/ML
SOLUTION TOPICAL DAILY PRN
OUTPATIENT
Start: 2025-01-13

## 2025-01-13 RX ORDER — MUPIROCIN 20 MG/G
OINTMENT TOPICAL
Qty: 15 G | Refills: 1 | Status: SHIPPED | OUTPATIENT
Start: 2025-01-13

## 2025-01-13 RX ORDER — MULTIVIT-MIN/IRON FUM/FOLIC AC 7.5 MG-4
1 TABLET ORAL DAILY
Qty: 30 TABLET | Refills: 1 | Status: SHIPPED | OUTPATIENT
Start: 2025-01-13

## 2025-01-13 RX ORDER — FOLIC ACID 1 MG/1
1 TABLET ORAL DAILY
Qty: 30 TABLET | Refills: 1 | Status: SHIPPED | OUTPATIENT
Start: 2025-01-13

## 2025-01-13 RX ORDER — ACETAMINOPHEN 325 MG/1
975 TABLET ORAL ONCE
OUTPATIENT
Start: 2025-01-13 | End: 2025-01-13

## 2025-01-13 RX ORDER — VITAMIN B COMPLEX
2000 TABLET ORAL DAILY
Qty: 60 TABLET | Refills: 1 | Status: SHIPPED | OUTPATIENT
Start: 2025-01-13

## 2025-01-13 RX ORDER — ASCORBIC ACID 500 MG
500 TABLET ORAL 2 TIMES DAILY
Qty: 60 TABLET | Refills: 1 | Status: SHIPPED | OUTPATIENT
Start: 2025-01-13

## 2025-01-13 NOTE — PROGRESS NOTES
Knee Follow up Office Note    Assessment:     1. Primary osteoarthritis of left knee    2. Chronic pain of left knee    3. Pain in other joint          Plan:   Diagnoses and all orders for this visit:    Primary osteoarthritis of left knee  -     Case request operating room: ARTHROPLASTY KNEE TOTAL; Standing  -     ascorbic acid (VITAMIN C) 500 MG tablet; Take 1 tablet (500 mg total) by mouth 2 (two) times a day  -     folic acid (FOLVITE) 1 mg tablet; Take 1 tablet (1 mg total) by mouth daily  -     Multiple Vitamins-Minerals (multivitamin with minerals) tablet; Take 1 tablet by mouth daily  -     cholecalciferol (VITAMIN D3) 25 mcg (1,000 units) tablet; Take 2 tablets (2,000 Units total) by mouth daily  -     mupirocin (BACTROBAN) 2 % ointment; Apply topically to the inside of the left and right nostrils twice daily for 5 days before surgery, including the morning of surgery.  -     Comprehensive metabolic panel; Future  -     Hemoglobin A1C W/EAG Estimation; Future  -     CBC and differential; Future  -     MRSA culture; Future  -     Anemia Panel w/Reflex; Future  -     Protime-INR; Future  -     APTT; Future  -     [facility] Referral to [facility]; Future  -     Ambulatory referral to Physical Therapy; Future  -     EKG 12 lead; Future  -     Case request operating room: ARTHROPLASTY KNEE TOTAL    Chronic pain of left knee  -     Case request operating room: ARTHROPLASTY KNEE TOTAL; Standing  -     Case request operating room: ARTHROPLASTY KNEE TOTAL    Pain in other joint  -     CBC and differential; Future  -     Protime-INR; Future  -     APTT; Future    Other orders  -     Notify physician; Standing  -     Diet NPO; Sips with meds; Standing  -     Fingerstick Glucose (POCT); Standing  -     Nursing Communication Warmimg Interventions Implemented; Standing  -     Nursing Communication CHG bath, have staff wash entire body (neck down) per pre-op bathing protocol. Routine, evening prior  to, and day of surgery.; Standing  -     Nursing Communication Swab both nares with Povidone-Iodine solution, EXCLUDE if patient has shellfish/Iodine allergy. Routine, day of surgery, on call to OR; Standing  -     chlorhexidine (PERIDEX) 0.12 % oral rinse 15 mL  -     Void on call to OR; Standing  -     Insert peripheral IV; Standing  -     lactated ringers infusion  -     acetaminophen (TYLENOL) tablet 975 mg  -     tranexamic Acid 1,000 mg in sodium chloride 0.9 % 100 mL IVPB  -     ceFAZolin (ANCEF) 2,000 mg in sodium chloride 0.9 % 50 mL IVPB  -     chlorhexidine gluconate (HIBICLENS) 4 % topical liquid  -     Place sequential compression device; Standing         63 y/o female with severe OA of her left knee which she has been treating for conservatively.  She has tried and failed cortisone injections and visco injections.  She is struggling with her ADLs and she has been unable to go to work or do most of the things she likes to do.  Due to her history of recent DVT, she obtained preoperative clearance from her hematologist prior to scheduling anything such as surgical intervention.  She is to discontinue her Eliquis 3 days before surgery and she does not require a rohan-operative bridge.  Due to pain control with aspirin, she will continue with her asa through surgery.  Again discussed the surgical procedure of Left TKA with the patient and recovery time after.  The patient has elected to proceed with Left TKA. Risks and benefits of surgery to include but not limited to bleeding, infection, damage to surrounding structures, hardware failure, instability, fracture, dislocation, need for further surgery, continued pain, stiffness, blood clots, stroke, and heart attack was discussed with the patient. Informed consent was signed today in the office. The patient has met with our surgical schedulers and our preoperative joint replacement pathway has been initiated. All questions were answered. Patient will follow-up  2 weeks post operatively. BMI is acceptable at 24.09 and is a nonsmoker.   She would like to plan on going to rehab post operatively due to lack of support system at home, we will plan on [facility] for her surgery.      Subjective:     Patient ID: Lori Damon is a 64 y.o. female.  Chief Complaint:  HPI:  64 y.o. female here for follow up evaluation for left knee pain.  She has known severe OA of the left knee which she has been treating for conservatively. She has been having left knee pain for the past year, which has been progressively getting worse. Patient works at a deli shop, however she has not been able to work over the last month and a half due to issues with bearing weight on her left knee.  She has resorted to using a cane for long distance walking.  She alleviates her pain with aspirin.  She has history of multiple DVTs and is now on lifetime Eliquis as well as aspirin. She has tried and failed corticosteroid injection as well as hyaluronic acid injections.  Patient states that the left knee pain is excruciating and she has had an MRI that demonstrates complex tear of medial meniscus on the left side as well as tricompartmental degenerative changes.    At last visit it was discussed that she is a candidate for TKA, however due to her history of DVT, hematology clearance was requested.  She was seen by hematology who provided clearance and recommendations and she is here today to schedule surgery.      Allergy:  Allergies   Allergen Reactions    Armodafinil Other (See Comments)     Tardive dyskinesia - mouth movements    Celecoxib Other (See Comments)     Increased Heart Rate, Palpitations    Sumatriptan Anaphylaxis     Other reaction(s): SUMATRIPTAN (IMITREX) (Throat closure). Pt analilia zolmitriptan.    Tramadol Other (See Comments)     GI Upset- severe vomiting and systemic body aches    Medical Tape Dermatitis, Rash and Blisters     Adhesive from medication patches and bandaides- ok with paper tape  "    Medications:  all current active meds have been reviewed  Past Medical History:  Past Medical History:   Diagnosis Date    Allergy to dust     Anemia     Calculus of ureter     Chronic kidney disease 11/2018    Hydrophenesis    Chronic pain disorder     neck and back- sees pain management    Cluster headache Teenager    May occur 2-3 times per year. Lasts several days    Colitis     Colon polyp     Cracked skin     on fingers    CTS (carpal tunnel syndrome) 01/01/2014    Diverticulitis of colon 2012    Seen by Dr. Wilburn at [facility]. Inpatient at    Diverticulosis     Dysphagia     Fibromyalgia, primary 04/16/1996    GERD (gastroesophageal reflux disease)     GI (gastrointestinal bleed)     As a result of diverticulitis and microscopic colitis.    H/O: GI bleed     Head injury     Had numerious concussions    Headache, tension-type     Hiatal hernia     History of DVT in adulthood 2019 2019    HL (hearing loss) unknown    Hydronephrosis     Hyperlipidemia     Inguinal hernia     right side    Irregular heart beat     Pt states with prolonged QT interval - was seen by cardiologist( [facility]) pt told \"to not worry about it\"    Irritable bowel     Kidney stone     Lactose intolerance     Did not tolerate milk after being weaned from breast milk.    Memory loss 2016/2017    Medication/also related to medical conditions    Migraines     Narcolepsy     OAB (overactive bladder)     Osteoarthritis     Osteoporosis     Other chronic pain     degenerative disc disease    PONV (postoperative nausea and vomiting)     migraines    Pyelonephritis 01/13/2021    SBO (small bowel obstruction) ([facility]) 09/25/2021    Sepsis ([facility]) 01/13/2021    Shingles Unknown    Had mild cases of shingles several times.    Smoking 08/06/2020    Tobacco abuse     Vision loss Unknown    Occurs when headaches start behind my left eye.    Wears dentures     full upper    Wears glasses     Wears glasses      Past Surgical History:  Past Surgical History: "   Procedure Laterality Date    ABDOMINAL ADHESION SURGERY      ARM SKIN LESION BIOPSY / EXCISION      lymph node excision    BACK SURGERY  11/21/14    Laminectomy, forminalectomy    BLADDER SURGERY      CHOLECYSTECTOMY      COLON SURGERY  1987    Extensive adhesions in abdomen, adhesions surgically removed    COLONOSCOPY  08/06/2020    FL RETROGRADE PYELOGRAM  01/05/2021    FLEXIBLE SIGMOIDOSCOPY      Last done at [facility] in 2018 after being admitted for GI bleed.    HERNIA REPAIR      HYSTERECTOMY  1977    JOINT REPLACEMENT Right     knee    KNEE SURGERY Right     replacement    LAMINECTOMY      L5-6-7    LAPAROSCOPY      LYMPH NODE DISSECTION Left     removed -no cancer-  limb restriction    LYMPHADENECTOMY  1974    UNDER LEFT ARM - DO NOT DRAW BLOOD/GIVE INJECTIONS IN LEFT ARM PER PATIENT     MOLE REMOVAL      NECK SURGERY  4/16/14    Fusion C 5/6 and 6/7    OOPHORECTOMY Bilateral 1977    OTHER SURGICAL HISTORY      enlarged lymph node removed left arm..no cancer    OK CYSTO/URETERO W/LITHOTRIPSY &INDWELL STENT INSRT Right 01/05/2021    Procedure: CYSTO, LASER  URETEROSCOPY, RETRO PYELOGRAM, STENT REPLACMENT;  Surgeon: Adriel Subramanian MD;  Location: [facility];  Service: Urology    OK CYSTOURETHROSCOPY W/URETERAL CATHETERIZATION Right 12/02/2020    Procedure: CYSTOSCOPY AND INSERTION STENT URETERAL;  Surgeon: Rory Fall MD;  Location[facility];  Service: Urology    OK LITHOTRIPSY XTRCORP SHOCK WAVE Right 03/25/2021    Procedure: LITHOTRIPSY EXTRACORPORAL SHOCKWAVE (ESWL);  Surgeon: Darwin Montero MD;  Location:  MAIN OR;  Service: Urology    OK RPR 1ST INGUN HRNA AGE 5 YRS/> REDUCIBLE Right 06/25/2020    Procedure: REPAIR HERNIA INGUINAL  WITH MESH;  Surgeon: Mercedes Justin MD;  Location: [facility];  Service: General    SPINAL FUSION      UPPER GASTROINTESTINAL ENDOSCOPY  08/06/2020    WISDOM TOOTH EXTRACTION       Family History:  Family History   Problem Relation Age of Onset    Hypertension Mother           from massive heart attack, never took any medication for this condition    Heart attack Mother     Hyperlipidemia Mother         Blood tests done just prior to heart attack.  Results reported after her passing.    Migraines Mother          from massive cardiac arrest. Had abdominal aneurysm.    Hypertension Father         Had high blood pressure, unknown if he ever took medication    Cancer Father 72        Started in bowels, spread to liver, refused treatment    Colon polyps Father     Diabetes Sister         uses insulin    Arthritis Sister         Osteo arthritis    Hypertension Sister         Triple bypass    Diabetes Sister         pre-diabetic, oral medication    Arthritis Sister         Osteo arthritis    Hypertension Sister         Triple bypass    Irritable bowel syndrome Sister         Constipation    No Known Problems Daughter     No Known Problems Maternal Grandmother     No Known Problems Maternal Grandfather     No Known Problems Paternal Grandmother     No Known Problems Paternal Grandfather     Cancer Brother         Terminal soft tissue cancer     Hypertension Brother         Triple bypass    Migraines Brother         Severe. Standard meds do not help. Goes to [facility] for I    Learning disabilities Brother         Mental retardation    Mental retardation Brother     No Known Problems Maternal Aunt     No Known Problems Maternal Aunt     No Known Problems Maternal Aunt     Migraines Maternal Aunt         Had 2.  from cerebral aneurysm    Migraines Maternal Aunt          from cerebral aneurysm    Migraines Maternal Aunt          from cerebral aneurysm    Migraines Maternal Uncle          from cerebral aneurysm    No Known Problems Paternal Aunt     No Known Problems Paternal Aunt     No Known Problems Paternal Aunt     Cancer Cousin         Pancreatic cancer    Cancer Cousin         Breast cancer, total mastectomy    Breast cancer Other 47     Social  "History:  Social History     Substance and Sexual Activity   Alcohol Use Not Currently    Comment: May have an occasional drink when di     Social History     Substance and Sexual Activity   Drug Use Never    Types: Marijuana     Social History     Tobacco Use   Smoking Status Every Day    Current packs/day: 0.50    Average packs/day: 0.5 packs/day for 51.6 years (25.8 ttl pk-yrs)    Types: Cigarettes    Start date: 6/1/1973    Passive exposure: Current   Smokeless Tobacco Never   Tobacco Comments    Stopped smoking in 2014 because of cervical surgeries. Started smoking again as soon as my body was           ROS:  General: Per HPI  Skin: Negative, except if noted below  HEENT: Negative  Respiratory: Negative  Cardiovascular: Negative  Gastrointestinal: Negative  Urinary: Negative  Vascular: Negative  Musculoskeletal: Positive per HPI   Neurologic: Positive per HPI  Endocrine: Negative    Objective:  BP Readings from Last 1 Encounters:   11/19/24 114/60      Wt Readings from Last 1 Encounters:   01/13/25 61.7 kg (136 lb)        Respiratory:   non-labored respirations    Lymphatics:  no palpable lymph nodes    Gait:   Antalgic    Neurologic:   Alert and oriented times 3  Patient with normal sensation except as noted below  Deep tendon reflexes 2+ except as noted in MSK exam    Lower Extremity:  Left Knee:      Inspection:  skin intact    Overall limb alignment varus    Effusion: moderate    ROM 3-115 without pain    Extensor Lag: none    Palpation: medial Joint line tenderness to palpation    AP Stability at 90 deg stable    M/L stability in full extension stable    M/L stability in midflexion stable    Motor: 5/5 Q/HS/TA/GS/P    Pulses: 2+ DP / 2+ PT    SILT DP/SP/S/S/TN      BMI:   Estimated body mass index is 24.09 kg/m² as calculated from the following:    Height as of this encounter: 5' 3\" (1.6 m).    Weight as of this encounter: 61.7 kg (136 lb).  BSA:   Estimated body surface area is 1.64 meters squared as " "calculated from the following:    Height as of this encounter: 5' 3\" (1.6 m).    Weight as of this encounter: 61.7 kg (136 lb).           Scribe Attestation      I,:  Chica Dennis PA-C am acting as a scribe while in the presence of the attending physician.:       I,:  Malcolm Khan, DO personally performed the services described in this documentation    as scribed in my presence.:              "

## 2025-01-14 DIAGNOSIS — G24.01 TARDIVE DYSKINESIA: ICD-10-CM

## 2025-01-14 DIAGNOSIS — G43.009 MIGRAINE WITHOUT AURA AND WITHOUT STATUS MIGRAINOSUS, NOT INTRACTABLE: ICD-10-CM

## 2025-01-16 DIAGNOSIS — Z72.0 TOBACCO ABUSE: ICD-10-CM

## 2025-01-16 RX ORDER — BUPROPION HYDROCHLORIDE 150 MG/1
150 TABLET ORAL EVERY MORNING
Qty: 90 TABLET | Refills: 1 | Status: SHIPPED | OUTPATIENT
Start: 2025-01-16

## 2025-01-23 RX ORDER — AMANTADINE HYDROCHLORIDE 100 MG/1
100 CAPSULE, GELATIN COATED ORAL DAILY
Qty: 90 CAPSULE | Refills: 3 | Status: SHIPPED | OUTPATIENT
Start: 2025-01-23

## 2025-01-23 NOTE — TELEPHONE ENCOUNTER
"Juana Alvarez, DO to Neurology Neuromuscular Team 2     1/15/25  4:27 PM  Please confirm the pharmacy and send back to be to refill  ------------------------------------------    Patient message from 01/14/2025 said: \"Please send a 90 day script to [facility].\" Outbound call made to patient and she confirmed the refill information.     Medication:   amantadine (SYMMETREL) 100 mg capsule     Dose/Frequency: Take 1 capsule (100 mg total) by mouth in the morning     Quantity: 90 capsule     Pharmacy:   [facility] - [address] [address]  Phone: [phone]  Fax: [phone]       Office:   [] PCP/Provider -   [x] Speciality/Provider - Dr. Alvarez    Does the patient have enough for 3 days?   [x] Yes   [] No - Send as HP to POD    Dr. Alvarez, please send refill if agreeable, thank you!         "

## 2025-01-25 ENCOUNTER — HOSPITAL ENCOUNTER (OUTPATIENT)
Dept: CT IMAGING | Facility: HOSPITAL | Age: 65
Discharge: HOME | End: 2025-01-25
Attending: FAMILY MEDICINE

## 2025-01-25 DIAGNOSIS — F17.210 NICOTINE DEPENDENCE, CIGARETTES, UNCOMPLICATED: ICD-10-CM

## 2025-01-25 DIAGNOSIS — Z72.0 TOBACCO ABUSE: ICD-10-CM

## 2025-01-28 NOTE — TELEPHONE ENCOUNTER
Outbound call made to Patient and she confirmed the medication is on it's way. Patient denied having other needs at this time and states she will contact us for the propanolol when it gets closer to the time to get it filled as [facility].

## 2025-01-30 ENCOUNTER — OFFICE VISIT (OUTPATIENT)
Dept: FAMILY MEDICINE CLINIC | Facility: CLINIC | Age: 65
End: 2025-01-30
Payer: COMMERCIAL

## 2025-01-30 VITALS
WEIGHT: 140.6 LBS | TEMPERATURE: 97.7 F | BODY MASS INDEX: 24.91 KG/M2 | HEART RATE: 74 BPM | SYSTOLIC BLOOD PRESSURE: 102 MMHG | HEIGHT: 63 IN | DIASTOLIC BLOOD PRESSURE: 70 MMHG | OXYGEN SATURATION: 96 %

## 2025-01-30 DIAGNOSIS — R32 URINARY INCONTINENCE, UNSPECIFIED TYPE: ICD-10-CM

## 2025-01-30 DIAGNOSIS — Z00.00 MEDICARE ANNUAL WELLNESS VISIT, SUBSEQUENT: Primary | ICD-10-CM

## 2025-01-30 DIAGNOSIS — I82.491 DEEP VEIN THROMBOSIS (DVT) OF OTHER VEIN OF RIGHT LOWER EXTREMITY, UNSPECIFIED CHRONICITY (HCC): ICD-10-CM

## 2025-01-30 DIAGNOSIS — N18.31 STAGE 3A CHRONIC KIDNEY DISEASE (HCC): ICD-10-CM

## 2025-01-30 PROCEDURE — G0439 PPPS, SUBSEQ VISIT: HCPCS | Performed by: FAMILY MEDICINE

## 2025-01-30 NOTE — ASSESSMENT & PLAN NOTE
Lab Results   Component Value Date    EGFR 59 10/29/2024    EGFR 52 03/21/2024    EGFR 53 03/08/2024    CREATININE 0.99 10/29/2024    CREATININE 1.11 03/21/2024    CREATININE 1.09 03/08/2024

## 2025-01-30 NOTE — PROGRESS NOTES
Name: Lori Damon      : 1960      MRN: [id number]  Encounter Provider: Naty Kenney MD  Encounter Date: 2025   Encounter department: [facility]    Assessment & Plan  Medicare annual wellness visit, subsequent  Preventive health issues were discussed with patient, and age appropriate screening tests were ordered as noted in patient's After Visit Summary.   Personalized health advice and appropriate referrals for health education or preventive services given if needed, as noted in patient's After Visit Summary.  Follow-up in 6 months or sooner if needed.       Deep vein thrombosis (DVT) of other vein of right lower extremity, unspecified chronicity (HCC)  Patient on Eliquis indefinitely       Stage 3a chronic kidney disease (HCC)  Lab Results   Component Value Date    EGFR 59 10/29/2024    EGFR 52 2024    EGFR 53 2024    CREATININE 0.99 10/29/2024    CREATININE 1.11 2024    CREATININE 1.09 2024            Urinary incontinence, unspecified type           Depression Screening and Follow-up Plan: Patient was screened for depression during today's encounter. They screened negative with a PHQ-2 score of 2.    Urinary Incontinence Plan of Care: counseling topics discussed: limit alcohol, caffeine, spicy foods, and acidic foods, limiting fluid intake 3-4 hours before bed and preventing constipation.     Tobacco Cessation Counseling: The patient is sincerely urged to quit consumption of tobacco. She is not ready to quit tobacco.       History of Present Illness     Lori Damon is a very pleasant 64-year-old female who presents today for Medicare wellness visit.  Patient's main concern today is regarding her knee.  She has been doing steroid shots with no improvement and is now scheduled for a knee replacement in March.  She reports that the knee pain is severely limiting her daily life and is looking forward to the surgery.  She also reports that she is down to  smoking just 2 cigarettes a day and is hoping that she will have quit by the time the surgery comes around.    Patient Care Team:  Naty Kenney MD as PCP - General (Family Medicine)  Naty Kenney MD (Family Medicine)  Travis Leavitt MD (Hematology and Oncology)    Review of Systems   Constitutional: Negative.    HENT: Negative.     Eyes: Negative.    Respiratory: Negative.     Cardiovascular: Negative.    Gastrointestinal: Negative.    Genitourinary: Negative.    Musculoskeletal:  Positive for arthralgias.   Skin: Negative.    Neurological: Negative.    Psychiatric/Behavioral: Negative.       Medical History Reviewed by provider this encounter:       Annual Wellness Visit Questionnaire   Lori is here for her Subsequent Wellness visit.     Health Risk Assessment:   Patient rates overall health as good. Patient feels that their physical health rating is slightly worse. Patient is dissatisfied with their life. Eyesight was rated as slightly worse. Hearing was rated as same. Patient feels that their emotional and mental health rating is slightly worse. Patients states they are never, rarely angry. Patient states they are sometimes unusually tired/fatigued. Pain experienced in the last 7 days has been a lot. Patient's pain rating has been 8/10. Patient states that she has experienced weight loss or gain in last 6 months.     Depression Screening:   PHQ-2 Score: 2      Fall Risk Screening:   In the past year, patient has experienced: history of falling in past year    Number of falls: 2 or more  Injured during fall?: No    Feels unsteady when standing or walking?: No    Worried about falling?: No      Urinary Incontinence Screening:   Patient has leaked urine accidently in the last six months. Already seeing urologist/Dr. Subramanian.    Home Safety:  Patient has trouble with stairs inside or outside of their home. Patient has working smoke alarms and has no working carbon monoxide detector. Home safety hazards include:  household clutter.     Nutrition:   Current diet is Regular.     Medications:   Patient is currently taking over-the-counter supplements. OTC medications include: see medication list. Patient is able to manage medications.     Activities of Daily Living (ADLs)/Instrumental Activities of Daily Living (IADLs):   Walk and transfer into and out of bed and chair?: Yes  Dress and groom yourself?: Yes    Bathe or shower yourself?: Yes    Feed yourself? Yes  Do your laundry/housekeeping?: Yes  Manage your money, pay your bills and track your expenses?: Yes  Make your own meals?: Yes    Do your own shopping?: Yes    Previous Hospitalizations:   Any hospitalizations or ED visits within the last 12 months?: No      Advance Care Planning:   Living will: Yes    Durable POA for healthcare: Yes    Advanced directive: Yes      PREVENTIVE SCREENINGS      Cardiovascular Screening:    General: Screening Not Indicated and History Lipid Disorder      Diabetes Screening:     General: Screening Current      Colorectal Cancer Screening:     General: Screening Current      Breast Cancer Screening:     General: Screening Current      Cervical Cancer Screening:    General: Screening Not Indicated      Osteoporosis Screening:    General: Screening Not Indicated and History Osteoporosis      Lung Cancer Screening:     General: Screening Current      Hepatitis C Screening:    General: Screening Current    Screening, Brief Intervention, and Referral to Treatment (SBIRT)    Screening  Typical number of drinks in a day: 0  Typical number of drinks in a week: 0  Interpretation: Low risk drinking behavior.    AUDIT-C Screenin) How often did you have a drink containing alcohol in the past year? monthly or less  2) How many drinks did you have on a typical day when you were drinking in the past year? 0  3) How often did you have 6 or more drinks on one occasion in the past year? never    AUDIT-C Score: 1  Interpretation: Score 0-2 (female):  "Negative screen for alcohol misuse    Single Item Drug Screening:  How often have you used an illegal drug (including marijuana) or a prescription medication for non-medical reasons in the past year? never    Single Item Drug Screen Score: 0  Interpretation: Negative screen for possible drug use disorder    Social Drivers of Health     Financial Resource Strain: Low Risk  (1/26/2024)    Overall Financial Resource Strain (CARDIA)     Difficulty of Paying Living Expenses: Not hard at all   Food Insecurity: No Food Insecurity (1/26/2025)    Hunger Vital Sign     Worried About Running Out of Food in the Last Year: Never true     Ran Out of Food in the Last Year: Never true   Transportation Needs: No Transportation Needs (1/26/2025)    PRAPARE - Transportation     Lack of Transportation (Medical): No     Lack of Transportation (Non-Medical): No   Housing Stability: Low Risk  (1/26/2025)    Housing Stability Vital Sign     Unable to Pay for Housing in the Last Year: No     Number of Times Moved in the Last Year: 0     Homeless in the Last Year: No   Utilities: Not At Risk (1/26/2025)    [facility] Utilities     Threatened with loss of utilities: No     No results found.    Objective   /70 (BP Location: Left arm, Patient Position: Sitting, Cuff Size: Adult)   Pulse 74   Temp 97.7 °F (36.5 °C) (Temporal)   Ht 5' 3\" (1.6 m)   Wt 63.8 kg (140 lb 9.6 oz)   SpO2 96%   BMI 24.91 kg/m²     Physical Exam  Constitutional:       General: She is not in acute distress.     Appearance: She is not ill-appearing.   HENT:      Head: Normocephalic and atraumatic.   Eyes:      General:         Right eye: No discharge.         Left eye: No discharge.      Extraocular Movements: Extraocular movements intact.   Cardiovascular:      Rate and Rhythm: Normal rate.   Pulmonary:      Effort: Pulmonary effort is normal. No respiratory distress.      Breath sounds: No wheezing.   Abdominal:      General: Bowel sounds are normal. There is no " distension.      Palpations: Abdomen is soft.      Tenderness: There is no abdominal tenderness.   Musculoskeletal:      Left lower leg: Edema present.   Neurological:      General: No focal deficit present.      Mental Status: She is alert.   Psychiatric:         Mood and Affect: Mood normal.         Behavior: Behavior normal.

## 2025-01-30 NOTE — PATIENT INSTRUCTIONS
Medicare Preventive Visit Patient Instructions  Thank you for completing your Welcome to Medicare Visit or Medicare Annual Wellness Visit today. Your next wellness visit will be due in one year (1/31/2026).  The screening/preventive services that you may require over the next 5-10 years are detailed below. Some tests may not apply to you based off risk factors and/or age. Screening tests ordered at today's visit but not completed yet may show as past due. Also, please note that scanned in results may not display below.  Preventive Screenings:  Service Recommendations Previous Testing/Comments   Colorectal Cancer Screening  * Colonoscopy    * Fecal Occult Blood Test (FOBT)/Fecal Immunochemical Test (FIT)  * Fecal DNA/Cologuard Test  * Flexible Sigmoidoscopy Age: 45-75 years old   Colonoscopy: every 10 years (may be performed more frequently if at higher risk)  OR  FOBT/FIT: every 1 year  OR  Cologuard: every 3 years  OR  Sigmoidoscopy: every 5 years  Screening may be recommended earlier than age 45 if at higher risk for colorectal cancer. Also, an individualized decision between you and your healthcare provider will decide whether screening between the ages of 76-85 would be appropriate. Colonoscopy: 10/07/2021  FOBT/FIT: 06/29/2022  Cologuard: Not on file  Sigmoidoscopy: Not on file          Breast Cancer Screening Age: 40+ years old  Frequency: every 1-2 years  Not required if history of left and right mastectomy Mammogram: 12/20/2024        Cervical Cancer Screening Between the ages of 21-29, pap smear recommended once every 3 years.   Between the ages of 30-65, can perform pap smear with HPV co-testing every 5 years.   Recommendations may differ for women with a history of total hysterectomy, cervical cancer, or abnormal pap smears in past. Pap Smear: 04/08/2021        Hepatitis C Screening Once for adults born between 1945 and 1965  More frequently in patients at high risk for Hepatitis C Hep C Antibody:  11/21/2019        Diabetes Screening 1-2 times per year if you're at risk for diabetes or have pre-diabetes Fasting glucose: 86 mg/dL (10/29/2024)  A1C: 6.2 % (10/29/2024)      Cholesterol Screening Once every 5 years if you don't have a lipid disorder. May order more often based on risk factors. Lipid panel: 10/29/2024          Other Preventive Screenings Covered by Medicare:  Abdominal Aortic Aneurysm (AAA) Screening: covered once if your at risk. You're considered to be at risk if you have a family history of AAA.  Lung Cancer Screening: covers low dose CT scan once per year if you meet all of the following conditions: (1) Age 55-77; (2) No signs or symptoms of lung cancer; (3) Current smoker or have quit smoking within the last 15 years; (4) You have a tobacco smoking history of at least 20 pack years (packs per day multiplied by number of years you smoked); (5) You get a written order from a healthcare provider.  Glaucoma Screening: covered annually if you're considered high risk: (1) You have diabetes OR (2) Family history of glaucoma OR (3)  aged 50 and older OR (4)  American aged 65 and older  Osteoporosis Screening: covered every 2 years if you meet one of the following conditions: (1) You're estrogen deficient and at risk for osteoporosis based off medical history and other findings; (2) Have a vertebral abnormality; (3) On glucocorticoid therapy for more than 3 months; (4) Have primary hyperparathyroidism; (5) On osteoporosis medications and need to assess response to drug therapy.   Last bone density test (DXA Scan): 06/16/2023.  HIV Screening: covered annually if you're between the age of 15-65. Also covered annually if you are younger than 15 and older than 65 with risk factors for HIV infection. For pregnant patients, it is covered up to 3 times per pregnancy.    Immunizations:  Immunization Recommendations   Influenza Vaccine Annual influenza vaccination during flu season is  recommended for all persons aged >= 6 months who do not have contraindications   Pneumococcal Vaccine   * Pneumococcal conjugate vaccine = PCV13 (Prevnar 13), PCV15 (Vaxneuvance), PCV20 (Prevnar 20)  * Pneumococcal polysaccharide vaccine = PPSV23 (Pneumovax) Adults 19-65 yo with certain risk factors or if 65+ yo  If never received any pneumonia vaccine: recommend Prevnar 20 (PCV20)  Give PCV20 if previously received 1 dose of PCV13 or PPSV23   Hepatitis B Vaccine 3 dose series if at intermediate or high risk (ex: diabetes, end stage renal disease, liver disease)   Respiratory syncytial virus (RSV) Vaccine - COVERED BY MEDICARE PART D  * RSVPreF3 (Arexvy) CDC recommends that adults 60 years of age and older may receive a single dose of RSV vaccine using shared clinical decision-making (SCDM)   Tetanus (Td) Vaccine - COST NOT COVERED BY MEDICARE PART B Following completion of primary series, a booster dose should be given every 10 years to maintain immunity against tetanus. Td may also be given as tetanus wound prophylaxis.   Tdap Vaccine - COST NOT COVERED BY MEDICARE PART B Recommended at least once for all adults. For pregnant patients, recommended with each pregnancy.   Shingles Vaccine (Shingrix) - COST NOT COVERED BY MEDICARE PART B  2 shot series recommended in those 19 years and older who have or will have weakened immune systems or those 50 years and older     Health Maintenance Due:      Topic Date Due   • Breast Cancer Screening: Mammogram  12/20/2025   • Lung Cancer Screening  01/25/2026   • Colorectal Cancer Screening  10/06/2026   • Hepatitis C Screening  Completed     Immunizations Due:      Topic Date Due   • Hepatitis A Vaccine (1 of 2 - Risk 2-dose series) Never done   • COVID-19 Vaccine (5 - 2024-25 season) 09/01/2024     Advance Directives   What are advance directives?  Advance directives are legal documents that state your wishes and plans for medical care. These plans are made ahead of time in  case you lose your ability to make decisions for yourself. Advance directives can apply to any medical decision, such as the treatments you want, and if you want to donate organs.   What are the types of advance directives?  There are many types of advance directives, and each state has rules about how to use them. You may choose a combination of any of the following:  Living will:  This is a written record of the treatment you want. You can also choose which treatments you do not want, which to limit, and which to stop at a certain time. This includes surgery, medicine, IV fluid, and tube feedings.   Durable power of  for healthcare (DPAHC):  This is a written record that states who you want to make healthcare choices for you when you are unable to make them for yourself. This person, called a proxy, is usually a family member or a friend. You may choose more than 1 proxy.  Do not resuscitate (DNR) order:  A DNR order is used in case your heart stops beating or you stop breathing. It is a request not to have certain forms of treatment, such as CPR. A DNR order may be included in other types of advance directives.  Medical directive:  This covers the care that you want if you are in a coma, near death, or unable to make decisions for yourself. You can list the treatments you want for each condition. Treatment may include pain medicine, surgery, blood transfusions, dialysis, IV or tube feedings, and a ventilator (breathing machine).  Values history:  This document has questions about your views, beliefs, and how you feel and think about life. This information can help others choose the care that you would choose.  Why are advance directives important?  An advance directive helps you control your care. Although spoken wishes may be used, it is better to have your wishes written down. Spoken wishes can be misunderstood, or not followed. Treatments may be given even if you do not want them. An advance directive  may make it easier for your family to make difficult choices about your care.   Urinary Incontinence   Urinary incontinence (UI)  is when you lose control of your bladder. UI develops because your bladder cannot store or empty urine properly. The 3 most common types of UI are stress incontinence, urge incontinence, or both.  Medicines:   May be given to help strengthen your bladder control. Report any side effects of medication to your healthcare provider.  Do pelvic muscle exercises often:  Your pelvic muscles help you stop urinating. Squeeze these muscles tight for 5 seconds, then relax for 5 seconds. Gradually work up to squeezing for 10 seconds. Do 3 sets of 15 repetitions a day, or as directed. This will help strengthen your pelvic muscles and improve bladder control.  Train your bladder:  Go to the bathroom at set times, such as every 2 hours, even if you do not feel the urge to go. You can also try to hold your urine when you feel the urge to go. For example, hold your urine for 5 minutes when you feel the urge to go. As that becomes easier, hold your urine for 10 minutes.   Self-care:   Keep a UI record.  Write down how often you leak urine and how much you leak. Make a note of what you were doing when you leaked urine.  Drink liquids as directed. You may need to limit the amount of liquid you drink to help control your urine leakage. Do not drink any liquid right before you go to bed. Limit or do not have drinks that contain caffeine or alcohol.   Prevent constipation.  Eat a variety of high-fiber foods. Good examples are high-fiber cereals, beans, vegetables, and whole-grain breads. Walking is the best way to trigger your intestines to have a bowel movement.  Exercise regularly and maintain a healthy weight.  Weight loss and exercise will decrease pressure on your bladder and help you control your leakage.   Use a catheter as directed  to help empty your bladder. A catheter is a tiny, plastic tube that is  put into your bladder to drain your urine.   Go to behavior therapy as directed.  Behavior therapy may be used to help you learn to control your urge to urinate.    Cigarette Smoking and Your Health   Risks to your health if you smoke:  Nicotine and other chemicals found in tobacco damage every cell in your body. Even if you are a light smoker, you have an increased risk for cancer, heart disease, and lung disease. If you are pregnant or have diabetes, smoking increases your risk for complications.   Benefits to your health if you stop smoking:   You decrease respiratory symptoms such as coughing, wheezing, and shortness of breath.   You reduce your risk for cancers of the lung, mouth, throat, kidney, bladder, pancreas, stomach, and cervix. If you already have cancer, you increase the benefits of chemotherapy. You also reduce your risk for cancer returning or a second cancer from developing.   You reduce your risk for heart disease, blood clots, heart attack, and stroke.   You reduce your risk for lung infections, and diseases such as pneumonia, asthma, chronic bronchitis, and emphysema.  Your circulation improves. More oxygen can be delivered to your body. If you have diabetes, you lower your risk for complications, such as kidney, artery, and eye diseases. You also lower your risk for nerve damage. Nerve damage can lead to amputations, poor vision, and blindness.  You improve your body's ability to heal and to fight infections.  For more information and support to stop smoking:   Smokefree.gov  Phone: 4- 529 - 184-3890  Web Address: www.smokefree.gov     © Copyright [employer] 2018 Information is for End User's use only and may not be sold, redistributed or otherwise used for commercial purposes. All illustrations and images included in CareNotes® are the copyrighted property of [employer] Inc[employer] or [employer]

## 2025-02-07 ENCOUNTER — TELEPHONE (OUTPATIENT)
Dept: OBGYN CLINIC | Facility: HOSPITAL | Age: 65
End: 2025-02-07

## 2025-02-07 NOTE — TELEPHONE ENCOUNTER
Preoperative Elective Admission Assessment    EKG/LAB/MRSA SWAB/CXR date: week of 2/24    Living Situation:    Who does pt live with:  daughter  What kind of home: ranch  How do they enter the home: front  How many levels in home: 1   # of steps to enter home: 2  # of steps to second floor: n/a  Are there handrails: No  Are there landings: No  Sleeping arrangement: first/entry floor  Where is Bathroom: entry level  Where is the tub or shower: step in bath tub w/ grab bar  Toilet height? Concerns for low toilets standard height  Dogs or ther pets: cats     First Floor Setup:   Is there a bathroom: Yes  Where would pt sleep: bed     DME: rolling walker. Instructed to bring DOS  We discussed clearing pathways in the home and making sure there is accessibly to use the walker, for example, removing throw rugs.      Patient's Current Level of Function: Ambulates with cane, Ambulates with crutches, and ADLs: Independent    Post-op Caregiver: child, only from 4PM through the night  Caregiver Name and phone number for Inpatient discharge needs: Sushila  Currently receive any HHC/aides/community supports: No     Post-op Transport: child  To/from hospital: child  To/from PT 2-3x/week: child  Uses community transport now: No     Outpatient Physical Therapy Site:  Site: [facility]  pre and post-op appts scheduled? Yes     Medication Management: self and pillbox  Preferred Pharmacy for Post-op Medications: [facility]  Blood Management Vitamin Regimen:  Starting 30 days prior to surgery  Post-op anticoagulant: to be determined by surgical team postoperatively  Has Bactroban for 5 days preop: Yes  Educated on Preoperative Bathing Instructions, and use of Soap for 5 days before surgery.      DC Plan: Pt plans to be d/c to rehab per OV note with surgeon     Barriers to DC identified preoperatively: caregiver support    BMI: 24.91    Patient Education:  Pt educated on post-op pain, early mobilization (POD0), LOS goals, OP PT goals, and  preoperative bathing. Patient educated that our goal is to appropriately discharge patient based off their post-op function while striving to maintain maximal independence. The goal is to discharge patient to home and for them to attend outpatient physical therapy.    Assigned to care team? Yes

## 2025-02-17 ENCOUNTER — EVALUATION (OUTPATIENT)
Age: 65
End: 2025-02-17
Payer: COMMERCIAL

## 2025-02-17 DIAGNOSIS — M17.12 PRIMARY OSTEOARTHRITIS OF LEFT KNEE: Primary | ICD-10-CM

## 2025-02-17 PROCEDURE — 97161 PT EVAL LOW COMPLEX 20 MIN: CPT | Performed by: PHYSICAL THERAPIST

## 2025-02-17 PROCEDURE — 97112 NEUROMUSCULAR REEDUCATION: CPT | Performed by: PHYSICAL THERAPIST

## 2025-02-17 NOTE — PROGRESS NOTES
PT Evaluation     Today's date: 2025  Patient name: Lori Damon  : 1960  MRN: [id number]  Referring provider: Chica Dennis PA-C  Dx:   Encounter Diagnosis     ICD-10-CM    1. Primary osteoarthritis of left knee  M17.12 Ambulatory referral to Physical Therapy          Start Time: 1200  Stop Time: 1300  Total time in clinic (min): 60 minutes    Assessment  Impairments: abnormal gait, abnormal or restricted ROM, activity intolerance, impaired balance, impaired physical strength, lacks appropriate home exercise program, pain with function, weight-bearing intolerance, poor body mechanics and endurance    Assessment details: Patient is a 63 yo female presenting to physical therapy with symptoms consistent with L knee pain that started about 2 years ago. Patient is scheduled for a L TKA on 3/17/25. Patient presents with decreased hip and knee strength, decreased L knee ROM, decreased endurance and gait deviations such as decreased step length and antalgic gait. Patient has increased difficulty with walking, transfers, lifting, floor transfers and cleaning her litter boxes for her cats. PT will address the noted impairments by performing hip and knee strengthening, stretching, balance, functional activities and manual techniques to allow the patient to return to her PLOF. PT recommended 2x/week for 4-6 weeks c a guarded prognosis 2* PLOF pre operatively. PT educated the patient on stair negotiation, assistive device management, home set up, HEP and DVT/ infection signs and symptoms.     Understanding of Dx/Px/POC: good     Prognosis: good    Goals  Pre-Operative Goals: In one session the patient will be able to:  1. Describe and demonstrate amb with a RW. (MET)  2. (I) with HEP (MET)  3. Demonstrate proper stair negotiation. (MET)  4. Increase hip strength to 4/5 MMT score to assist c amb.     Post-Operative Goals:  STG: In four weeks the patient will:    1. Be (I) with her HEP.  2. Increase hip and knee  strength to 4+/5 MMT score to assist c ADLs.  3. Increase L knee ROM by 15 degrees to assist c walking and transfers.  4. amb with a RW for 200ft with mod (I) with proper gait mechanics.  5. amb with a SPC for 200ft with mod (I) with proper gait mechanics.   6. Negotiate 1 step with proper mechanics with a SPC to assist c getting into her home.       LTG: In eight weeks, the patient will:    1. Increase FOTO score to TBD to demonstrate improvements in symptoms and function.  2. Demonstrate full L knee AROM without pain.  3. Negotiate a full flight of steps without pain.  4. Increase hip and knee strength to 5/5 MMT score to assist c prolonged walking and stairs.   5. amb (I) for 200ft with proper gait mechanics.          Plan  Patient would benefit from: skilled physical therapy and PT eval  Planned modality interventions: cryotherapy and thermotherapy: hydrocollator packs    Planned therapy interventions: IASTM, joint mobilization, kinesiology taping, manual therapy, massage, Yi taping, abdominal trunk stabilization, balance, body mechanics training, breathing training, neuromuscular re-education, patient education, postural training, strengthening, stretching, therapeutic activities, therapeutic exercise, flexibility, functional ROM exercises, gait training, transfer training and home exercise program    Frequency: 2x week  Duration in weeks: 6  Plan of Care beginning date: 2/17/2025  Plan of Care expiration date: 3/31/2025  Treatment plan discussed with: patient        Subjective Evaluation    History of Present Illness  Mechanism of injury: Patient noted that she is scheduled for a L TKA on 3/17/25. Patient reported that she has had L knee pain for about 2 years ago. Patient noted originally clicking in the L knee and when she had x-rays they were normal. Patient then noted increased L LE swelling in March 2024 and was screened for a DVT which was negative. Patient noted she had injections into the L knee  "and noted no help. Patient noted was also on a steroid pack with some assistance in pain/swelling. Patient noted a hx of DVT. Patient noted in 2024 she stopped working. She was working at a [employer]. Patient noted that she does not want to return to the [employer], however would like to get some type of part time work/ volunteering. Patient noted she lives in a one story home with her daughter with 1 FÉLIX. Patient currently has a walker, crutches, cane and shower chair. Patient noted increased difficulty with walking, vacuuming, lifting, floor transfers, dressing and cleaning the floors. Patient noted a hx of two falls in the past year due to her cats. Patient noted she is mainly having difficulty with changing her liter boxes (5 total) for her 12 cats. Patient noted that she has difficulty with standing up in a lunge position on the R LE when compared to the L LE. Patient noted that a goal would be to walk 2-5 miles a day post surgery. Patient currently does not amb with an AD, unless she is walking in a larger store. She then amb with crutches or a SPC.   Patient Goals  Patient goals for therapy: increased strength, independence with ADLs/IADLs, return to sport/leisure activities, increased motion, improved balance and decreased pain  Patient goal: \"to improve mobility and walk.\" \"to get out and about.\"  Pain  Current pain ratin  At best pain ratin  At worst pain rating: 10  Location: L knee; medial knee; tibial tuberosity  Quality: burning (grinding)  Relieving factors: heat, rest and medications (Aspirin)  Aggravating factors: standing, walking and lifting (driving; floor transfers, dressing)  Progression: worsening    Social Support  Steps to enter house: yes  2  Stairs in house: yes (4\" step)   1  Lives in: one-story house  Lives with: adult children    Employment status: not working  Hand dominance: right          Objective     Postural Observations  Seated posture: fair  Standing posture: " fair      Observations   Left Knee   Positive for edema. Negative for trophic changes.     Additional Observation Details  Edema located superiorly to the L patella.     Active Range of Motion   Left Knee   Flexion: 115 degrees   Extension: 4 degrees   Extensor la degrees     Passive Range of Motion   Left Knee   Flexion: 116 degrees   Extension: 2 degrees     Mobility   Patellar Mobility:   Left Knee   Hypomobile: left medial, left lateral, left superior and left inferior    Strength/Myotome Testing     Left Hip   Planes of Motion   Flexion: 3+  Extension: 3+  Abduction: 3+  Adduction: 3+    Right Hip   Planes of Motion   Flexion: 3+  Extension: 3+  Abduction: 3+  Adduction: 3+    Left Knee   Flexion: 4-  Extension: 3+  Quadriceps contraction: fair    Right Knee   Flexion: 4  Extension: 4    Left Ankle/Foot   Dorsiflexion: 4+    Right Ankle/Foot   Dorsiflexion: 4+    Functional Assessment        Comments  25 TU seconds no AD, moderate pain noted         Risk Assessment and Prediction Tool      Total score: 10/12 directly home    Precautions: osteoporosis      Manuals             L patellar mobs nv                                                   Neuro Re-Ed             HEP edu; home set up; AD management MW            Quad set nv            SLR nv            clams nv            Sidelying hip abd nv            Sit to stand nv                         Ther Ex             Nustep nv            Hamstring stretch nv            Calf stretch nv            Piriformis stretch nv            HR nv            Heel slides nv            Hip add nv            Hip abd nv            Ther Activity                                       Gait Training             TUG Performed + edu                         Modalities

## 2025-02-20 ENCOUNTER — OFFICE VISIT (OUTPATIENT)
Age: 65
End: 2025-02-20
Payer: COMMERCIAL

## 2025-02-20 DIAGNOSIS — M17.12 PRIMARY OSTEOARTHRITIS OF LEFT KNEE: Primary | ICD-10-CM

## 2025-02-20 PROCEDURE — 97110 THERAPEUTIC EXERCISES: CPT | Performed by: PHYSICAL THERAPIST

## 2025-02-20 PROCEDURE — 97112 NEUROMUSCULAR REEDUCATION: CPT | Performed by: PHYSICAL THERAPIST

## 2025-02-20 NOTE — PROGRESS NOTES
"Daily Note     Today's date: 2025  Patient name: Lori Damon  : 1960  MRN: [id number]  Referring provider: Chica Dennis PA-C  Dx:   Encounter Diagnosis     ICD-10-CM    1. Primary osteoarthritis of left knee  M17.12           Start Time: 0900  Stop Time: 0940  Total time in clinic (min): 40 minutes    Subjective: Patient noted that her back is very sore today. Patient noted the L knee is sore as well.       Objective: See treatment diary below      Assessment: Patient performed Nustep aerobic exercise to increase blood flow to the area being treated, prepare the muscles for strength training and stretching, improve overall tolerance to activity, and aerobic endurance. Patient noted increased pain during glute sets, however with modifications this improved. Patient performed various strengthening and stretching exercises with min Vcs for form. PT added to her HEP. Patient would benefit from continued PT to allow the patient to return to her PLOF.         Plan: Continue per plan of care.        Precautions: osteoporosis      Manuals            L patellar mobs nv                                                   Neuro Re-Ed             HEP edu; home set up; AD management MW MW           Quad set nv X15  5\" hold each           SLR nv            clams nv            Sidelying hip abd nv            Sit to stand nv                         Ther Ex             Nustep nv 10' lvl 1           Hamstring stretch nv            Calf stretch nv            Piriformis stretch nv            HR nv            LAQ  X20 ea           Supine hip abd  With strap  x20           Heel slides nv x20           Glute set  50%  X10  5\" hold knees bent           Hip add nv X20  5\" hold           Hip abd nv PTB  X20  5\" hold           Ther Activity                                       Gait Training             TUG Performed + edu                         Modalities                                            "

## 2025-02-21 ENCOUNTER — APPOINTMENT (OUTPATIENT)
Age: 65
End: 2025-02-21
Payer: COMMERCIAL

## 2025-02-21 DIAGNOSIS — M17.12 PRIMARY OSTEOARTHRITIS OF LEFT KNEE: ICD-10-CM

## 2025-02-21 DIAGNOSIS — M25.59 PAIN IN OTHER JOINT: ICD-10-CM

## 2025-02-21 LAB
ALBUMIN SERPL BCG-MCNC: 4.1 G/DL (ref 3.5–5)
ALP SERPL-CCNC: 87 U/L (ref 34–104)
ALT SERPL W P-5'-P-CCNC: 17 U/L (ref 7–52)
ANION GAP SERPL CALCULATED.3IONS-SCNC: 7 MMOL/L (ref 4–13)
APTT PPP: 28 SECONDS (ref 23–34)
AST SERPL W P-5'-P-CCNC: 19 U/L (ref 13–39)
ATRIAL RATE: 60 BPM
BASOPHILS # BLD AUTO: 0.09 THOUSANDS/ΜL (ref 0–0.1)
BASOPHILS NFR BLD AUTO: 1 % (ref 0–1)
BILIRUB SERPL-MCNC: 0.5 MG/DL (ref 0.2–1)
BUN SERPL-MCNC: 21 MG/DL (ref 5–25)
CALCIUM SERPL-MCNC: 10.3 MG/DL (ref 8.4–10.2)
CHLORIDE SERPL-SCNC: 104 MMOL/L (ref 96–108)
CO2 SERPL-SCNC: 29 MMOL/L (ref 21–32)
CREAT SERPL-MCNC: 1.05 MG/DL (ref 0.6–1.3)
EOSINOPHIL # BLD AUTO: 0.26 THOUSAND/ΜL (ref 0–0.61)
EOSINOPHIL NFR BLD AUTO: 4 % (ref 0–6)
ERYTHROCYTE [DISTWIDTH] IN BLOOD BY AUTOMATED COUNT: 13.2 % (ref 11.6–15.1)
EST. AVERAGE GLUCOSE BLD GHB EST-MCNC: 128 MG/DL
GFR SERPL CREATININE-BSD FRML MDRD: 55 ML/MIN/1.73SQ M
GLUCOSE P FAST SERPL-MCNC: 92 MG/DL (ref 65–99)
HBA1C MFR BLD: 6.1 %
HCT VFR BLD AUTO: 40.7 % (ref 34.8–46.1)
HGB BLD-MCNC: 13.5 G/DL (ref 11.5–15.4)
IMM GRANULOCYTES # BLD AUTO: 0.03 THOUSAND/UL (ref 0–0.2)
IMM GRANULOCYTES NFR BLD AUTO: 0 % (ref 0–2)
INR PPP: 0.99 (ref 0.85–1.19)
LYMPHOCYTES # BLD AUTO: 1.47 THOUSANDS/ΜL (ref 0.6–4.47)
LYMPHOCYTES NFR BLD AUTO: 22 % (ref 14–44)
MCH RBC QN AUTO: 31.4 PG (ref 26.8–34.3)
MCHC RBC AUTO-ENTMCNC: 33.2 G/DL (ref 31.4–37.4)
MCV RBC AUTO: 95 FL (ref 82–98)
MONOCYTES # BLD AUTO: 0.34 THOUSAND/ΜL (ref 0.17–1.22)
MONOCYTES NFR BLD AUTO: 5 % (ref 4–12)
NEUTROPHILS # BLD AUTO: 4.54 THOUSANDS/ΜL (ref 1.85–7.62)
NEUTS SEG NFR BLD AUTO: 68 % (ref 43–75)
NRBC BLD AUTO-RTO: 0 /100 WBCS
P AXIS: 54 DEGREES
PLATELET # BLD AUTO: 261 THOUSANDS/UL (ref 149–390)
PMV BLD AUTO: 10.6 FL (ref 8.9–12.7)
POTASSIUM SERPL-SCNC: 4.9 MMOL/L (ref 3.5–5.3)
PR INTERVAL: 168 MS
PROT SERPL-MCNC: 6.9 G/DL (ref 6.4–8.4)
PROTHROMBIN TIME: 13.4 SECONDS (ref 12.3–15)
QRS AXIS: 9 DEGREES
QRSD INTERVAL: 100 MS
QT INTERVAL: 396 MS
QTC INTERVAL: 396 MS
RBC # BLD AUTO: 4.3 MILLION/UL (ref 3.81–5.12)
SODIUM SERPL-SCNC: 140 MMOL/L (ref 135–147)
T WAVE AXIS: 56 DEGREES
VENTRICULAR RATE: 60 BPM
WBC # BLD AUTO: 6.73 THOUSAND/UL (ref 4.31–10.16)

## 2025-02-21 PROCEDURE — 87081 CULTURE SCREEN ONLY: CPT

## 2025-02-21 PROCEDURE — 36415 COLL VENOUS BLD VENIPUNCTURE: CPT

## 2025-02-21 PROCEDURE — 85610 PROTHROMBIN TIME: CPT

## 2025-02-21 PROCEDURE — 85730 THROMBOPLASTIN TIME PARTIAL: CPT

## 2025-02-21 PROCEDURE — 85025 COMPLETE CBC W/AUTO DIFF WBC: CPT

## 2025-02-21 PROCEDURE — 93010 ELECTROCARDIOGRAM REPORT: CPT | Performed by: INTERNAL MEDICINE

## 2025-02-21 PROCEDURE — 83036 HEMOGLOBIN GLYCOSYLATED A1C: CPT

## 2025-02-21 PROCEDURE — 80053 COMPREHEN METABOLIC PANEL: CPT

## 2025-02-22 LAB — MRSA NOSE QL CULT: NORMAL

## 2025-02-25 ENCOUNTER — CLINICAL SUPPORT (OUTPATIENT)
Dept: RHEUMATOLOGY | Facility: CLINIC | Age: 65
End: 2025-02-25
Payer: COMMERCIAL

## 2025-02-25 DIAGNOSIS — M81.0 OSTEOPOROSIS WITHOUT CURRENT PATHOLOGICAL FRACTURE, UNSPECIFIED OSTEOPOROSIS TYPE: ICD-10-CM

## 2025-02-25 DIAGNOSIS — Z79.899 ENCOUNTER FOR LONG-TERM (CURRENT) USE OF MEDICATIONS: Primary | ICD-10-CM

## 2025-02-25 PROBLEM — M17.12 PRIMARY OSTEOARTHRITIS OF LEFT KNEE: Status: ACTIVE | Noted: 2025-02-25

## 2025-02-25 PROCEDURE — 96372 THER/PROPH/DIAG INJ SC/IM: CPT

## 2025-02-25 NOTE — ASSESSMENT & PLAN NOTE
Has had prior episodes   ELIQUIS long-term   Will hold 72 hrs prior to surgery and resume POD1  The AC is being managed by her PCP  She also takes ASA. She reports taking 4000mg. I feel this is excessive and cautioned her to decrease this. Also I asked her to decrease this to 81mg daily 1 week prior to surgery

## 2025-02-25 NOTE — H&P (VIEW-ONLY)
Internal Medicine Pre-Operative Evaluation:     Reason for Visit: Pre-operative Evaluation for Risk Stratification and Optimization    Patient ID: Lori Damon is a 64 y.o. female.     Future cases for Lori Damon [[id number]]       Case ID Status Date Time Julito Procedure Provider Location    [id number] [facility] 3/17/2025  9:30  ARTHROPLASTY KNEE TOTAL Malcolm Khan DO [[id number]] [facility]               Recommendations to Proceed withSurgery    Patient is considered to be Medium risk for Medium risk procedure.     After evaluation and discussion with patient with emphasis that all surgery has some degree of inherent risk it is acknowledged by patient this risk is Acceptable.    Patient is optimized and may proceed with planned procedure.     Assessment    Pre-operative Medical Evaluation for planned surgery  Recommendations as listed in PLAN section below  Contact surgical nurse  navigator with any questions regarding preoperative plan or schedule.      Assessment & Plan  Primary osteoarthritis of left knee  Failed outpatient conservative measures  Elected to undergo surgical intervention    Tardive dyskinesia    Hypercholesterolemia  Continue low cholesterol diet and statin therapy    Deep vein thrombosis (DVT) of other vein of right lower extremity, unspecified chronicity (HCC)   Has had prior episodes   ELIQUIS long-term   Will hold 72 hrs prior to surgery and resume POD1  The AC is being managed by her PCP  She also takes ASA. She reports taking 4000mg. I feel this is excessive and cautioned her to decrease this. Also I asked her to decrease this to 81mg daily 1 week prior to surgery  Pre-op examination    Stage 3a chronic kidney disease (CKD) (HCC)  Lab Results   Component Value Date    EGFR 55 02/21/2025    EGFR 59 10/29/2024    EGFR 52 03/21/2024    CREATININE 1.05 02/21/2025    CREATININE 0.99 10/29/2024    CREATININE 1.11 03/21/2024     Avoid nephrotoxic meds  Follow post op renal fxn and  hypotension protocol  Diabetes mellitus type II, non insulin dependent (HCC)    Lab Results   Component Value Date    HGBA1C 6.1 (H) 02/21/2025     Follow CCD  Accucheck and cover         Plan:     1. Further preoperative workup as follows:   - none no further testing required may proceed with surgery    2. Preoperative Medication Management Review performed by PAT nursing  YES    3. Patient requires further consultation with:   No Consults Required    4. Discharge Planning / Barriers to Discharge  none identified - patients has post discharge therapy plan in place, transportation arranged for discharge day, adequate family support at home to assist with discharge to home.        Subjective:           History of Present Illness:     Lori Damon is a 64 y.o. female who presents to the office today for a preoperative consultation at the request of surgeon. The patient understands this is an elective procedure and not emergent. They are electing to undergo planned procedure with an understanding that all surgery has inherent risk. They have worked with their surgeon and failed conservative treatment measures. Today they present for preoperative risk assessment and recommendations for optimization in preparation for surgery.    Pt seen in center for perioperative medicine for upcoming proposed surgery. They have failed previous conservative measures and have elected surgical intervention.     Pt meets presurgical lab and BMI optimization goals.      Lori Damon has an IN HOSPITAL cardiac risk of RCI RISK CLASS I (0 risk factors, risk of major cardiac compl. appr. 0.5%) based on RCRI calculator    Cardiac Risk Estimation: per the Revised Cardiac Risk Index (Circ. 100:1043, 1999),         Pre-op Exam    Previous history of bleeding disorders or clots?: No  Previous Anesthesia reaction?: No  Prolonged steroid use in the last 6 months?: No    Assessment of Cardiac Risk:   - Unstable or severe angina or MI in the last 6  "weeks or history of stent placement in the last year?: No   - Decompensated heart failure (e.g. New onset heart failure, NYHA  Class IV heart failure, or worsening existing heart failure)?: No  - Significant arrhythmias such as high grade AV block, symptomatic ventricular arrhythmia, newly recognized ventricular tachycardia, supraventricular tachycardia with resting heart rate >100, or symptomatic bradycardia?: No  - Severe heart valve disease including aortic stenosis or symptomatic mitral stenosis?: No      Pre-operative Risk Factors:  Elevated-risk surgery: No    History of cerebrovascular disease: No    History of ischemic heart disease: No  Pre-operative treatment with insulin: No  Pre-operative creatinine >2 mg/dL: No    History of congestive heart failure: No    Medications of Perioperative Concern:   Anti-platelet (aspirin, clopidogrel, ticagrelor, prasugrel, cilostazol, dipyridamole) and Anti-coagulants (Coumadin, Xarelto, Pradaxa, Eliquis, Lixiana)        ROS: No TIA's or unusual headaches, no dysphagia.  No prolonged cough. No dyspnea or chest pain on exertion.  No abdominal pain, change in bowel habits, black or bloody stools.  No urinary tract or BPH symptoms.  Positive reported pain in arthritic joint. Positive difficulty with gait. No skin rashes or issues.      Objective:    /72   Pulse 67   Ht 5' 3\" (1.6 m)   Wt 64 kg (141 lb)   BMI 24.98 kg/m²       General Appearance: no distress, conversive  HEENT: PERRLA, conjuctiva normal; oropharynx clear; mucous membranes moist;   Neck:  Supple, no lymphadenopathy or thyromegaly  Lungs: breath sounds normal, normal respiratory effort, no retractions, expiratory effort normal  CV: normal heart sounds S1/S2, PMI normal   ABD: soft non tender, no masses , no hepatic or splenomegaly  EXT: DP pulses intact, no lymphadenopathy, no edema  Skin: normal turgor, normal texture, no rash  Psych: affect normal, mood normal  Neuro: AAOx3        The following " "portions of the patient's history were reviewed and updated as appropriate: allergies, current medications, past family history, past medical history, past social history, past surgical history and problem list.     Past History:       Past Medical History:   Diagnosis Date    Allergic Unknown    Medical-Imatrex, Cymbalta, Ultram,  . Other-dust, mold, gras    Allergy to dust     Anemia     Arthritis unknown    Osteoarthritis, advanced osteoporosis    Calculus of ureter     Chronic kidney disease 11/2018    Hydrophenesis    Chronic pain disorder     neck and back- sees pain management    Cluster headache Teenager    May occur 2-3 times per year. Lasts several days    Colitis     Colon polyp     Cracked skin     on fingers    CTS (carpal tunnel syndrome) 01/01/2014    Diverticulitis of colon 2012    Seen by Dr. Wilburn at [facility]. Inpatient at    Diverticulosis     Dysphagia     Fibromyalgia, primary 04/16/1996    GERD (gastroesophageal reflux disease)     GI (gastrointestinal bleed)     As a result of diverticulitis and microscopic colitis.    H/O: GI bleed     Head injury     Had numerious concussions    Headache(784.0) 1980's    Migraine (family history of condition)    Headache, tension-type     Hiatal hernia     History of DVT in adulthood 2019 2019, first clot post op age 16    HL (hearing loss) unknown    Hydronephrosis     Hyperlipidemia     Inguinal hernia     right side    Irregular heart beat     Pt states with prolonged QT interval - was seen by cardiologist( [facility]) pt told \"to not worry about it\"    Irritable bowel     Kidney stone     Lactose intolerance     Did not tolerate milk after being weaned from breast milk.    Memory loss 2016/2017    Medication/also related to medical conditions    Migraines     Narcolepsy     OAB (overactive bladder)     Osteoarthritis     Osteoporosis     Other chronic pain     degenerative disc disease    PONV (postoperative nausea and vomiting)     migraines    Pyelonephritis " 01/13/2021    SBO (small bowel obstruction) ([facility]) 09/25/2021    Scoliosis Unknown    Scoliosis of lumbar and cervical spine. Full spinal xray for scoliosis done in 2022    Sepsis ([facility]) 01/13/2021    Shingles Unknown    Had mild cases of shingles several times.    Smoking 08/06/2020    Tobacco abuse     Urinary tract infection May 2019    See [facility] records    Vision loss Unknown    Occurs when headaches start behind my left eye.    Visual impairment Unknown    Blurry vision partially caused by medication.    Wears dentures     ill fitted currently    Wears glasses     Past Surgical History:   Procedure Laterality Date    ABDOMINAL ADHESION SURGERY      ARM SKIN LESION BIOPSY / EXCISION      lymph node excision    BACK SURGERY  11/21/14    Laminectomy, forminalectomy    BLADDER SURGERY      CHOLECYSTECTOMY      COLON SURGERY  1987    Extensive adhesions in abdomen, adhesions surgically removed    COLONOSCOPY  08/06/2020    CYSTOSCOPY      FL RETROGRADE PYELOGRAM  01/05/2021    FLEXIBLE SIGMOIDOSCOPY      Last done at [facility] in 2018 after being admitted for GI bleed.    HERNIA REPAIR      HYSTERECTOMY  1977    JOINT REPLACEMENT Right     knee    KNEE SURGERY Right     replacement    LAMINECTOMY      L5-6-7    LAPAROSCOPY      LYMPH NODE DISSECTION Left     removed -no cancer-  limb restriction    LYMPHADENECTOMY  1974    UNDER LEFT ARM - DO NOT DRAW BLOOD/GIVE INJECTIONS IN LEFT ARM PER PATIENT     MOLE REMOVAL      NECK SURGERY  4/16/14    Fusion C 5/6 and 6/7    OOPHORECTOMY Bilateral 1977    OTHER SURGICAL HISTORY      enlarged lymph node removed left arm..no cancer    MO CYSTO/URETERO W/LITHOTRIPSY &INDWELL STENT INSRT Right 01/05/2021    Procedure: CYSTO, LASER  URETEROSCOPY, RETRO PYELOGRAM, STENT REPLACMENT;  Surgeon: Adriel Subramanian MD;  Location: [facility];  Service: Urology    MO CYSTOURETHROSCOPY W/URETERAL CATHETERIZATION Right 12/02/2020    Procedure: CYSTOSCOPY AND INSERTION STENT URETERAL;  Surgeon: Rory  MD Juan David;  [facility];  Service: Urology    PA LITHOTRIPSY XTRCORP SHOCK WAVE Right 2021    Procedure: LITHOTRIPSY EXTRACORPORAL SHOCKWAVE (ESWL);  Surgeon: Darwin Montero MD;  Location:  MAIN OR;  Service: Urology    PA RPR 1ST INGUN HRNA AGE 3 YRS/> REDUCIBLE Right 2020    Procedure: REPAIR HERNIA INGUINAL  WITH MESH;  Surgeon: Mercedes Justin MD;  [facility];  Service: General    SMALL INTESTINE SURGERY  1987 - current    Removal of extensive adhesions. Caused bowel blockages    SPINAL FUSION      UPPER GASTROINTESTINAL ENDOSCOPY  2020    WISDOM TOOTH EXTRACTION            Social History     Tobacco Use    Smoking status: Every Day     Types: Cigarettes     Passive exposure: Current    Smokeless tobacco: Never    Tobacco comments:     Stopped smoking in  because of cervical surgeries. Started smoking again as soon as my body was   Vaping Use    Vaping status: Never Used   Substance Use Topics    Alcohol use: Not Currently     Comment: May have an occasional drink when di    Drug use: Not Currently     Types: Marijuana     Family History   Problem Relation Age of Onset    Hypertension Mother          from massive heart attack, never took any medication for this condition    Heart attack Mother     Hyperlipidemia Mother         Blood tests done just prior to heart attack.  Results reported after her passing.    Migraines Mother          from massive cardiac arrest. Had abdominal aneurysm.    Arthritis Mother         Unknown type arthritis, joint issues    Hypertension Father         Had high blood pressure, unknown if he ever took medication    Cancer Father 72        Started in bowels, spread to liver, refused treatment    Colon polyps Father     Arthritis Father         Type arthritis?, also Pagent's Diseare    Colon cancer Father     Diabetes Sister         uses insulin    Arthritis Sister         Osteo arthritis    Hypertension Sister         Triple  bypass    Diabetes Sister         pre-diabetic, oral medication    Arthritis Sister         Osteo arthritis    Hypertension Sister         Triple bypass    Irritable bowel syndrome Sister         Constipation    No Known Problems Daughter     No Known Problems Maternal Grandmother     No Known Problems Maternal Grandfather     No Known Problems Paternal Grandmother     No Known Problems Paternal Grandfather     Cancer Brother         Soft tissue cancer of abdominal blood vessel. Diagnosed ,      Hypertension Brother         Triple bypass    Migraines Brother         Severe. Standard meds do not help. Goes to [facility] for I    Learning disabilities Brother         Mental retardation    Mental retardation Brother     No Known Problems Maternal Aunt     No Known Problems Maternal Aunt     No Known Problems Maternal Aunt     Migraines Maternal Aunt         Had 2.  from cerebral aneurysm    Migraines Maternal Aunt          from cerebral aneurysm    Migraines Maternal Aunt          from cerebral aneurysm    Migraines Maternal Uncle          from cerebral aneurysm    No Known Problems Paternal Aunt     No Known Problems Paternal Aunt     No Known Problems Paternal Aunt     Cancer Cousin         Pancreatic cancer    Cancer Cousin         Breast cancer, total mastectomy    Breast cancer Other 47          Allergies:     Allergies   Allergen Reactions    Armodafinil Other (See Comments)     Tardive dyskinesia - mouth movements    Celecoxib Other (See Comments)     Increased Heart Rate, Palpitations    Sumatriptan Anaphylaxis     Other reaction(s): SUMATRIPTAN (IMITREX) (Throat closure). Pt analilia zolmitriptan.    Tramadol Other (See Comments)     GI Upset- severe vomiting and systemic body aches    Wound Dressing Adhesive Blisters     Denies Latex allergy    Latex Other (See Comments)     Added based on information entered during case entry, please review and add reactions, type, and severity  "as needed  PATIENT DENIES LATEX ALLERGY- allergic to adhesives long term use    Medical Tape Dermatitis, Rash and Blisters     Adhesive from medication patches and bandaides- ok with paper tape        Current Medications:     Current Outpatient Medications   Medication Instructions    amantadine (SYMMETREL) 100 mg, Oral, Daily    apixaban (ELIQUIS) 5 mg, Oral, 2 times daily    ascorbic acid (VITAMIN C) 500 mg, Oral, 2 times daily    aspirin 500 MG buffered tablet 1,000 mg, Every 6 hours PRN    buPROPion (WELLBUTRIN XL) 150 mg, Oral, Every morning    calcium carbonate 1,250 mg, Daily    cholecalciferol (VITAMIN D3) 2,000 Units, Oral, Daily    folic acid (FOLVITE) 1 mg, Oral, Daily    gabapentin (NEURONTIN) 400 mg, Oral, 3 times daily    Ginger, Zingiber officinalis, (GINGER ROOT PO) 700 mg, 2 times daily    metaxalone (SKELAXIN) 400 mg, Oral, 3 times daily    Multiple Vitamins-Minerals (multivitamin with minerals) tablet 1 tablet, Oral, Daily    mupirocin (BACTROBAN) 2 % ointment Apply topically to the inside of the left and right nostrils twice daily for 5 days before surgery, including the morning of surgery.    ondansetron (ZOFRAN) 4 mg, Oral, Every 8 hours PRN    predniSONE 5 mg tablet 1 tab in the AM prn migraine (if zomig fails)    propranolol (INDERAL LA) 60 mg, Oral, Daily    trospium chloride (SANCTURA) 20 mg, Oral, 2 times daily    Turmeric (QC TUMERIC COMPLEX PO) 1,000 mg, 2 times daily    ZOLMitriptan (ZOMIG) 2.5 MG tablet TAKE 1 TABLET BY MOUTH AT ONSET OF MIGRAINE, MAY REPEAT IN 2 HOURS IF NEEDED. LIMIT 2/24 HOURS, 4/WEEK, 8/MONTH           PRE-OP WORKSHEET DATA    Assessment of Pre-Operative Risks     MLJ Quality Hard Stops:    BMI (<40) : Estimated body mass index is 24.98 kg/m² as calculated from the following:    Height as of this encounter: 5' 3\" (1.6 m).    Weight as of this encounter: 64 kg (141 lb).    Hgb ( >11):   Lab Results   Component Value Date    HGB 13.5 02/21/2025    HGB 14.4 10/29/2024 "    HGB 13.0 03/21/2024       HbA1c (<7.5) :   Lab Results   Component Value Date    HGBA1C 6.1 (H) 02/21/2025       GFR (>60) (Less then 45 = Nephrology consult):    Lab Results   Component Value Date    EGFR 55 02/21/2025    EGFR 59 10/29/2024    EGFR 52 03/21/2024            Pre-Op Data Reviewed:       Laboratory Results: I have personally reviewed the pertinent reports    EKG: I personally reviewed and interpreted available tracings in the electronic medical record    Encounter Date: 02/21/25   EKG 12 lead   Result Value    Ventricular Rate 60    Atrial Rate 60    VT Interval 168    QRSD Interval 100    QT Interval 396    QTC Interval 396    P Axis 54    QRS Axis 9    T Wave Axis 56    Narrative    Normal sinus rhythm  Possible Left atrial enlargement  Borderline ECG  When compared with ECG of 02-Nov-2022 19:37,  Nonspecific T wave abnormality now evident in Anterior leads  Confirmed by Karri Tellez ([id number]) on 2/21/2025 11:58:20 AM       OLD RECORDS: reviewed old records in the chart review section if EHR on day of visit.    Previous cardiopulmonary studies within the past year:  Echocardiogram: no   Cardiac Catheterization: no  Stress Test: no      Time of visit including pre-visit chart review, visit and post-visit coordination of plan and care , review of pre-surgical lab work, preparation and time spent documenting note in electronic medical record, time spent face-to-face in physical examination answering patient questions by care team 35 minutes             [facility]

## 2025-02-25 NOTE — PROGRESS NOTES
Internal Medicine Pre-Operative Evaluation:     Reason for Visit: Pre-operative Evaluation for Risk Stratification and Optimization    Patient ID: Lori Damon is a 64 y.o. female.     Future cases for Lori Damon [[id number]]       Case ID Status Date Time Julito Procedure Provider Location    [id number] [facility] 3/17/2025  9:30  ARTHROPLASTY KNEE TOTAL Malcolm Khan DO [[id number]] [facility]               Recommendations to Proceed withSurgery    Patient is considered to be Medium risk for Medium risk procedure.     After evaluation and discussion with patient with emphasis that all surgery has some degree of inherent risk it is acknowledged by patient this risk is Acceptable.    Patient is optimized and may proceed with planned procedure.     Assessment    Pre-operative Medical Evaluation for planned surgery  Recommendations as listed in PLAN section below  Contact surgical nurse  navigator with any questions regarding preoperative plan or schedule.      Assessment & Plan  Primary osteoarthritis of left knee  Failed outpatient conservative measures  Elected to undergo surgical intervention    Tardive dyskinesia    Hypercholesterolemia  Continue low cholesterol diet and statin therapy    Deep vein thrombosis (DVT) of other vein of right lower extremity, unspecified chronicity (HCC)   Has had prior episodes   ELIQUIS long-term   Will hold 72 hrs prior to surgery and resume POD1  The AC is being managed by her PCP  She also takes ASA. She reports taking 4000mg. I feel this is excessive and cautioned her to decrease this. Also I asked her to decrease this to 81mg daily 1 week prior to surgery  Pre-op examination    Stage 3a chronic kidney disease (CKD) (HCC)  Lab Results   Component Value Date    EGFR 55 02/21/2025    EGFR 59 10/29/2024    EGFR 52 03/21/2024    CREATININE 1.05 02/21/2025    CREATININE 0.99 10/29/2024    CREATININE 1.11 03/21/2024     Avoid nephrotoxic meds  Follow post op renal fxn and  hypotension protocol  Diabetes mellitus type II, non insulin dependent (HCC)    Lab Results   Component Value Date    HGBA1C 6.1 (H) 02/21/2025     Follow CCD  Accucheck and cover         Plan:     1. Further preoperative workup as follows:   - none no further testing required may proceed with surgery    2. Preoperative Medication Management Review performed by PAT nursing  YES    3. Patient requires further consultation with:   No Consults Required    4. Discharge Planning / Barriers to Discharge  none identified - patients has post discharge therapy plan in place, transportation arranged for discharge day, adequate family support at home to assist with discharge to home.        Subjective:           History of Present Illness:     Lori Damon is a 64 y.o. female who presents to the office today for a preoperative consultation at the request of surgeon. The patient understands this is an elective procedure and not emergent. They are electing to undergo planned procedure with an understanding that all surgery has inherent risk. They have worked with their surgeon and failed conservative treatment measures. Today they present for preoperative risk assessment and recommendations for optimization in preparation for surgery.    Pt seen in center for perioperative medicine for upcoming proposed surgery. They have failed previous conservative measures and have elected surgical intervention.     Pt meets presurgical lab and BMI optimization goals.      Lori Damon has an IN HOSPITAL cardiac risk of RCI RISK CLASS I (0 risk factors, risk of major cardiac compl. appr. 0.5%) based on RCRI calculator    Cardiac Risk Estimation: per the Revised Cardiac Risk Index (Circ. 100:1043, 1999),         Pre-op Exam    Previous history of bleeding disorders or clots?: No  Previous Anesthesia reaction?: No  Prolonged steroid use in the last 6 months?: No    Assessment of Cardiac Risk:   - Unstable or severe angina or MI in the last 6  "weeks or history of stent placement in the last year?: No   - Decompensated heart failure (e.g. New onset heart failure, NYHA  Class IV heart failure, or worsening existing heart failure)?: No  - Significant arrhythmias such as high grade AV block, symptomatic ventricular arrhythmia, newly recognized ventricular tachycardia, supraventricular tachycardia with resting heart rate >100, or symptomatic bradycardia?: No  - Severe heart valve disease including aortic stenosis or symptomatic mitral stenosis?: No      Pre-operative Risk Factors:  Elevated-risk surgery: No    History of cerebrovascular disease: No    History of ischemic heart disease: No  Pre-operative treatment with insulin: No  Pre-operative creatinine >2 mg/dL: No    History of congestive heart failure: No    Medications of Perioperative Concern:   Anti-platelet (aspirin, clopidogrel, ticagrelor, prasugrel, cilostazol, dipyridamole) and Anti-coagulants (Coumadin, Xarelto, Pradaxa, Eliquis, Lixiana)        ROS: No TIA's or unusual headaches, no dysphagia.  No prolonged cough. No dyspnea or chest pain on exertion.  No abdominal pain, change in bowel habits, black or bloody stools.  No urinary tract or BPH symptoms.  Positive reported pain in arthritic joint. Positive difficulty with gait. No skin rashes or issues.      Objective:    /72   Pulse 67   Ht 5' 3\" (1.6 m)   Wt 64 kg (141 lb)   BMI 24.98 kg/m²       General Appearance: no distress, conversive  HEENT: PERRLA, conjuctiva normal; oropharynx clear; mucous membranes moist;   Neck:  Supple, no lymphadenopathy or thyromegaly  Lungs: breath sounds normal, normal respiratory effort, no retractions, expiratory effort normal  CV: normal heart sounds S1/S2, PMI normal   ABD: soft non tender, no masses , no hepatic or splenomegaly  EXT: DP pulses intact, no lymphadenopathy, no edema  Skin: normal turgor, normal texture, no rash  Psych: affect normal, mood normal  Neuro: AAOx3        The following " "portions of the patient's history were reviewed and updated as appropriate: allergies, current medications, past family history, past medical history, past social history, past surgical history and problem list.     Past History:       Past Medical History:   Diagnosis Date    Allergic Unknown    Medical-Imatrex, Cymbalta, Ultram,  . Other-dust, mold, gras    Allergy to dust     Anemia     Arthritis unknown    Osteoarthritis, advanced osteoporosis    Calculus of ureter     Chronic kidney disease 11/2018    Hydrophenesis    Chronic pain disorder     neck and back- sees pain management    Cluster headache Teenager    May occur 2-3 times per year. Lasts several days    Colitis     Colon polyp     Cracked skin     on fingers    CTS (carpal tunnel syndrome) 01/01/2014    Diverticulitis of colon 2012    Seen by Dr. Wilburn at [facility]. Inpatient at    Diverticulosis     Dysphagia     Fibromyalgia, primary 04/16/1996    GERD (gastroesophageal reflux disease)     GI (gastrointestinal bleed)     As a result of diverticulitis and microscopic colitis.    H/O: GI bleed     Head injury     Had numerious concussions    Headache(784.0) 1980's    Migraine (family history of condition)    Headache, tension-type     Hiatal hernia     History of DVT in adulthood 2019 2019, first clot post op age 16    HL (hearing loss) unknown    Hydronephrosis     Hyperlipidemia     Inguinal hernia     right side    Irregular heart beat     Pt states with prolonged QT interval - was seen by cardiologist( [facility]) pt told \"to not worry about it\"    Irritable bowel     Kidney stone     Lactose intolerance     Did not tolerate milk after being weaned from breast milk.    Memory loss 2016/2017    Medication/also related to medical conditions    Migraines     Narcolepsy     OAB (overactive bladder)     Osteoarthritis     Osteoporosis     Other chronic pain     degenerative disc disease    PONV (postoperative nausea and vomiting)     migraines    Pyelonephritis " 01/13/2021    SBO (small bowel obstruction) ([facility]) 09/25/2021    Scoliosis Unknown    Scoliosis of lumbar and cervical spine. Full spinal xray for scoliosis done in 2022    Sepsis ([facility]) 01/13/2021    Shingles Unknown    Had mild cases of shingles several times.    Smoking 08/06/2020    Tobacco abuse     Urinary tract infection May 2019    See [facility] records    Vision loss Unknown    Occurs when headaches start behind my left eye.    Visual impairment Unknown    Blurry vision partially caused by medication.    Wears dentures     ill fitted currently    Wears glasses     Past Surgical History:   Procedure Laterality Date    ABDOMINAL ADHESION SURGERY      ARM SKIN LESION BIOPSY / EXCISION      lymph node excision    BACK SURGERY  11/21/14    Laminectomy, forminalectomy    BLADDER SURGERY      CHOLECYSTECTOMY      COLON SURGERY  1987    Extensive adhesions in abdomen, adhesions surgically removed    COLONOSCOPY  08/06/2020    CYSTOSCOPY      FL RETROGRADE PYELOGRAM  01/05/2021    FLEXIBLE SIGMOIDOSCOPY      Last done at [facility] in 2018 after being admitted for GI bleed.    HERNIA REPAIR      HYSTERECTOMY  1977    JOINT REPLACEMENT Right     knee    KNEE SURGERY Right     replacement    LAMINECTOMY      L5-6-7    LAPAROSCOPY      LYMPH NODE DISSECTION Left     removed -no cancer-  limb restriction    LYMPHADENECTOMY  1974    UNDER LEFT ARM - DO NOT DRAW BLOOD/GIVE INJECTIONS IN LEFT ARM PER PATIENT     MOLE REMOVAL      NECK SURGERY  4/16/14    Fusion C 5/6 and 6/7    OOPHORECTOMY Bilateral 1977    OTHER SURGICAL HISTORY      enlarged lymph node removed left arm..no cancer    OK CYSTO/URETERO W/LITHOTRIPSY &INDWELL STENT INSRT Right 01/05/2021    Procedure: CYSTO, LASER  URETEROSCOPY, RETRO PYELOGRAM, STENT REPLACMENT;  Surgeon: Adriel Subramanian MD;  [facility];  Service: Urology    OK CYSTOURETHROSCOPY W/URETERAL CATHETERIZATION Right 12/02/2020    Procedure: CYSTOSCOPY AND INSERTION STENT URETERAL;  Surgeon: Rory  MD Juan David;  [facility];  Service: Urology    AZ LITHOTRIPSY XTRCORP SHOCK WAVE Right 2021    Procedure: LITHOTRIPSY EXTRACORPORAL SHOCKWAVE (ESWL);  Surgeon: Darwin Montero MD;  Location:  MAIN OR;  Service: Urology    AZ RPR 1ST INGUN HRNA AGE 5 YRS/> REDUCIBLE Right 2020    Procedure: REPAIR HERNIA INGUINAL  WITH MESH;  Surgeon: Mercedes Justin MD;  [facility];  Service: General    SMALL INTESTINE SURGERY  1987 - current    Removal of extensive adhesions. Caused bowel blockages    SPINAL FUSION      UPPER GASTROINTESTINAL ENDOSCOPY  2020    WISDOM TOOTH EXTRACTION            Social History     Tobacco Use    Smoking status: Every Day     Types: Cigarettes     Passive exposure: Current    Smokeless tobacco: Never    Tobacco comments:     Stopped smoking in  because of cervical surgeries. Started smoking again as soon as my body was   Vaping Use    Vaping status: Never Used   Substance Use Topics    Alcohol use: Not Currently     Comment: May have an occasional drink when di    Drug use: Not Currently     Types: Marijuana     Family History   Problem Relation Age of Onset    Hypertension Mother          from massive heart attack, never took any medication for this condition    Heart attack Mother     Hyperlipidemia Mother         Blood tests done just prior to heart attack.  Results reported after her passing.    Migraines Mother          from massive cardiac arrest. Had abdominal aneurysm.    Arthritis Mother         Unknown type arthritis, joint issues    Hypertension Father         Had high blood pressure, unknown if he ever took medication    Cancer Father 72        Started in bowels, spread to liver, refused treatment    Colon polyps Father     Arthritis Father         Type arthritis?, also Pagent's Diseare    Colon cancer Father     Diabetes Sister         uses insulin    Arthritis Sister         Osteo arthritis    Hypertension Sister         Triple  bypass    Diabetes Sister         pre-diabetic, oral medication    Arthritis Sister         Osteo arthritis    Hypertension Sister         Triple bypass    Irritable bowel syndrome Sister         Constipation    No Known Problems Daughter     No Known Problems Maternal Grandmother     No Known Problems Maternal Grandfather     No Known Problems Paternal Grandmother     No Known Problems Paternal Grandfather     Cancer Brother         Soft tissue cancer of abdominal blood vessel. Diagnosed ,      Hypertension Brother         Triple bypass    Migraines Brother         Severe. Standard meds do not help. Goes to [facility] for I    Learning disabilities Brother         Mental retardation    Mental retardation Brother     No Known Problems Maternal Aunt     No Known Problems Maternal Aunt     No Known Problems Maternal Aunt     Migraines Maternal Aunt         Had 2.  from cerebral aneurysm    Migraines Maternal Aunt          from cerebral aneurysm    Migraines Maternal Aunt          from cerebral aneurysm    Migraines Maternal Uncle          from cerebral aneurysm    No Known Problems Paternal Aunt     No Known Problems Paternal Aunt     No Known Problems Paternal Aunt     Cancer Cousin         Pancreatic cancer    Cancer Cousin         Breast cancer, total mastectomy    Breast cancer Other 47          Allergies:     Allergies   Allergen Reactions    Armodafinil Other (See Comments)     Tardive dyskinesia - mouth movements    Celecoxib Other (See Comments)     Increased Heart Rate, Palpitations    Sumatriptan Anaphylaxis     Other reaction(s): SUMATRIPTAN (IMITREX) (Throat closure). Pt analilia zolmitriptan.    Tramadol Other (See Comments)     GI Upset- severe vomiting and systemic body aches    Wound Dressing Adhesive Blisters     Denies Latex allergy    Latex Other (See Comments)     Added based on information entered during case entry, please review and add reactions, type, and severity  "as needed  PATIENT DENIES LATEX ALLERGY- allergic to adhesives long term use    Medical Tape Dermatitis, Rash and Blisters     Adhesive from medication patches and bandaides- ok with paper tape        Current Medications:     Current Outpatient Medications   Medication Instructions    amantadine (SYMMETREL) 100 mg, Oral, Daily    apixaban (ELIQUIS) 5 mg, Oral, 2 times daily    ascorbic acid (VITAMIN C) 500 mg, Oral, 2 times daily    aspirin 500 MG buffered tablet 1,000 mg, Every 6 hours PRN    buPROPion (WELLBUTRIN XL) 150 mg, Oral, Every morning    calcium carbonate 1,250 mg, Daily    cholecalciferol (VITAMIN D3) 2,000 Units, Oral, Daily    folic acid (FOLVITE) 1 mg, Oral, Daily    gabapentin (NEURONTIN) 400 mg, Oral, 3 times daily    Ginger, Zingiber officinalis, (GINGER ROOT PO) 700 mg, 2 times daily    metaxalone (SKELAXIN) 400 mg, Oral, 3 times daily    Multiple Vitamins-Minerals (multivitamin with minerals) tablet 1 tablet, Oral, Daily    mupirocin (BACTROBAN) 2 % ointment Apply topically to the inside of the left and right nostrils twice daily for 5 days before surgery, including the morning of surgery.    ondansetron (ZOFRAN) 4 mg, Oral, Every 8 hours PRN    predniSONE 5 mg tablet 1 tab in the AM prn migraine (if zomig fails)    propranolol (INDERAL LA) 60 mg, Oral, Daily    trospium chloride (SANCTURA) 20 mg, Oral, 2 times daily    Turmeric (QC TUMERIC COMPLEX PO) 1,000 mg, 2 times daily    ZOLMitriptan (ZOMIG) 2.5 MG tablet TAKE 1 TABLET BY MOUTH AT ONSET OF MIGRAINE, MAY REPEAT IN 2 HOURS IF NEEDED. LIMIT 2/24 HOURS, 4/WEEK, 8/MONTH           PRE-OP WORKSHEET DATA    Assessment of Pre-Operative Risks     MLJ Quality Hard Stops:    BMI (<40) : Estimated body mass index is 24.98 kg/m² as calculated from the following:    Height as of this encounter: 5' 3\" (1.6 m).    Weight as of this encounter: 64 kg (141 lb).    Hgb ( >11):   Lab Results   Component Value Date    HGB 13.5 02/21/2025    HGB 14.4 10/29/2024 "    HGB 13.0 03/21/2024       HbA1c (<7.5) :   Lab Results   Component Value Date    HGBA1C 6.1 (H) 02/21/2025       GFR (>60) (Less then 45 = Nephrology consult):    Lab Results   Component Value Date    EGFR 55 02/21/2025    EGFR 59 10/29/2024    EGFR 52 03/21/2024            Pre-Op Data Reviewed:       Laboratory Results: I have personally reviewed the pertinent reports    EKG: I personally reviewed and interpreted available tracings in the electronic medical record    Encounter Date: 02/21/25   EKG 12 lead   Result Value    Ventricular Rate 60    Atrial Rate 60    DE Interval 168    QRSD Interval 100    QT Interval 396    QTC Interval 396    P Axis 54    QRS Axis 9    T Wave Axis 56    Narrative    Normal sinus rhythm  Possible Left atrial enlargement  Borderline ECG  When compared with ECG of 02-Nov-2022 19:37,  Nonspecific T wave abnormality now evident in Anterior leads  Confirmed by Karri Tellez ([id number]) on 2/21/2025 11:58:20 AM       OLD RECORDS: reviewed old records in the chart review section if EHR on day of visit.    Previous cardiopulmonary studies within the past year:  Echocardiogram: no   Cardiac Catheterization: no  Stress Test: no      Time of visit including pre-visit chart review, visit and post-visit coordination of plan and care , review of pre-surgical lab work, preparation and time spent documenting note in electronic medical record, time spent face-to-face in physical examination answering patient questions by care team 35 minutes             [facility]

## 2025-02-25 NOTE — PATIENT INSTRUCTIONS
BEFORE SURGERY    Contact your surgical nurse navigator or surgical provider with any questions regarding preoperative plan or schedule.  Stop all over the counter supplements, herbal, naturopathic  medications for 1 week prior to surgery UNLESS prescribed by your surgeon  Hold NSAIDS (i.e. advil, alleve, motrin, ibuprofen, celebrex) minimum 5 days prior to surgery  Follow presurgical medication instructions provided by preadmission nursing team reviewed during your presurgery phone call  Strategies for optimizing your surgery through breathing exercises, nutrition and physical activity can be found at www.[facility].org/best  Call [phone] with any presurgical concerns or medications questions or use the messaging feature in your [employer] simone to contact your provider  Hold current anticoagulant eliquis 72 hrs prior to surgery. Last dose should be taken at 7 AM on 3/14/2025. You will resume this medication the morning after your surgery unless instructed otherwise by your surgical team.   I recommend decreasing aspirin to 81 mg daily      AFTER SURGERY    Recommend using Tylenol ( acetaminophen ) 1000 mg every eight hours during the first week post discharge along with icing the area for 20 mins every 3-4 hours while awake can be helpful in reducing your need for post operative opioid use. This opioid sparing plan can be used along side your surgeons pain plan.  Use stool softener over the counter (colace) daily after surgery during the first 1-2 weeks to avoid post operative constipation issues  If no bowel movement within 3 days after surgery then use over the counter Miralax in addition to your stool softener   If cleared by your surgical team for activity then early and often walking is encouraged and can be important in prevention of post surgical blood clots. Additionally spend as much time out of bed as possible and allowed by your surgical team  Use your incentive spirometer twice per hour in the first seven  days after surgery to help prevent post surgery lung complications and infections  It is very important you follow the instructions from your surgeon regarding any medications for after surgery blood clot prevention. Compliance with these medications or interventions is very important.  Call [phone] with any post discharge concerns or medical issues or use the messaging feature in your [employer] simone to contact your provider

## 2025-02-25 NOTE — PROGRESS NOTES
Assessment/Plan:    Lori Damon came into the [facility] Rheumatology Office today 02/25/25 to receive Prolia injection.      Verbal consent obtained.  Consent given by: patient    patient states patient has been medically healthy with no underlining concerns/complications.      Lori Damon presents with no symptoms today.       All insturctions were reviewed with the patient.    If the patient should have any questions/concerns, advised patient to contacted [facility] Office.       Subjective:     History provided by: patient    Patient ID: Lori Damon is a 64 y.o. female      Objective:    There were no vitals filed for this visit.    Patient tolerated the injection well without any complications.  Injection site/s right arm.  Medication was provided by providers stock.    Patient signed consent form yes   Patient signed ABN form yes (If no patient is not a medicare patient).   Patient waited 15 minutes after injection no (This only applies to patient's receiving first time injection).       Last Visit: 10/25/2024  Next visit:Visit date not found

## 2025-02-26 ENCOUNTER — OFFICE VISIT (OUTPATIENT)
Age: 65
End: 2025-02-26
Payer: COMMERCIAL

## 2025-02-26 DIAGNOSIS — M17.12 PRIMARY OSTEOARTHRITIS OF LEFT KNEE: Primary | ICD-10-CM

## 2025-02-26 PROCEDURE — 97110 THERAPEUTIC EXERCISES: CPT | Performed by: PHYSICAL THERAPIST

## 2025-02-26 PROCEDURE — 97112 NEUROMUSCULAR REEDUCATION: CPT | Performed by: PHYSICAL THERAPIST

## 2025-02-26 NOTE — PROGRESS NOTES
"Daily Note     Today's date: 2025  Patient name: Lori Damon  : 1960  MRN: [id number]  Referring provider: Chica Dennis PA-C  Dx:   Encounter Diagnosis     ICD-10-CM    1. Primary osteoarthritis of left knee  M17.12           Start Time: 1045  Stop Time: 1130  Total time in clinic (min): 45 minutes    Subjective: Patient noted increased pain in the L LE today.       Objective: See treatment diary below      Assessment: Patient performed Nustep aerobic exercise to increase blood flow to the area being treated, prepare the muscles for strength training and stretching, improve overall tolerance to activity, and aerobic endurance. PT educated the patient on using a SPC or crutches to assist c gait mechanics while walking and to decrease pain. Patient performed exercises with min VCS for form. PT introduced stretches to assist c pain levels. Patient noted improvements post stretches. PT added to her HEP. Patient would benefit from continued PT to allow the patient to return to her PLOF.         Plan: Continue per plan of care.      Precautions: osteoporosis      Manuals           L patellar mobs nv                                                   Neuro Re-Ed             HEP edu; home set up; AD management MW MW MW; cane edu          Quad set nv X15  5\" hold each X20  5\" hold each          SLR nv            clams nv            Sidelying hip abd nv            Sit to stand nv                         Ther Ex             Nustep nv 10' lvl 1 10' lvl 3          Hamstring stretch nv            Calf stretch nv            Piriformis stretch nv  3x30\" ea          Lower trunk rotations   5x15\" ea          HR nv            Seated marching   X20 ea          LAQ  X20 ea           Supine hip abd  With strap  x20           Heel slides nv x20           Glute set  50%  X10  5\" hold knees bent 2x10  5\" hold  PTB press hip abd          Hip add nv X20  5\" hold X20  5\" hold          Hip abd nv PTB  X20  5\" " "hold PTB  X25  5\" hold          Ther Activity                                       Gait Training             TUG Performed + edu                         Modalities                                              "

## 2025-02-27 NOTE — PRE-PROCEDURE INSTRUCTIONS
Pre-Surgery Instructions:   Medication Instructions    amantadine (SYMMETREL) 100 mg capsule Take day of surgery.    apixaban (ELIQUIS) 5 mg Stop taking 3 days prior to surgery.    ascorbic acid (VITAMIN C) 500 MG tablet Hold day of surgery.    aspirin 500 MG buffered tablet Instructions provided by MD    buPROPion (WELLBUTRIN XL) 150 mg 24 hr tablet Take day of surgery.    calcium carbonate 1250 MG capsule Hold day of surgery.    cholecalciferol (VITAMIN D3) 25 mcg (1,000 units) tablet Hold day of surgery.    folic acid (FOLVITE) 1 mg tablet Hold day of surgery.    gabapentin (NEURONTIN) 400 mg capsule Take day of surgery.    Lori, Zingiber officinalis, (LORI ROOT PO) Stop taking 7 days prior to surgery.    metaxalone (SKELAXIN) 800 mg tablet Take day of surgery.    Multiple Vitamins-Minerals (multivitamin with minerals) tablet Hold day of surgery.    mupirocin (BACTROBAN) 2 % ointment Instructions provided by MD    ondansetron (ZOFRAN) 4 mg tablet Uses PRN- OK to take day of surgery    predniSONE 5 mg tablet Uses PRN- OK to take day of surgery    propranolol (INDERAL LA) 60 mg 24 hr capsule Take night before surgery    trospium chloride (SANCTURA) 20 mg tablet Hold day of surgery.    Turmeric (QC TUMERIC COMPLEX PO) Stop taking 7 days prior to surgery.    ZOLMitriptan (ZOMIG) 2.5 MG tablet Uses PRN- OK to take day of surgery        Bring walker day of surgery    Medication instructions for day of surgery reviewed. Please take all instructed medications with only a sip of water.       You will receive a call one business day prior to surgery with an arrival time and hospital directions. If your surgery is scheduled on a Monday, the hospital will be calling you on the Friday prior to your surgery. If you have not heard from anyone by 8pm, please call the hospital supervisor through the hospital  at [phone]. ([place] [phone] or [place] [phone]).    Do not eat or drink anything after  midnight the night before your surgery, including candy, mints, lifesavers, or chewing gum. Do not drink alcohol 24hrs before your surgery. Try not to smoke at least 24hrs before your surgery.       Follow the pre surgery showering instructions as listed in the “My Surgical Experience Booklet” or otherwise provided by your surgeon's office. Do not use a blade to shave the surgical area 1 week before surgery. It is okay to use a clean electric clippers up to 24 hours before surgery. Do not apply any lotions, creams, including makeup, cologne, deodorant, or perfumes after showering on the day of your surgery. Do not use dry shampoo, hair spray, hair gel, or any type of hair products.     No contact lenses, eye make-up, or artificial eyelashes. Remove nail polish, including gel polish, and any artificial, gel, or acrylic nails if possible. Remove all jewelry including rings and body piercing jewelry.     Wear causal clothing that is easy to take on and off. Consider your type of surgery.    Keep any valuables, jewelry, piercings at home. Please bring any specially ordered equipment (sling, braces) if indicated.    Arrange for a responsible person to drive you to and from the hospital on the day of your surgery. Please confirm the visitor policy for the day of your procedure when you receive your phone call with an arrival time.     Call the surgeon's office with any new illnesses, exposures, or additional questions prior to surgery.    Please reference your “My Surgical Experience Booklet” for additional information to prepare for your upcoming surgery.

## 2025-02-28 ENCOUNTER — OFFICE VISIT (OUTPATIENT)
Age: 65
End: 2025-02-28
Payer: COMMERCIAL

## 2025-02-28 DIAGNOSIS — N39.41 URGE INCONTINENCE: ICD-10-CM

## 2025-02-28 DIAGNOSIS — M17.12 PRIMARY OSTEOARTHRITIS OF LEFT KNEE: Primary | ICD-10-CM

## 2025-02-28 PROCEDURE — 97110 THERAPEUTIC EXERCISES: CPT | Performed by: PHYSICAL THERAPIST

## 2025-02-28 PROCEDURE — 97112 NEUROMUSCULAR REEDUCATION: CPT | Performed by: PHYSICAL THERAPIST

## 2025-02-28 RX ORDER — TROSPIUM CHLORIDE 20 MG/1
20 TABLET, FILM COATED ORAL 2 TIMES DAILY
Qty: 180 TABLET | Refills: 0 | Status: SHIPPED | OUTPATIENT
Start: 2025-02-28

## 2025-02-28 NOTE — PROGRESS NOTES
"Daily Note     Today's date: 2025  Patient name: Lori Damon  : 1960  MRN: [id number]  Referring provider: Chica Dennis PA-C  Dx:   Encounter Diagnosis     ICD-10-CM    1. Primary osteoarthritis of left knee  M17.12           Start Time: 1115  Stop Time: 1200  Total time in clinic (min): 45 minutes    Subjective: Patient noted some muscle soreness after last session.       Objective: See treatment diary below      Assessment: Patient performed Nustep aerobic exercise to increase blood flow to the area being treated, prepare the muscles for strength training and stretching, improve overall tolerance to activity, and aerobic endurance. Patient improved with coordination of the quad muscle during the session. Patient performed exercises with min Vcs for form. Patient performed SLR with some back pain noted. PT educated the patient on her HEP. Patient would benefit from continued PT to allow the patient to return to her PLOF.         Plan: Continue per plan of care.      Precautions: osteoporosis      Manuals          L patellar mobs nv                                                   Neuro Re-Ed             HEP edu; home set up; AD management MW MW MW; cane edu   HEP edu         Quad set nv X15  5\" hold each X20  5\" hold each X25  5\" hold each         SLR nv   X10 ea         clams nv            Sidelying hip abd nv            Sit to stand nv                         Ther Ex             Nustep nv 10' lvl 1 10' lvl 3 10' lvl 3         Hamstring stretch nv            Calf stretch nv            Piriformis stretch nv  3x30\" ea 3x30\" ea         Lower trunk rotations   5x15\" ea 5x15\" ea         HR nv            Seated marching   X20 ea X20 ea         LAQ  X20 ea  X20 ea         Supine hip abd  With strap  x20           Heel slides nv x20           Glute set  50%  X10  5\" hold knees bent 2x10  5\" hold  PTB press hip abd 2x10  5\" hold  PTB press hip abd         Hip add nv X20  5\" hold " "X20  5\" hold X20  5\" hold         Hip abd nv PTB  X20  5\" hold PTB  X25  5\" hold PTB  X25  5\" hold         Ther Activity                                       Gait Training             TUG Performed + edu                         Modalities                                                "

## 2025-02-28 NOTE — TELEPHONE ENCOUNTER
Pt under care of:   Office Location: [facility]    Last Seen (include Follow Up recommendations of last visit- see Office Visit - Instructions): 07/1/2024 - Return in about 6 months (around 1/1/2025).     Pt calling due to: Schedule an overdue follow up and update pharmacy for RX request.     Pt can be reached at: [phone]     Appointment Details  Date: 04/11/2025  Time:  3:40pm    Location: [facility]   Provider: Flora Montoya    Does the appointment need further review? No - pt requested specific date.

## 2025-03-02 DIAGNOSIS — G43.009 MIGRAINE WITHOUT AURA AND WITHOUT STATUS MIGRAINOSUS, NOT INTRACTABLE: ICD-10-CM

## 2025-03-02 DIAGNOSIS — M54.16 LUMBAR RADICULOPATHY: ICD-10-CM

## 2025-03-02 DIAGNOSIS — M79.2 NEUROPATHIC PAIN: ICD-10-CM

## 2025-03-03 ENCOUNTER — OFFICE VISIT (OUTPATIENT)
Age: 65
End: 2025-03-03
Payer: COMMERCIAL

## 2025-03-03 DIAGNOSIS — M17.12 PRIMARY OSTEOARTHRITIS OF LEFT KNEE: Primary | ICD-10-CM

## 2025-03-03 PROCEDURE — 97110 THERAPEUTIC EXERCISES: CPT | Performed by: PHYSICAL THERAPIST

## 2025-03-03 PROCEDURE — 97112 NEUROMUSCULAR REEDUCATION: CPT | Performed by: PHYSICAL THERAPIST

## 2025-03-03 RX ORDER — PROPRANOLOL HYDROCHLORIDE 60 MG/1
60 CAPSULE, EXTENDED RELEASE ORAL DAILY
Qty: 90 CAPSULE | Refills: 1 | Status: SHIPPED | OUTPATIENT
Start: 2025-03-03

## 2025-03-03 NOTE — PROGRESS NOTES
"Daily Note     Today's date: 3/3/2025  Patient name: Lori Damon  : 1960  MRN: [id number]  Referring provider: Chica Dennis PA-C  Dx:   Encounter Diagnosis     ICD-10-CM    1. Primary osteoarthritis of left knee  M17.12           Start Time: 1115  Stop Time: 1200  Total time in clinic (min): 45 minutes    Subjective: Patient noted that she had a painful migraine over the weekend. Patient noted that her back and L LE has been painful today. Patient is now amb with a SPC to decrease pain.       Objective: See treatment diary below      Assessment: Patient performed Nustep aerobic exercise to increase blood flow to the area being treated, prepare the muscles for strength training and stretching, improve overall tolerance to activity, and aerobic endurance. Patient continues to perform exercises with min Vcs for form. Patient noted pain at times during the session. PT educated the patient on her HEP. Patient would benefit from continued PT to allow the patient to return to her PLOF.         Plan: Continue per plan of care.      Precautions: osteoporosis      Manuals 2/17 2/20 2/26 2/28 3/3        L patellar mobs nv                                                   Neuro Re-Ed             HEP edu; home set up; AD management MW MW MW; cane edu MW  HEP edu MW HEP edu        Quad set nv X15  5\" hold each X20  5\" hold each X25  5\" hold each X25  5\" hold each        SLR nv   X10 ea X15 L        clams nv            Sidelying hip abd nv            Sit to stand nv                         Ther Ex             Nustep nv 10' lvl 1 10' lvl 3 10' lvl 3 10' lvl 4        Hamstring stretch nv            Piriformis stretch nv  3x30\" ea 3x30\" ea 3x30\" ea        Lower trunk rotations   5x15\" ea 5x15\" ea 5x15\" ea        HR nv            Seated marching   X20 ea X20 ea X20 ea        LAQ  X20 ea  X20 ea X20 ea        Calf stretch with strap     3x30\"        Supine hip abd  With strap  x20           Heel slides nv x20   x20      " "  Glute set  50%  X10  5\" hold knees bent 2x10  5\" hold  PTB press hip abd 2x10  5\" hold  PTB press hip abd 2x10  5\" hold  PTB press hip abd        Hip add nv X20  5\" hold X20  5\" hold X20  5\" hold X20  5\" hold        Hip abd nv PTB  X20  5\" hold PTB  X25  5\" hold PTB  X25  5\" hold PTB  X25  5\" hold        Ther Activity                                       Gait Training             TUG Performed + edu                         Modalities                                                  "

## 2025-03-04 RX ORDER — METAXALONE 800 MG/1
400 TABLET ORAL 3 TIMES DAILY
Qty: 135 TABLET | Refills: 0 | Status: SHIPPED | OUTPATIENT
Start: 2025-03-04

## 2025-03-04 RX ORDER — GABAPENTIN 400 MG/1
400 CAPSULE ORAL 3 TIMES DAILY
Qty: 270 CAPSULE | Refills: 1 | Status: SHIPPED | OUTPATIENT
Start: 2025-03-04 | End: 2025-06-02

## 2025-03-05 ENCOUNTER — OFFICE VISIT (OUTPATIENT)
Age: 65
End: 2025-03-05
Payer: COMMERCIAL

## 2025-03-05 VITALS
SYSTOLIC BLOOD PRESSURE: 107 MMHG | HEART RATE: 67 BPM | WEIGHT: 141 LBS | BODY MASS INDEX: 24.98 KG/M2 | DIASTOLIC BLOOD PRESSURE: 72 MMHG | HEIGHT: 63 IN

## 2025-03-05 DIAGNOSIS — E78.00 PURE HYPERCHOLESTEROLEMIA: ICD-10-CM

## 2025-03-05 DIAGNOSIS — I82.491 DEEP VEIN THROMBOSIS (DVT) OF OTHER VEIN OF RIGHT LOWER EXTREMITY, UNSPECIFIED CHRONICITY (HCC): ICD-10-CM

## 2025-03-05 DIAGNOSIS — E11.9 DIABETES MELLITUS TYPE II, NON INSULIN DEPENDENT (HCC): ICD-10-CM

## 2025-03-05 DIAGNOSIS — G24.01 TARDIVE DYSKINESIA: ICD-10-CM

## 2025-03-05 DIAGNOSIS — Z01.818 PRE-OP EXAMINATION: ICD-10-CM

## 2025-03-05 DIAGNOSIS — M17.12 PRIMARY OSTEOARTHRITIS OF LEFT KNEE: Primary | ICD-10-CM

## 2025-03-05 DIAGNOSIS — E78.00 HYPERCHOLESTEROLEMIA: ICD-10-CM

## 2025-03-05 DIAGNOSIS — N18.31 STAGE 3A CHRONIC KIDNEY DISEASE (CKD) (HCC): ICD-10-CM

## 2025-03-05 PROCEDURE — 99215 OFFICE O/P EST HI 40 MIN: CPT | Performed by: INTERNAL MEDICINE

## 2025-03-06 ENCOUNTER — OFFICE VISIT (OUTPATIENT)
Age: 65
End: 2025-03-06
Payer: COMMERCIAL

## 2025-03-06 DIAGNOSIS — M17.12 PRIMARY OSTEOARTHRITIS OF LEFT KNEE: Primary | ICD-10-CM

## 2025-03-06 PROCEDURE — 97110 THERAPEUTIC EXERCISES: CPT | Performed by: PHYSICAL THERAPIST

## 2025-03-06 PROCEDURE — 97112 NEUROMUSCULAR REEDUCATION: CPT | Performed by: PHYSICAL THERAPIST

## 2025-03-06 NOTE — PROGRESS NOTES
"Daily Note     Today's date: 3/6/2025  Patient name: Lori Damon  : 1960  MRN: [id number]  Referring provider: Chica Dennis PA-C  Dx:   Encounter Diagnosis     ICD-10-CM    1. Primary osteoarthritis of left knee  M17.12           Start Time: 1115  Stop Time: 1200  Total time in clinic (min): 45 minutes    Subjective: Patient noted an 8/10 pain in the L knee. Patient noted yesterday was painful due to the rain.       Objective: See treatment diary below      Assessment: Patient performed Nustep aerobic exercise to increase blood flow to the area being treated, prepare the muscles for strength training and stretching, improve overall tolerance to activity, and aerobic endurance. Patient increased in strength during the session due to increased resistance as well as reps and quad activation during quad sets. Patient continues to present with extensor lag during SLR. PT educated the patient on her HEP. Patient would benefit from continued PT to allow the patient to return to her PLOF.         Plan: Continue per plan of care.      Precautions: osteoporosis      Manuals 2/17 2/20 2/26 2/28 3/3 36       L patellar mobs nv                                                   Neuro Re-Ed             HEP edu; home set up; AD management MW MW MW; cane edu MW  HEP edu MW HEP edu MW  HEP Edu       Quad set nv X15  5\" hold each X20  5\" hold each X25  5\" hold each X25  5\" hold each X25  5\" hold each       SLR nv   X10 ea X15 L X15 L       clams nv            Sidelying hip abd nv            Sit to stand nv                         Ther Ex             Nustep nv 10' lvl 1 10' lvl 3 10' lvl 3 10' lvl 4 10' lvl 4       Hamstring stretch nv            Piriformis stretch nv  3x30\" ea 3x30\" ea 3x30\" ea        Lower trunk rotations   5x15\" ea 5x15\" ea 5x15\" ea 5x15\" ea       HR nv            Seated marching   X20 ea X20 ea X20 ea X20 ea       LAQ  X20 ea  X20 ea X20 ea X20 ea       Calf stretch with strap     3x30\" 3x30\"     " "  Supine hip abd  With strap  x20           Heel slides nv x20   x20 x20       Glute set  50%  X10  5\" hold knees bent 2x10  5\" hold  PTB press hip abd 2x10  5\" hold  PTB press hip abd 2x10  5\" hold  PTB press hip abd 2x10  5\" hold  GTB press hip abd       Hip add nv X20  5\" hold X20  5\" hold X20  5\" hold X20  5\" hold X30  5\" hold       Hip abd nv PTB  X20  5\" hold PTB  X25  5\" hold PTB  X25  5\" hold PTB  X25  5\" hold GTB  X25  5\" hold       Ther Activity                                       Gait Training             TUG Performed + edu                         Modalities                                                    "

## 2025-03-10 ENCOUNTER — OFFICE VISIT (OUTPATIENT)
Age: 65
End: 2025-03-10
Payer: COMMERCIAL

## 2025-03-10 DIAGNOSIS — I82.409 RECURRENT DEEP VEIN THROMBOSIS (DVT) (HCC): ICD-10-CM

## 2025-03-10 DIAGNOSIS — M17.12 PRIMARY OSTEOARTHRITIS OF LEFT KNEE: Primary | ICD-10-CM

## 2025-03-10 PROCEDURE — 97112 NEUROMUSCULAR REEDUCATION: CPT | Performed by: PHYSICAL THERAPIST

## 2025-03-10 PROCEDURE — 97110 THERAPEUTIC EXERCISES: CPT | Performed by: PHYSICAL THERAPIST

## 2025-03-10 NOTE — PROGRESS NOTES
"Daily Note     Today's date: 3/10/2025  Patient name: Lori Damon  : 1960  MRN: [id number]  Referring provider: Chica Dennis PA-C  Dx:   Encounter Diagnosis     ICD-10-CM    1. Primary osteoarthritis of left knee  M17.12           Start Time: 1115  Stop Time: 1200  Total time in clinic (min): 45 minutes    Subjective: Patient noted a 6/10 pain in the L knee at the start of the session. Patient noted in general she feels unstable in the L knee.       Objective: See treatment diary below      Assessment: Patient performed Nustep aerobic exercise to increase blood flow to the area being treated, prepare the muscles for strength training and stretching, improve overall tolerance to activity, and aerobic endurance. PT introduced clams to assist c stabilization in the hips for ambulation. Patient noted tenderness in the L hip while laying in sidelying and performing clams. PT educated the patient on amb with a walking to decrease the risk of falls. PT educated the patient on her HEP. Patient would benefit from continued PT to allow the patient to return to her PLOF.         Plan: Continue per plan of care.      Precautions: osteoporosis      Manuals 2/17 2/20 2/26 2/28 3/3 3/6 3/10      L patellar mobs nv                                                   Neuro Re-Ed             HEP edu; home set up; AD management MW MW MW; cane edu MW  HEP edu MW HEP edu MW  HEP Edu MW  HEP edu      Quad set nv X15  5\" hold each X20  5\" hold each X25  5\" hold each X25  5\" hold each X25  5\" hold each X25  5\" hold each      SLR nv   X10 ea X15 L X15 L X15 ea      clams nv      X10 ea      Sidelying hip abd nv            Sit to stand nv                         Ther Ex             Nustep nv 10' lvl 1 10' lvl 3 10' lvl 3 10' lvl 4 10' lvl 4 10' lvl 4      Hamstring stretch nv            Piriformis stretch nv  3x30\" ea 3x30\" ea 3x30\" ea        Lower trunk rotations   5x15\" ea 5x15\" ea 5x15\" ea 5x15\" ea 5x15\" ea      HR nv         " "   Seated marching   X20 ea X20 ea X20 ea X20 ea X20 ea      LAQ  X20 ea  X20 ea X20 ea X20 ea X20 ea      Calf stretch with strap     3x30\" 3x30\"       Supine hip abd  With strap  x20           Heel slides nv x20   x20 x20       Glute set  50%  X10  5\" hold knees bent 2x10  5\" hold  PTB press hip abd 2x10  5\" hold  PTB press hip abd 2x10  5\" hold  PTB press hip abd 2x10  5\" hold  GTB press hip abd 2x10  5\" hold  GTB press hip abd      Hip add nv X20  5\" hold X20  5\" hold X20  5\" hold X20  5\" hold X30  5\" hold X30  5\" hold      Hip abd nv PTB  X20  5\" hold PTB  X25  5\" hold PTB  X25  5\" hold PTB  X25  5\" hold GTB  X25  5\" hold GTB  X25  5\" hold      Ther Activity                                       Gait Training             TUG Performed + edu                         Modalities                                                      "

## 2025-03-12 ENCOUNTER — OFFICE VISIT (OUTPATIENT)
Age: 65
End: 2025-03-12
Payer: COMMERCIAL

## 2025-03-12 DIAGNOSIS — M17.12 PRIMARY OSTEOARTHRITIS OF LEFT KNEE: Primary | ICD-10-CM

## 2025-03-12 PROCEDURE — 97112 NEUROMUSCULAR REEDUCATION: CPT | Performed by: PHYSICAL THERAPIST

## 2025-03-12 PROCEDURE — 97110 THERAPEUTIC EXERCISES: CPT | Performed by: PHYSICAL THERAPIST

## 2025-03-12 NOTE — PROGRESS NOTES
"Daily Note     Today's date: 3/12/2025  Patient name: Lori Damon  : 1960  MRN: [id number]  Referring provider: Chica Dennis PA-C  Dx:   Encounter Diagnosis     ICD-10-CM    1. Primary osteoarthritis of left knee  M17.12           Start Time: 1215  Stop Time: 1255  Total time in clinic (min): 40 minutes    Subjective: Patient noted that she is walking better today. Patient is scheduled for a L TKA on 3/17/25. Patient noted that she feels that PT helped prior to surgery.       Objective: See treatment diary below      Assessment: Patient performed Nustep aerobic exercise to increase blood flow to the area being treated, prepare the muscles for strength training and stretching, improve overall tolerance to activity, and aerobic endurance. PT educated the patient on expectations after TKA for exercises. Patient performed exercises with minimal to no pain increased. Patient increased the band resistance with hip abd exercises. PT educated the patient on her HEP. Patient would benefit from continued PT to allow the patient to return to her PLOF.         Plan: Continue per plan of care.  Patient will follow up after surgery and potential rehab stay.      Precautions: osteoporosis      Manuals 2/17 2/20 2/26 2/28 3/3 3/6 3/10 3/12     L patellar mobs nv                                                   Neuro Re-Ed             HEP edu; home set up; AD management MW MAURA MW; cane edu MW  HEP edu MW HEP edu MW  HEP Edu MW  HEP edu MW  HEP edu     Quad set nv X15  5\" hold each X20  5\" hold each X25  5\" hold each X25  5\" hold each X25  5\" hold each X25  5\" hold each X25  5\" hold each     SLR nv   X10 ea X15 L X15 L X15 ea X15 ea     clams nv      X10 ea      Sidelying hip abd nv            Sit to stand nv                         Ther Ex             Nustep nv 10' lvl 1 10' lvl 3 10' lvl 3 10' lvl 4 10' lvl 4 10' lvl 4 10' lvl 4     Hamstring stretch nv            Piriformis stretch nv  3x30\" ea 3x30\" ea 3x30\" ea   " "3x30\" ea     Lower trunk rotations   5x15\" ea 5x15\" ea 5x15\" ea 5x15\" ea 5x15\" ea 5x15\" ea     HR nv            Seated marching   X20 ea X20 ea X20 ea X20 ea X20 ea X20 ea     LAQ  X20 ea  X20 ea X20 ea X20 ea X20 ea X20 ea     Calf stretch with strap     3x30\" 3x30\"       Supine hip abd  With strap  x20           Heel slides nv x20   x20 x20       Glute set  50%  X10  5\" hold knees bent 2x10  5\" hold  PTB press hip abd 2x10  5\" hold  PTB press hip abd 2x10  5\" hold  PTB press hip abd 2x10  5\" hold  GTB press hip abd 2x10  5\" hold  GTB press hip abd 2x10  5\" hold  BTB press hip abd     Hip add nv X20  5\" hold X20  5\" hold X20  5\" hold X20  5\" hold X30  5\" hold X30  5\" hold X30  5\" hold     Hip abd nv PTB  X20  5\" hold PTB  X25  5\" hold PTB  X25  5\" hold PTB  X25  5\" hold GTB  X25  5\" hold GTB  X25  5\" hold BTB  x20     Ther Activity                                       Gait Training             TUG Performed + edu                         Modalities                                                        "

## 2025-03-13 ENCOUNTER — ANESTHESIA EVENT (OUTPATIENT)
Dept: PERIOP | Facility: HOSPITAL | Age: 65
DRG: 470 | End: 2025-03-13
Payer: COMMERCIAL

## 2025-03-13 DIAGNOSIS — I82.409 RECURRENT DEEP VEIN THROMBOSIS (DVT) (HCC): ICD-10-CM

## 2025-03-14 RX ORDER — CEFADROXIL 500 MG/1
500 CAPSULE ORAL EVERY 12 HOURS SCHEDULED
Qty: 2 CAPSULE | Refills: 0 | Status: CANCELLED | OUTPATIENT
Start: 2025-03-14 | End: 2025-03-15

## 2025-03-14 NOTE — DISCHARGE INSTR - AVS FIRST PAGE
Dr. Khan Knee Replacement    What to Expect/Activity  It is normal to have some discomfort in your knee for several days to weeks.  You are weight bearing as tolerated to your operative leg with assist devices.  Please use crutches/walker when ambulating until your follow-up  Swelling and discomfort in the knee is normal for several days after surgery. For the first 2-3 days, use ice around the knee to help. Use for 20-30 minutes every 1-2 hours for 48 hours, while awake. You may continue beyond 48 hours as needed.  Place one or two pillows underneath your calf, not your knee, to reduce swelling.  Physical therapy on your own at home should start as soon as possible (see below). Please perform heel slides and extension exercises on your own as well (see diagram).  Please use incentive spirometer 10 times per hour while awake (see diagram).    Dressing/Wound Care/Bathing  You may remove your toe-to-groin dressing 24 hours after surgery. There will be a surgical dressing over your incision that stays in place until follow-up unless water gets under the bandage and then it should be removed.   You may start showering 24 hours after surgery, the surgical dressing will remain in place. Please pat the dressing dry. If you notice the dressing appears saturated or is starting to come off, please replace with dry dressing.  You can keep the dressing in place until follow-up in the office.   Do not place any creams, ointments or gels on or around the incision.  No baths, swimming or submerging until cleared by Dr. Khan    Pain Management/Medications  You may resume your usual medications.  Please take the following medications:  Anti-coagulation (blood clot prevention) - Resume Eliquis post op day 1  Pain medication:  Narcotic: Take as directed  NSAID/Anti-inflammatory: Take as directed  Tylenol 1000mg every 8 hours  Zofran (ondasetron) - 4mg every 8 hours as needed for nausea  Stool softeners (senna/colace) - take  daily to prevent constipation as narcotic pain medication causes constipation  Antibiotic - take as directed if prescribed   If you have questions or pain concerns, please contact the office. Pain medication cannot remove all post-operative pain.    Follow up/Call if:  The findings of your surgery will be explained to you and your family immediately after surgery. However, in the post-operative period, during recovery from anesthesia you may not fully remember or fully understand what was said. This will be again gone over when you return for your post-op appointment.  Please contact Dr. Khan's office if you experience the following:  Excessive bleeding (bleeding through your dressing)  Fever greater than 101 degrees F after 48 hours (low grade fevers the day or two after surgery are normal)  Persistent nausea or vomiting  Decreased sensation or discoloration of the operative limb  Pain or swelling that is getting worse and not better with medication    Dr. Khan's Office Contact: [phone]

## 2025-03-17 ENCOUNTER — ANESTHESIA (OUTPATIENT)
Dept: PERIOP | Facility: HOSPITAL | Age: 65
DRG: 470 | End: 2025-03-17
Payer: COMMERCIAL

## 2025-03-17 ENCOUNTER — APPOINTMENT (OUTPATIENT)
Dept: RADIOLOGY | Facility: HOSPITAL | Age: 65
DRG: 470 | End: 2025-03-17
Payer: COMMERCIAL

## 2025-03-17 ENCOUNTER — HOSPITAL ENCOUNTER (INPATIENT)
Facility: HOSPITAL | Age: 65
LOS: 1 days | Discharge: HOME | DRG: 470 | End: 2025-03-19
Attending: STUDENT IN AN ORGANIZED HEALTH CARE EDUCATION/TRAINING PROGRAM | Admitting: STUDENT IN AN ORGANIZED HEALTH CARE EDUCATION/TRAINING PROGRAM
Payer: COMMERCIAL

## 2025-03-17 DIAGNOSIS — M17.12 PRIMARY OSTEOARTHRITIS OF LEFT KNEE: Primary | ICD-10-CM

## 2025-03-17 PROCEDURE — C1776 JOINT DEVICE (IMPLANTABLE): HCPCS | Performed by: STUDENT IN AN ORGANIZED HEALTH CARE EDUCATION/TRAINING PROGRAM

## 2025-03-17 PROCEDURE — 0SRD0J9 REPLACEMENT OF LEFT KNEE JOINT WITH SYNTHETIC SUBSTITUTE, CEMENTED, OPEN APPROACH: ICD-10-PCS | Performed by: STUDENT IN AN ORGANIZED HEALTH CARE EDUCATION/TRAINING PROGRAM

## 2025-03-17 PROCEDURE — A6250 SKIN SEAL PROTECT MOISTURIZR: HCPCS | Performed by: STUDENT IN AN ORGANIZED HEALTH CARE EDUCATION/TRAINING PROGRAM

## 2025-03-17 PROCEDURE — 27447 TOTAL KNEE ARTHROPLASTY: CPT | Performed by: STUDENT IN AN ORGANIZED HEALTH CARE EDUCATION/TRAINING PROGRAM

## 2025-03-17 PROCEDURE — 73560 X-RAY EXAM OF KNEE 1 OR 2: CPT

## 2025-03-17 PROCEDURE — 97162 PT EVAL MOD COMPLEX 30 MIN: CPT

## 2025-03-17 PROCEDURE — C1713 ANCHOR/SCREW BN/BN,TIS/BN: HCPCS | Performed by: STUDENT IN AN ORGANIZED HEALTH CARE EDUCATION/TRAINING PROGRAM

## 2025-03-17 DEVICE — ATTUNE KNEE SYSTEM TIBIAL INSERT FIXED BEARING MEDIAL STABILIZED LEFT AOX 4, 10MM
Type: IMPLANTABLE DEVICE | Site: KNEE | Status: FUNCTIONAL
Brand: ATTUNE

## 2025-03-17 DEVICE — ATTUNE KNEE SYSTEM FEMORAL POROCOAT CRUCIATE RETAINING SIZE 4 LEFT CEMENTLESS
Type: IMPLANTABLE DEVICE | Site: KNEE | Status: FUNCTIONAL
Brand: ATTUNE

## 2025-03-17 DEVICE — ATTUNE PATELLA MEDIALIZED DOME 35MM CEMENTED AOX
Type: IMPLANTABLE DEVICE | Site: PATELLA | Status: FUNCTIONAL
Brand: ATTUNE

## 2025-03-17 DEVICE — DEPUY CMW 2 FAST SET BONE CEMENT 20G: Type: IMPLANTABLE DEVICE | Site: KNEE | Status: FUNCTIONAL

## 2025-03-17 DEVICE — ATTUNE KNEE SYSTEM TIBIAL BASE AFFIXIUM FIXED BEARING SIZE 4
Type: IMPLANTABLE DEVICE | Site: KNEE | Status: FUNCTIONAL
Brand: ATTUNE AFFIXIUM

## 2025-03-17 RX ORDER — CEFAZOLIN SODIUM 2 G/50ML
SOLUTION INTRAVENOUS AS NEEDED
Status: DISCONTINUED | OUTPATIENT
Start: 2025-03-17 | End: 2025-03-17

## 2025-03-17 RX ORDER — SODIUM CHLORIDE, SODIUM LACTATE, POTASSIUM CHLORIDE, CALCIUM CHLORIDE 600; 310; 30; 20 MG/100ML; MG/100ML; MG/100ML; MG/100ML
125 INJECTION, SOLUTION INTRAVENOUS CONTINUOUS
Status: DISCONTINUED | OUTPATIENT
Start: 2025-03-17 | End: 2025-03-17 | Stop reason: SDUPTHER

## 2025-03-17 RX ORDER — METAXALONE 800 MG/1
400 TABLET ORAL 3 TIMES DAILY
Status: DISCONTINUED | OUTPATIENT
Start: 2025-03-17 | End: 2025-03-19 | Stop reason: HOSPADM

## 2025-03-17 RX ORDER — TRANEXAMIC ACID 10 MG/ML
1000 INJECTION, SOLUTION INTRAVENOUS ONCE
Status: COMPLETED | OUTPATIENT
Start: 2025-03-17 | End: 2025-03-17

## 2025-03-17 RX ORDER — SODIUM CHLORIDE, SODIUM LACTATE, POTASSIUM CHLORIDE, CALCIUM CHLORIDE 600; 310; 30; 20 MG/100ML; MG/100ML; MG/100ML; MG/100ML
INJECTION, SOLUTION INTRAVENOUS CONTINUOUS PRN
Status: DISCONTINUED | OUTPATIENT
Start: 2025-03-17 | End: 2025-03-17

## 2025-03-17 RX ORDER — BUPIVACAINE HYDROCHLORIDE 5 MG/ML
INJECTION, SOLUTION EPIDURAL; INTRACAUDAL; PERINEURAL
Status: COMPLETED | OUTPATIENT
Start: 2025-03-17 | End: 2025-03-17

## 2025-03-17 RX ORDER — ACETAMINOPHEN 325 MG/1
975 TABLET ORAL ONCE
Status: COMPLETED | OUTPATIENT
Start: 2025-03-17 | End: 2025-03-17

## 2025-03-17 RX ORDER — BUPROPION HYDROCHLORIDE 150 MG/1
150 TABLET ORAL EVERY MORNING
Status: DISCONTINUED | OUTPATIENT
Start: 2025-03-18 | End: 2025-03-19 | Stop reason: HOSPADM

## 2025-03-17 RX ORDER — MIDAZOLAM HYDROCHLORIDE 2 MG/2ML
INJECTION, SOLUTION INTRAMUSCULAR; INTRAVENOUS
Status: COMPLETED | OUTPATIENT
Start: 2025-03-17 | End: 2025-03-17

## 2025-03-17 RX ORDER — CALCIUM CARBONATE 500 MG/1
1000 TABLET, CHEWABLE ORAL DAILY PRN
Status: DISCONTINUED | OUTPATIENT
Start: 2025-03-17 | End: 2025-03-19 | Stop reason: HOSPADM

## 2025-03-17 RX ORDER — SIMETHICONE 80 MG
80 TABLET,CHEWABLE ORAL 4 TIMES DAILY PRN
Status: DISCONTINUED | OUTPATIENT
Start: 2025-03-17 | End: 2025-03-19 | Stop reason: HOSPADM

## 2025-03-17 RX ORDER — FENTANYL CITRATE 50 UG/ML
INJECTION, SOLUTION INTRAMUSCULAR; INTRAVENOUS
Status: COMPLETED | OUTPATIENT
Start: 2025-03-17 | End: 2025-03-17

## 2025-03-17 RX ORDER — DOCUSATE SODIUM 100 MG/1
100 CAPSULE, LIQUID FILLED ORAL 2 TIMES DAILY
Status: DISCONTINUED | OUTPATIENT
Start: 2025-03-17 | End: 2025-03-19 | Stop reason: HOSPADM

## 2025-03-17 RX ORDER — AMOXICILLIN 250 MG
1 CAPSULE ORAL DAILY
Qty: 30 TABLET | Refills: 0 | Status: SHIPPED | OUTPATIENT
Start: 2025-03-17

## 2025-03-17 RX ORDER — GLYCOPYRROLATE 0.2 MG/ML
INJECTION INTRAMUSCULAR; INTRAVENOUS AS NEEDED
Status: DISCONTINUED | OUTPATIENT
Start: 2025-03-17 | End: 2025-03-17

## 2025-03-17 RX ORDER — PROPRANOLOL HYDROCHLORIDE 60 MG/1
60 CAPSULE, EXTENDED RELEASE ORAL EVERY EVENING
Status: DISCONTINUED | OUTPATIENT
Start: 2025-03-17 | End: 2025-03-19 | Stop reason: HOSPADM

## 2025-03-17 RX ORDER — FENTANYL CITRATE/PF 50 MCG/ML
25 SYRINGE (ML) INJECTION
Status: DISCONTINUED | OUTPATIENT
Start: 2025-03-17 | End: 2025-03-17 | Stop reason: HOSPADM

## 2025-03-17 RX ORDER — ONDANSETRON 2 MG/ML
4 INJECTION INTRAMUSCULAR; INTRAVENOUS ONCE AS NEEDED
Status: DISCONTINUED | OUTPATIENT
Start: 2025-03-17 | End: 2025-03-17 | Stop reason: HOSPADM

## 2025-03-17 RX ORDER — ONDANSETRON 2 MG/ML
4 INJECTION INTRAMUSCULAR; INTRAVENOUS EVERY 6 HOURS PRN
Status: DISCONTINUED | OUTPATIENT
Start: 2025-03-17 | End: 2025-03-19 | Stop reason: HOSPADM

## 2025-03-17 RX ORDER — ACETAMINOPHEN 325 MG/1
650 TABLET ORAL EVERY 4 HOURS PRN
Status: DISCONTINUED | OUTPATIENT
Start: 2025-03-17 | End: 2025-03-19 | Stop reason: HOSPADM

## 2025-03-17 RX ORDER — PROPOFOL 10 MG/ML
INJECTION, EMULSION INTRAVENOUS CONTINUOUS PRN
Status: DISCONTINUED | OUTPATIENT
Start: 2025-03-17 | End: 2025-03-17

## 2025-03-17 RX ORDER — OXYBUTYNIN CHLORIDE 5 MG/1
5 TABLET ORAL 2 TIMES DAILY
Status: DISCONTINUED | OUTPATIENT
Start: 2025-03-17 | End: 2025-03-19 | Stop reason: HOSPADM

## 2025-03-17 RX ORDER — MAGNESIUM HYDROXIDE 1200 MG/15ML
LIQUID ORAL AS NEEDED
Status: DISCONTINUED | OUTPATIENT
Start: 2025-03-17 | End: 2025-03-17 | Stop reason: HOSPADM

## 2025-03-17 RX ORDER — LIDOCAINE HYDROCHLORIDE 10 MG/ML
INJECTION, SOLUTION EPIDURAL; INFILTRATION; INTRACAUDAL; PERINEURAL AS NEEDED
Status: DISCONTINUED | OUTPATIENT
Start: 2025-03-17 | End: 2025-03-17

## 2025-03-17 RX ORDER — OXYCODONE HYDROCHLORIDE 5 MG/1
5 TABLET ORAL EVERY 4 HOURS PRN
Status: DISCONTINUED | OUTPATIENT
Start: 2025-03-17 | End: 2025-03-19 | Stop reason: HOSPADM

## 2025-03-17 RX ORDER — GABAPENTIN 400 MG/1
400 CAPSULE ORAL 3 TIMES DAILY
Status: DISCONTINUED | OUTPATIENT
Start: 2025-03-17 | End: 2025-03-19 | Stop reason: HOSPADM

## 2025-03-17 RX ORDER — CHLORHEXIDINE GLUCONATE ORAL RINSE 1.2 MG/ML
15 SOLUTION DENTAL ONCE
Status: COMPLETED | OUTPATIENT
Start: 2025-03-17 | End: 2025-03-17

## 2025-03-17 RX ORDER — FOLIC ACID 1 MG/1
1 TABLET ORAL DAILY
Status: DISCONTINUED | OUTPATIENT
Start: 2025-03-17 | End: 2025-03-19 | Stop reason: HOSPADM

## 2025-03-17 RX ORDER — GABAPENTIN 300 MG/1
300 CAPSULE ORAL
Status: DISCONTINUED | OUTPATIENT
Start: 2025-03-17 | End: 2025-03-17 | Stop reason: SDUPTHER

## 2025-03-17 RX ORDER — PANTOPRAZOLE SODIUM 40 MG/1
40 TABLET, DELAYED RELEASE ORAL
Status: DISCONTINUED | OUTPATIENT
Start: 2025-03-17 | End: 2025-03-19 | Stop reason: HOSPADM

## 2025-03-17 RX ORDER — OXYCODONE HYDROCHLORIDE 10 MG/1
10 TABLET ORAL EVERY 4 HOURS PRN
Status: DISCONTINUED | OUTPATIENT
Start: 2025-03-17 | End: 2025-03-19 | Stop reason: HOSPADM

## 2025-03-17 RX ORDER — ONDANSETRON 2 MG/ML
INJECTION INTRAMUSCULAR; INTRAVENOUS AS NEEDED
Status: DISCONTINUED | OUTPATIENT
Start: 2025-03-17 | End: 2025-03-17

## 2025-03-17 RX ORDER — ASCORBIC ACID 500 MG
500 TABLET ORAL 2 TIMES DAILY
Status: DISCONTINUED | OUTPATIENT
Start: 2025-03-17 | End: 2025-03-19 | Stop reason: HOSPADM

## 2025-03-17 RX ORDER — CEFAZOLIN SODIUM 2 G/50ML
2000 SOLUTION INTRAVENOUS ONCE
Status: DISCONTINUED | OUTPATIENT
Start: 2025-03-17 | End: 2025-03-17 | Stop reason: HOSPADM

## 2025-03-17 RX ORDER — ACETAMINOPHEN 325 MG/1
975 TABLET ORAL EVERY 8 HOURS SCHEDULED
Status: DISCONTINUED | OUTPATIENT
Start: 2025-03-17 | End: 2025-03-19 | Stop reason: HOSPADM

## 2025-03-17 RX ORDER — AMANTADINE HYDROCHLORIDE 100 MG/1
100 CAPSULE, GELATIN COATED ORAL DAILY
Status: DISCONTINUED | OUTPATIENT
Start: 2025-03-18 | End: 2025-03-19 | Stop reason: HOSPADM

## 2025-03-17 RX ORDER — OXYCODONE HYDROCHLORIDE 5 MG/1
5 TABLET ORAL EVERY 4 HOURS PRN
Qty: 42 TABLET | Refills: 0 | Status: SHIPPED | OUTPATIENT
Start: 2025-03-17 | End: 2025-03-27

## 2025-03-17 RX ORDER — MIDAZOLAM HYDROCHLORIDE 2 MG/2ML
INJECTION, SOLUTION INTRAMUSCULAR; INTRAVENOUS AS NEEDED
Status: DISCONTINUED | OUTPATIENT
Start: 2025-03-17 | End: 2025-03-17

## 2025-03-17 RX ORDER — SENNOSIDES 8.6 MG
1 TABLET ORAL DAILY
Status: DISCONTINUED | OUTPATIENT
Start: 2025-03-17 | End: 2025-03-19 | Stop reason: HOSPADM

## 2025-03-17 RX ORDER — SODIUM CHLORIDE, SODIUM LACTATE, POTASSIUM CHLORIDE, CALCIUM CHLORIDE 600; 310; 30; 20 MG/100ML; MG/100ML; MG/100ML; MG/100ML
100 INJECTION, SOLUTION INTRAVENOUS CONTINUOUS
Status: DISCONTINUED | OUTPATIENT
Start: 2025-03-17 | End: 2025-03-19

## 2025-03-17 RX ORDER — CEFAZOLIN SODIUM 2 G/50ML
2000 SOLUTION INTRAVENOUS EVERY 8 HOURS
Status: COMPLETED | OUTPATIENT
Start: 2025-03-17 | End: 2025-03-18

## 2025-03-17 RX ORDER — CHLORHEXIDINE GLUCONATE 40 MG/ML
SOLUTION TOPICAL DAILY PRN
Status: DISCONTINUED | OUTPATIENT
Start: 2025-03-17 | End: 2025-03-17 | Stop reason: HOSPADM

## 2025-03-17 RX ORDER — ONDANSETRON 4 MG/1
4 TABLET, ORALLY DISINTEGRATING ORAL EVERY 6 HOURS PRN
Qty: 20 TABLET | Refills: 0 | Status: SHIPPED | OUTPATIENT
Start: 2025-03-17

## 2025-03-17 RX ORDER — ACETAMINOPHEN 500 MG
1000 TABLET ORAL EVERY 8 HOURS
Qty: 60 TABLET | Refills: 0 | Status: SHIPPED | OUTPATIENT
Start: 2025-03-17

## 2025-03-17 RX ADMIN — PROPRANOLOL HYDROCHLORIDE 60 MG: 60 CAPSULE, EXTENDED RELEASE ORAL at 22:21

## 2025-03-17 RX ADMIN — SODIUM CHLORIDE, SODIUM LACTATE, POTASSIUM CHLORIDE, AND CALCIUM CHLORIDE: .6; .31; .03; .02 INJECTION, SOLUTION INTRAVENOUS at 10:21

## 2025-03-17 RX ADMIN — SODIUM CHLORIDE, SODIUM LACTATE, POTASSIUM CHLORIDE, AND CALCIUM CHLORIDE 100 ML/HR: .6; .31; .03; .02 INJECTION, SOLUTION INTRAVENOUS at 13:30

## 2025-03-17 RX ADMIN — Medication 1 TABLET: at 13:31

## 2025-03-17 RX ADMIN — PANTOPRAZOLE SODIUM 40 MG: 40 TABLET, DELAYED RELEASE ORAL at 13:31

## 2025-03-17 RX ADMIN — OXYBUTYNIN CHLORIDE 5 MG: 5 TABLET ORAL at 21:20

## 2025-03-17 RX ADMIN — PROPOFOL 50 MCG/KG/MIN: 10 INJECTION, EMULSION INTRAVENOUS at 10:21

## 2025-03-17 RX ADMIN — GABAPENTIN 400 MG: 400 CAPSULE ORAL at 21:20

## 2025-03-17 RX ADMIN — OXYCODONE HYDROCHLORIDE AND ACETAMINOPHEN 500 MG: 500 TABLET ORAL at 21:20

## 2025-03-17 RX ADMIN — FOLIC ACID 1 MG: 1 TABLET ORAL at 13:31

## 2025-03-17 RX ADMIN — DOCUSATE SODIUM 100 MG: 100 CAPSULE, LIQUID FILLED ORAL at 21:20

## 2025-03-17 RX ADMIN — METAXALONE 400 MG: 800 TABLET ORAL at 16:58

## 2025-03-17 RX ADMIN — CEFAZOLIN SODIUM 2000 MG: 2 SOLUTION INTRAVENOUS at 10:21

## 2025-03-17 RX ADMIN — BUPIVACAINE HYDROCHLORIDE 10 ML: 5 INJECTION, SOLUTION EPIDURAL; INTRACAUDAL; PERINEURAL at 09:08

## 2025-03-17 RX ADMIN — ONDANSETRON 4 MG: 2 INJECTION INTRAMUSCULAR; INTRAVENOUS at 11:20

## 2025-03-17 RX ADMIN — ACETAMINOPHEN 975 MG: 325 TABLET, FILM COATED ORAL at 13:31

## 2025-03-17 RX ADMIN — SODIUM CHLORIDE, SODIUM LACTATE, POTASSIUM CHLORIDE, AND CALCIUM CHLORIDE 125 ML/HR: .6; .31; .03; .02 INJECTION, SOLUTION INTRAVENOUS at 08:07

## 2025-03-17 RX ADMIN — BUPIVACAINE 10 ML: 13.3 INJECTION, SUSPENSION, LIPOSOMAL INFILTRATION at 09:08

## 2025-03-17 RX ADMIN — OXYCODONE 5 MG: 5 TABLET ORAL at 19:21

## 2025-03-17 RX ADMIN — MIDAZOLAM 2 MG: 1 INJECTION INTRAMUSCULAR; INTRAVENOUS at 09:06

## 2025-03-17 RX ADMIN — LIDOCAINE HYDROCHLORIDE 60 MG: 10 INJECTION, SOLUTION EPIDURAL; INFILTRATION; INTRACAUDAL; PERINEURAL at 10:22

## 2025-03-17 RX ADMIN — ACETAMINOPHEN 975 MG: 325 TABLET, FILM COATED ORAL at 08:07

## 2025-03-17 RX ADMIN — GABAPENTIN 400 MG: 400 CAPSULE ORAL at 16:58

## 2025-03-17 RX ADMIN — TRANEXAMIC ACID 1000 MG: 10 INJECTION, SOLUTION INTRAVENOUS at 10:22

## 2025-03-17 RX ADMIN — GLYCOPYRROLATE 0.2 MCG: 0.2 INJECTION, SOLUTION INTRAMUSCULAR; INTRAVENOUS at 10:21

## 2025-03-17 RX ADMIN — MORPHINE SULFATE 2 MG: 2 INJECTION, SOLUTION INTRAMUSCULAR; INTRAVENOUS at 21:22

## 2025-03-17 RX ADMIN — SENNOSIDES 8.6 MG: 8.6 TABLET, FILM COATED ORAL at 13:31

## 2025-03-17 RX ADMIN — MIDAZOLAM HYDROCHLORIDE 1 MG: 2 INJECTION, SOLUTION INTRAMUSCULAR; INTRAVENOUS at 10:12

## 2025-03-17 RX ADMIN — FENTANYL CITRATE 100 MCG: 50 INJECTION INTRAMUSCULAR; INTRAVENOUS at 09:06

## 2025-03-17 RX ADMIN — CEFAZOLIN SODIUM 2000 MG: 2 SOLUTION INTRAVENOUS at 17:00

## 2025-03-17 RX ADMIN — METAXALONE 400 MG: 800 TABLET ORAL at 21:19

## 2025-03-17 RX ADMIN — CHLORHEXIDINE GLUCONATE 15 ML: 1.2 SOLUTION ORAL at 08:07

## 2025-03-17 RX ADMIN — LIDOCAINE HYDROCHLORIDE 40 MG: 10 INJECTION, SOLUTION EPIDURAL; INFILTRATION; INTRACAUDAL; PERINEURAL at 11:20

## 2025-03-17 NOTE — PLAN OF CARE
Problem: PHYSICAL THERAPY ADULT  Goal: Performs mobility at highest level of function for planned discharge setting.  See evaluation for individualized goals.  Description: Treatment/Interventions: Functional transfer training, LE strengthening/ROM, Elevations, Therapeutic exercise, Patient/family training, Bed mobility, Equipment eval/education, Gait training, Compensatory technique education, Continued evaluation          See flowsheet documentation for full assessment, interventions and recommendations.  Note: Prognosis: Good  Problem List: Impaired sensation, Decreased mobility  Assessment: Lori Damon is a 64 y.o. female admitted to [facility] on 3/17/2025 for Primary osteoarthritis of left knee. POD#0 L TKR. PT was consulted and pt was seen on 3/17/2025 for mobility assessment and d/c planning. Pt presents w low fall risk, multiple lines, wbat LLE per ortho. At baseline is indep for ADLs and ambulation w SPC. Pt is currently functioning at a mod I to get OOB, S to transfers, min A to get back into bed to support LLE. Upon standing pt reporting numbness in BLE that is impacting her ability to try to take steps. Did demonstrate intact sensation and fair strength; pt seems more apprehensive about sx and this limited her willingness to take steps. Defer trial of gait at this time dt sx and pt comfort. Pt will benefit from continued skilled IP PT to address the above mentioned impairments  in order to maximize recovery and increase functional independence when completing mobility and ADLs.  Barriers to Discharge: None     Rehab Resource Intensity Level, PT: (S)  (tbd pending gait assessment however likely HHPT pending progress)    See flowsheet documentation for full assessment.

## 2025-03-17 NOTE — OP NOTE
OPERATIVE REPORT  PATIENT NAME: Lori Damon  : 1960  MRN: [id number]  Pt Location:  [facility] OR ROOM 02    Surgery Date: 3/17/2025    Surgeons and Role:     * Malcolm Khan,  - Primary     * Chica Dennis PA-C - Assisting     * Cory Weston MD - Assisting     Preop Diagnosis:  Primary osteoarthritis of left knee [M17.12]  Chronic pain of left knee [M25.562, G89.29]    Post-Op Diagnosis Codes:     * Primary osteoarthritis of left knee [M17.12]     * Chronic pain of left knee [M25.562, G89.29]    Procedure(s):  Left - ARTHROPLASTY KNEE TOTAL    Specimens:  * No specimens in log *    Estimated Blood Loss:   25 mL    Drains:  Ureteral Drain/Stent Right ureter 6 Fr. (Active)   Number of days: 1532       Anesthesia Type:   Spinal      Operative Indications:  Primary osteoarthritis of left knee [M17.12]  Chronic pain of left knee [M25.562, G89.29]    63 y/o female with severe OA of her left knee which she has been treating for conservatively.  She has tried and failed cortisone injections and visco injections.  She is struggling with her ADLs and she has been unable to go to work or do most of the things she likes to do.  Due to her history of recent DVT, she obtained preoperative clearance from her hematologist.  She discontinued her Eliquis 3 days prior to surgery and she does not require a rohan-operative bridge.  Due to pain control with aspirin, she will continue with her asa through surgery. The patient has elected to proceed with Left TKA. Risks and benefits of surgery to include but not limited to bleeding, infection, damage to surrounding structures, hardware failure, instability, fracture, dislocation, need for further surgery, continued pain, stiffness, blood clots, stroke, and heart attack was discussed with the patient.     Operative Findings:  Severe tricompartmental osteoarthritis  Pre-op ROM 3-115  Post-op ROM 0-130+ calf to thigh gravity-assisted flexion    Implant Name Type Inv.  Item Serial No.  Lot No. LRB No. Used Action   COMPONENT PATELLA 35MM MEDIAL DOME ATTUNE - [id number]  COMPONENT PATELLA 35MM MEDIAL DOME ATTUNE  DEPUY [id number] Left 1 Implanted   INSERT TIB SZ 4 10MM LT FX BRNG MED STAB ATTUNE - [id number]  INSERT TIB SZ 4 10MM LT FX BRNG MED STAB ATTUNE  DEPUY [id number] Left 1 Implanted   COMPONENT FEM SZ 4 LT  CR ATTUNE - [id number]  COMPONENT FEM SZ 4 LT  CR ATTUNE  DEPUY [id number] Left 1 Implanted   BASEPLATE TIBIAL SZ 4 FX BRNG  ATTUNE - [id number]  BASEPLATE TIBIAL SZ 4 FX BRNG  ATTUNE  DEPUY [id number] Left 1 Implanted   DEPUY BONE CEMENT CMW2 - [id number]  DEPUY BONE CEMENT CMW2  DEPUY [id number] Left 1 Implanted       Complications:   None    Knee Technique: Suture (direct) Repair  Knee Approach: Medial Parapatellar    Chronic Narcotic Use:  No      Procedure and Technique:  Patient was seen in the preoperative holding area.  Informed consent was confirmed and all questions were answered. Operative site was confirmed and marked. Patient was taken to the operating room and transferred to the operating room table. Anesthesia was performed. The patient was then placed supine and all bony prominences were well-padded. Left lower extremity was prepped and draped in usual sterile fashion with chlorhexidine scrub.  Patient was given perioperative antibiotics prior to incision and SCDs were placed on the non-operative leg.  A formal time-out was performed identifying the patient and confirmed operative site.  The knee was exsanguinated and a pneumatic tourniquet was inflated at 250 mmHg.  A slightly medial midline incision was performed from 3 fingerbreadths above the patella to the tibial tubercle.  This incision was carried down through skin and subcutaneous tissues to the level of the extensor mechanism.  A small medial skin flap was created.  A medial parapatellar approach was performed into the knee being careful to avoid the patellar tendon.  The anterior horn of the  medial and lateral meniscus was released.  The medial peel was performed to the mid coronal line.  Lateral patellofemoral plica was excised and the patella was everted.  The fat pad was removed being careful to avoid the patellar tendon.  Peripheral osteophytes removed at this time as was the anterior cruciate ligament.  The intramedullary femoral drill was now used just medial and anterior to the PCL insertion at the sulcus terminalis.  A drop chano was used to confirm intramedullary placement of the drill.  The distal femoral cutting jig was now inserted into the intramedullary canal set to 5° of valgus per pre-op template and 9 mm distal resection.  Distal resection was checked with an Corey wing and deemed appropriate.  The distal femur was cut.  At this time the distal femoral cutting guide was removed and the sizing guide was placed on the distal femur.  A posterior referencing system was used and pins placed with 3° of external rotation.  The femur was sized to the above size.  The appropriate cutting block was then placed over the pins and rotation was confirmed being parallel to the epicondylar access and perpendicular to Whites[place] line.  Retractors were placed to protect the collateral ligaments and the anterior, posterior, posterior chamfer, anterior chamfer was cut. The distal 5th cut was also performed.  Bone fragments were removed with curved osteotome and then posterior Hohmann was placed to sublux the tibia forward.  An extramedullary tibial guide was used veing cognizant of varus valgus alignment, slope and depth of resection.  The guide was pinned in place and then a drop chano was used to confirm appropriate alignment.  The tibia was cut and removed.  At this time the bilateral menisci and posterior osteophytes of the femur were resected with the use of a laminar .  Once adequate osteophytes were removed the knee was trialed with the above implants.  The knee was found to have excellent  stability with a 10 mm MS insert.  At this time the patella was measured and resected to appropriate depth.  The patella sized to the above size and 3 drill holes were performed.  At this time the knee was again taken through range of motion and found to have excellent stability and excellent patellar tracking.  The trials were floated and rotation of tibia marked.  The femoral lug drills were drilled.  The femur and trial poly were removed and posterior Hohmann placed to sublux the tibia forward.  The Press-Fit tibial base plate was then aligned on the cut surface of the tibia matching where the trial was floated at approximately the medial 1/3 the tibial tubercle.  The base plate was pinned in place and prep tower impacted.  The Boss Reamer for the Press-Fit tibia was used 1st followed by keel punch.  The peripheral holes were then drilled as well.  At this time all trials were removed and the knee was copiously irrigated with normal saline solution.  The Press-Fit base plate was impacted into place and assured to be flush in all corners.  The MS polyethylene was impacted into the clean tray. The posterior Hohmann was then removed.  The Press-Fit femoral component was impacted in place and assured to be flush on all bony surfaces.  The patella cut surface was dried and the domed patella was cemented into place and held with a clamp.  Peripheral cement was cleared and the clamp was held until cement cured.  The tourniquet was released at 26 minutes and all bleeders were coagulated.  The knee was irrigated with Irrisept solution and then the remainder of the 3 L of normal saline solution.  The joint local was injected in the posterior capsule and around the tissues of the knee.  The knee was taken through a final range of motion and found to have excellent stability and patellar tracking.  All instrument and sponge counts were correct x2.  The arthrotomy was closed with #1 Stratafix suture.  Subcutaneous tissues  were closed with 2-0 Vicryl.  The skin was closed with 3-0 Stratafix followed by Dermabond.  Due to adhesive allergy we did not place a meplix. 4x4s, abd and thigh foot Ace wrap.  Patient was awoken from anesthesia and taken to recovery room in stable condition.    64F s/p L TKA 3/17  - multi-modal pain control  - ancef x 24 hrs  - DVT ppx: resume Eliquis POD 1  - PT/OT  - WBAT  - ROM as tolerated, pillow/blankets under achilles not behind knee while in bed  - f/u 10-14 days      I was present for the entire procedure. and A physician assistant was required during the procedure for retraction, tissue handling, dissection and suturing.    Patient Disposition:  PACU       SIGNATURE: Malcolm Khan DO  DATE: March 17, 2025  TIME: 11:25 AM

## 2025-03-17 NOTE — ANESTHESIA PREPROCEDURE EVALUATION
Procedure:  ARTHROPLASTY KNEE TOTAL (Left: Knee)    Relevant Problems   CARDIO   (+) Chronic migraine without aura without status migrainosus, not intractable   (+) Deep vein thrombosis (DVT) of other vein of right lower extremity, unspecified chronicity (HCC)   (+) Hypercholesterolemia   (+) Migraine without aura and without status migrainosus, not intractable   (+) Pure hypercholesterolemia      /RENAL   (+) Stage 3a chronic kidney disease (HCC)      HEMATOLOGY   (+) Anemia      MUSCULOSKELETAL   (+) Fibromyalgia   (+) Myofascial pain syndrome, cervical   (+) Primary osteoarthritis of left knee   (+) Spondylosis of cervical region without myelopathy or radiculopathy   (+) Spondylosis of lumbar spine      NEURO/PSYCH   (+) Chronic migraine without aura without status migrainosus, not intractable   (+) Fibromyalgia   (+) Migraine without aura and without status migrainosus, not intractable   (+) Myofascial pain syndrome, cervical      PULMONARY   (+) Smoking        Physical Exam    Airway    Mallampati score: II  TM Distance: >3 FB  Neck ROM: full     Dental    upper dentures    Cardiovascular  Rhythm: regular, Rate: normal, Cardiovascular exam normal    Pulmonary  Pulmonary exam normal Breath sounds clear to auscultation    Other Findings  post-pubertal.      Anesthesia Plan  ASA Score- 2     Anesthesia Type- spinal with ASA Monitors.         Additional Monitors:     Airway Plan:     Comment: Adductor block  Pt off Eliquis > 3 days..       Plan Factors-    Chart reviewed. EKG reviewed. Imaging results reviewed. Existing labs reviewed. Patient summary reviewed.                  Induction-     Postoperative Plan- Plan for postoperative opioid use.         Informed Consent- Anesthetic plan and risks discussed with patient.  I personally reviewed this patient with the CRNA. Discussed and agreed on the Anesthesia Plan with the CRNA..      NPO Status:  Vitals Value Taken Time   Date of last liquid 03/17/25 03/17/25  0744   Time of last liquid 0600 03/17/25 0744   Date of last solid 03/16/25 03/17/25 0744   Time of last solid 1500 03/17/25 0744

## 2025-03-17 NOTE — PLAN OF CARE
Problem: PAIN - ADULT  Goal: Verbalizes/displays adequate comfort level or baseline comfort level  Description: Interventions:  - Encourage patient to monitor pain and request assistance  - Assess pain using appropriate pain scale  - Administer analgesics based on type and severity of pain and evaluate response  - Implement non-pharmacological measures as appropriate and evaluate response  - Consider cultural and social influences on pain and pain management  - Notify physician/advanced practitioner if interventions unsuccessful or patient reports new pain  Outcome: Progressing     Problem: INFECTION - ADULT  Goal: Absence or prevention of progression during hospitalization  Description: INTERVENTIONS:  - Assess and monitor for signs and symptoms of infection  - Monitor lab/diagnostic results  - Monitor all insertion sites, i.e. indwelling lines, tubes, and drains  - Monitor endotracheal if appropriate and nasal secretions for changes in amount and color  - [place] appropriate cooling/warming therapies per order  - Administer medications as ordered  - Instruct and encourage patient and family to use good hand hygiene technique  - Identify and instruct in appropriate isolation precautions for identified infection/condition  Outcome: Progressing  Goal: Absence of fever/infection during neutropenic period  Description: INTERVENTIONS:  - Monitor WBC    Outcome: Progressing     Problem: SAFETY ADULT  Goal: Patient will remain free of falls  Description: INTERVENTIONS:  - Educate patient/family on patient safety including physical limitations  - Instruct patient to call for assistance with activity   - Consult OT/PT to assist with strengthening/mobility   - Keep Call bell within reach  - Keep bed low and locked with side rails adjusted as appropriate  - Keep care items and personal belongings within reach  - Initiate and maintain comfort rounds  - Make Fall Risk Sign visible to staff  - Apply yellow socks and bracelet  for high fall risk patients  - Consider moving patient to room near nurses station  Outcome: Progressing  Goal: Maintain or return to baseline ADL function  Description: INTERVENTIONS:  -  Assess patient's ability to carry out ADLs; assess patient's baseline for ADL function and identify physical deficits which impact ability to perform ADLs (bathing, care of mouth/teeth, toileting, grooming, dressing, etc.)  - Assess/evaluate cause of self-care deficits   - Assess range of motion  - Assess patient's mobility; develop plan if impaired  - Assess patient's need for assistive devices and provide as appropriate  - Encourage maximum independence but intervene and supervise when necessary  - Involve family in performance of ADLs  - Assess for home care needs following discharge   - Consider OT consult to assist with ADL evaluation and planning for discharge  - Provide patient education as appropriate  Outcome: Progressing  Goal: Maintains/Returns to pre admission functional level  Description: INTERVENTIONS:  - Perform AM-PAC 6 Click Basic Mobility/ Daily Activity assessment daily.  - Set and communicate daily mobility goal to care team and patient/family/caregiver.   - Collaborate with rehabilitation services on mobility goals if consulted  - Out of bed for toileting  - Record patient progress and toleration of activity level   Outcome: Progressing     Problem: Knowledge Deficit  Goal: Patient/family/caregiver demonstrates understanding of disease process, treatment plan, medications, and discharge instructions  Description: Complete learning assessment and assess knowledge base.  Interventions:  - Provide teaching at level of understanding  - Provide teaching via preferred learning methods  Outcome: Progressing     Problem: METABOLIC, FLUID AND ELECTROLYTES - ADULT  Goal: Electrolytes maintained within normal limits  Description: INTERVENTIONS:  - Monitor labs and assess patient for signs and symptoms of electrolyte  imbalances  - Administer electrolyte replacement as ordered  - Monitor response to electrolyte replacements, including repeat lab results as appropriate  - Instruct patient on fluid and nutrition as appropriate  Outcome: Progressing  Goal: Fluid balance maintained  Description: INTERVENTIONS:  - Monitor labs   - Monitor I/O and WT  - Instruct patient on fluid and nutrition as appropriate  - Assess for signs & symptoms of volume excess or deficit  Outcome: Progressing  Goal: Glucose maintained within target range  Description: INTERVENTIONS:  - Monitor Blood Glucose as ordered  - Assess for signs and symptoms of hyperglycemia and hypoglycemia  - Administer ordered medications to maintain glucose within target range  - Assess nutritional intake and initiate nutrition service referral as needed  Outcome: Progressing     Problem: MUSCULOSKELETAL - ADULT  Goal: Maintain or return mobility to safest level of function  Description: INTERVENTIONS:  - Assess patient's ability to carry out ADLs; assess patient's baseline for ADL function and identify physical deficits which impact ability to perform ADLs (bathing, care of mouth/teeth, toileting, grooming, dressing, etc.)  - Assess/evaluate cause of self-care deficits   - Assess range of motion  - Assess patient's mobility  - Assess patient's need for assistive devices and provide as appropriate  - Encourage maximum independence but intervene and supervise when necessary  - Involve family in performance of ADLs  - Assess for home care needs following discharge   - Consider OT consult to assist with ADL evaluation and planning for discharge  - Provide patient education as appropriate  Outcome: Progressing  Goal: Maintain proper alignment of affected body part  Description: INTERVENTIONS:  - Support, maintain and protect limb and body alignment  - Provide patient/ family with appropriate education  Outcome: Progressing

## 2025-03-17 NOTE — ANESTHESIA PROCEDURE NOTES
Spinal Block    Patient location during procedure: OR  Start time: 3/17/2025 10:17 AM  Reason for block: procedure for pain and at surgeon's request  Staffing  Performed by: Too Wei CRNA  Authorized by: Miguelangel Gilmore DO    Preanesthetic Checklist  Completed: patient identified, IV checked, site marked, risks and benefits discussed, surgical consent, monitors and equipment checked, pre-op evaluation and timeout performed  Spinal Block  Patient position: sitting  Prep: ChloraPrep and site prepped and draped  Patient monitoring: frequent blood pressure checks, continuous pulse ox and heart rate  Approach: midline  Location: L3-4  Needle  Needle type: Pencan   Needle gauge: 24 G  Needle length: 4 in  Assessment  Sensory level: T4  Injection Assessment:  negative aspiration for heme, no paresthesia on injection and positive aspiration for clear CSF.  Post-procedure:  site cleaned  Additional Notes  On e attempt unsuccussful attempt CRNA one successful attempt  1.5 ml .75 percent bupivicaine sab

## 2025-03-17 NOTE — ANESTHESIA PROCEDURE NOTES
Peripheral Block    Patient location during procedure: holding area  Start time: 3/17/2025 9:06 AM  Reason for block: at surgeon's request and post-op pain management  Staffing  Performed by: Miguelangel Gilmore DO  Authorized by: Miguelangel Gilmore DO    Preanesthetic Checklist  Completed: patient identified, IV checked, site marked, risks and benefits discussed, surgical consent, monitors and equipment checked, pre-op evaluation and timeout performed  Peripheral Block  Patient position: supine  Prep: ChloraPrep  Patient monitoring: frequent blood pressure checks, continuous pulse oximetry and heart rate  Block type: Adductor Canal  Laterality: left  Injection technique: single-shot  Procedures: ultrasound guided, Ultrasound guidance required for the procedure to increase accuracy and safety of medication placement and decrease risk of complications.  Ultrasound permanent image saved  bupivacaine (PF) (MARCAINE) 0.5 % injection 20 mL - Perineural   10 mL - 3/17/2025 9:08:00 AM  bupivacaine liposomal (EXPAREL) 1.3 % injection 20 mL - Perineural   10 mL - 3/17/2025 9:08:00 AM  fentanyl citrate (PF) 100 MCG/2ML 50 mcg - Intravenous   100 mcg - 3/17/2025 9:06:00 AM  midazolam (VERSED) injection 0.5 mg - Intravenous   2 mg - 3/17/2025 9:06:00 AM  Needle  Needle type: Stimuplex   Needle gauge: 20 G  Needle length: 4 in  Needle localization: anatomical landmarks and ultrasound guidance  Assessment  Injection assessment: incremental injection, frequent aspiration, injected with ease, negative aspiration, negative for heart rate change, no paresthesia on injection, no symptoms of intraneural/intravenous injection and needle tip visualized at all times  Paresthesia pain: none  Post-procedure:  site cleaned  patient tolerated the procedure well with no immediate complications

## 2025-03-17 NOTE — PHYSICAL THERAPY NOTE
PHYSICAL THERAPY EVALUATION          Patient Name: Lori Damon  Today's Date: 3/17/2025       03/17/25 1420   PT Last Visit   PT Visit Date 03/17/25   Note Type   Note type Evaluation   Pain Assessment   Pain Assessment Tool 0-10   Pain Score No Pain   Restrictions/Precautions   Weight Bearing Precautions Per Order Yes   LLE Weight Bearing Per Order WBAT   Other Precautions Multiple lines;Fall Risk   Home Living   Type of Home House   Home Layout One level;Stairs to enter without rails   Bathroom Shower/Tub Tub/shower unit   Bathroom Toilet Standard   Bathroom Equipment Grab bars in shower   Home Equipment Walker;Cane;Crutches   Additional Comments 2 FÉLIX. one interior step   Prior Function   Level of [place] Independent with ADLs;Independent with functional mobility;Independent with IADLS   Lives With Daughter   Falls in the last 6 months 0   Comments indep w use of cane. dtr works and per patient is unreliable support for dc   General   Additional Pertinent History pt admitted 3/17/25 for OA of L knee now POD#0 L TKR. PMHx significant for OA, FM, incontinence, mgiraines, osteoporosis, tremor, tardive dyskinesia   Cognition   Overall Cognitive Status WFL   Arousal/Participation Cooperative   Memory Within functional limits   Following Commands Follows all commands and directions without difficulty   RLE Assessment   RLE Assessment WFL  (4 knee ext)   LLE Assessment   LLE Assessment WFL  (4- knee ext)   Coordination   Sensation X  (reports nubmness ble L>R)   Light Touch   RLE Light Touch Grossly intact   LLE Light Touch Grossly intact   Bed Mobility   Supine to Sit 6  Modified independent   Additional items HOB elevated;Bedrails   Sit to Supine 4  Minimal assistance   Additional items Assist x 1;LE management  (assist for LLE <25%)   Transfers   Sit to Stand 5  Supervision   Additional items Increased time required;Other;Verbal cues  (RW)   Stand to Sit 5  Supervision   Additional  items Increased time required;Other  (RW)   Additional Comments cues for safe technique   Ambulation/Elevation   Gait pattern Not tested   Balance   Static Standing Fair -   Endurance Deficit   Endurance Deficit No   Activity Tolerance   Activity Tolerance Treatment limited secondary to medical complications (Comment)   Assessment   Prognosis Good   Problem List Impaired sensation;Decreased mobility   Assessment Lori Damon is a 64 y.o. female admitted to [facility] on 3/17/2025 for Primary osteoarthritis of left knee. POD#0 L TKR. PT was consulted and pt was seen on 3/17/2025 for mobility assessment and d/c planning. Pt presents w low fall risk, multiple lines, wbat LLE per ortho. At baseline is indep for ADLs and ambulation w SPC. Pt is currently functioning at a mod I to get OOB, S to transfers, min A to get back into bed to support LLE. Upon standing pt reporting numbness in BLE that is impacting her ability to try to take steps. Did demonstrate intact sensation and fair strength; pt seems more apprehensive about sx and this limited her willingness to take steps. Defer trial of gait at this time dt sx and pt comfort. Pt will benefit from continued skilled IP PT to address the above mentioned impairments  in order to maximize recovery and increase functional independence when completing mobility and ADLs.   Barriers to Discharge None   Goals   Patient Goals go to rehab   STG Expiration Date 03/27/25   Short Term Goal #1 1) Pt will perform bed mobility mod I demonstrating appropriate technique 100% of the time in order to improve function. 2) Pt will perform all transfers mod I demonstrating safe technique 100% of the time in order to improve ability to negotiate safely in home environment. 3) Amb with least restrictive AD > 100'x1 with mod I in order to demonstrate ability to negotiate in home environment. 4) Improve overall strength and balance 1/2 grade in order to optimize ability to perform functional tasks and  reduce fall risk. 5) Improve am-pac by 2 to demonstrate functional progress and return to baseline function/reduced caregiver burden. 6) Negotiate stairs using most appropriate technique and S in order to be able to negotiate safely in home environment.   Plan   Treatment/Interventions Functional transfer training;LE strengthening/ROM;Elevations;Therapeutic exercise;Patient/family training;Bed mobility;Equipment eval/education;Gait training;Compensatory technique education;Continued evaluation   PT Frequency 5-7x/wk;Twice a day   Discharge Recommendation   Rehab Resource Intensity Level, PT (S)    (tbd pending gait assessment however likely HHPT pending progress)   AM-PAC Basic Mobility Inpatient   Turning in Flat Bed Without Bedrails 3   Lying on Back to Sitting on Edge of Flat Bed Without Bedrails 3   Moving Bed to Chair 3   Standing Up From Chair Using Arms 3   Walk in Room 3   Climb 3-5 Stairs With Railing 3   Basic Mobility Inpatient Raw Score 18   Basic Mobility Standardized Score 41.05   [facility] Highest Level Of Mobility   -HLM Goal 6: Walk 10 steps or more   -HLM Achieved 5: Stand (1 or more minutes)   End of Consult   Patient Position at End of Consult Supine;All needs within reach   History: co - morbidities including age, use of assistive device, current experience including fall risk, multiple lines  Exam: impairments in systems including multiple body structures involved; musculoskeletal (strength), neuromuscular (balance, transfers, sensation),  cognition; activity limitations  (difficulties executing an action); participation restrictions (problems associated w involvement in life situations), AM-PAC  Clinical: unstable/unpredictable  Complexity: moderate    Gillian Ramirez, PT

## 2025-03-17 NOTE — ANESTHESIA POSTPROCEDURE EVALUATION
Post-Op Assessment Note    CV Status:  Stable    Pain management: adequate       Mental Status:  Alert and awake   Hydration Status:  Euvolemic   PONV Controlled:  Controlled   Airway Patency:  Patent     Post Op Vitals Reviewed: Yes    No anethesia notable event occurred.    Staff: Anesthesiologist           Last Filed PACU Vitals:  Vitals Value Taken Time   Temp 97.6 °F (36.4 °C) 03/17/25 1220   Pulse 56 03/17/25 1251   /74 03/17/25 1250   Resp 16 03/17/25 1220   SpO2 98 % 03/17/25 1251   Vitals shown include unfiled device data.    Modified Brock:     Vitals Value Taken Time   Activity 2 03/17/25 1220   Respiration 2 03/17/25 1220   Circulation 2 03/17/25 1220   Consciousness 2 03/17/25 1220   Oxygen Saturation 2 03/17/25 1220     Modified Brock Score: 10

## 2025-03-17 NOTE — ANESTHESIA POSTPROCEDURE EVALUATION
Post-Op Assessment Note    CV Status:  Stable  Pain Score: 0    Pain management: adequate       Mental Status:  Alert and awake   Hydration Status:  Euvolemic   PONV Controlled:  Controlled   Airway Patency:  Patent     Post Op Vitals Reviewed: Yes    No anethesia notable event occurred.    Staff: CRNA           Last Filed PACU Vitals:  Vitals Value Taken Time   Temp     Pulse 58 03/17/25 1137   /65 03/17/25 1136   Resp     SpO2     Vitals shown include unfiled device data.

## 2025-03-18 LAB
ANION GAP SERPL CALCULATED.3IONS-SCNC: 6 MMOL/L (ref 4–13)
BUN SERPL-MCNC: 17 MG/DL (ref 5–25)
CALCIUM SERPL-MCNC: 8.2 MG/DL (ref 8.4–10.2)
CHLORIDE SERPL-SCNC: 104 MMOL/L (ref 96–108)
CO2 SERPL-SCNC: 26 MMOL/L (ref 21–32)
CREAT SERPL-MCNC: 1.01 MG/DL (ref 0.6–1.3)
ERYTHROCYTE [DISTWIDTH] IN BLOOD BY AUTOMATED COUNT: 13.1 % (ref 11.6–15.1)
GFR SERPL CREATININE-BSD FRML MDRD: 58 ML/MIN/1.73SQ M
GLUCOSE P FAST SERPL-MCNC: 105 MG/DL (ref 65–99)
GLUCOSE SERPL-MCNC: 105 MG/DL (ref 65–140)
HCT VFR BLD AUTO: 35.1 % (ref 34.8–46.1)
HGB BLD-MCNC: 11.4 G/DL (ref 11.5–15.4)
MCH RBC QN AUTO: 30.8 PG (ref 26.8–34.3)
MCHC RBC AUTO-ENTMCNC: 32.5 G/DL (ref 31.4–37.4)
MCV RBC AUTO: 95 FL (ref 82–98)
PLATELET # BLD AUTO: 223 THOUSANDS/UL (ref 149–390)
PMV BLD AUTO: 10.3 FL (ref 8.9–12.7)
POTASSIUM SERPL-SCNC: 4.3 MMOL/L (ref 3.5–5.3)
RBC # BLD AUTO: 3.7 MILLION/UL (ref 3.81–5.12)
SODIUM SERPL-SCNC: 136 MMOL/L (ref 135–147)
WBC # BLD AUTO: 6.33 THOUSAND/UL (ref 4.31–10.16)

## 2025-03-18 PROCEDURE — 97116 GAIT TRAINING THERAPY: CPT

## 2025-03-18 PROCEDURE — 99024 POSTOP FOLLOW-UP VISIT: CPT | Performed by: STUDENT IN AN ORGANIZED HEALTH CARE EDUCATION/TRAINING PROGRAM

## 2025-03-18 PROCEDURE — 99222 1ST HOSP IP/OBS MODERATE 55: CPT | Performed by: STUDENT IN AN ORGANIZED HEALTH CARE EDUCATION/TRAINING PROGRAM

## 2025-03-18 PROCEDURE — 80048 BASIC METABOLIC PNL TOTAL CA: CPT | Performed by: PHYSICIAN ASSISTANT

## 2025-03-18 PROCEDURE — 85027 COMPLETE CBC AUTOMATED: CPT | Performed by: PHYSICIAN ASSISTANT

## 2025-03-18 PROCEDURE — 97110 THERAPEUTIC EXERCISES: CPT

## 2025-03-18 PROCEDURE — 97530 THERAPEUTIC ACTIVITIES: CPT

## 2025-03-18 PROCEDURE — 97166 OT EVAL MOD COMPLEX 45 MIN: CPT

## 2025-03-18 RX ADMIN — FOLIC ACID 1 MG: 1 TABLET ORAL at 08:16

## 2025-03-18 RX ADMIN — METAXALONE 400 MG: 800 TABLET ORAL at 20:37

## 2025-03-18 RX ADMIN — PANTOPRAZOLE SODIUM 40 MG: 40 TABLET, DELAYED RELEASE ORAL at 06:30

## 2025-03-18 RX ADMIN — DOCUSATE SODIUM 100 MG: 100 CAPSULE, LIQUID FILLED ORAL at 20:38

## 2025-03-18 RX ADMIN — OXYCODONE HYDROCHLORIDE 10 MG: 10 TABLET ORAL at 00:36

## 2025-03-18 RX ADMIN — CEFAZOLIN SODIUM 2000 MG: 2 SOLUTION INTRAVENOUS at 01:13

## 2025-03-18 RX ADMIN — APIXABAN 5 MG: 5 TABLET, FILM COATED ORAL at 20:36

## 2025-03-18 RX ADMIN — GABAPENTIN 400 MG: 400 CAPSULE ORAL at 16:58

## 2025-03-18 RX ADMIN — OXYBUTYNIN CHLORIDE 5 MG: 5 TABLET ORAL at 20:37

## 2025-03-18 RX ADMIN — OXYCODONE HYDROCHLORIDE AND ACETAMINOPHEN 500 MG: 500 TABLET ORAL at 08:16

## 2025-03-18 RX ADMIN — AMANTADINE HYDROCHLORIDE 100 MG: 100 CAPSULE, LIQUID FILLED ORAL at 10:00

## 2025-03-18 RX ADMIN — SENNOSIDES 8.6 MG: 8.6 TABLET, FILM COATED ORAL at 08:16

## 2025-03-18 RX ADMIN — METAXALONE 400 MG: 800 TABLET ORAL at 16:58

## 2025-03-18 RX ADMIN — OXYBUTYNIN CHLORIDE 5 MG: 5 TABLET ORAL at 08:16

## 2025-03-18 RX ADMIN — OXYCODONE HYDROCHLORIDE 10 MG: 10 TABLET ORAL at 06:30

## 2025-03-18 RX ADMIN — GABAPENTIN 400 MG: 400 CAPSULE ORAL at 20:37

## 2025-03-18 RX ADMIN — OXYCODONE HYDROCHLORIDE 10 MG: 10 TABLET ORAL at 20:38

## 2025-03-18 RX ADMIN — Medication 1 TABLET: at 08:16

## 2025-03-18 RX ADMIN — MORPHINE SULFATE 2 MG: 2 INJECTION, SOLUTION INTRAMUSCULAR; INTRAVENOUS at 02:59

## 2025-03-18 RX ADMIN — METAXALONE 400 MG: 800 TABLET ORAL at 08:16

## 2025-03-18 RX ADMIN — BUPROPION HYDROCHLORIDE 150 MG: 150 TABLET, EXTENDED RELEASE ORAL at 08:16

## 2025-03-18 RX ADMIN — GABAPENTIN 400 MG: 400 CAPSULE ORAL at 08:16

## 2025-03-18 RX ADMIN — APIXABAN 5 MG: 5 TABLET, FILM COATED ORAL at 08:16

## 2025-03-18 RX ADMIN — DOCUSATE SODIUM 100 MG: 100 CAPSULE, LIQUID FILLED ORAL at 08:16

## 2025-03-18 RX ADMIN — OXYCODONE HYDROCHLORIDE AND ACETAMINOPHEN 500 MG: 500 TABLET ORAL at 20:36

## 2025-03-18 NOTE — PROGRESS NOTES
Progress Note - Orthopedics   Name: Lori Damon 64 y.o. female I MRN: [id number]  Unit/Bed#: E2 -01 I Date of Admission: 3/17/2025   Date of Service: 3/18/2025 I Hospital Day: 0     Assessment & Plan  Primary osteoarthritis of left knee  POD 1 s/p L TKA    - Pain Control  - rohan-op ancef x24 hours  - PT/OT  - WBAT LLE  - ROM as tolerated, pillow/blankets under achilles not behind knee while in bed   - DVT prophylaxis, resume Eliquis today.  - Hgb 11.4 this am. Continue to monitor clinically for ABLA  - D/C planning. Possible d/c home with home health services versus placement pending progress with PT.        Subjective   64 y.o.female seen and evaluated this am. She notes significant left knee pain overnight which has since improved. No numbness/tingling. No fever/chills.    Objective :  Temp:  [96.6 °F (35.9 °C)-98.3 °F (36.8 °C)] 98.3 °F (36.8 °C)  HR:  [54-66] 66  BP: ()/(56-87) 94/64  Resp:  [16-20] 20  SpO2:  [95 %-99 %] 96 %  O2 Device: None (Room air)  Nasal Cannula O2 Flow Rate (L/min):  [4 L/min] 4 L/min    Physical Exam  Musculoskeletal: Left Knee:  Dressing C/D/I  Knee ROM intact.  Motor intact to +FHL/EHL, +ankle dorsi/plantar flexion  Sensation intact to saphenous, sural, tibial, superficial peroneal nerve, and deep peroneal  2+ DP pulse      Lab Results: I have reviewed the following results:  Recent Labs     03/18/25  0501   WBC 6.33   HGB 11.4*   HCT 35.1      BUN 17   CREATININE 1.01

## 2025-03-18 NOTE — OCCUPATIONAL THERAPY NOTE
"    Occupational Therapy Evaluation     Patient Name: Lori Damon  Today's Date: 3/18/2025  Problem List  Principal Problem:    Primary osteoarthritis of left knee    Past Medical History  Past Medical History:   Diagnosis Date    Allergic Unknown    Medical-Imatrex, Cymbalta, Ultram,  . Other-dust, mold, gras    Allergy to dust     Anemia     Arthritis unknown    Osteoarthritis, advanced osteoporosis    Calculus of ureter     Chronic kidney disease 11/2018    Hydrophenesis    Chronic pain disorder     neck and back- sees pain management    Cluster headache Teenager    May occur 2-3 times per year. Lasts several days    Colitis     Colon polyp     Cracked skin     on fingers    CTS (carpal tunnel syndrome) 01/01/2014    Diverticulitis of colon 2012    Seen by Dr. Wil[facility]. Inpatient at    Diverticulosis     Dysphagia     Fibromyalgia, primary 04/16/1996    GERD (gastroesophageal reflux disease)     GI (gastrointestinal bleed)     As a result of diverticulitis and microscopic colitis.    H/O: GI bleed     Head injury     Had numerious concussions    Headache(784.0) 1980's    Migraine (family history of condition)    Headache, tension-type     Hiatal hernia     History of DVT in adulthood 2019 2019, first clot post op age 16    HL (hearing loss) unknown    Hydronephrosis     Hyperlipidemia     Inguinal hernia     right side    Irregular heart beat     Pt states with prolonged QT interval - was seen by cardiologist( [facility]) pt told \"to not worry about it\"    Irritable bowel     Kidney stone     Lactose intolerance     Did not tolerate milk after being weaned from breast milk.    Memory loss 2016/2017    Medication/also related to medical conditions    Migraines     Narcolepsy     OAB (overactive bladder)     Osteoarthritis     Osteoporosis     Other chronic pain     degenerative disc disease    PONV (postoperative nausea and vomiting)     migraines    Pyelonephritis 01/13/2021    SBO (small bowel obstruction) " ([facility]) 09/25/2021    Scoliosis Unknown    Scoliosis of lumbar and cervical spine. Full spinal xray for scoliosis done in 2022    Sepsis ([facility]) 01/13/2021    Shingles Unknown    Had mild cases of shingles several times.    Smoking 08/06/2020    Tobacco abuse     Urinary tract infection May 2019    See [facility] records    Vision loss Unknown    Occurs when headaches start behind my left eye.    Visual impairment Unknown    Blurry vision partially caused by medication.    Wears dentures     ill fitted currently    Wears glasses      Past Surgical History  Past Surgical History:   Procedure Laterality Date    ABDOMINAL ADHESION SURGERY      ARM SKIN LESION BIOPSY / EXCISION      lymph node excision    BACK SURGERY  11/21/14    Laminectomy, forminalectomy    BLADDER SURGERY      CHOLECYSTECTOMY      COLON SURGERY  1987    Extensive adhesions in abdomen, adhesions surgically removed    COLONOSCOPY  08/06/2020    CYSTOSCOPY      FL RETROGRADE PYELOGRAM  01/05/2021    FLEXIBLE SIGMOIDOSCOPY      Last done at [facility] in 2018 after being admitted for GI bleed.    HERNIA REPAIR      HYSTERECTOMY  1977    JOINT REPLACEMENT Right     knee    KNEE SURGERY Right     replacement    LAMINECTOMY      L5-6-7    LAPAROSCOPY      LYMPH NODE DISSECTION Left     removed -no cancer-  limb restriction    LYMPHADENECTOMY  1974    UNDER LEFT ARM - DO NOT DRAW BLOOD/GIVE INJECTIONS IN LEFT ARM PER PATIENT     MOLE REMOVAL      NECK SURGERY  4/16/14    Fusion C 5/6 and 6/7    OOPHORECTOMY Bilateral 1977    OTHER SURGICAL HISTORY      enlarged lymph node removed left arm..no cancer    VA CYSTO/URETERO W/LITHOTRIPSY &INDWELL STENT INSRT Right 01/05/2021    Procedure: CYSTO, LASER  URETEROSCOPY, RETRO PYELOGRAM, STENT REPLACMENT;  Surgeon: Adriel Subramanian MD;  Location: [facility];  Service: Urology    VA CYSTOURETHROSCOPY W/URETERAL CATHETERIZATION Right 12/02/2020    Procedure: CYSTOSCOPY AND INSERTION STENT URETERAL;  Surgeon: Rory Fall MD;   Location[facility];  Service: Urology    HI LITHOTRIPSY XTRCORP SHOCK WAVE Right 03/25/2021    Procedure: LITHOTRIPSY EXTRACORPORAL SHOCKWAVE (ESWL);  Surgeon: Darwin Montero MD;  Location:  MAIN OR;  Service: Urology    HI RPR 1ST INGUN HRNA AGE 5 YRS/> REDUCIBLE Right 06/25/2020    Procedure: REPAIR HERNIA INGUINAL  WITH MESH;  Surgeon: Mercedes Justin MD;  Location: [facility];  Service: General    SMALL INTESTINE SURGERY  1987 - current    Removal of extensive adhesions. Caused bowel blockages    SPINAL FUSION      TOTAL KNEE ARTHROPLASTY Left 3/17/2025    Procedure: ARTHROPLASTY KNEE TOTAL;  Surgeon: Malcolm Khan DO;  Location: [facility];  Service: Orthopedics    UPPER GASTROINTESTINAL ENDOSCOPY  08/06/2020    WISDOM TOOTH EXTRACTION           03/18/25 1021   OT Last Visit   OT Visit Date 03/18/25   Note Type   Note type Evaluation   Pain Assessment   Pain Assessment Tool 0-10   Pain Score 4   Pain Location/Orientation Orientation: Left;Location: Knee   Hospital Pain Intervention(s) Repositioned;Ambulation/increased activity;Emotional support   Multiple Pain Sites No   Restrictions/Precautions   Weight Bearing Precautions Per Order Yes   LLE Weight Bearing Per Order WBAT   Other Precautions Fall Risk;Pain;WBS;Limb alert   Home Living   Type of Home House   Home Layout One level;Performs ADLs on one level;Able to live on main level with bedroom/bathroom;Stairs to enter without rails  (2+1 FÉLIX)   Bathroom Shower/Tub Tub/shower unit   Bathroom Toilet Standard   Bathroom Equipment Tub transfer bench;Other (Comment)  (unable to use the tub transfer bench because it is too big per pt)   Home Equipment Walker;Cane;Reacher;Sock aid;Crutches;Other (Comment)  (most equipment was her husbands)   Prior Function   Level of [place] Independent with ADLs;Independent with functional mobility;Independent with IADLS   Lives With Daughter   Receives Help From Other (Comment)  (pt states that her daughter  works 9-5 and will not help her)   IADLs Independent with driving;Independent with meal prep;Independent with medication management   Falls in the last 6 months 0   Comments SPC for AD at baseline   Lifestyle   Autonomy PTA, was (I) with ADLs, was (I) with IADLs, and completed functional mobility/transfers with Murali utilizing SPC .  (+) Patient has had 0 falls in the last 6 months. Patient lives in a 1 story home with 2+1 FÉLIX with 0 HR.  Pt can live on one level, bedroom on 1st floor, bathroom on 1st floor, tub-shower, and standard toilet. Pt home DME includes: Tub Bench, Rolling Walker, Crutches, Reacher, SPC, and sock aid.   Intrinsic Gratification cats   General   Additional Pertinent History Comorbidities affecting pt’s functional performance include a significant PMH of: Past Medical History:CKD, CTS, Dysphagia, Fibromyalgia, GERD Chronic pain disorder, HLM, and dentures.  Patient with active OT orders and activity orders for Activity as tolerated.   Family/Caregiver Present No   Subjective   Subjective Pt reports that she does not want help because when her daughter is home she won't help her so she needs to get used to doing everything on her own.   ADL   Where Assessed Chair   Eating Assistance 6  Modified independent   Grooming Assistance 5  Supervision/Setup   UB Bathing Assistance 5  Supervision/Setup   LB Bathing Assistance 5  Supervision/Setup   UB Dressing Assistance 5  Supervision/Setup   LB Dressing Assistance 4  Minimal Assistance   Toileting Assistance  5  Supervision/Setup   Transfers   Sit to Stand 5  Supervision   Additional items Armrests  (RW)   Stand to Sit 5  Supervision   Additional items Armrests  (RW)   Additional Comments Pt provided with demonstration and education about safe tub transfer as an alternative to sponge bathing. Pt reports that her tub transfer bench does not fit in her tub and she is unable to afford a SC. Pt offered alternative resources for acquiring DME.    Functional Mobility   Functional Mobility 5  Supervision   Additional Comments ~30 ft   Additional items Rolling walker   Balance   Static Sitting Good   Dynamic Sitting Fair +   Static Standing Fair  (RW)   Dynamic Standing Fair -  (RW)   Ambulatory Fair -  (RW)   Activity Tolerance   Activity Tolerance Patient tolerated treatment well;Other (Comment)   Medical Staff Made Aware PT   Nurse Made Aware RN   RUE Assessment   RUE Assessment WFL   LUE Assessment   LUE Assessment WFL   Hand Function   Gross Motor Coordination Functional   Fine Motor Coordination Functional   Sensation   Light Touch No apparent deficits   Proprioception   Proprioception No apparent deficits   Vision-Basic Assessment   Current Vision Wears glasses all the time   Vision - Complex Assessment   Ocular Range of Motion Intact   Additional Comments scans all visual fields   Psychosocial   Psychosocial (WDL) WDL   Perception   Inattention/Neglect Appears intact   Cognition   Overall Cognitive Status WFL   Arousal/Participation Alert;Cooperative   Attention Within functional limits   Orientation Level Oriented X4   Memory Within functional limits   Following Commands Follows all commands and directions without difficulty   Assessment   Limitation Decreased ADL status;Decreased endurance;Decreased self-care trans;Decreased high-level ADLs;Decreased Safe judgement during ADL  (decreased balance, decreased coordination, decreased functional reach.)   Prognosis Good   Assessment Patient is a 64 y.o. year old female seen for OT eval s/p admit to [facility] on 3/17/2025 with Primary osteoarthritis of left knee,Chronic pain of left knee, s/p L TKA 3/17/24  . OT consulted to assess ADLs/IADLs/functional mobility and assist w/ D/C planning.  Pt's CLOF as follows: eating/grooming: Allyn and supervision , UB ADLs: supervision , LB ADLs: Joshua, toileting: supervision , bed mobility: DNT, functional transfers: supervision , functional mobility: supervision ,  sitting/standing tolerance: 3-5 mins. Pt would benefit from continued skilled OT while in acute setting to address deficits as defined above and to maximize (I) w/ ADLs/functional mobility. Occupational performance areas to address include: grooming, bathing/shower, toilet hygiene, dressing, medication management, socialization, health maintenance, functional mobility, community mobility, clothing management, cleaning, meal prep, care of pets, and social participation. Patient demonstrates the following deficits impacting occupational performance: weakness, decreased strength , decreased balance, decreased activity tolerance, limited functional reach, impulsivity, decreased safety awareness, increased pain, increased body habitus , and decreased cardiovascular endurance. These impairments, as well at pt’s FÉLIX home environment, limited home support, difficulty performing ADLs, difficulty performing IADLs, difficulty performing transfers/mobility, fall risk , functional decline , and new use of AD for functional transfers/mobility, limit pt’s ability to safely engage in all baseline areas of occupation. Based on the aforementioned evaluation, functional performance deficits, and assessments, pt has been identified as a moderate complexity evaluation. At this time, recommendation for pt to receive post-acute rehabilitation services at a Level III (minimum resource intensity) due to above deficits and CLOF. OT will continue to follow pt 2-3x/wk to address the goals listed below to  w/in 10-14 days.   Goals   Patient Goals to take care of herself   LTG Time Frame 10-14   Plan   Treatment Interventions ADL retraining;Functional transfer training;Endurance training;Patient/family training;Equipment evaluation/education;Compensatory technique education;Continued evaluation;Energy conservation;Activityengagement   Goal Expiration Date 25   OT Frequency 2-3x/wk   Discharge Recommendation   Rehab Resource Intensity  Level, OT III (Minimum Resource Intensity)   Additional Comments  Pt provided education about using LH AE to increase I with LE dressing. Pt declined and stated she does have LH AE at home, but declined using it during eval. Pt also provided education about safe functional transfer/mobility techniques due to pt concern about returning home to 12 cats.    AM-PAC Daily Activity Inpatient   Lower Body Dressing 3   Bathing 3   Toileting 3   Upper Body Dressing 3   Grooming 3   Eating 4   Daily Activity Raw Score 19   Daily Activity Standardized Score (Calc for Raw Score >=11) 40.22   AM-PAC Applied Cognition Inpatient   Following a Speech/Presentation 4   Understanding Ordinary Conversation 4   Taking Medications 4   Remembering Where Things Are Placed or Put Away 4   Remembering List of 4-5 Errands 4   Taking Care of Complicated Tasks 3   Applied Cognition Raw Score 23   Applied Cognition Standardized Score 53.08   End of Consult   Patient Position at End of Consult Bedside chair;All needs within reach       OT goals to be met in 10-14 days:    Pt will be mod I for UB dressing/bathing to increase independence with ADLs.   Pt will be mod I for LB dressing/bathing using LHAE prn to increase independence with ADLs.    Pt will complete toileting routine w/ mod I using G safety techniques   Pt will improve dynamic standing balance to GOOD to perform grooming tasks standing at sink   Patient will independently implement relaxation exercises, deep breathing, mindfulness, and body mechanics for pain management.   Pt will increase activity tolerance to 10 mins to increase engagement in ADLs/IADLs and leisure tasks  Pt will improve functional transfers to mod I, utilizing DME as appropriate, w/ G safety techniques to increase engagement in ADL/IADL tasks  Pt will be mod I  w/ functional mobility,utilizing DME as appropriate, in a home-like environment to improve participation in ADL/IADLs and leisure activities  Pt will  demonstrate G carryover of pt/caregiver education w/ no cues to increase safety w/ functional tasks.  Pt will attend to ongoing cognitive assessment w/ G participation for safe d/c planning   Pt will identify 3 potential falls risk in a home-like environment and with mod I implement strategies to reduce fall risk  Rupinder Ortiz OTS

## 2025-03-18 NOTE — CONSULTS
Consultation - Hospitalist   Name: Lori Damon 64 y.o. female I MRN: [id number]  Unit/Bed#: E2 -01 I Date of Admission: 3/17/2025   Date of Service: 3/18/2025 I Hospital Day: 0   Inpatient consult to Internal Medicine  Consult performed by: Miguelangel Nick MD  Consult ordered by: Chica Dennis PA-C        Physician Requesting Evaluation: Malcolm Khan DO   Reason for Evaluation / Principal Problem: med management    Assessment & Plan  Primary osteoarthritis of left knee  64-year-old female with a history of migraines, anemia, tardive dyskinesia, CKD, and DVT on Eliquis was admitted under the Ortho service due to her left knee osteoarthritis.  She is status post left total knee arthroplasty.   Pain control, disposition, and rest of management per primary service   Migraine without aura and without status migrainosus, not intractable  Continue propanolol  Anemia  Results from last 7 days   Lab Units 03/18/25  0501   HEMOGLOBIN g/dL 11.4*   MCV fL 95   RDW % 13.1     Continue monitoring, no indication for transfusion at this time.  Tardive dyskinesia  Continue Amantadine  Deep vein thrombosis (DVT) of other vein of right lower extremity, unspecified chronicity (HCC)  Continue Eliquis  Stage 3a chronic kidney disease (HCC)  Lab Results   Component Value Date    EGFR 58 03/18/2025    EGFR 55 02/21/2025    EGFR 59 10/29/2024    CREATININE 1.01 03/18/2025    CREATININE 1.05 02/21/2025    CREATININE 0.99 10/29/2024     Stable, does not meet BRAYDON criteria    VTE Pharmacologic Prophylaxis:   Moderate Risk (Score 3-4) - Pharmacological DVT Prophylaxis Ordered: apixaban (Eliquis).  Code Status: Level 1 - Full Code     Anticipated Length of Stay: Patient will be admitted on an inpatient basis with an anticipated length of stay of greater than 2 midnights secondary to placement.    History of Present Illness   Chief Complaint: knee pain    Patient is a 64-year-old female with a history of DVT on Eliquis, anemia,  "migraine, and left knee osteoarthritis who was admitted under the orthopedic service for her elective intervention.  She is status post left total knee arthroplasty 3/17/2025. She is doing well postop and currently has no any acute complaints, we were consulted for med management of her medical conditions.    Review of Systems   Constitutional:  Negative for chills and fever.   Respiratory:  Negative for cough and shortness of breath.    Gastrointestinal:  Negative for abdominal pain, nausea and vomiting.   Musculoskeletal:  Positive for arthralgias.   Psychiatric/Behavioral:  Negative for agitation.        Historical Information   Past Medical History:   Diagnosis Date    Allergic Unknown    Medical-Imatrex, Cymbalta, Ultram,  . Other-dust, mold, gras    Allergy to dust     Anemia     Arthritis unknown    Osteoarthritis, advanced osteoporosis    Calculus of ureter     Chronic kidney disease 11/2018    Hydrophenesis    Chronic pain disorder     neck and back- sees pain management    Cluster headache Teenager    May occur 2-3 times per year. Lasts several days    Colitis     Colon polyp     Cracked skin     on fingers    CTS (carpal tunnel syndrome) 01/01/2014    Diverticulitis of colon 2012    Seen by Dr. Wilburn at [facility]. Inpatient at    Diverticulosis     Dysphagia     Fibromyalgia, primary 04/16/1996    GERD (gastroesophageal reflux disease)     GI (gastrointestinal bleed)     As a result of diverticulitis and microscopic colitis.    H/O: GI bleed     Head injury     Had numerious concussions    Headache(784.0) 1980's    Migraine (family history of condition)    Headache, tension-type     Hiatal hernia     History of DVT in adulthood 2019 2019, first clot post op age 16    HL (hearing loss) unknown    Hydronephrosis     Hyperlipidemia     Inguinal hernia     right side    Irregular heart beat     Pt states with prolonged QT interval - was seen by cardiologist( [facility]) pt told \"to not worry about it\"    Irritable " bowel     Kidney stone     Lactose intolerance     Did not tolerate milk after being weaned from breast milk.    Memory loss 2016/2017    Medication/also related to medical conditions    Migraines     Narcolepsy     OAB (overactive bladder)     Osteoarthritis     Osteoporosis     Other chronic pain     degenerative disc disease    PONV (postoperative nausea and vomiting)     migraines    Pyelonephritis 01/13/2021    SBO (small bowel obstruction) ([facility]) 09/25/2021    Scoliosis Unknown    Scoliosis of lumbar and cervical spine. Full spinal xray for scoliosis done in 2022    Sepsis ([facility]) 01/13/2021    Shingles Unknown    Had mild cases of shingles several times.    Smoking 08/06/2020    Tobacco abuse     Urinary tract infection May 2019    See [facility] records    Vision loss Unknown    Occurs when headaches start behind my left eye.    Visual impairment Unknown    Blurry vision partially caused by medication.    Wears dentures     ill fitted currently    Wears glasses      Past Surgical History:   Procedure Laterality Date    ABDOMINAL ADHESION SURGERY      ARM SKIN LESION BIOPSY / EXCISION      lymph node excision    BACK SURGERY  11/21/14    Laminectomy, forminalectomy    BLADDER SURGERY      CHOLECYSTECTOMY      COLON SURGERY  1987    Extensive adhesions in abdomen, adhesions surgically removed    COLONOSCOPY  08/06/2020    CYSTOSCOPY      FL RETROGRADE PYELOGRAM  01/05/2021    FLEXIBLE SIGMOIDOSCOPY      Last done at [facility] in 2018 after being admitted for GI bleed.    HERNIA REPAIR      HYSTERECTOMY  1977    JOINT REPLACEMENT Right     knee    KNEE SURGERY Right     replacement    LAMINECTOMY      L5-6-7    LAPAROSCOPY      LYMPH NODE DISSECTION Left     removed -no cancer-  limb restriction    LYMPHADENECTOMY  1974    UNDER LEFT ARM - DO NOT DRAW BLOOD/GIVE INJECTIONS IN LEFT ARM PER PATIENT     MOLE REMOVAL      NECK SURGERY  4/16/14    Fusion C 5/6 and 6/7    OOPHORECTOMY Bilateral 1977    OTHER SURGICAL HISTORY       enlarged lymph node removed left arm..no cancer    MA CYSTO/URETERO W/LITHOTRIPSY &INDWELL STENT INSRT Right 01/05/2021    Procedure: CYSTO, LASER  URETEROSCOPY, RETRO PYELOGRAM, STENT REPLACMENT;  Surgeon: Adriel Subramanian MD;  [facility];  Service: Urology    MA CYSTOURETHROSCOPY W/URETERAL CATHETERIZATION Right 12/02/2020    Procedure: CYSTOSCOPY AND INSERTION STENT URETERAL;  Surgeon: Rory Fall MD;  [facility];  Service: Urology    MA LITHOTRIPSY XTRCORP SHOCK WAVE Right 03/25/2021    Procedure: LITHOTRIPSY EXTRACORPORAL SHOCKWAVE (ESWL);  Surgeon: Darwin Montero MD;  Location:  MAIN OR;  Service: Urology    MA RPR 1ST INGUN HRNA AGE 5 YRS/> REDUCIBLE Right 06/25/2020    Procedure: REPAIR HERNIA INGUINAL  WITH MESH;  Surgeon: Mercedes Justin MD;  [facility];  Service: General    SMALL INTESTINE SURGERY  1987 - current    Removal of extensive adhesions. Caused bowel blockages    SPINAL FUSION      TOTAL KNEE ARTHROPLASTY Left 3/17/2025    Procedure: ARTHROPLASTY KNEE TOTAL;  Surgeon: Malcolm Khan DO;  [facility];  Service: Orthopedics    UPPER GASTROINTESTINAL ENDOSCOPY  08/06/2020    WISDOM TOOTH EXTRACTION       Social History     Tobacco Use    Smoking status: Every Day     Types: Cigarettes     Passive exposure: Current    Smokeless tobacco: Never    Tobacco comments:     Smokes 2 cigarettes daily. Last smoked 3/15/25   Vaping Use    Vaping status: Never Used   Substance and Sexual Activity    Alcohol use: Not Currently     Comment: May have an occasional drink .    Drug use: Not Currently     Types: Marijuana     Comment: quit    Sexual activity: Not Currently     Partners: Male     Birth control/protection: Abstinence     E-Cigarette/Vaping    E-Cigarette Use Never User      E-Cigarette/Vaping Substances    Nicotine No     THC No     CBD No     Flavoring No     Other No     Unknown No      Family History   Problem Relation Age of Onset     Hypertension Mother          from massive heart attack, never took any medication for this condition    Heart attack Mother     Hyperlipidemia Mother         Blood tests done just prior to heart attack.  Results reported after her passing.    Migraines Mother          from massive cardiac arrest. Had abdominal aneurysm.    Arthritis Mother         Unknown type arthritis, joint issues    Hypertension Father         Had high blood pressure, unknown if he ever took medication    Cancer Father 72        Started in bowels, spread to liver, refused treatment    Colon polyps Father     Arthritis Father         Type arthritis?, also Pagent's Diseare    Colon cancer Father     Diabetes Sister         uses insulin    Arthritis Sister         Osteo arthritis    Hypertension Sister         Triple bypass    Diabetes Sister         pre-diabetic, oral medication    Arthritis Sister         Osteo arthritis    Hypertension Sister         Triple bypass    Irritable bowel syndrome Sister         Constipation    No Known Problems Daughter     No Known Problems Maternal Grandmother     No Known Problems Maternal Grandfather     No Known Problems Paternal Grandmother     No Known Problems Paternal Grandfather     Cancer Brother         Soft tissue cancer of abdominal blood vessel. Diagnosed ,      Hypertension Brother         Triple bypass    Migraines Brother         Severe. Standard meds do not help. Goes to [facility] for I    Learning disabilities Brother         Mental retardation    Mental retardation Brother     No Known Problems Maternal Aunt     No Known Problems Maternal Aunt     No Known Problems Maternal Aunt     Migraines Maternal Aunt         Had 2.  from cerebral aneurysm    Migraines Maternal Aunt          from cerebral aneurysm    Migraines Maternal Aunt          from cerebral aneurysm    Migraines Maternal Uncle          from cerebral aneurysm    No Known Problems Paternal Aunt      No Known Problems Paternal Aunt     No Known Problems Paternal Aunt     Cancer Cousin         Pancreatic cancer    Cancer Cousin         Breast cancer, total mastectomy    Breast cancer Other 47     Social History:  Marital Status:      Meds/Allergies   I have reviewed home medications with patient personally.  Prior to Admission medications    Medication Sig Start Date End Date Taking? Authorizing Provider   acetaminophen (TYLENOL) 500 mg tablet Take 2 tablets (1,000 mg total) by mouth every 8 (eight) hours 3/17/25  Yes Chica Dennis PA-C   amantadine (SYMMETREL) 100 mg capsule Take 1 capsule (100 mg total) by mouth in the morning 1/23/25  Yes Juana Alvarez,    ascorbic acid (VITAMIN C) 500 MG tablet Take 1 tablet (500 mg total) by mouth 2 (two) times a day 1/13/25  Yes Chica Dennis PA-C   aspirin 500 MG buffered tablet Take 1,000 mg by mouth every 6 (six) hours as needed for mild pain 4,000 mg daily for Pain   Yes Historical Provider, MD   buPROPion (WELLBUTRIN XL) 150 mg 24 hr tablet Take 1 tablet (150 mg total) by mouth every morning 1/16/25  Yes Naty Kenney MD   calcium carbonate 1250 MG capsule Take 1,250 mg by mouth daily   Yes Historical Provider, MD   cholecalciferol (VITAMIN D3) 25 mcg (1,000 units) tablet Take 2 tablets (2,000 Units total) by mouth daily 1/13/25  Yes Chica Dennis PA-C   folic acid (FOLVITE) 1 mg tablet Take 1 tablet (1 mg total) by mouth daily 1/13/25  Yes Chica Dennis PA-C   gabapentin (NEURONTIN) 400 mg capsule Take 1 capsule (400 mg total) by mouth 3 (three) times a day 3/4/25 6/2/25 Yes Naty Kenney MD   Ginger, Zingiber officinalis, (GINGER ROOT PO) Take 700 mg by mouth 2 (two) times a day    Yes Historical Provider, MD   metaxalone (SKELAXIN) 800 mg tablet Take 0.5 tablets (400 mg total) by mouth 3 (three) times a day 3/4/25  Yes Naty Kenney MD   Multiple Vitamins-Minerals (multivitamin with minerals) tablet Take 1 tablet by mouth daily  1/13/25  Yes Chica Dennis PA-C   mupirocin (BACTROBAN) 2 % ointment Apply topically to the inside of the left and right nostrils twice daily for 5 days before surgery, including the morning of surgery. 1/13/25  Yes Chica Dennis PA-C   ondansetron (ZOFRAN-ODT) 4 mg disintegrating tablet Take 1 tablet (4 mg total) by mouth every 6 (six) hours as needed for nausea or vomiting 3/17/25  Yes Chica Dennis PA-C   oxyCODONE (Roxicodone) 5 immediate release tablet Take 1 tablet (5 mg total) by mouth every 4 (four) hours as needed for moderate pain or severe pain for up to 10 days Max Daily Amount: 30 mg 3/17/25 3/27/25 Yes Chica Dnenis PA-C   propranolol (INDERAL LA) 60 mg 24 hr capsule Take 1 capsule (60 mg total) by mouth daily 3/3/25  Yes Juana Alvarez DO   senna-docusate sodium (SENOKOT S) 8.6-50 mg per tablet Take 1 tablet by mouth daily 3/17/25  Yes Chica Dennis PA-C   trospium chloride (SANCTURA) 20 mg tablet Take 1 tablet (20 mg total) by mouth 2 (two) times a day 2/28/25  Yes Bridgette Negron PA-C   Turmeric (QC TUMERIC COMPLEX PO) Take 1,000 mg by mouth 2 (two) times a day   Yes Vincent Ramos MD   apixaban (ELIQUIS) 5 mg Take 1 tablet (5 mg total) by mouth 2 (two) times a day 3/14/25   Naty Kenney MD   ondansetron (ZOFRAN) 4 mg tablet Take 1 tablet (4 mg total) by mouth every 8 (eight) hours as needed for nausea or vomiting 11/8/24   Juana Alvarez DO   predniSONE 5 mg tablet 1 tab in the AM prn migraine (if zomig fails) 5/18/23   Lori Regalado PA-C   ZOLMitriptan (ZOMIG) 2.5 MG tablet TAKE 1 TABLET BY MOUTH AT ONSET OF MIGRAINE, MAY REPEAT IN 2 HOURS IF NEEDED. LIMIT 2/24 HOURS, 4/WEEK, 8/MONTH 10/16/24   Juana Alvarez, DO     Allergies   Allergen Reactions    Armodafinil Other (See Comments)     Tardive dyskinesia - mouth movements    Celecoxib Other (See Comments)     Increased Heart Rate, Palpitations    Sumatriptan Anaphylaxis     Other reaction(s):  SUMATRIPTAN (IMITREX) (Throat closure). Pt analilia zolmitriptan.    Tramadol Other (See Comments)     GI Upset- severe vomiting and systemic body aches    Wound Dressing Adhesive Blisters     Denies Latex allergy    Latex Other (See Comments)     Added based on information entered during case entry, please review and add reactions, type, and severity as needed  PATIENT DENIES LATEX ALLERGY- allergic to adhesives long term use    Medical Tape Dermatitis, Rash and Blisters     Adhesive from medication patches and bandaides- ok with paper tape       Objective :  Temp:  [97.6 °F (36.4 °C)-98.7 °F (37.1 °C)] 98.7 °F (37.1 °C)  HR:  [61-69] 69  BP: ()/(54-87) 127/72  Resp:  [16-20] 18  SpO2:  [94 %-99 %] 94 %  O2 Device: None (Room air)    Physical Exam  Vitals reviewed.   Constitutional:       General: She is not in acute distress.  HENT:      Head: Normocephalic.      Nose: Nose normal.      Mouth/Throat:      Mouth: Mucous membranes are moist.   Eyes:      General: No scleral icterus.  Cardiovascular:      Rate and Rhythm: Normal rate and regular rhythm.   Pulmonary:      Effort: Pulmonary effort is normal. No respiratory distress.      Breath sounds: No wheezing.   Abdominal:      General: There is no distension.      Palpations: Abdomen is soft.      Tenderness: There is no abdominal tenderness.   Musculoskeletal:      Comments: Left knee dressing intact   Skin:     General: Skin is warm.   Neurological:      Mental Status: She is alert.   Psychiatric:         Mood and Affect: Mood normal.         Behavior: Behavior normal.       Lines/Drains:            Lab Results: I have reviewed the following results:  Results from last 7 days   Lab Units 03/18/25  0501   WBC Thousand/uL 6.33   HEMOGLOBIN g/dL 11.4*   HEMATOCRIT % 35.1   PLATELETS Thousands/uL 223     Results from last 7 days   Lab Units 03/18/25  0501   SODIUM mmol/L 136   POTASSIUM mmol/L 4.3   CHLORIDE mmol/L 104   CO2 mmol/L 26   BUN mg/dL 17   CREATININE  mg/dL 1.01   ANION GAP mmol/L 6   CALCIUM mg/dL 8.2*   GLUCOSE RANDOM mg/dL 105             Lab Results   Component Value Date    HGBA1C 6.1 (H) 02/21/2025    HGBA1C 6.2 (H) 10/29/2024    HGBA1C 5.5 05/27/2021           XR knee left    ** Please Note: This note has been constructed using a voice recognition system. **

## 2025-03-18 NOTE — ASSESSMENT & PLAN NOTE
Results from last 7 days   Lab Units 03/18/25  0501   HEMOGLOBIN g/dL 11.4*   MCV fL 95   RDW % 13.1     Continue monitoring, no indication for transfusion at this time.

## 2025-03-18 NOTE — CASE MANAGEMENT
Case Management Assessment & Discharge Planning Note    Patient name Lori Damon  [facility] /E2 -* MRN [id number]  : 1960 Date 3/18/2025       Current Admission Date: 3/17/2025  Current Admission Diagnosis:Primary osteoarthritis of left knee   Patient Active Problem List    Diagnosis Date Noted Date Diagnosed    Primary osteoarthritis of left knee 2025     Stage 3a chronic kidney disease (HCC) 2024     Deep vein thrombosis (DVT) of other vein of right lower extremity, unspecified chronicity (HCC) 2024     Chronic migraine without aura without status migrainosus, not intractable 2023     Neuropathic pain 2023     Tardive dyskinesia 2023     Myofascial pain syndrome, cervical 2022     Neurologic gait dysfunction 2022     Tremor 2021     History of fusion of cervical spine 2021     Smoking 2020     Spondylosis of cervical region without myelopathy or radiculopathy      Anemia 2019     Irritable bowel syndrome 2019     Migraine without aura and without status migrainosus, not intractable 2019     Fibromyalgia 2019     Pure hypercholesterolemia 2019     Hypercholesterolemia 2019     Urge incontinence 2019     History of hydronephrosis 2019     Spondylosis of lumbar spine 2015     Senile osteoporosis 2014       LOS (days): 0  Geometric Mean LOS (GMLOS) (days):   Days to GMLOS:     OBJECTIVE:    Risk of Unplanned Readmission Score: 14.12         Current admission status: Inpatient       Preferred Pharmacy:   [facility]/pharmacy #[id number] - [place] - [address]  [facility]  [place]   Phone: [phone] Fax: [phone]    [facility] - [place], [place] - [address] [place] [address]  Phone: [phone] Fax: [phone]    Primary Care Provider: Naty Kenney  MD    Primary Insurance: [facility]  Secondary Insurance:     ASSESSMENT:  Active Health Care Proxies       SUSHILA HENSLEY Health Care Representative - Daughter   Primary Phone: [phone] (Mobile)                 Advance Directives  Does patient have a Health Care POA?: No  Was patient offered paperwork?: No (patient wasn't receptive to discussion)  Does patient currently have a Health Care decision maker?: Yes, please see Health Care Proxy section  Does patient have Advance Directives?: No  Was patient offered paperwork?: No  Primary Contact: Sushila Hensley (daughter) [phone]          Patient Information  Admitted from:: Home  Mental Status: Alert  During Assessment patient was accompanied by: Not accompanied during assessment  Primary Caregiver: Self  Support Systems: Daughter, Self  County [place]idence: [facility]  What city do you live in?: [place]  Living Arrangements: Lives w/ Children  Is patient a ?: No    Activities of Daily Living Prior to Admission  Functional Status: Independent  Completes ADLs independently?: Yes  Ambulates independently?: Yes       Patient Information Continued  Income Source: Employed  Does patient have prescription coverage?: Yes  Can the patient afford their medications and any related supplies (such as glucometers or test strips)?: N/A  Does patient receive dialysis treatments?: No  Does patient have a history of substance abuse?: No  Does patient have a history of Mental Health Diagnosis?: No      Social Determinants of Health (SDOH)      Flowsheet Row Most Recent Value   Housing Stability    In the last 12 months, was there a time when you were not able to pay the mortgage or rent on time? Pt Declined   At any time in the past 12 months, were you homeless or living in a shelter (including now)? Pt Declined   Transportation Needs    In the past 12 months, has lack of transportation kept you from medical appointments or from getting medications? Pt Declined    In the past 12 months, has lack of transportation kept you from meetings, work, or from getting things needed for daily living? Pt Declined   Food Insecurity    Within the past 12 months, you worried that your food would run out before you got the money to buy more. Pt Declined   Within the past 12 months, the food you bought just didn't last and you didn't have money to get more. Pt Declined   Utilities    In the past 12 months has the electric, gas, oil, or water company threatened to shut off services in your home? Pt Declined            DISCHARGE DETAILS:    Discharge planning discussed with:: patient  Freedom of Choice: Yes  Comments - Freedom of Choice: Home with HHC vs OP follow up - patient declined to discuss with CM because she wants to go to [facility], Patient told CM that if she is not going to STR then she did not want to speak with care manager. Patient declined list of [facility] agencies care manager offered, patient declined home PT/OT services.  CM contacted family/caregiver?: No- see comments (patient declined)  Were Treatment Team discharge recommendations reviewed with patient/caregiver?: No (patient declined)  Did patient/caregiver verbalize understanding of patient care needs?: No  Were patient/caregiver advised of the risks associated with not following Treatment Team discharge recommendations?: Yes    Contacts  Patient Contacts: Sushila Hensley (daughter) [phone]  Relationship to Patient:: Family    Requested Home Health Care         Is the patient interested in HHC at discharge?: No    DME Referral Provided  Referral made for DME?: No       Would you like to participate in our [facility] service program?  : No - Declined    Treatment Team Recommendation: Home  Discharge Destination Plan:: Home        Additional Comments: CM met with the patient in her room for intake assessment and discharge planning. CM introduced self and reviewed role. CM tried to discuss the PT/OT recommendation, patient  declined to discuss with CM states she wants to go to STR, Patient told CM that if she is not going to STR then she did not want to speak with care manager. Patient declined list of [facility] agencies care manager offered, patient declined home PT/OT services.

## 2025-03-18 NOTE — UTILIZATION REVIEW
Initial Clinical Review    Elective outpatient procedure, converted to inpatient admission due to  NOT meeting  PT Goals.    POST OP PAIN    Elective   OP    surgical procedure    Age/Sex: 64 y.o. female    Surgery Date:   3/17/25    Procedure: Left - ARTHROPLASTY KNEE TOTAL       Anesthesia:  spinal      Operative Findings: Severe tricompartmental osteoarthritis  Pre-op ROM 3-115  Post-op ROM 0-130+ calf to thigh gravity-assisted flexion    POD#1 Progress Note:   3/18  IP admission  Continue post op care.  Needs  PT/OT/WBAT  LLE.  Hemoglobin  11.4  this am.Complained of significant left knee pain  overnight.    Date:   3/19  Day 3: Has surpassed a 2nd midnight with active treatments and services.  POD  #  2   Continue post op care/pain control. Slid  backward this am trying to sit on bed, landed on backside of wall.  No head trauma.          Admission Orders: Date/Time/Statement:   Admission Orders (From admission, onward)       Ordered        03/18/25 1519  INPATIENT ADMISSION  Once                          Orders Placed This Encounter   Procedures    INPATIENT ADMISSION     Standing Status:   Standing     Number of Occurrences:   1     Level of Care:   Med Surg [16]     Estimated length of stay:   More than 2 Midnights     Certification:   I certify that inpatient services are medically necessary for this patient for a duration of greater than two midnights. See H&P and MD Progress Notes for additional information about the patient's course of treatment.     Diet:  regular    Mobility:  PT/OT    DVT Prophylaxis:  SCD's    Medications/Pain Control:   Scheduled Medications:  acetaminophen, 975 mg, Oral, Q8H MARION  amantadine, 100 mg, Oral, Daily  apixaban, 5 mg, Oral, BID  ascorbic acid, 500 mg, Oral, BID  buPROPion, 150 mg, Oral, QAM  docusate sodium, 100 mg, Oral, BID  folic acid, 1 mg, Oral, Daily  gabapentin, 400 mg, Oral, TID  metaxalone, 400 mg, Oral, TID  multivitamin-minerals, 1 tablet, Oral,  Daily  oxybutynin, 5 mg, Oral, BID  pantoprazole, 40 mg, Oral, Daily Before Breakfast  propranolol, 60 mg, Oral, QPM  senna, 1 tablet, Oral, Daily      Continuous IV Infusions:  lactated ringers, 100 mL/hr, Intravenous, Continuous      PRN Meds:  acetaminophen, 650 mg, Oral, Q4H PRN  calcium carbonate, 1,000 mg, Oral, Daily PRN  morphine injection, 2 mg, Intravenous, Q2H PRN  ( x1  3/18)    ondansetron, 4 mg, Intravenous, Q6H PRN  oxyCODONE, 10 mg, Oral, Q4H PRN  ( x3  3/18  and  X 2  3/19 )    oxyCODONE, 5 mg, Oral, Q4H PRN ( x1  3/19)   simethicone, 80 mg, Oral, 4x Daily PRN      Vital Signs (last 3 days)       Date/Time Temp Pulse Resp BP MAP (mmHg) SpO2 Calculated FIO2 (%) - Nasal Cannula Nasal Cannula O2 Flow Rate (L/min) O2 Device Patient Position - Orthostatic VS Pain    03/18/25 1506 98.7 °F (37.1 °C) 69 18 127/72 86 94 % -- -- None (Room air) Lying --    03/18/25 1045 -- -- -- 100/57 -- -- -- -- -- Sitting --    03/18/25 1021 -- -- -- -- -- -- -- -- -- -- 4    03/18/25 0952 -- -- -- 96/54 -- -- -- -- -- Sitting --    03/18/25 0750 98.3 °F (36.8 °C) 66 20 94/64 75 96 % -- -- None (Room air) Lying 1    03/18/25 0630 -- -- -- -- -- -- -- -- -- -- 7    03/18/25 0259 -- -- -- -- -- -- -- -- -- -- 7    03/18/25 0254 97.7 °F (36.5 °C) 65 18 108/67 81 96 % -- -- None (Room air) Lying --    03/18/25 0036 -- -- -- -- -- -- -- -- -- -- 7    03/17/25 2212 97.8 °F (36.6 °C) 64 16 119/66 86 99 % -- -- None (Room air) Lying --    03/17/25 2122 -- -- -- -- -- -- -- -- -- -- 5    03/17/25 1921 -- -- -- -- -- -- -- -- -- -- 5 03/17/25 1910 97.6 °F (36.4 °C) 61 18 129/87 97 99 % -- -- None (Room air) Lying --    03/17/25 1420 -- -- -- -- -- -- -- -- -- -- No Pain    03/17/25 1331 -- -- -- -- -- -- -- -- -- -- Med Not Given for Pain - for MAR use only    03/17/25 1320 97.1 °F (36.2 °C) 54 17 129/79 90 98 % -- -- None (Room air) Lying --    03/17/25 1220 97.6 °F (36.4 °C) 58 16 130/74 97 96 % -- -- None (Room air) -- No Pain     03/17/25 1205 -- 58 16 127/63 89 97 % -- -- None (Room air) -- No Pain    03/17/25 1150 -- 58 16 127/64 89 97 % -- -- None (Room air) -- No Pain    03/17/25 1137 96.6 °F (35.9 °C) 62 16 127/65 89 98 % -- -- None (Room air) -- No Pain    03/17/25 0933 -- 62 -- 121/68 -- 99 % 36 4 L/min Nasal cannula -- --    03/17/25 0928 -- 60 -- 120/67 -- 99 % 36 4 L/min Nasal cannula -- --    03/17/25 0923 -- 62 -- 125/70 -- 99 % 36 4 L/min Nasal cannula -- --    03/17/25 0918 -- 58 -- 118/71 -- 97 % 36 4 L/min Nasal cannula -- --    03/17/25 0913 -- 56 -- 103/56 -- 95 % 36 4 L/min Nasal cannula -- --    03/17/25 0908 -- 56 -- 120/69 -- 99 % 36 4 L/min Nasal cannula -- --    03/17/25 0853 -- 56 -- 115/65 -- 99 % 36 4 L/min Nasal cannula -- --    03/17/25 0848 -- 56 -- 117/64 -- 96 % 36 4 L/min Nasal cannula -- --    03/17/25 0839 -- -- -- -- -- -- -- -- -- -- 6    03/17/25 0750 -- -- -- -- -- -- -- -- -- -- No Pain    03/17/25 0746 97 °F (36.1 °C) 63 15 107/56 -- 97 % -- -- None (Room air) -- --          Weight (last 2 days)       Date/Time Weight    03/17/25 0750 64.8 (142.86)            Pertinent Labs/Diagnostic Test Results:   Radiology:  XR knee left 1 or 2 views   Final Interpretation by Padilla Floyd MD (03/18 1336)      Expected postoperative changes of recent left total knee arthroplasty. The surgical hardware appears intact.         Computerized Assisted Algorithm (CAA) may have been used to analyze all applicable images.         Workstation performed: [id number]           Cardiology:          Results from last 7 days   Lab Units 03/18/25  0501   WBC Thousand/uL 6.33   HEMOGLOBIN g/dL 11.4*   HEMATOCRIT % 35.1   PLATELETS Thousands/uL 223         Results from last 7 days   Lab Units 03/18/25  0501   SODIUM mmol/L 136   POTASSIUM mmol/L 4.3   CHLORIDE mmol/L 104   CO2 mmol/L 26   ANION GAP mmol/L 6   BUN mg/dL 17   CREATININE mg/dL 1.01   EGFR ml/min/1.73sq m 58   CALCIUM mg/dL 8.2*             Results from last 7  days   Lab Units 03/18/25  0501   GLUCOSE RANDOM mg/dL 105                                         Network Utilization Review Department  ATTENTION: Please call with any questions or concerns to [phone] and carefully listen to the prompts so that you are directed to the right person. All voicemails are confidential.   For Discharge needs, contact Care Management DC Support Team at [phone] opt. 2  Send all requests for admission clinical reviews, approved or denied determinations and any other requests to dedicated fax number below belonging to the campus where the patient is receiving treatment. List of dedicated fax numbers for the Facilities:  FACILITY NAME UR FAX NUMBER   ADMISSION DENIALS (Administrative/Medical Necessity) [phone]   DISCHARGE SUPPORT TEAM (NETWORK) [phone]   PARENT CHILD HEALTH (Maternity/NICU/Pediatrics) [phone]   [facility] [phone]   [facility] [phone]   [facility] [phone]   [facility] [phone]   [facility] [phone]   [facility] [phone]   General acute hospital [phone]   [facility] [phone]   [facility] [phone]   [facility] [phone]   [facility] [phone]   [facility] [phone]

## 2025-03-18 NOTE — PHYSICAL THERAPY NOTE
Physical Therapy Treatment Note     03/18/25 0928   PT Last Visit   PT Visit Date 03/18/25   Note Type   Note Type Treatment   Pain Assessment   Pain Assessment Tool 0-10   Pain Score 8   Pain Location/Orientation Orientation: Left;Location: Knee   Restrictions/Precautions   Weight Bearing Precautions Per Order Yes   LLE Weight Bearing Per Order WBAT   Other Precautions WBS;Fall Risk;Pain;Chair Alarm   General   Chart Reviewed Yes   Family/Caregiver Present No   Subjective   Subjective Pt. agreeable to PT   Bed Mobility   Supine to Sit 6  Modified independent   Additional items HOB elevated;Bedrails   Transfers   Sit to Stand 6  Modified independent   Additional items Armrests   Stand to Sit 6  Modified independent   Additional items Armrests   Stand pivot 5  Supervision   Additional items Increased time required   Toilet transfer 6  Modified independent   Additional items Increased time required;Standard toilet  (grab bar)   Ambulation/Elevation   Gait pattern Improper Weight shift;Step to;Decreased foot clearance;Decreased toe off;Decreased heel strike   Gait Assistance 5  Supervision   Additional items Assist x 1;Verbal cues   Assistive Device Rolling walker   Distance ~350ft, 15ft   Stair Management Assistance 5  Supervision   Additional items Assist x 1;Increased time required;Verbal cues   Stair Management Technique Two rails;Step to pattern;Foreward;Nonreciprocal   Number of Stairs 1   Curbs 6 inch crub step negotiated with RW and S   Balance   Static Sitting Normal   Dynamic Sitting Fair +   Static Standing Fair +   Dynamic Standing Fair   Ambulatory Fair   Endurance Deficit   Endurance Deficit No   Activity Tolerance   Activity Tolerance Patient tolerated treatment well   Nurse Made Aware yes   Exercises   TKR Supine;Bilateral;AROM;10 reps;AAROM   Assessment   Prognosis Good   Problem List Decreased strength;Decreased range of motion;Decreased mobility;Pain;Orthopedic restrictions;Impaired judgement    Assessment Pt. progressing well with overall mobility and needed no hands on A for any mobility. Cues for techniques and safety given. Pt. able to perform toileting and pericare with DS. Pt. reproted she sleeps on recliner at home. pt. able to perform supine to sit transfer with HOB eleavted and use of BRs and Mod I. No knee buckling or LOB noted t/o session. Pt. tend to  the RW like SW for amb. trial and when corrected pt. reported its a habit. Occ. decreased safety noted and cues to keep RW in front prior to turning and sitting down. Reviewed car transfer, activiites at home, Pain mgt prior to PT and icing of the knee for decreased pain and edema. Pt. shoed good understanding of the instructions. Pt. was given ice pack post session when seated on chair with LEs elevated and chair alarm engaged. RN and Ortho PA was informed of the session and that pt. was okay for DC home with OPPT from PT standpoint. Will continue to follow per PT POC.   Barriers to Discharge None   Goals   Patient Goals None reported   STG Expiration Date 03/27/25   PT Treatment Day 1   Plan   Treatment/Interventions Functional transfer training;LE strengthening/ROM;Elevations;Therapeutic exercise;Spoke to nursing;Spoke to case management;Spoke to advanced practitioner;Bed mobility;Gait training;Equipment eval/education;Patient/family training   Progress Progressing toward goals   PT Frequency Twice a day;5-7x/wk   Discharge Recommendation   Rehab Resource Intensity Level, PT III (Minimum Resource Intensity)   Equipment Recommended Walker   AM-PAC Basic Mobility Inpatient   Turning in Flat Bed Without Bedrails 4   Lying on Back to Sitting on Edge of Flat Bed Without Bedrails 4   Moving Bed to Chair 4   Standing Up From Chair Using Arms 4   Walk in Room 4   Climb 3-5 Stairs With Railing 4   Basic Mobility Inpatient Raw Score 24   Basic Mobility Standardized Score 57.68   [facility] Highest Level Of Mobility   [facility] Goal 8: Walk 250  feet or more   -HLM Achieved 8: Walk 250 feet ot more   End of Consult   Patient Position at End of Consult Bedside chair;Bed/Chair alarm activated;All needs within reach         [place] PTA    An AM-PAC basic mobility standardized score less than 42.9 suggest the patient may benefit from discharge to post-acute rehab services.

## 2025-03-18 NOTE — PLAN OF CARE
Problem: OCCUPATIONAL THERAPY ADULT  Goal: Performs self-care activities at highest level of function for planned discharge setting.  See evaluation for individualized goals.  Description: Treatment Interventions: ADL retraining, Functional transfer training, Endurance training, Patient/family training, Equipment evaluation/education, Compensatory technique education, Continued evaluation, Energy conservation, Activityengagement          See flowsheet documentation for full assessment, interventions and recommendations.   Note: Limitation: Decreased ADL status, Decreased endurance, Decreased self-care trans, Decreased high-level ADLs, Decreased Safe judgement during ADL (decreased balance, decreased coordination, decreased functional reach.)  Prognosis: Good  Assessment: Patient is a 64 y.o. year old female seen for OT eval s/p admit to [facility] on 3/17/2025 with Primary osteoarthritis of left knee,Chronic pain of left knee, s/p L TKA 3/17/24  . OT consulted to assess ADLs/IADLs/functional mobility and assist w/ D/C planning.  Pt's CLOF as follows: eating/grooming: Allyn and supervision , UB ADLs: supervision , LB ADLs: Joshua, toileting: supervision , bed mobility: DNT, functional transfers: supervision , functional mobility: supervision , sitting/standing tolerance: 3-5 mins. Pt would benefit from continued skilled OT while in acute setting to address deficits as defined above and to maximize (I) w/ ADLs/functional mobility. Occupational performance areas to address include: grooming, bathing/shower, toilet hygiene, dressing, medication management, socialization, health maintenance, functional mobility, community mobility, clothing management, cleaning, meal prep, care of pets, and social participation. Patient demonstrates the following deficits impacting occupational performance: weakness, decreased strength , decreased balance, decreased activity tolerance, limited functional reach, impulsivity, decreased safety  awareness, increased pain, increased body habitus , and decreased cardiovascular endurance. These impairments, as well at pt’s [place] home environment, limited home support, difficulty performing ADLs, difficulty performing IADLs, difficulty performing transfers/mobility, fall risk , functional decline , and new use of AD for functional transfers/mobility, limit pt’s ability to safely engage in all baseline areas of occupation. Based on the aforementioned evaluation, functional performance deficits, and assessments, pt has been identified as a moderate complexity evaluation. At this time, recommendation for pt to receive post-acute rehabilitation services at a Level III (minimum resource intensity) due to above deficits and CLOF. OT will continue to follow pt 2-3x/wk to address the goals listed below to  w/in 10-14 days.     Rehab Resource Intensity Level, OT: III (Minimum Resource Intensity)

## 2025-03-18 NOTE — ASSESSMENT & PLAN NOTE
64-year-old female with a history of migraines, anemia, tardive dyskinesia, CKD, and DVT on Eliquis was admitted under the Ortho service due to her left knee osteoarthritis.  She is status post left total knee arthroplasty.   Pain control, disposition, and rest of management per primary service

## 2025-03-18 NOTE — ASSESSMENT & PLAN NOTE
Lab Results   Component Value Date    EGFR 58 03/18/2025    EGFR 55 02/21/2025    EGFR 59 10/29/2024    CREATININE 1.01 03/18/2025    CREATININE 1.05 02/21/2025    CREATININE 0.99 10/29/2024     Stable, does not meet BRAYDON criteria   · Sleeping much better, no snoring, feels better and refreshed after a night's sleep with the new pressure setting of 12 cm of water  · Continue present therapy  No compliance concerns    · Follow-up 6 months None known

## 2025-03-18 NOTE — ASSESSMENT & PLAN NOTE
POD 1 s/p L TKA    - Pain Control  - rohan-op ancef x24 hours  - PT/OT  - WBAT LLE  - ROM as tolerated, pillow/blankets under achilles not behind knee while in bed   - DVT prophylaxis, resume Eliquis today.  - Hgb 11.4 this am. Continue to monitor clinically for ABLA  - D/C planning. Possible d/c home with home health services versus placement pending progress with PT.

## 2025-03-18 NOTE — PLAN OF CARE
Problem: PAIN - ADULT  Goal: Verbalizes/displays adequate comfort level or baseline comfort level  Description: Interventions:  - Encourage patient to monitor pain and request assistance  - Assess pain using appropriate pain scale  - Administer analgesics based on type and severity of pain and evaluate response  - Implement non-pharmacological measures as appropriate and evaluate response  - Consider cultural and social influences on pain and pain management  - Notify physician/advanced practitioner if interventions unsuccessful or patient reports new pain  Outcome: Progressing     Problem: INFECTION - ADULT  Goal: Absence or prevention of progression during hospitalization  Description: INTERVENTIONS:  - Assess and monitor for signs and symptoms of infection  - Monitor lab/diagnostic results  - Monitor all insertion sites, i.e. indwelling lines, tubes, and drains  - Monitor endotracheal if appropriate and nasal secretions for changes in amount and color  - [place] appropriate cooling/warming therapies per order  - Administer medications as ordered  - Instruct and encourage patient and family to use good hand hygiene technique  - Identify and instruct in appropriate isolation precautions for identified infection/condition  Outcome: Progressing  Goal: Absence of fever/infection during neutropenic period  Description: INTERVENTIONS:  - Monitor WBC    Outcome: Progressing     Problem: SAFETY ADULT  Goal: Patient will remain free of falls  Description: INTERVENTIONS:  - Educate patient/family on patient safety including physical limitations  - Instruct patient to call for assistance with activity   - Consult OT/PT to assist with strengthening/mobility   - Keep Call bell within reach  - Keep bed low and locked with side rails adjusted as appropriate  - Keep care items and personal belongings within reach  - Initiate and maintain comfort rounds  - Make Fall Risk Sign visible to staff  - Apply yellow socks and bracelet  for high fall risk patients  - Consider moving patient to room near nurses station  Outcome: Progressing  Goal: Maintain or return to baseline ADL function  Description: INTERVENTIONS:  -  Assess patient's ability to carry out ADLs; assess patient's baseline for ADL function and identify physical deficits which impact ability to perform ADLs (bathing, care of mouth/teeth, toileting, grooming, dressing, etc.)  - Assess/evaluate cause of self-care deficits   - Assess range of motion  - Assess patient's mobility; develop plan if impaired  - Assess patient's need for assistive devices and provide as appropriate  - Encourage maximum independence but intervene and supervise when necessary  - Involve family in performance of ADLs  - Assess for home care needs following discharge   - Consider OT consult to assist with ADL evaluation and planning for discharge  - Provide patient education as appropriate  Outcome: Progressing  Goal: Maintains/Returns to pre admission functional level  Description: INTERVENTIONS:  - Perform AM-PAC 6 Click Basic Mobility/ Daily Activity assessment daily.  - Set and communicate daily mobility goal to care team and patient/family/caregiver.   - Collaborate with rehabilitation services on mobility goals if consulted  - Out of bed for toileting  - Record patient progress and toleration of activity level   Outcome: Progressing     Problem: Knowledge Deficit  Goal: Patient/family/caregiver demonstrates understanding of disease process, treatment plan, medications, and discharge instructions  Description: Complete learning assessment and assess knowledge base.  Interventions:  - Provide teaching at level of understanding  - Provide teaching via preferred learning methods  Outcome: Progressing     Problem: METABOLIC, FLUID AND ELECTROLYTES - ADULT  Goal: Electrolytes maintained within normal limits  Description: INTERVENTIONS:  - Monitor labs and assess patient for signs and symptoms of electrolyte  imbalances  - Administer electrolyte replacement as ordered  - Monitor response to electrolyte replacements, including repeat lab results as appropriate  - Instruct patient on fluid and nutrition as appropriate  Outcome: Progressing  Goal: Fluid balance maintained  Description: INTERVENTIONS:  - Monitor labs   - Monitor I/O and WT  - Instruct patient on fluid and nutrition as appropriate  - Assess for signs & symptoms of volume excess or deficit  Outcome: Progressing  Goal: Glucose maintained within target range  Description: INTERVENTIONS:  - Monitor Blood Glucose as ordered  - Assess for signs and symptoms of hyperglycemia and hypoglycemia  - Administer ordered medications to maintain glucose within target range  - Assess nutritional intake and initiate nutrition service referral as needed  Outcome: Progressing     Problem: MUSCULOSKELETAL - ADULT  Goal: Maintain or return mobility to safest level of function  Description: INTERVENTIONS:  - Assess patient's ability to carry out ADLs; assess patient's baseline for ADL function and identify physical deficits which impact ability to perform ADLs (bathing, care of mouth/teeth, toileting, grooming, dressing, etc.)  - Assess/evaluate cause of self-care deficits   - Assess range of motion  - Assess patient's mobility  - Assess patient's need for assistive devices and provide as appropriate  - Encourage maximum independence but intervene and supervise when necessary  - Involve family in performance of ADLs  - Assess for home care needs following discharge   - Consider OT consult to assist with ADL evaluation and planning for discharge  - Provide patient education as appropriate  Outcome: Progressing  Goal: Maintain proper alignment of affected body part  Description: INTERVENTIONS:  - Support, maintain and protect limb and body alignment  - Provide patient/ family with appropriate education  Outcome: Progressing

## 2025-03-18 NOTE — PLAN OF CARE
Problem: PAIN - ADULT  Goal: Verbalizes/displays adequate comfort level or baseline comfort level  Description: Interventions:  - Encourage patient to monitor pain and request assistance  - Assess pain using appropriate pain scale  - Administer analgesics based on type and severity of pain and evaluate response  - Implement non-pharmacological measures as appropriate and evaluate response  - Consider cultural and social influences on pain and pain management  - Notify physician/advanced practitioner if interventions unsuccessful or patient reports new pain  Outcome: Progressing     Problem: INFECTION - ADULT  Goal: Absence or prevention of progression during hospitalization  Description: INTERVENTIONS:  - Assess and monitor for signs and symptoms of infection  - Monitor lab/diagnostic results  - Monitor all insertion sites, i.e. indwelling lines, tubes, and drains  - Monitor endotracheal if appropriate and nasal secretions for changes in amount and color  - [place] appropriate cooling/warming therapies per order  - Administer medications as ordered  - Instruct and encourage patient and family to use good hand hygiene technique  - Identify and instruct in appropriate isolation precautions for identified infection/condition  Outcome: Progressing  Goal: Absence of fever/infection during neutropenic period  Description: INTERVENTIONS:  - Monitor WBC    Outcome: Progressing     Problem: SAFETY ADULT  Goal: Patient will remain free of falls  Description: INTERVENTIONS:  - Educate patient/family on patient safety including physical limitations  - Instruct patient to call for assistance with activity   - Consult OT/PT to assist with strengthening/mobility   - Keep Call bell within reach  - Keep bed low and locked with side rails adjusted as appropriate  - Keep care items and personal belongings within reach  - Initiate and maintain comfort rounds  - Make Fall Risk Sign visible to staff  - Offer Toileting every 2 Hours,  in advance of need  - Initiate/Maintain bed alarm  - Obtain necessary fall risk management equipment:    - Apply yellow socks and bracelet for high fall risk patients  - Consider moving patient to room near nurses station  Outcome: Progressing  Goal: Maintain or return to baseline ADL function  Description: INTERVENTIONS:  -  Assess patient's ability to carry out ADLs; assess patient's baseline for ADL function and identify physical deficits which impact ability to perform ADLs (bathing, care of mouth/teeth, toileting, grooming, dressing, etc.)  - Assess/evaluate cause of self-care deficits   - Assess range of motion  - Assess patient's mobility; develop plan if impaired  - Assess patient's need for assistive devices and provide as appropriate  - Encourage maximum independence but intervene and supervise when necessary  - Involve family in performance of ADLs  - Assess for home care needs following discharge   - Consider OT consult to assist with ADL evaluation and planning for discharge  - Provide patient education as appropriate  Outcome: Progressing  Goal: Maintains/Returns to pre admission functional level  Description: INTERVENTIONS:  - Perform AM-PAC 6 Click Basic Mobility/ Daily Activity assessment daily.  - Set and communicate daily mobility goal to care team and patient/family/caregiver.   - Collaborate with rehabilitation services on mobility goals if consulted  - Perform Range of Motion 3 times a day.  - Reposition patient every 2 hours.  - Dangle patient 3 times a day  - Stand patient 3 times a day  - Ambulate patient 3 times a day  - Out of bed to chair 3 times a day   - Out of bed for meals 3 times a day  - Out of bed for toileting  - Record patient progress and toleration of activity level   Outcome: Progressing     Problem: DISCHARGE PLANNING  Goal: Discharge to home or other facility with appropriate resources  Description: INTERVENTIONS:  - Identify barriers to discharge w/patient and caregiver  -  Arrange for needed discharge resources and transportation as appropriate  - Identify discharge learning needs (meds, wound care, etc.)  - Arrange for interpretive services to assist at discharge as needed  - Refer to Case Management Department for coordinating discharge planning if the patient needs post-hospital services based on physician/advanced practitioner order or complex needs related to functional status, cognitive ability, or social support system  Outcome: Progressing     Problem: Knowledge Deficit  Goal: Patient/family/caregiver demonstrates understanding of disease process, treatment plan, medications, and discharge instructions  Description: Complete learning assessment and assess knowledge base.  Interventions:  - Provide teaching at level of understanding  - Provide teaching via preferred learning methods  Outcome: Progressing     Problem: METABOLIC, FLUID AND ELECTROLYTES - ADULT  Goal: Electrolytes maintained within normal limits  Description: INTERVENTIONS:  - Monitor labs and assess patient for signs and symptoms of electrolyte imbalances  - Administer electrolyte replacement as ordered  - Monitor response to electrolyte replacements, including repeat lab results as appropriate  - Instruct patient on fluid and nutrition as appropriate  Outcome: Progressing  Goal: Fluid balance maintained  Description: INTERVENTIONS:  - Monitor labs   - Monitor I/O and WT  - Instruct patient on fluid and nutrition as appropriate  - Assess for signs & symptoms of volume excess or deficit  Outcome: Progressing  Goal: Glucose maintained within target range  Description: INTERVENTIONS:  - Monitor Blood Glucose as ordered  - Assess for signs and symptoms of hyperglycemia and hypoglycemia  - Administer ordered medications to maintain glucose within target range  - Assess nutritional intake and initiate nutrition service referral as needed  Outcome: Progressing     Problem: MUSCULOSKELETAL - ADULT  Goal: Maintain or  return mobility to safest level of function  Description: INTERVENTIONS:  - Assess patient's ability to carry out ADLs; assess patient's baseline for ADL function and identify physical deficits which impact ability to perform ADLs (bathing, care of mouth/teeth, toileting, grooming, dressing, etc.)  - Assess/evaluate cause of self-care deficits   - Assess range of motion  - Assess patient's mobility  - Assess patient's need for assistive devices and provide as appropriate  - Encourage maximum independence but intervene and supervise when necessary  - Involve family in performance of ADLs  - Assess for home care needs following discharge   - Consider OT consult to assist with ADL evaluation and planning for discharge  - Provide patient education as appropriate  Outcome: Progressing  Goal: Maintain proper alignment of affected body part  Description: INTERVENTIONS:  - Support, maintain and protect limb and body alignment  - Provide patient/ family with appropriate education  Outcome: Progressing

## 2025-03-18 NOTE — PLAN OF CARE
Problem: PHYSICAL THERAPY ADULT  Goal: Performs mobility at highest level of function for planned discharge setting.  See evaluation for individualized goals.  Description: Treatment/Interventions: Functional transfer training, LE strengthening/ROM, Elevations, Therapeutic exercise, Patient/family training, Bed mobility, Equipment eval/education, Gait training, Compensatory technique education, Continued evaluation          See flowsheet documentation for full assessment, interventions and recommendations.  Outcome: Progressing  Note: Prognosis: Good  Problem List: Decreased strength, Decreased range of motion, Decreased mobility, Pain, Orthopedic restrictions, Impaired judgement  Assessment: Pt. progressing well with overall mobility and needed no hands on A for any mobility. Cues for techniques and safety given. Pt. able to perform toileting and pericare with DS. Pt. reproted she sleeps on recliner at home. pt. able to perform supine to sit transfer with HOB eleavted and use of BRs and Mod I. No knee buckling or LOB noted t/o session. Pt. tend to  the RW like SW for amb. trial and when corrected pt. reported its a habit. Occ. decreased safety noted and cues to keep RW in front prior to turning and sitting down. Reviewed car transfer, activiites at home, Pain mgt prior to PT and icing of the knee for decreased pain and edema. Pt. shoed good understanding of the instructions. Pt. was given ice pack post session when seated on chair with LEs elevated and chair alarm engaged. RN and Ortho PA was informed of the session and that pt. was okay for DC home with OPPT from PT standpoint. Will continue to follow per PT POC.  Barriers to Discharge: None     Rehab Resource Intensity Level, PT: III (Minimum Resource Intensity)    See flowsheet documentation for full assessment.

## 2025-03-19 VITALS
BODY MASS INDEX: 25.31 KG/M2 | OXYGEN SATURATION: 97 % | SYSTOLIC BLOOD PRESSURE: 138 MMHG | RESPIRATION RATE: 20 BRPM | HEIGHT: 63 IN | TEMPERATURE: 98.3 F | WEIGHT: 142.86 LBS | DIASTOLIC BLOOD PRESSURE: 85 MMHG | HEART RATE: 72 BPM
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
## 2025-03-19 LAB
ANION GAP SERPL CALCULATED.3IONS-SCNC: 8 MMOL/L (ref 4–13)
BUN SERPL-MCNC: 11 MG/DL (ref 5–25)
CALCIUM SERPL-MCNC: 8.8 MG/DL (ref 8.4–10.2)
CHLORIDE SERPL-SCNC: 102 MMOL/L (ref 96–108)
CO2 SERPL-SCNC: 24 MMOL/L (ref 21–32)
CREAT SERPL-MCNC: 0.83 MG/DL (ref 0.6–1.3)
ERYTHROCYTE [DISTWIDTH] IN BLOOD BY AUTOMATED COUNT: 13.2 % (ref 11.6–15.1)
GFR SERPL CREATININE-BSD FRML MDRD: 73 ML/MIN/1.73SQ M
GLUCOSE SERPL-MCNC: 125 MG/DL (ref 65–140)
HCT VFR BLD AUTO: 32.4 % (ref 34.8–46.1)
HGB BLD-MCNC: 10.8 G/DL (ref 11.5–15.4)
MCH RBC QN AUTO: 31 PG (ref 26.8–34.3)
MCHC RBC AUTO-ENTMCNC: 33.3 G/DL (ref 31.4–37.4)
MCV RBC AUTO: 93 FL (ref 82–98)
PLATELET # BLD AUTO: 201 THOUSANDS/UL (ref 149–390)
PMV BLD AUTO: 10.1 FL (ref 8.9–12.7)
POTASSIUM SERPL-SCNC: 3.9 MMOL/L (ref 3.5–5.3)
RBC # BLD AUTO: 3.48 MILLION/UL (ref 3.81–5.12)
SODIUM SERPL-SCNC: 134 MMOL/L (ref 135–147)
WBC # BLD AUTO: 7.12 THOUSAND/UL (ref 4.31–10.16)

## 2025-03-19 PROCEDURE — 99232 SBSQ HOSP IP/OBS MODERATE 35: CPT | Performed by: STUDENT IN AN ORGANIZED HEALTH CARE EDUCATION/TRAINING PROGRAM

## 2025-03-19 PROCEDURE — 97116 GAIT TRAINING THERAPY: CPT

## 2025-03-19 PROCEDURE — 80048 BASIC METABOLIC PNL TOTAL CA: CPT | Performed by: PHYSICIAN ASSISTANT

## 2025-03-19 PROCEDURE — 85027 COMPLETE CBC AUTOMATED: CPT | Performed by: PHYSICIAN ASSISTANT

## 2025-03-19 PROCEDURE — 99024 POSTOP FOLLOW-UP VISIT: CPT

## 2025-03-19 PROCEDURE — NC001 PR NO CHARGE

## 2025-03-19 PROCEDURE — 97530 THERAPEUTIC ACTIVITIES: CPT

## 2025-03-19 RX ADMIN — SENNOSIDES 8.6 MG: 8.6 TABLET, FILM COATED ORAL at 08:36

## 2025-03-19 RX ADMIN — Medication 1 TABLET: at 08:41

## 2025-03-19 RX ADMIN — METAXALONE 400 MG: 800 TABLET ORAL at 08:37

## 2025-03-19 RX ADMIN — OXYCODONE HYDROCHLORIDE 10 MG: 10 TABLET ORAL at 08:36

## 2025-03-19 RX ADMIN — FOLIC ACID 1 MG: 1 TABLET ORAL at 08:38

## 2025-03-19 RX ADMIN — APIXABAN 5 MG: 5 TABLET, FILM COATED ORAL at 08:37

## 2025-03-19 RX ADMIN — OXYCODONE HYDROCHLORIDE 10 MG: 10 TABLET ORAL at 03:59

## 2025-03-19 RX ADMIN — DOCUSATE SODIUM 100 MG: 100 CAPSULE, LIQUID FILLED ORAL at 08:38

## 2025-03-19 RX ADMIN — PANTOPRAZOLE SODIUM 40 MG: 40 TABLET, DELAYED RELEASE ORAL at 05:21

## 2025-03-19 RX ADMIN — OXYCODONE 5 MG: 5 TABLET ORAL at 14:04

## 2025-03-19 RX ADMIN — AMANTADINE HYDROCHLORIDE 100 MG: 100 CAPSULE, LIQUID FILLED ORAL at 08:42

## 2025-03-19 RX ADMIN — OXYBUTYNIN CHLORIDE 5 MG: 5 TABLET ORAL at 08:36

## 2025-03-19 RX ADMIN — METAXALONE 400 MG: 800 TABLET ORAL at 16:10

## 2025-03-19 RX ADMIN — BUPROPION HYDROCHLORIDE 150 MG: 150 TABLET, EXTENDED RELEASE ORAL at 08:37

## 2025-03-19 RX ADMIN — ACETAMINOPHEN 975 MG: 325 TABLET, FILM COATED ORAL at 14:03

## 2025-03-19 RX ADMIN — GABAPENTIN 400 MG: 400 CAPSULE ORAL at 08:37

## 2025-03-19 RX ADMIN — GABAPENTIN 400 MG: 400 CAPSULE ORAL at 16:10

## 2025-03-19 RX ADMIN — OXYCODONE HYDROCHLORIDE AND ACETAMINOPHEN 500 MG: 500 TABLET ORAL at 08:36

## 2025-03-19 NOTE — ASSESSMENT & PLAN NOTE
Results from last 7 days   Lab Units 03/19/25  0523 03/18/25  0501   HEMOGLOBIN g/dL 10.8* 11.4*   MCV fL 93 95   RDW % 13.2 13.1     Continue monitoring, no indication for transfusion at this time.

## 2025-03-19 NOTE — PLAN OF CARE
Problem: PAIN - ADULT  Goal: Verbalizes/displays adequate comfort level or baseline comfort level  Description: Interventions:  - Encourage patient to monitor pain and request assistance  - Assess pain using appropriate pain scale  - Administer analgesics based on type and severity of pain and evaluate response  - Implement non-pharmacological measures as appropriate and evaluate response  - Consider cultural and social influences on pain and pain management  - Notify physician/advanced practitioner if interventions unsuccessful or patient reports new pain  Outcome: Progressing     Problem: INFECTION - ADULT  Goal: Absence or prevention of progression during hospitalization  Description: INTERVENTIONS:  - Assess and monitor for signs and symptoms of infection  - Monitor lab/diagnostic results  - Monitor all insertion sites, i.e. indwelling lines, tubes, and drains  - Monitor endotracheal if appropriate and nasal secretions for changes in amount and color  - [place] appropriate cooling/warming therapies per order  - Administer medications as ordered  - Instruct and encourage patient and family to use good hand hygiene technique  - Identify and instruct in appropriate isolation precautions for identified infection/condition  Outcome: Progressing  Goal: Absence of fever/infection during neutropenic period  Description: INTERVENTIONS:  - Monitor WBC    Outcome: Progressing     Problem: SAFETY ADULT  Goal: Patient will remain free of falls  Description: INTERVENTIONS:  - Educate patient/family on patient safety including physical limitations  - Instruct patient to call for assistance with activity   - Consult OT/PT to assist with strengthening/mobility   - Keep Call bell within reach  - Keep bed low and locked with side rails adjusted as appropriate  - Keep care items and personal belongings within reach  - Initiate and maintain comfort rounds  - Make Fall Risk Sign visible to staff  - Offer Toileting every 2 Hours,  in advance of need  - Initiate/Maintain bed alarm  - Obtain necessary fall risk management equipment:    - Apply yellow socks and bracelet for high fall risk patients  - Consider moving patient to room near nurses station  Outcome: Progressing  Goal: Maintain or return to baseline ADL function  Description: INTERVENTIONS:  -  Assess patient's ability to carry out ADLs; assess patient's baseline for ADL function and identify physical deficits which impact ability to perform ADLs (bathing, care of mouth/teeth, toileting, grooming, dressing, etc.)  - Assess/evaluate cause of self-care deficits   - Assess range of motion  - Assess patient's mobility; develop plan if impaired  - Assess patient's need for assistive devices and provide as appropriate  - Encourage maximum independence but intervene and supervise when necessary  - Involve family in performance of ADLs  - Assess for home care needs following discharge   - Consider OT consult to assist with ADL evaluation and planning for discharge  - Provide patient education as appropriate  Outcome: Progressing  Goal: Maintains/Returns to pre admission functional level  Description: INTERVENTIONS:  - Perform AM-PAC 6 Click Basic Mobility/ Daily Activity assessment daily.  - Set and communicate daily mobility goal to care team and patient/family/caregiver.   - Collaborate with rehabilitation services on mobility goals if consulted  - Perform Range of Motion 3 times a day.  - Reposition patient every 2 hours.  - Dangle patient 3 times a day  - Stand patient 3 times a day  - Ambulate patient 3 times a day  - Out of bed to chair 3 times a day   - Out of bed for meals 3 times a day  - Out of bed for toileting  - Record patient progress and toleration of activity level   Outcome: Progressing     Problem: DISCHARGE PLANNING  Goal: Discharge to home or other facility with appropriate resources  Description: INTERVENTIONS:  - Identify barriers to discharge w/patient and caregiver  -  Arrange for needed discharge resources and transportation as appropriate  - Identify discharge learning needs (meds, wound care, etc.)  - Arrange for interpretive services to assist at discharge as needed  - Refer to Case Management Department for coordinating discharge planning if the patient needs post-hospital services based on physician/advanced practitioner order or complex needs related to functional status, cognitive ability, or social support system  Outcome: Progressing     Problem: Knowledge Deficit  Goal: Patient/family/caregiver demonstrates understanding of disease process, treatment plan, medications, and discharge instructions  Description: Complete learning assessment and assess knowledge base.  Interventions:  - Provide teaching at level of understanding  - Provide teaching via preferred learning methods  Outcome: Progressing     Problem: METABOLIC, FLUID AND ELECTROLYTES - ADULT  Goal: Electrolytes maintained within normal limits  Description: INTERVENTIONS:  - Monitor labs and assess patient for signs and symptoms of electrolyte imbalances  - Administer electrolyte replacement as ordered  - Monitor response to electrolyte replacements, including repeat lab results as appropriate  - Instruct patient on fluid and nutrition as appropriate  Outcome: Progressing  Goal: Fluid balance maintained  Description: INTERVENTIONS:  - Monitor labs   - Monitor I/O and WT  - Instruct patient on fluid and nutrition as appropriate  - Assess for signs & symptoms of volume excess or deficit  Outcome: Progressing  Goal: Glucose maintained within target range  Description: INTERVENTIONS:  - Monitor Blood Glucose as ordered  - Assess for signs and symptoms of hyperglycemia and hypoglycemia  - Administer ordered medications to maintain glucose within target range  - Assess nutritional intake and initiate nutrition service referral as needed  Outcome: Progressing     Problem: MUSCULOSKELETAL - ADULT  Goal: Maintain or  return mobility to safest level of function  Description: INTERVENTIONS:  - Assess patient's ability to carry out ADLs; assess patient's baseline for ADL function and identify physical deficits which impact ability to perform ADLs (bathing, care of mouth/teeth, toileting, grooming, dressing, etc.)  - Assess/evaluate cause of self-care deficits   - Assess range of motion  - Assess patient's mobility  - Assess patient's need for assistive devices and provide as appropriate  - Encourage maximum independence but intervene and supervise when necessary  - Involve family in performance of ADLs  - Assess for home care needs following discharge   - Consider OT consult to assist with ADL evaluation and planning for discharge  - Provide patient education as appropriate  Outcome: Progressing  Goal: Maintain proper alignment of affected body part  Description: INTERVENTIONS:  - Support, maintain and protect limb and body alignment  - Provide patient/ family with appropriate education  Outcome: Progressing

## 2025-03-19 NOTE — PLAN OF CARE
Problem: PHYSICAL THERAPY ADULT  Goal: Performs mobility at highest level of function for planned discharge setting.  See evaluation for individualized goals.  Description: Treatment/Interventions: Functional transfer training, LE strengthening/ROM, Elevations, Therapeutic exercise, Patient/family training, Bed mobility, Equipment eval/education, Gait training, Compensatory technique education, Continued evaluation          See flowsheet documentation for full assessment, interventions and recommendations.  Outcome: Progressing  Note: Prognosis: Good  Problem List: Decreased strength, Decreased range of motion, Decreased endurance, Decreased mobility, Pain, Orthopedic restrictions, Impaired judgement  Assessment: Pt. continue to progress well with her mobility. pt. needed no hands on A for mobility. Noted no LOB or knee buckling during session. cues for safety given and to focus on task at hand. Cues for LE sequencing given for stair negotiation as pt. was unable to recall it consistently. Pt. back in bed with all needs within reach and ice pack given at the end of the session. pt. back in bed with all needs within reach and bed alarm engaged. Will continue to follow epr PT POC  Barriers to Discharge: None     Rehab Resource Intensity Level, PT: III (Minimum Resource Intensity)    See flowsheet documentation for full assessment.

## 2025-03-19 NOTE — ASSESSMENT & PLAN NOTE
Fell to the side as she attempted to sit back on the bed. She hit her backside on the wall. We attempted to get her back up. No head trauma and currently in pain, states she is fine. Informed primary service, and would defer need for further workup to them. So far she is still looking forward to her discharged today.

## 2025-03-19 NOTE — PHYSICAL THERAPY NOTE
Physical Therapy Treatment Note     03/19/25 1412   PT Last Visit   PT Visit Date 03/19/25   Note Type   Note Type Treatment   Pain Assessment   Pain Assessment Tool 0-10   Pain Score 7   Pain Location/Orientation Orientation: Left;Location: Knee   Restrictions/Precautions   Weight Bearing Precautions Per Order Yes   LLE Weight Bearing Per Order WBAT   Other Precautions WBS;Fall Risk;Pain;Limb alert   General   Chart Reviewed Yes   Family/Caregiver Present No   Subjective   Subjective Pt. agreeable to PT   Bed Mobility   Supine to Sit 6  Modified independent   Additional items Increased time required  (HOB flat and no use of bRs)   Sit to Supine 5  Supervision   Additional items Assist x 1;Increased time required   Transfers   Sit to Stand 5  Supervision   Additional items Increased time required   Stand to Sit 5  Supervision   Additional items Increased time required   Stand pivot 5  Supervision   Additional items Increased time required;Verbal cues   Ambulation/Elevation   Gait pattern Improper Weight shift;Antalgic;Step to;Decreased toe off;Decreased heel strike;Step through pattern;Excessively slow;Decreased foot clearance   Gait Assistance 5  Supervision   Additional items Assist x 1;Verbal cues   Assistive Device Rolling walker   Distance ~ 300ft   Stair Management Assistance 5  Supervision   Additional items Assist x 1;Verbal cues   Stair Management Technique Two rails;Step to pattern;Foreward;Nonreciprocal   Number of Stairs 3   Curbs 6 inch curb step x 2 with RW and S   Balance   Static Sitting Good   Dynamic Sitting Fair +   Static Standing Fair   Dynamic Standing Fair -   Ambulatory Fair -  (with RW)   Endurance Deficit   Endurance Deficit Yes   Endurance Deficit Description pain   Activity Tolerance   Activity Tolerance Patient tolerated treatment well   Nurse Made Aware yes   Assessment   Prognosis Good   Problem List Decreased strength;Decreased range of motion;Decreased endurance;Decreased  mobility;Pain;Orthopedic restrictions;Impaired judgement   Assessment Pt. continue to progress well with her mobility. pt. needed no hands on A for mobility. Noted no LOB or knee buckling during session. cues for safety given and to focus on task at hand. Cues for LE sequencing given for stair negotiation as pt. was unable to recall it consistently. Pt. back in bed with all needs within reach and ice pack given at the end of the session. pt. back in bed with all needs within reach and bed alarm engaged. Will continue to follow epr PT POC   Barriers to Discharge None   Goals   Patient Goals None reported   STG Expiration Date 03/27/25   PT Treatment Day 2   Plan   Treatment/Interventions Functional transfer training;Elevations;Patient/family training;Equipment eval/education;Bed mobility;Gait training;Spoke to nursing   Progress Progressing toward goals   PT Frequency Twice a day;5-7x/wk   Discharge Recommendation   Rehab Resource Intensity Level, PT III (Minimum Resource Intensity)   Equipment Recommended Walker   AM-PAC Basic Mobility Inpatient   Turning in Flat Bed Without Bedrails 4   Lying on Back to Sitting on Edge of Flat Bed Without Bedrails 4   Moving Bed to Chair 4   Standing Up From Chair Using Arms 4   Walk in Room 4   Climb 3-5 Stairs With Railing 4   Basic Mobility Inpatient Raw Score 24   Basic Mobility Standardized Score 57.68   [facility] Highest Level Of Mobility   -HLM Goal 8: Walk 250 feet or more   -HLM Achieved 8: Walk 250 feet ot more   End of Consult   Patient Position at End of Consult All needs within reach;Supine;Bed/Chair alarm activated         Rivera [place] PTA    An AM-PAC basic mobility standardized score less than 42.9 suggest the patient may benefit from discharge to post-acute rehab services.

## 2025-03-19 NOTE — ASSESSMENT & PLAN NOTE
POD 2 s/p L TKA    - Pain Control  - rohan-op ancef x24 hours  - PT/OT  - WBAT LLE  - ROM as tolerated, pillow/blankets under achilles not behind knee while in bed   - DVT prophylaxis, resumed Eliquis  - Hgb 10.8 this am. Continue to monitor clinically for ABLA  - D/C planning. Possible d/c home with home health services versus placement pending progress with PT.  - proceed with DC

## 2025-03-19 NOTE — PROGRESS NOTES
Progress Note - Orthopedics   Name: Lori Damon 64 y.o. female I MRN: [id number]  Unit/Bed#: E2 -01 I Date of Admission: 3/17/2025   Date of Service: 3/19/2025 I Hospital Day: 1     Assessment & Plan  Primary osteoarthritis of left knee  POD 2 s/p L TKA    - Pain Control  - rohan-op ancef x24 hours  - PT/OT  - WBAT LLE  - ROM as tolerated, pillow/blankets under achilles not behind knee while in bed   - DVT prophylaxis, resumed Eliquis  - Hgb 10.8 this am. Continue to monitor clinically for ABLA  - D/C planning. Possible d/c home with home health services versus placement pending progress with PT.  - proceed with DC  Deep vein thrombosis (DVT) of other vein of right lower extremity, unspecified chronicity (HCC)  Eliquis     Subjective   64 y.o.female seen and evaluated this am. Feels ready for discharge and outpatient PT. Cleared by PT. Discussed with medicine. Fall this AM, without signs of trauma or lasting pain     Objective :  Temp:  [98 °F (36.7 °C)-100.2 °F (37.9 °C)] 99 °F (37.2 °C)  HR:  [68-78] 72  BP: (107-137)/(67-85) 110/70  Resp:  [18] 18  SpO2:  [94 %-97 %] 97 %  O2 Device: None (Room air)    Physical Exam  Musculoskeletal: Left Knee:  Dressing C/D/I  Knee ROM intact.  Motor intact to +FHL/EHL, +ankle dorsi/plantar flexion  Sensation intact to saphenous, sural, tibial, superficial peroneal nerve, and deep peroneal  Intact  DP pulse, feet warm and perfused       Lab Results: I have reviewed the following results:  Recent Labs     03/18/25  0501 03/19/25  0523   WBC 6.33 7.12   HGB 11.4* 10.8*   HCT 35.1 32.4*    201   BUN 17 11   CREATININE 1.01 0.83     Romulo Stringer PA-C

## 2025-03-19 NOTE — ASSESSMENT & PLAN NOTE
Lab Results   Component Value Date    EGFR 73 03/19/2025    EGFR 58 03/18/2025    EGFR 55 02/21/2025    CREATININE 0.83 03/19/2025    CREATININE 1.01 03/18/2025    CREATININE 1.05 02/21/2025     Stable, does not meet BRAYDON criteria

## 2025-03-19 NOTE — DISCHARGE SUMMARY
Discharge Summary - Orthopedics   Name: Lori Arevalo y.o. female I MRN: [id number]  Unit/Bed#: E2 -01 I Date of Admission: 3/17/2025   Date of Service: 3/19/2025 I Hospital Day: 1      ORTHOPEDICS DISCHARGE SUMMARY  Lori Arevalo y.o. female MRN: [id number]  Unit/Bed#: E2 -01    Attending Physician: Dr. Khan    Admitting diagnosis: Primary osteoarthritis of left knee [M17.12]  Chronic pain of left knee [M25.562, G89.29]    Discharge diagnosis: Primary osteoarthritis of left knee [M17.12]  Chronic pain of left knee [M25.562, G89.29]    Date of admission: 3/17/2025    Date of discharge: 03/19/25         Procedure: Left - ARTHROPLASTY KNEE TOTAL     HPI:  This is a 64 y.o. year old female that presented to the office with signs and symptoms of left knee osteoarthritis. They tried and failed conservative treatment measures and wished to proceed with surgical intervention. The risks, benefits, and complications of the procedure were discussed with the patient and informed consent was obtained.    Hospital Course:  The patient was admitted to the hospital on 3/17/2025 and underwent an uncomplicated left total knee arthroplasty. They were transferred to the floor after a brief stay in the post-anesthesia care unit. Their pain was well managed with IV and oral pain medications. They began therapy on post operative day #1. eliquis was also started for DVT prophylaxis 12 hours post operatively.   On discharge date pt was cleared by PT and the medicine team and determined to be safe for discharge.  Daily discussion was had with the patient, nursing staff, orthopaedic team, and family members if present.  All questions were answered to the patients satisifaction.      0   Lab Value Date/Time    HGB 10.8 (L) 03/19/2025 0523    HGB 11.4 (L) 03/18/2025 0501    HGB 13.5 02/21/2025 0915    HGB 14.4 10/29/2024 1026    HGB 13.0 03/21/2024 1724    HGB 12.5 03/08/2024 1034    HGB 14.0 11/02/2022 1930    HGB 15.1  10/29/2022 1848    HGB 13.4 08/15/2022 1654    HGB 13.1 02/10/2022 2010    HGB 10.9 (L) 12/22/2021 1339    HGB 11.9 09/26/2021 0438    HGB 13.3 09/25/2021 0506    HGB 13.6 09/24/2021 1903    HGB 12.4 04/15/2021 1303    HGB 12.4 03/14/2021 1033    HGB 10.0 (L) 02/10/2021 1401    HGB 10.1 (L) 01/16/2021 0621    HGB 11.0 (L) 01/15/2021 0547    HGB 11.0 (L) 01/14/2021 0655    HGB 11.4 (L) 01/13/2021 1529    HGB 9.8 (L) 12/28/2020 0340    HGB 9.3 (L) 12/27/2020 0435    HGB 11.2 (L) 12/26/2020 0430    HGB 12.9 12/25/2020 0809    HGB 11.9 12/21/2020 1146    HGB 10.7 (L) 12/04/2020 0607    HGB 10.3 (L) 12/03/2020 0450    HGB 11.5 12/02/2020 0846    HGB 12.6 05/14/2020 0905    HGB 11.2 (L) 03/19/2020 1453    HGB 11.5 07/31/2019 1402    HGB 12.4 06/27/2019 1023    HGB 11.4 (L) 05/29/2019 2104       Greater than 2 gram drop which qualifies for diagnosis of acute blood loss anemia.  Vital signs remained stable and pt was resuscitated with IVF as needed   Body mass index is 25.31 kg/m².     Discharge Instructions:  The patient was discharged weight bearing as tolerated to the left lower extremity. eliquis will be continued at least  28 days. Continue PT/OT. Take pain medications as instructed.    Discharge Medications:  For the complete list of discharge medications, please refer to the patient's medication reconciliation.      Romulo Stringer PA-C

## 2025-03-19 NOTE — PROGRESS NOTES
Progress Note - Hospitalist   Name: Lori Damon 64 y.o. female I MRN: [id number]  Unit/Bed#: E2 -01 I Date of Admission: 3/17/2025   Date of Service: 3/19/2025 I Hospital Day: 1    Assessment & Plan  Primary osteoarthritis of left knee  64-year-old female with a history of migraines, anemia, tardive dyskinesia, CKD, and DVT on Eliquis was admitted under the Ortho service due to her left knee osteoarthritis.  She is status post left total knee arthroplasty.   Pain control, disposition, and rest of management per primary service   Migraine without aura and without status migrainosus, not intractable  Continue propanolol  Anemia  Results from last 7 days   Lab Units 03/19/25  0523 03/18/25  0501   HEMOGLOBIN g/dL 10.8* 11.4*   MCV fL 93 95   RDW % 13.2 13.1     Continue monitoring, no indication for transfusion at this time.  Tardive dyskinesia  Continue Amantadine  Deep vein thrombosis (DVT) of other vein of right lower extremity, unspecified chronicity (HCC)  Continue Eliquis  Stage 3a chronic kidney disease (HCC)  Lab Results   Component Value Date    EGFR 73 03/19/2025    EGFR 58 03/18/2025    EGFR 55 02/21/2025    CREATININE 0.83 03/19/2025    CREATININE 1.01 03/18/2025    CREATININE 1.05 02/21/2025     Stable, does not meet BRAYDON criteria  Fall  Fell to the side as she attempted to sit back on the bed. She hit her backside on the wall. We attempted to get her back up. No head trauma and currently in pain, states she is fine. Informed primary service, and would defer need for further workup to them. So far she is still looking forward to her discharged today.    VTE Pharmacologic Prophylaxis:   Moderate Risk (Score 3-4) - Pharmacological DVT Prophylaxis Ordered: apixaban (Eliquis).    Mobility:   Basic Mobility Inpatient Raw Score: 24  JH-HLM Goal: 8: Walk 250 feet or more  JH-HLM Achieved: 5: Stand (1 or more minutes)    Discussions with Specialists or Other Care Team Provider: ortho    Education and  Discussions with Family / Patient: patient    Current Length of Stay: 1 day(s)  Current Patient Status: Inpatient   Certification Statement: The patient will continue to require additional inpatient hospital stay due to dispo planning  Discharge Plan: today    Code Status: Level 1 - Full Code    Subjective   Patient seen and examined. She unfortunately slid backwards as she was trying to sit on the bed. She ended up landing her backside on the wall.    Objective   Vitals:   Temp (24hrs), Av °F (37.2 °C), Min:98 °F (36.7 °C), Max:100.2 °F (37.9 °C)    Temp:  [98 °F (36.7 °C)-100.2 °F (37.9 °C)] 98.5 °F (36.9 °C)  HR:  [68-78] 68  Resp:  [18] 18  BP: (100-137)/(57-85) 122/67  SpO2:  [94 %-95 %] 95 %  Body mass index is 25.31 kg/m².     Input and Output Summary (last 24 hours):     Intake/Output Summary (Last 24 hours) at 3/19/2025 1015  Last data filed at 3/19/2025 0501  Gross per 24 hour   Intake 720 ml   Output --   Net 720 ml       Physical Exam  Vitals reviewed.   Constitutional:       General: She is not in acute distress.  HENT:      Head: Normocephalic.      Nose: Nose normal.      Mouth/Throat:      Mouth: Mucous membranes are moist.   Eyes:      General: No scleral icterus.  Cardiovascular:      Rate and Rhythm: Normal rate.   Pulmonary:      Effort: Pulmonary effort is normal. No respiratory distress.   Abdominal:      General: There is no distension.      Palpations: Abdomen is soft.      Tenderness: There is no abdominal tenderness.   Musculoskeletal:      Comments: Left knee wrap in place   Skin:     General: Skin is warm.   Neurological:      Mental Status: She is alert.   Psychiatric:         Mood and Affect: Mood normal.         Behavior: Behavior normal.       Lines/Drains:              Lab Results: I have reviewed the following results:   Results from last 7 days   Lab Units 25  0523 25  0501   WBC Thousand/uL 7.12 6.33   HEMOGLOBIN g/dL 10.8* 11.4*   PLATELETS Thousands/uL 201 223    MCV fL 93 95     Results from last 7 days   Lab Units 03/19/25  0523 03/18/25  0501   SODIUM mmol/L 134* 136   POTASSIUM mmol/L 3.9 4.3   CHLORIDE mmol/L 102 104   CO2 mmol/L 24 26   ANION GAP mmol/L 8 6   BUN mg/dL 11 17   CREATININE mg/dL 0.83 1.01   CALCIUM mg/dL 8.8 8.2*   EGFR ml/min/1.73sq m 73 58   GLUCOSE RANDOM mg/dL 125 105                                        Recent Cultures (last 7 days):         Imaging:  Reviewed radiology reports from this admission: no new imaging    Last 24 Hours Medication List:     Current Facility-Administered Medications:     acetaminophen (TYLENOL) tablet 650 mg, Q4H PRN    acetaminophen (TYLENOL) tablet 975 mg, Q8H MARION    amantadine (SYMMETREL) capsule 100 mg, Daily    apixaban (ELIQUIS) tablet 5 mg, BID    ascorbic acid (VITAMIN C) tablet 500 mg, BID    buPROPion (WELLBUTRIN XL) 24 hr tablet 150 mg, QAM    calcium carbonate (TUMS) chewable tablet 1,000 mg, Daily PRN    docusate sodium (COLACE) capsule 100 mg, BID    folic acid (FOLVITE) tablet 1 mg, Daily    gabapentin (NEURONTIN) capsule 400 mg, TID    lactated ringers infusion, Continuous, Last Rate: Stopped (03/17/25 1723)    metaxalone (SKELAXIN) tablet 400 mg, TID    morphine injection 2 mg, Q2H PRN    multivitamin-minerals (CENTRUM) tablet 1 tablet, Daily    ondansetron (ZOFRAN) injection 4 mg, Q6H PRN    oxybutynin (DITROPAN) tablet 5 mg, BID    oxyCODONE (ROXICODONE) immediate release tablet 10 mg, Q4H PRN    oxyCODONE (ROXICODONE) IR tablet 5 mg, Q4H PRN    pantoprazole (PROTONIX) EC tablet 40 mg, Daily Before Breakfast    propranolol (INDERAL LA) 24 hr capsule 60 mg, QPM    senna (SENOKOT) tablet 8.6 mg, Daily    simethicone (MYLICON) chewable tablet 80 mg, 4x Daily PRN      **Please Note: This note may have been constructed using a voice recognition system.**

## 2025-03-19 NOTE — UTILIZATION REVIEW
Notification of Unplanned, Urgent, or   Emergency Inpatient Admission   AUTHORIZATION REQUEST   [place] [address]  Tax ID: [id number]  NPI: [id number]  Place of Service: Acute [facility]  Admission Level of Care: Inpatient  Place of Service Code: 21     Attending Physician Information  Attending Name and NPI#: Malcolm Santy Khan Do [[id number]]  Phone: [phone]     Admission Information  Inpatient Admission Date/Time: 3/18/25  3:19 PM  Discharge Date/Time: No discharge date for patient encounter.  Admitting Diagnosis Code/Description:  Primary osteoarthritis of left knee [M17.12]  Chronic pain of left knee [M25.562, G89.29]     Utilization Review Contact  Becca Morrison Utilization   Phone: [phone]  Fax: [phone]  Email: [email or web address]  Contact for approvals/pending authorizations, clinical reviews, and discharge.     Physician Advisory Services Contact  Medical Necessity Denial & Roqr-ot-Qkrf Discussion  Phone: [phone]  Fax: [phone]  Email: [email or web address]     DISCHARGE SUPPORT TEAM:  For Patients Discharge Needs & Updates  Phone: [phone] opt. 2 Fax: [phone]  Email: [email or web address]

## 2025-03-19 NOTE — NURSING NOTE
Pt d/c to home via dtr and w/c. RW sent along, pt taught how to properly use. AVS reviewed with pt and dtr, both expressed knowledge of f/u appts and med administration. Also able to p/u meds from pharm

## 2025-03-20 ENCOUNTER — TRANSITIONAL CARE MANAGEMENT (OUTPATIENT)
Dept: FAMILY MEDICINE CLINIC | Facility: CLINIC | Age: 65
End: 2025-03-20

## 2025-03-20 ENCOUNTER — PATIENT OUTREACH (OUTPATIENT)
Dept: CASE MANAGEMENT | Facility: OTHER | Age: 65
End: 2025-03-20

## 2025-03-20 ENCOUNTER — TELEPHONE (OUTPATIENT)
Dept: OBGYN CLINIC | Facility: HOSPITAL | Age: 65
End: 2025-03-20

## 2025-03-20 ENCOUNTER — OFFICE VISIT (OUTPATIENT)
Age: 65
End: 2025-03-20
Payer: COMMERCIAL

## 2025-03-20 DIAGNOSIS — Z96.652 S/P TKR (TOTAL KNEE REPLACEMENT), LEFT: ICD-10-CM

## 2025-03-20 DIAGNOSIS — Z74.09 IMPAIRED FUNCTIONAL MOBILITY, BALANCE, GAIT, AND ENDURANCE: ICD-10-CM

## 2025-03-20 DIAGNOSIS — M17.12 PRIMARY OSTEOARTHRITIS OF LEFT KNEE: Primary | ICD-10-CM

## 2025-03-20 DIAGNOSIS — M25.562 ACUTE PAIN OF LEFT KNEE: ICD-10-CM

## 2025-03-20 PROCEDURE — 97110 THERAPEUTIC EXERCISES: CPT

## 2025-03-20 PROCEDURE — 97112 NEUROMUSCULAR REEDUCATION: CPT

## 2025-03-20 PROCEDURE — 97164 PT RE-EVAL EST PLAN CARE: CPT

## 2025-03-20 PROCEDURE — 97116 GAIT TRAINING THERAPY: CPT

## 2025-03-20 NOTE — PROGRESS NOTES
PT Re-Evaluation     Today's date: 3/20/2025  Patient name: Lori Damon  : 1960  MRN: [id number]  Referring provider: Chica Dennis PA-C  Dx:   Encounter Diagnosis     ICD-10-CM    1. Primary osteoarthritis of left knee  M17.12       2. S/P TKR (total knee replacement), left  Z96.652       3. Impaired functional mobility, balance, gait, and endurance  Z74.09       4. Acute pain of left knee  M25.562             Start Time: 1715  Stop Time:   Total time in clinic (min): 60 minutes    Assessment  Impairments: abnormal gait, abnormal or restricted ROM, activity intolerance, impaired balance, impaired physical strength, lacks appropriate home exercise program, pain with function, safety issue, weight-bearing intolerance, poor body mechanics, unable to perform ADL, participation limitations, activity limitations and endurance    Assessment details: Patient is a 65 yo female presenting to physical therapy s/p L TKA on 3/17/25. Pt presented to the session ambulating with a RW using step to gait mechanics. Resized the RW and performed gait training to encourage heel toe strike and safety with AD management. Patient presents with decreased hip and knee strength, decreased L knee ROM, decreased endurance and gait deviations such as decreased step length and antalgic gait. Pt is considered a fall risk based on time taken to complete the TUG (40 seconds). Performed gait training for stair negotiation. Provided pt education on signs/ symptoms of infections and DVT; safety with AD management, stair negotiation, ice/ elevation, and HEP. PT will address the noted impairments by performing hip and knee strengthening, stretching, balance, functional activities and manual techniques to allow the patient to return to her PLOF. PT recommended 2-3x/week for 8-10 weeks c a guarded prognosis 2* PLOF pre operatively. PT educated the patient on stair negotiation, assistive device management, home set up, HEP and DVT/  infection signs and symptoms.     Understanding of Dx/Px/POC: good     Prognosis: good    Goals  Post-Operative Goals:  STG: In four weeks the patient will:    1. Be (I) with her HEP.  2. Increase hip and knee strength to 4/5 MMT score to assist c ADLs.  3. Increase L knee ROM by 15 degrees to assist c walking and transfers.  4. amb with a RW for 200ft with mod (I) with proper gait mechanics.  5. amb with a SPC for 200ft with mod (I) with proper gait mechanics.   6. Negotiate 1 step with proper mechanics with a SPC to assist c getting into her home.       LTG: In eight weeks, the patient will:    1. Increase FOTO score to TBD to demonstrate improvements in symptoms and function.  2. Demonstrate full L knee AROM without pain.  3. Negotiate a full flight of steps without pain.  4. Increase hip and knee strength to 4+/5 MMT score to assist c prolonged walking and stairs.   5. amb (I) for 200ft with proper gait mechanics.          Plan  Patient would benefit from: skilled physical therapy and PT eval  Planned modality interventions: cryotherapy and thermotherapy: hydrocollator packs    Planned therapy interventions: IASTM, joint mobilization, kinesiology taping, manual therapy, massage, Yi taping, abdominal trunk stabilization, balance, body mechanics training, breathing training, neuromuscular re-education, patient education, postural training, strengthening, stretching, therapeutic activities, therapeutic exercise, flexibility, functional ROM exercises, gait training, transfer training, home exercise program and patient/caregiver education    Frequency: 2x week  Duration in weeks: 10  Plan of Care beginning date: 3/20/2025  Plan of Care expiration date: 5/29/2025  Treatment plan discussed with: patient        Subjective Evaluation    History of Present Illness  Mechanism of injury: Pt reports she had surgery on 3/17/25 no complications reported. Pt returned home on 3/19/25. Pt is currently sleeping in a  "recliner. Pt reports she has been ambulating with  RW around the home and community. Pt has been icing and elevating 2x a day for about 10-15 minutes. Pt is currently taking Oxycodone every 6 hours prn (2x today). Pt is only able to sit for less than 5 minutes; stand for 5 minutes with RW, and walk for 20 minutes with RW before symptoms increase.   Patient Goals  Patient goals for therapy: increased strength, independence with ADLs/IADLs, return to sport/leisure activities, increased motion, improved balance and decreased pain  Patient goal: \"to improve mobility and walk.\"(in progress) \"to get out and about.\"(in progress); new goals: \"return to work\"; \"be able to walk without AD\"; \"be able to complete chores\"  Pain  Current pain ratin (inferior to the patella)  At best pain ratin  At worst pain ratin  Quality: burning, knife-like, radiating, pressure, sharp, tight, discomfort, pulling and squeezing  Relieving factors: rest, medications, ice and change in position (Aspirin; oxycodone)  Aggravating factors: standing, walking and lifting (driving; floor transfers, dressing)  Progression: improved    Social Support  Steps to enter house: yes  2  Stairs in house: yes (4\" step)   1  Lives in: one-story house  Lives with: adult children    Employment status: not working  Hand dominance: right          Objective     Postural Observations  Seated posture: fair  Standing posture: fair      Observations   Left Knee   Positive for edema.     Tenderness     Additional Tenderness Details  Tender to palpate global L knee.    Active Range of Motion   Left Knee   Flexion: 48 degrees with pain  Extension: -3 degrees with pain  Extensor lag: -3 degrees     Passive Range of Motion   Left Knee   Flexion: 85 degrees with pain  Extension: 0 degrees with pain    Mobility   Patellar Mobility:   Left Knee   Hypomobile: left medial, left lateral, left superior and left inferior    Strength/Myotome Testing     Left Hip   Planes of " "Motion   Flexion: 3  Extension: 3+  Abduction: 4-  Adduction: 4-    Right Hip   Planes of Motion   Flexion: 4-  Extension: 3+  Abduction: 4-  Adduction: 4-    Left Knee   Flexion: 4-  Extension: 3+  Quadriceps contraction: fair    Right Knee   Flexion: 4+  Extension: 4+    Left Ankle/Foot   Dorsiflexion: 4+    Right Ankle/Foot   Dorsiflexion: 4+    Swelling     Left Knee Girth Measurement (cm)   Joint line: 46 cm  10 cm above joint line: 48 cm  10 cm below joint line: 40 cm    Ambulation     Ambulation: Level Surfaces   Ambulation with assistive device: close supervision.  Ambulation without assistive device: unable    Ambulation: Stairs   Level of assistance: close supervision.  Pattern: non-reciprocal  Railings: two rails  Level of assistance: close supervision.  Pattern: non-reciprocal  Railings: two rails  Curbs: unable    Observational Gait   Gait: antalgic   Increased right stance time. Decreased walking speed, stride length, left stance time, left step length and right step length.   Left foot contact pattern: foot flat  Right foot contact pattern: foot flat  Base of support: increased    Functional Assessment        Comments  25 TU seconds no AD, moderate pain noted    RE: 3/20/25  TU seconds w/ RW step through gait; required b/l UE support to stand    General Comments:      Knee Comments  Edema present in entire L LE. Pt was donning compression socks. No s/s of infection/ DVT observed. Provided pt education on s/s of infection and DVT.      Precautions: osteoporosis      Manuals  3/3 3/6 3/10 3/12 3/20 RE    L patellar mobs nv            PROM/ stretch          RR                               Neuro Re-Ed             HEP edu; home set up; AD management MW MW MW; cane edu MW  HEP edu MW HEP edu MW  HEP Edu MW  HEP edu MW  HEP edu RR    Glute set         10x5\" holds ea     Quad set nv X15  5\" hold each X20  5\" hold each X25  5\" hold each X25  5\" hold each X25  5\" hold each " "X25  5\" hold each X25  5\" hold each 10x5\" holds ea    SLR nv   X10 ea X15 L X15 L X15 ea X15 ea     clams nv      X10 ea      Sidelying hip abd nv            Sit to stand nv                         Ther Ex             Nustep nv 10' lvl 1 10' lvl 3 10' lvl 3 10' lvl 4 10' lvl 4 10' lvl 4 10' lvl 4 10' L 1    Hamstring stretch nv            Piriformis stretch nv  3x30\" ea 3x30\" ea 3x30\" ea   3x30\" ea     Lower trunk rotations   5x15\" ea 5x15\" ea 5x15\" ea 5x15\" ea 5x15\" ea 5x15\" ea     HR nv            Seated marching   X20 ea X20 ea X20 ea X20 ea X20 ea X20 ea     LAQ  X20 ea  X20 ea X20 ea X20 ea X20 ea X20 ea     Calf stretch with strap     3x30\" 3x30\"       Supine hip abd  With strap  x20           Heel slides nv x20   x20 x20       Glute set  50%  X10  5\" hold knees bent 2x10  5\" hold  PTB press hip abd 2x10  5\" hold  PTB press hip abd 2x10  5\" hold  PTB press hip abd 2x10  5\" hold  GTB press hip abd 2x10  5\" hold  GTB press hip abd 2x10  5\" hold  BTB press hip abd     Hip add nv X20  5\" hold X20  5\" hold X20  5\" hold X20  5\" hold X30  5\" hold X30  5\" hold X30  5\" hold     Hip abd nv PTB  X20  5\" hold PTB  X25  5\" hold PTB  X25  5\" hold PTB  X25  5\" hold GTB  X25  5\" hold GTB  X25  5\" hold BTB  x20     Ther Activity                                       Gait Training             TUG Performed + edu        performed    Stair negotiation         RR    RW         RR step through gait mech    Modalities                                              "

## 2025-03-20 NOTE — RESTORATIVE TECHNICIAN NOTE
Restorative Technician Note      Patient Name: Lori Damon     Restorative Tech Visit Date: 03/20/25  Note Type: Mobility  Patient Position Upon Consult: Supine  Activity Performed: Ambulated; Range of motion  Assistive Device: Roller walker  Patient Position at End of Consult: All needs within reach; Supine

## 2025-03-20 NOTE — TELEPHONE ENCOUNTER
Patient contacted for a postoperative follow up assessment. Patient states current pain level of a 5/10 when sitting and 6/10 when walking with RW.  Patient denies increase in swelling and dressing is Dressing C/D/I Patient isicing the site regularly. NN educated patient on post-op bruising, swelling, and icing. Patient calling for PT today.      We reviewed patients AVS medication list. Patient is taking Oxycodone 5mg PRN and Senokot once daily and Eliquis 5mg BID, ASA 2000mg a day, Gabapentin 400mg TID. Patient has had a BM but ispassing gas.       Patient denies nausea, vomiting, abdominal pain, chest pain, shortness of breath, fever, dizziness, and calf pain. Patient confirmed post-op appointment with surgeon on 4/1 at 1:15PM .Patient does not have any other questions or concerns at this time. Pt was encouraged to call with any questions, concerns or issues.  .

## 2025-03-24 ENCOUNTER — APPOINTMENT (OUTPATIENT)
Age: 65
End: 2025-03-24
Payer: COMMERCIAL

## 2025-03-24 ENCOUNTER — PATIENT OUTREACH (OUTPATIENT)
Dept: CASE MANAGEMENT | Facility: OTHER | Age: 65
End: 2025-03-24

## 2025-03-24 NOTE — PROGRESS NOTES
"Outpatient Care Management note- follow up call    Chart review completed.    Initial outreach to Lori. RN CM role explained.     Patient with h/o urge incontinence, hydronephrosis, HLD, migraines, anemia, IBS, smoker, TD, DVT, OA, falls, CKD3    Lori was admitted to [facility] from 3/17/25 to 3/19/25 for LTKA. The surgical procedure was done on 3/17/25 d/t OA and s/p failed conservative measures.     Reviewed d/c AVS with the patient.     Lori reports she is not using the Oxycodone for pain routinely if she doesn't have to. She would prefer to avoid taking it. She states she \"broke down\" and took it last evening and this morning. She is not taking Tylenol d/t ineffectiveness and states she is taking Aspirin. She reports she takes Aspirin 500 mg, up to 4,000 mg/day PRN. This is a PTA medication and approved by ortho. She is WBAT and is doing PT 2x/week. She reports her swelling as moderate. She is elevating the leg but was told not to use ice by the hospital nurse and therapy because it can be the cause for pain. Reminded to elevate but not behind the knee. She states there is no dressing. She has sutures and glue. Denies drainage or bleeding at this time. Advised to continue to monitor for increased pain, swelling and drainage. Lori reports she had some spotty bleeding after therapy but nothing since. She is continued on her Eliquis 5 mg BID. She has had a BM. Lori states her L hip and ankle ache now and reports having OA all over her body. Advised to discuss with PT and ortho provider as other joints can compensate. She denies any other complaints.    Lori is scheduled with PT for tomorrow, 3/25/25 and Ortho follow up appt on 4/1/25    She declined the need for further OP RN CM outreach. Will close referral at this time.   "

## 2025-03-25 ENCOUNTER — OFFICE VISIT (OUTPATIENT)
Age: 65
End: 2025-03-25
Payer: COMMERCIAL

## 2025-03-25 DIAGNOSIS — Z96.652 S/P TKR (TOTAL KNEE REPLACEMENT), LEFT: ICD-10-CM

## 2025-03-25 DIAGNOSIS — M25.562 ACUTE PAIN OF LEFT KNEE: ICD-10-CM

## 2025-03-25 DIAGNOSIS — M17.12 PRIMARY OSTEOARTHRITIS OF LEFT KNEE: Primary | ICD-10-CM

## 2025-03-25 DIAGNOSIS — Z74.09 IMPAIRED FUNCTIONAL MOBILITY, BALANCE, GAIT, AND ENDURANCE: ICD-10-CM

## 2025-03-25 PROCEDURE — 97112 NEUROMUSCULAR REEDUCATION: CPT | Performed by: PHYSICAL THERAPIST

## 2025-03-25 PROCEDURE — 97110 THERAPEUTIC EXERCISES: CPT | Performed by: PHYSICAL THERAPIST

## 2025-03-25 NOTE — PROGRESS NOTES
"Daily Note     Today's date: 3/25/2025  Patient name: Lori Damon  : 1960  MRN: [id number]  Referring provider: Chica Dennis PA-C  Dx:   Encounter Diagnosis     ICD-10-CM    1. Primary osteoarthritis of left knee  M17.12       2. S/P TKR (total knee replacement), left  Z96.652       3. Impaired functional mobility, balance, gait, and endurance  Z74.09       4. Acute pain of left knee  M25.562           Start Time: 1700  Stop Time: 1745  Total time in clinic (min): 45 minutes    Subjective: Patient noted that the knee is painful.       Objective: See treatment diary below      Assessment: Patient performed Nustep aerobic exercise to increase blood flow to the area being treated, prepare the muscles for strength training and stretching, improve overall tolerance to activity, and aerobic endurance. Patient amb with a RW with cues for heel strike. Patient performed exercises with min VCS for form. Patient was able to activate her quad, however it was minimal. PT continues to note improvements in patellar mobility post manuals. PT educated the patient on her HEP, ice and walking every hour. Patient would benefit from continued PT to allow the patient to return to her PLOF.         Plan: Continue per plan of care.      Precautions: osteoporosis      Manuals 2/17 2/20 2/26 2/28 3/3 3/6 3/10 3/12 3/20 RE 3/25   L patellar mobs nv         MW grade III   PROM/ stretch          RR  MW                             Neuro Re-Ed             HEP edu; home set up; AD management MW MW MW; cane edu MW  HEP edu MW HEP edu MW  HEP Edu MW  HEP edu MW  HEP edu RR MW HEP edu   Glute set         10x5\" holds ea     Quad set nv X15  5\" hold each X20  5\" hold each X25  5\" hold each X25  5\" hold each X25  5\" hold each X25  5\" hold each X25  5\" hold each 10x5\" holds ea X20  5\" hold each   SLR nv   X10 ea X15 L X15 L X15 ea X15 ea     clams nv      X10 ea      Sidelying hip abd nv            Sit to stand nv                       " "  Ther Ex             Nustep nv 10' lvl 1 10' lvl 3 10' lvl 3 10' lvl 4 10' lvl 4 10' lvl 4 10' lvl 4 10' L 1 10' lvl 1   Hamstring stretch nv            Piriformis stretch nv  3x30\" ea 3x30\" ea 3x30\" ea   3x30\" ea     Lower trunk rotations   5x15\" ea 5x15\" ea 5x15\" ea 5x15\" ea 5x15\" ea 5x15\" ea     HR nv            Seated marching   X20 ea X20 ea X20 ea X20 ea X20 ea X20 ea  X20 ea   LAQ  X20 ea  X20 ea X20 ea X20 ea X20 ea X20 ea  LAQ--> knee flexion  X20 ea   Calf stretch with strap     3x30\" 3x30\"    3x30\" ea   Supine hip abd  With strap  x20        With strap  X20 L   Heel slides nv x20   x20 x20    X20 L   Glute set  50%  X10  5\" hold knees bent 2x10  5\" hold  PTB press hip abd 2x10  5\" hold  PTB press hip abd 2x10  5\" hold  PTB press hip abd 2x10  5\" hold  GTB press hip abd 2x10  5\" hold  GTB press hip abd 2x10  5\" hold  BTB press hip abd     Hip add nv X20  5\" hold X20  5\" hold X20  5\" hold X20  5\" hold X30  5\" hold X30  5\" hold X30  5\" hold  X20  5\" hold   Hip abd nv PTB  X20  5\" hold PTB  X25  5\" hold PTB  X25  5\" hold PTB  X25  5\" hold GTB  X25  5\" hold GTB  X25  5\" hold BTB  x20     Ther Activity                                       Gait Training             TUG Performed + edu        performed    Stair negotiation         RR    RW         RR step through gait mech 2 laps   Modalities                                                      "

## 2025-03-27 ENCOUNTER — OFFICE VISIT (OUTPATIENT)
Age: 65
End: 2025-03-27
Payer: COMMERCIAL

## 2025-03-27 DIAGNOSIS — Z74.09 IMPAIRED FUNCTIONAL MOBILITY, BALANCE, GAIT, AND ENDURANCE: ICD-10-CM

## 2025-03-27 DIAGNOSIS — M25.562 ACUTE PAIN OF LEFT KNEE: ICD-10-CM

## 2025-03-27 DIAGNOSIS — M17.12 PRIMARY OSTEOARTHRITIS OF LEFT KNEE: Primary | ICD-10-CM

## 2025-03-27 DIAGNOSIS — Z96.652 S/P TKR (TOTAL KNEE REPLACEMENT), LEFT: ICD-10-CM

## 2025-03-27 PROCEDURE — 97112 NEUROMUSCULAR REEDUCATION: CPT

## 2025-03-27 PROCEDURE — 97110 THERAPEUTIC EXERCISES: CPT

## 2025-03-27 NOTE — PROGRESS NOTES
Daily Note     Today's date: 3/27/2025  Patient name: Lori Damon  : 1960  MRN: [id number]  Referring provider: Chica Dennis PA-C  Dx:   Encounter Diagnosis     ICD-10-CM    1. Primary osteoarthritis of left knee  M17.12       2. S/P TKR (total knee replacement), left  Z96.652       3. Impaired functional mobility, balance, gait, and endurance  Z74.09       4. Acute pain of left knee  M25.562           Start Time: 1700  Stop Time: 1745  Total time in clinic (min): 45 minutes    Subjective: Reports following last session she was very sore in her L hip flexors and L ankle. Currently reports 5-7 global L knee pain.       Objective: See treatment diary below      Assessment: Pt performed Nustep aerobic exercises to increase blood flow to the area being treated, prepare the muscles for strength training and stretching, improve overall tolerance to activity, and aerobic endurance. Pt demonstrated improved gait mechanics with RW, continues to have decreased heel toe contact. Next session pt will bring her SPC to be fitted and progress to gait training with SPC. Pt was able to progress to marches unilaterally versus alternating, noted fatigue towards the end of repetitions. Utilized ice/ elevation at the end of the session for pain relief and swelling. Pt left session with 5/10 pain in the L knee. Pt continues to benefit from physical therapy in order to continue progressing towards goals as outlined and return to PLOF. Will continue to modify and advance current POC based on overall progress and symptom irritability.       Plan: Continue per plan of care.  Progress treatment as tolerated.       Precautions: osteoporosis      Manuals 3/27     3/6 3/10 3/12 3/20 RE 3/25   L patellar mobs RR grade 3         MW grade III   PROM/ stretch  RR        RR  MW                             Neuro Re-Ed             HEP edu; home set up; AD management RR     MW  HEP Edu MW  HEP edu MW  HEP edu RR MW HEP edu   Glute set      "    10x5\" holds ea     Quad set X20  5\" hold each     X25  5\" hold each X25  5\" hold each X25  5\" hold each 10x5\" holds ea X20  5\" hold each   SLR      X15 L X15 ea X15 ea     clams       X10 ea      Sidelying hip abd             Sit to stand                          Ther Ex             Nustep 12' L3     10' lvl 4 10' lvl 4 10' lvl 4 10' L 1 10' lvl 1   Hamstring stretch             Piriformis stretch        3x30\" ea     Lower trunk rotations      5x15\" ea 5x15\" ea 5x15\" ea     HR             Seated marching x20     X20 ea X20 ea X20 ea  X20 ea   LAQ LAQ--> knee flexion  X20 ea     X20 ea X20 ea X20 ea  LAQ--> knee flexion  X20 ea   Calf stretch with strap 3x30\" ea     3x30\"    3x30\" ea   Supine hip abd With strap  X20 L         With strap  X20 L   Heel slides 20x5\" holds ea L     x20    X20 L   Glute set      2x10  5\" hold  GTB press hip abd 2x10  5\" hold  GTB press hip abd 2x10  5\" hold  BTB press hip abd     Hip add X20  5\" hold     X30  5\" hold X30  5\" hold X30  5\" hold  X20  5\" hold   Hip abd      GTB  X25  5\" hold GTB  X25  5\" hold BTB  x20     Ther Activity                                       Gait Training             TUG         performed    Stair negotiation         RR    RW 2 laps SPC       RR step through gait mech 2 laps   Modalities             CP 10' with elevation (4' tolerated with elevation) skin intact prior to and following CP                                          "

## 2025-03-31 ENCOUNTER — OFFICE VISIT (OUTPATIENT)
Age: 65
End: 2025-03-31
Payer: COMMERCIAL

## 2025-03-31 DIAGNOSIS — M17.12 PRIMARY OSTEOARTHRITIS OF LEFT KNEE: Primary | ICD-10-CM

## 2025-03-31 DIAGNOSIS — M25.562 ACUTE PAIN OF LEFT KNEE: ICD-10-CM

## 2025-03-31 DIAGNOSIS — Z74.09 IMPAIRED FUNCTIONAL MOBILITY, BALANCE, GAIT, AND ENDURANCE: ICD-10-CM

## 2025-03-31 DIAGNOSIS — Z96.652 S/P TKR (TOTAL KNEE REPLACEMENT), LEFT: ICD-10-CM

## 2025-03-31 PROCEDURE — 97110 THERAPEUTIC EXERCISES: CPT | Performed by: PHYSICAL THERAPIST

## 2025-03-31 PROCEDURE — 97112 NEUROMUSCULAR REEDUCATION: CPT | Performed by: PHYSICAL THERAPIST

## 2025-03-31 PROCEDURE — 97116 GAIT TRAINING THERAPY: CPT | Performed by: PHYSICAL THERAPIST

## 2025-03-31 NOTE — PROGRESS NOTES
Daily Note     Today's date: 3/31/2025  Patient name: Lori Damon  : 1960  MRN: [id number]  Referring provider: Chica Dennis PA-C  Dx:   Encounter Diagnosis     ICD-10-CM    1. Primary osteoarthritis of left knee  M17.12       2. S/P TKR (total knee replacement), left  Z96.652       3. Impaired functional mobility, balance, gait, and endurance  Z74.09       4. Acute pain of left knee  M25.562           Start Time: 1615  Stop Time: 1700  Total time in clinic (min): 45 minutes    Subjective: Patient noted she felt pretty good after Thursday's session, however on Friday she woke up with swelling in the L anterior and posterior hip as well as her L groin area. She noted she felt a lump when wiping. Patient noted that it went away last night. Patient noted it was a painful weekend.       Objective: See treatment diary below      Assessment: Patient performed Nustep aerobic exercise to increase blood flow to the area being treated, prepare the muscles for strength training and stretching, improve overall tolerance to activity, and aerobic endurance. PT introduced standing stretching as well as standing hip abd to assist c walking. Patient performed with fatigue noted and instability in the L knee. Patient amb with the SPC during the session for 3 laps and was able to perform, however needed cues for heel strike and cues to navigate the bends. PT educated the patient on amb with a SPC while in her home and RW in the community. Patient ended the session with ice on the knee while stretching into knee extension. Patient noted some nerve sensitivity with the ice, so the PT applied another towel between the ice and skin. PT educated the patient on her HEP. Patient would benefit from continued PT to allow the patient to return to her PLOF.         Plan: Continue per plan of care.      Precautions: osteoporosis      Manuals 3/27 3/31    3/6 3/10 3/12 3/20 RE 3/25   L patellar mobs RR grade 3 MW grade III        MW  "grade III   PROM/ stretch  RR MW       RR  MW                             Neuro Re-Ed             HEP edu; home set up; AD management RR MW HEP edu    MW  HEP Edu MW  HEP edu MW  HEP edu RR MW HEP edu   Glute set         10x5\" holds ea     Quad set X20  5\" hold each     X25  5\" hold each X25  5\" hold each X25  5\" hold each 10x5\" holds ea X20  5\" hold each   SLR      X15 L X15 ea X15 ea     clams       X10 ea      Sidelying hip abd             Sit to stand             Standing hip abd  2x10 ea           Ther Ex             Nustep 12' L3 10' lvl 4    10' lvl 4 10' lvl 4 10' lvl 4 10' L 1 10' lvl 1   Hamstring stretch  @ step  2x20\" ea           Knee flexion stretch at step  2x20\" each           Piriformis stretch        3x30\" ea     Lower trunk rotations      5x15\" ea 5x15\" ea 5x15\" ea     HR             Seated marching x20 X20 ea    X20 ea X20 ea X20 ea  X20 ea   LAQ LAQ--> knee flexion  X20 ea     X20 ea X20 ea X20 ea  LAQ--> knee flexion  X20 ea   Calf stretch with strap 3x30\" ea Fitter board  3x30\" each    3x30\"    3x30\" ea   Supine hip abd With strap  X20 L         With strap  X20 L   Heel slides 20x5\" holds ea L     x20    X20 L   Glute set      2x10  5\" hold  GTB press hip abd 2x10  5\" hold  GTB press hip abd 2x10  5\" hold  BTB press hip abd     Hip add X20  5\" hold     X30  5\" hold X30  5\" hold X30  5\" hold  X20  5\" hold   Hip abd      GTB  X25  5\" hold GTB  X25  5\" hold BTB  x20     Ther Activity                                       Gait Training             TUG         performed    Stair negotiation         RR    RW 2 laps SPC  3 laps + edu       RR step through gait mech 2 laps   Modalities             CP 10' with elevation (4' tolerated with elevation) skin intact prior to and following CP                                            "

## 2025-04-01 ENCOUNTER — OFFICE VISIT (OUTPATIENT)
Age: 65
End: 2025-04-01

## 2025-04-01 ENCOUNTER — APPOINTMENT (OUTPATIENT)
Age: 65
End: 2025-04-01
Payer: COMMERCIAL

## 2025-04-01 VITALS — WEIGHT: 142 LBS | BODY MASS INDEX: 25.16 KG/M2 | HEIGHT: 63 IN

## 2025-04-01 DIAGNOSIS — Z96.652 STATUS POST TOTAL KNEE REPLACEMENT, LEFT: ICD-10-CM

## 2025-04-01 DIAGNOSIS — Z96.652 STATUS POST TOTAL KNEE REPLACEMENT, LEFT: Primary | ICD-10-CM

## 2025-04-01 PROCEDURE — 73562 X-RAY EXAM OF KNEE 3: CPT

## 2025-04-01 PROCEDURE — 99024 POSTOP FOLLOW-UP VISIT: CPT | Performed by: STUDENT IN AN ORGANIZED HEALTH CARE EDUCATION/TRAINING PROGRAM

## 2025-04-01 NOTE — PROGRESS NOTES
Subjective:Patient seen and examined. Pain controlled. Progressing well, she is ambulating with a cane at home but presents today walking with a walker.  She is doing well overall post-operatively.  Incision without drainage, mepilex intact.  Denies fevers or chills.    Physical Exam:  Incision: CDI, no erythema or drainage noted  ROM: 0-115  5/5 IP/Q/HS/TA/GS, 2+ DP/PT, SILT DP/SP/S/S/TN    XR Left knee: s/p press-fit CR attune TKA, components in good position. No fracture or dislocation.      Assessment & Plan  Status post total knee replacement, left  64 y.o. female doing well 2 weeks s/p Left TKA from 3/17/25.    - continue multi-modal pain control   - Weight bearing status: WBAT LLE  - DVT ppx: Resume Eliquis  - Incision care: May shower, do not scrub or submerge incision  - PT/OT  - F/U 4 weeks    Orders:    XR knee 3 vw left non injury; Future        Scribe Attestation      I,:  Chica Dennis PA-C am acting as a scribe while in the presence of the attending physician.:       I,:  Malcolm Khan, DO personally performed the services described in this documentation    as scribed in my presence.:

## 2025-04-03 ENCOUNTER — PATIENT MESSAGE (OUTPATIENT)
Age: 65
End: 2025-04-03

## 2025-04-03 ENCOUNTER — OFFICE VISIT (OUTPATIENT)
Age: 65
End: 2025-04-03
Payer: COMMERCIAL

## 2025-04-03 DIAGNOSIS — M17.12 PRIMARY OSTEOARTHRITIS OF LEFT KNEE: Primary | ICD-10-CM

## 2025-04-03 DIAGNOSIS — Z74.09 IMPAIRED FUNCTIONAL MOBILITY, BALANCE, GAIT, AND ENDURANCE: ICD-10-CM

## 2025-04-03 DIAGNOSIS — M25.562 ACUTE PAIN OF LEFT KNEE: ICD-10-CM

## 2025-04-03 DIAGNOSIS — Z96.652 S/P TKR (TOTAL KNEE REPLACEMENT), LEFT: ICD-10-CM

## 2025-04-03 DIAGNOSIS — Z96.652 STATUS POST TOTAL KNEE REPLACEMENT, LEFT: Primary | ICD-10-CM

## 2025-04-03 PROCEDURE — 97110 THERAPEUTIC EXERCISES: CPT | Performed by: PHYSICAL THERAPIST

## 2025-04-03 PROCEDURE — 97140 MANUAL THERAPY 1/> REGIONS: CPT | Performed by: PHYSICAL THERAPIST

## 2025-04-03 RX ORDER — OXYCODONE HYDROCHLORIDE 5 MG/1
5 TABLET ORAL EVERY 4 HOURS PRN
Qty: 42 TABLET | Refills: 0 | Status: SHIPPED | OUTPATIENT
Start: 2025-04-03 | End: 2025-04-13

## 2025-04-03 NOTE — PROGRESS NOTES
"Daily Note     Today's date: 4/3/2025  Patient name: Lori Damon  : 1960  MRN: [id number]  Referring provider: Chica Dennis PA-C  Dx:   Encounter Diagnosis     ICD-10-CM    1. Primary osteoarthritis of left knee  M17.12       2. S/P TKR (total knee replacement), left  Z96.652       3. Impaired functional mobility, balance, gait, and endurance  Z74.09       4. Acute pain of left knee  M25.562           Start Time: 1530  Stop Time: 1615  Total time in clinic (min): 45 minutes    Subjective: Patient noted a 6/10 pain in the body today. Patient noted the weather has a role in her pain. Patient followed up with her surgeon and he noted good progress.       Objective: See treatment diary below      Assessment: Patient performed Nustep aerobic exercise to increase blood flow to the area being treated, prepare the muscles for strength training and stretching, improve overall tolerance to activity, and aerobic endurance. Patient performed step ups with min VCS for form, however no increased pain. Patient continues to improve with gait mechanics, however cues are required for knee extension. PT performed patellar mobs with hypomobility in the superior and inferior direction. PT educated the patient on her HEP. Patient would benefit from continued PT to allow the patient to return to her PLOF.         Plan: Continue per plan of care.      Precautions: osteoporosis      Manuals 3/27 3/31 4/3   3/6 3/10 3/12 3/20 RE 3/25   L patellar mobs RR grade 3 MW grade III MW grade III       MW grade III   PROM/ stretch  RR MW MW      RR  MW                             Neuro Re-Ed             HEP edu; home set up; AD management RR MW HEP edu MW  HEP edu   MW  HEP Edu MW  HEP edu MW  HEP edu RR MW HEP edu   Step ups fwd   6\"  X20 ea 2HHA          Step ups lateral   6\"  X20 ea 2 HHA          Glute set         10x5\" holds ea     Quad set X20  5\" hold each  X20  5\" hold ea   X25  5\" hold each X25  5\" hold each X25  5\" hold each " "10x5\" holds ea X20  5\" hold each   SLR   X15 ea   X15 L X15 ea X15 ea     clams       X10 ea      Sidelying hip abd             Sit to stand             Standing hip abd  2x10 ea           Ther Ex             Nustep 12' L3 10' lvl 4 10' lvl 4   10' lvl 4 10' lvl 4 10' lvl 4 10' L 1 10' lvl 1   Hamstring stretch  @ step  2x20\" ea           Knee flexion stretch at step  2x20\" each 5x20\" each          Piriformis stretch        3x30\" ea     Lower trunk rotations      5x15\" ea 5x15\" ea 5x15\" ea     HR             Seated marching x20 X20 ea X20 ea   X20 ea X20 ea X20 ea  X20 ea   LAQ LAQ--> knee flexion  X20 ea  x20   X20 ea X20 ea X20 ea  LAQ--> knee flexion  X20 ea   Calf stretch with strap 3x30\" ea Fitter board  3x30\" each Fitter board  3x30\" each   3x30\"    3x30\" ea   Supine hip abd With strap  X20 L         With strap  X20 L   Heel slides 20x5\" holds ea L  X20  5\" hold  L   x20    X20 L   Glute set      2x10  5\" hold  GTB press hip abd 2x10  5\" hold  GTB press hip abd 2x10  5\" hold  BTB press hip abd     Hip add X20  5\" hold  X20  5\" hold   X30  5\" hold X30  5\" hold X30  5\" hold  X20  5\" hold   Hip abd      GTB  X25  5\" hold GTB  X25  5\" hold BTB  x20     Ther Activity                                       Gait Training             TUG         performed    Stair negotiation         RR    RW 2 laps SPC  3 laps + edu 2 laps      RR step through gait mech 2 laps   Modalities             CP 10' with elevation (4' tolerated with elevation) skin intact prior to and following CP                                              "

## 2025-04-07 ENCOUNTER — OFFICE VISIT (OUTPATIENT)
Age: 65
End: 2025-04-07
Payer: COMMERCIAL

## 2025-04-07 DIAGNOSIS — M17.12 PRIMARY OSTEOARTHRITIS OF LEFT KNEE: Primary | ICD-10-CM

## 2025-04-07 DIAGNOSIS — Z74.09 IMPAIRED FUNCTIONAL MOBILITY, BALANCE, GAIT, AND ENDURANCE: ICD-10-CM

## 2025-04-07 DIAGNOSIS — M25.562 ACUTE PAIN OF LEFT KNEE: ICD-10-CM

## 2025-04-07 DIAGNOSIS — Z96.652 S/P TKR (TOTAL KNEE REPLACEMENT), LEFT: ICD-10-CM

## 2025-04-07 PROCEDURE — 97112 NEUROMUSCULAR REEDUCATION: CPT | Performed by: PHYSICAL THERAPIST

## 2025-04-07 PROCEDURE — 97110 THERAPEUTIC EXERCISES: CPT | Performed by: PHYSICAL THERAPIST

## 2025-04-07 PROCEDURE — 97140 MANUAL THERAPY 1/> REGIONS: CPT | Performed by: PHYSICAL THERAPIST

## 2025-04-07 NOTE — PROGRESS NOTES
"Name: Lori Damon      : 1960      MRN: [id number]  Encounter Provider: RADHA Ferguson  Encounter Date: 2025   Encounter department: [facility] [place]  :  Assessment & Plan  Urge incontinence  PVR 40 mL  Patient will continue trospium 20 mg twice a day happy with urinary patterns at this time.  Denies side effects of medication  Will follow-up in 1 year  UA: Negative for blood and infection  Orders:    POCT urine dip auto non-scope    POCT Measure PVR    trospium chloride (SANCTURA) 20 mg tablet; Take 1 tablet (20 mg total) by mouth 2 (two) times a day        History of Present Illness   Lori Damon is a 64 y.o. female who presents for 6-month follow-up for overactive bladder.    Dr. Subramanian placed patient on trospium 2 times a day. Happy with urinary patterns.     Review of Systems   Constitutional:  Negative for chills and fever.   HENT:  Negative for ear pain and sore throat.    Eyes:  Negative for pain and visual disturbance.   Respiratory:  Negative for cough and shortness of breath.    Cardiovascular:  Negative for chest pain and palpitations.   Gastrointestinal:  Negative for abdominal pain and vomiting.   Genitourinary:  Negative for dysuria and hematuria.   Musculoskeletal:  Negative for arthralgias and back pain.   Skin:  Negative for color change and rash.   Neurological:  Negative for seizures and syncope.   All other systems reviewed and are negative.         Objective   /72 (BP Location: Right arm, Patient Position: Sitting, Cuff Size: Adult)   Pulse 78   Ht 5' 3\" (1.6 m)   Wt 63.7 kg (140 lb 6.4 oz)   SpO2 98%   BMI 24.87 kg/m²     Physical Exam  Vitals reviewed.   Constitutional:       Appearance: Normal appearance.   Cardiovascular:      Rate and Rhythm: Normal rate.   Skin:     General: Skin is warm.   Neurological:      General: No focal deficit present.      Mental Status: She is oriented to person, place, and time.   Psychiatric:         " "Mood and Affect: Mood normal.         Behavior: Behavior normal.           Results   No results found for: \"PSA\"  Lab Results   Component Value Date    CALCIUM 8.8 03/19/2025    K 3.9 03/19/2025    CO2 24 03/19/2025     03/19/2025    BUN 11 03/19/2025    CREATININE 0.83 03/19/2025     Lab Results   Component Value Date    WBC 7.12 03/19/2025    HGB 10.8 (L) 03/19/2025    HCT 32.4 (L) 03/19/2025    MCV 93 03/19/2025     03/19/2025       Office Urine Dip  Recent Results (from the past hour)   POCT Measure PVR    Collection Time: 04/11/25  3:22 PM   Result Value Ref Range    POST-VOID RESIDUAL VOLUME, ML POC 40 mL   POCT urine dip auto non-scope    Collection Time: 04/11/25  3:25 PM   Result Value Ref Range     COLOR,UA Yellow     CLARITY,UA Clear     SPECIFIC GRAVITY,UA 1.020      PH,UA 6     LEUKOCYTE ESTERASE,UA Trace     NITRITE,UA Neg     GLUCOSE, UA Neg     KETONES,UA Neg     BILIRUBIN,UA Neg     BLOOD,UA Neg     POCT URINE PROTEIN Neg     SL AMB POCT UROBILINOGEN 0.2          "

## 2025-04-07 NOTE — PROGRESS NOTES
"Daily Note     Today's date: 2025  Patient name: Lori Damon  : 1960  MRN: [id number]  Referring provider: Chica Dennis PA-C  Dx:   Encounter Diagnosis     ICD-10-CM    1. Primary osteoarthritis of left knee  M17.12       2. S/P TKR (total knee replacement), left  Z96.652       3. Impaired functional mobility, balance, gait, and endurance  Z74.09       4. Acute pain of left knee  M25.562           Start Time: 1530  Stop Time: 1615  Total time in clinic (min): 45 minutes    Subjective: Pt reports she has more stiffness today. Patient is now driving with no difficulty. Patient ntoed she went to the store over the weekend with fatigue noted.       Objective: See treatment diary below      Assessment: Patient performed Nustep aerobic exercise to increase blood flow to the area being treated, prepare the muscles for strength training and stretching, improve overall tolerance to activity, and aerobic endurance. Patient continues to improve with strength 2* improved quad control with quad set. Patient continues to have extensor lag during SLR. Patient negotiated steps with improved form. Patient amb from the parking lot to the clinic without a cane and minimal gait deviations were noted. PT educated the patient on her HEP. Patient would benefit from continued PT to allow the patient to return to her PLOF.         Plan: Continue per plan of care.      Precautions: osteoporosis      Manuals 3/27 3/31 4/3 4/7  3/6 3/10 3/12 3/20 RE 3/25   L patellar mobs RR grade 3 MW grade III MW grade III MW grade III      MW grade III   PROM/ stretch  RR MW MW MW     RR  MW                             Neuro Re-Ed             HEP edu; home set up; AD management RR MW HEP edu MW  HEP edu HEP edu  MW  MW  HEP Edu MW  HEP edu MW  HEP edu RR MW HEP edu   Step ups fwd   6\"  X20 ea 2HHA 6\"  X20 ea 2HHA         Step ups lateral   6\"  X20 ea 2 HHA 6\"  X20 ea 2 HHA         Glute set         10x5\" holds ea     Quad set X20  5\" hold " "each  X20  5\" hold ea X20  5\" hold  X25  5\" hold each X25  5\" hold each X25  5\" hold each 10x5\" holds ea X20  5\" hold each   SLR   X15 ea X15 ea  X15 L X15 ea X15 ea     clams       X10 ea      Sidelying hip abd             Sit to stand             Standing hip abd  2x10 ea           Ther Ex             Nustep 12' L3 10' lvl 4 10' lvl 4 10' lvl 4  10' lvl 4 10' lvl 4 10' lvl 4 10' L 1 10' lvl 1   Hamstring stretch  @ step  2x20\" ea           Knee flexion stretch at step  2x20\" each 5x20\" each 30\"x4         Piriformis stretch        3x30\" ea     Lower trunk rotations      5x15\" ea 5x15\" ea 5x15\" ea     HR             Seated marching x20 X20 ea X20 ea 2# 2x10 BLE  X20 ea X20 ea X20 ea  X20 ea   LAQ LAQ--> knee flexion  X20 ea  x20 2# 2x10 BLE   X20 ea X20 ea X20 ea  LAQ--> knee flexion  X20 ea   Calf stretch with strap 3x30\" ea Fitter board  3x30\" each Fitter board  3x30\" each Fitter board  3x30\" each  3x30\"    3x30\" ea   Supine hip abd With strap  X20 L         With strap  X20 L   Heel slides 20x5\" holds ea L  X20  5\" hold  L 5x30\" hold L  x20    X20 L   Glute set      2x10  5\" hold  GTB press hip abd 2x10  5\" hold  GTB press hip abd 2x10  5\" hold  BTB press hip abd     Hip add X20  5\" hold  X20  5\" hold X20  5\" hold  X30  5\" hold X30  5\" hold X30  5\" hold  X20  5\" hold   Hip abd      GTB  X25  5\" hold GTB  X25  5\" hold BTB  x20     Ther Activity                                       Gait Training             TUG         performed    Stair negotiation         RR    RW 2 laps SPC  3 laps + edu 2 laps SPC 2 laps     RR step through gait mech 2 laps   Modalities             CP 10' with elevation (4' tolerated with elevation) skin intact prior to and following CP                                                "

## 2025-04-09 RX ORDER — NADOLOL
POWDER (GRAM) MISCELLANEOUS
COMMUNITY
End: 2025-04-10

## 2025-04-10 ENCOUNTER — OFFICE VISIT (OUTPATIENT)
Age: 65
End: 2025-04-10
Payer: COMMERCIAL

## 2025-04-10 DIAGNOSIS — Z96.652 S/P TKR (TOTAL KNEE REPLACEMENT), LEFT: ICD-10-CM

## 2025-04-10 DIAGNOSIS — Z74.09 IMPAIRED FUNCTIONAL MOBILITY, BALANCE, GAIT, AND ENDURANCE: ICD-10-CM

## 2025-04-10 DIAGNOSIS — M25.562 ACUTE PAIN OF LEFT KNEE: ICD-10-CM

## 2025-04-10 DIAGNOSIS — M17.12 PRIMARY OSTEOARTHRITIS OF LEFT KNEE: Primary | ICD-10-CM

## 2025-04-10 PROCEDURE — 97140 MANUAL THERAPY 1/> REGIONS: CPT

## 2025-04-10 PROCEDURE — 97112 NEUROMUSCULAR REEDUCATION: CPT

## 2025-04-10 PROCEDURE — 97110 THERAPEUTIC EXERCISES: CPT

## 2025-04-10 NOTE — PROGRESS NOTES
"Daily Note     Today's date: 4/10/2025  Patient name: Lori Damon  : 1960  MRN: [id number]  Referring provider: Chica Dennis PA-C  Dx:   Encounter Diagnosis     ICD-10-CM    1. Primary osteoarthritis of left knee  M17.12       2. S/P TKR (total knee replacement), left  Z96.652       3. Impaired functional mobility, balance, gait, and endurance  Z74.09       4. Acute pain of left knee  M25.562           Start Time: 1535  Stop Time: 1620  Total time in clinic (min): 45 minutes    Subjective: pt more painful w/ cramping in L knee      Objective: See treatment diary below      Assessment: Pt tolerated today's treatment session well    Pt challenged with pain, more than previously.  Discussed pt using ice more at home (A and P knee).  STM and palp of L knee reveals some restriction along superior incision, but distal quads w/ good soft tissue mobility.  Pt completed exer with no adverse reactions  .   Pt continues to benefit from skilled PT services. Will continue to encourage HEP and PT attendance while addressing pt's  functional deficits & focusing on progression of POC as patient tolerates.     Plan: Continue per plan of care.      Precautions: osteoporosis      Manuals 3/27 3/31 4/3 4/7 4/10   3/12 3/20 RE 3/25   L patellar mobs RR grade 3 MW grade III MW grade III MW grade III AE grade 3     MW grade III   PROM/ stretch  RR MW MW MW AE w/ STM    RR  MW                             Neuro Re-Ed             HEP edu; home set up; AD management RR MW HEP edu MW  HEP edu HEP edu  MW    MW  HEP edu RR MW HEP edu   Step ups fwd   6\"  X20 ea 2HHA 6\"  X20 ea 2HHA 6\" x10 1 HHA        Step ups lateral   6\"  X20 ea 2 HHA 6\"  X20 ea 2 HHA         Glute set         10x5\" holds ea     Quad set X20  5\" hold each  X20  5\" hold ea X20  5\" hold X20 5\" hold   X25  5\" hold each 10x5\" holds ea X20  5\" hold each   SLR   X15 ea X15 ea x15   X15 ea     SAQ - - - - x15        clams             Sidelying hip abd             Sit " "to stand             Standing hip abd  2x10 ea           Ther Ex             Nustep seat  12' L3 10' lvl 4 10' lvl 4 10' lvl 4 10' L4   10' lvl 4 10' L 1 10' lvl 1   Hamstring stretch  @ step  2x20\" ea   3-5 x 20\" ea        Knee flexion stretch at step  2x20\" each 5x20\" each 30\"x4 X10 20\" hold         Piriformis stretch        3x30\" ea     Lower trunk rotations        5x15\" ea     HR             Seated marching x20 X20 ea X20 ea 2# 2x10 BLE    X20 ea  X20 ea   LAQ LAQ--> knee flexion  X20 ea  x20 2# 2x10 BLE  x20   X20 ea  LAQ--> knee flexion  X20 ea   Calf stretch with strap 3x30\" ea Fitter board  3x30\" each Fitter board  3x30\" each Fitter board  3x30\" each Step 3x20\" hold     3x30\" ea   Supine hip abd With strap  X20 L         With strap  X20 L   Heel slides 20x5\" holds ea L  X20  5\" hold  L 5x30\" hold L KTC x10     X20 L   Glute set        2x10  5\" hold  BTB press hip abd     Hip add X20  5\" hold  X20  5\" hold X20  5\" hold X20  5\" hold   X30  5\" hold  X20  5\" hold   Hip abd        BTB  x20     Ther Activity                                       Gait Training             TUG         performed    Stair negotiation         RR    RW 2 laps SPC  3 laps + edu 2 laps SPC 2 laps     RR step through gait mech 2 laps   Modalities             CP 10' with elevation (4' tolerated with elevation) skin intact prior to and following CP    home                                              "

## 2025-04-11 ENCOUNTER — OFFICE VISIT (OUTPATIENT)
Dept: UROLOGY | Facility: MEDICAL CENTER | Age: 65
End: 2025-04-11

## 2025-04-11 VITALS
HEIGHT: 63 IN | SYSTOLIC BLOOD PRESSURE: 108 MMHG | WEIGHT: 140.4 LBS | HEART RATE: 78 BPM | BODY MASS INDEX: 24.88 KG/M2 | DIASTOLIC BLOOD PRESSURE: 72 MMHG | OXYGEN SATURATION: 98 %

## 2025-04-11 DIAGNOSIS — N39.41 URGE INCONTINENCE: Primary | ICD-10-CM

## 2025-04-11 LAB
POST-VOID RESIDUAL VOLUME, ML POC: 40 ML
SL AMB  POCT GLUCOSE, UA: ABNORMAL
SL AMB LEUKOCYTE ESTERASE,UA: ABNORMAL
SL AMB POCT BILIRUBIN,UA: ABNORMAL
SL AMB POCT BLOOD,UA: ABNORMAL
SL AMB POCT CLARITY,UA: CLEAR
SL AMB POCT COLOR,UA: YELLOW
SL AMB POCT KETONES,UA: ABNORMAL
SL AMB POCT NITRITE,UA: ABNORMAL
SL AMB POCT PH,UA: 6
SL AMB POCT SPECIFIC GRAVITY,UA: 1.02
SL AMB POCT URINE PROTEIN: ABNORMAL
SL AMB POCT UROBILINOGEN: 0.2

## 2025-04-11 RX ORDER — TROSPIUM CHLORIDE 20 MG/1
20 TABLET, FILM COATED ORAL 2 TIMES DAILY
Qty: 180 TABLET | Refills: 0 | Status: SHIPPED | OUTPATIENT
Start: 2025-04-11

## 2025-04-11 NOTE — ASSESSMENT & PLAN NOTE
PVR 40 mL  Patient will continue trospium 20 mg twice a day happy with urinary patterns at this time.  Denies side effects of medication  Will follow-up in 1 year  UA: Negative for blood and infection  Orders:    POCT urine dip auto non-scope    POCT Measure PVR    trospium chloride (SANCTURA) 20 mg tablet; Take 1 tablet (20 mg total) by mouth 2 (two) times a day

## 2025-04-14 ENCOUNTER — OFFICE VISIT (OUTPATIENT)
Age: 65
End: 2025-04-14
Payer: COMMERCIAL

## 2025-04-14 DIAGNOSIS — Z96.652 S/P TKR (TOTAL KNEE REPLACEMENT), LEFT: ICD-10-CM

## 2025-04-14 DIAGNOSIS — Z74.09 IMPAIRED FUNCTIONAL MOBILITY, BALANCE, GAIT, AND ENDURANCE: ICD-10-CM

## 2025-04-14 DIAGNOSIS — M17.12 PRIMARY OSTEOARTHRITIS OF LEFT KNEE: Primary | ICD-10-CM

## 2025-04-14 DIAGNOSIS — M25.562 ACUTE PAIN OF LEFT KNEE: ICD-10-CM

## 2025-04-14 PROCEDURE — 97112 NEUROMUSCULAR REEDUCATION: CPT | Performed by: PHYSICAL THERAPIST

## 2025-04-14 PROCEDURE — 97110 THERAPEUTIC EXERCISES: CPT | Performed by: PHYSICAL THERAPIST

## 2025-04-14 NOTE — PROGRESS NOTES
"Daily Note     Today's date: 2025  Patient name: Lori Damon  : 1960  MRN: [id number]  Referring provider: Chica Dennis PA-C  Dx:   Encounter Diagnosis     ICD-10-CM    1. Primary osteoarthritis of left knee  M17.12       2. S/P TKR (total knee replacement), left  Z96.652       3. Impaired functional mobility, balance, gait, and endurance  Z74.09       4. Acute pain of left knee  M25.562           Start Time: 1530  Stop Time: 1615  Total time in clinic (min): 45 minutes    Subjective: Patient noted that she had two slips this morning without falling. Patient noted that she rode her bike at her house this morning with no difficulty.       Objective: See treatment diary below      Assessment: Patient performed Recumbent bike and Nustep aerobic exercise to increase blood flow to the area being treated, prepare the muscles for strength training and stretching, improve overall tolerance to activity, and aerobic endurance. Patient was able to complete a full revolution on the recumbent bike without difficulty which demonstrates improvements with ROM. Patient performed sit to stands with moderate cues for knee valgus and posterior lean. Patient performed step ups with some pain noted at times. Patient continues to progress with strength, endurance and ROM. PT educated the patient on her HEP. Patient would benefit from continued PT to allow the patient to return to her PLOF.         Plan: Continue per plan of care.      Precautions: osteoporosis      Manuals 3/27 3/31 4/3 4/7 4/10 4/14  3/12 3/20 RE 3/25   L patellar mobs RR grade 3 MW grade III MW grade III MW grade III AE grade 3     MW grade III   PROM/ stretch  RR MW MW MW AE w/ STM    RR  MW                             Neuro Re-Ed             HEP edu; home set up; AD management RR MW HEP edu MW  HEP edu HEP edu  MW  HEP edu  MW  MW  HEP edu RR MW HEP edu   Step ups fwd   6\"  X20 ea 2HHA 6\"  X20 ea 2HHA 6\" x10 1 HHA 6\"  X20 ea 1-2 HHA       Step ups " "lateral   6\"  X20 ea 2 HHA 6\"  X20 ea 2 HHA  6\"  X20 ea 2 HHA       Standing marching      X20 ea       Glute set         10x5\" holds ea     Quad set X20  5\" hold each  X20  5\" hold ea X20  5\" hold X20 5\" hold   X25  5\" hold each 10x5\" holds ea X20  5\" hold each   SLR   X15 ea X15 ea x15   X15 ea     SAQ - - - - x15        clams             Sidelying hip abd             Sit to stand      X10 mod cues       Standing hip abd  2x10 ea           Leg press      DL  45#  X20  SL  25#  X20 ea plate 7       Ther Ex             Nustep seat  12' L3 10' lvl 4 10' lvl 4 10' lvl 4 10' L4 10' lvl 4  10' lvl 4 10' L 1 10' lvl 1   bike      5' full rev       Hamstring stretch  @ step  2x20\" ea   3-5 x 20\" ea        Knee flexion stretch at step  2x20\" each 5x20\" each 30\"x4 X10 20\" hold  5x20\" ea       Piriformis stretch        3x30\" ea     Lower trunk rotations        5x15\" ea     HR      x20       Seated marching x20 X20 ea X20 ea 2# 2x10 BLE    X20 ea  X20 ea   LAQ LAQ--> knee flexion  X20 ea  x20 2# 2x10 BLE  x20   X20 ea  LAQ--> knee flexion  X20 ea   Calf stretch with strap 3x30\" ea Fitter board  3x30\" each Fitter board  3x30\" each Fitter board  3x30\" each Step 3x20\" hold     3x30\" ea   Supine hip abd With strap  X20 L         With strap  X20 L   Heel slides 20x5\" holds ea L  X20  5\" hold  L 5x30\" hold L KTC x10     X20 L   Glute set        2x10  5\" hold  BTB press hip abd     Hip add X20  5\" hold  X20  5\" hold X20  5\" hold X20  5\" hold   X30  5\" hold  X20  5\" hold   Hip abd        BTB  x20     Ther Activity                                       Gait Training             TUG         performed    Stair negotiation         RR    RW 2 laps SPC  3 laps + edu 2 laps SPC 2 laps     RR step through gait mech 2 laps   Modalities             CP 10' with elevation (4' tolerated with elevation) skin intact prior to and following CP    home                                                "

## 2025-04-15 ENCOUNTER — TELEPHONE (OUTPATIENT)
Age: 65
End: 2025-04-15

## 2025-04-15 ENCOUNTER — OFFICE VISIT (OUTPATIENT)
Dept: NEUROLOGY | Facility: CLINIC | Age: 65
End: 2025-04-15
Payer: COMMERCIAL

## 2025-04-15 VITALS
WEIGHT: 140 LBS | DIASTOLIC BLOOD PRESSURE: 62 MMHG | HEART RATE: 71 BPM | BODY MASS INDEX: 24.8 KG/M2 | HEIGHT: 63 IN | SYSTOLIC BLOOD PRESSURE: 108 MMHG

## 2025-04-15 DIAGNOSIS — M79.18 MYOFASCIAL PAIN SYNDROME, CERVICAL: ICD-10-CM

## 2025-04-15 DIAGNOSIS — G43.009 MIGRAINE WITHOUT AURA AND WITHOUT STATUS MIGRAINOSUS, NOT INTRACTABLE: ICD-10-CM

## 2025-04-15 DIAGNOSIS — G43.709 CHRONIC MIGRAINE WITHOUT AURA WITHOUT STATUS MIGRAINOSUS, NOT INTRACTABLE: ICD-10-CM

## 2025-04-15 DIAGNOSIS — G43.709 CHRONIC MIGRAINE WITHOUT AURA WITHOUT STATUS MIGRAINOSUS, NOT INTRACTABLE: Primary | ICD-10-CM

## 2025-04-15 DIAGNOSIS — Z98.1 HISTORY OF FUSION OF CERVICAL SPINE: ICD-10-CM

## 2025-04-15 PROCEDURE — 99214 OFFICE O/P EST MOD 30 MIN: CPT | Performed by: PHYSICIAN ASSISTANT

## 2025-04-15 PROCEDURE — G2211 COMPLEX E/M VISIT ADD ON: HCPCS | Performed by: PHYSICIAN ASSISTANT

## 2025-04-15 RX ORDER — NARATRIPTAN 1 MG/1
TABLET ORAL
Qty: 1 TABLET | Refills: 0 | OUTPATIENT
Start: 2025-04-15

## 2025-04-15 NOTE — ASSESSMENT & PLAN NOTE
The patient is a good candidate for Botox injections as she has tried and failed several oral preventatives or had side effects, has greater than 15 days of the month with a migraine and each lasts greater than 4 hours long.  Nurtec as needed, if that fails she can try Ubrelvy.  Side effects reviewed.    Orders:    rimegepant sulfate (NURTEC) 75 mg TBDP; 1 tab prn migraine. No more than one dose per 24 hours.    Botulinum Toxin Type A SOLR 200 Units

## 2025-04-15 NOTE — PROGRESS NOTES
Name: Lori Damon      : 1960      MRN: [id number]  Encounter Provider: Lori Regalado PA-C  Encounter Date: 4/15/2025   Encounter department: [facility] [place]  :  Assessment & Plan  Chronic migraine without aura without status migrainosus, not intractable  The patient is a good candidate for Botox injections as she has tried and failed several oral preventatives or had side effects, has greater than 15 days of the month with a migraine and each lasts greater than 4 hours long.  Nurtec as needed, if that fails she can try Ubrelvy.  Side effects reviewed.    Orders:    rimegepant sulfate (NURTEC) 75 mg TBDP; 1 tab prn migraine. No more than one dose per 24 hours.    Botulinum Toxin Type A SOLR 200 Units    Migraine without aura and without status migrainosus, not intractable         Myofascial pain syndrome, cervical  Decline trigger point injections today, will consider in the future if needed emergently.         History of fusion of cervical spine           Patient Instructions   [email or web address] patient assistance application- for Eliquis assistance       The patient should not hesitate to call me prior to her follow up with any questions or concerns.      History of Present Illness   HPI migraine follow up, says she has had knee pain and pain is her trigger, so her headaches have been more frequent. She said she is having 5 headaches a week. She expects the frequency to improve with time from knee surgery.  Scheduled for PT for her knee for another month still.  S/p left knee surgery 3/17/25.    Pain in other parts of the body is a migraine trigger, and barometric pressure is a trigger as well.  Taking the zomig, and she states she is taking more than she should.    Mots of the headaches are forehad and temples and travel to the front, rarely in the occipitals. HAs are typically L>R, unilateral, sometimes behind the left eye, never behind R eye.  Character is  "throbbing, thumping, and \"just pain.\"  She has associated neck and back pain which radiates up to the front of the head.  She very seldom has nausea with a migraine unless severe.  She prefers not to be around in noisy environment when she has a migraine. She also has difficulty with concentration and focus.  She tries to give herself something to do to distract herself from the headache and other symptoms, and tries to keep her mind busy which sometimes helps.     +tearing from the left eye on occasion when headache is bad.    Meds tried:  Prevention:  Propranolol  Nortriptyline  Topamax  Gabapentin  Duloxetine    Abortive:  Sumatriptan  Rizatriptan  Zomig  Midrin  Zofran  Decadron       Review of Systems   Constitutional:  Negative for appetite change, fatigue and fever.   HENT: Negative.  Negative for hearing loss, tinnitus, trouble swallowing and voice change.    Eyes: Negative.  Negative for photophobia, pain and visual disturbance.   Respiratory: Negative.  Negative for shortness of breath.    Cardiovascular: Negative.  Negative for palpitations.   Gastrointestinal: Negative.  Negative for nausea and vomiting.   Endocrine: Negative.  Negative for cold intolerance.   Genitourinary: Negative.  Negative for dysuria, frequency and urgency.   Musculoskeletal:  Negative for back pain, gait problem, myalgias, neck pain and neck stiffness.   Skin: Negative.  Negative for rash.   Allergic/Immunologic: Negative.    Neurological:  Positive for headaches. Negative for dizziness, tremors, seizures, syncope, facial asymmetry, speech difficulty, weakness, light-headedness and numbness.   Hematological: Negative.  Does not bruise/bleed easily.   Psychiatric/Behavioral: Negative.  Negative for confusion, hallucinations and sleep disturbance.     I have personally reviewed the MA's review of systems and made changes as necessary.    Pertinent Medical History           Medical History Reviewed by provider this encounter:  " "Tobacco  Allergies  Meds  Problems  Med Hx  Surg Hx  Fam Hx     .  Past Medical History   Past Medical History:   Diagnosis Date    Allergic Unknown    Medical-Imatrex, Cymbalta, Ultram,  . Other-dust, mold, gras    Allergy to dust     Anemia     Arthritis unknown    Osteoarthritis, advanced osteoporosis    Calculus of ureter     Chronic kidney disease 11/2018    Hydrophenesis    Chronic pain disorder     neck and back- sees pain management    Cluster headache Teenager    May occur 2-3 times per year. Lasts several days    Colitis     Colon polyp     Cracked skin     on fingers    CTS (carpal tunnel syndrome) 01/01/2014    Diverticulitis of colon 2012    Seen by Dr. Wilburn at [facility]. Inpatient at    Diverticulosis     Dysphagia     Fibromyalgia, primary 04/16/1996    GERD (gastroesophageal reflux disease)     GI (gastrointestinal bleed)     As a result of diverticulitis and microscopic colitis.    H/O: GI bleed     Head injury     Had numerious concussions    Headache(784.0) 1980's    Migraine (family history of condition)    Headache, tension-type     Hiatal hernia     History of DVT in adulthood 2019 2019, first clot post op age 16    HL (hearing loss) unknown    Hydronephrosis     Hyperlipidemia     Inguinal hernia     right side    Irregular heart beat     Pt states with prolonged QT interval - was seen by cardiologist( [facility]) pt told \"to not worry about it\"    Irritable bowel     Kidney stone     Lactose intolerance     Did not tolerate milk after being weaned from breast milk.    Memory loss 2016/2017    Medication/also related to medical conditions    Migraines     Narcolepsy     OAB (overactive bladder)     Osteoarthritis     Osteoporosis     Other chronic pain     degenerative disc disease    PONV (postoperative nausea and vomiting)     migraines    Pyelonephritis 01/13/2021    SBO (small bowel obstruction) ([facility]) 09/25/2021    Scoliosis Unknown    Scoliosis of lumbar and cervical spine. Full spinal " xray for scoliosis done in 2022    Sepsis (HCC) 01/13/2021    Shingles Unknown    Had mild cases of shingles several times.    Smoking 08/06/2020    Tobacco abuse     Urinary tract infection May 2019    See [facility] records    Vision loss Unknown    Occurs when headaches start behind my left eye.    Visual impairment Unknown    Blurry vision partially caused by medication.    Wears dentures     ill fitted currently    Wears glasses      Past Surgical History:   Procedure Laterality Date    ABDOMINAL ADHESION SURGERY      ARM SKIN LESION BIOPSY / EXCISION      lymph node excision    BACK SURGERY  11/21/14    Laminectomy, forminalectomy    BLADDER SURGERY      CHOLECYSTECTOMY      COLON SURGERY  1987    Extensive adhesions in abdomen, adhesions surgically removed    COLONOSCOPY  08/06/2020    CYSTOSCOPY      FL RETROGRADE PYELOGRAM  01/05/2021    FLEXIBLE SIGMOIDOSCOPY      Last done at [facility] in 2018 after being admitted for GI bleed.    HERNIA REPAIR      HYSTERECTOMY  1977    JOINT REPLACEMENT Right     knee    KNEE SURGERY Right     replacement    LAMINECTOMY      L5-6-7    LAPAROSCOPY      LYMPH NODE DISSECTION Left     removed -no cancer-  limb restriction    LYMPHADENECTOMY  1974    UNDER LEFT ARM - DO NOT DRAW BLOOD/GIVE INJECTIONS IN LEFT ARM PER PATIENT     MOLE REMOVAL      NECK SURGERY  4/16/14    Fusion C 5/6 and 6/7    OOPHORECTOMY Bilateral 1977    OTHER SURGICAL HISTORY      enlarged lymph node removed left arm..no cancer    VA CYSTO/URETERO W/LITHOTRIPSY &INDWELL STENT INSRT Right 01/05/2021    Procedure: CYSTO, LASER  URETEROSCOPY, RETRO PYELOGRAM, STENT REPLACMENT;  Surgeon: Adriel Subramanian MD;  Location: [facility];  Service: Urology    VA CYSTOURETHROSCOPY W/URETERAL CATHETERIZATION Right 12/02/2020    Procedure: CYSTOSCOPY AND INSERTION STENT URETERAL;  Surgeon: Rory Fall MD;  [facility];  Service: Urology    VA LITHOTRIPSY XTRCORP SHOCK WAVE Right 03/25/2021    Procedure: LITHOTRIPSY  EXTRACORPORAL SHOCKWAVE (ESWL);  Surgeon: Darwin Montero MD;  Location:  MAIN OR;  Service: Urology    WI RPR 1ST INGUN HRNA AGE 5 YRS/> REDUCIBLE Right 2020    Procedure: REPAIR HERNIA INGUINAL  WITH MESH;  Surgeon: Mercedes Justin MD;  Location: [facility];  Service: General    SMALL INTESTINE SURGERY  1987 - current    Removal of extensive adhesions. Caused bowel blockages    SPINAL FUSION      TOTAL KNEE ARTHROPLASTY Left 3/17/2025    Procedure: ARTHROPLASTY KNEE TOTAL;  Surgeon: Malcolm Khan DO;  Location: [facility];  Service: Orthopedics    UPPER GASTROINTESTINAL ENDOSCOPY  2020    WISDOM TOOTH EXTRACTION       Family History   Problem Relation Age of Onset    Hypertension Mother          from massive heart attack, never took any medication for this condition    Heart attack Mother     Hyperlipidemia Mother         Blood tests done just prior to heart attack.  Results reported after her passing.    Migraines Mother          from massive cardiac arrest. Had abdominal aneurysm.    Arthritis Mother         Unknown type arthritis, joint issues    Hypertension Father         Had high blood pressure, unknown if he ever took medication    Cancer Father 72        Started in bowels, spread to liver, refused treatment    Colon polyps Father     Arthritis Father         Type arthritis?, also Pagent's Diseare    Colon cancer Father     Diabetes Sister         uses insulin    Arthritis Sister         Osteo arthritis    Hypertension Sister         Triple bypass    Diabetes Sister         pre-diabetic, oral medication    Arthritis Sister         Osteo arthritis    Hypertension Sister         Triple bypass    Irritable bowel syndrome Sister         Constipation    No Known Problems Daughter     No Known Problems Maternal Grandmother     No Known Problems Maternal Grandfather     No Known Problems Paternal Grandmother     No Known Problems Paternal Grandfather     Cancer Brother         Soft  tissue cancer of abdominal blood vessel. Diagnosed ,      Hypertension Brother         Triple bypass    Migraines Brother         Severe. Standard meds do not help. Goes to [facility] for I    Learning disabilities Brother         Mental retardation    Mental retardation Brother     No Known Problems Maternal Aunt     No Known Problems Maternal Aunt     No Known Problems Maternal Aunt     Migraines Maternal Aunt         Had 2.  from cerebral aneurysm    Migraines Maternal Aunt          from cerebral aneurysm    Migraines Maternal Aunt          from cerebral aneurysm    Migraines Maternal Uncle          from cerebral aneurysm    No Known Problems Paternal Aunt     No Known Problems Paternal Aunt     No Known Problems Paternal Aunt     Cancer Cousin         Pancreatic cancer    Cancer Cousin         Breast cancer, total mastectomy    Breast cancer Other 47      reports that she has been smoking cigarettes. She has been exposed to tobacco smoke. She has never used smokeless tobacco. She reports that she does not currently use alcohol. She reports that she does not currently use drugs after having used the following drugs: Marijuana.  Current Outpatient Medications   Medication Instructions    amantadine (SYMMETREL) 100 mg, Oral, Daily    apixaban (ELIQUIS) 5 mg, Oral, 2 times daily    ascorbic acid (VITAMIN C) 500 mg, Oral, 2 times daily    aspirin 500 MG buffered tablet 1,000 mg, Every 6 hours PRN    buPROPion (WELLBUTRIN XL) 150 mg, Oral, Every morning    calcium carbonate 1,250 mg, Daily    cholecalciferol (VITAMIN D3) 2,000 Units, Oral, Daily    folic acid (FOLVITE) 1 mg, Oral, Daily    gabapentin (NEURONTIN) 400 mg, Oral, 3 times daily    Lori, Zingiber officinalis, (LORI ROOT PO) 700 mg, 2 times daily    metaxalone (SKELAXIN) 400 mg, Oral, 3 times daily    Multiple Vitamins-Minerals (multivitamin with minerals) tablet 1 tablet, Oral, Daily    ondansetron (ZOFRAN) 4 mg, Oral,  Every 8 hours PRN    ondansetron (ZOFRAN-ODT) 4 mg, Oral, Every 6 hours PRN    predniSONE 5 mg tablet 1 tab in the AM prn migraine (if zomig fails)    propranolol (INDERAL LA) 60 mg, Oral, Daily    rimegepant sulfate (NURTEC) 75 mg TBDP 1 tab prn migraine. No more than one dose per 24 hours.    trospium chloride (SANCTURA) 20 mg, Oral, 2 times daily    Turmeric (QC TUMERIC COMPLEX PO) 1,000 mg, 2 times daily    ZOLMitriptan (ZOMIG) 2.5 MG tablet TAKE 1 TABLET BY MOUTH AT ONSET OF MIGRAINE, MAY REPEAT IN 2 HOURS IF NEEDED. LIMIT 2/24 HOURS, 4/WEEK, 8/MONTH     Allergies   Allergen Reactions    Armodafinil Other (See Comments)     Tardive dyskinesia - mouth movements    Celecoxib Other (See Comments)     Increased Heart Rate, Palpitations    Sumatriptan Anaphylaxis     Other reaction(s): SUMATRIPTAN (IMITREX) (Throat closure). Pt analilia zolmitriptan.    Tramadol Other (See Comments)     GI Upset- severe vomiting and systemic body aches    Wound Dressing Adhesive Blisters     Denies Latex allergy    Medical Tape Dermatitis, Rash and Blisters     Adhesive from medication patches and bandaides- ok with paper tape      Current Outpatient Medications on File Prior to Visit   Medication Sig Dispense Refill    amantadine (SYMMETREL) 100 mg capsule Take 1 capsule (100 mg total) by mouth in the morning 90 capsule 3    apixaban (ELIQUIS) 5 mg Take 1 tablet (5 mg total) by mouth 2 (two) times a day 180 tablet 1    ascorbic acid (VITAMIN C) 500 MG tablet Take 1 tablet (500 mg total) by mouth 2 (two) times a day 60 tablet 1    aspirin 500 MG buffered tablet Take 1,000 mg by mouth every 6 (six) hours as needed for mild pain 4,000 mg daily for Pain      buPROPion (WELLBUTRIN XL) 150 mg 24 hr tablet Take 1 tablet (150 mg total) by mouth every morning 90 tablet 1    calcium carbonate 1250 MG capsule Take 1,250 mg by mouth daily      cholecalciferol (VITAMIN D3) 25 mcg (1,000 units) tablet Take 2 tablets (2,000 Units total) by mouth daily  60 tablet 1    folic acid (FOLVITE) 1 mg tablet Take 1 tablet (1 mg total) by mouth daily 30 tablet 1    gabapentin (NEURONTIN) 400 mg capsule Take 1 capsule (400 mg total) by mouth 3 (three) times a day 270 capsule 1    Ginger, Zingiber officinalis, (GINGER ROOT PO) Take 700 mg by mouth 2 (two) times a day       metaxalone (SKELAXIN) 800 mg tablet Take 0.5 tablets (400 mg total) by mouth 3 (three) times a day 135 tablet 0    Multiple Vitamins-Minerals (multivitamin with minerals) tablet Take 1 tablet by mouth daily 30 tablet 1    ondansetron (ZOFRAN) 4 mg tablet Take 1 tablet (4 mg total) by mouth every 8 (eight) hours as needed for nausea or vomiting 20 tablet 0    ondansetron (ZOFRAN-ODT) 4 mg disintegrating tablet Take 1 tablet (4 mg total) by mouth every 6 (six) hours as needed for nausea or vomiting 20 tablet 0    predniSONE 5 mg tablet 1 tab in the AM prn migraine (if zomig fails) 20 tablet 0    propranolol (INDERAL LA) 60 mg 24 hr capsule Take 1 capsule (60 mg total) by mouth daily 90 capsule 1    trospium chloride (SANCTURA) 20 mg tablet Take 1 tablet (20 mg total) by mouth 2 (two) times a day 180 tablet 0    Turmeric (QC TUMERIC COMPLEX PO) Take 1,000 mg by mouth 2 (two) times a day      ZOLMitriptan (ZOMIG) 2.5 MG tablet TAKE 1 TABLET BY MOUTH AT ONSET OF MIGRAINE, MAY REPEAT IN 2 HOURS IF NEEDED. LIMIT 2/24 HOURS, 4/WEEK, 8/MONTH 16 tablet 6     No current facility-administered medications on file prior to visit.      Social History     Tobacco Use    Smoking status: Every Day     Types: Cigarettes     Passive exposure: Current (1-2 cigarettes today)    Smokeless tobacco: Never    Tobacco comments:     Smokes 2 cigarettes daily. Last smoked 3/15/25   Vaping Use    Vaping status: Never Used   Substance and Sexual Activity    Alcohol use: Not Currently     Comment: May have an occasional drink .    Drug use: Not Currently     Types: Marijuana     Comment: quit    Sexual activity: Not Currently      "Partners: Male     Birth control/protection: Abstinence        Objective   /62 (BP Location: Right arm, Patient Position: Sitting, Cuff Size: Standard)   Pulse 71   Ht 5' 3\" (1.6 m)   Wt 63.5 kg (140 lb)   BMI 24.80 kg/m²     Physical Exam  Neurological Exam  On neurologic exam, the patient is alert and oriented to time and place. Speech is fluent and articulate, and the patient follows commands appropriately. Judgment and affect appear normal. Pupils are equally round and reactive to light and extraocular muscles are intact without nystagmus. Face is symmetric, and tongue, uvula, and palate are midline. Hearing is intact. Motor examination reveals intact strength throughout. Neck flexors 5/5, no TTP. Normal gait is steady.      Radiology Results Review : No pertinent imaging studies reviewed.    Administrative Statements   I have spent a total time of 30  minutes in caring for this patient on the day of the visit/encounter including Risks and benefits of tx options, Instructions for management, Patient and family education, Importance of tx compliance, Risk factor reductions, Impressions, Counseling / Coordination of care, Documenting in the medical record, Reviewing/placing orders in the medical record (including tests, medications, and/or procedures), and Obtaining or reviewing history  .  "

## 2025-04-17 ENCOUNTER — OFFICE VISIT (OUTPATIENT)
Age: 65
End: 2025-04-17
Payer: COMMERCIAL

## 2025-04-17 DIAGNOSIS — Z96.652 S/P TKR (TOTAL KNEE REPLACEMENT), LEFT: ICD-10-CM

## 2025-04-17 DIAGNOSIS — M25.562 ACUTE PAIN OF LEFT KNEE: ICD-10-CM

## 2025-04-17 DIAGNOSIS — Z74.09 IMPAIRED FUNCTIONAL MOBILITY, BALANCE, GAIT, AND ENDURANCE: ICD-10-CM

## 2025-04-17 DIAGNOSIS — M17.12 PRIMARY OSTEOARTHRITIS OF LEFT KNEE: Primary | ICD-10-CM

## 2025-04-17 PROCEDURE — 97110 THERAPEUTIC EXERCISES: CPT | Performed by: PHYSICAL THERAPIST

## 2025-04-17 PROCEDURE — 97112 NEUROMUSCULAR REEDUCATION: CPT | Performed by: PHYSICAL THERAPIST

## 2025-04-17 NOTE — PROGRESS NOTES
"Daily Note     Today's date: 2025  Patient name: Lori Damon  : 1960  MRN: [id number]  Referring provider: Chica Dennis PA-C  Dx:   Encounter Diagnosis     ICD-10-CM    1. Primary osteoarthritis of left knee  M17.12       2. S/P TKR (total knee replacement), left  Z96.652       3. Impaired functional mobility, balance, gait, and endurance  Z74.09       4. Acute pain of left knee  M25.562           Start Time: 1530  Stop Time: 1615  Total time in clinic (min): 45 minutes    Subjective: Patient noted a 6/10 pain at the start of the session. Patient noted that she is not amb with a SPC for short distances. Patient is using the SPC for longer distances such as the grocery store.       Objective: See treatment diary below      Assessment: Patient performed Recumbent bike aerobic exercise to increase blood flow to the area being treated, prepare the muscles for strength training and stretching, improve overall tolerance to activity, and aerobic endurance. PT introduced TKE and seated hamstring curls to assist c sit to stands. Patient performed with min VCS for form. Patient improved with sit to stand mechanics during the session. Patient increased in weight on the leg press. PT educated the patient on her HEP. Patient would benefit from continued PT to allow the patient to return to her PLOF.         Plan: Continue per plan of care.      Precautions: osteoporosis      Manuals 3/27 3/31 4/3 4/7 4/10 4/14 4/17      L patellar mobs RR grade 3 MW grade III MW grade III MW grade III AE grade 3        PROM/ stretch  RR MW MW MW AE w/ STM                                  Neuro Re-Ed             HEP edu; home set up; AD management RR MW HEP edu MW  HEP edu HEP edu  MW  HEP edu  MW HEP edu  MW      Step ups fwd   6\"  X20 ea 2HHA 6\"  X20 ea 2HHA 6\" x10 1 HHA 6\"  X20 ea 1-2 HHA 6\"  X20 ea 1 HHA      Step ups lateral   6\"  X20 ea 2 HHA 6\"  X20 ea 2 HHA  6\"  X20 ea 2 HHA 6\"  X20 ea 1 HHA      Standing marching      " "X20 ea X20 ea      Standing TKE       X20  L  GTB      Glute set             Quad set X20  5\" hold each  X20  5\" hold ea X20  5\" hold X20 5\" hold        SLR   X15 ea X15 ea x15        SAQ - - - - x15        clams             Sidelying hip abd             Sit to stand      X10 mod cues X15 improved      Standing hip abd  2x10 ea           Leg press      DL  45#  X20  SL  25#  X20 ea plate 7 DL  55#  X20  SL  35#  X20 ea plate 7      Ther Ex             Nustep seat  12' L3 10' lvl 4 10' lvl 4 10' lvl 4 10' L4 10' lvl 4       bike      5' full rev 10' full rev      Hamstring stretch  @ step  2x20\" ea   3-5 x 20\" ea        Seated hamstring curl       GTB  X20 ea      Knee flexion stretch at step  2x20\" each 5x20\" each 30\"x4 X10 20\" hold  5x20\" ea 5x20\"ea      Piriformis stretch             Lower trunk rotations             HR      x20 x20      Seated marching x20 X20 ea X20 ea 2# 2x10 BLE         LAQ LAQ--> knee flexion  X20 ea  x20 2# 2x10 BLE  x20        Calf stretch with strap 3x30\" ea Fitter board  3x30\" each Fitter board  3x30\" each Fitter board  3x30\" each Step 3x20\" hold        Supine hip abd With strap  X20 L            Heel slides 20x5\" holds ea L  X20  5\" hold  L 5x30\" hold L KTC x10        Glute set             Hip add X20  5\" hold  X20  5\" hold X20  5\" hold X20  5\" hold        Hip abd             Ther Activity                                       Gait Training             TUG             Stair negotiation             RW 2 laps SPC  3 laps + edu 2 laps SPC 2 laps         Modalities             CP 10' with elevation (4' tolerated with elevation) skin intact prior to and following CP    home                                                  "

## 2025-04-22 ENCOUNTER — OFFICE VISIT (OUTPATIENT)
Age: 65
End: 2025-04-22
Payer: COMMERCIAL

## 2025-04-22 DIAGNOSIS — M17.12 PRIMARY OSTEOARTHRITIS OF LEFT KNEE: Primary | ICD-10-CM

## 2025-04-22 DIAGNOSIS — M25.562 ACUTE PAIN OF LEFT KNEE: ICD-10-CM

## 2025-04-22 DIAGNOSIS — Z96.652 S/P TKR (TOTAL KNEE REPLACEMENT), LEFT: ICD-10-CM

## 2025-04-22 DIAGNOSIS — Z74.09 IMPAIRED FUNCTIONAL MOBILITY, BALANCE, GAIT, AND ENDURANCE: ICD-10-CM

## 2025-04-22 PROCEDURE — 97112 NEUROMUSCULAR REEDUCATION: CPT | Performed by: PHYSICAL THERAPIST

## 2025-04-22 PROCEDURE — 97164 PT RE-EVAL EST PLAN CARE: CPT | Performed by: PHYSICAL THERAPIST

## 2025-04-22 PROCEDURE — 97110 THERAPEUTIC EXERCISES: CPT | Performed by: PHYSICAL THERAPIST

## 2025-04-22 NOTE — PROGRESS NOTES
PT Re-Evaluation     Today's date: 2025  Patient name: Lori Damon  : 1960  MRN: [id number]  Referring provider: Chica Dennis PA-C  Dx:   Encounter Diagnosis     ICD-10-CM    1. Primary osteoarthritis of left knee  M17.12       2. S/P TKR (total knee replacement), left  Z96.652       3. Impaired functional mobility, balance, gait, and endurance  Z74.09       4. Acute pain of left knee  M25.562             Start Time: 1445  Stop Time: 1530  Total time in clinic (min): 45 minutes    Assessment  Impairments: abnormal gait, abnormal or restricted ROM, activity intolerance, impaired balance, impaired physical strength, lacks appropriate home exercise program, pain with function, safety issue, weight-bearing intolerance, poor body mechanics, unable to perform ADL, participation limitations, activity limitations and endurance    Assessment details: Patient is a 65 yo female presenting to physical therapy s/p L TKA on 3/17/25. Since starting physical therapy the patient has progressed with strength, L knee AROM/PROM, patellar mobility, endurance and gait mechanics. Patient is currently not amb with an AD, except for over the weekend due to pain after last PT session. Patient's L knee AROM is 0-115 degrees. Patient is driving and is able to alejandra and doff shoes without difficulty. Although improvements have been made the patient continues to be limited with prolonged walking, transfers, floor transfers, kneeling and prolonged activities.  PT will continue to address the noted impairments by performing hip and knee strengthening, stretching, balance, functional activities and manual techniques to allow the patient to return to her PLOF. PT recommended 2x/week for 6-8 weeks c a good prognosis 2* noted improvements.   Understanding of Dx/Px/POC: good     Prognosis: good    Goals  Post-Operative Goals:  STG: In four weeks the patient will:    1. Be (I) with her HEP. (In progress)  2. Increase hip and knee  strength to 4/5 MMT score to assist c ADLs.(In progress)  3. Increase L knee ROM by 15 degrees to assist c walking and transfers. (MET)  4. amb with a RW for 200ft with mod (I) with proper gait mechanics. (MET)  5. amb with a SPC for 200ft with mod (I) with proper gait mechanics. (MET)  6. Negotiate 1 step with proper mechanics with a SPC to assist c getting into her home. (MET)      LTG: In eight weeks, the patient will:    1. Increase FOTO score to TBD to demonstrate improvements in symptoms and function. (In progress)  2. Demonstrate full L knee AROM without pain. (In progress)  3. Negotiate a full flight of steps without pain.(In progress)  4. Increase hip and knee strength to 4+/5 MMT score to assist c prolonged walking and stairs. (In progress)  5. amb (I) for 200ft with proper gait mechanics.(In progress)    New goals: in 6 weeks the patient will:  1. Kneel for 1-2 minutes without pain.  2. Perform a floor transfer without pain.  3. Perform x10 sit to stands with improved form.           Plan  Patient would benefit from: skilled physical therapy and PT eval  Planned modality interventions: cryotherapy and thermotherapy: hydrocollator packs    Planned therapy interventions: IASTM, joint mobilization, kinesiology taping, manual therapy, massage, Yi taping, abdominal trunk stabilization, balance, body mechanics training, breathing training, neuromuscular re-education, patient education, postural training, strengthening, stretching, therapeutic activities, therapeutic exercise, flexibility, functional ROM exercises, gait training, transfer training, home exercise program and patient/caregiver education    Frequency: 2x week  Duration in weeks: 8  Plan of Care beginning date: 4/22/2025  Plan of Care expiration date: 6/17/2025  Treatment plan discussed with: patient        Subjective Evaluation    History of Present Illness  Mechanism of injury: Pt reports she had surgery on 3/17/25 no complications  "reported. Pt returned home on 3/19/25. Patient noted a 60% improvement since starting physical therapy. Patient noted improvements with the following functional activities such as cooking, walking, cleaning, sleeping, bath tub transfers, donning and doffing shoes and socks, prolonged sitting and steps. Patient is negotiating steps in a step to gait pattern with a hand rail. Patient noted that they would want to continue to improve with floor transfers, kneeling, prolonged walking and car transfers. Patient has been completing their HEP. Patient is driving. Patient is icing 1-2 times a day. Patient is taking aspirin daily, oxycodone once a day at night or first thing in the morning.     Patient Goals  Patient goals for therapy: increased strength, independence with ADLs/IADLs, return to sport/leisure activities, increased motion, improved balance and decreased pain  Patient goal: \"to improve mobility and walk.\"(in progress) \"to get out and about.\"(in progress); new goals: \"return to work\"; \"be able to walk without AD\"; \"be able to complete chores\"  Pain  Current pain ratin (inferior to the patella)  At best pain ratin  At worst pain ratin  Quality: burning, knife-like, pressure, sharp and discomfort  Relieving factors: rest, medications, ice and change in position (Aspirin; oxycodone)  Aggravating factors: standing, walking and lifting (driving; floor transfers, dressing)  Progression: improved    Social Support  Steps to enter house: yes  2  Stairs in house: yes (4\" step)   1  Lives in: one-story house  Lives with: adult children    Employment status: not working  Hand dominance: right          Objective     Postural Observations  Seated posture: fair  Standing posture: fair      Observations   Left Knee   Negative for edema.     Additional Observation Details  Patient presents with a healed incision on the L LE.     Tenderness     Additional Tenderness Details  Tender to palpate global L knee.    Active " Range of Motion   Left Knee   Flexion: 115 degrees with pain  Extension: 0 degrees   Extensor la degrees     Passive Range of Motion   Left Knee   Flexion: 115 degrees with pain  Extension: 0 degrees     Mobility   Patellar Mobility:   Left Knee   WFL: medial and lateral.   Hypomobile: left superior and left inferior    Strength/Myotome Testing     Left Hip   Planes of Motion   Flexion: 4-  Extension: 4-  Abduction: 4-  Adduction: 4    Right Hip   Planes of Motion   Flexion: 4-  Extension: 3+  Abduction: 4-  Adduction: 4    Left Knee   Flexion: 4  Extension: 4-  Quadriceps contraction: fair    Right Knee   Flexion: 4+  Extension: 4+    Left Ankle/Foot   Dorsiflexion: 4+    Right Ankle/Foot   Dorsiflexion: 4+    Ambulation     Ambulation: Level Surfaces   Ambulation with assistive device: close supervision.  Ambulation without assistive device: unable    Ambulation: Stairs   Level of assistance: close supervision.  Pattern: non-reciprocal  Railings: two rails  Level of assistance: close supervision.  Pattern: non-reciprocal  Railings: two rails  Curbs: unable    Observational Gait   Gait: antalgic   Increased right stance time. Decreased walking speed, stride length, left stance time, left step length and right step length.   Left foot contact pattern: foot flat  Right foot contact pattern: foot flat  Base of support: increased    Functional Assessment        Comments  25 TU seconds no AD, moderate pain noted    RE: 3/20/25  TU seconds w/ RW step through gait; required b/l UE support to stand      Precautions: osteoporosis      Manuals 3/27 3/31 4/3 4/7 4/10 4/14 4/17 4/22     L patellar mobs RR grade 3 MW grade III MW grade III MW grade III AE grade 3   MW grade III     PROM/ stretch  RR MW MW MW AE w/ STM   MW     RE        MW                  Neuro Re-Ed             HEP edu; home set up; AD management RR MW HEP edu MW  HEP edu HEP edu  MW  HEP edu  MW HEP edu  MW HEP edu  MW     Step ups fwd    "6\"  X20 ea 2HHA 6\"  X20 ea 2HHA 6\" x10 1 HHA 6\"  X20 ea 1-2 HHA 6\"  X20 ea 1 HHA      Step ups lateral   6\"  X20 ea 2 HHA 6\"  X20 ea 2 HHA  6\"  X20 ea 2 HHA 6\"  X20 ea 1 HHA      Standing marching      X20 ea X20 ea      Standing TKE       X20  L  GTB      Glute set             Quad set X20  5\" hold each  X20  5\" hold ea X20  5\" hold X20 5\" hold   X20  5\" hold     SLR   X15 ea X15 ea x15        SAQ - - - - x15        clams             Sidelying hip abd             Sit to stand      X10 mod cues X15 improved      Standing hip abd  2x10 ea           Leg press      DL  45#  X20  SL  25#  X20 ea plate 7 DL  55#  X20  SL  35#  X20 ea plate 7      Ther Ex             Nustep seat  12' L3 10' lvl 4 10' lvl 4 10' lvl 4 10' L4 10' lvl 4       bike      5' full rev 10' full rev 10' full rev     Hamstring stretch  @ step  2x20\" ea   3-5 x 20\" ea        Seated hamstring curl       GTB  X20 ea      Knee flexion stretch at step  2x20\" each 5x20\" each 30\"x4 X10 20\" hold  5x20\" ea 5x20\"ea      Piriformis stretch             Lower trunk rotations             HR      x20 x20      Seated marching x20 X20 ea X20 ea 2# 2x10 BLE    X20 ea     LAQ LAQ--> knee flexion  X20 ea  x20 2# 2x10 BLE  x20   X20 ea     Calf stretch with strap 3x30\" ea Fitter board  3x30\" each Fitter board  3x30\" each Fitter board  3x30\" each Step 3x20\" hold        Supine hip abd With strap  X20 L            Heel slides 20x5\" holds ea L  X20  5\" hold  L 5x30\" hold L KTC x10   x20     Glute set        Bridge  x10     Hip add X20  5\" hold  X20  5\" hold X20  5\" hold X20  5\" hold   X20  5\" hold     Hip abd             Ther Activity                                       Gait Training             TUG             Stair negotiation             RW 2 laps SPC  3 laps + edu 2 laps SPC 2 laps         Modalities             CP 10' with elevation (4' tolerated with elevation) skin intact prior to and following CP    home                         "

## 2025-04-24 ENCOUNTER — OFFICE VISIT (OUTPATIENT)
Age: 65
End: 2025-04-24
Attending: PHYSICIAN ASSISTANT
Payer: COMMERCIAL

## 2025-04-24 DIAGNOSIS — Z96.652 S/P TKR (TOTAL KNEE REPLACEMENT), LEFT: ICD-10-CM

## 2025-04-24 DIAGNOSIS — M17.12 PRIMARY OSTEOARTHRITIS OF LEFT KNEE: Primary | ICD-10-CM

## 2025-04-24 DIAGNOSIS — M25.562 ACUTE PAIN OF LEFT KNEE: ICD-10-CM

## 2025-04-24 DIAGNOSIS — Z74.09 IMPAIRED FUNCTIONAL MOBILITY, BALANCE, GAIT, AND ENDURANCE: ICD-10-CM

## 2025-04-24 PROCEDURE — 97110 THERAPEUTIC EXERCISES: CPT | Performed by: PHYSICAL THERAPIST

## 2025-04-24 PROCEDURE — 97112 NEUROMUSCULAR REEDUCATION: CPT | Performed by: PHYSICAL THERAPIST

## 2025-04-24 NOTE — PROGRESS NOTES
"Daily Note     Today's date: 2025  Patient name: Lori Damon  : 1960  MRN: [id number]  Referring provider: Chica Dennis PA-C  Dx:   Encounter Diagnosis     ICD-10-CM    1. Primary osteoarthritis of left knee  M17.12       2. S/P TKR (total knee replacement), left  Z96.652       3. Impaired functional mobility, balance, gait, and endurance  Z74.09       4. Acute pain of left knee  M25.562           Start Time: 1615  Stop Time: 1700  Total time in clinic (min): 45 minutes    Subjective: Patient noted a 6/10 pain at the start of the session. Patient noted she is feeling better than Tuesday.       Objective: See treatment diary below      Assessment: Patient performed Recumbent bike aerobic exercise to increase blood flow to the area being treated, prepare the muscles for strength training and stretching, improve overall tolerance to activity, and aerobic endurance. PT introduced 3 way hip and lateral stepping to assist c functional activities at home. Patient performed with min VCS for form and patient noted moderate fatigue. Patient noted more fatigue on the R when compared to the L. Patient was able to perform step ups without HHA which demonstrates improvements in strength and balance. PT educated the patient on her HEP. Patient would benefit from continued PT to allow the patient to return to her PLOF.         Plan: Continue per plan of care.      Precautions: osteoporosis        Manuals 3/27 3/31 4/3 4/7 4/10 4/14 4/17 4/22 4/24    L patellar mobs RR grade 3 MW grade III MW grade III MW grade III AE grade 3   MW grade III     PROM/ stretch  RR MW MW MW AE w/ STM   MW     RE        MW                  Neuro Re-Ed             HEP edu; home set up; AD management RR MW HEP edu MW  HEP edu HEP edu  MW  HEP edu  MW HEP edu  MW HEP edu  MW HEP edu  MW    Step ups fwd   6\"  X20 ea 2HHA 6\"  X20 ea 2HHA 6\" x10 1 HHA 6\"  X20 ea 1-2 HHA 6\"  X20 ea 1 HHA  6\"  X20 ea 1 HHA  X10  0 HHA    Step ups lateral   " "6\"  X20 ea 2 HHA 6\"  X20 ea 2 HHA  6\"  X20 ea 2 HHA 6\"  X20 ea 1 HHA  6\"  X20 ea 1 HHA  X10  0 HHA    Standing marching      X20 ea X20 ea  X20 ea    Standing TKE       X20  L  GTB      3 way hip         X15 ea taps at times    Lateral stepping         2 laps    Glute set             Quad set X20  5\" hold each  X20  5\" hold ea X20  5\" hold X20 5\" hold   X20  5\" hold     SLR   X15 ea X15 ea x15        SAQ - - - - x15        clams             Sidelying hip abd             Sit to stand      X10 mod cues X15 improved      Standing hip abd  2x10 ea           Leg press      DL  45#  X20  SL  25#  X20 ea plate 7 DL  55#  X20  SL  35#  X20 ea plate 7  DL  55#  X20  SL  35#  X20 ea plate 7    Ther Ex             Nustep seat  12' L3 10' lvl 4 10' lvl 4 10' lvl 4 10' L4 10' lvl 4       bike      5' full rev 10' full rev 10' full rev 10' full rev    Hamstring stretch  @ step  2x20\" ea   3-5 x 20\" ea        Seated hamstring curl       GTB  X20 ea      Knee flexion stretch at step  2x20\" each 5x20\" each 30\"x4 X10 20\" hold  5x20\" ea 5x20\"ea  5x20\"ea    Piriformis stretch             Lower trunk rotations             HR      x20 x20  x20    Seated marching x20 X20 ea X20 ea 2# 2x10 BLE    X20 ea     LAQ LAQ--> knee flexion  X20 ea  x20 2# 2x10 BLE  x20   X20 ea     Calf stretch with strap 3x30\" ea Fitter board  3x30\" each Fitter board  3x30\" each Fitter board  3x30\" each Step 3x20\" hold        Supine hip abd With strap  X20 L            Heel slides 20x5\" holds ea L  X20  5\" hold  L 5x30\" hold L KTC x10   x20     Glute set        Bridge  x10     Hip add X20  5\" hold  X20  5\" hold X20  5\" hold X20  5\" hold   X20  5\" hold     Hip abd             Ther Activity                                       Gait Training             TUG             Stair negotiation             RW 2 laps SPC  3 laps + edu 2 laps SPC 2 laps         Modalities             CP 10' with elevation (4' tolerated with elevation) skin intact prior to and following CP    " home

## 2025-04-28 ENCOUNTER — OFFICE VISIT (OUTPATIENT)
Age: 65
End: 2025-04-28
Attending: PHYSICIAN ASSISTANT
Payer: COMMERCIAL

## 2025-04-28 DIAGNOSIS — Z96.652 S/P TKR (TOTAL KNEE REPLACEMENT), LEFT: ICD-10-CM

## 2025-04-28 DIAGNOSIS — M17.12 PRIMARY OSTEOARTHRITIS OF LEFT KNEE: Primary | ICD-10-CM

## 2025-04-28 DIAGNOSIS — Z74.09 IMPAIRED FUNCTIONAL MOBILITY, BALANCE, GAIT, AND ENDURANCE: ICD-10-CM

## 2025-04-28 DIAGNOSIS — M25.562 ACUTE PAIN OF LEFT KNEE: ICD-10-CM

## 2025-04-28 PROCEDURE — 97110 THERAPEUTIC EXERCISES: CPT | Performed by: PHYSICAL THERAPIST

## 2025-04-28 PROCEDURE — 97112 NEUROMUSCULAR REEDUCATION: CPT | Performed by: PHYSICAL THERAPIST

## 2025-04-28 PROCEDURE — 97140 MANUAL THERAPY 1/> REGIONS: CPT | Performed by: PHYSICAL THERAPIST

## 2025-04-28 NOTE — PROGRESS NOTES
Daily Note     Today's date: 2025  Patient name: Lori Damon  : 1960  MRN: [id number]  Referring provider: Chica Dennis PA-C  Dx:   Encounter Diagnosis     ICD-10-CM    1. Primary osteoarthritis of left knee  M17.12       2. S/P TKR (total knee replacement), left  Z96.652       3. Impaired functional mobility, balance, gait, and endurance  Z74.09       4. Acute pain of left knee  M25.562           Start Time: 1030  Stop Time: 1115  Total time in clinic (min): 45 minutes    Subjective: Patient noted a 5/10 pain in the knee at the start of the session. Patient noted that she walked yesterday for about 1/4 mile without an AD and noted no pain in the knee, however pain in the back. Patient noted that she hears her knee clicking when she rides her bike at home. Patient is seeing the surgeon tomorrow.       Objective: See treatment diary below      Assessment: Patient performed Nustep aerobic exercise to increase blood flow to the area being treated, prepare the muscles for strength training and stretching, improve overall tolerance to activity, and aerobic endurance. PT performed STM superiorly to the L healed incision to assist c pain. Patient noted this improved post manuals. Patient continues to improve with strength 2* no HHA on the steps at times. Patient had no LOB during lateral stepping. PT educated the patient on her HEP. Patient would benefit from continued PT to allow the patient to return to her PLOF.         Plan: Continue per plan of care.      Precautions: osteoporosis        Manuals 3/27 3/31 4/3 4/7 4/10 4/14 4/17 4/22 4/24 4/28   L patellar mobs RR grade 3 MW grade III MW grade III MW grade III AE grade 3   MW grade III     PROM/ stretch  RR MW MW MW AE w/ STM   MW     RE        MW     STM massage superiorly to L scar          MW   Neuro Re-Ed             HEP edu; home set up; AD management RR MW HEP edu MW  HEP edu HEP edu  MW  HEP edu  MW HEP edu  MW HEP edu  MW HEP edu  MW HEP  "edu  MW   Step ups fwd   6\"  X20 ea 2HHA 6\"  X20 ea 2HHA 6\" x10 1 HHA 6\"  X20 ea 1-2 HHA 6\"  X20 ea 1 HHA  6\"  X20 ea 1 HHA  X10  0 HHA 6\"  X20 ea 1 HHA  X10  0 HHA   Step ups lateral   6\"  X20 ea 2 HHA 6\"  X20 ea 2 HHA  6\"  X20 ea 2 HHA 6\"  X20 ea 1 HHA  6\"  X20 ea 1 HHA  X10  0 HHA 6\"  X20 ea 0 HHA   Standing marching      X20 ea X20 ea  X20 ea X20 ea   Standing TKE       X20  L  GTB      3 way hip         X15 ea taps at times X20 ea  B/l   Lateral stepping         2 laps 2 laps   Glute set             Quad set X20  5\" hold each  X20  5\" hold ea X20  5\" hold X20 5\" hold   X20  5\" hold     SLR   X15 ea X15 ea x15        SAQ - - - - x15        clams             Sidelying hip abd             Sit to stand      X10 mod cues X15 improved      Standing hip abd  2x10 ea           Leg press      DL  45#  X20  SL  25#  X20 ea plate 7 DL  55#  X20  SL  35#  X20 ea plate 7  DL  55#  X20  SL  35#  X20 ea plate 7    Ther Ex             Nustep seat  12' L3 10' lvl 4 10' lvl 4 10' lvl 4 10' L4 10' lvl 4       bike      5' full rev 10' full rev 10' full rev 10' full rev 10' full rev   Hamstring stretch  @ step  2x20\" ea   3-5 x 20\" ea        Seated hamstring curl       GTB  X20 ea      Knee flexion stretch at step  2x20\" each 5x20\" each 30\"x4 X10 20\" hold  5x20\" ea 5x20\"ea  5x20\"ea 5x20\" ea   Piriformis stretch             Lower trunk rotations             HR      x20 x20  x20 x20   Seated marching x20 X20 ea X20 ea 2# 2x10 BLE    X20 ea     LAQ LAQ--> knee flexion  X20 ea  x20 2# 2x10 BLE  x20   X20 ea     Calf stretch with strap 3x30\" ea Fitter board  3x30\" each Fitter board  3x30\" each Fitter board  3x30\" each Step 3x20\" hold        Supine hip abd With strap  X20 L            Heel slides 20x5\" holds ea L  X20  5\" hold  L 5x30\" hold L KTC x10   x20     Glute set        Bridge  x10     Hip add X20  5\" hold  X20  5\" hold X20  5\" hold X20  5\" hold   X20  5\" hold     Hip abd             Ther Activity                                  "      Gait Training             TUG             Stair negotiation             RW 2 laps SPC  3 laps + edu 2 laps SPC 2 laps         Modalities             CP 10' with elevation (4' tolerated with elevation) skin intact prior to and following CP    home

## 2025-04-29 ENCOUNTER — OFFICE VISIT (OUTPATIENT)
Age: 65
End: 2025-04-29

## 2025-04-29 VITALS — BODY MASS INDEX: 24.8 KG/M2 | WEIGHT: 140 LBS | HEIGHT: 63 IN

## 2025-04-29 DIAGNOSIS — Z96.652 STATUS POST TOTAL KNEE REPLACEMENT, LEFT: Primary | ICD-10-CM

## 2025-04-29 PROCEDURE — 99024 POSTOP FOLLOW-UP VISIT: CPT | Performed by: PHYSICIAN ASSISTANT

## 2025-04-29 NOTE — PROGRESS NOTES
Subjective:Patient seen and examined. Pain controlled. Progressing well, she is ambulating unassisted today.  She is doing well overall post-operatively. She is attending PT and making good progress. Incision without drainage well healed.  Denies fevers or chills. She is getting around on the bike at PT.     Physical Exam:  Incision: CDI, no erythema or drainage noted  ROM: 0-120  5/5 IP/Q/HS/TA/GS, 2+ DP/PT, SILT DP/SP/S/S/TN      Assessment & Plan  Status post total knee replacement, left  64 y.o. female doing well 6 weeks s/p Left TKA from 3/17/25.     - continue multi-modal pain control   - Weight bearing status: WBAT LLE  - DVT ppx: continue home Eliquis  - Incision care: May shower, do not scrub or submerge incision  -Discussed increasing bedtime neurontin dose to help with her night time neuropathic pain.  - PT/OT  - F/U 6 weeks             Chica Dennis PA-C

## 2025-05-01 ENCOUNTER — OFFICE VISIT (OUTPATIENT)
Age: 65
End: 2025-05-01
Attending: PHYSICIAN ASSISTANT
Payer: COMMERCIAL

## 2025-05-01 DIAGNOSIS — M17.12 PRIMARY OSTEOARTHRITIS OF LEFT KNEE: Primary | ICD-10-CM

## 2025-05-01 DIAGNOSIS — Z96.652 S/P TKR (TOTAL KNEE REPLACEMENT), LEFT: ICD-10-CM

## 2025-05-01 DIAGNOSIS — Z74.09 IMPAIRED FUNCTIONAL MOBILITY, BALANCE, GAIT, AND ENDURANCE: ICD-10-CM

## 2025-05-01 DIAGNOSIS — M25.562 ACUTE PAIN OF LEFT KNEE: ICD-10-CM

## 2025-05-01 PROCEDURE — 97110 THERAPEUTIC EXERCISES: CPT | Performed by: PHYSICAL THERAPIST

## 2025-05-01 PROCEDURE — 97112 NEUROMUSCULAR REEDUCATION: CPT | Performed by: PHYSICAL THERAPIST

## 2025-05-01 PROCEDURE — 97140 MANUAL THERAPY 1/> REGIONS: CPT | Performed by: PHYSICAL THERAPIST

## 2025-05-01 NOTE — PROGRESS NOTES
"Daily Note     Today's date: 2025  Patient name: Lori Damon  : 1960  MRN: [id number]  Referring provider: Chica Dennis PA-C  Dx:   Encounter Diagnosis     ICD-10-CM    1. Primary osteoarthritis of left knee  M17.12       2. S/P TKR (total knee replacement), left  Z96.652       3. Impaired functional mobility, balance, gait, and endurance  Z74.09       4. Acute pain of left knee  M25.562           Start Time: 1030  Stop Time: 1115  Total time in clinic (min): 45 minutes    Subjective: Patient followed up with the surgeon on Tuesday and they noted good progress. Patient noted the massage helped after last session.       Objective: See treatment diary below      Assessment: Patient performed Recumbent bike aerobic exercise to increase blood flow to the area being treated, prepare the muscles for strength training and stretching, improve overall tolerance to activity, and aerobic endurance. Patient negotiated the steps with no HHA during the session which is an improvement. Patient performed lateral stepping with cues for toe alignment. PT performed scar massage to the L knee and STM to the medial knee to assist c pain. Patient noted some discomfort at times with the STM to the medial knee. PT educated the patient on her HEP. Patient would benefit from continued PT to allow the patient to return to her PLOF.         Plan: Continue per plan of care.      Precautions: osteoporosis        Manuals 5/1   4/7 4/10 4/14 4/17 4/22 4/24 4/28   L patellar mobs MW grade III   MW grade III AE grade 3   MW grade III     PROM/ stretch     MW AE w/ STM   MW     RE        MW     STM massage superiorly to L scar          MW   Scar massage MW + STM to medial L knee            Neuro Re-Ed             HEP edu; home set up; AD management HEP edu  MW   HEP edu  MW  HEP edu  MW HEP edu  MW HEP edu  MW HEP edu  MW HEP edu  MW   Step ups fwd 6\"  X20 each no HHA   6\"  X20 ea 2HHA 6\" x10 1 HHA 6\"  X20 ea 1-2 HHA 6\"  X20 ea 1 " "HHA  6\"  X20 ea 1 HHA  X10  0 HHA 6\"  X20 ea 1 HHA  X10  0 HHA   Step ups lateral 6\"  X20 each no HHA   6\"  X20 ea 2 HHA  6\"  X20 ea 2 HHA 6\"  X20 ea 1 HHA  6\"  X20 ea 1 HHA  X10  0 HHA 6\"  X20 ea 0 HHA   Standing marching X20 ea     X20 ea X20 ea  X20 ea X20 ea   Standing TKE       X20  L  GTB      3 way hip X20 ea  B/l        X15 ea taps at times X20 ea  B/l   Lateral stepping 2 laps        2 laps 2 laps   Glute set             Quad set X20  5\" hold   X20  5\" hold X20 5\" hold   X20  5\" hold     SLR    X15 ea x15        SAQ    - x15        clams             Sidelying hip abd             Sit to stand      X10 mod cues X15 improved      Standing hip abd             Leg press      DL  45#  X20  SL  25#  X20 ea plate 7 DL  55#  X20  SL  35#  X20 ea plate 7  DL  55#  X20  SL  35#  X20 ea plate 7    Ther Ex             Nustep seat     10' lvl 4 10' L4 10' lvl 4       bike 10' full rev     5' full rev 10' full rev 10' full rev 10' full rev 10' full rev   Hamstring stretch     3-5 x 20\" ea        Seated hamstring curl       GTB  X20 ea      Knee flexion stretch at step    30\"x4 X10 20\" hold  5x20\" ea 5x20\"ea  5x20\"ea 5x20\" ea   Piriformis stretch             Lower trunk rotations             HR x20     x20 x20  x20 x20   Seated marching    2# 2x10 BLE    X20 ea     LAQ    2# 2x10 BLE  x20   X20 ea     Calf stretch with strap    Fitter board  3x30\" each Step 3x20\" hold        Supine hip abd             Heel slides    5x30\" hold L KTC x10   x20     Glute set        Bridge  x10     Hip add    X20  5\" hold X20  5\" hold   X20  5\" hold     Hip abd             Ther Activity                                       Gait Training             TUG             Stair negotiation             RW    SPC 2 laps         Modalities             CP     home                                                    "

## 2025-05-06 ENCOUNTER — OFFICE VISIT (OUTPATIENT)
Age: 65
End: 2025-05-06
Payer: COMMERCIAL

## 2025-05-06 DIAGNOSIS — Z96.652 S/P TKR (TOTAL KNEE REPLACEMENT), LEFT: ICD-10-CM

## 2025-05-06 DIAGNOSIS — M25.562 ACUTE PAIN OF LEFT KNEE: ICD-10-CM

## 2025-05-06 DIAGNOSIS — Z74.09 IMPAIRED FUNCTIONAL MOBILITY, BALANCE, GAIT, AND ENDURANCE: ICD-10-CM

## 2025-05-06 DIAGNOSIS — M17.12 PRIMARY OSTEOARTHRITIS OF LEFT KNEE: Primary | ICD-10-CM

## 2025-05-06 PROCEDURE — 97112 NEUROMUSCULAR REEDUCATION: CPT | Performed by: PHYSICAL THERAPIST

## 2025-05-06 PROCEDURE — 97140 MANUAL THERAPY 1/> REGIONS: CPT | Performed by: PHYSICAL THERAPIST

## 2025-05-06 PROCEDURE — 97110 THERAPEUTIC EXERCISES: CPT | Performed by: PHYSICAL THERAPIST

## 2025-05-06 NOTE — PROGRESS NOTES
Daily Note     Today's date: 2025  Patient name: Lori Damon  : 1960  MRN: [id number]  Referring provider: Chica Dennis PA-C  Dx:   Encounter Diagnosis     ICD-10-CM    1. Primary osteoarthritis of left knee  M17.12       2. S/P TKR (total knee replacement), left  Z96.652       3. Impaired functional mobility, balance, gait, and endurance  Z74.09       4. Acute pain of left knee  M25.562           Start Time: 1445  Stop Time: 1530  Total time in clinic (min): 45 minutes    Subjective: Patient noted that her knee has stopped popping, however she is feeling pain in the Left shin. Patient noted the weather has increased her pain levels at times.       Objective: See treatment diary below      Assessment: Patient performed Recumbent bike aerobic exercise to increase blood flow to the area being treated, prepare the muscles for strength training and stretching, improve overall tolerance to activity, and aerobic endurance. Patient continues to improve with strength 2* increased height on the steps. Patient performed step downs with some pain noted in the L knee. Patient increased weight on the leg press. PT performed scar massage to assist c ROM and pain levels. Patient provided verbal consent for manual techniques. PT educated the patient on kneeling for 5 minutes a day on a soft chair at 50%. Patient would benefit from continued PT to allow the patient to return to her PLOF.         Plan: Continue per plan of care.      Precautions: osteoporosis        Manuals 5/1 5/6  4/7 4/10 4/14 4/17 4/22 4/24 4/28   L patellar mobs MW grade III MW grade III  MW grade III AE grade 3   MW grade III     PROM/ stretch     MW AE w/ STM   MW     RE        MW     STM massage superiorly to L scar          MW   Scar massage MW + STM to medial L knee MW           Neuro Re-Ed             HEP edu; home set up; AD management HEP edu  MW HEP edu  MW  HEP edu  MW  HEP edu  MW HEP edu  MW HEP edu  MW HEP edu  MW HEP edu  MW  "  Step ups fwd 6\"  X20 each no HHA 8\"  X20 each 1 HHA  6\"  X20 ea 2HHA 6\" x10 1 HHA 6\"  X20 ea 1-2 HHA 6\"  X20 ea 1 HHA  6\"  X20 ea 1 HHA  X10  0 HHA 6\"  X20 ea 1 HHA  X10  0 HHA   Step ups lateral 6\"  X20 each no HHA 8\"  X20 each 1 HHA  6\"  X20 ea 2 HHA  6\"  X20 ea 2 HHA 6\"  X20 ea 1 HHA  6\"  X20 ea 1 HHA  X10  0 HHA 6\"  X20 ea 0 HHA   Step downs  4\"  X15 ea 2 HHA           Standing marching X20 ea X20 ea    X20 ea X20 ea  X20 ea X20 ea   Standing TKE       X20  L  GTB      3 way hip X20 ea  B/l X20 ea b/l       X15 ea taps at times X20 ea  B/l   Lateral stepping 2 laps 2 laps       2 laps 2 laps   Glute set             Quad set X20  5\" hold   X20  5\" hold X20 5\" hold   X20  5\" hold     SLR    X15 ea x15        SAQ    - x15        clams             Sidelying hip abd             Sit to stand      X10 mod cues X15 improved      Standing hip abd             Leg press  DL  65#  X20  SL  35#  X20 ea plate 7    DL  45#  X20  SL  25#  X20 ea plate 7 DL  55#  X20  SL  35#  X20 ea plate 7  DL  55#  X20  SL  35#  X20 ea plate 7    Ther Ex             Nustep seat     10' lvl 4 10' L4 10' lvl 4       bike 10' full rev 10'    5' full rev 10' full rev 10' full rev 10' full rev 10' full rev   Hamstring stretch     3-5 x 20\" ea        Seated hamstring curl       GTB  X20 ea      Knee flexion stretch at step    30\"x4 X10 20\" hold  5x20\" ea 5x20\"ea  5x20\"ea 5x20\" ea   Piriformis stretch             Lower trunk rotations             HR x20     x20 x20  x20 x20   Seated marching    2# 2x10 BLE    X20 ea     LAQ    2# 2x10 BLE  x20   X20 ea     Calf stretch with strap    Fitter board  3x30\" each Step 3x20\" hold        Supine hip abd             Heel slides    5x30\" hold L KTC x10   x20     Glute set        Bridge  x10     Hip add    X20  5\" hold X20  5\" hold   X20  5\" hold     Hip abd             Ther Activity                                       Gait Training             TUG             Stair negotiation             RW    SPC 2 laps  "        Modalities             CP     home

## 2025-05-08 ENCOUNTER — OFFICE VISIT (OUTPATIENT)
Age: 65
End: 2025-05-08
Attending: PHYSICIAN ASSISTANT
Payer: COMMERCIAL

## 2025-05-08 DIAGNOSIS — M17.12 PRIMARY OSTEOARTHRITIS OF LEFT KNEE: Primary | ICD-10-CM

## 2025-05-08 DIAGNOSIS — M25.562 ACUTE PAIN OF LEFT KNEE: ICD-10-CM

## 2025-05-08 DIAGNOSIS — Z74.09 IMPAIRED FUNCTIONAL MOBILITY, BALANCE, GAIT, AND ENDURANCE: ICD-10-CM

## 2025-05-08 DIAGNOSIS — Z96.652 S/P TKR (TOTAL KNEE REPLACEMENT), LEFT: ICD-10-CM

## 2025-05-08 PROCEDURE — 97112 NEUROMUSCULAR REEDUCATION: CPT | Performed by: PHYSICAL THERAPIST

## 2025-05-08 PROCEDURE — 97110 THERAPEUTIC EXERCISES: CPT | Performed by: PHYSICAL THERAPIST

## 2025-05-08 NOTE — PROGRESS NOTES
"Daily Note     Today's date: 2025  Patient name: Lori Damon  : 1960  MRN: [id number]  Referring provider: Chica Dennis PA-C  Dx:   Encounter Diagnosis     ICD-10-CM    1. Primary osteoarthritis of left knee  M17.12       2. S/P TKR (total knee replacement), left  Z96.652       3. Impaired functional mobility, balance, gait, and endurance  Z74.09       4. Acute pain of left knee  M25.562           Start Time: 1030  Stop Time: 1115  Total time in clinic (min): 45 minutes    Subjective: Patient noted soreness after last session. Patient noted she feels better this morning.       Objective: See treatment diary below      Assessment: Patient performed Recumbent bike aerobic exercise to increase blood flow to the area being treated, prepare the muscles for strength training and stretching, improve overall tolerance to activity, and aerobic endurance. Patient performed step ups and 3 way hip with improved form. Weight was decreased on the leg press due to soreness after last session. PT educated the patient on her HEP. Patient would benefit from continued PT to allow the patient to return to her PLOF.         Plan: Continue per plan of care.      Precautions: osteoporosis        Manuals  5   L patellar mobs MW grade III MW grade III      MW grade III     PROM/ stretch         MW     RE        MW     STM massage superiorly to L scar          MW   Scar massage MW + STM to medial L knee MW           Neuro Re-Ed             HEP edu; home set up; AD management HEP edu  MW HEP edu  MW HEP edu  MW   HEP edu  MW HEP edu  MW HEP edu  MW HEP edu  MW HEP edu  MW   Step ups fwd 6\"  X20 each no HHA 8\"  X20 each 1 HHA 8\"  X20 each 1 HHA   6\"  X20 ea 1-2 HHA 6\"  X20 ea 1 HHA  6\"  X20 ea 1 HHA  X10  0 HHA 6\"  X20 ea 1 HHA  X10  0 HHA   Step ups lateral 6\"  X20 each no HHA 8\"  X20 each 1 HHA 8\"  X20 each 1 HHA   6\"  X20 ea 2 HHA 6\"  X20 ea 1 HHA  6\"  X20 ea 1 HHA  X10  0 HHA 6\"  X20 " "ea 0 HHA   Step downs  4\"  X15 ea 2 HHA 4\"  X15 ea 2 HHA          Standing marching X20 ea X20 ea X20 ea   X20 ea X20 ea  X20 ea X20 ea   Standing TKE       X20  L  GTB      3 way hip X20 ea  B/l X20 ea b/l X20 ea b/l      X15 ea taps at times X20 ea  B/l   Lateral stepping 2 laps 2 laps 2 laps      2 laps 2 laps   Glute set             Quad set X20  5\" hold       X20  5\" hold     SLR             SAQ             clams             Sidelying hip abd             Sit to stand      X10 mod cues X15 improved      Standing hip abd             Leg press  DL  65#  X20  SL  35#  X20 ea plate 7 DL  55#  X20  SL  35#  X20 ea plate 7   DL  45#  X20  SL  25#  X20 ea plate 7 DL  55#  X20  SL  35#  X20 ea plate 7  DL  55#  X20  SL  35#  X20 ea plate 7    Ther Ex             Nustep seat       10' lvl 4       bike 10' full rev 10' 10'   5' full rev 10' full rev 10' full rev 10' full rev 10' full rev   Hamstring stretch             Seated hamstring curl       GTB  X20 ea      Knee flexion stretch at step      5x20\" ea 5x20\"ea  5x20\"ea 5x20\" ea   Piriformis stretch             Lower trunk rotations             HR x20  x20   x20 x20  x20 x20   Seated marching        X20 ea     LAQ        X20 ea     Calf stretch with strap             Supine hip abd             Heel slides        x20     Glute set        Bridge  x10     Hip add        X20  5\" hold     Hip abd             Ther Activity                                       Gait Training             TUG             Stair negotiation             RW             Modalities             CP                                                             "

## 2025-05-12 ENCOUNTER — OFFICE VISIT (OUTPATIENT)
Age: 65
End: 2025-05-12
Attending: PHYSICIAN ASSISTANT
Payer: COMMERCIAL

## 2025-05-12 DIAGNOSIS — M17.12 PRIMARY OSTEOARTHRITIS OF LEFT KNEE: Primary | ICD-10-CM

## 2025-05-12 DIAGNOSIS — Z96.652 S/P TKR (TOTAL KNEE REPLACEMENT), LEFT: ICD-10-CM

## 2025-05-12 DIAGNOSIS — Z74.09 IMPAIRED FUNCTIONAL MOBILITY, BALANCE, GAIT, AND ENDURANCE: ICD-10-CM

## 2025-05-12 DIAGNOSIS — M25.562 ACUTE PAIN OF LEFT KNEE: ICD-10-CM

## 2025-05-12 PROCEDURE — 97112 NEUROMUSCULAR REEDUCATION: CPT | Performed by: PHYSICAL THERAPIST

## 2025-05-12 PROCEDURE — 97110 THERAPEUTIC EXERCISES: CPT | Performed by: PHYSICAL THERAPIST

## 2025-05-12 NOTE — PROGRESS NOTES
"Daily Note     Today's date: 2025  Patient name: Lori Damon  : 1960  MRN: [id number]  Referring provider: Chica Dennis PA-C  Dx:   Encounter Diagnosis     ICD-10-CM    1. Primary osteoarthritis of left knee  M17.12       2. S/P TKR (total knee replacement), left  Z96.652       3. Impaired functional mobility, balance, gait, and endurance  Z74.09       4. Acute pain of left knee  M25.562           Start Time: 1115  Stop Time: 1200  Total time in clinic (min): 45 minutes    Subjective: Patient noted that she has been in pain since Thursday. Patient feels that this is due to a fibromyalgia flare.       Objective: See treatment diary below      Assessment: Patient performed Recumbent bike aerobic exercise to increase blood flow to the area being treated, prepare the muscles for strength training and stretching, improve overall tolerance to activity, and aerobic endurance. Patient performed kneeling at 50% on a chair on the L knee to assist c floor transfers. Patient noted discomfort, however with a change in position this decreased. PT educated the patient on continuing this daily to assist c desensitization. Patient performed forward and lateral step over a 12\" little to assist c navigating her bathtub. Patient performed with cues for knee flexion. PT educated the patient on her HEP. Patient would benefit from continued PT to allow the patient to return to her PLOF.         Plan: Continue per plan of care.      Precautions: osteoporosis        Manuals  5 5   L patellar mobs MW grade III MW grade III           PROM/ stretch              RE             STM massage superiorly to L scar          MW   Scar massage MW + STM to medial L knee MW           Neuro Re-Ed             HEP edu; home set up; AD management HEP edu  MW HEP edu  MW HEP edu  MW HEP edu  MW     HEP edu  MW HEP edu  MW   Step ups fwd 6\"  X20 each no HHA 8\"  X20 each 1 HHA 8\"  X20 each 1 HHA 8\"  X20 each 0 HHA   " "  6\"  X20 ea 1 HHA  X10  0 HHA 6\"  X20 ea 1 HHA  X10  0 HHA   Step over 12\" little fwd and lateral    X10 ea         Step ups lateral 6\"  X20 each no HHA 8\"  X20 each 1 HHA 8\"  X20 each 1 HHA 8\"  X20 each 1 HHA     6\"  X20 ea 1 HHA  X10  0 HHA 6\"  X20 ea 0 HHA   Step downs  4\"  X15 ea 2 HHA 4\"  X15 ea 2 HHA 4\"  X20 ea 2 HHA         Standing marching X20 ea X20 ea X20 ea X20 ea     X20 ea X20 ea   Standing TKE             3 way hip X20 ea  B/l X20 ea b/l X20 ea b/l X20 ea b/l     X15 ea taps at times X20 ea  B/l   Lateral stepping 2 laps 2 laps 2 laps      2 laps 2 laps   Glute set             Quad set X20  5\" hold            SLR             SAQ             clams             Sidelying hip abd             Sit to stand             Standing hip abd             Leg press  DL  65#  X20  SL  35#  X20 ea plate 7 DL  55#  X20  SL  35#  X20 ea plate 7 DL  55#  X20  SL  35#  X20 ea plate 7     DL  55#  X20  SL  35#  X20 ea plate 7    Kneel on chair    50%  2' L LE         Ther Ex             Nustep seat              bike 10' full rev 10' 10' 10'     10' full rev 10' full rev   Hamstring stretch             Seated hamstring curl             Knee flexion stretch at step         5x20\"ea 5x20\" ea   Piriformis stretch             Lower trunk rotations             HR x20  x20 x20     x20 x20   Seated marching             LAQ             Calf stretch with strap             Supine hip abd             Heel slides             Glute set             Hip add             Hip abd             Ther Activity                                       Gait Training             TUG             Stair negotiation             RW             Modalities             CP                                                               "

## 2025-05-15 ENCOUNTER — APPOINTMENT (OUTPATIENT)
Age: 65
End: 2025-05-15
Attending: PHYSICIAN ASSISTANT
Payer: COMMERCIAL

## 2025-05-16 ENCOUNTER — APPOINTMENT (OUTPATIENT)
Age: 65
End: 2025-05-16
Attending: PHYSICIAN ASSISTANT
Payer: COMMERCIAL

## 2025-05-19 ENCOUNTER — OFFICE VISIT (OUTPATIENT)
Age: 65
End: 2025-05-19
Attending: PHYSICIAN ASSISTANT
Payer: COMMERCIAL

## 2025-05-19 DIAGNOSIS — Z96.652 S/P TKR (TOTAL KNEE REPLACEMENT), LEFT: ICD-10-CM

## 2025-05-19 DIAGNOSIS — M25.562 ACUTE PAIN OF LEFT KNEE: ICD-10-CM

## 2025-05-19 DIAGNOSIS — Z74.09 IMPAIRED FUNCTIONAL MOBILITY, BALANCE, GAIT, AND ENDURANCE: ICD-10-CM

## 2025-05-19 DIAGNOSIS — M17.12 PRIMARY OSTEOARTHRITIS OF LEFT KNEE: Primary | ICD-10-CM

## 2025-05-19 PROCEDURE — 97112 NEUROMUSCULAR REEDUCATION: CPT

## 2025-05-19 PROCEDURE — 97110 THERAPEUTIC EXERCISES: CPT
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
## 2025-05-22 ENCOUNTER — OFFICE VISIT (OUTPATIENT)
Age: 65
End: 2025-05-22
Attending: PHYSICIAN ASSISTANT
Payer: COMMERCIAL

## 2025-05-22 DIAGNOSIS — M17.12 PRIMARY OSTEOARTHRITIS OF LEFT KNEE: Primary | ICD-10-CM

## 2025-05-22 DIAGNOSIS — Z96.652 S/P TKR (TOTAL KNEE REPLACEMENT), LEFT: ICD-10-CM

## 2025-05-22 DIAGNOSIS — Z74.09 IMPAIRED FUNCTIONAL MOBILITY, BALANCE, GAIT, AND ENDURANCE: ICD-10-CM

## 2025-05-22 DIAGNOSIS — M25.562 ACUTE PAIN OF LEFT KNEE: ICD-10-CM

## 2025-05-22 PROCEDURE — 97110 THERAPEUTIC EXERCISES: CPT

## 2025-05-22 PROCEDURE — 97112 NEUROMUSCULAR REEDUCATION: CPT

## 2025-05-22 NOTE — PROGRESS NOTES
"Daily Note     Today's date: 2025  Patient name: Lori Damon  : 1960  MRN: [id number]  Referring provider: Chica Dennis PA-C  Dx:   Encounter Diagnosis     ICD-10-CM    1. Primary osteoarthritis of left knee  M17.12       2. S/P TKR (total knee replacement), left  Z96.652       3. Acute pain of left knee  M25.562       4. Impaired functional mobility, balance, gait, and endurance  Z74.09           Start Time: 1115  Stop Time: 1200  Total time in clinic (min): 45 minutes    Subjective: Patient reports that she had some sharp \"lightning\" like pain while she was kneeling on Tuesday and then had pain the rest of the day.       Objective: See treatment diary below      Assessment: Lori tolerated today's treatment session well. Lori completed all exercises with good form and no adverse reactions. Patient education provided on continuation of POC and TE. Patient able to progress to 6\" step for step down activity today despite pain and fatigue. Patient continues to have increased difficulty with step over 12\" cones to clear her LLE. Lori continues to benefit from skilled OPPT services to address BLE strength, balance, and endurance. PT will continue to address functional deficits  and focus on progression of POC per patient tolerance.      Patient performed Recumbent bike to increase blood flow to the area being treated, prepare the muscles for strength training and stretching, improve overall tolerance to activity, and aerobic endurance.       Plan: Continue per plan of care.  Progress treatment as tolerated.       Precautions: osteoporosis        Manuals    L patellar mobs MW grade III MW grade III           PROM/ stretch              RE             STM massage superiorly to L scar          MW   Scar massage MW + STM to medial L knee MW           Neuro Re-Ed             HEP edu; home set up; AD management HEP edu  MW HEP edu  MW HEP edu  MW HEP edu  MW HEP edu  MF " "HEP edu  MF   HEP edu  MW HEP edu  MW   Step ups fwd 6\"  X20 each no HHA 8\"  X20 each 1 HHA 8\"  X20 each 1 HHA 8\"  X20 each 0 HHA 8\"  X20 each 0 HHA 8\"  X20 each 0 HHA   6\"  X20 ea 1 HHA  X10  0 HHA 6\"  X20 ea 1 HHA  X10  0 HHA   Step over 12\" little fwd and lateral    X10 ea  2 laps fwd and 2 laps lateral        Step ups lateral 6\"  X20 each no HHA 8\"  X20 each 1 HHA 8\"  X20 each 1 HHA 8\"  X20 each 1 HHA 8\"  X20 each 1 HHA 8\"  X20 each 1 HHA   6\"  X20 ea 1 HHA  X10  0 HHA 6\"  X20 ea 0 HHA   Step downs  4\"  X15 ea 2 HHA 4\"  X15 ea 2 HHA 4\"  X20 ea 2 HHA 4\"  X20 ea 2 HHA 6\" x 20 ea 2 HHA       Standing marching X20 ea X20 ea X20 ea X20 ea X20 ea X 20 ea   X20 ea X20 ea   Standing TKE             3 way hip X20 ea  B/l X20 ea b/l X20 ea b/l X20 ea b/l X20 ea b/l X 20 ea b/l   X15 ea taps at times X20 ea  B/l   Lateral stepping 2 laps 2 laps 2 laps      2 laps 2 laps   Glute set             Quad set X20  5\" hold            SLR             SAQ             clams             Sidelying hip abd             Sit to stand             Standing hip abd             Leg press  DL  65#  X20  SL  35#  X20 ea plate 7 DL  55#  X20  SL  35#  X20 ea plate 7 DL  55#  X20  SL  35#  X20 ea plate 7 DL  55#  X20  SL  35#  X20 ea plate 7 DL  55#  X20  SL  35#  X20 ea plate 7   DL  55#  X20  SL  35#  X20 ea plate 7    Kneel on chair    50%  2' L LE         Ther Ex             Nustep seat              bike 10' full rev 10' 10' 10' 10' 10'   10' full rev 10' full rev   Hamstring stretch             Seated hamstring curl             Knee flexion stretch at step         5x20\"ea 5x20\" ea   Piriformis stretch             Lower trunk rotations             HR x20  x20 x20     x20 x20   Seated marching             LAQ             Calf stretch with strap             Supine hip abd             Heel slides             Glute set             Hip add             Hip abd             Ther Activity                                       Gait Training           "   TUG             Stair negotiation             RW             Modalities             CP

## 2025-05-27 ENCOUNTER — TELEPHONE (OUTPATIENT)
Age: 65
End: 2025-05-27

## 2025-05-27 ENCOUNTER — OFFICE VISIT (OUTPATIENT)
Dept: FAMILY MEDICINE CLINIC | Facility: CLINIC | Age: 65
End: 2025-05-27
Payer: COMMERCIAL

## 2025-05-27 VITALS
SYSTOLIC BLOOD PRESSURE: 110 MMHG | DIASTOLIC BLOOD PRESSURE: 70 MMHG | HEIGHT: 63 IN | WEIGHT: 146 LBS | OXYGEN SATURATION: 96 % | TEMPERATURE: 97.5 F | HEART RATE: 95 BPM | BODY MASS INDEX: 25.87 KG/M2

## 2025-05-27 DIAGNOSIS — M47.816 SPONDYLOSIS OF LUMBAR SPINE: ICD-10-CM

## 2025-05-27 DIAGNOSIS — M17.12 PRIMARY OSTEOARTHRITIS OF LEFT KNEE: Primary | ICD-10-CM

## 2025-05-27 DIAGNOSIS — Z78.9 NEED FOR FOLLOW-UP BY SOCIAL WORKER: ICD-10-CM

## 2025-05-27 DIAGNOSIS — Z74.2 NEEDS ASSISTANCE WHILE AT HOME: ICD-10-CM

## 2025-05-27 DIAGNOSIS — N39.41 URGE INCONTINENCE: ICD-10-CM

## 2025-05-27 PROCEDURE — 99213 OFFICE O/P EST LOW 20 MIN: CPT | Performed by: FAMILY MEDICINE

## 2025-05-27 PROCEDURE — G2211 COMPLEX E/M VISIT ADD ON: HCPCS | Performed by: FAMILY MEDICINE

## 2025-05-27 NOTE — TELEPHONE ENCOUNTER
Please see the below. Thank you.        Appointment canceled for Lroi Damon ([id number])   Visit type: OFFICE VISIT SHORT [facility]   7/8/2025 1:00 PM (30 minutes) with Lori Regalado PA-C in [facility]      Reason for cancellation: Canceled via [employer]      Patient comments: Can not afford botox procedure as well as Nurtec prescription.  Will call to reschedule regular office visit instead.

## 2025-05-27 NOTE — PROGRESS NOTES
Name: Lori Damon      : 1960      MRN: [id number]  Encounter Provider: aNty Kenney MD  Encounter Date: 2025   Encounter department: [facility]  :  Assessment & Plan  Primary osteoarthritis of left knee  S/P total knee replacement. Continue with physical therapy and follow up with Orthopedics as scheduled        Spondylosis of lumbar spine  XR from  reviewed which showed severe disc height loss at L3-4 and L4-5 and to a lesser extent L2-3. Mild disc height loss at L5-S1. Mild multilevel facet degenerative changes most prominent at L5-S1. At this time patient does not wish to see pain management or Neursurgery but will start physical therapy first          Need for follow-up by   Discussed referral to social work for getting assistance in the home given decreased mobility as well as to help with the cost of Eliquis medication   Orders:    Ambulatory Referral to Social Work Care Management Program; Future    Needs assistance while at home    Orders:    Ambulatory Referral to Social Work Care Management Program; Future           History of Present Illness   HPI    Lori Damon is a very pleasant 64-year-old female who presents today for follow-up.  Unfortunately patient is not doing well right now.  She reports that recovery from her knee replacement is slow and she is still struggling with a lot of pain.  She has been doing physical therapy as directed however she thinks that the issue with the knee is aggravating her back which has also been causing a lot of issues.  She had seen pain management in the past and had injections but is hesitant to see them again.  She was also referred to neurosurgery however was advised to do physical therapy and also had to quit smoking before she would be considered a candidate for surgery.  She reports that due to her limited mobility she is having issues getting around the house and has not been able to clean or cook like she used  "to.  Her daughter does live with her however her daughter does not really help out with that much.  She also reports that since she is not as mobile she has also been gaining weight and is also been eating a lot of frozen ready meals.  She also reports that being on the Eliquis she does have a lot of bruising primarily on the distal extremities however she knows that given a history of recurrent DVTs and PE that she needs to be on it long-term.  She does report however that the cost of Eliquis is rather expensive and that she does not think she qualifies for any assistance with that.  All of this is affecting her mental health and she does admit to feeling a little depressed at times.    Review of Systems   Constitutional: Negative.    HENT: Negative.     Eyes: Negative.    Respiratory: Negative.     Cardiovascular: Negative.    Gastrointestinal: Negative.    Genitourinary: Negative.    Musculoskeletal:  Positive for arthralgias, back pain and gait problem.   Skin: Negative.    Hematological:  Bruises/bleeds easily.   Psychiatric/Behavioral:  Positive for dysphoric mood.        Objective   /70 (BP Location: Left arm, Patient Position: Sitting, Cuff Size: Standard)   Pulse 95   Temp 97.5 °F (36.4 °C) (Temporal)   Ht 5' 3\" (1.6 m)   Wt 66.2 kg (146 lb)   SpO2 96%   BMI 25.86 kg/m²      Physical Exam  Constitutional:       General: She is not in acute distress.     Appearance: She is not ill-appearing.   HENT:      Head: Normocephalic and atraumatic.     Eyes:      General:         Right eye: No discharge.         Left eye: No discharge.      Extraocular Movements: Extraocular movements intact.       Cardiovascular:      Rate and Rhythm: Normal rate.   Pulmonary:      Effort: Pulmonary effort is normal. No respiratory distress.      Breath sounds: No wheezing.     Musculoskeletal:      Right lower leg: No edema.      Left lower leg: No edema.     Neurological:      General: No focal deficit present.      " Mental Status: She is alert.     Psychiatric:      Comments: Intermittently tearful

## 2025-05-28 ENCOUNTER — PATIENT OUTREACH (OUTPATIENT)
Dept: CASE MANAGEMENT | Facility: OTHER | Age: 65
End: 2025-05-28

## 2025-05-28 ENCOUNTER — OFFICE VISIT (OUTPATIENT)
Age: 65
End: 2025-05-28
Attending: PHYSICIAN ASSISTANT
Payer: COMMERCIAL

## 2025-05-28 DIAGNOSIS — Z74.09 IMPAIRED FUNCTIONAL MOBILITY, BALANCE, GAIT, AND ENDURANCE: ICD-10-CM

## 2025-05-28 DIAGNOSIS — Z96.652 S/P TKR (TOTAL KNEE REPLACEMENT), LEFT: ICD-10-CM

## 2025-05-28 DIAGNOSIS — M25.562 ACUTE PAIN OF LEFT KNEE: ICD-10-CM

## 2025-05-28 DIAGNOSIS — M17.12 PRIMARY OSTEOARTHRITIS OF LEFT KNEE: Primary | ICD-10-CM

## 2025-05-28 PROCEDURE — 97112 NEUROMUSCULAR REEDUCATION: CPT

## 2025-05-28 PROCEDURE — 97110 THERAPEUTIC EXERCISES: CPT

## 2025-05-28 RX ORDER — TROSPIUM CHLORIDE 20 MG/1
20 TABLET, FILM COATED ORAL 2 TIMES DAILY
Qty: 180 TABLET | Refills: 1 | Status: SHIPPED | OUTPATIENT
Start: 2025-05-28

## 2025-05-28 NOTE — ASSESSMENT & PLAN NOTE
S/P total knee replacement. Continue with physical therapy and follow up with Orthopedics as scheduled

## 2025-05-28 NOTE — PROGRESS NOTES
SERA DIAZ received referral for patient from Naty Kenney MD, for needs assistance while at home and need for follow-up by . Per chart review, patient needs assistance in the home due to decreased mobility and assistance with cost of Eliquis medication.    Patient does not have any prior OP SW CM outreach.    SERA DIAZ placed initial outreach call to patient. SERA DIAZ introduced self. Patient asked if SERA DIAZ could call her back at a different time as she was driving home from PT. SERA DIAZ confirmed this. SERA DIAZ to outreach patient at a later time.    SERA DIAZ placed another outreach call to patient. SERA DIAZ re-introduced self, explained role and reasoning for outreach. Patient confirmed she would like to have some assistance in the home. Patient stated she still drives and is able to complete ADL's independently. She stated she would like someone to assist with light cleaning and moving things around. SERA DIAZ notified patient that this would be more of something a local cleaning service would need to be outreached to for. SERA DIAZ offered to provide patient with the [facility]ging [facility] phone number in case they can provide local options: [phone].     SERA DIAZ reviewed patient's concern with affording Eliquis. Patient stated she is over income for Medicaid. SERA DIAZ reviewed PACE/PACEN[employer] and the Eliquis Patient [place] [facility] and patient did not qualify for these as she was over income.    No other needs identified. SERA DIAZ routed note to PCP to provide update. Referral closed.

## 2025-05-28 NOTE — ASSESSMENT & PLAN NOTE
XR from 2023 reviewed which showed severe disc height loss at L3-4 and L4-5 and to a lesser extent L2-3. Mild disc height loss at L5-S1. Mild multilevel facet degenerative changes most prominent at L5-S1. At this time patient does not wish to see pain management or Neursurgery but will start physical therapy first

## 2025-05-28 NOTE — PROGRESS NOTES
Daily Note     Today's date: 2025  Patient name: Lori Damon  : 1960  MRN: [id number]  Referring provider: Chica Dennis PA-C  Dx:   Encounter Diagnosis     ICD-10-CM    1. Primary osteoarthritis of left knee  M17.12       2. S/P TKR (total knee replacement), left  Z96.652       3. Acute pain of left knee  M25.562       4. Impaired functional mobility, balance, gait, and endurance  Z74.09           Start Time: 1115  Stop Time: 1203  Total time in clinic (min): 48 minutes    Subjective: Patient reports that she was kneeling and crawling on Monday at the [facility] for approximately 3 hours and is very painful and sore from doing so. She also reports she believed she pulled a muscle in the R hamstring/glute area as well.       Objective: See treatment diary below      Assessment: Lori tolerated today's treatment session well. Lori completed all exercises with good form and no adverse reactions. Patient education provided on continuation of POC and TE and progression of HEP. Modified program today due to increased knee pain and full body soreness after activity on Monday. Added in new balance activities with PT SBA and intermittent HHA on handrail needed. Lori continues to benefit from skilled OPPT services to address range of motion/strength/balance/gait impairments. PT will continue to address functional deficits  and focus on progression of POC per patient tolerance.      Patient performed Recumbent bike to increase blood flow to the area being treated, prepare the muscles for strength training and stretching, improve overall tolerance to activity, and aerobic endurance.       Plan: Continue per plan of care.  Progress treatment as tolerated.       Precautions: osteoporosis        Manuals    L patellar mobs MW grade III MW grade III           PROM/ stretch              RE             STM massage superiorly to L scar          MW   Scar massage MW + STM to  "medial L knee MW           Neuro Re-Ed             HEP edu; home set up; AD management HEP edu  MW HEP edu  MW HEP edu  MW HEP edu  MW HEP edu  MF HEP edu  MF HEP edu  MF  HEP edu  MW HEP edu  MW   Step ups fwd 6\"  X20 each no HHA 8\"  X20 each 1 HHA 8\"  X20 each 1 HHA 8\"  X20 each 0 HHA 8\"  X20 each 0 HHA 8\"  X20 each 0 HHA Hold  6\"  X20 ea 1 HHA  X10  0 HHA 6\"  X20 ea 1 HHA  X10  0 HHA   Step over 12\" little fwd and lateral    X10 ea  2 laps fwd and 2 laps lateral  Hold      Step ups lateral 6\"  X20 each no HHA 8\"  X20 each 1 HHA 8\"  X20 each 1 HHA 8\"  X20 each 1 HHA 8\"  X20 each 1 HHA 8\"  X20 each 1 HHA Hold  6\"  X20 ea 1 HHA  X10  0 HHA 6\"  X20 ea 0 HHA   Step downs  4\"  X15 ea 2 HHA 4\"  X15 ea 2 HHA 4\"  X20 ea 2 HHA 4\"  X20 ea 2 HHA 6\" x 20 ea 2 HHA Hold      Standing marching X20 ea X20 ea X20 ea X20 ea X20 ea X 20 ea X 20 ea  X20 ea X20 ea   Standing TKE       Purple TB 2 x 10      3 way hip X20 ea  B/l X20 ea b/l X20 ea b/l X20 ea b/l X20 ea b/l X 20 ea b/l X 20 ea b/l  X15 ea taps at times X20 ea  B/l   Lateral stepping 2 laps 2 laps 2 laps    1 lap  2 laps 2 laps   Glute set             Quad set X20  5\" hold            Offset Sharpened Rhomberg       On airex 4 x :30      Diagonal Big Step on Airex       4 laps with SBA      clams             Sidelying hip abd             Sit to stand             Standing hip abd             Leg press  DL  65#  X20  SL  35#  X20 ea plate 7 DL  55#  X20  SL  35#  X20 ea plate 7 DL  55#  X20  SL  35#  X20 ea plate 7 DL  55#  X20  SL  35#  X20 ea plate 7 DL  55#  X20  SL  35#  X20 ea plate 7 DL  55#  X20  SL  35# RLE  X20  and 25# LLE plate 7  DL  55#  X20  SL  35#  X20 ea plate 7    Kneel on chair    50%  2' L LE         Ther Ex             Nustep seat              bike 10' full rev 10' 10' 10' 10' 10' 10'  10' full rev 10' full rev   Hamstring stretch             Seated hamstring curl             Knee flexion stretch at step       5 x 20\" ea  5x20\"ea 5x20\" ea   Piriformis " stretch             Lower trunk rotations             HR x20  x20 x20     x20 x20   Seated marching             LAQ             Calf stretch with strap             Supine hip abd             Heel slides             Glute set             Hip add             Hip abd             Ther Activity                                       Gait Training             TUG             Stair negotiation             RW             Modalities             CP

## 2025-05-30 ENCOUNTER — OFFICE VISIT (OUTPATIENT)
Age: 65
End: 2025-05-30
Attending: PHYSICIAN ASSISTANT
Payer: COMMERCIAL

## 2025-05-30 DIAGNOSIS — Z74.09 IMPAIRED FUNCTIONAL MOBILITY, BALANCE, GAIT, AND ENDURANCE: ICD-10-CM

## 2025-05-30 DIAGNOSIS — Z96.652 S/P TKR (TOTAL KNEE REPLACEMENT), LEFT: ICD-10-CM

## 2025-05-30 DIAGNOSIS — M25.562 ACUTE PAIN OF LEFT KNEE: ICD-10-CM

## 2025-05-30 DIAGNOSIS — M17.12 PRIMARY OSTEOARTHRITIS OF LEFT KNEE: Primary | ICD-10-CM

## 2025-05-30 PROCEDURE — 97112 NEUROMUSCULAR REEDUCATION: CPT

## 2025-05-30 PROCEDURE — 97110 THERAPEUTIC EXERCISES: CPT

## 2025-05-30 NOTE — PROGRESS NOTES
Daily Note     Today's date: 2025  Patient name: Lori Damon  : 1960  MRN: [id number]  Referring provider: Chica Dennis PA-C  Dx:   Encounter Diagnosis     ICD-10-CM    1. Primary osteoarthritis of left knee  M17.12       2. S/P TKR (total knee replacement), left  Z96.652       3. Impaired functional mobility, balance, gait, and endurance  Z74.09       4. Acute pain of left knee  M25.562           Start Time: 1115  Stop Time: 1158  Total time in clinic (min): 43 minutes    Subjective: Patient reports mostly soreness today in the L knee. She states that she is feeling better than she did on Wednesday but still has been busy at home with sweeping and vacuuming.      Objective: See treatment diary below      Assessment: Lori tolerated today's treatment session well. Lori completed all exercises with no adverse reactions. Patient education provided on continued plan of care. Patient had increased difficulty/pain/fatigue with stepping over 12 inch cones and started to circumduct. Laterally, she was only able to complete 1 lap due to anterior/inferior knee pain.Patient able to achieve 0-131 L knee range of motion. Lori continues to benefit from skilled OPPT services to address BLE strength deficits, gait abnormalities, and balance impairment. PT will continue to address functional deficits and focus on progression of POC per patient tolerance.      Patient performed Recumbent bike to increase blood flow to the area being treated, prepare the muscles for strength training and stretching, improve overall tolerance to activity, and aerobic endurance.       Plan: Continue per plan of care.      Precautions: osteoporosis        Manuals    L patellar mobs MW grade III MW grade III           PROM/ stretch              RE             STM massage superiorly to L scar          MW   Scar massage MW + STM to medial L knee MW           Neuro Re-Ed             HEP edu;  "home set up; AD management HEP edu  MW HEP edu  MW HEP edu  MW HEP edu  MW HEP edu  MF HEP edu  MF HEP edu  MF  HEP edu  MW HEP edu  MW   Step ups fwd 6\"  X20 each no HHA 8\"  X20 each 1 HHA 8\"  X20 each 1 HHA 8\"  X20 each 0 HHA 8\"  X20 each 0 HHA 8\"  X20 each 0 HHA Hold X 10 on 6\" steps at staircase w/o HHA 6\"  X20 ea 1 HHA  X10  0 HHA 6\"  X20 ea 1 HHA  X10  0 HHA   Step over 12\" little fwd and lateral    X10 ea  2 laps fwd and 2 laps lateral  Hold 2 laps fwd and 1 laps lateral      Step ups lateral 6\"  X20 each no HHA 8\"  X20 each 1 HHA 8\"  X20 each 1 HHA 8\"  X20 each 1 HHA 8\"  X20 each 1 HHA 8\"  X20 each 1 HHA Hold  6\"  X20 ea 1 HHA  X10  0 HHA 6\"  X20 ea 0 HHA   Step downs  4\"  X15 ea 2 HHA 4\"  X15 ea 2 HHA 4\"  X20 ea 2 HHA 4\"  X20 ea 2 HHA 6\" x 20 ea 2 HHA Hold      Standing marching X20 ea X20 ea X20 ea X20 ea X20 ea X 20 ea X 20 ea X 20 ea X20 ea X20 ea   Standing TKE       Purple TB 2 x 10      3 way hip X20 ea  B/l X20 ea b/l X20 ea b/l X20 ea b/l X20 ea b/l X 20 ea b/l X 20 ea b/l X 20 ea b/l X15 ea taps at times X20 ea  B/l   Lateral stepping 2 laps 2 laps 2 laps    1 lap  2 laps 2 laps   Offset Sharpened Rhomberg       On airex 4 x :30      Diagonal Big Step on Airex       4 laps with SBA      Sit to stand             Leg press  DL  65#  X20  SL  35#  X20 ea plate 7 DL  55#  X20  SL  35#  X20 ea plate 7 DL  55#  X20  SL  35#  X20 ea plate 7 DL  55#  X20  SL  35#  X20 ea plate 7 DL  55#  X20  SL  35#  X20 ea plate 7 DL  55#  X20  SL  35# RLE  X20  and 25# LLE plate 7 DL  65#  X20  SL  35# RLE  X20  and 35# LLE plate 7 DL  55#  X20  SL  35#  X20 ea plate 7    Kneel on chair    50%  2' L LE         Ther Ex             bike 10' full rev 10' 10' 10' 10' 10' 10'  10' full rev 10' full rev   Knee flexion stretch at step       5 x 20\" ea  5x20\"ea 5x20\" ea   HR x20  x20 x20     x20 x20   Standing marching             Ther Activity                                       Gait Training             TUG           "   Modalities             CP

## 2025-06-03 ENCOUNTER — EVALUATION (OUTPATIENT)
Age: 65
End: 2025-06-03
Attending: PHYSICIAN ASSISTANT
Payer: COMMERCIAL

## 2025-06-03 DIAGNOSIS — Z96.652 S/P TKR (TOTAL KNEE REPLACEMENT), LEFT: ICD-10-CM

## 2025-06-03 DIAGNOSIS — Z74.09 IMPAIRED FUNCTIONAL MOBILITY, BALANCE, GAIT, AND ENDURANCE: ICD-10-CM

## 2025-06-03 DIAGNOSIS — M25.562 ACUTE PAIN OF LEFT KNEE: ICD-10-CM

## 2025-06-03 DIAGNOSIS — M17.12 PRIMARY OSTEOARTHRITIS OF LEFT KNEE: Primary | ICD-10-CM

## 2025-06-03 PROCEDURE — 97110 THERAPEUTIC EXERCISES: CPT

## 2025-06-03 PROCEDURE — 97164 PT RE-EVAL EST PLAN CARE: CPT

## 2025-06-03 PROCEDURE — 97112 NEUROMUSCULAR REEDUCATION: CPT

## 2025-06-03 NOTE — PROGRESS NOTES
PT Re-Evaluation     Today's date: 6/3/2025  Patient name: Lori Damon  : 1960  MRN: [id number]  Referring provider: Chica Dennis PA-C  Dx:   Encounter Diagnosis     ICD-10-CM    1. Primary osteoarthritis of left knee  M17.12       2. S/P TKR (total knee replacement), left  Z96.652       3. Impaired functional mobility, balance, gait, and endurance  Z74.09       4. Acute pain of left knee  M25.562               Start Time: 1045  Stop Time: 1140  Total time in clinic (min): 55 minutes    Assessment  Impairments: abnormal gait, abnormal or restricted ROM, activity intolerance, impaired balance, impaired physical strength, lacks appropriate home exercise program, pain with function and endurance    Assessment details: Patient is a 65 yo female who continues to present to physical therapy s/p L TKA on 3/17/25. Since starting physical therapy, the patient has progressed with BLE strength, L knee AROM/PROM, patellar mobility, balance, endurance and gait mechanics. She reports a GROC score of 75%. Patient is currently not ambulating with an AD. Patient's L knee AROM is 0-127 degrees. Patient is driving and is able to alejandra and doff shoes without difficulty. Although improvements have been made the patient continues to be limited with prolonged sitting (ie. > 5 minutes), kneeling and prolonged strenuous activities.  PT will continue to address the noted impairments by performing hip and knee strengthening, stretching, balance, functional activities and manual techniques to allow the patient to return to her PLOF. PT recommended 2x/week for 4-6 weeks c a good prognosis 2* noted improvements.   Understanding of Dx/Px/POC: good     Prognosis: good    Goals  Post-Operative Goals:  STG: In four weeks the patient will:    1. Be (I) with her HEP. (MET)  2. Increase hip and knee strength to 4/5 MMT score to assist c ADLs. (MET)  3. Increase L knee ROM by 15 degrees to assist c walking and transfers. (MET)  4. amb with  a RW for 200ft with mod (I) with proper gait mechanics. (MET)  5. amb with a SPC for 200ft with mod (I) with proper gait mechanics. (MET)  6. Negotiate 1 step with proper mechanics with a SPC to assist c getting into her home. (MET)      LTG: In eight weeks, the patient will:    1. Increase FOTO score to 65 to demonstrate improvements in symptoms and function. (In progress)  2. Demonstrate full L knee AROM without pain. (In progress)  3. Negotiate a full flight of steps without pain. (MET)  4. Increase hip and knee strength to 4+/5 MMT score to assist c prolonged walking and stairs. (In progress)  5. amb (I) for 200ft with proper gait mechanics.(In progress)    New goals: in 6 weeks the patient will:  1. Kneel for 1-2 minutes without pain (In progress)  2. Perform a floor transfer without pain. (In progress)  3. Perform x 10 sit to stands with improved form (more valgus on RLE but improving)          Plan  Patient would benefit from: skilled physical therapy and PT eval  Planned modality interventions: cryotherapy, thermotherapy: hydrocollator packs and neuromuscular electric stimulation    Planned therapy interventions: IASTM, joint mobilization, kinesiology taping, manual therapy, massage, Yi taping, abdominal trunk stabilization, balance, body mechanics training, breathing training, neuromuscular re-education, patient education, postural training, strengthening, stretching, therapeutic activities, therapeutic exercise, flexibility, functional ROM exercises, gait training, transfer training, home exercise program and patient/caregiver education    Frequency: 2x week  Duration in weeks: 6  Plan of Care beginning date: 6/3/2025  Plan of Care expiration date: 7/15/2025  Treatment plan discussed with: patient        Subjective Evaluation    History of Present Illness  Mechanism of injury: RE 6/3:     Pt had surgery on 3/17/25 with no complications reported. Patient notes a 75% improvement since starting  "physical therapy. Patient noted improvements with the following functional activities such as cooking, walking, cleaning, sleeping, bath tub transfers, donning and doffing shoes and socks, and steps. Patient is negotiating steps in a reciprocal gait pattern with a hand rail. Patient has been completing their HEP. Patient is taking aspirin daily. She still has the most difficulty with sitting for periods > 5 minutes, kneeling, and balance. Also, more strenuous activities such as gardening/cleaning do give her some knee pain but improved and it is more her back that bothers her.    Patient Goals  Patient goals for therapy: increased strength, independence with ADLs/IADLs, return to sport/leisure activities, increased motion, improved balance and decreased pain  Patient goal: \"to improve mobility and walk.\"(in progress) \"to get out and about.\"(in progress); new goals: \"return to work\" (wants a part-time/volunteer); \"be able to walk without AD\" (complete); \"be able to complete chores\"(complete)  Pain  Current pain ratin (inferior to the patella)  At best pain ratin  At worst pain ratin  Quality: burning, knife-like, pressure, sharp and discomfort  Relieving factors: rest, medications and ice (Aspirin; oxycodone)  Aggravating factors: lifting and sitting (driving; floor transfers, dressing)  Progression: improved    Social Support  Steps to enter house: yes  2  Stairs in house: yes (4\" step)   1  Lives in: one-story house  Lives with: adult children    Employment status: not working  Hand dominance: right          Objective     Postural Observations  Seated posture: fair  Standing posture: fair      Tenderness     Additional Tenderness Details  Tender to palpate global L knee.    Active Range of Motion   Left Knee   Flexion: 127 degrees with pain  Extension: 0 degrees   Extensor la degrees     Right Knee   Flexion: 141 degrees     Mobility   Patellar Mobility:   Left Knee   WFL: medial, lateral, superior " "and inferior.     Strength/Myotome Testing     Left Hip   Planes of Motion   Flexion: 4  Extension: 4  Abduction: 4  Adduction: 4+    Right Hip   Planes of Motion   Flexion: 5  Extension: 4+  Abduction: 4+  Adduction: 4+    Left Knee   Flexion: 4+  Extension: 4+  Quadriceps contraction: good    Right Knee   Flexion: 5  Extension: 5    Left Ankle/Foot   Dorsiflexion: 4+    Right Ankle/Foot   Dorsiflexion: 5    Swelling     Left Knee Girth Measurement (cm)   Joint line: 39.7 cm  10 cm above joint line: 44 cm    Right Knee Girth Measurement (cm)   Joint line: 39.3 cm  10 cm above joint line: 42.5 cm    Ambulation     Ambulation: Level Surfaces   Ambulation with assistive device: close supervision.  Ambulation without assistive device: independent    Ambulation: Stairs   Level of assistance: close supervision.  Pattern: reciprocal  Railings: without rails  Descend stairs: independent (close supervision)  Pattern: reciprocal  Railings: without rails  Curbs: independent    Observational Gait   Gait: within functional limits   Walking speed, stride length, left stance time, right stance time, left step length and right step length within functional limits.     Functional Assessment        Comments  25 TU seconds no AD, moderate pain noted    RE: 3/20/25  TU seconds w/ RW step through gait; required b/l UE support to stand    RE 6/3: 10 seconds without AD        Precautions: osteoporosis      Manuals  5 5/8 5/12 5/19 5/22 5/28 5/30 6/3 4/28   L patellar mobs MW grade III MW grade III           PROM/ stretch              RE             STM massage superiorly to L scar          MW   Scar massage MW + STM to medial L knee MW           Neuro Re-Ed             HEP edu; home set up; AD management HEP edu  MW HEP edu  MW HEP edu  MW HEP edu  MW HEP edu  MF HEP edu  MF HEP edu  MF  HEP edu and POC  MF HEP edu  MW   Step ups fwd 6\"  X20 each no HHA 8\"  X20 each 1 HHA 8\"  X20 each 1 HHA 8\"  X20 each 0 HHA 8\"  X20 " "each 0 HHA 8\"  X20 each 0 HHA Hold X 10 on 6\" steps at staircase w/o HHA  6\"  X20 ea 1 HHA  X10  0 HHA   Step over 12\" little fwd and lateral    X10 ea  2 laps fwd and 2 laps lateral  Hold 2 laps fwd and 1 laps lateral      Step ups lateral 6\"  X20 each no HHA 8\"  X20 each 1 HHA 8\"  X20 each 1 HHA 8\"  X20 each 1 HHA 8\"  X20 each 1 HHA 8\"  X20 each 1 HHA Hold   6\"  X20 ea 0 HHA   Step downs  4\"  X15 ea 2 HHA 4\"  X15 ea 2 HHA 4\"  X20 ea 2 HHA 4\"  X20 ea 2 HHA 6\" x 20 ea 2 HHA Hold      Standing marching X20 ea X20 ea X20 ea X20 ea X20 ea X 20 ea X 20 ea X 20 ea  X20 ea   Standing TKE       Purple TB 2 x 10      3 way hip X20 ea  B/l X20 ea b/l X20 ea b/l X20 ea b/l X20 ea b/l X 20 ea b/l X 20 ea b/l X 20 ea b/l  X20 ea  B/l   Lateral stepping 2 laps 2 laps 2 laps    1 lap   2 laps   Offset Sharpened Rhomberg       On airex 4 x :30      Diagonal Big Step on Airex       4 laps with SBA      Sit to stand             Leg press  DL  65#  X20  SL  35#  X20 ea plate 7 DL  55#  X20  SL  35#  X20 ea plate 7 DL  55#  X20  SL  35#  X20 ea plate 7 DL  55#  X20  SL  35#  X20 ea plate 7 DL  55#  X20  SL  35#  X20 ea plate 7 DL  55#  X20  SL  35# RLE  X20  and 25# LLE plate 7 DL  65#  X20  SL  35# RLE  X20  and 35# LLE plate 7 DL  65#  X20  SL  35# RLE  X20  and 35# LLE plate 7    Kneel on chair    50%  2' L LE         Ther Ex             bike 10' full rev 10' 10' 10' 10' 10' 10' 10' 10'  10' full rev   Knee flexion stretch at step       5 x 20\" ea   5x20\" ea   HR x20  x20 x20      x20   Standing marching             Ther Activity             STS w/ band         NV    STS         1 x 10    Gait Training             TUG             Modalities             CP                              "

## 2025-06-05 ENCOUNTER — OFFICE VISIT (OUTPATIENT)
Age: 65
End: 2025-06-05
Attending: PHYSICIAN ASSISTANT
Payer: COMMERCIAL

## 2025-06-05 DIAGNOSIS — M17.12 PRIMARY OSTEOARTHRITIS OF LEFT KNEE: Primary | ICD-10-CM

## 2025-06-05 DIAGNOSIS — M25.562 ACUTE PAIN OF LEFT KNEE: ICD-10-CM

## 2025-06-05 DIAGNOSIS — Z96.652 S/P TKR (TOTAL KNEE REPLACEMENT), LEFT: ICD-10-CM

## 2025-06-05 DIAGNOSIS — Z74.09 IMPAIRED FUNCTIONAL MOBILITY, BALANCE, GAIT, AND ENDURANCE: ICD-10-CM

## 2025-06-05 PROCEDURE — 97530 THERAPEUTIC ACTIVITIES: CPT

## 2025-06-05 PROCEDURE — 97112 NEUROMUSCULAR REEDUCATION: CPT

## 2025-06-05 PROCEDURE — 97110 THERAPEUTIC EXERCISES: CPT

## 2025-06-05 NOTE — HOME EXERCISE EDUCATION
Program_ID:[id number]   Access Code: [email or web address]  URL: [email or web address]  Date: 06-  Prepared By: Elvin Leslie    Program Notes      Exercises      - Seated Long Arc Quad with Hip Adduction - 1 x daily - 7 x weekly - 2 sets - 10 reps      - Sit to Stand on Foam Pad with Resistance Around Legs - 1 x daily - 7 x weekly - 2 sets - 10 reps      - Side Stepping with Resistance at Ankles - 1 x daily - 7 x weekly - 1 sets - 2 reps

## 2025-06-05 NOTE — PROGRESS NOTES
Daily Note     Today's date: 2025  Patient name: Lori Damon  : 1960  MRN: [id number]  Referring provider: Chica Dennis PA-C  Dx:   Encounter Diagnosis     ICD-10-CM    1. Primary osteoarthritis of left knee  M17.12       2. S/P TKR (total knee replacement), left  Z96.652       3. Impaired functional mobility, balance, gait, and endurance  Z74.09       4. Acute pain of left knee  M25.562           Start Time: 1116  Stop Time: 1204  Total time in clinic (min): 48 minutes    Subjective: Patient reports 5/10 pain today in the left knee. She admits that she was busy planting yesterday and is surprised that her pain isn't worse today.       Objective: See treatment diary below      Assessment: Lori tolerated today's treatment session well. Lori completed all exercises with no adverse reactions. Patient education provided on continued plan of care and updated HEP with printout given. Patient required verbal cueing and demonstrations for proper exercise form. Added in STS with band around legs to have patient focus on preventing dynamic valgus. Also added in further hip abduction and quad strengthening exercises with no adverse reactions. Lori continues to benefit from skilled OPPT services to address range of motion deficits, BLE strength deficits, and balance impairment. PT will continue to address functional deficits and focus on progression of POC per patient tolerance.      Patient performed Recumbent bike to increase blood flow to the area being treated, prepare the muscles for strength training and stretching, improve overall tolerance to activity, and aerobic endurance.       Plan: Continue per plan of care.      Precautions: osteoporosis      Manuals 5/1 5/6 5/8 5/12 5/19 5/22 5/28 5/30 6/3 6/5    L patellar mobs MW grade III MW grade III            PROM/ stretch               RE         MF     STM massage superiorly to L scar              Scar massage MW + STM to medial L knee MW           "  Neuro Re-Ed              HEP edu; home set up; AD management HEP edu  MW HEP edu  MW HEP edu  MW HEP edu  MW HEP edu  MF HEP edu  MF HEP edu  MF  HEP edu and POC  MF HEP update  MF    Step ups fwd 6\"  X20 each no HHA 8\"  X20 each 1 HHA 8\"  X20 each 1 HHA 8\"  X20 each 0 HHA 8\"  X20 each 0 HHA 8\"  X20 each 0 HHA Hold X 10 on 6\" steps at staircase w/o HHA      Step over 12\" little fwd and lateral    X10 ea  2 laps fwd and 2 laps lateral  Hold 2 laps fwd and 1 laps lateral   2 laps fwd and 2 laps lateral     Step ups lateral 6\"  X20 each no HHA 8\"  X20 each 1 HHA 8\"  X20 each 1 HHA 8\"  X20 each 1 HHA 8\"  X20 each 1 HHA 8\"  X20 each 1 HHA Hold       Step downs  4\"  X15 ea 2 HHA 4\"  X15 ea 2 HHA 4\"  X20 ea 2 HHA 4\"  X20 ea 2 HHA 6\" x 20 ea 2 HHA Hold       Standing marching X20 ea X20 ea X20 ea X20 ea X20 ea X 20 ea X 20 ea X 20 ea      Standing TKE       Purple TB 2 x 10       3 way hip X20 ea  B/l X20 ea b/l X20 ea b/l X20 ea b/l X20 ea b/l X 20 ea b/l X 20 ea b/l X 20 ea b/l      Lateral stepping 2 laps 2 laps 2 laps    1 lap       Offset Sharpened Rhomberg       On airex 4 x :30       Diagonal Big Step on Airex       4 laps with SBA       LAQ with hip add          2 x 10 on LLE    Leg press  DL  65#  X20  SL  35#  X20 ea plate 7 DL  55#  X20  SL  35#  X20 ea plate 7 DL  55#  X20  SL  35#  X20 ea plate 7 DL  55#  X20  SL  35#  X20 ea plate 7 DL  55#  X20  SL  35#  X20 ea plate 7 DL  55#  X20  SL  35# RLE  X20  and 25# LLE plate 7 DL  65#  X20  SL  35# RLE  X20  and 35# LLE plate 7 DL  65#  X20  SL  35# RLE  X20  and 35# LLE plate 7 DL  65#  X20  SL  45# RLE  X20  and 45# LLE plate 7    Kneel on chair    50%  2' L LE          SLS Blaze Pods          4 x :30    Ther Ex              bike 10' full rev 10' 10' 10' 10' 10' 10' 10' 10'  10'     Knee flexion stretch at step       5 x 20\" ea   5 x :20 on LLE    HR x20  x20 x20          Ther Activity              STS w/ band         NV W/ black band for hip abd to prevent " valgus 2 x 10    Sidestepping          Yellow  COB x 1 lap    Gait Training              TUG              Modalities              CP

## 2025-06-05 NOTE — HOME EXERCISE EDUCATION
Program_ID:[id number]   Access Code: [email or web address]  URL: [email or web address]  Date: 06-  Prepared By: Elvin Leslie    Program Notes      Exercises      - Seated Long Arc Quad with Hip Adduction - 1 x daily - 7 x weekly - 2 sets - 10 reps      - Sit to Stand on Foam Pad with Resistance Around Legs - 1 x daily - 7 x weekly - 2 sets - 10 reps      - Side Stepping with Resistance at Ankles - 1 x daily - 7 x weekly - 1 sets - 3 reps

## 2025-06-05 NOTE — HOME EXERCISE EDUCATION
Program_ID:[id number]   Access Code: [email or web address]  URL: [email or web address]  Date: 06-  Prepared By: Elvin Leslie    Program Notes      Exercises      - Seated Long Arc Quad with Hip Adduction - 1 x daily - 7 x weekly - 2 sets - 10 reps      - Sit to Stand on Foam Pad with Resistance Around Legs - 1 x daily - 7 x weekly - 2 sets - 10 reps      - Side Stepping with Resistance at Ankles - 1 x daily - 7 x weekly - 2 sets - 10 reps

## 2025-06-05 NOTE — HOME EXERCISE EDUCATION
Program_ID:[id number]   Access Code: [email or web address]  URL: [email or web address]  Date: 06-  Prepared By: Elvin Leslie    Program Notes      Exercises      - Seated Long Arc Quad with Hip Adduction - 1 x daily - 7 x weekly - 2 sets - 10 reps      - Sit to Stand on Foam Pad with Resistance Around Legs - 1 x daily - 7 x weekly - 2 sets - 10 reps      - Side Stepping with Resistance at Ankles - 1 x daily - 7 x weekly - 2 sets - 10 reps

## 2025-06-05 NOTE — HOME EXERCISE EDUCATION
Program_ID:[id number]   Access Code: [email or web address]  URL: [email or web address]  Date: 06-  Prepared By: Elvin Leslie    Program Notes      Exercises      - Seated Long Arc Quad with Hip Adduction - 1 x daily - 7 x weekly - 2 sets - 10 reps      - Sit to Stand on Foam Pad with Resistance Around Legs - 1 x daily - 7 x weekly - 2 sets - 10 reps      - Side Stepping with Resistance at Ankles - 1 x daily - 7 x weekly - 1 sets - 3 reps

## 2025-06-09 ENCOUNTER — OFFICE VISIT (OUTPATIENT)
Age: 65
End: 2025-06-09
Attending: PHYSICIAN ASSISTANT
Payer: COMMERCIAL

## 2025-06-09 DIAGNOSIS — Z96.652 S/P TKR (TOTAL KNEE REPLACEMENT), LEFT: ICD-10-CM

## 2025-06-09 DIAGNOSIS — M17.12 PRIMARY OSTEOARTHRITIS OF LEFT KNEE: Primary | ICD-10-CM

## 2025-06-09 DIAGNOSIS — Z74.09 IMPAIRED FUNCTIONAL MOBILITY, BALANCE, GAIT, AND ENDURANCE: ICD-10-CM

## 2025-06-09 PROCEDURE — 97112 NEUROMUSCULAR REEDUCATION: CPT

## 2025-06-09 PROCEDURE — 97110 THERAPEUTIC EXERCISES: CPT

## 2025-06-09 NOTE — PROGRESS NOTES
Daily Note     Today's date: 2025  Patient name: Lori Damon  : 1960  MRN: [id number]  Referring provider: Chica Dennis PA-C  Dx:   Encounter Diagnosis     ICD-10-CM    1. Primary osteoarthritis of left knee  M17.12       2. S/P TKR (total knee replacement), left  Z96.652       3. Impaired functional mobility, balance, gait, and endurance  Z74.09           Start Time: 1116  Stop Time: 1154  Total time in clinic (min): 38 minutes    Subjective: Patient reports 6/10 pain in the left knee today. She states that she over did it again this weekend with gardening. She states that she was kneeling on her knee for about 30 minutes as well. She also notes that the weather is bothering her back and knee today as well.       Objective: See treatment diary below      Assessment: Lori tolerated today's treatment session well. Lori completed all exercises with no adverse reactions. Patient education provided on continued plan of care and progression of POC and exercises. Modified session today due to patient's inc.. in back and left knee pain today. No pain exacerbations during session. Lori continues to benefit from skilled OPPT services to address range of motion deficits, BLE strength deficits, gait abnormalities, and balance impairment. PT will continue to address functional deficits and focus on progression of POC per patient tolerance.      Patient performed Recumbent bike to increase blood flow to the area being treated, prepare the muscles for strength training and stretching, improve overall tolerance to activity, and aerobic endurance.       Plan: Continue per plan of care.  Progress treatment as tolerated.       Precautions: osteoporosis      Manuals 5/1 5/6 5/8 5/12 5/19 5/22 5/28 5/30 6/3 6/5 6/9   L patellar mobs MW grade III MW grade III            PROM/ stretch               RE         MF     STM massage superiorly to L scar              Scar massage MW + STM to medial L knee MW            Neuro  "Re-Ed              HEP edu; home set up; AD management HEP edu  MW HEP edu  MW HEP edu  MW HEP edu  MW HEP edu  MF HEP edu  MF HEP edu  MF  HEP edu and POC  MF HEP update  MF    Step ups fwd 6\"  X20 each no HHA 8\"  X20 each 1 HHA 8\"  X20 each 1 HHA 8\"  X20 each 0 HHA 8\"  X20 each 0 HHA 8\"  X20 each 0 HHA Hold X 10 on 6\" steps at staircase w/o HHA      Step over 12\" little fwd and lateral    X10 ea  2 laps fwd and 2 laps lateral  Hold 2 laps fwd and 1 laps lateral   2 laps fwd and 2 laps lateral  HOLD   Step ups lateral 6\"  X20 each no HHA 8\"  X20 each 1 HHA 8\"  X20 each 1 HHA 8\"  X20 each 1 HHA 8\"  X20 each 1 HHA 8\"  X20 each 1 HHA Hold       Step downs  4\"  X15 ea 2 HHA 4\"  X15 ea 2 HHA 4\"  X20 ea 2 HHA 4\"  X20 ea 2 HHA 6\" x 20 ea 2 HHA Hold       Standing marching X20 ea X20 ea X20 ea X20 ea X20 ea X 20 ea X 20 ea X 20 ea      Standing TKE       Purple TB 2 x 10       3 way hip X20 ea  B/l X20 ea b/l X20 ea b/l X20 ea b/l X20 ea b/l X 20 ea b/l X 20 ea b/l X 20 ea b/l   X 10 ea b/l   Offset Sharpened Rhomberg       On airex 4 x :30    On airex 4 x :30   Diagonal Big Step on Airex       4 laps with SBA       LAQ with hip add          2 x 10 on LLE 2 x 10 on LLE   Leg press  DL  65#  X20  SL  35#  X20 ea plate 7 DL  55#  X20  SL  35#  X20 ea plate 7 DL  55#  X20  SL  35#  X20 ea plate 7 DL  55#  X20  SL  35#  X20 ea plate 7 DL  55#  X20  SL  35#  X20 ea plate 7 DL  55#  X20  SL  35# RLE  X20  and 25# LLE plate 7 DL  65#  X20  SL  35# RLE  X20  and 35# LLE plate 7 DL  65#  X20  SL  35# RLE  X20  and 35# LLE plate 7 DL  65#  X20  SL  45# RLE  X20  and 45# LLE plate 7 DL  65#  X20  SL  45# RLE  X20  and 45# LLE plate 7   SLS Blaze Pods          4 x :30 4 x :30   Ther Ex              bike 10' full rev 10' 10' 10' 10' 10' 10' 10' 10'  10'  10'   Knee flexion stretch at step       5 x 20\" ea   5 x :20 on LLE    Ther Activity              STS w/ band         NV W/ black band for hip abd to prevent valgus 2 x 10 HOLD "   Sidestepping          Yellow  COB x 1 lap Yellow COB x 1 lap   Gait Training              TUG              Modalities              CP

## 2025-06-10 ENCOUNTER — OFFICE VISIT (OUTPATIENT)
Age: 65
End: 2025-06-10

## 2025-06-10 VITALS — HEIGHT: 63 IN | BODY MASS INDEX: 25.87 KG/M2 | WEIGHT: 146 LBS

## 2025-06-10 DIAGNOSIS — Z96.652 STATUS POST TOTAL KNEE REPLACEMENT, LEFT: Primary | ICD-10-CM

## 2025-06-10 PROCEDURE — 99024 POSTOP FOLLOW-UP VISIT: CPT | Performed by: STUDENT IN AN ORGANIZED HEALTH CARE EDUCATION/TRAINING PROGRAM

## 2025-06-10 NOTE — PROGRESS NOTES
Subjective: 64 y.o. female presents to the office 3 months s/p left total knee arthroplasty performed on 03/17/2025. She notes that she has been having difficulty with kneeling activities. She notes that after she kneels it takes her knee a few days to recover. She also has been experiencing new onset of aching to the anterior proximal tibia. Incision well healed. Denies fevers or chills    Physical Exam:  Incision: well healed  ROM: 0-120 without pain  5/5 IP/Q/HS/TA/GS, 2+ DP/PT, SILT DP/SP/S/S/TN    No new imaging obtained today    Assessment & Plan  Status post total knee replacement, left  3 months s/p left total knee arthroplasty performed on 03/17/2025    - continue multi-modal pain control   - Weight bearing status: as tolerated  - DVT ppx: completed  - Incision care: no restrictions. Scar tissue massage demonstrated.  - Continue PT/OT exercises  - F/U in 9 months for annual check, new x-rays of bilateral knees upon arrival.           Scribe Attestation    I,:  Cheryl Rojas am acting as a scribe while in the presence of the attending physician.:       I,:  Malcolm Khan, DO personally performed the services described in this documentation    as scribed in my presence.:

## 2025-06-10 NOTE — ASSESSMENT & PLAN NOTE
3 months s/p left total knee arthroplasty performed on 03/17/2025    - continue multi-modal pain control   - Weight bearing status: as tolerated  - DVT ppx: completed  - Incision care: no restrictions. Scar tissue massage demonstrated.  - Continue PT/OT exercises  - F/U in 9 months for annual check, new x-rays of bilateral knees upon arrival.

## 2025-06-12 ENCOUNTER — OFFICE VISIT (OUTPATIENT)
Age: 65
End: 2025-06-12
Attending: PHYSICIAN ASSISTANT
Payer: COMMERCIAL

## 2025-06-12 DIAGNOSIS — Z74.09 IMPAIRED FUNCTIONAL MOBILITY, BALANCE, GAIT, AND ENDURANCE: ICD-10-CM

## 2025-06-12 DIAGNOSIS — Z96.652 S/P TKR (TOTAL KNEE REPLACEMENT), LEFT: ICD-10-CM

## 2025-06-12 DIAGNOSIS — M17.12 PRIMARY OSTEOARTHRITIS OF LEFT KNEE: Primary | ICD-10-CM

## 2025-06-12 PROCEDURE — 97110 THERAPEUTIC EXERCISES: CPT

## 2025-06-12 PROCEDURE — 97112 NEUROMUSCULAR REEDUCATION: CPT

## 2025-06-12 NOTE — PROGRESS NOTES
Daily Note     Today's date: 2025  Patient name: Lori Damon  : 1960  MRN: [id number]  Referring provider: Chica Dennis PA-C  Dx:   Encounter Diagnosis     ICD-10-CM    1. Primary osteoarthritis of left knee  M17.12       2. S/P TKR (total knee replacement), left  Z96.652       3. Impaired functional mobility, balance, gait, and endurance  Z74.09           Start Time: 1114  Stop Time: 1155  Total time in clinic (min): 41 minutes    Subjective: Patient reports that she knelt on her knee yesterday just once and had increased pain with this. She admits that she really struggled to get up.       Objective: See treatment diary below      Assessment: Lori tolerated today's treatment session well. Lori completed all exercises with no adverse reactions. Patient education provided on continued plan of care and progression of POC and exercises. Patient required stand by assistance for balance activities. Lori continues to benefit from skilled OPPT services to address range of motion deficits, BLE strength deficits, and balance impairment. PT will continue to address functional deficits and focus on progression of POC per patient tolerance.      Patient performed Recumbent bike to increase blood flow to the area being treated, prepare the muscles for strength training and stretching, improve overall tolerance to activity, and aerobic endurance.       Plan: Continue per plan of care.  Progress treatment as tolerated.       Precautions: osteoporosis      Manuals  6/3 6   L patellar mobs MW grade III MW grade III             PROM/ stretch                RE         MF      STM massage superiorly to L scar               Scar massage MW + STM to medial L knee MW             Neuro Re-Ed               HEP edu; home set up; AD management HEP edu  MW HEP edu  MW HEP edu  MW HEP edu  MW HEP edu  MF HEP edu  MF HEP edu  MF  HEP edu and POC  MF HEP update  MF     Step ups  "fwd 6\"  X20 each no HHA 8\"  X20 each 1 HHA 8\"  X20 each 1 HHA 8\"  X20 each 0 HHA 8\"  X20 each 0 HHA 8\"  X20 each 0 HHA Hold X 10 on 6\" steps at staircase w/o HHA       Step over 12\" little fwd and lateral    X10 ea  2 laps fwd and 2 laps lateral  Hold 2 laps fwd and 1 laps lateral   2 laps fwd and 2 laps lateral  HOLD    Step ups lateral 6\"  X20 each no HHA 8\"  X20 each 1 HHA 8\"  X20 each 1 HHA 8\"  X20 each 1 HHA 8\"  X20 each 1 HHA 8\"  X20 each 1 HHA Hold        Step downs  4\"  X15 ea 2 HHA 4\"  X15 ea 2 HHA 4\"  X20 ea 2 HHA 4\"  X20 ea 2 HHA 6\" x 20 ea 2 HHA Hold        Standing marching X20 ea X20 ea X20 ea X20 ea X20 ea X 20 ea X 20 ea X 20 ea       Standing TKE       Purple TB 2 x 10        3 way hip X20 ea  B/l X20 ea b/l X20 ea b/l X20 ea b/l X20 ea b/l X 20 ea b/l X 20 ea b/l X 20 ea b/l   X 10 ea b/l X 20 ea b/l   Offset Sharpened Rhomberg       On airex 4 x :30    On airex 4 x :30    Diagonal Big Step on Airex       4 laps with SBA        LAQ with hip add          2 x 10 on LLE 2 x 10 on LLE 2 x 10 on LLE   Leg press  DL  65#  X20  SL  35#  X20 ea plate 7 DL  55#  X20  SL  35#  X20 ea plate 7 DL  55#  X20  SL  35#  X20 ea plate 7 DL  55#  X20  SL  35#  X20 ea plate 7 DL  55#  X20  SL  35#  X20 ea plate 7 DL  55#  X20  SL  35# RLE  X20  and 25# LLE plate 7 DL  65#  X20  SL  35# RLE  X20  and 35# LLE plate 7 DL  65#  X20  SL  35# RLE  X20  and 35# LLE plate 7 DL  65#  X20  SL  45# RLE  X20  and 45# LLE plate 7 DL  65#  X20  SL  45# RLE  X20  and 45# LLE plate 7 DL  65#  X20  SL  45# RLE  X20  and 45# LLE plate 7   SLS Blaze Pods          4 x :30 4 x :30 4 x :30   Obstacle Course            Thera-band cushions diagonal walk, sharpened rhomberg on airex, 12\" cone step over   Ther Ex               bike 10' full rev 10' 10' 10' 10' 10' 10' 10' 10'  10'  10' 10'   Knee flexion stretch at step       5 x 20\" ea   5 x :20 on LLE     Ther Activity               STS w/ band         NV W/ black band for hip abd to prevent " valgus 2 x 10 HOLD    Sidestepping          Yellow  COB x 1 lap Yellow COB x 1 lap With blaze pods 3 x :30 and 1 lap yellow COB    Gait Training               TUG               Modalities               CP

## 2025-06-16 ENCOUNTER — OFFICE VISIT (OUTPATIENT)
Dept: FAMILY MEDICINE CLINIC | Facility: CLINIC | Age: 65
End: 2025-06-16
Payer: COMMERCIAL

## 2025-06-16 VITALS
HEART RATE: 65 BPM | SYSTOLIC BLOOD PRESSURE: 124 MMHG | TEMPERATURE: 97.9 F | RESPIRATION RATE: 16 BRPM | OXYGEN SATURATION: 95 % | DIASTOLIC BLOOD PRESSURE: 80 MMHG | HEIGHT: 63 IN | WEIGHT: 147.2 LBS | BODY MASS INDEX: 26.08 KG/M2

## 2025-06-16 DIAGNOSIS — H44.001 INFECTION OF RIGHT EYE: Primary | ICD-10-CM

## 2025-06-16 DIAGNOSIS — H44.001 INFECTION OF RIGHT EYE: ICD-10-CM

## 2025-06-16 PROCEDURE — 99213 OFFICE O/P EST LOW 20 MIN: CPT | Performed by: FAMILY MEDICINE

## 2025-06-16 PROCEDURE — G2211 COMPLEX E/M VISIT ADD ON: HCPCS | Performed by: FAMILY MEDICINE

## 2025-06-16 RX ORDER — ERYTHROMYCIN 5 MG/G
0.5 OINTMENT OPHTHALMIC DAILY
Qty: 24 G | Refills: 0 | Status: SHIPPED | OUTPATIENT
Start: 2025-06-16 | End: 2025-06-17

## 2025-06-16 NOTE — PROGRESS NOTES
"Name: Lori Damon      : 1960      MRN: [id number]  Encounter Provider: Naty Kenney MD  Encounter Date: 2025   Encounter department: [facility]  :  Assessment & Plan  Infection of right eye  Start course of erythromycin daily; f/u if no improvement   Orders:  •  erythromycin (ILOTYCIN) ophthalmic ointment; Administer 0.5 inches to the right eye in the morning           History of Present Illness   Insect Bite      Lori Damon is a very pleasant 64 year old female who presents today with concerns for her right eye. Patient states that she either got a mosquito or bug bite on the right upper eyelid. She then applied mupirocin to the area however quickly rubbed it off when it began to burn. She reports that the day after the eyelid was swollen and then her eye began to tear and develop a crusty discharge. Today she is not having a lot of discharge but reports some tearing of the eye. She denies that her vision is affected.     Review of Systems   Constitutional: Negative.    HENT: Negative.     Eyes:  Positive for discharge. Negative for photophobia, pain, redness, itching and visual disturbance.        Eyelid swelling   Respiratory: Negative.     Cardiovascular: Negative.    Gastrointestinal: Negative.    Genitourinary: Negative.    Musculoskeletal: Negative.    Skin: Negative.    Neurological: Negative.    Psychiatric/Behavioral: Negative.         Objective   /80 (BP Location: Left arm, Patient Position: Sitting, Cuff Size: Large)   Pulse 65   Temp 97.9 °F (36.6 °C) (Tympanic)   Resp 16   Ht 5' 3\" (1.6 m)   Wt 66.8 kg (147 lb 3.2 oz)   SpO2 95%   BMI 26.08 kg/m²      Physical Exam  Constitutional:       General: She is not in acute distress.     Appearance: She is not ill-appearing.   HENT:      Head: Normocephalic and atraumatic.     Eyes:      General:         Right eye: No discharge or hordeolum.         Left eye: No discharge or hordeolum.      Extraocular " Movements: Extraocular movements intact.      Right eye: Normal extraocular motion.      Left eye: Normal extraocular motion.      Conjunctiva/sclera:      Right eye: Right conjunctiva is not injected.      Left eye: Left conjunctiva is not injected.      Comments: No swelling noted of the upper eyelid     Cardiovascular:      Rate and Rhythm: Normal rate.   Pulmonary:      Effort: Pulmonary effort is normal. No respiratory distress.     Neurological:      General: No focal deficit present.      Mental Status: She is alert.     Psychiatric:         Mood and Affect: Mood normal.         Behavior: Behavior normal.

## 2025-06-17 ENCOUNTER — OFFICE VISIT (OUTPATIENT)
Age: 65
End: 2025-06-17
Attending: PHYSICIAN ASSISTANT
Payer: COMMERCIAL

## 2025-06-17 DIAGNOSIS — M17.12 PRIMARY OSTEOARTHRITIS OF LEFT KNEE: Primary | ICD-10-CM

## 2025-06-17 DIAGNOSIS — M25.562 ACUTE PAIN OF LEFT KNEE: ICD-10-CM

## 2025-06-17 DIAGNOSIS — Z96.652 S/P TKR (TOTAL KNEE REPLACEMENT), LEFT: ICD-10-CM

## 2025-06-17 DIAGNOSIS — Z74.09 IMPAIRED FUNCTIONAL MOBILITY, BALANCE, GAIT, AND ENDURANCE: ICD-10-CM

## 2025-06-17 PROCEDURE — 97110 THERAPEUTIC EXERCISES: CPT

## 2025-06-17 PROCEDURE — 97112 NEUROMUSCULAR REEDUCATION: CPT

## 2025-06-17 PROCEDURE — 97530 THERAPEUTIC ACTIVITIES: CPT

## 2025-06-17 RX ORDER — ERYTHROMYCIN 5 MG/G
0.5 OINTMENT OPHTHALMIC DAILY
Qty: 3.5 G | Refills: 0 | Status: SHIPPED | OUTPATIENT
Start: 2025-06-17 | End: 2025-06-24

## 2025-06-17 NOTE — PROGRESS NOTES
"Daily Note     Today's date: 2025  Patient name: Lori Damon  : 1960  MRN: [id number]  Referring provider: Chica Dennis PA-C  Dx:   Encounter Diagnosis     ICD-10-CM    1. Primary osteoarthritis of left knee  M17.12       2. S/P TKR (total knee replacement), left  Z96.652       3. Impaired functional mobility, balance, gait, and endurance  Z74.09       4. Acute pain of left knee  M25.562                      Subjective: States that she is doing well today especially given the weather!      Objective: See treatment diary below      Assessment: Lori tolerated today's treatment session well. Lori completed all exercises with no adverse reactions. Patient education provided on continued plan of care and progression of POC and exercises. Progressed program to include more higher level balance tasks. Patient required stand by assistance for balance activities. Lori continues to benefit from skilled OPPT services to address range of motion deficits, BLE strength deficits, and balance impairment. PT will continue to address functional deficits and focus on progression of POC per patient tolerance.      Patient performed Recumbent bike to increase blood flow to the area being treated, prepare the muscles for strength training and stretching, improve overall tolerance to activity, and aerobic endurance.       Plan: Continue per plan of care.  Progress treatment as tolerated.       Precautions: osteoporosis      Manuals  6/3 6/5 6/9 6/12   L patellar mobs               PROM/ stretch                RE         MF      STM massage superiorly to L scar               Scar massage               Neuro Re-Ed               HEP edu; home set up; AD management   HEP edu  MW HEP edu  MW HEP edu  MF HEP edu  MF HEP edu  MF  HEP edu and POC  MF HEP update  MF     Step ups fwd   8\"  X20 each 1 HHA 8\"  X20 each 0 HHA 8\"  X20 each 0 HHA 8\"  X20 each 0 HHA Hold X 10 on 6\" steps at staircase " "w/o HHA       Step over 12\" little fwd and lateral    X10 ea  2 laps fwd and 2 laps lateral  Hold 2 laps fwd and 1 laps lateral   2 laps fwd and 2 laps lateral  HOLD    Step ups lateral   8\"  X20 each 1 HHA 8\"  X20 each 1 HHA 8\"  X20 each 1 HHA 8\"  X20 each 1 HHA Hold        Step downs   4\"  X15 ea 2 HHA 4\"  X20 ea 2 HHA 4\"  X20 ea 2 HHA 6\" x 20 ea 2 HHA Hold        Standing marching   X20 ea X20 ea X20 ea X 20 ea X 20 ea X 20 ea       Standing TKE       Purple TB 2 x 10        3 way hip X 20 ea b/l  X20 ea b/l X20 ea b/l X20 ea b/l X 20 ea b/l X 20 ea b/l X 20 ea b/l   X 10 ea b/l X 20 ea b/l   Offset Sharpened Rhomberg       On airex 4 x :30    On airex 4 x :30    Diagonal Big Step on Airex       4 laps with SBA        LAQ with hip add 2x10 on LLE         2 x 10 on LLE 2 x 10 on LLE 2 x 10 on LLE   Leg press DL  65#  X20  SL  45# RLE  X20  and 45# LLE plate 7  DL  55#  X20  SL  35#  X20 ea plate 7 DL  55#  X20  SL  35#  X20 ea plate 7 DL  55#  X20  SL  35#  X20 ea plate 7 DL  55#  X20  SL  35#  X20 ea plate 7 DL  55#  X20  SL  35# RLE  X20  and 25# LLE plate 7 DL  65#  X20  SL  35# RLE  X20  and 35# LLE plate 7 DL  65#  X20  SL  35# RLE  X20  and 35# LLE plate 7 DL  65#  X20  SL  45# RLE  X20  and 45# LLE plate 7 DL  65#  X20  SL  45# RLE  X20  and 45# LLE plate 7 DL  65#  X20  SL  45# RLE  X20  and 45# LLE plate 7   SLS Blaze Pods 4 x :30         4 x :30 4 x :30 4 x :30   Walking w/ cone taps Fwd/lat x4 laps ea  6 cones              Wedge > 6\" lifeline box > floor X10 laps              Walking over hidden objects  X10 laps              Obstacle Course Hurdles > foam > cone weave > across foam pads in a row   X4 laps           Thera-band cushions diagonal walk, sharpened rhomberg on airex, 12\" cone step over   Ther Ex               bike 10'  10' 10' 10' 10' 10' 10' 10'  10'  10' 10'   Knee flexion stretch at step       5 x 20\" ea   5 x :20 on LLE     Ther Activity               STS w/ band         NV W/ black band " for hip abd to prevent valgus 2 x 10 HOLD    Sidestepping Yellow COB x 2 lap         Yellow  COB x 1 lap Yellow COB x 1 lap With blaze pods 3 x :30 and 1 lap yellow COB    Gait Training               TUG               Modalities               CP

## 2025-06-19 ENCOUNTER — APPOINTMENT (OUTPATIENT)
Age: 65
End: 2025-06-19
Attending: PHYSICIAN ASSISTANT
Payer: COMMERCIAL

## 2025-06-24 ENCOUNTER — EVALUATION (OUTPATIENT)
Age: 65
End: 2025-06-24
Attending: PHYSICIAN ASSISTANT
Payer: COMMERCIAL

## 2025-06-24 DIAGNOSIS — Z74.09 IMPAIRED FUNCTIONAL MOBILITY, BALANCE, GAIT, AND ENDURANCE: ICD-10-CM

## 2025-06-24 DIAGNOSIS — Z96.652 S/P TKR (TOTAL KNEE REPLACEMENT), LEFT: ICD-10-CM

## 2025-06-24 DIAGNOSIS — M25.562 ACUTE PAIN OF LEFT KNEE: ICD-10-CM

## 2025-06-24 DIAGNOSIS — M17.12 PRIMARY OSTEOARTHRITIS OF LEFT KNEE: Primary | ICD-10-CM

## 2025-06-24 PROCEDURE — 97530 THERAPEUTIC ACTIVITIES: CPT

## 2025-06-24 PROCEDURE — 97112 NEUROMUSCULAR REEDUCATION: CPT

## 2025-06-24 PROCEDURE — 97110 THERAPEUTIC EXERCISES: CPT

## 2025-06-24 NOTE — PROGRESS NOTES
"Daily Note     Today's date: 2025  Patient name: Lori Damon  : 1960  MRN: [id number]  Referring provider: Cihca Dennis PA-C  Dx:   Encounter Diagnosis     ICD-10-CM    1. Primary osteoarthritis of left knee  M17.12       2. S/P TKR (total knee replacement), left  Z96.652       3. Impaired functional mobility, balance, gait, and endurance  Z74.09       4. Acute pain of left knee  M25.562                      Subjective:  Lori reports that her L knee is feeling pretty good today.  She notes it didn't really bother her at all this morning and usually she is about a 6/10 but not today.       Objective: See treatment diary below  FOTO completed.     Assessment: Patient tolerated treatment well. Patient performed ex as noted with direct supervision.  Patient performed all ex without adverse response.   Patient is pleased with recent functional changes including being able to kneel on her knee for a long period of time and ability to sit with her foot unsupported and her knee doesn't ache.        Plan: Patient is discharged from formal PT intervention.  Patient will continue with independent exercise.       Precautions: osteoporosis      Manuals  5 6/3 6/5 6/9 6/12   L patellar mobs               PROM/ stretch                RE         MF      STM massage superiorly to L scar               Scar massage               Neuro Re-Ed               HEP edu; home set up; AD management   HEP edu  MW HEP edu  MW HEP edu  MF HEP edu  MF HEP edu  MF  HEP edu and POC  MF HEP update  MF     Step ups fwd   8\"  X20 each 1 HHA 8\"  X20 each 0 HHA 8\"  X20 each 0 HHA 8\"  X20 each 0 HHA Hold X 10 on 6\" steps at staircase w/o HHA       Step over 12\" little fwd and lateral    X10 ea  2 laps fwd and 2 laps lateral  Hold 2 laps fwd and 1 laps lateral   2 laps fwd and 2 laps lateral  HOLD    Step ups lateral   8\"  X20 each 1 HHA 8\"  X20 each 1 HHA 8\"  X20 each 1 HHA 8\"  X20 each 1 HHA Hold    " "    Step downs   4\"  X15 ea 2 HHA 4\"  X20 ea 2 HHA 4\"  X20 ea 2 HHA 6\" x 20 ea 2 HHA Hold        Standing marching   X20 ea X20 ea X20 ea X 20 ea X 20 ea X 20 ea       Standing TKE       Purple TB 2 x 10        3 way hip X 20 ea b/l X20 ea b/l X20 ea b/l X20 ea b/l X20 ea b/l X 20 ea b/l X 20 ea b/l X 20 ea b/l   X 10 ea b/l X 20 ea b/l   Offset Sharpened Rhomberg       On airex 4 x :30    On airex 4 x :30    Diagonal Big Step on Airex       4 laps with SBA        LAQ with hip add 2x10 on LLE 2x10        2 x 10 on LLE 2 x 10 on LLE 2 x 10 on LLE   Leg press DL  65#  X20  SL  45# RLE  X20  and 45# LLE plate 7 DL 65#  x20      SL 45#  RLE  x20      SLE L  45#  x20 DL  55#  X20  SL  35#  X20 ea plate 7 DL  55#  X20  SL  35#  X20 ea plate 7 DL  55#  X20  SL  35#  X20 ea plate 7 DL  55#  X20  SL  35#  X20 ea plate 7 DL  55#  X20  SL  35# RLE  X20  and 25# LLE plate 7 DL  65#  X20  SL  35# RLE  X20  and 35# LLE plate 7 DL  65#  X20  SL  35# RLE  X20  and 35# LLE plate 7 DL  65#  X20  SL  45# RLE  X20  and 45# LLE plate 7 DL  65#  X20  SL  45# RLE  X20  and 45# LLE plate 7 DL  65#  X20  SL  45# RLE  X20  and 45# LLE plate 7   SLS Blaze Pods 4 x :30 np        4 x :30 4 x :30 4 x :30   Walking w/ cone taps Fwd/lat x4 laps ea  6 cones Fwd/lat  X4 laps ea 6 cones  4 laps             Wedge > 6\" lifeline box > floor X10 laps X10 laps             Walking over hidden objects  X10 laps X10 laps             Obstacle Course Hurdles > foam > cone weave > across foam pads in a row   X4 laps Hurdles, foam, cone weave, across foam pads in a row  X4 laps          Thera-band cushions diagonal walk, sharpened rhomberg on airex, 12\" cone step over   Ther Ex               bike 10' 10' 10' 10' 10' 10' 10' 10' 10'  10'  10' 10'   Knee flexion stretch at step       5 x 20\" ea   5 x :20 on LLE     Ther Activity               STS w/ band         NV W/ black band for hip abd to prevent valgus 2 x 10 HOLD    Sidestepping Yellow COB x 2 lap         " Yellow  COB x 1 lap Yellow COB x 1 lap With blaze pods 3 x :30 and 1 lap yellow COB    Gait Training               TUG               Modalities               CP

## 2025-07-07 DIAGNOSIS — Z72.0 TOBACCO ABUSE: ICD-10-CM

## 2025-07-08 RX ORDER — BUPROPION HYDROCHLORIDE 150 MG/1
150 TABLET ORAL EVERY MORNING
Qty: 90 TABLET | Refills: 0 | Status: SHIPPED | OUTPATIENT
Start: 2025-07-08

## 2025-07-09 NOTE — TELEPHONE ENCOUNTER
Pt didn't feel the need to schedule another appt. Stated she never had to have an appt just for her meds and she was also her not long ago on 6/16.

## 2025-07-29 ENCOUNTER — TELEPHONE (OUTPATIENT)
Age: 65
End: 2025-07-29

## 2025-08-07 DIAGNOSIS — G43.009 MIGRAINE WITHOUT AURA AND WITHOUT STATUS MIGRAINOSUS, NOT INTRACTABLE: ICD-10-CM

## 2025-08-07 RX ORDER — PROPRANOLOL HYDROCHLORIDE 60 MG/1
60 CAPSULE, EXTENDED RELEASE ORAL DAILY
Qty: 90 CAPSULE | Refills: 1 | Status: SHIPPED | OUTPATIENT
Start: 2025-08-07

## 2025-08-11 ENCOUNTER — APPOINTMENT (OUTPATIENT)
Age: 65
End: 2025-08-11
Payer: COMMERCIAL

## 2025-08-13 ENCOUNTER — VBI (OUTPATIENT)
Dept: ADMINISTRATIVE | Facility: OTHER | Age: 65
End: 2025-08-13

## 2025-08-21 DIAGNOSIS — Z00.6 ENCOUNTER FOR EXAMINATION FOR NORMAL COMPARISON OR CONTROL IN CLINICAL RESEARCH PROGRAM: ICD-10-CM

## (undated) DEVICE — CHLORAPREP HI-LITE 26ML ORANGE

## (undated) DEVICE — TELFA NON-ADHERENT ABSORBENT DRESSING: Brand: TELFA

## (undated) DEVICE — GLOVE SRG BIOGEL 6.5

## (undated) DEVICE — GLOVE INDICATOR PI UNDERGLOVE SZ 6.5 BLUE

## (undated) DEVICE — GLOVE SRG BIOGEL 7

## (undated) DEVICE — SHEATH URETERAL ACCESS 10/12FR 35CM PROXIS

## (undated) DEVICE — IMPERVIOUS STOCKINETTE: Brand: DEROYAL

## (undated) DEVICE — DRESSING TELFA 2 X 3 IN STRL

## (undated) DEVICE — SCD SEQUENTIAL COMPRESSION COMFORT SLEEVE MEDIUM KNEE LENGTH: Brand: KENDALL SCD

## (undated) DEVICE — SUT VICRYL 1 CT-1 27 IN J261H

## (undated) DEVICE — SUT STRATAFIX SPIRAL PDS PLUS 1 CTX 18 IN SXPP1A400

## (undated) DEVICE — PREP SURGICAL PURPREP 26ML

## (undated) DEVICE — SURGICAL GOWN, XL SMARTSLEEVE: Brand: CONVERTORS

## (undated) DEVICE — SYRINGE 50ML LL

## (undated) DEVICE — HOOD: Brand: FLYTE, SURGICOOL

## (undated) DEVICE — GLOVE SRG BIOGEL 7.5

## (undated) DEVICE — 3M™ TEGADERM™ TRANSPARENT FILM DRESSING FRAME STYLE, 1626W, 4 IN X 4-3/4 IN (10 CM X 12 CM), 50/CT 4CT/CASE: Brand: 3M™ TEGADERM™

## (undated) DEVICE — CAPIT KNEE ATTUNE FB MEDIAL STAB AOX POR

## (undated) DEVICE — CATH URETERAL 5FR X 70 CM FLEX TIP POLYUR BARD

## (undated) DEVICE — DRAPE EQUIPMENT RF WAND

## (undated) DEVICE — ACE WRAP 6 IN UNSTERILE

## (undated) DEVICE — SAW BLADE OSCILLATING BRAZOL 167

## (undated) DEVICE — COBAN 6 IN STERILE

## (undated) DEVICE — DRESSING MEPILEX AG BORDER POST-OP 4 X 12 IN

## (undated) DEVICE — TUBING SUCTION 5MM X 12 FT

## (undated) DEVICE — 3M™ STERI-STRIP™ COMPOUND BENZOIN TINCTURE 40 BAGS/CARTON 4 CARTONS/CASE C1544: Brand: 3M™ STERI-STRIP™

## (undated) DEVICE — STERILE SURGICAL LUBRICANT,  TUBE: Brand: SURGILUBE

## (undated) DEVICE — 450 ML BOTTLE OF 0.05% CHLORHEXIDINE GLUCONATE IN 99.95% STERILE WATER FOR IRRIGATION, USP AND APPLICATOR.: Brand: IRRISEPT ANTIMICROBIAL WOUND LAVAGE

## (undated) DEVICE — BETHLEHEM UNIV TOTAL KNEE, KIT: Brand: CARDINAL HEALTH

## (undated) DEVICE — SPECIMEN CONTAINER STERILE PEEL PACK

## (undated) DEVICE — 3M™ STERI-DRAPE™ U-DRAPE 1015: Brand: STERI-DRAPE™

## (undated) DEVICE — INTENDED FOR TISSUE SEPARATION, AND OTHER PROCEDURES THAT REQUIRE A SHARP SURGICAL BLADE TO PUNCTURE OR CUT.: Brand: BARD-PARKER ® CARBON RIB-BACK BLADES

## (undated) DEVICE — UROCATCH BAG

## (undated) DEVICE — MEDI-VAC YANKAUER SUCTION HANDLE W/BULBOUS AND CONTROL VENT: Brand: CARDINAL HEALTH

## (undated) DEVICE — SUT VICRYL 3-0 SH 27 IN J416H

## (undated) DEVICE — ADHESIVE SKIN HIGH VISCOSITY EXOFIN MICRO 0.5ML

## (undated) DEVICE — INVIEW CLEAR LEGGINGS: Brand: CONVERTORS

## (undated) DEVICE — SUT VICRYL 2-0 SH 27 IN UNDYED J417H

## (undated) DEVICE — STRL PENROSE DRAIN 18" X 1/2": Brand: CARDINAL HEALTH

## (undated) DEVICE — PACK TUR

## (undated) DEVICE — GLOVE INDICATOR PI UNDERGLOVE SZ 7 BLUE

## (undated) DEVICE — PLUMEPEN PRO 10FT

## (undated) DEVICE — EXOFIN PRECISION PEN HIGH VISCOSITY TOPICAL SKIN ADHESIVE: Brand: EXOFIN PRECISION PEN, 1G

## (undated) DEVICE — SUT MONOCRYL 4-0 PS-2 27 IN Y426H

## (undated) DEVICE — GUIDEWIRE STRGHT TIP 0.035 IN  SOLO PLUS

## (undated) DEVICE — GLOVE SRG BIOGEL 8.5

## (undated) DEVICE — SINGLE PORT MANIFOLD: Brand: NEPTUNE 2

## (undated) DEVICE — SYRINGE 20ML LL

## (undated) DEVICE — SUT STRATAFIX SPIRAL PLUS 3-0 PS-2 30 X 30 CM SXMP2B408

## (undated) DEVICE — 2963 MEDIPORE SOFT CLOTH TAPE 3 IN X 10 YD 12 RLS/CS: Brand: 3M™ MEDIPORE™

## (undated) DEVICE — BETHLEHEM UNIVERSAL MINOR GEN: Brand: CARDINAL HEALTH

## (undated) DEVICE — GLOVE SRG BIOGEL ORTHOPEDIC 8.5

## (undated) DEVICE — 4-PORT MANIFOLD: Brand: NEPTUNE 2

## (undated) DEVICE — PREMIUM DRY TRAY LF: Brand: MEDLINE INDUSTRIES, INC.

## (undated) DEVICE — EXIDINE 4 PCT

## (undated) DEVICE — INTENDED FOR TISSUE SEPARATION, AND OTHER PROCEDURES THAT REQUIRE A SHARP SURGICAL BLADE TO PUNCTURE OR CUT.: Brand: BARD-PARKER SAFETY BLADES SIZE 15, STERILE

## (undated) DEVICE — COVER ROLL 8 IN X 2 YD STRETCH TAPE

## (undated) DEVICE — ENDOSCOPIC VALVE WITH ADAPTER.: Brand: SURSEAL® II

## (undated) DEVICE — 3000CC GUARDIAN II: Brand: GUARDIAN

## (undated) DEVICE — SUT PDS II 0 CT-2 27 IN Z334H

## (undated) DEVICE — HANDPIECE SET WITH RETRACTABLE COAXIAL FAN SPRAY TIP AND SUCTION TUBE: Brand: INTERPULSE

## (undated) DEVICE — WEBRIL 6 IN UNSTERILE

## (undated) DEVICE — NEEDLE 18 G X 1 1/2

## (undated) DEVICE — NEEDLE HYPO 22G X 1-1/2 IN

## (undated) DEVICE — GLOVE INDICATOR PI UNDERGLOVE SZ 8.5 BLUE

## (undated) DEVICE — SUT VICRYL 2-0 CT-1 36 IN J945H

## (undated) DEVICE — GLOVE SRG BIOGEL ECLIPSE 7.5

## (undated) DEVICE — CEMENT MIXING SYSTEM WITH FEMORAL BREAKWAY NOZZLE: Brand: REVOLUTION

## (undated) DEVICE — 3M™ STERI-STRIP™ REINFORCED ADHESIVE SKIN CLOSURES, R1547, 1/2 IN X 4 IN (12 MM X 100 MM), 6 STRIPS/ENVELOPE: Brand: 3M™ STERI-STRIP™